# Patient Record
Sex: FEMALE | Race: WHITE | NOT HISPANIC OR LATINO | Employment: OTHER | URBAN - METROPOLITAN AREA
[De-identification: names, ages, dates, MRNs, and addresses within clinical notes are randomized per-mention and may not be internally consistent; named-entity substitution may affect disease eponyms.]

---

## 2023-08-31 PROBLEM — R73.03 PREDIABETES: Status: ACTIVE | Noted: 2023-08-31

## 2023-08-31 PROBLEM — M51.36 DEGENERATION OF LUMBAR INTERVERTEBRAL DISC: Status: ACTIVE | Noted: 2023-08-31

## 2023-08-31 PROBLEM — N94.819 VULVODYNIA: Status: ACTIVE | Noted: 2023-08-31

## 2023-08-31 PROBLEM — M35.3 POLYMYALGIA RHEUMATICA (MULTI): Status: ACTIVE | Noted: 2023-08-31

## 2023-08-31 PROBLEM — E03.9 HYPOTHYROID: Status: ACTIVE | Noted: 2023-08-31

## 2023-08-31 PROBLEM — R91.1 PULMONARY NODULE: Status: ACTIVE | Noted: 2023-08-31

## 2023-08-31 PROBLEM — R70.0 ELEVATED ERYTHROCYTE SEDIMENTATION RATE: Status: ACTIVE | Noted: 2023-08-31

## 2023-08-31 PROBLEM — I10 HYPERTENSION: Status: ACTIVE | Noted: 2023-08-31

## 2023-08-31 PROBLEM — M47.816 LUMBAR SPONDYLOSIS: Status: ACTIVE | Noted: 2023-08-31

## 2023-08-31 PROBLEM — E11.8 COMPLICATION OF DIABETES MELLITUS (MULTI): Status: ACTIVE | Noted: 2023-08-31

## 2023-08-31 PROBLEM — E55.9 VITAMIN D DEFICIENCY: Status: ACTIVE | Noted: 2023-08-31

## 2023-08-31 PROBLEM — E78.5 HYPERLIPIDEMIA: Status: ACTIVE | Noted: 2023-08-31

## 2023-08-31 PROBLEM — I25.10 CORONARY ARTERY DISEASE: Status: ACTIVE | Noted: 2023-08-31

## 2023-08-31 PROBLEM — M19.90 OSTEOARTHRITIS: Status: ACTIVE | Noted: 2023-08-31

## 2023-08-31 PROBLEM — Z90.710 HISTORY OF HYSTERECTOMY: Status: ACTIVE | Noted: 2023-08-31

## 2023-08-31 PROBLEM — E11.9 DIABETES MELLITUS WITHOUT COMPLICATION (MULTI): Status: ACTIVE | Noted: 2023-08-31

## 2023-08-31 PROBLEM — M47.817 SPONDYLOSIS WITHOUT MYELOPATHY OR RADICULOPATHY, LUMBOSACRAL REGION: Status: ACTIVE | Noted: 2023-08-31

## 2023-08-31 PROBLEM — M51.369 DEGENERATION OF LUMBAR INTERVERTEBRAL DISC: Status: ACTIVE | Noted: 2023-08-31

## 2023-08-31 PROBLEM — L90.0 LICHEN SCLEROSUS: Status: ACTIVE | Noted: 2023-08-31

## 2023-08-31 RX ORDER — DICYCLOMINE HYDROCHLORIDE 10 MG/1
1 CAPSULE ORAL 3 TIMES DAILY
COMMUNITY
End: 2024-05-30 | Stop reason: SDUPTHER

## 2023-08-31 RX ORDER — HYDROGEN PEROXIDE 3 %
1 SOLUTION, NON-ORAL MISCELLANEOUS DAILY
COMMUNITY
Start: 2014-08-21

## 2023-08-31 RX ORDER — LANOLIN ALCOHOL/MO/W.PET/CERES
1 CREAM (GRAM) TOPICAL DAILY
COMMUNITY

## 2023-08-31 RX ORDER — LEVOTHYROXINE SODIUM 88 UG/1
1 TABLET ORAL DAILY
COMMUNITY
End: 2024-02-05 | Stop reason: SDUPTHER

## 2023-08-31 RX ORDER — ATORVASTATIN CALCIUM 80 MG/1
1 TABLET, FILM COATED ORAL DAILY
COMMUNITY

## 2023-08-31 RX ORDER — LOSARTAN POTASSIUM 100 MG/1
1 TABLET ORAL DAILY
COMMUNITY
End: 2023-10-14 | Stop reason: HOSPADM

## 2023-08-31 RX ORDER — BLOOD SUGAR DIAGNOSTIC
STRIP MISCELLANEOUS
COMMUNITY
End: 2023-12-28

## 2023-08-31 RX ORDER — VIT C/E/ZN/COPPR/LUTEIN/ZEAXAN 250MG-90MG
1 CAPSULE ORAL DAILY
COMMUNITY

## 2023-08-31 RX ORDER — NAPROXEN SODIUM 220 MG/1
1 TABLET, FILM COATED ORAL DAILY
COMMUNITY

## 2023-09-04 ENCOUNTER — HOSPITAL ENCOUNTER (OUTPATIENT)
Dept: DATA CONVERSION | Facility: HOSPITAL | Age: 88
Discharge: HOME | End: 2023-09-04
Payer: MEDICARE

## 2023-09-04 DIAGNOSIS — Z88.0 ALLERGY STATUS TO PENICILLIN: ICD-10-CM

## 2023-09-04 DIAGNOSIS — Z79.899 OTHER LONG TERM (CURRENT) DRUG THERAPY: ICD-10-CM

## 2023-09-04 DIAGNOSIS — I10 ESSENTIAL (PRIMARY) HYPERTENSION: ICD-10-CM

## 2023-09-04 LAB
ANION GAP SERPL CALCULATED.3IONS-SCNC: 13 MMOL/L (ref 0–19)
BASOPHILS # BLD AUTO: 0.02 K/UL (ref 0–0.22)
BASOPHILS NFR BLD AUTO: 0.3 % (ref 0–1)
BUN SERPL-MCNC: 15 MG/DL (ref 8–25)
BUN/CREAT SERPL: 13.6 RATIO (ref 8–21)
CALCIUM SERPL-MCNC: 9.5 MG/DL (ref 8.5–10.4)
CHLORIDE SERPL-SCNC: 104 MMOL/L (ref 97–107)
CO2 SERPL-SCNC: 24 MMOL/L (ref 24–31)
CREAT SERPL-MCNC: 1.1 MG/DL (ref 0.4–1.6)
DEPRECATED RDW RBC AUTO: 46.1 FL (ref 37–54)
DIFFERENTIAL METHOD BLD: ABNORMAL
EOSINOPHIL # BLD AUTO: 0.14 K/UL (ref 0–0.45)
EOSINOPHIL NFR BLD: 1.9 % (ref 0–3)
ERYTHROCYTE [DISTWIDTH] IN BLOOD BY AUTOMATED COUNT: 13.6 % (ref 11.7–15)
GFR SERPL CREATININE-BSD FRML MDRD: 46 ML/MIN/1.73 M2
GLUCOSE SERPL-MCNC: 198 MG/DL (ref 65–99)
HCT VFR BLD AUTO: 39.4 % (ref 36–44)
HGB BLD-MCNC: 12.7 GM/DL (ref 12–15)
HS TROPONIN T DELTA: 2 (ref 0–4)
HS TROPONIN T DELTA: ABNORMAL (ref 0–4)
IMM GRANULOCYTES # BLD AUTO: 0.02 K/UL (ref 0–0.1)
LYMPHOCYTES # BLD AUTO: 2.69 K/UL (ref 1.2–3.2)
LYMPHOCYTES NFR BLD MANUAL: 37.2 % (ref 20–40)
MCH RBC QN AUTO: 30.1 PG (ref 26–34)
MCHC RBC AUTO-ENTMCNC: 32.2 % (ref 31–37)
MCV RBC AUTO: 93.4 FL (ref 80–100)
MONOCYTES # BLD AUTO: 0.7 K/UL (ref 0–0.8)
MONOCYTES NFR BLD MANUAL: 9.7 % (ref 0–8)
NEUTROPHILS # BLD AUTO: 3.67 K/UL
NEUTROPHILS # BLD AUTO: 3.67 K/UL (ref 1.8–7.7)
NEUTROPHILS.IMMATURE NFR BLD: 0.3 % (ref 0–1)
NEUTS SEG NFR BLD: 50.6 % (ref 50–70)
NRBC BLD-RTO: 0 /100 WBC
PLATELET # BLD AUTO: 169 K/UL (ref 150–450)
PMV BLD AUTO: 10.9 CU (ref 7–12.6)
POTASSIUM SERPL-SCNC: 4.4 MMOL/L (ref 3.4–5.1)
RBC # BLD AUTO: 4.22 M/UL (ref 4–4.9)
SODIUM SERPL-SCNC: 141 MMOL/L (ref 133–145)
TROPONIN T SERPL-MCNC: 26 NG/L
TROPONIN T SERPL-MCNC: 28 NG/L
WBC # BLD AUTO: 7.2 K/UL (ref 4.5–11)

## 2023-09-12 RX ORDER — ESTRADIOL 0.1 MG/G
CREAM VAGINAL DAILY
COMMUNITY
Start: 2023-04-06 | End: 2024-04-08 | Stop reason: WASHOUT

## 2023-09-20 ENCOUNTER — HOSPITAL ENCOUNTER (OUTPATIENT)
Dept: DATA CONVERSION | Facility: HOSPITAL | Age: 88
Discharge: HOME | End: 2023-09-20
Payer: MEDICARE

## 2023-09-28 ENCOUNTER — HOSPITAL ENCOUNTER (OUTPATIENT)
Dept: DATA CONVERSION | Facility: HOSPITAL | Age: 88
Discharge: HOME | End: 2023-09-28
Payer: MEDICARE

## 2023-09-28 LAB
ALBUMIN SERPL-MCNC: 4 GM/DL (ref 3.5–5)
ALBUMIN/GLOB SERPL: 1.3 RATIO (ref 1.5–3)
ALP BLD-CCNC: 120 U/L (ref 35–125)
ALT SERPL-CCNC: 15 U/L (ref 5–40)
AMOXICILLIN+CLAV SUSC ISLT: NORMAL
AMPICILLIN+SULBAC SUSC ISLT: NORMAL
ANION GAP SERPL CALCULATED.3IONS-SCNC: 12 MMOL/L (ref 0–19)
AST SERPL-CCNC: 14 U/L (ref 5–40)
BACTERIA ISLT: NORMAL
BACTERIA UR QL AUTO: NEGATIVE
BASOPHILS # BLD AUTO: 0.03 K/UL (ref 0–0.22)
BASOPHILS NFR BLD AUTO: 0.2 % (ref 0–1)
BILIRUB DIRECT SERPL-MCNC: 0.2 MG/DL (ref 0–0.2)
BILIRUB INDIRECT SERPL-MCNC: 0.2 MG/DL (ref 0–0.8)
BILIRUB SERPL-MCNC: 0.4 MG/DL (ref 0.1–1.2)
BILIRUB UR QL STRIP.AUTO: NEGATIVE
BUN SERPL-MCNC: 33 MG/DL (ref 8–25)
BUN/CREAT SERPL: 25.4 RATIO (ref 8–21)
CALCIUM SERPL-MCNC: 9.6 MG/DL (ref 8.5–10.4)
CEFAZOLIN SUSC ISLT: NORMAL
CEFOTETAN SUSC ISLT: NORMAL
CHLORIDE SERPL-SCNC: 103 MMOL/L (ref 97–107)
CIPROFLOXACIN SUSC ISLT: NORMAL
CLARITY UR: CLEAR
CO2 SERPL-SCNC: 25 MMOL/L (ref 24–31)
COLOR UR: COLORLESS
CREAT SERPL-MCNC: 1.3 MG/DL (ref 0.4–1.6)
DEPRECATED RDW RBC AUTO: 44.4 FL (ref 37–54)
DIFFERENTIAL METHOD BLD: ABNORMAL
EOSINOPHIL # BLD AUTO: 0.09 K/UL (ref 0–0.45)
EOSINOPHIL NFR BLD: 0.6 % (ref 0–3)
ERYTHROCYTE [DISTWIDTH] IN BLOOD BY AUTOMATED COUNT: 13 % (ref 11.7–15)
GENTAMICIN ISLT MLC: NORMAL
GFR SERPL CREATININE-BSD FRML MDRD: 38 ML/MIN/1.73 M2
GLOBULIN SER-MCNC: 3.2 G/DL (ref 1.9–3.7)
GLUCOSE SERPL-MCNC: 243 MG/DL (ref 65–99)
GLUCOSE UR STRIP.AUTO-MCNC: NEGATIVE MG/DL
HCT VFR BLD AUTO: 38.4 % (ref 36–44)
HGB BLD-MCNC: 12.6 GM/DL (ref 12–15)
HGB UR QL STRIP.AUTO: 1 /HPF (ref 0–3)
HGB UR QL: NEGATIVE
HYALINE CASTS UR QL AUTO: ABNORMAL /LPF
IMM GRANULOCYTES # BLD AUTO: 0.06 K/UL (ref 0–0.1)
KETONES UR QL STRIP.AUTO: NEGATIVE
LEUKOCYTE ESTERASE UR QL STRIP.AUTO: ABNORMAL
LEVOFLOXACIN SUSC ISLT: NORMAL
LYMPHOCYTES # BLD AUTO: 2.82 K/UL (ref 1.2–3.2)
LYMPHOCYTES NFR BLD MANUAL: 19.6 % (ref 20–40)
MCH RBC QN AUTO: 30.4 PG (ref 26–34)
MCHC RBC AUTO-ENTMCNC: 32.8 % (ref 31–37)
MCV RBC AUTO: 92.5 FL (ref 80–100)
MEROPENEM SUSC ISLT: NORMAL
MIC (SUSCEPTIBILITY): NORMAL
MICROSCOPIC (UA): ABNORMAL
MONOCYTES # BLD AUTO: 1.15 K/UL (ref 0–0.8)
MONOCYTES NFR BLD MANUAL: 8 % (ref 0–8)
NEUTROPHILS # BLD AUTO: 10.27 K/UL
NEUTROPHILS # BLD AUTO: 10.27 K/UL (ref 1.8–7.7)
NEUTROPHILS.IMMATURE NFR BLD: 0.4 % (ref 0–1)
NEUTS SEG NFR BLD: 71.2 % (ref 50–70)
NITRITE UR QL STRIP.AUTO: NEGATIVE
NITROFURANTOIN SUSC ISLT: NORMAL
NRBC BLD-RTO: 0 /100 WBC
PH UR STRIP.AUTO: 5 [PH] (ref 4.6–8)
PIP+TAZO SUSC ISLT: NORMAL
PLATELET # BLD AUTO: 190 K/UL (ref 150–450)
PMV BLD AUTO: 11 CU (ref 7–12.6)
POTASSIUM SERPL-SCNC: 4.4 MMOL/L (ref 3.4–5.1)
PROT SERPL-MCNC: 7.2 G/DL (ref 5.9–7.9)
PROT UR STRIP.AUTO-MCNC: NEGATIVE MG/DL
RBC # BLD AUTO: 4.15 M/UL (ref 4–4.9)
SODIUM SERPL-SCNC: 140 MMOL/L (ref 133–145)
SP GR UR STRIP.AUTO: 1.01 (ref 1–1.03)
SQUAMOUS UR QL AUTO: ABNORMAL /HPF
TETRACYCLINE SUSC ISLT: NORMAL
TMP SMX SUSC ISLT: NORMAL
URINE CULTURE: ABNORMAL
UROBILINOGEN UR QL STRIP.AUTO: NORMAL MG/DL (ref 0–1)
WBC # BLD AUTO: 14.4 K/UL (ref 4.5–11)
WBC #/AREA URNS AUTO: 6 /HPF (ref 0–3)

## 2023-10-01 ENCOUNTER — HOSPITAL ENCOUNTER (EMERGENCY)
Facility: HOSPITAL | Age: 88
Discharge: STILL ADMITTED OR ADMINISTRATIVE | End: 2023-10-01
Payer: MEDICARE

## 2023-10-01 PROCEDURE — 4500999001 HC ED NO CHARGE

## 2023-10-02 ENCOUNTER — TELEPHONE (OUTPATIENT)
Dept: PRIMARY CARE | Facility: CLINIC | Age: 88
End: 2023-10-02
Payer: MEDICARE

## 2023-10-02 DIAGNOSIS — N30.00 ACUTE CYSTITIS WITHOUT HEMATURIA: Primary | ICD-10-CM

## 2023-10-02 NOTE — TELEPHONE ENCOUNTER
Pt called stating her urine test done previously was positive for a UTI, and was prescribed bactrim at hospital - the patient states she does not have sx of UTI --- she is requesting another lab order for a UA to further dx her - she is requesting they be sent to [facility] - pt states she trusts Dr. Hamilton's judgement

## 2023-10-02 NOTE — TELEPHONE ENCOUNTER
Orders placed for a urine culture and urine micro - okay to send to [facility]

## 2023-10-06 ENCOUNTER — LAB (OUTPATIENT)
Dept: LAB | Facility: LAB | Age: 88
End: 2023-10-06
Payer: MEDICARE

## 2023-10-06 DIAGNOSIS — I25.10 ATHEROSCLEROTIC HEART DISEASE OF NATIVE CORONARY ARTERY WITHOUT ANGINA PECTORIS: Primary | ICD-10-CM

## 2023-10-06 LAB
ANION GAP SERPL CALC-SCNC: 15 MMOL/L (ref 10–20)
BUN SERPL-MCNC: 23 MG/DL (ref 6–23)
CALCIUM SERPL-MCNC: 8.9 MG/DL (ref 8.6–10.3)
CHLORIDE SERPL-SCNC: 95 MMOL/L (ref 98–107)
CO2 SERPL-SCNC: 23 MMOL/L (ref 21–32)
CREAT SERPL-MCNC: 1.65 MG/DL (ref 0.5–1.05)
GFR SERPL CREATININE-BSD FRML MDRD: 29 ML/MIN/1.73M*2
GLUCOSE SERPL-MCNC: 184 MG/DL (ref 74–99)
MAGNESIUM SERPL-MCNC: 1.54 MG/DL (ref 1.6–2.4)
POTASSIUM SERPL-SCNC: 4 MMOL/L (ref 3.5–5.3)
SODIUM SERPL-SCNC: 129 MMOL/L (ref 136–145)

## 2023-10-06 PROCEDURE — 36415 COLL VENOUS BLD VENIPUNCTURE: CPT

## 2023-10-09 ENCOUNTER — APPOINTMENT (OUTPATIENT)
Dept: RADIOLOGY | Facility: HOSPITAL | Age: 88
DRG: 392 | End: 2023-10-09
Payer: MEDICARE

## 2023-10-09 ENCOUNTER — HOSPITAL ENCOUNTER (INPATIENT)
Facility: HOSPITAL | Age: 88
LOS: 5 days | Discharge: HOME | DRG: 392 | End: 2023-10-14
Attending: STUDENT IN AN ORGANIZED HEALTH CARE EDUCATION/TRAINING PROGRAM | Admitting: FAMILY MEDICINE
Payer: MEDICARE

## 2023-10-09 DIAGNOSIS — I10 PRIMARY HYPERTENSION: ICD-10-CM

## 2023-10-09 DIAGNOSIS — M51.36 DEGENERATION OF LUMBAR INTERVERTEBRAL DISC: ICD-10-CM

## 2023-10-09 DIAGNOSIS — R53.1 ASTHENIA: ICD-10-CM

## 2023-10-09 DIAGNOSIS — R19.7 DIARRHEA, UNSPECIFIED TYPE: ICD-10-CM

## 2023-10-09 DIAGNOSIS — E87.1 HYPONATREMIA: Primary | ICD-10-CM

## 2023-10-09 LAB
ALBUMIN SERPL-MCNC: 3.8 G/DL (ref 3.5–5)
ALP BLD-CCNC: 98 U/L (ref 35–125)
ALT SERPL-CCNC: 15 U/L (ref 5–40)
ANION GAP SERPL CALC-SCNC: 12 MMOL/L
APPEARANCE UR: CLEAR
AST SERPL-CCNC: 15 U/L (ref 5–40)
BASOPHILS # BLD AUTO: 0.01 X10*3/UL (ref 0–0.1)
BASOPHILS NFR BLD AUTO: 0.1 %
BILIRUB SERPL-MCNC: <0.2 MG/DL (ref 0.1–1.2)
BILIRUB UR STRIP.AUTO-MCNC: NEGATIVE MG/DL
BUN SERPL-MCNC: 24 MG/DL (ref 8–25)
CALCIUM SERPL-MCNC: 9 MG/DL (ref 8.5–10.4)
CHLORIDE SERPL-SCNC: 90 MMOL/L (ref 97–107)
CO2 SERPL-SCNC: 18 MMOL/L (ref 24–31)
COLOR UR: ABNORMAL
CREAT SERPL-MCNC: 1.4 MG/DL (ref 0.4–1.6)
EOSINOPHIL # BLD AUTO: 0 X10*3/UL (ref 0–0.4)
EOSINOPHIL NFR BLD AUTO: 0 %
ERYTHROCYTE [DISTWIDTH] IN BLOOD BY AUTOMATED COUNT: 12.7 % (ref 11.5–14.5)
GFR SERPL CREATININE-BSD FRML MDRD: 35 ML/MIN/1.73M*2
GLUCOSE SERPL-MCNC: 223 MG/DL (ref 65–99)
GLUCOSE UR STRIP.AUTO-MCNC: NORMAL MG/DL
HCT VFR BLD AUTO: 32.1 % (ref 36–46)
HGB BLD-MCNC: 10.9 G/DL (ref 12–16)
HYALINE CASTS #/AREA URNS AUTO: ABNORMAL /LPF
IMM GRANULOCYTES # BLD AUTO: 0.06 X10*3/UL (ref 0–0.5)
IMM GRANULOCYTES NFR BLD AUTO: 0.6 % (ref 0–0.9)
KETONES UR STRIP.AUTO-MCNC: NEGATIVE MG/DL
LEUKOCYTE ESTERASE UR QL STRIP.AUTO: ABNORMAL
LYMPHOCYTES # BLD AUTO: 0.88 X10*3/UL (ref 0.8–3)
LYMPHOCYTES NFR BLD AUTO: 8.6 %
MCH RBC QN AUTO: 30.9 PG (ref 26–34)
MCHC RBC AUTO-ENTMCNC: 34 G/DL (ref 32–36)
MCV RBC AUTO: 91 FL (ref 80–100)
MONOCYTES # BLD AUTO: 0.83 X10*3/UL (ref 0.05–0.8)
MONOCYTES NFR BLD AUTO: 8.1 %
NEUTROPHILS # BLD AUTO: 8.44 X10*3/UL (ref 1.6–5.5)
NEUTROPHILS NFR BLD AUTO: 82.6 %
NITRITE UR QL STRIP.AUTO: NEGATIVE
NRBC BLD-RTO: 0 /100 WBCS (ref 0–0)
PH UR STRIP.AUTO: 5.5 [PH]
PLATELET # BLD AUTO: 182 X10*3/UL (ref 150–450)
PMV BLD AUTO: 10 FL (ref 7.5–11.5)
POTASSIUM SERPL-SCNC: 4.6 MMOL/L (ref 3.4–5.1)
PROT SERPL-MCNC: 6.3 G/DL (ref 5.9–7.9)
PROT UR STRIP.AUTO-MCNC: ABNORMAL MG/DL
RBC # BLD AUTO: 3.53 X10*6/UL (ref 4–5.2)
RBC # UR STRIP.AUTO: NEGATIVE /UL
RBC #/AREA URNS AUTO: ABNORMAL /HPF
SODIUM SERPL-SCNC: 120 MMOL/L (ref 133–145)
SP GR UR STRIP.AUTO: 1.02
SQUAMOUS #/AREA URNS AUTO: ABNORMAL /HPF
TROPONIN T SERPL-MCNC: 23 NG/L
TSH SERPL DL<=0.05 MIU/L-ACNC: 0.93 MIU/L (ref 0.27–4.2)
UROBILINOGEN UR STRIP.AUTO-MCNC: NORMAL MG/DL
WBC # BLD AUTO: 10.2 X10*3/UL (ref 4.4–11.3)
WBC #/AREA URNS AUTO: ABNORMAL /HPF

## 2023-10-09 PROCEDURE — 2500000004 HC RX 250 GENERAL PHARMACY W/ HCPCS (ALT 636 FOR OP/ED): Performed by: FAMILY MEDICINE

## 2023-10-09 PROCEDURE — 80053 COMPREHEN METABOLIC PANEL: CPT | Performed by: STUDENT IN AN ORGANIZED HEALTH CARE EDUCATION/TRAINING PROGRAM

## 2023-10-09 PROCEDURE — 71046 X-RAY EXAM CHEST 2 VIEWS: CPT

## 2023-10-09 PROCEDURE — 81001 URINALYSIS AUTO W/SCOPE: CPT | Performed by: STUDENT IN AN ORGANIZED HEALTH CARE EDUCATION/TRAINING PROGRAM

## 2023-10-09 PROCEDURE — 1100000001 HC PRIVATE ROOM DAILY

## 2023-10-09 PROCEDURE — G0378 HOSPITAL OBSERVATION PER HR: HCPCS

## 2023-10-09 PROCEDURE — 99285 EMERGENCY DEPT VISIT HI MDM: CPT | Mod: 25 | Performed by: STUDENT IN AN ORGANIZED HEALTH CARE EDUCATION/TRAINING PROGRAM

## 2023-10-09 PROCEDURE — 84443 ASSAY THYROID STIM HORMONE: CPT | Performed by: STUDENT IN AN ORGANIZED HEALTH CARE EDUCATION/TRAINING PROGRAM

## 2023-10-09 PROCEDURE — G1004 CDSM NDSC: HCPCS

## 2023-10-09 PROCEDURE — 36415 COLL VENOUS BLD VENIPUNCTURE: CPT | Performed by: STUDENT IN AN ORGANIZED HEALTH CARE EDUCATION/TRAINING PROGRAM

## 2023-10-09 PROCEDURE — 84484 ASSAY OF TROPONIN QUANT: CPT | Performed by: STUDENT IN AN ORGANIZED HEALTH CARE EDUCATION/TRAINING PROGRAM

## 2023-10-09 PROCEDURE — 2500000004 HC RX 250 GENERAL PHARMACY W/ HCPCS (ALT 636 FOR OP/ED): Performed by: STUDENT IN AN ORGANIZED HEALTH CARE EDUCATION/TRAINING PROGRAM

## 2023-10-09 PROCEDURE — 85025 COMPLETE CBC W/AUTO DIFF WBC: CPT | Performed by: STUDENT IN AN ORGANIZED HEALTH CARE EDUCATION/TRAINING PROGRAM

## 2023-10-09 RX ORDER — SODIUM CHLORIDE 9 MG/ML
100 INJECTION, SOLUTION INTRAVENOUS CONTINUOUS
Status: DISCONTINUED | OUTPATIENT
Start: 2023-10-09 | End: 2023-10-11

## 2023-10-09 RX ORDER — DEXTROSE 50 % IN WATER (D50W) INTRAVENOUS SYRINGE
25
Status: DISCONTINUED | OUTPATIENT
Start: 2023-10-09 | End: 2023-10-14 | Stop reason: HOSPADM

## 2023-10-09 RX ORDER — INSULIN LISPRO 100 [IU]/ML
0-10 INJECTION, SOLUTION INTRAVENOUS; SUBCUTANEOUS
Status: DISCONTINUED | OUTPATIENT
Start: 2023-10-10 | End: 2023-10-14 | Stop reason: HOSPADM

## 2023-10-09 RX ORDER — HYDRALAZINE HYDROCHLORIDE 20 MG/ML
10 INJECTION INTRAMUSCULAR; INTRAVENOUS EVERY 6 HOURS PRN
Status: DISCONTINUED | OUTPATIENT
Start: 2023-10-09 | End: 2023-10-12

## 2023-10-09 RX ORDER — DEXTROSE MONOHYDRATE 100 MG/ML
0.3 INJECTION, SOLUTION INTRAVENOUS ONCE AS NEEDED
Status: DISCONTINUED | OUTPATIENT
Start: 2023-10-09 | End: 2023-10-14 | Stop reason: HOSPADM

## 2023-10-09 RX ORDER — ONDANSETRON HYDROCHLORIDE 2 MG/ML
4 INJECTION, SOLUTION INTRAVENOUS ONCE
Status: COMPLETED | OUTPATIENT
Start: 2023-10-09 | End: 2023-10-09

## 2023-10-09 RX ADMIN — ONDANSETRON 4 MG: 2 INJECTION INTRAMUSCULAR; INTRAVENOUS at 23:37

## 2023-10-09 RX ADMIN — HYDRALAZINE HYDROCHLORIDE 10 MG: 20 INJECTION INTRAMUSCULAR; INTRAVENOUS at 23:36

## 2023-10-09 RX ADMIN — SODIUM CHLORIDE 100 ML/HR: 900 INJECTION, SOLUTION INTRAVENOUS at 23:37

## 2023-10-09 RX ADMIN — SODIUM CHLORIDE 500 ML: 900 INJECTION, SOLUTION INTRAVENOUS at 19:59

## 2023-10-09 ASSESSMENT — COLUMBIA-SUICIDE SEVERITY RATING SCALE - C-SSRS
6. HAVE YOU EVER DONE ANYTHING, STARTED TO DO ANYTHING, OR PREPARED TO DO ANYTHING TO END YOUR LIFE?: NO
2. HAVE YOU ACTUALLY HAD ANY THOUGHTS OF KILLING YOURSELF?: NO
1. IN THE PAST MONTH, HAVE YOU WISHED YOU WERE DEAD OR WISHED YOU COULD GO TO SLEEP AND NOT WAKE UP?: NO

## 2023-10-09 ASSESSMENT — PAIN - FUNCTIONAL ASSESSMENT: PAIN_FUNCTIONAL_ASSESSMENT: 0-10

## 2023-10-09 ASSESSMENT — PAIN SCALES - GENERAL
PAINLEVEL_OUTOF10: 0 - NO PAIN
PAINLEVEL_OUTOF10: 0 - NO PAIN

## 2023-10-09 NOTE — ED PROVIDER NOTES
HPI   Chief Complaint   Patient presents with    Fatigue     Patient brought in by EMS for complaints of weakness, diarrhea and blurred vision       Loss of appetite, nausea, vomiting, tired, lightheaded, cough, diarrhea. Denies abdominal pain however.  Patient states that she has had 6 episodes of diarrhea today.  She has been unable to eat due to lack of appetite and nausea if she does try to eat.  She had several bites of toast and little fluids today.  Patient reports that she has been feeling weak.  She does live at home alone.  She states that she has had diarrhea for approximately the last 4 weeks.  She has had multiple urinary tract infections recently treated with antibiotics.                          [place] Coma Scale Score: 15                  Patient History   No past medical history on file.  No past surgical history on file.  Family History   Problem Relation Name Age of Onset    Heart disease Mother      Heart disease Father      Heart disease Brother      No Known Problems Son      Heart disease Maternal Grandmother      Heart disease Maternal Grandfather      Heart disease Paternal Grandmother      Heart disease Paternal Grandfather       Social History     Tobacco Use    Smoking status: Not on file    Smokeless tobacco: Not on file   Substance Use Topics    Alcohol use: Not on file    Drug use: Not on file       Physical Exam   ED Triage Vitals [10/09/23 1817]   Temp Heart Rate Resp BP   37.1 °C (98.8 °F) 88 16 (!) 219/96      SpO2 Temp Source Heart Rate Source Patient Position   95 % Oral Monitor Lying      BP Location FiO2 (%)     Left arm --       Physical Exam  HENT:      Head: Normocephalic.   Cardiovascular:      Comments: Systolic murmur but normal rate  Pulmonary:      Effort: Pulmonary effort is normal.   Abdominal:      Comments: Abdomen soft and nontender to palpation   Skin:     General: Skin is warm.   Neurological:      General: No focal deficit present.      Mental Status: She is  alert.       EKG interpretation: Sinus rhythm with PVCs, rate 85.  Normal axis.  LVH.  No significant ST or T wave abnormalities.    ED Course & MDM   Diagnoses as of 10/09/23 6662   Hyponatremia   Asthenia   Diarrhea, unspecified type       Medical Decision Making  Laboratory studies are significant for hyponatremia.  Chest x-ray interpreted by radiology is unremarkable.  CAT scan reveals multiple lung nodules, patient's family states that patient had been told about this several years ago and has already had follow-up testing regarding these.  In setting of difficulty with oral intake and ongoing diarrhea, patient was accepted to hospitalist service for further management of hyponatremia.  Prior to obtaining laboratory results, I had given patient a cautious 500 mL fluid bolus of normal saline, but no further boluses were given.  Patient was noted to have elevated blood pressure, however I do not feel that she has hypertensive emergency and do not feel that treatment of this blood pressure is required acutely.  Parts of this chart were completed with dictation software, please excuse any errors in transcription.        Procedure  Procedures     Asad Rizo MD  10/09/23 2666

## 2023-10-10 ENCOUNTER — APPOINTMENT (OUTPATIENT)
Dept: RADIOLOGY | Facility: HOSPITAL | Age: 88
DRG: 392 | End: 2023-10-10
Payer: MEDICARE

## 2023-10-10 LAB
ANION GAP SERPL CALC-SCNC: 12 MMOL/L
ANION GAP SERPL CALC-SCNC: 9 MMOL/L
BASOPHILS # BLD AUTO: 0.02 X10*3/UL (ref 0–0.1)
BASOPHILS NFR BLD AUTO: 0.2 %
BUN SERPL-MCNC: 19 MG/DL (ref 8–25)
BUN SERPL-MCNC: 22 MG/DL (ref 8–25)
CALCIUM SERPL-MCNC: 8.9 MG/DL (ref 8.5–10.4)
CALCIUM SERPL-MCNC: 8.9 MG/DL (ref 8.5–10.4)
CHLORIDE SERPL-SCNC: 92 MMOL/L (ref 97–107)
CHLORIDE SERPL-SCNC: 93 MMOL/L (ref 97–107)
CO2 SERPL-SCNC: 17 MMOL/L (ref 24–31)
CO2 SERPL-SCNC: 19 MMOL/L (ref 24–31)
CREAT SERPL-MCNC: 1.2 MG/DL (ref 0.4–1.6)
CREAT SERPL-MCNC: 1.3 MG/DL (ref 0.4–1.6)
CREAT UR-MCNC: 90.4 MG/DL
EOSINOPHIL # BLD AUTO: 0.04 X10*3/UL (ref 0–0.4)
EOSINOPHIL NFR BLD AUTO: 0.5 %
ERYTHROCYTE [DISTWIDTH] IN BLOOD BY AUTOMATED COUNT: 12.7 % (ref 11.5–14.5)
GFR SERPL CREATININE-BSD FRML MDRD: 38 ML/MIN/1.73M*2
GFR SERPL CREATININE-BSD FRML MDRD: 42 ML/MIN/1.73M*2
GLUCOSE BLD MANUAL STRIP-MCNC: 109 MG/DL (ref 74–99)
GLUCOSE BLD MANUAL STRIP-MCNC: 123 MG/DL (ref 74–99)
GLUCOSE BLD MANUAL STRIP-MCNC: 126 MG/DL (ref 74–99)
GLUCOSE BLD MANUAL STRIP-MCNC: 157 MG/DL (ref 74–99)
GLUCOSE SERPL-MCNC: 120 MG/DL (ref 65–99)
GLUCOSE SERPL-MCNC: 140 MG/DL (ref 65–99)
HCT VFR BLD AUTO: 30.9 % (ref 36–46)
HGB BLD-MCNC: 10.4 G/DL (ref 12–16)
IMM GRANULOCYTES # BLD AUTO: 0.04 X10*3/UL (ref 0–0.5)
IMM GRANULOCYTES NFR BLD AUTO: 0.5 % (ref 0–0.9)
LYMPHOCYTES # BLD AUTO: 2.28 X10*3/UL (ref 0.8–3)
LYMPHOCYTES NFR BLD AUTO: 26.1 %
MCH RBC QN AUTO: 30.6 PG (ref 26–34)
MCHC RBC AUTO-ENTMCNC: 33.7 G/DL (ref 32–36)
MCV RBC AUTO: 91 FL (ref 80–100)
MONOCYTES # BLD AUTO: 1.06 X10*3/UL (ref 0.05–0.8)
MONOCYTES NFR BLD AUTO: 12.1 %
NEUTROPHILS # BLD AUTO: 5.31 X10*3/UL (ref 1.6–5.5)
NEUTROPHILS NFR BLD AUTO: 60.6 %
NRBC BLD-RTO: 0 /100 WBCS (ref 0–0)
PLATELET # BLD AUTO: 196 X10*3/UL (ref 150–450)
PMV BLD AUTO: 10.8 FL (ref 7.5–11.5)
POTASSIUM SERPL-SCNC: 4.6 MMOL/L (ref 3.4–5.1)
POTASSIUM SERPL-SCNC: 4.8 MMOL/L (ref 3.4–5.1)
RBC # BLD AUTO: 3.4 X10*6/UL (ref 4–5.2)
SARS-COV-2 RNA RESP QL NAA+PROBE: NOT DETECTED
SODIUM SERPL-SCNC: 121 MMOL/L (ref 133–145)
SODIUM SERPL-SCNC: 121 MMOL/L (ref 133–145)
SODIUM UR-SCNC: 123 MMOL/L
SODIUM/CREAT UR-RTO: 136 MMOL/G CREAT
TROPONIN T SERPL-MCNC: 28 NG/L
TROPONIN T SERPL-MCNC: 28 NG/L
TROPONIN T SERPL-MCNC: 37 NG/L
WBC # BLD AUTO: 8.8 X10*3/UL (ref 4.4–11.3)

## 2023-10-10 PROCEDURE — 1100000001 HC PRIVATE ROOM DAILY

## 2023-10-10 PROCEDURE — 2500000004 HC RX 250 GENERAL PHARMACY W/ HCPCS (ALT 636 FOR OP/ED): Performed by: NURSE PRACTITIONER

## 2023-10-10 PROCEDURE — 83930 ASSAY OF BLOOD OSMOLALITY: CPT | Mod: CMCLAB,TRILAB | Performed by: NURSE PRACTITIONER

## 2023-10-10 PROCEDURE — 97162 PT EVAL MOD COMPLEX 30 MIN: CPT | Mod: GP

## 2023-10-10 PROCEDURE — 2500000001 HC RX 250 WO HCPCS SELF ADMINISTERED DRUGS (ALT 637 FOR MEDICARE OP): Performed by: NURSE PRACTITIONER

## 2023-10-10 PROCEDURE — 87635 SARS-COV-2 COVID-19 AMP PRB: CPT | Performed by: STUDENT IN AN ORGANIZED HEALTH CARE EDUCATION/TRAINING PROGRAM

## 2023-10-10 PROCEDURE — 36415 COLL VENOUS BLD VENIPUNCTURE: CPT | Performed by: FAMILY MEDICINE

## 2023-10-10 PROCEDURE — 84484 ASSAY OF TROPONIN QUANT: CPT | Performed by: FAMILY MEDICINE

## 2023-10-10 PROCEDURE — 2500000002 HC RX 250 W HCPCS SELF ADMINISTERED DRUGS (ALT 637 FOR MEDICARE OP, ALT 636 FOR OP/ED): Performed by: FAMILY MEDICINE

## 2023-10-10 PROCEDURE — 97165 OT EVAL LOW COMPLEX 30 MIN: CPT | Mod: GO

## 2023-10-10 PROCEDURE — 71250 CT THORAX DX C-: CPT | Mod: ME

## 2023-10-10 PROCEDURE — 84484 ASSAY OF TROPONIN QUANT: CPT | Performed by: STUDENT IN AN ORGANIZED HEALTH CARE EDUCATION/TRAINING PROGRAM

## 2023-10-10 PROCEDURE — 87086 URINE CULTURE/COLONY COUNT: CPT | Mod: CMCLAB,TRILAB | Performed by: NURSE PRACTITIONER

## 2023-10-10 PROCEDURE — 83935 ASSAY OF URINE OSMOLALITY: CPT | Mod: CMCLAB,TRILAB | Performed by: NURSE PRACTITIONER

## 2023-10-10 PROCEDURE — 85025 COMPLETE CBC W/AUTO DIFF WBC: CPT | Performed by: FAMILY MEDICINE

## 2023-10-10 PROCEDURE — 80048 BASIC METABOLIC PNL TOTAL CA: CPT | Performed by: FAMILY MEDICINE

## 2023-10-10 PROCEDURE — 96372 THER/PROPH/DIAG INJ SC/IM: CPT | Performed by: FAMILY MEDICINE

## 2023-10-10 PROCEDURE — 2500000005 HC RX 250 GENERAL PHARMACY W/O HCPCS: Performed by: NURSE PRACTITIONER

## 2023-10-10 PROCEDURE — 84300 ASSAY OF URINE SODIUM: CPT | Performed by: NURSE PRACTITIONER

## 2023-10-10 PROCEDURE — 2500000004 HC RX 250 GENERAL PHARMACY W/ HCPCS (ALT 636 FOR OP/ED): Performed by: FAMILY MEDICINE

## 2023-10-10 PROCEDURE — 82947 ASSAY GLUCOSE BLOOD QUANT: CPT

## 2023-10-10 PROCEDURE — 87086 URINE CULTURE/COLONY COUNT: CPT | Mod: TRILAB | Performed by: NURSE PRACTITIONER

## 2023-10-10 PROCEDURE — 80048 BASIC METABOLIC PNL TOTAL CA: CPT | Performed by: INTERNAL MEDICINE

## 2023-10-10 PROCEDURE — 2500000002 HC RX 250 W HCPCS SELF ADMINISTERED DRUGS (ALT 637 FOR MEDICARE OP, ALT 636 FOR OP/ED): Performed by: NURSE PRACTITIONER

## 2023-10-10 PROCEDURE — S0119 ONDANSETRON 4 MG: HCPCS | Performed by: NURSE PRACTITIONER

## 2023-10-10 RX ORDER — CHOLECALCIFEROL (VITAMIN D3) 25 MCG
25 TABLET ORAL DAILY
Status: DISCONTINUED | OUTPATIENT
Start: 2023-10-10 | End: 2023-10-14 | Stop reason: HOSPADM

## 2023-10-10 RX ORDER — AMLODIPINE BESYLATE 5 MG/1
5 TABLET ORAL DAILY
Status: DISCONTINUED | OUTPATIENT
Start: 2023-10-10 | End: 2023-10-11

## 2023-10-10 RX ORDER — NAPROXEN SODIUM 220 MG/1
81 TABLET, FILM COATED ORAL DAILY
Status: DISCONTINUED | OUTPATIENT
Start: 2023-10-10 | End: 2023-10-14 | Stop reason: HOSPADM

## 2023-10-10 RX ORDER — ONDANSETRON HYDROCHLORIDE 2 MG/ML
4 INJECTION, SOLUTION INTRAVENOUS EVERY 8 HOURS PRN
Status: DISCONTINUED | OUTPATIENT
Start: 2023-10-10 | End: 2023-10-14 | Stop reason: HOSPADM

## 2023-10-10 RX ORDER — ONDANSETRON 4 MG/1
4 TABLET, ORALLY DISINTEGRATING ORAL EVERY 8 HOURS PRN
Status: DISCONTINUED | OUTPATIENT
Start: 2023-10-10 | End: 2023-10-14 | Stop reason: HOSPADM

## 2023-10-10 RX ORDER — LEVOTHYROXINE SODIUM 88 UG/1
88 TABLET ORAL
Status: DISCONTINUED | OUTPATIENT
Start: 2023-10-10 | End: 2023-10-14 | Stop reason: HOSPADM

## 2023-10-10 RX ORDER — LANOLIN ALCOHOL/MO/W.PET/CERES
1000 CREAM (GRAM) TOPICAL DAILY
Status: DISCONTINUED | OUTPATIENT
Start: 2023-10-10 | End: 2023-10-14 | Stop reason: HOSPADM

## 2023-10-10 RX ORDER — ATORVASTATIN CALCIUM 80 MG/1
80 TABLET, FILM COATED ORAL NIGHTLY
Status: DISCONTINUED | OUTPATIENT
Start: 2023-10-10 | End: 2023-10-14 | Stop reason: HOSPADM

## 2023-10-10 RX ORDER — PANTOPRAZOLE SODIUM 20 MG/1
20 TABLET, DELAYED RELEASE ORAL
Status: DISCONTINUED | OUTPATIENT
Start: 2023-10-10 | End: 2023-10-14 | Stop reason: HOSPADM

## 2023-10-10 RX ADMIN — ATORVASTATIN CALCIUM 80 MG: 80 TABLET, FILM COATED ORAL at 22:20

## 2023-10-10 RX ADMIN — LEVOTHYROXINE SODIUM 88 MCG: 0.09 TABLET ORAL at 09:56

## 2023-10-10 RX ADMIN — AMLODIPINE BESYLATE 5 MG: 5 TABLET ORAL at 09:56

## 2023-10-10 RX ADMIN — CHOLECALCIFEROL TAB 25 MCG (1000 UNIT) 25 MCG: 25 TAB at 09:55

## 2023-10-10 RX ADMIN — MEROPENEM 1 G: 1 INJECTION, POWDER, FOR SOLUTION INTRAVENOUS at 09:57

## 2023-10-10 RX ADMIN — INSULIN LISPRO 2 UNITS: 100 INJECTION, SOLUTION INTRAVENOUS; SUBCUTANEOUS at 11:58

## 2023-10-10 RX ADMIN — PANTOPRAZOLE SODIUM 20 MG: 20 TABLET, DELAYED RELEASE ORAL at 09:56

## 2023-10-10 RX ADMIN — CYANOCOBALAMIN TAB 1000 MCG 1000 MCG: 1000 TAB at 09:56

## 2023-10-10 RX ADMIN — ASPIRIN 81 MG CHEWABLE TABLET 81 MG: 81 TABLET CHEWABLE at 09:55

## 2023-10-10 RX ADMIN — HYDRALAZINE HYDROCHLORIDE 5 MG: 20 INJECTION INTRAMUSCULAR; INTRAVENOUS at 08:26

## 2023-10-10 RX ADMIN — MEROPENEM 1 G: 1 INJECTION, POWDER, FOR SOLUTION INTRAVENOUS at 22:20

## 2023-10-10 RX ADMIN — SITAGLIPTIN 100 MG: 100 TABLET, FILM COATED ORAL at 09:55

## 2023-10-10 RX ADMIN — ONDANSETRON 4 MG: 4 TABLET, ORALLY DISINTEGRATING ORAL at 09:56

## 2023-10-10 RX ADMIN — SODIUM CHLORIDE 100 ML/HR: 900 INJECTION, SOLUTION INTRAVENOUS at 09:57

## 2023-10-10 SDOH — SOCIAL STABILITY: SOCIAL INSECURITY: ABUSE: ADULT

## 2023-10-10 SDOH — SOCIAL STABILITY: SOCIAL INSECURITY: WERE YOU ABLE TO COMPLETE ALL THE BEHAVIORAL HEALTH SCREENINGS?: YES

## 2023-10-10 SDOH — SOCIAL STABILITY: SOCIAL INSECURITY: ARE YOU OR HAVE YOU BEEN THREATENED OR ABUSED PHYSICALLY, EMOTIONALLY, OR SEXUALLY BY ANYONE?: NO

## 2023-10-10 SDOH — SOCIAL STABILITY: SOCIAL INSECURITY: HAVE YOU HAD THOUGHTS OF HARMING ANYONE ELSE?: NO

## 2023-10-10 SDOH — SOCIAL STABILITY: SOCIAL INSECURITY: HAS ANYONE EVER THREATENED TO HURT YOUR FAMILY OR YOUR PETS?: NO

## 2023-10-10 SDOH — SOCIAL STABILITY: SOCIAL INSECURITY: DO YOU FEEL UNSAFE GOING BACK TO THE PLACE WHERE YOU ARE LIVING?: NO

## 2023-10-10 SDOH — SOCIAL STABILITY: SOCIAL INSECURITY: DOES ANYONE TRY TO KEEP YOU FROM HAVING/CONTACTING OTHER FRIENDS OR DOING THINGS OUTSIDE YOUR HOME?: NO

## 2023-10-10 SDOH — SOCIAL STABILITY: SOCIAL INSECURITY: DO YOU FEEL ANYONE HAS EXPLOITED OR TAKEN ADVANTAGE OF YOU FINANCIALLY OR OF YOUR PERSONAL PROPERTY?: NO

## 2023-10-10 SDOH — SOCIAL STABILITY: SOCIAL INSECURITY: ARE THERE ANY APPARENT SIGNS OF INJURIES/BEHAVIORS THAT COULD BE RELATED TO ABUSE/NEGLECT?: NO

## 2023-10-10 ASSESSMENT — ACTIVITIES OF DAILY LIVING (ADL)
WALKS IN HOME: INDEPENDENT
DRESSING YOURSELF: INDEPENDENT
BATHING_ASSISTANCE: NOT PERFORMED
ADEQUATE_TO_COMPLETE_ADL: YES
PATIENT'S MEMORY ADEQUATE TO SAFELY COMPLETE DAILY ACTIVITIES?: YES
LACK_OF_TRANSPORTATION: NO
ADL_ASSISTANCE: INDEPENDENT
FEEDING YOURSELF: INDEPENDENT
JUDGMENT_ADEQUATE_SAFELY_COMPLETE_DAILY_ACTIVITIES: YES
HEARING - RIGHT EAR: HEARING AID
HEARING - RIGHT EAR: HEARING AID
TOILETING: INDEPENDENT
WALKS IN HOME: INDEPENDENT
TOILETING: INDEPENDENT
JUDGMENT_ADEQUATE_SAFELY_COMPLETE_DAILY_ACTIVITIES: YES
PATIENT'S MEMORY ADEQUATE TO SAFELY COMPLETE DAILY ACTIVITIES?: YES
GROOMING: INDEPENDENT
FEEDING YOURSELF: INDEPENDENT
BATHING: INDEPENDENT
ADL_ASSISTANCE: INDEPENDENT
HEARING - LEFT EAR: HEARING AID
GROOMING: INDEPENDENT
ADEQUATE_TO_COMPLETE_ADL: YES
DRESSING YOURSELF: INDEPENDENT
BATHING: INDEPENDENT
HEARING - LEFT EAR: HEARING AID

## 2023-10-10 ASSESSMENT — ENCOUNTER SYMPTOMS
PALPITATIONS: 0
WEAKNESS: 0
SEIZURES: 0
POLYDIPSIA: 0
WHEEZING: 0
SINUS PRESSURE: 0
BLOOD IN STOOL: 0
NAUSEA: 1
COUGH: 1
TREMORS: 0
COLOR CHANGE: 0
BRUISES/BLEEDS EASILY: 0
LIGHT-HEADEDNESS: 0
CARDIOVASCULAR NEGATIVE: 1
ARTHRALGIAS: 0
PSYCHIATRIC NEGATIVE: 1
CONFUSION: 0
CONSTIPATION: 0
RECTAL PAIN: 0
HEADACHES: 0
FEVER: 0
APNEA: 0
ABDOMINAL PAIN: 0
ADENOPATHY: 0
BACK PAIN: 0
SHORTNESS OF BREATH: 0
SPEECH DIFFICULTY: 0
FATIGUE: 0
HALLUCINATIONS: 0
CHILLS: 0
HEMATOLOGIC/LYMPHATIC NEGATIVE: 1
SORE THROAT: 0
SLEEP DISTURBANCE: 0
COUGH: 0
VOMITING: 1
NECK PAIN: 0
ALLERGIC/IMMUNOLOGIC NEGATIVE: 1
HEMATURIA: 0
MUSCULOSKELETAL NEGATIVE: 1
DIZZINESS: 0
EYES NEGATIVE: 1
DYSURIA: 0
POLYPHAGIA: 0
DIARRHEA: 1
WOUND: 0
NEUROLOGICAL NEGATIVE: 1
FATIGUE: 1
MYALGIAS: 0
APPETITE CHANGE: 1
FREQUENCY: 0
NUMBNESS: 0
PHOTOPHOBIA: 0
JOINT SWELLING: 0

## 2023-10-10 ASSESSMENT — PAIN SCALES - GENERAL
PAINLEVEL_OUTOF10: 0 - NO PAIN

## 2023-10-10 ASSESSMENT — LIFESTYLE VARIABLES
HOW OFTEN DO YOU HAVE A DRINK CONTAINING ALCOHOL: NEVER
AUDIT-C TOTAL SCORE: 0
SKIP TO QUESTIONS 9-10: 1
SKIP TO QUESTIONS 9-10: 1
SUBSTANCE_ABUSE_PAST_12_MONTHS: NO
HOW OFTEN DO YOU HAVE 6 OR MORE DRINKS ON ONE OCCASION: NEVER
HOW OFTEN DO YOU HAVE A DRINK CONTAINING ALCOHOL: NEVER
PRESCIPTION_ABUSE_PAST_12_MONTHS: NO
SUBSTANCE_ABUSE_PAST_12_MONTHS: NO
HOW MANY STANDARD DRINKS CONTAINING ALCOHOL DO YOU HAVE ON A TYPICAL DAY: PATIENT DOES NOT DRINK
HOW OFTEN DO YOU HAVE 6 OR MORE DRINKS ON ONE OCCASION: NEVER
HOW MANY STANDARD DRINKS CONTAINING ALCOHOL DO YOU HAVE ON A TYPICAL DAY: PATIENT DOES NOT DRINK
PRESCIPTION_ABUSE_PAST_12_MONTHS: NO
AUDIT-C TOTAL SCORE: 0

## 2023-10-10 ASSESSMENT — PAIN - FUNCTIONAL ASSESSMENT
PAIN_FUNCTIONAL_ASSESSMENT: 0-10

## 2023-10-10 ASSESSMENT — COGNITIVE AND FUNCTIONAL STATUS - GENERAL
CLIMB 3 TO 5 STEPS WITH RAILING: A LITTLE
MOBILITY SCORE: 18
MOVING FROM LYING ON BACK TO SITTING ON SIDE OF FLAT BED WITH BEDRAILS: A LITTLE
STANDING UP FROM CHAIR USING ARMS: A LITTLE
MOVING TO AND FROM BED TO CHAIR: A LITTLE
MOVING FROM LYING ON BACK TO SITTING ON SIDE OF FLAT BED WITH BEDRAILS: A LITTLE
DAILY ACTIVITIY SCORE: 19
TOILETING: A LITTLE
MOVING TO AND FROM BED TO CHAIR: A LITTLE
PATIENT BASELINE BEDBOUND: NO
WALKING IN HOSPITAL ROOM: A LITTLE
STANDING UP FROM CHAIR USING ARMS: A LITTLE
DAILY ACTIVITIY SCORE: 20
DAILY ACTIVITIY SCORE: 21
MOVING FROM LYING ON BACK TO SITTING ON SIDE OF FLAT BED WITH BEDRAILS: A LITTLE
DRESSING REGULAR LOWER BODY CLOTHING: A LITTLE
TURNING FROM BACK TO SIDE WHILE IN FLAT BAD: A LITTLE
DRESSING REGULAR UPPER BODY CLOTHING: A LITTLE
MOBILITY SCORE: 17
CLIMB 3 TO 5 STEPS WITH RAILING: A LOT
WALKING IN HOSPITAL ROOM: A LITTLE
TURNING FROM BACK TO SIDE WHILE IN FLAT BAD: A LITTLE
STANDING UP FROM CHAIR USING ARMS: A LITTLE
DRESSING REGULAR LOWER BODY CLOTHING: A LITTLE
WALKING IN HOSPITAL ROOM: A LITTLE
TOILETING: A LITTLE
MOVING TO AND FROM BED TO CHAIR: A LITTLE
HELP NEEDED FOR BATHING: A LITTLE
DRESSING REGULAR LOWER BODY CLOTHING: A LITTLE
CLIMB 3 TO 5 STEPS WITH RAILING: A LITTLE
HELP NEEDED FOR BATHING: A LITTLE
TOILETING: A LITTLE
PERSONAL GROOMING: A LITTLE
MOBILITY SCORE: 20
HELP NEEDED FOR BATHING: A LITTLE
DRESSING REGULAR UPPER BODY CLOTHING: A LITTLE

## 2023-10-10 ASSESSMENT — PATIENT HEALTH QUESTIONNAIRE - PHQ9
2. FEELING DOWN, DEPRESSED OR HOPELESS: NOT AT ALL
1. LITTLE INTEREST OR PLEASURE IN DOING THINGS: NOT AT ALL
SUM OF ALL RESPONSES TO PHQ9 QUESTIONS 1 & 2: 0
1. LITTLE INTEREST OR PLEASURE IN DOING THINGS: NOT AT ALL
2. FEELING DOWN, DEPRESSED OR HOPELESS: NOT AT ALL
SUM OF ALL RESPONSES TO PHQ9 QUESTIONS 1 & 2: 0

## 2023-10-10 NOTE — PROGRESS NOTES
Physical Therapy    Physical Therapy Evaluation    Patient Name: Fartun Carey  MRN: [id number]  Today's Date: 10/10/2023   Time Calculation  Start Time: 1021  Stop Time: 1036  Time Calculation (min): 15 min    Assessment/Plan   PT Assessment  PT Assessment Results: Decreased strength, Decreased range of motion, Impaired balance, Decreased endurance, Decreased mobility, Decreased safety awareness  Rehab Prognosis: Excellent  Evaluation/Treatment Tolerance: Patient tolerated treatment well, Patient limited by fatigue  Medical Staff Made Aware: Yes  Strengths: Ability to acquire knowledge, Housing layout, Insight into problems, Premorbid level of function, Rehab experience  End of Session Communication: Bedside nurse  End of Session Patient Position: Bed, 3 rail up, Alarm on  IP OR SWING BED PT PLAN  Inpatient or Swing Bed: Inpatient  PT Plan  Treatment/Interventions: Bed mobility, Transfer training, Gait training, Stair training, Balance training, Strengthening, Endurance training, Range of motion, Therapeutic exercise, Therapeutic activity, Home exercise program  PT Plan: Skilled PT  PT Frequency: 3 times per week  PT Discharge Recommendations: Low intensity level of continued care, 24 hr supervision due to cognition  Equipment Recommended upon Discharge: Wheeled walker  PT Recommended Transfer Status: Assist x1, Assistive device      Subjective   General Visit Information:  General  Reason for Referral: Impaired Mobility  Referred By: Dr. Caroline Denise  Past Medical History Relevant to Rehab: HTN, DB  Family/Caregiver Present: No  Prior to Session Communication: Bedside nurse  Patient Position Received: Up in chair  Preferred Learning Style: verbal  Home Living:  Home Living  Type of Home: Condo  Lives With: Alone  Home Adaptive Equipment: Walker rolling or standard  Home Layout: One level  Home Access: Stairs to enter with rails  Entrance Stairs-Rails: None  Entrance Stairs-Number of Steps: 1  Bathroom Shower/Tub:  Walk-in shower  Bathroom Equipment: Grab bars in shower, Shower chair with back  Prior Level of Function:  Prior Function Per Pt/Caregiver Report  Level of [place]: Independent with ADLs and functional transfers, Needs assistance with homemaking, Independent with homemaking with wheelchair ([facility] assist meal prep, cleaning)  Receives Help From: Home health  ADL Assistance: Independent  Homemaking Assistance: Needs assistance  Meal Prep:  (assist from aid)  Laundry:  (assist from aid)  Vacuuming:  (assist from aid)  Cleaning:  (assist from aid)  Driving/Transportation: Independent  Ambulatory Assistance: Independent  Transfers: Independent  Gait: Other (Comment) (ind in home no AD, FWW in community and long Dx)  Stairs:  (ind)  Precautions:     Vital Signs:  Vital Signs  Heart Rate: 84  Heart Rate Source: Monitor  SpO2: 95 % (ra)  BP: 177/53 (rn made aware)  BP Location: Right arm  BP Method: Automatic  Patient Position: Sitting    Objective   Pain:  Pain Assessment  Pain Assessment: 0-10  Pain Score: 0 - No pain  Cognition:  Cognition  Overall Cognitive Status: Within Functional Limits    General Assessments:  Activity Tolerance  Endurance: Decreased tolerance for upright activites  Rate of Perceived Exertion (RPE): 7/10 (post gait)    Sensation  Light Touch: No apparent deficits    Static Sitting Balance  Static Sitting-Balance Support: Bilateral upper extremity supported, Feet supported  Static Sitting-Level of Assistance: Independent    Static Standing Balance  Static Standing-Balance Support: Bilateral upper extremity supported  Static Standing-Level of Assistance: Contact guard  Static Standing-Comment/Number of Minutes: 1  Functional Assessments:  Bed Mobility  Bed Mobility: Yes  Bed Mobility 1  Bed Mobility 1: Sitting to supine  Level of Assistance 1: Close supervision  Bed Mobility Comments 1: no rail use    Transfers  Transfer: Yes  Transfer 1  Transfer From 1: Sit to  Transfer to 1: Stand  Technique  1: Sit to stand  Transfer Device 1: Walker  Transfer Level of Assistance 1: Close supervision  Trials/Comments 1: x3 trials    Ambulation/Gait Training  Ambulation/Gait Training Performed: Yes  Ambulation/Gait Training 1  Surface 1: Level tile  Device 1: Rolling walker  Assistance 1: Contact guard  Quality of Gait 1:  (slow laborous with mild unsteadiness)  Comments/Distance (ft) 1: 15  Extremity/Trunk Assessments:  RLE   RLE : Exceptions to WFL  Strength RLE  RLE Overall Strength: Deficits  R Hip Flexion: 4/5  R Knee Flexion: 4/5  R Knee Extension: 4/5  LLE   LLE : Exceptions to WFL  Strength LLE  LLE Overall Strength: Deficits  L Hip Flexion: 4/5  L Knee Flexion: 4/5  L Knee Extension: 4/5  Outcome Measures:  [facility] Basic Mobility  Turning from your back to your side while in a flat bed without using bedrails: A little  Moving from lying on your back to sitting on the side of a flat bed without using bedrails: A little  Moving to and from bed to chair (including a wheelchair): A little  Standing up from a chair using your arms (e.g. wheelchair or bedside chair): A little  To walk in hospital room: A little  Climbing 3-5 steps with railing: A lot  Basic Mobility - Total Score: 17    Encounter Problems       Encounter Problems (Active)       Balance       Sitting Balance (Progressing)       Start:  10/10/23    Expected End:  10/24/23       Pt will demonstrate good sitting balance to promote safe engagement with out of bed activities           Standing Balance (Progressing)       Start:  10/10/23    Expected End:  10/24/23       Pt will demonstrate good static standing balance to promote safe participation with out of bed activity, transfers, and mobility              Mobility       Ambulation (Progressing)       Start:  10/10/23    Expected End:  10/24/23       Pt will ambulate 50' independent assist with walker to promote safe home mobility           Stair Negotiation (Progressing)       Start:  10/10/23    Expected  End:  10/24/23       Pt will ascend/descend 1 stairs, no rails, and least restrictive device with independent assist to safely enter/exit the home environment              Safety       LTG - Patient will demonstrate safety requirements appropriate to situation/environment (Progressing)       Start:  10/10/23    Expected End:  10/24/23            LTG - Patient will utilize safety techniques (Progressing)       Start:  10/10/23    Expected End:  10/24/23               Safety       Safe Mobility Techniques (Progressing)       Start:  10/10/23    Expected End:  10/24/23       Pt will correctly identify and demonstrate safe mobility techniques to reduce their risks for falls during their acute care stay               Transfers       Supine to sit (Progressing)       Start:  10/10/23    Expected End:  10/24/23       Pt will transfer supine to sitting at edge of bed with independent assist to promote acute care out of bed activity           Sit to stand (Progressing)       Start:  10/10/23    Expected End:  10/24/23       Pt will transfer sit to standing position with independent assist and least restrictive device to promote safe out of bed activity                  Education Documentation  Mobility Training, taught by Jorge Soriano, PT at 10/10/2023 11:31 AM.  Learner: Patient  Readiness: Eager  Method: Explanation  Response: Verbalizes Understanding  Comment: safe mobility techniques, transfer techniques, AD use, fall risk management    Education Comments  No comments found.

## 2023-10-10 NOTE — NURSING NOTE
Patient resting in bed comfortably. No signs of distress or pain. Call light and belongings within reach.

## 2023-10-10 NOTE — NURSING NOTE
Patient concerned with 10mg dose of hydralazine and how it made her feel in ED. Talked with Bárbara Kendrick while she was in pt room. Told to give 5mg of Hydralazine and see how she does.

## 2023-10-10 NOTE — NURSING NOTE
Patient in bed resting comfortably, NO change in previous assessment. Call light and belongings within reach,

## 2023-10-10 NOTE — CARE PLAN
Problem: Safety  Goal: LTG - Patient will demonstrate safety requirements appropriate to situation/environment  Outcome: Progressing  Goal: LTG - Patient will utilize safety techniques  Outcome: Progressing     Problem: Balance  Goal: Sitting Balance  Description: Pt will demonstrate good sitting balance to promote safe engagement with out of bed activities    Outcome: Progressing  Goal: Standing Balance  Description: Pt will demonstrate good static standing balance to promote safe participation with out of bed activity, transfers, and mobility    Outcome: Progressing     Problem: Mobility  Goal: Ambulation  Description: Pt will ambulate 50' independent assist with walker to promote safe home mobility    Outcome: Progressing  Goal: Stair Negotiation  Description: Pt will ascend/descend 1 stairs, no rails, and least restrictive device with independent assist to safely enter/exit the home environment    Outcome: Progressing     Problem: Safety  Goal: Safe Mobility Techniques  Description: Pt will correctly identify and demonstrate safe mobility techniques to reduce their risks for falls during their acute care stay     Outcome: Progressing     Problem: Transfers  Goal: Supine to sit  Description: Pt will transfer supine to sitting at edge of bed with independent assist to promote acute care out of bed activity    Outcome: Progressing  Goal: Sit to stand  Description: Pt will transfer sit to standing position with independent assist and least restrictive device to promote safe out of bed activity    Outcome: Progressing

## 2023-10-10 NOTE — CONSULTS
Reason For Consult  lung nodules    History Of Present Illness  Fartun Carey is a 89 y.o. female presenting with diarrhea.  She has been having diarrhea for the past 4 weeks that escalated and she decided to seek medical attention.  She endorses decreased appetite and generalized weakness.  Part of the work-up included an abdominal CT which showed diffuse lung nodules at the bases.  We are asked to see her in this regard.  Denies any fevers or chills.  She denies any night sweats.  She admits to an intermittent cough.  Denies any shortness of breath.     Past Medical History  She has a past medical history of Diabetes mellitus (CMS/[facility]), Disease of thyroid gland, and Hypertension.    Surgical History  She has a past surgical history that includes Hysterectomy; Back surgery; and Appendectomy.     Social History  She reports that she has never smoked. She has never used smokeless tobacco. She reports that she does not drink alcohol and does not use drugs.    Family History  Family History   Problem Relation Name Age of Onset    Heart disease Mother      Heart disease Father      Heart disease Brother      No Known Problems Son      Heart disease Maternal Grandmother      Heart disease Maternal Grandfather      Heart disease Paternal Grandmother      Heart disease Paternal Grandfather          Allergies  Amoxicillin-pot clavulanate, Glimepiride, Lisinopril, Metformin hcl, Tetracycline hcl, and Cephalexin    Review of Systems  Review of Systems   Constitutional:  Positive for appetite change and fatigue.   HENT: Negative.     Eyes: Negative.    Respiratory:  Positive for cough.    Cardiovascular: Negative.    Gastrointestinal:  Positive for diarrhea.   Genitourinary: Negative.    Musculoskeletal: Negative.    Skin: Negative.    Allergic/Immunologic: Negative.    Neurological: Negative.    Hematological: Negative.    Psychiatric/Behavioral: Negative.            Physical Exam  Constitutional: Nontoxic, no acute  "distress  Eyes: Anicteric sclera, extraocular movements intact  ENT: Oropharynx clear, moist mucous membranes, ambient air  Neck: Supple, trachea midline  Lymph: No cervical or supraclavicular adenopathy  Chest: nontender  Cardiovascular: Regular rate and rhythm, S1-S2 +, no murmurs  Lungs: clear to auscultation bilaterally without wheezes/rales/rhonci  Abdomen: NABS, soft, nontender,  Extremities: No cyanosis, clubbing or edema  MSK: no joint effusions or deformity  Neurological: Alert and oriented ×3, cranial nerves II-12 intact, no gross focal motor deficits  Skin: No jaundice, warm, no rashes     Last Recorded Vitals  Blood pressure (!) 167/48, pulse 79, temperature 36.8 °C (98.2 °F), temperature source Oral, resp. rate 16, height 1.651 m (5' 5\"), weight 72.6 kg (160 lb), SpO2 94 %.    Relevant Results  Reviewed basic metabolic panel on admission showing hyponatremia with sodium of 121.  Creatinine elevated 1.30  DBC reviewed, no significant leukocytosis  CT of the abdomen pelvis personally reviewed and agree on lung cuts there is diffuse solid lung nodules of which on the differential could be infection versus inflammatory versus metastatic implantation.     Assessment/Plan     Lung nodules: Unclear etiology at this time.  Diarrhea  Hypoosmolar hyponatremia due to volume contraction    Recommend for better assessment to get a noncontrasted chest CT.  Diarrhea work-up per primary team.  If negative, can consider getting GI involvement  Monitor sodium level, avoid rapid correction  Prophylaxis  Thank you for his consultation.  We will continue to follow with you.    Jamal Plascencia MD  Pulmonary/ Medicine  [facility]    I spent 22 minutes in the professional and overall care of this patient.        "

## 2023-10-10 NOTE — CONSULTS
.Reason For Consult  Hyponatremia and acute kidney injury    History Of Present Illness  Fartun Carey is a 89 y.o. female with a history of diabetes mellitus hypertension recurrent urinary tract infection fact recently she had been treated for UTI with Bactrim she finished the last dose on Sunday she took 10 pills total she presented to the emergency room with chief complaint of persistent nausea vomiting and diarrhea been going on for 2-week upon evaluation in the emergency room she was found to be hyponatremic and an VINCENT and that is why renal consultation is obtained the patient however denies any chest pain shortness breath fever chills or any abdominal pain..     Review of Systems  Review of Systems   Constitutional:  Negative for chills, fatigue and fever.   HENT:  Negative for sinus pressure, sore throat and tinnitus.    Eyes:  Negative for photophobia and visual disturbance.   Respiratory:  Negative for apnea, cough, shortness of breath and wheezing.    Cardiovascular:  Negative for chest pain, palpitations and leg swelling.   Gastrointestinal:  Positive for diarrhea, nausea and vomiting. Negative for abdominal pain, blood in stool, constipation and rectal pain.   Endocrine: Negative for cold intolerance, heat intolerance, polydipsia, polyphagia and polyuria.   Genitourinary:  Negative for decreased urine volume, dysuria, frequency, hematuria and urgency.   Musculoskeletal:  Negative for arthralgias, back pain, joint swelling, myalgias and neck pain.   Skin:  Negative for color change, pallor, rash and wound.   Neurological:  Negative for dizziness, tremors, seizures, syncope, speech difficulty, weakness, light-headedness, numbness and headaches.   Hematological:  Negative for adenopathy. Does not bruise/bleed easily.   Psychiatric/Behavioral:  Negative for confusion, hallucinations, sleep disturbance and suicidal ideas.         Past Medical History  She has a past medical history of Diabetes mellitus  (CMS/HCC), Disease of thyroid gland, and Hypertension.    Surgical History  She has a past surgical history that includes Hysterectomy; Back surgery; and Appendectomy.     Social History  She reports that she has never smoked. She has never used smokeless tobacco. She reports that she does not drink alcohol and does not use drugs.    Family History  Family History   Problem Relation Name Age of Onset    Heart disease Mother      Heart disease Father      Heart disease Brother      No Known Problems Son      Heart disease Maternal Grandmother      Heart disease Maternal Grandfather      Heart disease Paternal Grandmother      Heart disease Paternal Grandfather          Current Facility-Administered Medications:     amLODIPine (Norvasc) tablet 5 mg, 5 mg, oral, Daily, Osiris A Aroldo, APRN-CNP, 5 mg at 10/10/23 0956    aspirin chewable tablet 81 mg, 81 mg, oral, Daily, Osiris A Aroldo, APRN-CNP, 81 mg at 10/10/23 0955    atorvastatin (Lipitor) tablet 80 mg, 80 mg, oral, Nightly, Osiris A Aroldo, APRN-CNP    cholecalciferol (Vitamin D-3) tablet 25 mcg, 25 mcg, oral, Daily, Osiris A Aroldo, APRN-CNP, 25 mcg at 10/10/23 0955    cyanocobalamin (Vitamin B-12) tablet 1,000 mcg, 1,000 mcg, oral, Daily, Osiris A Aroldo, APRN-CNP, 1,000 mcg at 10/10/23 0956    dextrose 10 % in water (D10W) infusion, 0.3 g/kg/hr, intravenous, Once PRN, Caroline Denise MD    dextrose 50 % injection 25 g, 25 g, intravenous, q15 min PRN, Caroline Denise MD    glucagon (Glucagen) injection 1 mg, 1 mg, intramuscular, q15 min PRN, Caroline Denise MD    hydrALAZINE (Apresoline) injection 10 mg, 10 mg, intravenous, q6h PRN, Caroline Denise MD, 5 mg at 10/10/23 0826    insulin lispro (HumaLOG) injection 0-10 Units, 0-10 Units, subcutaneous, TID with meals, Caroline Denise MD, 2 Units at 10/10/23 1158    levothyroxine (Synthroid, Levoxyl) tablet 88 mcg, 88 mcg, oral, Daily before breakfast, Osiris Kendrick, APRN-CNP, 88 mcg at 10/10/23 0956    meropenem (Merrem)  1 g in sodium chloride 0.9 % 100 mL IV, 1 g, intravenous, q12h, Osiris A Aroldo, APRN-CNP, Stopped at 10/10/23 1027    ondansetron ODT (Zofran-ODT) disintegrating tablet 4 mg, 4 mg, oral, q8h PRN, 4 mg at 10/10/23 0956 **OR** ondansetron (Zofran) injection 4 mg, 4 mg, intravenous, q8h PRN, Osiris A Aroldo, APRN-CNP    pantoprazole (ProtoNix) EC tablet 20 mg, 20 mg, oral, Daily before breakfast, Osiris A Aroldo, APRN-CNP, 20 mg at 10/10/23 0956    SITagliptin phosphate (Januvia) tablet 100 mg, 100 mg, oral, Daily, Osiris A Aroldo, APRN-CNP, 100 mg at 10/10/23 0955    sodium chloride 0.9% infusion, 100 mL/hr, intravenous, Continuous, Caroline Denise MD, Last Rate: 100 mL/hr at 10/10/23 1041, 100 mL/hr at 10/10/23 1041   Allergies  Amoxicillin-pot clavulanate, Glimepiride, Lisinopril, Metformin hcl, Tetracycline hcl, and Cephalexin         Physical Exam  Physical Exam  Constitutional:       General: She is not in acute distress.     Appearance: She is not toxic-appearing.   HENT:      Head: Normocephalic and atraumatic.   Eyes:      Extraocular Movements: Extraocular movements intact.      Pupils: Pupils are equal, round, and reactive to light.   Neck:      Vascular: No carotid bruit.   Cardiovascular:      Rate and Rhythm: Normal rate and regular rhythm.   Pulmonary:      Effort: No respiratory distress.      Breath sounds: No stridor. No wheezing, rhonchi or rales.   Chest:      Chest wall: No tenderness.   Abdominal:      General: There is no distension.      Palpations: There is no mass.      Tenderness: There is no abdominal tenderness. There is no right CVA tenderness, left CVA tenderness or guarding.      Hernia: No hernia is present.   Musculoskeletal:         General: No swelling or tenderness.      Cervical back: No rigidity.      Right lower leg: No edema.      Left lower leg: No edema.   Lymphadenopathy:      Cervical: No cervical adenopathy.   Skin:     General: Skin is warm and dry.      Coloration: Skin  "is not jaundiced or pale.      Findings: No bruising or erythema.   Neurological:      General: No focal deficit present.      Mental Status: She is alert and oriented to person, place, and time.   Psychiatric:         Mood and Affect: Mood normal.         Behavior: Behavior normal.              I&O 24HR    Intake/Output Summary (Last 24 hours) at 10/10/2023 1226  Last data filed at 10/10/2023 1041  Gross per 24 hour   Intake 558.34 ml   Output --   Net 558.34 ml       Vitals 24HR  Heart Rate:  [78-94]   Temp:  [36.8 °C (98.2 °F)-37.1 °C (98.8 °F)]   Resp:  [15-18]   BP: (152-219)/(41-96)   Height:  [165.1 cm (5' 5\")]   Weight:  [72.6 kg (160 lb)]   SpO2:  [92 %-96 %]         Relevant Results        Results for orders placed or performed during the hospital encounter of 10/09/23 (from the past 96 hour(s))   CBC and Auto Differential   Result Value Ref Range    WBC 10.2 4.4 - 11.3 x10*3/uL    nRBC 0.0 0.0 - 0.0 /100 WBCs    RBC 3.53 (L) 4.00 - 5.20 x10*6/uL    Hemoglobin 10.9 (L) 12.0 - 16.0 g/dL    Hematocrit 32.1 (L) 36.0 - 46.0 %    MCV 91 80 - 100 fL    MCH 30.9 26.0 - 34.0 pg    MCHC 34.0 32.0 - 36.0 g/dL    RDW 12.7 11.5 - 14.5 %    Platelets 182 150 - 450 x10*3/uL    MPV 10.0 7.5 - 11.5 fL    Neutrophils % 82.6 40.0 - 80.0 %    Immature Granulocytes %, Automated 0.6 0.0 - 0.9 %    Lymphocytes % 8.6 13.0 - 44.0 %    Monocytes % 8.1 2.0 - 10.0 %    Eosinophils % 0.0 0.0 - 6.0 %    Basophils % 0.1 0.0 - 2.0 %    Neutrophils Absolute 8.44 (H) 1.60 - 5.50 x10*3/uL    Immature Granulocytes Absolute, Automated 0.06 0.00 - 0.50 x10*3/uL    Lymphocytes Absolute 0.88 0.80 - 3.00 x10*3/uL    Monocytes Absolute 0.83 (H) 0.05 - 0.80 x10*3/uL    Eosinophils Absolute 0.00 0.00 - 0.40 x10*3/uL    Basophils Absolute 0.01 0.00 - 0.10 x10*3/uL   Comprehensive Metabolic Panel   Result Value Ref Range    Glucose 223 (H) 65 - 99 mg/dL    Sodium 120 (L) 133 - 145 mmol/L    Potassium 4.6 3.4 - 5.1 mmol/L    Chloride 90 (L) 97 - 107 " mmol/L    Bicarbonate 18 (L) 24 - 31 mmol/L    Urea Nitrogen 24 8 - 25 mg/dL    Creatinine 1.40 0.40 - 1.60 mg/dL    eGFR 35 (L) >60 mL/min/1.73m*2    Calcium 9.0 8.5 - 10.4 mg/dL    Albumin 3.8 3.5 - 5.0 g/dL    Alkaline Phosphatase 98 35 - 125 U/L    Total Protein 6.3 5.9 - 7.9 g/dL    AST 15 5 - 40 U/L    Bilirubin, Total <0.2 0.1 - 1.2 mg/dL    ALT 15 5 - 40 U/L    Anion Gap 12 <=19 mmol/L   Serial Troponin, Initial ([facility])   Result Value Ref Range    Troponin T, High Sensitivity 23 (H) <=15 ng/L   TSH with reflex to Free T4 if abnormal   Result Value Ref Range    Thyroid Stimulating Hormone 0.93 0.27 - 4.20 mIU/L   Urinalysis with Reflex Microscopic   Result Value Ref Range    Color, Urine Light-Yellow Light-Yellow, Yellow, Dark-Yellow    Appearance, Urine Clear Clear    Specific Gravity, Urine 1.021 1.005 - 1.035    pH, Urine 5.5 5.0, 5.5, 6.0, 6.5, 7.0, 7.5, 8.0    Protein, Urine 10 (TRACE) NEGATIVE, 10 (TRACE), 20 (TRACE) mg/dL    Glucose, Urine Normal Normal mg/dL    Blood, Urine NEGATIVE NEGATIVE    Ketones, Urine NEGATIVE NEGATIVE mg/dL    Bilirubin, Urine NEGATIVE NEGATIVE    Urobilinogen, Urine Normal Normal mg/dL    Nitrite, Urine NEGATIVE NEGATIVE    Leukocyte Esterase, Urine 25 Sarahi/µL (A) NEGATIVE   Urinalysis Microscopic Only   Result Value Ref Range    WBC, Urine 1-5 1-5, NONE /HPF    RBC, Urine 6-10 (A) NONE, 1-2, 3-5 /HPF    Squamous Epithelial Cells, Urine 1-9 (SPARSE) Reference range not established. /HPF    Hyaline Casts, Urine 2+ (A) NONE /LPF   Serial Troponin, 2 Hour ([facility])   Result Value Ref Range    Troponin T, High Sensitivity 28 (H) <=15 ng/L   Sars-CoV-2 PCR, Symptomatic   Result Value Ref Range    Coronavirus 2019, PCR Not Detected Not Detected   Troponin T, High Sensitivity   Result Value Ref Range    Troponin T, High Sensitivity 28 (H) <=15 ng/L   Basic metabolic panel   Result Value Ref Range    Glucose 120 (H) 65 - 99 mg/dL    Sodium 121 (L) 133 - 145 mmol/L    Potassium 4.8 3.4 -  5.1 mmol/L    Chloride 93 (L) 97 - 107 mmol/L    Bicarbonate 19 (L) 24 - 31 mmol/L    Urea Nitrogen 22 8 - 25 mg/dL    Creatinine 1.30 0.40 - 1.60 mg/dL    eGFR 38 (L) >60 mL/min/1.73m*2    Calcium 8.9 8.5 - 10.4 mg/dL    Anion Gap 9 <=19 mmol/L   CBC and Auto Differential   Result Value Ref Range    WBC 8.8 4.4 - 11.3 x10*3/uL    nRBC 0.0 0.0 - 0.0 /100 WBCs    RBC 3.40 (L) 4.00 - 5.20 x10*6/uL    Hemoglobin 10.4 (L) 12.0 - 16.0 g/dL    Hematocrit 30.9 (L) 36.0 - 46.0 %    MCV 91 80 - 100 fL    MCH 30.6 26.0 - 34.0 pg    MCHC 33.7 32.0 - 36.0 g/dL    RDW 12.7 11.5 - 14.5 %    Platelets 196 150 - 450 x10*3/uL    MPV 10.8 7.5 - 11.5 fL    Neutrophils % 60.6 40.0 - 80.0 %    Immature Granulocytes %, Automated 0.5 0.0 - 0.9 %    Lymphocytes % 26.1 13.0 - 44.0 %    Monocytes % 12.1 2.0 - 10.0 %    Eosinophils % 0.5 0.0 - 6.0 %    Basophils % 0.2 0.0 - 2.0 %    Neutrophils Absolute 5.31 1.60 - 5.50 x10*3/uL    Immature Granulocytes Absolute, Automated 0.04 0.00 - 0.50 x10*3/uL    Lymphocytes Absolute 2.28 0.80 - 3.00 x10*3/uL    Monocytes Absolute 1.06 (H) 0.05 - 0.80 x10*3/uL    Eosinophils Absolute 0.04 0.00 - 0.40 x10*3/uL    Basophils Absolute 0.02 0.00 - 0.10 x10*3/uL   Serial Troponin, 6 Hour ([facility])   Result Value Ref Range    Troponin T, High Sensitivity 37 (H) <=15 ng/L   POCT GLUCOSE   Result Value Ref Range    POCT Glucose 123 (H) 74 - 99 mg/dL   POCT GLUCOSE   Result Value Ref Range    POCT Glucose 157 (H) 74 - 99 mg/dL          Assessment/Plan     CT abdomen pelvis wo IV contrast    Result Date: 10/9/2023  Interpreted By:  Carlos A Bobby, STUDY: CT ABDOMEN PELVIS WO IV CONTRAST;  10/9/2023 9:35 pm   INDICATION: Signs/Symptoms:diarrhea.  Nausea, vomiting, lightheadedness, diarrhea   COMPARISON: None..   ACCESSION NUMBER(S): [id number]   ORDERING CLINICIAN: JAMEY ROSE   TECHNIQUE: Oral contrast was administered. IV contrast was not administered. CT of the Abdomen without contrast was performed. Axial,  sagittal and coronal reformatted images were reviewed.   FINDINGS: Lower Chest: There are multiple noncalcified soft tissue nodules involving the bilateral lung bases. The largest in the right lower lobe measures approximately 9 mm. The largest nodule in the left lower lobe measures approximately 9 mm.   Abdomen: Liver: within normal limits. Bile Ducts: Normal caliber. Gallbladder: No calcified gallstones. Normal caliber wall. Pancreas: within normal limits. Spleen: Calcified granuloma are noted within the spleen Adrenals: within normal limits. Kidneys: Nonobstructive calculi are noted involving the right kidney.   Bowel: There is diverticulosis of the sigmoid and descending colon without evidence for diverticulitis. Mesenteric Lymph Nodes: No enlarged mesenteric lymph nodes. Peritoneum: No ascites or free air, no fluid collection. Vessels: Atherosclerotic changes within normal limits. Retroperitoneum: within normal limits. Abdominal Wall: within normal limits. Bones: within normal limits.       Numerous noncalcified soft tissue nodules throughout the bilateral lung bases. Findings are nonspecific but findings could represent nodules from metastatic disease.   MACRO: none   Signed by: Carlos A Bobby 10/9/2023 9:47 PM Dictation workstation:   [id number]    XR chest 2 views    Result Date: 10/9/2023  Interpreted By:  Carlos A Bobby, STUDY: XR CHEST 2 VIEWS; 10/9/2023 7:21 pm   INDICATION: Signs/Symptoms:fatigue/weakness;   COMPARISON: None available.   ACCESSION NUMBER(S): [id number]   ORDERING CLINICIAN: JAMEY ROSE   TECHNIQUE: Views: PA and Lateral   FINDINGS: RESULTS:  The cardiac silhouette is within normal limits. There are calcifications involving the thoracic aorta. The lungs demonstrate no infiltrate or atelectasis. There is no evidence for pleural effusion. There are degenerative changes of the thoracic spine.       No radiographic evidence for acute cardiopulmonary disease.     Signed by: Carlos A  Kanu 10/9/2023 8:06 PM Dictation workstation:   [id number]    Impression:    1.  Hyponatremia this could be very well secondary to volume depletion from GI losses however clinically the patient looks euvolemic at this time  2.  Acute kidney injury most likely secondary to GI loss however I am concerned about the use of Bactrim in this elderly woman this could very well lead to acute infection nephritis  3.  Diarrhea etiology is not clear rule out C. Difficile  4.  Metabolic acidosis.  5.  Diabetes mellitus type 2  6.  Recurrent urinary tract infections    Recommendations:  1.  Avoid Bactrim and avoid nephrotoxic medications  2.  Check serum osmolarity  3.  Check urine osmolarity  4.  Check urine sodium  5.  Check stool for C. Difficile  6.  IV hydration until I obtain the above results    Thank you for your consult              Johnathan Chilel

## 2023-10-10 NOTE — NURSING NOTE
Assumed care of patient. Patient in bed resting comfortably in bed. Call light and  belongings within reach.  Bed alarm on.

## 2023-10-10 NOTE — PROGRESS NOTES
Occupational Therapy    Evaluation    Patient Name: Fartun Carey  MRN: [id number]  Today's Date: 10/10/2023  Time Calculation  Start Time: 0937  Stop Time: 0951  Time Calculation (min): 14 min    Assessment  IP OT Assessment  OT Assessment:  (Pt is 89 y/o female admited for hyponatremia, diarrhea)  Plan:  OT Frequency: 2 times per week  OT Discharge Recommendations: Low intensity level of continued care  Equipment Recommended upon Discharge: Wheeled walker  OT Recommended Transfer Status: Stand by assist    Subjective   Current Problem:  1. Hyponatremia        2. Asthenia        3. Diarrhea, unspecified type          General:  General  Reason for Referral: decreased ADL's  Referred By: Dr. Caroline Denise  Past Medical History Relevant to Rehab: HTN, DMII  Family/Caregiver Present: No  Prior to Session Communication: Bedside nurse  Patient Position Received:  (on BSC)  Preferred Learning Style: verbal  Precautions:  Hearing/Visual Limitations:  (glasses, [place], has hearing aides)  Medical Precautions: Infection precautions (c-diff)  Vital Signs:  BP: (!) 159/41  Pain:  Pain Assessment  Pain Assessment: 0-10  Pain Score: 0 - No pain    Objective   Cognition:  Overall Cognitive Status: Within Functional Limits           Home Living:  Type of Home: Condo  Lives With: Alone  Home Adaptive Equipment: Walker rolling or standard (walker in community, none in home)  Home Layout: One level  Home Access: Stairs to enter without rails  Entrance Stairs-Rails: None  Entrance Stairs-Number of Steps: 1  Bathroom Shower/Tub: Walk-in shower  Bathroom Toilet: Handicapped height  Bathroom Equipment: Grab bars in shower, Shower chair with back   Prior Function:  Level of [place]: Independent with ADLs and functional transfers  Receives Help From: Home health  ADL Assistance: Independent  Homemaking Assistance: Needs assistance  Meal Prep:  (Has aide 2x/wk for meal assist)  Laundry:  (assist from aide)  Vacuuming:  (assist from  aide)  Cleaning:  (assist from aide for cleaning)  Driving/Transportation: Independent  Shopping:  (has groceries delivered)  Ambulatory Assistance: Independent  Transfers: Independent  Vocational: Retired  Hand Dominance: Right  IADL History:  Homemaking Responsibilities:  (Has aide for meals, cleaning)  Current License: Yes  Mode of Transportation: Car  Occupation: Retired  ADL:  Eating Assistance: Not performed  Grooming Assistance: Stand by  Grooming Deficit: Setup, Wash/dry face  Bathing Assistance: Not performed (pt declined bathing)  UE Dressing Assistance: Not performed (pt declined)  LE Dressing Assistance: Not performed  Toileting Assistance with Device: Stand by  Toileting Deficit: Steadying, Clothing management up  Functional Assistance: Stand by  Functional Deficit: Supervision/safety, Commode transfer  Activity Tolerance:  Endurance: Decreased tolerance for upright activites  Rate of Perceived Exertion (RPE): 3/10 (d/t nausea, lightheadedness)  Bed Mobility/Transfers: Transfers  Transfer: Yes  Transfer 1  Transfer From 1: Sit to, Commode-standard to  Transfer to 1: Stand, Chair with arms, Sit  Technique 1: Sit to stand, Stand to sit  Transfer Device 1:  (none)  Transfer Level of Assistance 1: Close supervision  Modalities:     IADL's:   Homemaking Responsibilities:  (Has aide for meals, cleaning)  Current License: Yes  Mode of Transportation: Car  Occupation: Retired  Vision: Vision - Basic Assessment  Current Vision: Wears glasses all the time  Sensation:  Light Touch: No apparent deficits  Strength:  Strength Comments: 4/5 (BUE strength)  Perception:  Inattention/Neglect: Appears intact  Coordination:  Movements are Fluid and Coordinated: Yes   Hand Function:  Hand Function  Gross Grasp: Functional  Coordination: Functional  Extremities: RUE   RUE : Within Functional Limits and LUE   LUE: Within Functional Limits      Outcome Measures: [facility] Daily Activity  Putting on and taking off regular lower  body clothing: A little  Bathing (including washing, rinsing, drying): A little  Putting on and taking off regular upper body clothing: None  Toileting, which includes using toilet, bedpan or urinal: A little  Taking care of personal grooming such as brushing teeth: None  Eating Meals: None  Daily Activity - Total Score: 21      Education Documentation  ADL Training, taught by Tea Garcia, OT at 10/10/2023 12:21 PM.  Learner: Patient  Readiness: Acceptance  Method: Explanation  Response: Needs Reinforcement    Education Comments  No comments found.      Goals:   Encounter Problems       Encounter Problems (Active)           OT Goals       Bathing (Progressing)       Start:  10/10/23    Expected End:  10/24/23       Pt will demon. UB/LB bathing independently         Dressing (Progressing)       Start:  10/10/23    Expected End:  10/24/23       Pt will demon. UB/LB dressing Independently         Functional transfers (Progressing)       Start:  10/10/23    Expected End:  10/24/23       Pt will demon. Bed, chair and toilet transfers independently w/ FWW.         Functional endurance. (Progressing)       Start:  10/10/23    Expected End:  10/24/23       Pt will demon. BUE exercises to improve functional endurance.

## 2023-10-10 NOTE — H&P
History Of Present Illness  Fartun Carey is a 89 y.o. female with history of hypertension diabetes mellitus presenting with diarrhea.  She lives at home and has been having diarrhea for the past 4 weeks, but worse for the past week associated with decreased appetite and generalized weakness.  Today she vomited twice and came to the emergency room for evaluation.  Initial work-up showed serum sodium of 120.  Her blood pressure is elevated.  She is admitted for further management     Past Medical History  Hypertension, diabetes mellitus    Surgical History  She has no past surgical history on file.     Social History  Denies alcohol abuse or drug abuse    Family History  Family History   Problem Relation Name Age of Onset    Heart disease Mother      Heart disease Father      Heart disease Brother      No Known Problems Son      Heart disease Maternal Grandmother      Heart disease Maternal Grandfather      Heart disease Paternal Grandmother      Heart disease Paternal Grandfather          Allergies  Amoxicillin-pot clavulanate, Glimepiride, Lisinopril, Metformin hcl, Tetracycline hcl, and Cephalexin    Review of Systems  As in history of present illness, otherwise negative     Physical Exam     Last Recorded Vitals  BP (!) 200/65 (BP Location: Left arm, Patient Position: Lying)   Pulse 83   Temp 37.1 °C (98.8 °F) (Oral)   Resp 15   Wt 72.6 kg (160 lb)   SpO2 92%     Relevant Results      Scheduled medications    Results for orders placed or performed during the hospital encounter of 10/09/23 (from the past 24 hour(s))   CBC and Auto Differential   Result Value Ref Range    WBC 10.2 4.4 - 11.3 x10*3/uL    nRBC 0.0 0.0 - 0.0 /100 WBCs    RBC 3.53 (L) 4.00 - 5.20 x10*6/uL    Hemoglobin 10.9 (L) 12.0 - 16.0 g/dL    Hematocrit 32.1 (L) 36.0 - 46.0 %    MCV 91 80 - 100 fL    MCH 30.9 26.0 - 34.0 pg    MCHC 34.0 32.0 - 36.0 g/dL    RDW 12.7 11.5 - 14.5 %    Platelets 182 150 - 450 x10*3/uL    MPV 10.0 7.5 - 11.5 fL     Neutrophils % 82.6 40.0 - 80.0 %    Immature Granulocytes %, Automated 0.6 0.0 - 0.9 %    Lymphocytes % 8.6 13.0 - 44.0 %    Monocytes % 8.1 2.0 - 10.0 %    Eosinophils % 0.0 0.0 - 6.0 %    Basophils % 0.1 0.0 - 2.0 %    Neutrophils Absolute 8.44 (H) 1.60 - 5.50 x10*3/uL    Immature Granulocytes Absolute, Automated 0.06 0.00 - 0.50 x10*3/uL    Lymphocytes Absolute 0.88 0.80 - 3.00 x10*3/uL    Monocytes Absolute 0.83 (H) 0.05 - 0.80 x10*3/uL    Eosinophils Absolute 0.00 0.00 - 0.40 x10*3/uL    Basophils Absolute 0.01 0.00 - 0.10 x10*3/uL   Comprehensive Metabolic Panel   Result Value Ref Range    Glucose 223 (H) 65 - 99 mg/dL    Sodium 120 (L) 133 - 145 mmol/L    Potassium 4.6 3.4 - 5.1 mmol/L    Chloride 90 (L) 97 - 107 mmol/L    Bicarbonate 18 (L) 24 - 31 mmol/L    Urea Nitrogen 24 8 - 25 mg/dL    Creatinine 1.40 0.40 - 1.60 mg/dL    eGFR 35 (L) >60 mL/min/1.73m*2    Calcium 9.0 8.5 - 10.4 mg/dL    Albumin 3.8 3.5 - 5.0 g/dL    Alkaline Phosphatase 98 35 - 125 U/L    Total Protein 6.3 5.9 - 7.9 g/dL    AST 15 5 - 40 U/L    Bilirubin, Total <0.2 0.1 - 1.2 mg/dL    ALT 15 5 - 40 U/L    Anion Gap 12 <=19 mmol/L   Serial Troponin, Initial ([facility])   Result Value Ref Range    Troponin T, High Sensitivity 23 (H) <=15 ng/L   TSH with reflex to Free T4 if abnormal   Result Value Ref Range    Thyroid Stimulating Hormone 0.93 0.27 - 4.20 mIU/L   Urinalysis with Reflex Microscopic   Result Value Ref Range    Color, Urine Light-Yellow Light-Yellow, Yellow, Dark-Yellow    Appearance, Urine Clear Clear    Specific Gravity, Urine 1.021 1.005 - 1.035    pH, Urine 5.5 5.0, 5.5, 6.0, 6.5, 7.0, 7.5, 8.0    Protein, Urine 10 (TRACE) NEGATIVE, 10 (TRACE), 20 (TRACE) mg/dL    Glucose, Urine Normal Normal mg/dL    Blood, Urine NEGATIVE NEGATIVE    Ketones, Urine NEGATIVE NEGATIVE mg/dL    Bilirubin, Urine NEGATIVE NEGATIVE    Urobilinogen, Urine Normal Normal mg/dL    Nitrite, Urine NEGATIVE NEGATIVE    Leukocyte Esterase, Urine 25 Sarahi/µL  (A) NEGATIVE   Urinalysis Microscopic Only   Result Value Ref Range    WBC, Urine 1-5 1-5, NONE /HPF    RBC, Urine 6-10 (A) NONE, 1-2, 3-5 /HPF    Squamous Epithelial Cells, Urine 1-9 (SPARSE) Reference range not established. /HPF    Hyaline Casts, Urine 2+ (A) NONE /LPF         Assessment/Plan   Acute diarrhea  Dehydration  Hypoosmolar hyponatremia due to volume contraction  Generalized weakness  Hypertension with elevated blood pressure  Diabetes mellitus  Lung nodules seen on CAT scan of the abdomen  Mild troponin elevation, likely demand ischemia    Plan:  IV hydration, clear liquid diet as tolerated   Check stool culture, stool C. difficile toxin  Check serial troponins  Monitor serum sodium level  PT OT evaluation and treatment  Hydralazine as needed for blood pressure control  Accu-Cheks, sliding scale    Caroline Denise MD

## 2023-10-10 NOTE — PROGRESS NOTES
Fartun Carey is a 89 y.o. female on day 1 of admission presenting with Hyponatremia and diarrhea.       Subjective   Patient sitting comfortably in bed denies any abdominal pain or cramping.  Patient claims to have 1 episode of diarrhea daily for the past month. Patient also c/o nausea.        Objective     Last Recorded Vitals  BP (!) 181/51 (BP Location: Right arm, Patient Position: Lying)   Pulse 78   Temp 36.8 °C (98.2 °F) (Oral)   Resp 18   Wt 72.6 kg (160 lb)   SpO2 96%   Intake/Output last 3 Shifts:    Intake/Output Summary (Last 24 hours) at 10/10/2023 0849  Last data filed at 10/10/2023 0300  Gross per 24 hour   Intake 338.34 ml   Output --   Net 338.34 ml       Admission Weight  Weight: 72.6 kg (160 lb) (10/09/23 1817)    Daily Weight  10/09/23 : 72.6 kg (160 lb)    Image Results  CT abdomen pelvis wo IV contrast  Narrative: Interpreted By:  Carlos A Bobby,   STUDY:  CT ABDOMEN PELVIS WO IV CONTRAST;  10/9/2023 9:35 pm      INDICATION:  Signs/Symptoms:diarrhea.  Nausea, vomiting, lightheadedness, diarrhea      COMPARISON:  None..      ACCESSION NUMBER(S):  [id number]      ORDERING CLINICIAN:  JAMEY ROSE      TECHNIQUE:  Oral contrast was administered. IV contrast was not administered. CT  of the Abdomen without contrast was performed. Axial, sagittal and  coronal reformatted images were reviewed.      FINDINGS:  Lower Chest: There are multiple noncalcified soft tissue nodules  involving the bilateral lung bases. The largest in the right lower  lobe measures approximately 9 mm. The largest nodule in the left  lower lobe measures approximately 9 mm.      Abdomen:  Liver: within normal limits.  Bile Ducts: Normal caliber.  Gallbladder: No calcified gallstones. Normal caliber wall.  Pancreas: within normal limits.  Spleen: Calcified granuloma are noted within the spleen  Adrenals: within normal limits.  Kidneys: Nonobstructive calculi are noted involving the right kidney.      Bowel: There is  diverticulosis of the sigmoid and descending colon  without evidence for diverticulitis. Mesenteric Lymph Nodes: No  enlarged mesenteric lymph nodes. Peritoneum: No ascites or free air,  no fluid collection. Vessels: Atherosclerotic changes within normal  limits. Retroperitoneum: within normal limits.  Abdominal Wall: within normal limits.  Bones: within normal limits.      Impression: Numerous noncalcified soft tissue nodules throughout the bilateral  lung bases. Findings are nonspecific but findings could represent  nodules from metastatic disease.      MACRO:  none      Signed by: Carlos A Bobby 10/9/2023 9:47 PM  Dictation workstation:   [id number]  XR chest 2 views  Narrative: Interpreted By:  Carlos A Bobby,   STUDY:  XR CHEST 2 VIEWS; 10/9/2023 7:21 pm      INDICATION:  Signs/Symptoms:fatigue/weakness;      COMPARISON:  None available.      ACCESSION NUMBER(S):  [id number]      ORDERING CLINICIAN:  JAMEY ROSE      TECHNIQUE:  Views: PA and Lateral      FINDINGS:  RESULTS:  The cardiac silhouette is within normal limits. There are  calcifications involving the thoracic aorta. The lungs demonstrate no  infiltrate or atelectasis. There is no evidence for pleural effusion.  There are degenerative changes of the thoracic spine.      Impression: No radiographic evidence for acute cardiopulmonary disease.          Signed by: Carlos A Bobby 10/9/2023 8:06 PM  Dictation workstation:   [id number]      Physical Exam  Constitutional:       Appearance: Normal appearance. She is obese.   Cardiovascular:      Rate and Rhythm: Normal rate and regular rhythm.      Pulses: Normal pulses.      Heart sounds: Normal heart sounds.   Pulmonary:      Effort: Pulmonary effort is normal.      Breath sounds: Normal breath sounds.   Abdominal:      General: Bowel sounds are normal.      Palpations: Abdomen is soft.   Musculoskeletal:         General: Normal range of motion.   Skin:     General: Skin is warm and dry.       Capillary Refill: Capillary refill takes less than 2 seconds.   Neurological:      General: No focal deficit present.      Mental Status: She is alert and oriented to person, place, and time.   Psychiatric:         Mood and Affect: Mood normal.         Behavior: Behavior normal.         Relevant Results             Results for orders placed or performed during the hospital encounter of 10/09/23 (from the past 24 hour(s))   CBC and Auto Differential   Result Value Ref Range    WBC 10.2 4.4 - 11.3 x10*3/uL    nRBC 0.0 0.0 - 0.0 /100 WBCs    RBC 3.53 (L) 4.00 - 5.20 x10*6/uL    Hemoglobin 10.9 (L) 12.0 - 16.0 g/dL    Hematocrit 32.1 (L) 36.0 - 46.0 %    MCV 91 80 - 100 fL    MCH 30.9 26.0 - 34.0 pg    MCHC 34.0 32.0 - 36.0 g/dL    RDW 12.7 11.5 - 14.5 %    Platelets 182 150 - 450 x10*3/uL    MPV 10.0 7.5 - 11.5 fL    Neutrophils % 82.6 40.0 - 80.0 %    Immature Granulocytes %, Automated 0.6 0.0 - 0.9 %    Lymphocytes % 8.6 13.0 - 44.0 %    Monocytes % 8.1 2.0 - 10.0 %    Eosinophils % 0.0 0.0 - 6.0 %    Basophils % 0.1 0.0 - 2.0 %    Neutrophils Absolute 8.44 (H) 1.60 - 5.50 x10*3/uL    Immature Granulocytes Absolute, Automated 0.06 0.00 - 0.50 x10*3/uL    Lymphocytes Absolute 0.88 0.80 - 3.00 x10*3/uL    Monocytes Absolute 0.83 (H) 0.05 - 0.80 x10*3/uL    Eosinophils Absolute 0.00 0.00 - 0.40 x10*3/uL    Basophils Absolute 0.01 0.00 - 0.10 x10*3/uL   Comprehensive Metabolic Panel   Result Value Ref Range    Glucose 223 (H) 65 - 99 mg/dL    Sodium 120 (L) 133 - 145 mmol/L    Potassium 4.6 3.4 - 5.1 mmol/L    Chloride 90 (L) 97 - 107 mmol/L    Bicarbonate 18 (L) 24 - 31 mmol/L    Urea Nitrogen 24 8 - 25 mg/dL    Creatinine 1.40 0.40 - 1.60 mg/dL    eGFR 35 (L) >60 mL/min/1.73m*2    Calcium 9.0 8.5 - 10.4 mg/dL    Albumin 3.8 3.5 - 5.0 g/dL    Alkaline Phosphatase 98 35 - 125 U/L    Total Protein 6.3 5.9 - 7.9 g/dL    AST 15 5 - 40 U/L    Bilirubin, Total <0.2 0.1 - 1.2 mg/dL    ALT 15 5 - 40 U/L    Anion Gap 12 <=19  mmol/L   Serial Troponin, Initial ([facility])   Result Value Ref Range    Troponin T, High Sensitivity 23 (H) <=15 ng/L   TSH with reflex to Free T4 if abnormal   Result Value Ref Range    Thyroid Stimulating Hormone 0.93 0.27 - 4.20 mIU/L   Urinalysis with Reflex Microscopic   Result Value Ref Range    Color, Urine Light-Yellow Light-Yellow, Yellow, Dark-Yellow    Appearance, Urine Clear Clear    Specific Gravity, Urine 1.021 1.005 - 1.035    pH, Urine 5.5 5.0, 5.5, 6.0, 6.5, 7.0, 7.5, 8.0    Protein, Urine 10 (TRACE) NEGATIVE, 10 (TRACE), 20 (TRACE) mg/dL    Glucose, Urine Normal Normal mg/dL    Blood, Urine NEGATIVE NEGATIVE    Ketones, Urine NEGATIVE NEGATIVE mg/dL    Bilirubin, Urine NEGATIVE NEGATIVE    Urobilinogen, Urine Normal Normal mg/dL    Nitrite, Urine NEGATIVE NEGATIVE    Leukocyte Esterase, Urine 25 Sarahi/µL (A) NEGATIVE   Urinalysis Microscopic Only   Result Value Ref Range    WBC, Urine 1-5 1-5, NONE /HPF    RBC, Urine 6-10 (A) NONE, 1-2, 3-5 /HPF    Squamous Epithelial Cells, Urine 1-9 (SPARSE) Reference range not established. /HPF    Hyaline Casts, Urine 2+ (A) NONE /LPF   Serial Troponin, 2 Hour ([facility])   Result Value Ref Range    Troponin T, High Sensitivity 28 (H) <=15 ng/L   Sars-CoV-2 PCR, Symptomatic   Result Value Ref Range    Coronavirus 2019, PCR Not Detected Not Detected   Troponin T, High Sensitivity   Result Value Ref Range    Troponin T, High Sensitivity 28 (H) <=15 ng/L   Basic metabolic panel   Result Value Ref Range    Glucose 120 (H) 65 - 99 mg/dL    Sodium 121 (L) 133 - 145 mmol/L    Potassium 4.8 3.4 - 5.1 mmol/L    Chloride 93 (L) 97 - 107 mmol/L    Bicarbonate 19 (L) 24 - 31 mmol/L    Urea Nitrogen 22 8 - 25 mg/dL    Creatinine 1.30 0.40 - 1.60 mg/dL    eGFR 38 (L) >60 mL/min/1.73m*2    Calcium 8.9 8.5 - 10.4 mg/dL    Anion Gap 9 <=19 mmol/L   CBC and Auto Differential   Result Value Ref Range    WBC 8.8 4.4 - 11.3 x10*3/uL    nRBC 0.0 0.0 - 0.0 /100 WBCs    RBC 3.40 (L) 4.00 - 5.20  x10*6/uL    Hemoglobin 10.4 (L) 12.0 - 16.0 g/dL    Hematocrit 30.9 (L) 36.0 - 46.0 %    MCV 91 80 - 100 fL    MCH 30.6 26.0 - 34.0 pg    MCHC 33.7 32.0 - 36.0 g/dL    RDW 12.7 11.5 - 14.5 %    Platelets 196 150 - 450 x10*3/uL    MPV 10.8 7.5 - 11.5 fL    Neutrophils % 60.6 40.0 - 80.0 %    Immature Granulocytes %, Automated 0.5 0.0 - 0.9 %    Lymphocytes % 26.1 13.0 - 44.0 %    Monocytes % 12.1 2.0 - 10.0 %    Eosinophils % 0.5 0.0 - 6.0 %    Basophils % 0.2 0.0 - 2.0 %    Neutrophils Absolute 5.31 1.60 - 5.50 x10*3/uL    Immature Granulocytes Absolute, Automated 0.04 0.00 - 0.50 x10*3/uL    Lymphocytes Absolute 2.28 0.80 - 3.00 x10*3/uL    Monocytes Absolute 1.06 (H) 0.05 - 0.80 x10*3/uL    Eosinophils Absolute 0.04 0.00 - 0.40 x10*3/uL    Basophils Absolute 0.02 0.00 - 0.10 x10*3/uL   Serial Troponin, 6 Hour ([facility])   Result Value Ref Range    Troponin T, High Sensitivity 37 (H) <=15 ng/L   POCT GLUCOSE   Result Value Ref Range    POCT Glucose 123 (H) 74 - 99 mg/dL         Assessment/Plan                  Principal Problem:    Hyponatremia    1)Diarrhea  Stool studies  2)Hyponatremia  Consult nephrology, Dr. Gaxiola  Serum osmolality, urine sodium and osmolality  Restrict fluid intake  3) UTI  Merrem, patient has allergies to Augmentin and cephalexin   Await urine cultures  4) Elevated Troponin  Consult Dr. Monroy, cardiology  Telemetry continuous  5)Hypertension  Hold  losartan due to hyponatremia  Start amlodipine 5 mg daily  PRN hydralazine  6) Abnormal CT; lung nodules bilaterally suggesting metastasis  Consult pulmonology     Plan  Home medication list reconciled.  Discharge plan is for patient to go back home alone. Therapy evaluation pending.    Plan of care discussed with patient and collaborating physician, Dr. Corbin.               Osiris Kendrick, APRN-CNP

## 2023-10-10 NOTE — CARE PLAN
The patient's goals for the shift include decrease nausea    The clinical goals for the shift include fluids    Over the shift, the patient did not make progress toward the following goals. Barriers to progression include . Recommendations to address these barriers include .

## 2023-10-10 NOTE — CARE PLAN
Problem: OT Goals  Goal: Bathing  Description: Pt will demon. UB/LB bathing independently  Outcome: Progressing  Goal: Dressing  Description: Pt will demon. UB/LB dressing Independently  Outcome: Progressing  Goal: Functional transfers  Description: Pt will demon. Bed, chair and toilet transfers independently w/ FWW.  Outcome: Progressing  Goal: Functional endurance.  Description: Pt will demon. BUE exercises to improve functional endurance.  Outcome: Progressing

## 2023-10-10 NOTE — CONSULTS
Reason For Consult  Hyponatremia    History Of Present Illness  Fartun Carey is a 89 y.o. female with past medical history diabetes mellitus, hypertension, hypothyroidism, and recurrent urinary tract infections which have been treated with antibiotics. She was brought to the emergency department by EMS and admitted after she called due to light headedness following diarrhea and vomiting. She has had at least four episodes of diarrhea in the past simon with vomiting on two of those occasions.    Review of Systems  Review of Systems   Constitutional:  Positive for fatigue. Negative for chills, diaphoresis and fever.   Respiratory:  Positive for cough. Negative for chest tightness, wheezing and stridor.         Baseline dyspnea on exertion   Cardiovascular:  Positive for leg swelling. Negative for chest pain and palpitations.   Gastrointestinal:  Positive for abdominal distention, diarrhea, nausea and vomiting. Negative for abdominal pain, anal bleeding, blood in stool and rectal pain.   Genitourinary:  Negative for dysuria, frequency, pelvic pain and urgency.   Musculoskeletal:  Negative for arthralgias and myalgias.   Skin:  Negative for color change and rash.   Neurological:  Positive for dizziness, tremors and light-headedness. Negative for speech difficulty.     Past Medical History  She has a past medical history of Diabetes mellitus (CMS/[facility]), Disease of thyroid gland, and Hypertension.    Surgical History  She has a past surgical history that includes Hysterectomy; Back surgery; and Appendectomy.     Social History  She reports that she has never smoked. She has never used smokeless tobacco. She reports that she does not drink alcohol and does not use drugs.    Family History  Family History   Problem Relation Name Age of Onset    Heart disease Mother      Heart disease Father      Heart disease Brother      No Known Problems Son      Heart disease Maternal Grandmother      Heart disease Maternal Grandfather    "   Heart disease Paternal Grandmother      Heart disease Paternal Grandfather          Allergies  Amoxicillin-pot clavulanate, Glimepiride, Lisinopril, Metformin hcl, Tetracycline hcl, and Cephalexin     Physical Exam  Physical Exam  Constitutional:       General: Not in acute distress.     Appearance: Normal appearance, not toxic-appearing.     HENT:      Head: Normocephalic and atraumatic.      Mouth/Throat:      Mouth: Mucous membranes are moist.      Pharynx: No oropharyngeal exudate or posterior oropharyngeal erythema.   Eyes:      Extraocular Movements: Extraocular movements intact.      Conjunctiva/sclera: Conjunctivae normal.      Pupils: Pupils are equal, round, and reactive to light.   Neck:      Comments: No JVD  Cardiovascular:      Pulses: Normal pulses.   Pulmonary:      Effort: Pulmonary effort is normal.   Abdominal:      General: There is distension.      Tenderness: There is no abdominal tenderness. There is no guarding or rebound.      Hernia: No hernia is present.   Musculoskeletal:         General: No swelling. Normal range of motion.      Cervical back: No rigidity.   Lymphadenopathy:      Cervical: No cervical adenopathy.   Skin:     General: Skin is warm and dry.   Neurological:      General: No focal deficit present.      Mental Status: She is alert and oriented to person, place, and time.   Psychiatric:         Mood and Affect: Mood normal.         Behavior: Behavior normal.               I&O 24HR    Intake/Output Summary (Last 24 hours) at 10/10/2023 1144  Last data filed at 10/10/2023 1041  Gross per 24 hour   Intake 558.34 ml   Output --   Net 558.34 ml       Vitals 24HR  Heart Rate:  [78-94]   Temp:  [36.8 °C (98.2 °F)-37.1 °C (98.8 °F)]   Resp:  [15-18]   BP: (152-219)/(41-96)   Height:  [165.1 cm (5' 5\")]   Weight:  [72.6 kg (160 lb)]   SpO2:  [92 %-96 %]         Relevant Results  Admission on 10/09/2023   Component Date Value Ref Range Status    WBC 10/09/2023 10.2  4.4 - 11.3 " x10*3/uL Final    nRBC 10/09/2023 0.0  0.0 - 0.0 /100 WBCs Final    RBC 10/09/2023 3.53 (L)  4.00 - 5.20 x10*6/uL Final    Hemoglobin 10/09/2023 10.9 (L)  12.0 - 16.0 g/dL Final    Hematocrit 10/09/2023 32.1 (L)  36.0 - 46.0 % Final    MCV 10/09/2023 91  80 - 100 fL Final    MCH 10/09/2023 30.9  26.0 - 34.0 pg Final    MCHC 10/09/2023 34.0  32.0 - 36.0 g/dL Final    RDW 10/09/2023 12.7  11.5 - 14.5 % Final    Platelets 10/09/2023 182  150 - 450 x10*3/uL Final    MPV 10/09/2023 10.0  7.5 - 11.5 fL Final    Neutrophils % 10/09/2023 82.6  40.0 - 80.0 % Final    Immature Granulocytes %, Automated 10/09/2023 0.6  0.0 - 0.9 % Final    Immature Granulocyte Count (IG) includes promyelocytes, myelocytes and metamyelocytes but does not include bands. Percent differential counts (%) should be interpreted in the context of the absolute cell counts (cells/UL).    Lymphocytes % 10/09/2023 8.6  13.0 - 44.0 % Final    Monocytes % 10/09/2023 8.1  2.0 - 10.0 % Final    Eosinophils % 10/09/2023 0.0  0.0 - 6.0 % Final    Basophils % 10/09/2023 0.1  0.0 - 2.0 % Final    Neutrophils Absolute 10/09/2023 8.44 (H)  1.60 - 5.50 x10*3/uL Final    Percent differential counts (%) should be interpreted in the context of the absolute cell counts (cells/uL).    Immature Granulocytes Absolute, Au* 10/09/2023 0.06  0.00 - 0.50 x10*3/uL Final    Lymphocytes Absolute 10/09/2023 0.88  0.80 - 3.00 x10*3/uL Final    Monocytes Absolute 10/09/2023 0.83 (H)  0.05 - 0.80 x10*3/uL Final    Eosinophils Absolute 10/09/2023 0.00  0.00 - 0.40 x10*3/uL Final    Basophils Absolute 10/09/2023 0.01  0.00 - 0.10 x10*3/uL Final    Glucose 10/09/2023 223 (H)  65 - 99 mg/dL Final    Sodium 10/09/2023 120 (L)  133 - 145 mmol/L Final    Result rechecked    Potassium 10/09/2023 4.6  3.4 - 5.1 mmol/L Final    Chloride 10/09/2023 90 (L)  97 - 107 mmol/L Final    Bicarbonate 10/09/2023 18 (L)  24 - 31 mmol/L Final    Urea Nitrogen 10/09/2023 24  8 - 25 mg/dL Final     Creatinine 10/09/2023 1.40  0.40 - 1.60 mg/dL Final    eGFR 10/09/2023 35 (L)  >60 mL/min/1.73m*2 Final    Calculations of estimated GFR are performed using the 2021 CKD-EPI Study Refit equation without the race variable for the IDMS-Traceable creatinine methods.  https://jasn.asnjournals.org/content/early/2021/09/22/ASN.5229133601    Calcium 10/09/2023 9.0  8.5 - 10.4 mg/dL Final    Albumin 10/09/2023 3.8  3.5 - 5.0 g/dL Final    Alkaline Phosphatase 10/09/2023 98  35 - 125 U/L Final    Total Protein 10/09/2023 6.3  5.9 - 7.9 g/dL Final    AST 10/09/2023 15  5 - 40 U/L Final    Bilirubin, Total 10/09/2023 <0.2  0.1 - 1.2 mg/dL Final    ALT 10/09/2023 15  5 - 40 U/L Final    Anion Gap 10/09/2023 12  <=19 mmol/L Final    Color, Urine 10/09/2023 Light-Yellow  Light-Yellow, Yellow, Dark-Yellow Final    Appearance, Urine 10/09/2023 Clear  Clear Final    Specific Gravity, Urine 10/09/2023 1.021  1.005 - 1.035 Final    pH, Urine 10/09/2023 5.5  5.0, 5.5, 6.0, 6.5, 7.0, 7.5, 8.0 Final    Protein, Urine 10/09/2023 10 (TRACE)  NEGATIVE, 10 (TRACE), 20 (TRACE) mg/dL Final    Glucose, Urine 10/09/2023 Normal  Normal mg/dL Final    Blood, Urine 10/09/2023 NEGATIVE  NEGATIVE Final    Ketones, Urine 10/09/2023 NEGATIVE  NEGATIVE mg/dL Final    Bilirubin, Urine 10/09/2023 NEGATIVE  NEGATIVE Final    Urobilinogen, Urine 10/09/2023 Normal  Normal mg/dL Final    Nitrite, Urine 10/09/2023 NEGATIVE  NEGATIVE Final    Leukocyte Esterase, Urine 10/09/2023 25 Sarahi/µL (A)  NEGATIVE Final    Troponin T, High Sensitivity 10/09/2023 23 (H)  <=15 ng/L Final    Troponin T, High Sensitivity 10/10/2023 28 (H)  <=15 ng/L Final    Consistent with previous results  Sex Specific Reference Range:   Male (0-22)  Female (0-14)  **Change(Delta) >=5 is significant**     Troponin Baseline and Serial elevation for significant change(delta)should be interpreted in conjunction with clinical presentation, history, signs and symptoms, ECG and biomarker  concentrations.     Troponin elevation can be seen in several other non-infarct conditions. Chronic troponin elevations can be detected in clinically stable patients with heart failure, cardiomyopathy, renal failure, diabetes, etc. Elevated troponin can also occur in myocarditis, heart contusion, PE, drug induced cardiotoxicity, etc.      Samples should NOT be taken from patients receiving high dose Biotin (> 5mg) doses until 8 hours following last Biotin administration    Thyroid Stimulating Hormone 10/09/2023 0.93  0.27 - 4.20 mIU/L Final    WBC, Urine 10/09/2023 1-5  1-5, NONE /HPF Final    RBC, Urine 10/09/2023 6-10 (A)  NONE, 1-2, 3-5 /HPF Final    Squamous Epithelial Cells, Urine 10/09/2023 1-9 (SPARSE)  Reference range not established. /HPF Final    Hyaline Casts, Urine 10/09/2023 2+ (A)  NONE /LPF Final    Coronavirus 2019, PCR 10/10/2023 Not Detected  Not Detected Final    Troponin T, High Sensitivity 10/10/2023 28 (H)  <=15 ng/L Final    Consistent with previous results  Sex Specific Reference Range:   Male (0-22)  Female (0-14)  **Change(Delta) >=5 is significant**     Troponin Baseline and Serial elevation for significant change(delta)should be interpreted in conjunction with clinical presentation, history, signs and symptoms, ECG and biomarker concentrations.     Troponin elevation can be seen in several other non-infarct conditions. Chronic troponin elevations can be detected in clinically stable patients with heart failure, cardiomyopathy, renal failure, diabetes, etc. Elevated troponin can also occur in myocarditis, heart contusion, PE, drug induced cardiotoxicity, etc.      Samples should NOT be taken from patients receiving high dose Biotin (> 5mg) doses until 8 hours following last Biotin administration      Glucose 10/10/2023 120 (H)  65 - 99 mg/dL Final    Sodium 10/10/2023 121 (L)  133 - 145 mmol/L Final    Result rechecked    Potassium 10/10/2023 4.8  3.4 - 5.1 mmol/L Final    Chloride  10/10/2023 93 (L)  97 - 107 mmol/L Final    Bicarbonate 10/10/2023 19 (L)  24 - 31 mmol/L Final    Urea Nitrogen 10/10/2023 22  8 - 25 mg/dL Final    Creatinine 10/10/2023 1.30  0.40 - 1.60 mg/dL Final    eGFR 10/10/2023 38 (L)  >60 mL/min/1.73m*2 Final    Calculations of estimated GFR are performed using the 2021 CKD-EPI Study Refit equation without the race variable for the IDMS-Traceable creatinine methods.  https://jasn.asnjournals.org/content/early/2021/09/22/ASN.0115631956    Calcium 10/10/2023 8.9  8.5 - 10.4 mg/dL Final    Anion Gap 10/10/2023 9  <=19 mmol/L Final    WBC 10/10/2023 8.8  4.4 - 11.3 x10*3/uL Final    nRBC 10/10/2023 0.0  0.0 - 0.0 /100 WBCs Final    RBC 10/10/2023 3.40 (L)  4.00 - 5.20 x10*6/uL Final    Hemoglobin 10/10/2023 10.4 (L)  12.0 - 16.0 g/dL Final    Hematocrit 10/10/2023 30.9 (L)  36.0 - 46.0 % Final    MCV 10/10/2023 91  80 - 100 fL Final    MCH 10/10/2023 30.6  26.0 - 34.0 pg Final    MCHC 10/10/2023 33.7  32.0 - 36.0 g/dL Final    RDW 10/10/2023 12.7  11.5 - 14.5 % Final    Platelets 10/10/2023 196  150 - 450 x10*3/uL Final    MPV 10/10/2023 10.8  7.5 - 11.5 fL Final    Neutrophils % 10/10/2023 60.6  40.0 - 80.0 % Final    Immature Granulocytes %, Automated 10/10/2023 0.5  0.0 - 0.9 % Final    Immature Granulocyte Count (IG) includes promyelocytes, myelocytes and metamyelocytes but does not include bands. Percent differential counts (%) should be interpreted in the context of the absolute cell counts (cells/UL).    Lymphocytes % 10/10/2023 26.1  13.0 - 44.0 % Final    Monocytes % 10/10/2023 12.1  2.0 - 10.0 % Final    Eosinophils % 10/10/2023 0.5  0.0 - 6.0 % Final    Basophils % 10/10/2023 0.2  0.0 - 2.0 % Final    Neutrophils Absolute 10/10/2023 5.31  1.60 - 5.50 x10*3/uL Final    Percent differential counts (%) should be interpreted in the context of the absolute cell counts (cells/uL).    Immature Granulocytes Absolute, Au* 10/10/2023 0.04  0.00 - 0.50 x10*3/uL Final     Lymphocytes Absolute 10/10/2023 2.28  0.80 - 3.00 x10*3/uL Final    Monocytes Absolute 10/10/2023 1.06 (H)  0.05 - 0.80 x10*3/uL Final    Eosinophils Absolute 10/10/2023 0.04  0.00 - 0.40 x10*3/uL Final    Basophils Absolute 10/10/2023 0.02  0.00 - 0.10 x10*3/uL Final    Troponin T, High Sensitivity 10/10/2023 37 (H)  <=15 ng/L Final    POCT Glucose 10/10/2023 123 (H)  74 - 99 mg/dL Final    POCT Glucose 10/10/2023 157 (H)  74 - 99 mg/dL Final     XR chest 2 views    Interpreted By:  Carlos A Bobby,   STUDY:  XR CHEST 2 VIEWS; 10/9/2023 7:21 pm  INDICATION:  Signs/Symptoms:fatigue/weakness;  COMPARISON:  None available.  ACCESSION NUMBER(S):  [id number]  ORDERING CLINICIAN:  JAMEY ROSE  TECHNIQUE:  Views: PA and Lateral  FINDINGS:  RESULTS:  The cardiac silhouette is within normal limits. There are  calcifications involving the thoracic aorta. The lungs demonstrate no  infiltrate or atelectasis. There is no evidence for pleural effusion.  There are degenerative changes of the thoracic spine.  IMPRESSION:  No radiographic evidence for acute cardiopulmonary disease.  Signed by: Carlos A Bobby 10/9/2023 8:06 PM  Dictation workstation:   [id number]    CT abdomen pelvis wo IV contrast    Interpreted By:  Carlos A Bobby,   STUDY:  CT ABDOMEN PELVIS WO IV CONTRAST;  10/9/2023 9:35 pm  INDICATION:  Signs/Symptoms:diarrhea.  Nausea, vomiting, lightheadedness, diarrhea  COMPARISON:  None..  ACCESSION NUMBER(S):  [id number]  ORDERING CLINICIAN:  JAMEY ROSE  TECHNIQUE:  Oral contrast was administered. IV contrast was not administered. CT  of the Abdomen without contrast was performed. Axial, sagittal and  coronal reformatted images were reviewed.  FINDINGS:  Lower Chest: There are multiple noncalcified soft tissue nodules  involving the bilateral lung bases. The largest in the right lower  lobe measures approximately 9 mm. The largest nodule in the left  lower lobe measures approximately 9 mm.  Abdomen:  Liver:  within normal limits.  Bile Ducts: Normal caliber.  Gallbladder: No calcified gallstones. Normal caliber wall.  Pancreas: within normal limits.  Spleen: Calcified granuloma are noted within the spleen  Adrenals: within normal limits.  Kidneys: Nonobstructive calculi are noted involving the right kidney.  Bowel: There is diverticulosis of the sigmoid and descending colon  without evidence for diverticulitis. Mesenteric Lymph Nodes: No  enlarged mesenteric lymph nodes. Peritoneum: No ascites or free air,  no fluid collection. Vessels: Atherosclerotic changes within normal  limits. Retroperitoneum: within normal limits.  Abdominal Wall: within normal limits.  Bones: within normal limits.  IMPRESSION:  Numerous noncalcified soft tissue nodules throughout the bilateral  lung bases. Findings are nonspecific but findings could represent  nodules from metastatic disease.  MACRO:  none  Signed by: Carlos A Bobby 10/9/2023 9:47 PM  Dictation workstation:   [id number]      Assessment/Plan     Impressions  Hyponatremia most likely due to GI loss from diarrhea and emesis. Awaiting other labs to rule out other possible causes such as volume overload or SIADH  Non-anion gap metabolic acidosis is consistent with diarrhea  Diarrhea likely due C. difficile considering history of antibiotic treatment. Awaiting stool sample for culture to confirm diagnosis and rule out other causes such as SIBO  Hypertension  Diabetes Mellitus   Disease of thyroid gland    Recommendations  -Continue IV normal saline to replete sodium  -Awaiting BMP  -Awaiting urine sodium  -Awaiting urine osmolality  -Awaiting serum osmolality  -Awaiting stool studies for C. difficile     Mariposa Luo OMS III  12:38 PM 10/10/2023

## 2023-10-11 LAB
ANION GAP SERPL CALC-SCNC: 10 MMOL/L
BUN SERPL-MCNC: 17 MG/DL (ref 8–25)
CALCIUM SERPL-MCNC: 8.9 MG/DL (ref 8.5–10.4)
CHLORIDE SERPL-SCNC: 97 MMOL/L (ref 97–107)
CO2 SERPL-SCNC: 18 MMOL/L (ref 24–31)
CREAT SERPL-MCNC: 1.2 MG/DL (ref 0.4–1.6)
ERYTHROCYTE [DISTWIDTH] IN BLOOD BY AUTOMATED COUNT: 12.8 % (ref 11.5–14.5)
GFR SERPL CREATININE-BSD FRML MDRD: 42 ML/MIN/1.73M*2
GLUCOSE BLD MANUAL STRIP-MCNC: 106 MG/DL (ref 74–99)
GLUCOSE BLD MANUAL STRIP-MCNC: 119 MG/DL (ref 74–99)
GLUCOSE BLD MANUAL STRIP-MCNC: 127 MG/DL (ref 74–99)
GLUCOSE BLD MANUAL STRIP-MCNC: 196 MG/DL (ref 74–99)
GLUCOSE SERPL-MCNC: 99 MG/DL (ref 65–99)
HCT VFR BLD AUTO: 30.6 % (ref 36–46)
HGB BLD-MCNC: 10.1 G/DL (ref 12–16)
MCH RBC QN AUTO: 30.6 PG (ref 26–34)
MCHC RBC AUTO-ENTMCNC: 33 G/DL (ref 32–36)
MCV RBC AUTO: 93 FL (ref 80–100)
NRBC BLD-RTO: 0 /100 WBCS (ref 0–0)
OSMOLALITY SERPL: 265 MOSM/KG (ref 280–300)
OSMOLALITY UR: 542 MOSM/KG (ref 200–1200)
PLATELET # BLD AUTO: 176 X10*3/UL (ref 150–450)
PMV BLD AUTO: 10 FL (ref 7.5–11.5)
POTASSIUM SERPL-SCNC: 4.8 MMOL/L (ref 3.4–5.1)
RBC # BLD AUTO: 3.3 X10*6/UL (ref 4–5.2)
SODIUM SERPL-SCNC: 125 MMOL/L (ref 133–145)
WBC # BLD AUTO: 7.8 X10*3/UL (ref 4.4–11.3)

## 2023-10-11 PROCEDURE — 80048 BASIC METABOLIC PNL TOTAL CA: CPT | Performed by: NURSE PRACTITIONER

## 2023-10-11 PROCEDURE — 2500000004 HC RX 250 GENERAL PHARMACY W/ HCPCS (ALT 636 FOR OP/ED): Performed by: FAMILY MEDICINE

## 2023-10-11 PROCEDURE — 36415 COLL VENOUS BLD VENIPUNCTURE: CPT | Performed by: NURSE PRACTITIONER

## 2023-10-11 PROCEDURE — 82947 ASSAY GLUCOSE BLOOD QUANT: CPT

## 2023-10-11 PROCEDURE — 85027 COMPLETE CBC AUTOMATED: CPT | Performed by: NURSE PRACTITIONER

## 2023-10-11 PROCEDURE — 1100000001 HC PRIVATE ROOM DAILY

## 2023-10-11 PROCEDURE — 2500000001 HC RX 250 WO HCPCS SELF ADMINISTERED DRUGS (ALT 637 FOR MEDICARE OP): Performed by: NURSE PRACTITIONER

## 2023-10-11 PROCEDURE — 97116 GAIT TRAINING THERAPY: CPT | Mod: GP

## 2023-10-11 PROCEDURE — 2500000002 HC RX 250 W HCPCS SELF ADMINISTERED DRUGS (ALT 637 FOR MEDICARE OP, ALT 636 FOR OP/ED): Performed by: NURSE PRACTITIONER

## 2023-10-11 PROCEDURE — 99221 1ST HOSP IP/OBS SF/LOW 40: CPT | Performed by: INTERNAL MEDICINE

## 2023-10-11 PROCEDURE — 2500000004 HC RX 250 GENERAL PHARMACY W/ HCPCS (ALT 636 FOR OP/ED): Performed by: NURSE PRACTITIONER

## 2023-10-11 PROCEDURE — 2500000002 HC RX 250 W HCPCS SELF ADMINISTERED DRUGS (ALT 637 FOR MEDICARE OP, ALT 636 FOR OP/ED): Performed by: FAMILY MEDICINE

## 2023-10-11 PROCEDURE — 96372 THER/PROPH/DIAG INJ SC/IM: CPT | Performed by: FAMILY MEDICINE

## 2023-10-11 PROCEDURE — 97110 THERAPEUTIC EXERCISES: CPT | Mod: GP

## 2023-10-11 RX ORDER — MEROPENEM 1 G/1
1 INJECTION, POWDER, FOR SOLUTION INTRAVENOUS EVERY 12 HOURS
Status: DISCONTINUED | OUTPATIENT
Start: 2023-10-11 | End: 2023-10-12

## 2023-10-11 RX ORDER — AMLODIPINE BESYLATE 10 MG/1
10 TABLET ORAL DAILY
Status: DISCONTINUED | OUTPATIENT
Start: 2023-10-11 | End: 2023-10-14 | Stop reason: HOSPADM

## 2023-10-11 RX ADMIN — INSULIN LISPRO 2 UNITS: 100 INJECTION, SOLUTION INTRAVENOUS; SUBCUTANEOUS at 16:24

## 2023-10-11 RX ADMIN — PANTOPRAZOLE SODIUM 20 MG: 20 TABLET, DELAYED RELEASE ORAL at 06:04

## 2023-10-11 RX ADMIN — MEROPENEM 1 G: 1 INJECTION, POWDER, FOR SOLUTION INTRAVENOUS at 21:01

## 2023-10-11 RX ADMIN — ATORVASTATIN CALCIUM 80 MG: 80 TABLET, FILM COATED ORAL at 21:01

## 2023-10-11 RX ADMIN — ASPIRIN 81 MG CHEWABLE TABLET 81 MG: 81 TABLET CHEWABLE at 08:08

## 2023-10-11 RX ADMIN — AMLODIPINE BESYLATE 10 MG: 10 TABLET ORAL at 08:08

## 2023-10-11 RX ADMIN — SODIUM CHLORIDE 100 ML/HR: 900 INJECTION, SOLUTION INTRAVENOUS at 08:11

## 2023-10-11 RX ADMIN — CYANOCOBALAMIN TAB 1000 MCG 1000 MCG: 1000 TAB at 08:08

## 2023-10-11 RX ADMIN — LEVOTHYROXINE SODIUM 88 MCG: 0.09 TABLET ORAL at 06:04

## 2023-10-11 RX ADMIN — MEROPENEM 1 G: 1 INJECTION, POWDER, FOR SOLUTION INTRAVENOUS at 10:00

## 2023-10-11 RX ADMIN — SITAGLIPTIN 100 MG: 100 TABLET, FILM COATED ORAL at 08:08

## 2023-10-11 RX ADMIN — CHOLECALCIFEROL TAB 25 MCG (1000 UNIT) 25 MCG: 25 TAB at 08:08

## 2023-10-11 ASSESSMENT — COGNITIVE AND FUNCTIONAL STATUS - GENERAL
CLIMB 3 TO 5 STEPS WITH RAILING: A LOT
MOVING FROM LYING ON BACK TO SITTING ON SIDE OF FLAT BED WITH BEDRAILS: A LITTLE
WALKING IN HOSPITAL ROOM: A LITTLE
DRESSING REGULAR UPPER BODY CLOTHING: A LITTLE
TOILETING: A LITTLE
STANDING UP FROM CHAIR USING ARMS: A LITTLE
TURNING FROM BACK TO SIDE WHILE IN FLAT BAD: A LITTLE
WALKING IN HOSPITAL ROOM: A LITTLE
HELP NEEDED FOR BATHING: A LITTLE
STANDING UP FROM CHAIR USING ARMS: A LITTLE
STANDING UP FROM CHAIR USING ARMS: A LITTLE
DAILY ACTIVITIY SCORE: 19
DRESSING REGULAR UPPER BODY CLOTHING: A LITTLE
MOVING FROM LYING ON BACK TO SITTING ON SIDE OF FLAT BED WITH BEDRAILS: A LITTLE
MOBILITY SCORE: 17
WALKING IN HOSPITAL ROOM: A LITTLE
DAILY ACTIVITIY SCORE: 19
DAILY ACTIVITIY SCORE: 19
MOVING TO AND FROM BED TO CHAIR: A LITTLE
TURNING FROM BACK TO SIDE WHILE IN FLAT BAD: A LITTLE
MOVING TO AND FROM BED TO CHAIR: A LITTLE
TOILETING: A LITTLE
CLIMB 3 TO 5 STEPS WITH RAILING: A LOT
MOBILITY SCORE: 17
PERSONAL GROOMING: A LITTLE
PERSONAL GROOMING: A LITTLE
MOBILITY SCORE: 17
MOVING TO AND FROM BED TO CHAIR: A LITTLE
DRESSING REGULAR LOWER BODY CLOTHING: A LITTLE
PERSONAL GROOMING: A LITTLE
DRESSING REGULAR LOWER BODY CLOTHING: A LITTLE
STANDING UP FROM CHAIR USING ARMS: A LITTLE
WALKING IN HOSPITAL ROOM: A LITTLE
MOVING FROM LYING ON BACK TO SITTING ON SIDE OF FLAT BED WITH BEDRAILS: A LITTLE
TURNING FROM BACK TO SIDE WHILE IN FLAT BAD: A LITTLE
TOILETING: A LITTLE
TURNING FROM BACK TO SIDE WHILE IN FLAT BAD: A LITTLE
MOVING FROM LYING ON BACK TO SITTING ON SIDE OF FLAT BED WITH BEDRAILS: A LITTLE
MOVING TO AND FROM BED TO CHAIR: A LITTLE
HELP NEEDED FOR BATHING: A LITTLE
CLIMB 3 TO 5 STEPS WITH RAILING: A LOT
HELP NEEDED FOR BATHING: A LITTLE
DRESSING REGULAR UPPER BODY CLOTHING: A LITTLE
DRESSING REGULAR LOWER BODY CLOTHING: A LITTLE
CLIMB 3 TO 5 STEPS WITH RAILING: A LOT
MOBILITY SCORE: 17

## 2023-10-11 ASSESSMENT — ENCOUNTER SYMPTOMS
WHEEZING: 0
ABDOMINAL PAIN: 0
DYSURIA: 0
COUGH: 0
SHORTNESS OF BREATH: 0
VOMITING: 0
HEMATURIA: 0
DIARRHEA: 0
PALPITATIONS: 0
NAUSEA: 0

## 2023-10-11 ASSESSMENT — PAIN - FUNCTIONAL ASSESSMENT
PAIN_FUNCTIONAL_ASSESSMENT: 0-10

## 2023-10-11 ASSESSMENT — PAIN SCALES - GENERAL
PAINLEVEL_OUTOF10: 0 - NO PAIN

## 2023-10-11 NOTE — NURSING NOTE
BSSR at this time. Pt is sitting up in the chair with no needs  at this time Alvarado in place draining clear yellow urine. No pain expressed. Call bell within reach

## 2023-10-11 NOTE — PROGRESS NOTES
Fartun Carey is a 89 y.o. female on day 2 of admission presenting with Hyponatremia and diarrhea.       Subjective   Patient sitting comfortably in bed denies any abdominal pain or cramping.  Patient claims no BM in last 24 hours. She denies nausea at this time will advance her diet.    Objective     Last Recorded Vitals  /62 (BP Location: Right arm, Patient Position: Lying)   Pulse 72   Temp 37 °C (98.6 °F) (Oral)   Resp 17   Wt 72.6 kg (160 lb)   SpO2 98%   Intake/Output last 3 Shifts:    Intake/Output Summary (Last 24 hours) at 10/11/2023 0903  Last data filed at 10/11/2023 0335  Gross per 24 hour   Intake 1686.67 ml   Output 1550 ml   Net 136.67 ml         Admission Weight  Weight: 72.6 kg (160 lb) (10/09/23 1817)    Daily Weight  10/09/23 : 72.6 kg (160 lb)    Image Results  CT chest wo IV contrast  Narrative: Interpreted By:  Carlos Sotelo,   STUDY:  CT CHEST WO IV CONTRAST; 10/10/2023 1:25 pm      INDICATION:  Signs/Symptoms:lung nodules.      COMPARISON:  None      ACCESSION NUMBER(S):  [id number]      ORDERING CLINICIAN:  AVE MARQUEZ      TECHNIQUE:  Contiguous axial images of the thorax were obtained from the level of  the thoracic inlet through the lung bases. 100 ml of Omnipaque 350  was utilized.          FINDINGS:  The thyroid gland is unremarkable.      The heart size is within normal limits.  No pericardial effusion is  identified. The aorta and pulmonary arteries are normal in caliber.      There are no pathologically enlarged mediastinal, hilar, or axillary  lymph nodes are seen. Calcified left infrahilar lymph node consistent  with granulomatous disease.      The trachea and mainstem bronchi are patent.  There are a few  calcified subcentimeter granulomas in the left lower lobe consistent  with granulomas. However there are numerous noncalcified pulmonary  nodules bilaterally concerning for the possibility of pulmonary  metastatic disease, the largest on the left is within the  left lower  lobe measuring approximately 8 mm. The largest pulmonary nodule in  the right upper lobe is lobe measuring 8 mm. There is a 9 mm  nonspecific noncalcified pulmonary nodule in the lateral basal  segment of the right lower lobe. Numerous additional bilateral  pulmonary nodules are seen.      Otherwise no significant consolidation or acute airspace opacity.      There is no evidence of pneumothorax or pleural effusion.      The visualized osseous structures are intact.      Limited images through the upper abdomen show multiple splenic  granulomas. Moderate sized hiatal hernia. Nonobstructive right renal  calculi.      Impression: Numerous noncalcified pulmonary nodules bilaterally concerning for  pulmonary metastatic disease. The largest in the right lung measures  approximately 9 mm, the largest in the left lung measures  approximately 8 mm. Clinical correlation and follow-up in 3 months is  recommended.          Signed by: Carlos Sotelo 10/10/2023 2:54 PM  Dictation workstation:   [id number]      Physical Exam  Constitutional:       Appearance: Normal appearance. She is obese.   Cardiovascular:      Rate and Rhythm: Normal rate and regular rhythm.      Pulses: Normal pulses.      Heart sounds: Normal heart sounds.   Pulmonary:      Effort: Pulmonary effort is normal.      Breath sounds: Normal breath sounds.   Abdominal:      General: Bowel sounds are normal.      Palpations: Abdomen is soft.   Musculoskeletal:         General: Normal range of motion.   Skin:     General: Skin is warm and dry.      Capillary Refill: Capillary refill takes less than 2 seconds.   Neurological:      General: No focal deficit present.      Mental Status: She is alert and oriented to person, place, and time.   Psychiatric:         Mood and Affect: Mood normal.         Behavior: Behavior normal.         Relevant Results               Results for orders placed or performed during the hospital encounter of 10/09/23 (from  the past 24 hour(s))   POCT GLUCOSE   Result Value Ref Range    POCT Glucose 157 (H) 74 - 99 mg/dL   Basic metabolic panel   Result Value Ref Range    Glucose 140 (H) 65 - 99 mg/dL    Sodium 121 (L) 133 - 145 mmol/L    Potassium 4.6 3.4 - 5.1 mmol/L    Chloride 92 (L) 97 - 107 mmol/L    Bicarbonate 17 (L) 24 - 31 mmol/L    Urea Nitrogen 19 8 - 25 mg/dL    Creatinine 1.20 0.40 - 1.60 mg/dL    eGFR 42 (L) >60 mL/min/1.73m*2    Calcium 8.9 8.5 - 10.4 mg/dL    Anion Gap 12 <=19 mmol/L   Sodium, Urine Random   Result Value Ref Range    Sodium, Urine Random 123 NOT ESTABLISHED mmol/L    Creatinine, Urine Random 90.4 NOT ESTABLISHED mg/dL    Sodium/Creatinine Ratio 136 Not established. mmol/g Creat   Osmolality, urine   Result Value Ref Range    Osmolality, Urine Random 542 200 - 1,200 mOsm/kg   POCT GLUCOSE   Result Value Ref Range    POCT Glucose 126 (H) 74 - 99 mg/dL   POCT GLUCOSE   Result Value Ref Range    POCT Glucose 109 (H) 74 - 99 mg/dL   CBC   Result Value Ref Range    WBC 7.8 4.4 - 11.3 x10*3/uL    nRBC 0.0 0.0 - 0.0 /100 WBCs    RBC 3.30 (L) 4.00 - 5.20 x10*6/uL    Hemoglobin 10.1 (L) 12.0 - 16.0 g/dL    Hematocrit 30.6 (L) 36.0 - 46.0 %    MCV 93 80 - 100 fL    MCH 30.6 26.0 - 34.0 pg    MCHC 33.0 32.0 - 36.0 g/dL    RDW 12.8 11.5 - 14.5 %    Platelets 176 150 - 450 x10*3/uL    MPV 10.0 7.5 - 11.5 fL   Basic metabolic panel   Result Value Ref Range    Glucose 99 65 - 99 mg/dL    Sodium 125 (L) 133 - 145 mmol/L    Potassium 4.8 3.4 - 5.1 mmol/L    Chloride 97 97 - 107 mmol/L    Bicarbonate 18 (L) 24 - 31 mmol/L    Urea Nitrogen 17 8 - 25 mg/dL    Creatinine 1.20 0.40 - 1.60 mg/dL    eGFR 42 (L) >60 mL/min/1.73m*2    Calcium 8.9 8.5 - 10.4 mg/dL    Anion Gap 10 <=19 mmol/L   POCT GLUCOSE   Result Value Ref Range    POCT Glucose 106 (H) 74 - 99 mg/dL         Assessment/Plan                  Principal Problem:    Hyponatremia    1)Diarrhea  Stool studies not able to be sent to lab, no BM in over 24 hours  Nausea  has subsided will advance diet    2)Hyponatremia  Consult nephrology, Dr. Gaxiola  This am sodium increased to 125, repeat in am  Continue to restrict fluid intake    3) UTI  Merrem, patient has allergies to Augmentin and cephalexin   Await urine cultures    4) Elevated Troponin  Consult Dr. Monroy, cardiology  Telemetry continuous    5)Hypertension  Hold  losartan due to hyponatremia  Increase amlodipine to 10 mg daily  PRN hydralazine    6) Abnormal CT; lung nodules bilaterally suggesting metastasis    Pulmonology following  CT ordered, result show multiple nodules bilateral. Will await a plan from Pulmonology for next step; Observe or diagnosis with bronchoscopy/biopsy.      Plan  Patient may need C at discharge will reassess tomorrow.    Plan of care discussed with patient and collaborating physician, Dr. Corbin.               Osiris Kendrick, KAYLENE-CNP

## 2023-10-11 NOTE — CARE PLAN
Problem: Balance  Goal: Sitting Balance  Description: Pt will demonstrate good sitting balance to promote safe engagement with out of bed activities    Outcome: Progressing  Goal: Standing Balance  Description: Pt will demonstrate good static standing balance to promote safe participation with out of bed activity, transfers, and mobility    Outcome: Progressing     Problem: Mobility  Goal: Ambulation  Description: Pt will ambulate 50' independent assist with walker to promote safe home mobility    Outcome: Progressing     Problem: Safety  Goal: Safe Mobility Techniques  Description: Pt will correctly identify and demonstrate safe mobility techniques to reduce their risks for falls during their acute care stay     Outcome: Progressing     Problem: Transfers  Goal: Sit to stand  Description: Pt will transfer sit to standing position with independent assist and least restrictive device to promote safe out of bed activity    Outcome: Progressing

## 2023-10-11 NOTE — NURSING NOTE
Patient up to the chair, comfortable. IV infiltrated in right wrist. Rn applied ice and restarted iv in left ac. Patient has call bell near her and family in room.

## 2023-10-11 NOTE — PROGRESS NOTES
Pulmonary Progress Note 10/11/23     Patient seen in follow-up for lung nodules.    Subjective   Interval History:   Has not had any further diarrhea.  Denies any shortness of breath or coughing.    Objective   Vital signs in last 24 hours:  Temp:  [36.4 °C (97.5 °F)-37 °C (98.6 °F)] 37 °C (98.6 °F)  Heart Rate:  [72-90] 72  Resp:  [16-17] 17  BP: (153-178)/(45-65) 174/62    Intake/Output last 3 shifts:  I/O last 3 completed shifts:  In: 2025 (27.9 mL/kg) [P.O.:480; I.V.:1445 (19.9 mL/kg); IV Piggyback:100]  Out: 1550 (21.4 mL/kg) [Urine:1550 (0.6 mL/kg/hr)]  Weight: 72.6 kg   Intake/Output this shift:  No intake/output data recorded.    Physical Exam  Gen: alert, nontoxic, NAD  ENT: Ambient air  Lungs: Diminished but clear, no wheezing  Ext: no c/c/e  Neuro: speech, comprehension intact, no gross focal deficits  Skin: warm and dry  Psych: normal mood and affect    Scheduled medications  amLODIPine, 10 mg, oral, Daily  aspirin, 81 mg, oral, Daily  atorvastatin, 80 mg, oral, Nightly  cholecalciferol, 25 mcg, oral, Daily  cyanocobalamin, 1,000 mcg, oral, Daily  insulin lispro, 0-10 Units, subcutaneous, TID with meals  levothyroxine, 88 mcg, oral, Daily before breakfast  meropenem, 1 g, intravenous, q12h  pantoprazole, 20 mg, oral, Daily before breakfast  SITagliptin phosphate, 100 mg, oral, Daily      Continuous medications     PRN medications  PRN medications: dextrose 10 % in water (D10W), dextrose, glucagon, hydrALAZINE, ondansetron ODT **OR** ondansetron     Labs:  Results from last 72 hours   Lab Units 10/11/23  0423 10/10/23  0428   WBC AUTO x10*3/uL 7.8 8.8   HEMOGLOBIN g/dL 10.1* 10.4*   HEMATOCRIT % 30.6* 30.9*   PLATELETS AUTO x10*3/uL 176 196   NEUTROS PCT AUTO %  --  60.6   LYMPHS PCT AUTO %  --  26.1   MONOS PCT AUTO %  --  12.1   EOS PCT AUTO %  --  0.5     Results from last 72 hours   Lab Units 10/11/23  0423   SODIUM mmol/L 125*   POTASSIUM mmol/L 4.8   CHLORIDE mmol/L 97   CO2 mmol/L 18*    BUN mg/dL 17   CREATININE mg/dL 1.20   GLUCOSE mg/dL 99   CALCIUM mg/dL 8.9   ANION GAP mmol/L 10   EGFR mL/min/1.73m*2 42*     Results from last 72 hours   Lab Units 10/09/23  1904   ALK PHOS U/L 98   BILIRUBIN TOTAL mg/dL <0.2   PROTEIN TOTAL g/dL 6.3   ALT U/L 15   AST U/L 15   ALBUMIN g/dL 3.8     Estimated Creatinine Clearance: 28 mL/min (by C-G formula based on SCr of 1.2 mg/dL).  CRP   Date/Time Value Ref Range Status   08/18/2022 11:57 AM 0.6 0 - 2.0 MG/DL Final     Comment:     Performed at [place] [id number]     No results found for the last 7 days.      Imaging:    I personally reviewed CT of the chest and agree with interpretation.    CT chest wo IV contrast    Result Date: 10/10/2023  Interpreted By:  Carlos Sotelo, STUDY: CT CHEST WO IV CONTRAST; 10/10/2023 1:25 pm   INDICATION: Signs/Symptoms:lung nodules.   COMPARISON: None   ACCESSION NUMBER(S): [id number]   ORDERING CLINICIAN: AVE MARQUEZ   TECHNIQUE: Contiguous axial images of the thorax were obtained from the level of the thoracic inlet through the lung bases. 100 ml of Omnipaque 350 was utilized.     FINDINGS: The thyroid gland is unremarkable.   The heart size is within normal limits.  No pericardial effusion is identified. The aorta and pulmonary arteries are normal in caliber.   There are no pathologically enlarged mediastinal, hilar, or axillary lymph nodes are seen. Calcified left infrahilar lymph node consistent with granulomatous disease.   The trachea and mainstem bronchi are patent.  There are a few calcified subcentimeter granulomas in the left lower lobe consistent with granulomas. However there are numerous noncalcified pulmonary nodules bilaterally concerning for the possibility of pulmonary metastatic disease, the largest on the left is within the left lower lobe measuring approximately 8 mm. The largest pulmonary nodule in the right upper lobe is lobe measuring 8 mm. There is a 9 mm nonspecific  noncalcified pulmonary nodule in the lateral basal segment of the right lower lobe. Numerous additional bilateral pulmonary nodules are seen.   Otherwise no significant consolidation or acute airspace opacity.   There is no evidence of pneumothorax or pleural effusion.   The visualized osseous structures are intact.   Limited images through the upper abdomen show multiple splenic granulomas. Moderate sized hiatal hernia. Nonobstructive right renal calculi.       Numerous noncalcified pulmonary nodules bilaterally concerning for pulmonary metastatic disease. The largest in the right lung measures approximately 9 mm, the largest in the left lung measures approximately 8 mm. Clinical correlation and follow-up in 3 months is recommended.     Signed by: Carlos Sotelo 10/10/2023 2:54 PM Dictation workstation:   [id number]    CT abdomen pelvis wo IV contrast    Result Date: 10/9/2023  Interpreted By:  Carlos A Bobby, STUDY: CT ABDOMEN PELVIS WO IV CONTRAST;  10/9/2023 9:35 pm   INDICATION: Signs/Symptoms:diarrhea.  Nausea, vomiting, lightheadedness, diarrhea   COMPARISON: None..   ACCESSION NUMBER(S): [id number]   ORDERING CLINICIAN: JAMEY ROSE   TECHNIQUE: Oral contrast was administered. IV contrast was not administered. CT of the Abdomen without contrast was performed. Axial, sagittal and coronal reformatted images were reviewed.   FINDINGS: Lower Chest: There are multiple noncalcified soft tissue nodules involving the bilateral lung bases. The largest in the right lower lobe measures approximately 9 mm. The largest nodule in the left lower lobe measures approximately 9 mm.   Abdomen: Liver: within normal limits. Bile Ducts: Normal caliber. Gallbladder: No calcified gallstones. Normal caliber wall. Pancreas: within normal limits. Spleen: Calcified granuloma are noted within the spleen Adrenals: within normal limits. Kidneys: Nonobstructive calculi are noted involving the right kidney.   Bowel: There is  diverticulosis of the sigmoid and descending colon without evidence for diverticulitis. Mesenteric Lymph Nodes: No enlarged mesenteric lymph nodes. Peritoneum: No ascites or free air, no fluid collection. Vessels: Atherosclerotic changes within normal limits. Retroperitoneum: within normal limits. Abdominal Wall: within normal limits. Bones: within normal limits.       Numerous noncalcified soft tissue nodules throughout the bilateral lung bases. Findings are nonspecific but findings could represent nodules from metastatic disease.   MACRO: none   Signed by: Carlos A Bobby 10/9/2023 9:47 PM Dictation workstation:   [id number]    XR chest 2 views    Result Date: 10/9/2023  Interpreted By:  Carlos A Bobby, STUDY: XR CHEST 2 VIEWS; 10/9/2023 7:21 pm   INDICATION: Signs/Symptoms:fatigue/weakness;   COMPARISON: None available.   ACCESSION NUMBER(S): [id number]   ORDERING CLINICIAN: JAMEY ROSE   TECHNIQUE: Views: PA and Lateral   FINDINGS: RESULTS:  The cardiac silhouette is within normal limits. There are calcifications involving the thoracic aorta. The lungs demonstrate no infiltrate or atelectasis. There is no evidence for pleural effusion. There are degenerative changes of the thoracic spine.       No radiographic evidence for acute cardiopulmonary disease.     Signed by: Carlos A Bobby 10/9/2023 8:06 PM Dictation workstation:   [id number]    CT abdomen wo IV contrast    Result Date: 9/28/2023  PROCEDURE:         ABDOMEN WO ORAL WO IV CONTRAST - WCT  3220 REASON FOR EXAM: Abdominal pain, acute, nonlocalized RESULT: MRN: [id number] Patient Name: FRANKIE WESTBROOK STUDY: ABDOMEN WO ORAL WO IV CONTRAST; 9/28/2023 5:57 pm INDICATION: Abdominal pain, acute, nonlocalized; /diarrhea/dizziness COMPARISON: CT thorax 04/19/2023 and 09/12/2020 CT abdomen/pelvis ACCESSION NUMBER(S): [id number] ORDERING CLINICIAN: VILLA PATE TECHNIQUE: Contiguous axial images from the lung bases through the abdomen pelvis were  performed. CTDI: DLP: FINDINGS: Evaluation of the solid viscera is suboptimal due to the lack of intravenous contrast. The liver and adrenal glands are unremarkable. There is redemonstration of chronic pancreatic ductal dilatation suboptimally evaluated on this noncontrast study. There is evidence of suggested IPMN of the pancreas. Gallbladder is surgically absent. Splenule is noted. Calcified granulomata of the spleen are identified. Multiple subcentimeter caliceal calcifications are noted within the right kidney. No hydronephrosis is identified. Bilateral renal atrophy is seen. Calcific atherosclerosis of the abdominal aorta visceral iliac arteries can be seen without aneurysm. There are some fluid-filled small bowel loops identified. Equivocal wall thickening of the left upper quadrant bowel loops can be seen. Colonic diverticulosis without diverticulitis is seen. No secondary signs appendicitis can be seen. No abdominal ascites is seen. The uterus is surgically absent. The bladder is mildly distended. The visualized osseous structures are intact. Degenerative disc disease spondylosis of the lumbar spine is noted. Bilateral hip osteoarthrosis is seen. Limited images of the lower thorax reveal multiple benign pulmonary nodules within the lung bases. Small hiatal hernia is noted. IMPRESSION: 1. Findings which may be seen with mild enteritis in the appropriate clinical setting. 2. Nonobstructing right nephrolithiasis. 3. Chronic changes as above. Dictation workstation:   [id number] This exam is available in DICOM format to non-affiliated healthcare facilities on a secure media free searchable basis with prior patient authorization.  The patient exposure is reported to a radiation dose index registry.  All CT examinations are performed with one or more of the following dose reduction techniques: Automated Exposure Control, Adjustment of mA and/or KV according to patient size, or use of iterative reconstruction  techniques. Original Interpreting Physician:   TIBURCIO ESQUIVEL MD Original Transcribed by/Date: MMODAL Sep 28 2023  5:19P Original Electronically Signed by/Date: TIBURCIO ESQUIVEL MD Sep 28 2023  6:42P Addendum Interpreting Physician: Addendum Transcribed by/Date: NO ADDENDUM Addendum Electronically Signed by/Date:     FL pain management TC    Result Date: 9/22/2023  PROCEDURE:         PAIN MANAGEMENT CASE - CXR  0999 REASON FOR EXAM: twin RESULT: MRN: [id number] Patient Name: FRNAKIE WESTBROOK STUDY: PAIN MANAGEMENT CASE; 9/20/2023 2:00 pm INDICATION: twin COMPARISON: None. ACCESSION NUMBER(S): [id number] ORDERING CLINICIAN: VONDA LIU FINDINGS: No radiologist interpretation is required. IMPRESSION: See above MACRO: None Dictation workstation:   [id number] Original Interpreting Physician:    Original Transcribed by/Date: MMODAL Sep 20 2023 12:00A Original Electronically Signed by/Date:   Sep 22 2023 1:40P Addendum Interpreting Physician: Addendum Transcribed by/Date: NO ADDENDUM Addendum Electronically Signed by/Date:               Assessment/Plan   Principal Problem:    Hyponatremia  Multiple lung nodules  Diarrhea    Etiology of lung nodules is unclear.  There is some calcified ones which would argue for chronic granulomatous process such as histoplasmosis especially with splenic granulomas.  Other possibilities include sarcoidosis although these nodules are random and do not necessarily follow a tricia-lymphangitic distribution.  Would also be quite unusual to develop sarcoidosis at her age.  Additional possibility includes metastatic process versus inflammatory.  -We will recommend follow-up at this time given the age.  We can discuss biopsy as an outpatient pending patient wishes.  -Can consider given relative anemia, poor appetite and diarrhea of unclear etiology, GI consultation inpatient versus outpatient.  Especially since we do not have a clear understanding of her subacute  to chronic diarrhea complaints.    Discussed with patient.  All questions answered    Jamal Plascencia MD  Pulmonary/CC Medicine  [facility]         LOS: 2 days

## 2023-10-11 NOTE — PROGRESS NOTES
"Fartun Carey is a 89 y.o. female on day 2 of admission presenting with Hyponatremia.    Subjective      The patient seen and examined she was seen yesterday for severe hyponatremia and acute kidney injury as well as metabolic acidosis her diarrhea is subsided no abdominal pain she feels a whole lot better she is on clear liquid diet at this time and tolerating that very well    Review of Systems   Respiratory:  Negative for cough, shortness of breath and wheezing.    Cardiovascular:  Negative for chest pain, palpitations and leg swelling.   Gastrointestinal:  Negative for abdominal pain, diarrhea, nausea and vomiting.   Genitourinary:  Negative for decreased urine volume, dysuria and hematuria.       Objective     Physical Exam  Constitutional:       General: She is not in acute distress.     Appearance: Normal appearance. She is not toxic-appearing.   Neck:      Vascular: No carotid bruit.   Cardiovascular:      Heart sounds: No murmur heard.     No friction rub. No gallop.   Pulmonary:      Breath sounds: No wheezing, rhonchi or rales.   Abdominal:      Tenderness: There is no abdominal tenderness. There is no right CVA tenderness, left CVA tenderness or rebound.   Musculoskeletal:         General: No tenderness.      Cervical back: Neck supple.      Right lower leg: No edema.      Left lower leg: No edema.   Lymphadenopathy:      Cervical: No cervical adenopathy.         Last Recorded Vitals  Blood pressure 174/62, pulse 72, temperature 37 °C (98.6 °F), temperature source Oral, resp. rate 17, height 1.651 m (5' 5\"), weight 72.6 kg (160 lb), SpO2 98 %.    Intake/Output last 3 Shifts:  I/O last 3 completed shifts:  In: 2025 (27.9 mL/kg) [P.O.:480; I.V.:1445 (19.9 mL/kg); IV Piggyback:100]  Out: 1550 (21.4 mL/kg) [Urine:1550 (0.6 mL/kg/hr)]  Weight: 72.6 kg     Current Facility-Administered Medications:     amLODIPine (Norvasc) tablet 10 mg, 10 mg, oral, Daily, ELAINE Xiong, 10 mg at 10/11/23 0808    " aspirin chewable tablet 81 mg, 81 mg, oral, Daily, Osiris A Aroldo, APRN-CNP, 81 mg at 10/11/23 0808    atorvastatin (Lipitor) tablet 80 mg, 80 mg, oral, Nightly, Osiris A Aroldo, APRN-CNP, 80 mg at 10/10/23 2220    cholecalciferol (Vitamin D-3) tablet 25 mcg, 25 mcg, oral, Daily, Osiris A Aroldo, APRN-CNP, 25 mcg at 10/11/23 0808    cyanocobalamin (Vitamin B-12) tablet 1,000 mcg, 1,000 mcg, oral, Daily, Osiris A Aroldo, APRN-CNP, 1,000 mcg at 10/11/23 0808    dextrose 10 % in water (D10W) infusion, 0.3 g/kg/hr, intravenous, Once PRN, Caroline Denise MD    dextrose 50 % injection 25 g, 25 g, intravenous, q15 min PRN, Caroline Denise MD    glucagon (Glucagen) injection 1 mg, 1 mg, intramuscular, q15 min PRN, Caroline Denise MD    hydrALAZINE (Apresoline) injection 10 mg, 10 mg, intravenous, q6h PRN, Caroline Denise MD, 5 mg at 10/10/23 0826    insulin lispro (HumaLOG) injection 0-10 Units, 0-10 Units, subcutaneous, TID with meals, Caroline Denise MD, 2 Units at 10/10/23 1158    levothyroxine (Synthroid, Levoxyl) tablet 88 mcg, 88 mcg, oral, Daily before breakfast, Osiris Tylerar, APRN-CNP, 88 mcg at 10/11/23 0604    meropenem (Merrem) 1 g in sodium chloride 0.9 % 100 mL IV, 1 g, intravenous, q12h, Osiris NEWELL Aroldo, APRN-CNP, Stopped at 10/10/23 2250    ondansetron ODT (Zofran-ODT) disintegrating tablet 4 mg, 4 mg, oral, q8h PRN, 4 mg at 10/10/23 0956 **OR** ondansetron (Zofran) injection 4 mg, 4 mg, intravenous, q8h PRN, Osiris Kendrick, APRN-CNP    pantoprazole (ProtoNix) EC tablet 20 mg, 20 mg, oral, Daily before breakfast, Osiris Tylerar, APRN-CNP, 20 mg at 10/11/23 0604    SITagliptin phosphate (Januvia) tablet 100 mg, 100 mg, oral, Daily, Osiris Kendrick, APRN-CNP, 100 mg at 10/11/23 0808    sodium chloride 0.9% infusion, 100 mL/hr, intravenous, Continuous, Caroline Denise MD, Last Rate: 100 mL/hr at 10/11/23 0811, 100 mL/hr at 10/11/23 0811   Relevant Results    Results for orders placed or performed during the  hospital encounter of 10/09/23 (from the past 96 hour(s))   CBC and Auto Differential   Result Value Ref Range    WBC 10.2 4.4 - 11.3 x10*3/uL    nRBC 0.0 0.0 - 0.0 /100 WBCs    RBC 3.53 (L) 4.00 - 5.20 x10*6/uL    Hemoglobin 10.9 (L) 12.0 - 16.0 g/dL    Hematocrit 32.1 (L) 36.0 - 46.0 %    MCV 91 80 - 100 fL    MCH 30.9 26.0 - 34.0 pg    MCHC 34.0 32.0 - 36.0 g/dL    RDW 12.7 11.5 - 14.5 %    Platelets 182 150 - 450 x10*3/uL    MPV 10.0 7.5 - 11.5 fL    Neutrophils % 82.6 40.0 - 80.0 %    Immature Granulocytes %, Automated 0.6 0.0 - 0.9 %    Lymphocytes % 8.6 13.0 - 44.0 %    Monocytes % 8.1 2.0 - 10.0 %    Eosinophils % 0.0 0.0 - 6.0 %    Basophils % 0.1 0.0 - 2.0 %    Neutrophils Absolute 8.44 (H) 1.60 - 5.50 x10*3/uL    Immature Granulocytes Absolute, Automated 0.06 0.00 - 0.50 x10*3/uL    Lymphocytes Absolute 0.88 0.80 - 3.00 x10*3/uL    Monocytes Absolute 0.83 (H) 0.05 - 0.80 x10*3/uL    Eosinophils Absolute 0.00 0.00 - 0.40 x10*3/uL    Basophils Absolute 0.01 0.00 - 0.10 x10*3/uL   Comprehensive Metabolic Panel   Result Value Ref Range    Glucose 223 (H) 65 - 99 mg/dL    Sodium 120 (L) 133 - 145 mmol/L    Potassium 4.6 3.4 - 5.1 mmol/L    Chloride 90 (L) 97 - 107 mmol/L    Bicarbonate 18 (L) 24 - 31 mmol/L    Urea Nitrogen 24 8 - 25 mg/dL    Creatinine 1.40 0.40 - 1.60 mg/dL    eGFR 35 (L) >60 mL/min/1.73m*2    Calcium 9.0 8.5 - 10.4 mg/dL    Albumin 3.8 3.5 - 5.0 g/dL    Alkaline Phosphatase 98 35 - 125 U/L    Total Protein 6.3 5.9 - 7.9 g/dL    AST 15 5 - 40 U/L    Bilirubin, Total <0.2 0.1 - 1.2 mg/dL    ALT 15 5 - 40 U/L    Anion Gap 12 <=19 mmol/L   Serial Troponin, Initial ([facility])   Result Value Ref Range    Troponin T, High Sensitivity 23 (H) <=15 ng/L   TSH with reflex to Free T4 if abnormal   Result Value Ref Range    Thyroid Stimulating Hormone 0.93 0.27 - 4.20 mIU/L   Urinalysis with Reflex Microscopic   Result Value Ref Range    Color, Urine Light-Yellow Light-Yellow, Yellow, Dark-Yellow     Appearance, Urine Clear Clear    Specific Gravity, Urine 1.021 1.005 - 1.035    pH, Urine 5.5 5.0, 5.5, 6.0, 6.5, 7.0, 7.5, 8.0    Protein, Urine 10 (TRACE) NEGATIVE, 10 (TRACE), 20 (TRACE) mg/dL    Glucose, Urine Normal Normal mg/dL    Blood, Urine NEGATIVE NEGATIVE    Ketones, Urine NEGATIVE NEGATIVE mg/dL    Bilirubin, Urine NEGATIVE NEGATIVE    Urobilinogen, Urine Normal Normal mg/dL    Nitrite, Urine NEGATIVE NEGATIVE    Leukocyte Esterase, Urine 25 Sarahi/µL (A) NEGATIVE   Urinalysis Microscopic Only   Result Value Ref Range    WBC, Urine 1-5 1-5, NONE /HPF    RBC, Urine 6-10 (A) NONE, 1-2, 3-5 /HPF    Squamous Epithelial Cells, Urine 1-9 (SPARSE) Reference range not established. /HPF    Hyaline Casts, Urine 2+ (A) NONE /LPF   Serial Troponin, 2 Hour ([facility])   Result Value Ref Range    Troponin T, High Sensitivity 28 (H) <=15 ng/L   Sars-CoV-2 PCR, Symptomatic   Result Value Ref Range    Coronavirus 2019, PCR Not Detected Not Detected   Troponin T, High Sensitivity   Result Value Ref Range    Troponin T, High Sensitivity 28 (H) <=15 ng/L   Basic metabolic panel   Result Value Ref Range    Glucose 120 (H) 65 - 99 mg/dL    Sodium 121 (L) 133 - 145 mmol/L    Potassium 4.8 3.4 - 5.1 mmol/L    Chloride 93 (L) 97 - 107 mmol/L    Bicarbonate 19 (L) 24 - 31 mmol/L    Urea Nitrogen 22 8 - 25 mg/dL    Creatinine 1.30 0.40 - 1.60 mg/dL    eGFR 38 (L) >60 mL/min/1.73m*2    Calcium 8.9 8.5 - 10.4 mg/dL    Anion Gap 9 <=19 mmol/L   CBC and Auto Differential   Result Value Ref Range    WBC 8.8 4.4 - 11.3 x10*3/uL    nRBC 0.0 0.0 - 0.0 /100 WBCs    RBC 3.40 (L) 4.00 - 5.20 x10*6/uL    Hemoglobin 10.4 (L) 12.0 - 16.0 g/dL    Hematocrit 30.9 (L) 36.0 - 46.0 %    MCV 91 80 - 100 fL    MCH 30.6 26.0 - 34.0 pg    MCHC 33.7 32.0 - 36.0 g/dL    RDW 12.7 11.5 - 14.5 %    Platelets 196 150 - 450 x10*3/uL    MPV 10.8 7.5 - 11.5 fL    Neutrophils % 60.6 40.0 - 80.0 %    Immature Granulocytes %, Automated 0.5 0.0 - 0.9 %    Lymphocytes %  26.1 13.0 - 44.0 %    Monocytes % 12.1 2.0 - 10.0 %    Eosinophils % 0.5 0.0 - 6.0 %    Basophils % 0.2 0.0 - 2.0 %    Neutrophils Absolute 5.31 1.60 - 5.50 x10*3/uL    Immature Granulocytes Absolute, Automated 0.04 0.00 - 0.50 x10*3/uL    Lymphocytes Absolute 2.28 0.80 - 3.00 x10*3/uL    Monocytes Absolute 1.06 (H) 0.05 - 0.80 x10*3/uL    Eosinophils Absolute 0.04 0.00 - 0.40 x10*3/uL    Basophils Absolute 0.02 0.00 - 0.10 x10*3/uL   Serial Troponin, 6 Hour ([facility])   Result Value Ref Range    Troponin T, High Sensitivity 37 (H) <=15 ng/L   Osmolality   Result Value Ref Range    Osmolality, Serum 265 (L) 280 - 300 mOsm/kg   POCT GLUCOSE   Result Value Ref Range    POCT Glucose 123 (H) 74 - 99 mg/dL   POCT GLUCOSE   Result Value Ref Range    POCT Glucose 157 (H) 74 - 99 mg/dL   Basic metabolic panel   Result Value Ref Range    Glucose 140 (H) 65 - 99 mg/dL    Sodium 121 (L) 133 - 145 mmol/L    Potassium 4.6 3.4 - 5.1 mmol/L    Chloride 92 (L) 97 - 107 mmol/L    Bicarbonate 17 (L) 24 - 31 mmol/L    Urea Nitrogen 19 8 - 25 mg/dL    Creatinine 1.20 0.40 - 1.60 mg/dL    eGFR 42 (L) >60 mL/min/1.73m*2    Calcium 8.9 8.5 - 10.4 mg/dL    Anion Gap 12 <=19 mmol/L   Sodium, Urine Random   Result Value Ref Range    Sodium, Urine Random 123 NOT ESTABLISHED mmol/L    Creatinine, Urine Random 90.4 NOT ESTABLISHED mg/dL    Sodium/Creatinine Ratio 136 Not established. mmol/g Creat   Osmolality, urine   Result Value Ref Range    Osmolality, Urine Random 542 200 - 1,200 mOsm/kg   POCT GLUCOSE   Result Value Ref Range    POCT Glucose 126 (H) 74 - 99 mg/dL   POCT GLUCOSE   Result Value Ref Range    POCT Glucose 109 (H) 74 - 99 mg/dL   CBC   Result Value Ref Range    WBC 7.8 4.4 - 11.3 x10*3/uL    nRBC 0.0 0.0 - 0.0 /100 WBCs    RBC 3.30 (L) 4.00 - 5.20 x10*6/uL    Hemoglobin 10.1 (L) 12.0 - 16.0 g/dL    Hematocrit 30.6 (L) 36.0 - 46.0 %    MCV 93 80 - 100 fL    MCH 30.6 26.0 - 34.0 pg    MCHC 33.0 32.0 - 36.0 g/dL    RDW 12.8 11.5 -  14.5 %    Platelets 176 150 - 450 x10*3/uL    MPV 10.0 7.5 - 11.5 fL   Basic metabolic panel   Result Value Ref Range    Glucose 99 65 - 99 mg/dL    Sodium 125 (L) 133 - 145 mmol/L    Potassium 4.8 3.4 - 5.1 mmol/L    Chloride 97 97 - 107 mmol/L    Bicarbonate 18 (L) 24 - 31 mmol/L    Urea Nitrogen 17 8 - 25 mg/dL    Creatinine 1.20 0.40 - 1.60 mg/dL    eGFR 42 (L) >60 mL/min/1.73m*2    Calcium 8.9 8.5 - 10.4 mg/dL    Anion Gap 10 <=19 mmol/L   POCT GLUCOSE   Result Value Ref Range    POCT Glucose 106 (H) 74 - 99 mg/dL       Assessment/Plan     1.  Hyponatremia it is improving it is improving  Plan:  I will discontinue IV fluids continue to monitor sodium very closely encourage oral intake    2.  Acute kidney injury proving we will discontinue IV fluids  3.  Diarrhea etiology is not clear rule out C. Difficile  4.  Metabolic acidosis it is improving  5.  Diabetes mellitus type 2  6.  Recurrent urinary tract infections               Edvin Gaxiola MD

## 2023-10-11 NOTE — CONSULTS
Consults  History Of Present Illness:    Fartun Carey is a 89 y.o. female presenting with history of diabetes, hypertension and recently had a UTI.  Patient last several days of nausea vomiting diarrhea.  Patient now admitted with a hyponatremia with VINCENT.  Patient had elevated troponin which is a borderline about 25 and 26..  Patient denies any chest pain tightness.  Fairly active.  No active angina or CHF type symptoms.  Consulted for mild elevation troponin without any symptoms.  Patient does take amlodipine for blood pressure management at home.  5 mg p.o. daily.  Last Recorded Vitals:  Vitals:    10/11/23 0335 10/11/23 0753 10/11/23 1053 10/11/23 1126   BP: 163/57 174/62 156/54 135/85   BP Location: Right arm Right arm Right arm Right arm   Patient Position: Lying Lying Sitting Sitting   Pulse: 75 72 84 84   Resp: 16 17  17   Temp: 36.5 °C (97.7 °F) 37 °C (98.6 °F)  36.7 °C (98.1 °F)   TempSrc: Oral Oral  Oral   SpO2: 96% 98%  95%   Weight:       Height:           Last Labs:  CBC - 10/11/2023:  4:23 AM  7.8 10.1 176    30.6      CMP - 10/11/2023:  4:23 AM  8.9 6.3 15 --- <0.2   _ 3.8 15 98      PTT - No results in last year.  _   _ _     Hemoglobin A1C   Date/Time Value Ref Range Status   07/06/2023 10:01 AM 6.8 (H) 4.0 - 6.0 % Final     Comment:     Hemoglobin A1C levels are related to mean blood glucose during the   preceding 2-3 months. The relationship table below may be used as a   general guide. Each 1% increase in HGB A1C is a reflection of an   increase in mean glucose of approximately 30 mg/dl.   Reference: Diabetes Care, volume 29, supplement 1 Jan. 2006                        HGB A1C ................. Approx. Mean Glucose   _______________________________________________   6%   ...............................  120 mg/dl   7%   ...............................  150 mg/dl   8%   ...............................  180 mg/dl   9%   ...............................  210 mg/dl   10%   "...............................  240 mg/dl  Performed at [address] [address]     03/31/2023 10:14 AM 7.2 (H) 4.0 - 6.0 % Final     Comment:     Hemoglobin A1C levels are related to mean blood glucose during the   preceding 2-3 months. The relationship table below may be used as a   general guide. Each 1% increase in HGB A1C is a reflection of an   increase in mean glucose of approximately 30 mg/dl.   Reference: Diabetes Care, volume 29, supplement 1 Jan. 2006                        HGB A1C ................. Approx. Mean Glucose   _______________________________________________   6%   ...............................  120 mg/dl   7%   ...............................  150 mg/dl   8%   ...............................  180 mg/dl   9%   ...............................  210 mg/dl   10%  ...............................  240 mg/dl  Performed at [address] [address]       LDL Calculated   Date/Time Value Ref Range Status   07/06/2023 10:01 AM 58 (L) 65 - 130 MG/DL Final   03/31/2023 10:14 AM 62 (L) 65 - 130 MG/DL Final   12/27/2022 10:13 AM 65 65 - 130 MG/DL Final      Last I/O:  I/O last 3 completed shifts:  In: 2025 (27.9 mL/kg) [P.O.:480; I.V.:1445 (19.9 mL/kg); IV Piggyback:100]  Out: 1550 (21.4 mL/kg) [Urine:1550 (0.6 mL/kg/hr)]  Weight: 72.6 kg     Past Cardiology Tests (Last 3 Years):  EKG:  No results found for this or any previous visit from the past 1095 days.    Echo:  No results found for this or any previous visit from the past 1095 days.    Ejection Fractions:  No results found for: \"EF\"  Cath:  No results found for this or any previous visit from the past 1095 days.    Stress Test:  No results found for this or any previous visit from the past 1095 days.    Cardiac Imaging:  No results found for this or any previous visit from the past 1095 days.      Past Medical History:  She has a past medical history of Diabetes mellitus (CMS/HCC), Disease of thyroid " gland, and Hypertension.    Past Surgical History:  She has a past surgical history that includes Hysterectomy; Back surgery; and Appendectomy.      Social History:  She reports that she has never smoked. She has never used smokeless tobacco. She reports that she does not drink alcohol and does not use drugs.    Family History:  Family History   Problem Relation Name Age of Onset    Heart disease Mother      Heart disease Father      Heart disease Brother      No Known Problems Son      Heart disease Maternal Grandmother      Heart disease Maternal Grandfather      Heart disease Paternal Grandmother      Heart disease Paternal Grandfather          Allergies:  Amoxicillin-pot clavulanate, Glimepiride, Lisinopril, Metformin hcl, Tetracycline hcl, and Cephalexin    Inpatient Medications:  Scheduled medications   Medication Dose Route Frequency    amLODIPine  10 mg oral Daily    aspirin  81 mg oral Daily    atorvastatin  80 mg oral Nightly    cholecalciferol  25 mcg oral Daily    cyanocobalamin  1,000 mcg oral Daily    insulin lispro  0-10 Units subcutaneous TID with meals    levothyroxine  88 mcg oral Daily before breakfast    meropenem  1 g intravenous q12h    pantoprazole  20 mg oral Daily before breakfast    SITagliptin phosphate  100 mg oral Daily     PRN medications   Medication    dextrose 10 % in water (D10W)    dextrose    glucagon    hydrALAZINE    ondansetron ODT    Or    ondansetron     Continuous Medications   Medication Dose Last Rate     Outpatient Medications:  Current Outpatient Medications   Medication Instructions    Accu-Chek Guide test strips strip CHECK ONCE DAILY subcutaneously as directed for 90 day(s)    aspirin 81 mg chewable tablet 1 tablet, oral, Daily    atorvastatin (Lipitor) 80 mg tablet 1 tablet, oral, Daily    cholecalciferol (Vitamin D-3) 25 MCG (1000 UT) capsule 1 capsule, oral, Daily    cyanocobalamin (Vitamin B-12) 1,000 mcg tablet 1 tablet, oral, Daily    dicyclomine (Bentyl) 10 mg  capsule 1 capsule, oral, 3 times daily    esomeprazole (NexIUM) 20 mg DR capsule 1 capsule, oral, Daily    estradiol (Estrace) 0.01 % (0.1 mg/gram) vaginal cream vaginal, Daily    estrogens (conjugated) (PREMARIN) 0.5 g, vaginal, Daily    levothyroxine (Synthroid, Levoxyl) 88 mcg tablet 1 tablet, oral, Daily    losartan (Cozaar) 100 mg tablet 1 tablet, oral, Daily    SITagliptin phosphate (Januvia) 100 mg tablet 1 tablet, oral, Daily       Physical Exam:  HEENT: Normocephalic/atraumatic pupils equal react light  Neck exam no JVD, no bruit  Lung exam clear to auscultation, few crackles at the bases  Cardiac exam is regular rhythm S1-S2, soft slight murmur heard.  No S3 heard.  Abdomen soft nontender, nondistended  Extremities no clubbing, cyanosis but trace edema  Neuro exam grossly intact.       Assessment/Plan   Patient has above underlying hypertension hyperlipidemia.  Patient with nausea vomiting diarrhea.  With a recent UTI.  No active angina CHF signs symptoms.  Elevated troponin more likely from underlying possible UTI with dehydration.  Increase amlodipine to 10 mg daily for optimizing blood pressure as well as a as needed hydralazine.  Stable from cardiac perspective.  No further work-up.  Will follow.  Basis.  DVT aspiration for precaution.    Urethral Catheter Straight-tip 16 Fr. (Active)   Reason for Continuing Urinary Catheterization acute urinary retention 10/11/23 0912   Number of days: 1       Code Status:  No Order    I spent 30 minutes in the professional and overall care of this patient.        David Eddy MD

## 2023-10-11 NOTE — PROGRESS NOTES
Physical Therapy    Physical Therapy Treatment    Patient Name: Fartun Carey  MRN: [id number]  Today's Date: 10/11/2023  Time Calculation  Start Time: 1053  Stop Time: 1118  Time Calculation (min): 25 min       Assessment/Plan   PT Assessment  PT Assessment Results: Decreased strength, Decreased range of motion, Impaired balance, Decreased endurance, Decreased mobility, Decreased safety awareness  Rehab Prognosis: Excellent  Evaluation/Treatment Tolerance: Patient tolerated treatment well, Patient limited by fatigue  Medical Staff Made Aware: Yes  Strengths: Ability to acquire knowledge, Housing layout, Insight into problems, Premorbid level of function, Rehab experience  End of Session Communication: Bedside nurse  End of Session Patient Position: Up in chair     PT Plan  Treatment/Interventions: Bed mobility, Transfer training, Gait training, Stair training, Balance training, Strengthening, Endurance training, Range of motion, Therapeutic exercise, Therapeutic activity, Home exercise program  PT Plan: Skilled PT  PT Frequency: 3 times per week  PT Discharge Recommendations: Low intensity level of continued care, 24 hr supervision due to cognition  Equipment Recommended upon Discharge: Wheeled walker  PT Recommended Transfer Status: Assist x1, Assistive device      General Visit Information:   PT  Visit  PT Received On: 10/11/23  Response to Previous Treatment: Patient with no complaints from previous session.  General  Reason for Referral: Impaired Mobility  Referred By: Dr. Caroline Denise  Past Medical History Relevant to Rehab: HTN, DB  Family/Caregiver Present: No  Prior to Session Communication: Bedside nurse  Patient Position Received: Bed, 3 rail up  Preferred Learning Style: verbal    Subjective   Precautions:     Vital Signs:  Vital Signs  Heart Rate: 84  Heart Rate Source: Monitor  BP: 156/54  BP Location: Right arm  BP Method: Automatic  Patient Position: Sitting    Objective   Pain:  Pain Assessment  Pain  Assessment: 0-10  Pain Score: 0 - No pain  Cognition:  Cognition  Overall Cognitive Status: Within Functional Limits    Activity Tolerance:  Activity Tolerance  Endurance: Decreased tolerance for upright activites  Rate of Perceived Exertion (RPE): 3/10 with gait  Treatments:  Therapeutic Exercise  Therapeutic Exercise Performed: Yes  Therapeutic Exercise Activity 1: Sitting Exercise (B ankle pumps x20, knee kicks x10, seated marches x10, seated marches x10, resisted hip abd x10, resisted hip add x10, glute sets x10)    Ambulation/Gait Training  Ambulation/Gait Training Performed: Yes  Ambulation/Gait Training 1  Surface 1: Level tile  Device 1: Rolling walker  Assistance 1: Close supervision  Quality of Gait 1:  (x1 LOB R while R turning self corrected)  Comments/Distance (ft) 1: 90x2    Outcome Measures:  [facility] Basic Mobility  Turning from your back to your side while in a flat bed without using bedrails: A little  Moving from lying on your back to sitting on the side of a flat bed without using bedrails: A little  Moving to and from bed to chair (including a wheelchair): A little  Standing up from a chair using your arms (e.g. wheelchair or bedside chair): A little  To walk in hospital room: A little  Climbing 3-5 steps with railing: A lot  Basic Mobility - Total Score: 17    Education Documentation  Home Exercise Program, taught by Jorge Soriano PT at 10/11/2023  1:06 PM.  Learner: Patient  Readiness: Eager  Method: Explanation  Response: Verbalizes Understanding  Comment: HEP info    Mobility Training, taught by Jorge Soriano PT at 10/11/2023  1:05 PM.  Learner: Patient  Readiness: Eager  Method: Explanation  Response: Verbalizes Understanding  Comment: safety, ad use, mobility techniques, fall risk education    Education Comments  No comments found.        OP EDUCATION:  Education  Individual(s) Educated: Patient  Education Provided: Body Mechanics, Fall Risk, Home Exercise Program, Home Safety,  POC, Posture  Risk and Benefits Discussed with Patient/Caregiver/Other: yes  Patient/Caregiver Demonstrated Understanding: yes  Plan of Care Discussed and Agreed Upon: yes  Patient Response to Education: Patient/Caregiver Verbalized Understanding of Information    Encounter Problems       Encounter Problems (Active)       Balance       Sitting Balance (Progressing)       Start:  10/10/23    Expected End:  10/24/23       Pt will demonstrate good sitting balance to promote safe engagement with out of bed activities           Standing Balance (Progressing)       Start:  10/10/23    Expected End:  10/24/23       Pt will demonstrate good static standing balance to promote safe participation with out of bed activity, transfers, and mobility              Mobility       Ambulation (Progressing)       Start:  10/10/23    Expected End:  10/24/23       Pt will ambulate 50' independent assist with walker to promote safe home mobility           Stair Negotiation (Progressing)       Start:  10/10/23    Expected End:  10/24/23       Pt will ascend/descend 1 stairs, no rails, and least restrictive device with independent assist to safely enter/exit the home environment              Safety       LTG - Patient will demonstrate safety requirements appropriate to situation/environment (Progressing)       Start:  10/10/23    Expected End:  10/24/23            LTG - Patient will utilize safety techniques (Progressing)       Start:  10/10/23    Expected End:  10/24/23               Safety       Safe Mobility Techniques (Progressing)       Start:  10/10/23    Expected End:  10/24/23       Pt will correctly identify and demonstrate safe mobility techniques to reduce their risks for falls during their acute care stay               Transfers       Supine to sit (Progressing)       Start:  10/10/23    Expected End:  10/24/23       Pt will transfer supine to sitting at edge of bed with independent assist to promote acute care out of bed  activity           Sit to stand (Progressing)       Start:  10/10/23    Expected End:  10/24/23       Pt will transfer sit to standing position with independent assist and least restrictive device to promote safe out of bed activity

## 2023-10-12 LAB
ANION GAP SERPL CALC-SCNC: 9 MMOL/L
BACTERIA UR CULT: NO GROWTH
BUN SERPL-MCNC: 16 MG/DL (ref 8–25)
CALCIUM SERPL-MCNC: 8.8 MG/DL (ref 8.5–10.4)
CHLORIDE SERPL-SCNC: 96 MMOL/L (ref 97–107)
CO2 SERPL-SCNC: 22 MMOL/L (ref 24–31)
CREAT SERPL-MCNC: 1 MG/DL (ref 0.4–1.6)
ERYTHROCYTE [DISTWIDTH] IN BLOOD BY AUTOMATED COUNT: 12.5 % (ref 11.5–14.5)
GFR SERPL CREATININE-BSD FRML MDRD: 52 ML/MIN/1.73M*2
GLUCOSE BLD MANUAL STRIP-MCNC: 110 MG/DL (ref 74–99)
GLUCOSE BLD MANUAL STRIP-MCNC: 119 MG/DL (ref 74–99)
GLUCOSE BLD MANUAL STRIP-MCNC: 148 MG/DL (ref 74–99)
GLUCOSE BLD MANUAL STRIP-MCNC: 166 MG/DL (ref 74–99)
GLUCOSE SERPL-MCNC: 106 MG/DL (ref 65–99)
HCT VFR BLD AUTO: 30 % (ref 36–46)
HGB BLD-MCNC: 10.2 G/DL (ref 12–16)
MCH RBC QN AUTO: 31 PG (ref 26–34)
MCHC RBC AUTO-ENTMCNC: 34 G/DL (ref 32–36)
MCV RBC AUTO: 91 FL (ref 80–100)
NRBC BLD-RTO: 0 /100 WBCS (ref 0–0)
PLATELET # BLD AUTO: 178 X10*3/UL (ref 150–450)
PMV BLD AUTO: 10.3 FL (ref 7.5–11.5)
POTASSIUM SERPL-SCNC: 4.5 MMOL/L (ref 3.4–5.1)
RBC # BLD AUTO: 3.29 X10*6/UL (ref 4–5.2)
SODIUM SERPL-SCNC: 127 MMOL/L (ref 133–145)
WBC # BLD AUTO: 7.9 X10*3/UL (ref 4.4–11.3)

## 2023-10-12 PROCEDURE — 97110 THERAPEUTIC EXERCISES: CPT | Mod: GP

## 2023-10-12 PROCEDURE — 82947 ASSAY GLUCOSE BLOOD QUANT: CPT

## 2023-10-12 PROCEDURE — 2500000002 HC RX 250 W HCPCS SELF ADMINISTERED DRUGS (ALT 637 FOR MEDICARE OP, ALT 636 FOR OP/ED): Performed by: FAMILY MEDICINE

## 2023-10-12 PROCEDURE — 2500000004 HC RX 250 GENERAL PHARMACY W/ HCPCS (ALT 636 FOR OP/ED): Performed by: NURSE PRACTITIONER

## 2023-10-12 PROCEDURE — 96372 THER/PROPH/DIAG INJ SC/IM: CPT | Performed by: NURSE PRACTITIONER

## 2023-10-12 PROCEDURE — 2500000001 HC RX 250 WO HCPCS SELF ADMINISTERED DRUGS (ALT 637 FOR MEDICARE OP): Performed by: NURSE PRACTITIONER

## 2023-10-12 PROCEDURE — 36415 COLL VENOUS BLD VENIPUNCTURE: CPT | Performed by: NURSE PRACTITIONER

## 2023-10-12 PROCEDURE — 2500000001 HC RX 250 WO HCPCS SELF ADMINISTERED DRUGS (ALT 637 FOR MEDICARE OP): Performed by: INTERNAL MEDICINE

## 2023-10-12 PROCEDURE — 1100000001 HC PRIVATE ROOM DAILY

## 2023-10-12 PROCEDURE — 80048 BASIC METABOLIC PNL TOTAL CA: CPT | Performed by: NURSE PRACTITIONER

## 2023-10-12 PROCEDURE — 2500000004 HC RX 250 GENERAL PHARMACY W/ HCPCS (ALT 636 FOR OP/ED): Performed by: FAMILY MEDICINE

## 2023-10-12 PROCEDURE — 2500000002 HC RX 250 W HCPCS SELF ADMINISTERED DRUGS (ALT 637 FOR MEDICARE OP, ALT 636 FOR OP/ED): Performed by: NURSE PRACTITIONER

## 2023-10-12 PROCEDURE — 96372 THER/PROPH/DIAG INJ SC/IM: CPT | Performed by: FAMILY MEDICINE

## 2023-10-12 PROCEDURE — 97116 GAIT TRAINING THERAPY: CPT | Mod: GP

## 2023-10-12 PROCEDURE — 85027 COMPLETE CBC AUTOMATED: CPT | Performed by: NURSE PRACTITIONER

## 2023-10-12 RX ORDER — CLONIDINE 0.1 MG/24H
1 PATCH, EXTENDED RELEASE TRANSDERMAL
Status: DISCONTINUED | OUTPATIENT
Start: 2023-10-12 | End: 2023-10-14 | Stop reason: HOSPADM

## 2023-10-12 RX ORDER — ENOXAPARIN SODIUM 100 MG/ML
40 INJECTION SUBCUTANEOUS ONCE
Status: COMPLETED | OUTPATIENT
Start: 2023-10-12 | End: 2023-10-12

## 2023-10-12 RX ORDER — HYDRALAZINE HYDROCHLORIDE 20 MG/ML
5 INJECTION INTRAMUSCULAR; INTRAVENOUS EVERY 6 HOURS PRN
Status: DISCONTINUED | OUTPATIENT
Start: 2023-10-12 | End: 2023-10-14 | Stop reason: HOSPADM

## 2023-10-12 RX ADMIN — ASPIRIN 81 MG CHEWABLE TABLET 81 MG: 81 TABLET CHEWABLE at 09:00

## 2023-10-12 RX ADMIN — CYANOCOBALAMIN TAB 1000 MCG 1000 MCG: 1000 TAB at 09:00

## 2023-10-12 RX ADMIN — LEVOTHYROXINE SODIUM 88 MCG: 0.09 TABLET ORAL at 05:15

## 2023-10-12 RX ADMIN — CLONIDINE 1 PATCH: 0.1 PATCH TRANSDERMAL at 11:33

## 2023-10-12 RX ADMIN — ENOXAPARIN SODIUM 40 MG: 40 INJECTION SUBCUTANEOUS at 11:34

## 2023-10-12 RX ADMIN — CHOLECALCIFEROL TAB 25 MCG (1000 UNIT) 25 MCG: 25 TAB at 09:00

## 2023-10-12 RX ADMIN — AMLODIPINE BESYLATE 10 MG: 10 TABLET ORAL at 09:00

## 2023-10-12 RX ADMIN — HYDRALAZINE HYDROCHLORIDE 5 MG: 20 INJECTION INTRAMUSCULAR; INTRAVENOUS at 01:04

## 2023-10-12 RX ADMIN — ATORVASTATIN CALCIUM 80 MG: 80 TABLET, FILM COATED ORAL at 21:54

## 2023-10-12 RX ADMIN — HYDRALAZINE HYDROCHLORIDE 5 MG: 20 INJECTION INTRAMUSCULAR; INTRAVENOUS at 06:57

## 2023-10-12 RX ADMIN — MEROPENEM 1 G: 1 INJECTION, POWDER, FOR SOLUTION INTRAVENOUS at 09:34

## 2023-10-12 RX ADMIN — PANTOPRAZOLE SODIUM 20 MG: 20 TABLET, DELAYED RELEASE ORAL at 05:15

## 2023-10-12 RX ADMIN — SITAGLIPTIN 100 MG: 100 TABLET, FILM COATED ORAL at 09:00

## 2023-10-12 RX ADMIN — INSULIN LISPRO 2 UNITS: 100 INJECTION, SOLUTION INTRAVENOUS; SUBCUTANEOUS at 17:16

## 2023-10-12 ASSESSMENT — COGNITIVE AND FUNCTIONAL STATUS - GENERAL
CLIMB 3 TO 5 STEPS WITH RAILING: A LITTLE
TURNING FROM BACK TO SIDE WHILE IN FLAT BAD: A LITTLE
MOVING TO AND FROM BED TO CHAIR: A LITTLE
WALKING IN HOSPITAL ROOM: A LITTLE
HELP NEEDED FOR BATHING: A LITTLE
STANDING UP FROM CHAIR USING ARMS: A LITTLE
MOBILITY SCORE: 17
MOBILITY SCORE: 18
CLIMB 3 TO 5 STEPS WITH RAILING: A LOT
MOVING FROM LYING ON BACK TO SITTING ON SIDE OF FLAT BED WITH BEDRAILS: A LITTLE
TOILETING: A LITTLE
DAILY ACTIVITIY SCORE: 19
STANDING UP FROM CHAIR USING ARMS: A LITTLE
TOILETING: A LITTLE
WALKING IN HOSPITAL ROOM: A LITTLE
MOVING FROM LYING ON BACK TO SITTING ON SIDE OF FLAT BED WITH BEDRAILS: A LITTLE
HELP NEEDED FOR BATHING: A LITTLE
STANDING UP FROM CHAIR USING ARMS: A LITTLE
DRESSING REGULAR LOWER BODY CLOTHING: A LITTLE
PERSONAL GROOMING: A LITTLE
TURNING FROM BACK TO SIDE WHILE IN FLAT BAD: A LITTLE
DRESSING REGULAR UPPER BODY CLOTHING: A LITTLE
MOVING TO AND FROM BED TO CHAIR: A LITTLE
PERSONAL GROOMING: A LITTLE
DRESSING REGULAR UPPER BODY CLOTHING: A LITTLE
MOVING TO AND FROM BED TO CHAIR: A LITTLE
TURNING FROM BACK TO SIDE WHILE IN FLAT BAD: A LITTLE
MOVING FROM LYING ON BACK TO SITTING ON SIDE OF FLAT BED WITH BEDRAILS: A LITTLE
CLIMB 3 TO 5 STEPS WITH RAILING: A LOT
DAILY ACTIVITIY SCORE: 19
DRESSING REGULAR LOWER BODY CLOTHING: A LITTLE
WALKING IN HOSPITAL ROOM: A LITTLE
MOBILITY SCORE: 17

## 2023-10-12 ASSESSMENT — PAIN - FUNCTIONAL ASSESSMENT
PAIN_FUNCTIONAL_ASSESSMENT: 0-10

## 2023-10-12 ASSESSMENT — PAIN SCALES - GENERAL
PAINLEVEL_OUTOF10: 0 - NO PAIN

## 2023-10-12 NOTE — CARE PLAN
Problem: Skin  Goal: Prevent/minimize sheer/friction injuries  Outcome: Progressing   The patient's goals for the shift include regain strength    The clinical goals for the shift include ambulation

## 2023-10-12 NOTE — PROGRESS NOTES
"Fartun Carey is a 89 y.o. female on day 3 of admission presenting with Hyponatremia.    Subjective    Patient denies having any further diarrhea.  Denies any shortness of breath.       Objective     Physical Exam  Alert, no acute distress  Ambient air  Lungs are clear, no wheezing  No CCE  Mood and affect    Last Recorded Vitals  Blood pressure 143/50, pulse 86, temperature 36.5 °C (97.7 °F), temperature source Oral, resp. rate 18, height 1.651 m (5' 5\"), weight 72.6 kg (160 lb), SpO2 96 %.  Intake/Output last 3 Shifts:  I/O last 3 completed shifts:  In: 580 (8 mL/kg) [P.O.:480; IV Piggyback:100]  Out: 2150 (29.6 mL/kg) [Urine:2150 (0.8 mL/kg/hr)]  Weight: 72.6 kg                    Assessment/Plan   Principal Problem:    Hyponatremia  Multiple pulmonary nodules  Diarrhea: No further episode    Given patient largely asymptomatic and especially with her age would recommend holding on any aggressive pursuit for biopsy at this time.  Would recommend following up with imaging as an outpatient.  All of patient's questions were answered.  No absolute contraindications for discharge from our perspective.  We will sign off.  Please call with any questions or reconsult as needed    Jamal Plascencia MD  Pulmonary/ Medicine  [facility]       I spent 11 minutes in the professional and overall care of this patient.          "

## 2023-10-12 NOTE — PROGRESS NOTES
Physical Therapy    Physical Therapy Treatment    Patient Name: Fartun Carey  MRN: [id number]  Today's Date: 10/12/2023  Time Calculation  Start Time: 1057  Stop Time: 1120  Time Calculation (min): 23 min       Assessment/Plan   PT Assessment  PT Assessment Results: Decreased strength, Decreased range of motion, Impaired balance, Decreased endurance, Decreased mobility, Decreased safety awareness  Rehab Prognosis: Excellent  Evaluation/Treatment Tolerance: Patient tolerated treatment well  Medical Staff Made Aware: Yes  Strengths: Ability to acquire knowledge, Housing layout, Insight into problems, Premorbid level of function, Rehab experience  End of Session Communication: Bedside nurse  End of Session Patient Position: Up in chair     PT Plan  Treatment/Interventions: Bed mobility, Transfer training, Gait training, Stair training, Balance training, Strengthening, Endurance training, Range of motion, Therapeutic exercise, Therapeutic activity, Home exercise program  PT Plan: Skilled PT  PT Frequency: 3 times per week  PT Discharge Recommendations: Low intensity level of continued care, 24 hr supervision due to cognition  Equipment Recommended upon Discharge: Wheeled walker  PT Recommended Transfer Status: Assist x1      General Visit Information:   PT  Visit  PT Received On: 10/12/23  Response to Previous Treatment: Patient with no complaints from previous session.  General  Reason for Referral: Impaired Mobility  Referred By: Dr. Caroline Denise  Past Medical History Relevant to Rehab: HTN, DB  Family/Caregiver Present: No  Prior to Session Communication: Bedside nurse  Patient Position Received: Bed, 3 rail up  Preferred Learning Style: verbal    Subjective   Precautions:     Vital Signs:  Vital Signs  Heart Rate: 84  Heart Rate Source: Monitor  SpO2: 95 % (ra)  BP: 156/54  BP Location: Right arm  BP Method: Automatic  Patient Position: Sitting    Objective   Pain:  Pain Assessment  Pain Assessment: 0-10  Pain Score:  0 - No pain  Cognition:  Cognition  Overall Cognitive Status: Within Functional Limits    Activity Tolerance:  Activity Tolerance  Endurance: Decreased tolerance for upright activites  Rate of Perceived Exertion (RPE): 5/10 with gait  Treatments:  Therapeutic Exercise  Therapeutic Exercise Performed: Yes  Therapeutic Exercise Activity 1: Sitting Exercise (B ankle pumps x20, knee kicks x15, seated marches x15, resisted hip abd x15, resisted hip add x15, glute sets x15)    Ambulation/Gait Training  Ambulation/Gait Training Performed: Yes  Ambulation/Gait Training 1  Surface 1: Level tile  Device 1: Rolling walker  Assistance 1: Close supervision  Quality of Gait 1:  (slow, laborous, kyphotic)  Comments/Distance (ft) 1: 120'x2    Outcome Measures:  [facility] Basic Mobility  Turning from your back to your side while in a flat bed without using bedrails: A little  Moving from lying on your back to sitting on the side of a flat bed without using bedrails: A little  Moving to and from bed to chair (including a wheelchair): A little  Standing up from a chair using your arms (e.g. wheelchair or bedside chair): A little  To walk in hospital room: A little  Climbing 3-5 steps with railing: A little  Basic Mobility - Total Score: 18    Education Documentation  Mobility Training, taught by Jorge Soriano PT at 10/12/2023 12:00 PM.  Learner: Patient  Readiness: Eager  Method: Explanation  Response: Verbalizes Understanding  Comment: transfer techniques, ad use, risks of bedrest, fall risk management    Home Exercise Program, taught by Jorge Soriano PT at 10/11/2023  1:06 PM.  Learner: Patient  Readiness: Eager  Method: Explanation  Response: Verbalizes Understanding  Comment: HEP info    Mobility Training, taught by Jorge Soriano PT at 10/11/2023  1:05 PM.  Learner: Patient  Readiness: Eager  Method: Explanation  Response: Verbalizes Understanding  Comment: safety, ad use, mobility techniques, fall risk  education    Education Comments  No comments found.        OP EDUCATION:  Education  Individual(s) Educated: Patient  Education Provided: Body Mechanics, Fall Risk, Home Exercise Program, Home Safety, POC, Posture  Risk and Benefits Discussed with Patient/Caregiver/Other: yes  Patient/Caregiver Demonstrated Understanding: yes  Plan of Care Discussed and Agreed Upon: yes  Patient Response to Education: Patient/Caregiver Verbalized Understanding of Information    Encounter Problems       Encounter Problems (Active)       Balance       Sitting Balance (Met)       Start:  10/10/23    Expected End:  10/24/23    Resolved:  10/12/23    Pt will demonstrate good sitting balance to promote safe engagement with out of bed activities           Standing Balance (Progressing)       Start:  10/10/23    Expected End:  10/24/23       Pt will demonstrate good static standing balance to promote safe participation with out of bed activity, transfers, and mobility              Mobility       Ambulation (Progressing)       Start:  10/10/23    Expected End:  10/24/23       Pt will ambulate 50' independent assist with walker to promote safe home mobility           Stair Negotiation (Progressing)       Start:  10/10/23    Expected End:  10/24/23       Pt will ascend/descend 1 stairs, no rails, and least restrictive device with independent assist to safely enter/exit the home environment              Safety       LTG - Patient will demonstrate safety requirements appropriate to situation/environment (Progressing)       Start:  10/10/23    Expected End:  10/24/23            LTG - Patient will utilize safety techniques (Progressing)       Start:  10/10/23    Expected End:  10/24/23               Safety       Safe Mobility Techniques (Progressing)       Start:  10/10/23    Expected End:  10/24/23       Pt will correctly identify and demonstrate safe mobility techniques to reduce their risks for falls during their acute care stay                Transfers       Supine to sit (Progressing)       Start:  10/10/23    Expected End:  10/24/23       Pt will transfer supine to sitting at edge of bed with independent assist to promote acute care out of bed activity           Sit to stand (Progressing)       Start:  10/10/23    Expected End:  10/24/23       Pt will transfer sit to standing position with independent assist and least restrictive device to promote safe out of bed activity

## 2023-10-12 NOTE — PROGRESS NOTES
"    Subjective   Seen for hyponatremia and acute kidney injury presented with diarrhea and volume depletion she feels a lot better no further diarrhea no nausea vomiting       Review of Systems    Objective     Physical Exam  Constitutional:       General: She is not in acute distress.     Appearance: Normal appearance. She is not toxic-appearing.   Neck:      Vascular: No carotid bruit.   Cardiovascular:      Heart sounds: No murmur heard.     No friction rub. No gallop.   Pulmonary:      Breath sounds: No wheezing, rhonchi or rales.   Abdominal:      Tenderness: There is no abdominal tenderness. There is no right CVA tenderness, left CVA tenderness or rebound.   Musculoskeletal:         General: No tenderness.      Cervical back: Neck supple.      Right lower leg: No edema.      Left lower leg: No edema.   Lymphadenopathy:      Cervical: No cervical adenopathy.         Last Recorded Vitals  Blood pressure 172/53, pulse 74, temperature 36.8 °C (98.2 °F), temperature source Oral, resp. rate 17, height 1.651 m (5' 5\"), weight 72.6 kg (160 lb), SpO2 96 %.    Intake/Output last 3 Shifts:  I/O last 3 completed shifts:  In: 580 (8 mL/kg) [P.O.:480; IV Piggyback:100]  Out: 2150 (29.6 mL/kg) [Urine:2150 (0.8 mL/kg/hr)]  Weight: 72.6 kg     Current Facility-Administered Medications:     amLODIPine (Norvasc) tablet 10 mg, 10 mg, oral, Daily, Osiris A Aroldo, APRN-CNP, 10 mg at 10/12/23 0900    aspirin chewable tablet 81 mg, 81 mg, oral, Daily, Osiris A Aroldo, APRN-CNP, 81 mg at 10/12/23 0900    atorvastatin (Lipitor) tablet 80 mg, 80 mg, oral, Nightly, Osiris A Aroldo, APRN-CNP, 80 mg at 10/11/23 2101    cholecalciferol (Vitamin D-3) tablet 25 mcg, 25 mcg, oral, Daily, Osiris A Aroldo, APRN-CNP, 25 mcg at 10/12/23 0900    cloNIDine (Catapres-TTS) 0.1 mg/24 hr patch 1 patch, 1 patch, transdermal, Weekly, David Eddy MD    cyanocobalamin (Vitamin B-12) tablet 1,000 mcg, 1,000 mcg, oral, Daily, Osiris Kendrick, " APRN-CNP, 1,000 mcg at 10/12/23 0900    dextrose 10 % in water (D10W) infusion, 0.3 g/kg/hr, intravenous, Once PRN, Caroline Denise MD    dextrose 50 % injection 25 g, 25 g, intravenous, q15 min PRN, Caroline Denise MD    enoxaparin (Lovenox) syringe 40 mg, 40 mg, subcutaneous, Once, Osiris Kendrick APRN-CNP    glucagon (Glucagen) injection 1 mg, 1 mg, intramuscular, q15 min PRN, Caroline Denise MD    hydrALAZINE (Apresoline) injection 5 mg, 5 mg, intravenous, q6h PRN, Caroline Denise MD, 5 mg at 10/12/23 0657    insulin lispro (HumaLOG) injection 0-10 Units, 0-10 Units, subcutaneous, TID with meals, Caroline Denise MD, 2 Units at 10/11/23 1624    levothyroxine (Synthroid, Levoxyl) tablet 88 mcg, 88 mcg, oral, Daily before breakfast, Osiris Kendrick APRN-CNP, 88 mcg at 10/12/23 0515    ondansetron ODT (Zofran-ODT) disintegrating tablet 4 mg, 4 mg, oral, q8h PRN, 4 mg at 10/10/23 0956 **OR** ondansetron (Zofran) injection 4 mg, 4 mg, intravenous, q8h PRN, Osiris Tylerar, APRN-CNP    pantoprazole (ProtoNix) EC tablet 20 mg, 20 mg, oral, Daily before breakfast, Osiris Tylerar, APRN-CNP, 20 mg at 10/12/23 0515    [START ON 10/13/2023] SITagliptin phosphate (Januvia) tablet 50 mg, 50 mg, oral, Daily, Osiris Kendrcik, APRN-CNP   Relevant Results    Results for orders placed or performed during the hospital encounter of 10/09/23 (from the past 96 hour(s))   CBC and Auto Differential   Result Value Ref Range    WBC 10.2 4.4 - 11.3 x10*3/uL    nRBC 0.0 0.0 - 0.0 /100 WBCs    RBC 3.53 (L) 4.00 - 5.20 x10*6/uL    Hemoglobin 10.9 (L) 12.0 - 16.0 g/dL    Hematocrit 32.1 (L) 36.0 - 46.0 %    MCV 91 80 - 100 fL    MCH 30.9 26.0 - 34.0 pg    MCHC 34.0 32.0 - 36.0 g/dL    RDW 12.7 11.5 - 14.5 %    Platelets 182 150 - 450 x10*3/uL    MPV 10.0 7.5 - 11.5 fL    Neutrophils % 82.6 40.0 - 80.0 %    Immature Granulocytes %, Automated 0.6 0.0 - 0.9 %    Lymphocytes % 8.6 13.0 - 44.0 %    Monocytes % 8.1 2.0 - 10.0 %    Eosinophils % 0.0 0.0 -  6.0 %    Basophils % 0.1 0.0 - 2.0 %    Neutrophils Absolute 8.44 (H) 1.60 - 5.50 x10*3/uL    Immature Granulocytes Absolute, Automated 0.06 0.00 - 0.50 x10*3/uL    Lymphocytes Absolute 0.88 0.80 - 3.00 x10*3/uL    Monocytes Absolute 0.83 (H) 0.05 - 0.80 x10*3/uL    Eosinophils Absolute 0.00 0.00 - 0.40 x10*3/uL    Basophils Absolute 0.01 0.00 - 0.10 x10*3/uL   Comprehensive Metabolic Panel   Result Value Ref Range    Glucose 223 (H) 65 - 99 mg/dL    Sodium 120 (L) 133 - 145 mmol/L    Potassium 4.6 3.4 - 5.1 mmol/L    Chloride 90 (L) 97 - 107 mmol/L    Bicarbonate 18 (L) 24 - 31 mmol/L    Urea Nitrogen 24 8 - 25 mg/dL    Creatinine 1.40 0.40 - 1.60 mg/dL    eGFR 35 (L) >60 mL/min/1.73m*2    Calcium 9.0 8.5 - 10.4 mg/dL    Albumin 3.8 3.5 - 5.0 g/dL    Alkaline Phosphatase 98 35 - 125 U/L    Total Protein 6.3 5.9 - 7.9 g/dL    AST 15 5 - 40 U/L    Bilirubin, Total <0.2 0.1 - 1.2 mg/dL    ALT 15 5 - 40 U/L    Anion Gap 12 <=19 mmol/L   Serial Troponin, Initial ([facility])   Result Value Ref Range    Troponin T, High Sensitivity 23 (H) <=15 ng/L   TSH with reflex to Free T4 if abnormal   Result Value Ref Range    Thyroid Stimulating Hormone 0.93 0.27 - 4.20 mIU/L   Urinalysis with Reflex Microscopic   Result Value Ref Range    Color, Urine Light-Yellow Light-Yellow, Yellow, Dark-Yellow    Appearance, Urine Clear Clear    Specific Gravity, Urine 1.021 1.005 - 1.035    pH, Urine 5.5 5.0, 5.5, 6.0, 6.5, 7.0, 7.5, 8.0    Protein, Urine 10 (TRACE) NEGATIVE, 10 (TRACE), 20 (TRACE) mg/dL    Glucose, Urine Normal Normal mg/dL    Blood, Urine NEGATIVE NEGATIVE    Ketones, Urine NEGATIVE NEGATIVE mg/dL    Bilirubin, Urine NEGATIVE NEGATIVE    Urobilinogen, Urine Normal Normal mg/dL    Nitrite, Urine NEGATIVE NEGATIVE    Leukocyte Esterase, Urine 25 Sarahi/µL (A) NEGATIVE   Urinalysis Microscopic Only   Result Value Ref Range    WBC, Urine 1-5 1-5, NONE /HPF    RBC, Urine 6-10 (A) NONE, 1-2, 3-5 /HPF    Squamous Epithelial Cells, Urine  1-9 (SPARSE) Reference range not established. /HPF    Hyaline Casts, Urine 2+ (A) NONE /LPF   Serial Troponin, 2 Hour ([facility])   Result Value Ref Range    Troponin T, High Sensitivity 28 (H) <=15 ng/L   Sars-CoV-2 PCR, Symptomatic   Result Value Ref Range    Coronavirus 2019, PCR Not Detected Not Detected   Troponin T, High Sensitivity   Result Value Ref Range    Troponin T, High Sensitivity 28 (H) <=15 ng/L   Basic metabolic panel   Result Value Ref Range    Glucose 120 (H) 65 - 99 mg/dL    Sodium 121 (L) 133 - 145 mmol/L    Potassium 4.8 3.4 - 5.1 mmol/L    Chloride 93 (L) 97 - 107 mmol/L    Bicarbonate 19 (L) 24 - 31 mmol/L    Urea Nitrogen 22 8 - 25 mg/dL    Creatinine 1.30 0.40 - 1.60 mg/dL    eGFR 38 (L) >60 mL/min/1.73m*2    Calcium 8.9 8.5 - 10.4 mg/dL    Anion Gap 9 <=19 mmol/L   CBC and Auto Differential   Result Value Ref Range    WBC 8.8 4.4 - 11.3 x10*3/uL    nRBC 0.0 0.0 - 0.0 /100 WBCs    RBC 3.40 (L) 4.00 - 5.20 x10*6/uL    Hemoglobin 10.4 (L) 12.0 - 16.0 g/dL    Hematocrit 30.9 (L) 36.0 - 46.0 %    MCV 91 80 - 100 fL    MCH 30.6 26.0 - 34.0 pg    MCHC 33.7 32.0 - 36.0 g/dL    RDW 12.7 11.5 - 14.5 %    Platelets 196 150 - 450 x10*3/uL    MPV 10.8 7.5 - 11.5 fL    Neutrophils % 60.6 40.0 - 80.0 %    Immature Granulocytes %, Automated 0.5 0.0 - 0.9 %    Lymphocytes % 26.1 13.0 - 44.0 %    Monocytes % 12.1 2.0 - 10.0 %    Eosinophils % 0.5 0.0 - 6.0 %    Basophils % 0.2 0.0 - 2.0 %    Neutrophils Absolute 5.31 1.60 - 5.50 x10*3/uL    Immature Granulocytes Absolute, Automated 0.04 0.00 - 0.50 x10*3/uL    Lymphocytes Absolute 2.28 0.80 - 3.00 x10*3/uL    Monocytes Absolute 1.06 (H) 0.05 - 0.80 x10*3/uL    Eosinophils Absolute 0.04 0.00 - 0.40 x10*3/uL    Basophils Absolute 0.02 0.00 - 0.10 x10*3/uL   Serial Troponin, 6 Hour ([facility])   Result Value Ref Range    Troponin T, High Sensitivity 37 (H) <=15 ng/L   Osmolality   Result Value Ref Range    Osmolality, Serum 265 (L) 280 - 300 mOsm/kg   POCT GLUCOSE    Result Value Ref Range    POCT Glucose 123 (H) 74 - 99 mg/dL   POCT GLUCOSE   Result Value Ref Range    POCT Glucose 157 (H) 74 - 99 mg/dL   Basic metabolic panel   Result Value Ref Range    Glucose 140 (H) 65 - 99 mg/dL    Sodium 121 (L) 133 - 145 mmol/L    Potassium 4.6 3.4 - 5.1 mmol/L    Chloride 92 (L) 97 - 107 mmol/L    Bicarbonate 17 (L) 24 - 31 mmol/L    Urea Nitrogen 19 8 - 25 mg/dL    Creatinine 1.20 0.40 - 1.60 mg/dL    eGFR 42 (L) >60 mL/min/1.73m*2    Calcium 8.9 8.5 - 10.4 mg/dL    Anion Gap 12 <=19 mmol/L   Sodium, Urine Random   Result Value Ref Range    Sodium, Urine Random 123 NOT ESTABLISHED mmol/L    Creatinine, Urine Random 90.4 NOT ESTABLISHED mg/dL    Sodium/Creatinine Ratio 136 Not established. mmol/g Creat   Osmolality, urine   Result Value Ref Range    Osmolality, Urine Random 542 200 - 1,200 mOsm/kg   Urine culture    Specimen: Clean Catch/Voided; Urine   Result Value Ref Range    Urine Culture No growth    POCT GLUCOSE   Result Value Ref Range    POCT Glucose 126 (H) 74 - 99 mg/dL   POCT GLUCOSE   Result Value Ref Range    POCT Glucose 109 (H) 74 - 99 mg/dL   CBC   Result Value Ref Range    WBC 7.8 4.4 - 11.3 x10*3/uL    nRBC 0.0 0.0 - 0.0 /100 WBCs    RBC 3.30 (L) 4.00 - 5.20 x10*6/uL    Hemoglobin 10.1 (L) 12.0 - 16.0 g/dL    Hematocrit 30.6 (L) 36.0 - 46.0 %    MCV 93 80 - 100 fL    MCH 30.6 26.0 - 34.0 pg    MCHC 33.0 32.0 - 36.0 g/dL    RDW 12.8 11.5 - 14.5 %    Platelets 176 150 - 450 x10*3/uL    MPV 10.0 7.5 - 11.5 fL   Basic metabolic panel   Result Value Ref Range    Glucose 99 65 - 99 mg/dL    Sodium 125 (L) 133 - 145 mmol/L    Potassium 4.8 3.4 - 5.1 mmol/L    Chloride 97 97 - 107 mmol/L    Bicarbonate 18 (L) 24 - 31 mmol/L    Urea Nitrogen 17 8 - 25 mg/dL    Creatinine 1.20 0.40 - 1.60 mg/dL    eGFR 42 (L) >60 mL/min/1.73m*2    Calcium 8.9 8.5 - 10.4 mg/dL    Anion Gap 10 <=19 mmol/L   POCT GLUCOSE   Result Value Ref Range    POCT Glucose 106 (H) 74 - 99 mg/dL   POCT GLUCOSE    Result Value Ref Range    POCT Glucose 119 (H) 74 - 99 mg/dL   POCT GLUCOSE   Result Value Ref Range    POCT Glucose 196 (H) 74 - 99 mg/dL   POCT GLUCOSE   Result Value Ref Range    POCT Glucose 127 (H) 74 - 99 mg/dL   CBC   Result Value Ref Range    WBC 7.9 4.4 - 11.3 x10*3/uL    nRBC 0.0 0.0 - 0.0 /100 WBCs    RBC 3.29 (L) 4.00 - 5.20 x10*6/uL    Hemoglobin 10.2 (L) 12.0 - 16.0 g/dL    Hematocrit 30.0 (L) 36.0 - 46.0 %    MCV 91 80 - 100 fL    MCH 31.0 26.0 - 34.0 pg    MCHC 34.0 32.0 - 36.0 g/dL    RDW 12.5 11.5 - 14.5 %    Platelets 178 150 - 450 x10*3/uL    MPV 10.3 7.5 - 11.5 fL   Basic metabolic panel   Result Value Ref Range    Glucose 106 (H) 65 - 99 mg/dL    Sodium 127 (L) 133 - 145 mmol/L    Potassium 4.5 3.4 - 5.1 mmol/L    Chloride 96 (L) 97 - 107 mmol/L    Bicarbonate 22 (L) 24 - 31 mmol/L    Urea Nitrogen 16 8 - 25 mg/dL    Creatinine 1.00 0.40 - 1.60 mg/dL    eGFR 52 (L) >60 mL/min/1.73m*2    Calcium 8.8 8.5 - 10.4 mg/dL    Anion Gap 9 <=19 mmol/L   POCT GLUCOSE   Result Value Ref Range    POCT Glucose 110 (H) 74 - 99 mg/dL       Assessment/Plan     1.  Hyponatremia sodium is stable  Plan:  Avoid IV fluids and continue to monitor sodium     2.  Acute kidney injury proving  3.  Diarrhea etiology is not clear rule out C. Difficile  4.  Metabolic acidosis it is improving  5.  Diabetes mellitus type 2  6.  Recurrent urinary tract infections                  Edvin Gaxiola MD

## 2023-10-12 NOTE — CARE PLAN
Problem: Balance  Goal: Standing Balance  Description: Pt will demonstrate good static standing balance to promote safe participation with out of bed activity, transfers, and mobility    Outcome: Progressing     Problem: Mobility  Goal: Ambulation  Description: Pt will ambulate 50' independent assist with walker to promote safe home mobility    Outcome: Progressing     Problem: Safety  Goal: Safe Mobility Techniques  Description: Pt will correctly identify and demonstrate safe mobility techniques to reduce their risks for falls during their acute care stay     Outcome: Progressing     Problem: Transfers  Goal: Supine to sit  Description: Pt will transfer supine to sitting at edge of bed with independent assist to promote acute care out of bed activity    Outcome: Progressing  Goal: Sit to stand  Description: Pt will transfer sit to standing position with independent assist and least restrictive device to promote safe out of bed activity    Outcome: Progressing

## 2023-10-12 NOTE — PROGRESS NOTES
Frankie Westbrook is a 89 y.o. female on day 3 of admission presenting with Hyponatremia and diarrhea.       Subjective   Patient sitting comfortably in chair at side of bed, denies any abdominal pain or cramping.  Patient claims no BM in last 24 hours. She denies nausea at this time will advance her diet.    Objective     Last Recorded Vitals  /53 (BP Location: Right arm, Patient Position: Lying)   Pulse 74   Temp 36.8 °C (98.2 °F) (Oral)   Resp 17   Wt 72.6 kg (160 lb)   SpO2 96%   Intake/Output last 3 Shifts:    Intake/Output Summary (Last 24 hours) at 10/12/2023 0931  Last data filed at 10/12/2023 0529  Gross per 24 hour   Intake 580 ml   Output 1250 ml   Net -670 ml         Admission Weight  Weight: 72.6 kg (160 lb) (10/09/23 1817)    Daily Weight  10/09/23 : 72.6 kg (160 lb)    Image Results  CT abdomen pelvis wo IV contrast  PROCEDURE:         ABD PELV WO ORAL WO IV CONTRAST - T  3221  REASON FOR EXAM: Abdominal pain, acute, nonlocalized    RESULT: MRN: [id number]  Patient Name: FRANKIE WESTBROOK    STUDY:  ABD PELV WO ORAL WO IV CONTRAST; 10/9/2023 8:55 am    INDICATION:  Abdominal pain, acute, nonlocalized; /diarrhea/dizziness    COMPARISON:  CT thorax 04/19/2023 and 09/12/2020 CT abdomen/pelvis    ACCESSION NUMBER(S):  [id number]    ORDERING CLINICIAN:  VILLA PATE    TECHNIQUE:  Contiguous axial images of the abdomen/pelvis were performed.    CTDI:  DLP:    FINDINGS:  This radiology study is being re-signed due to an administrative error but  not reinterpreted. The new signature merely reflects the date upon which the  administrative error was corrected for placement in patient chart. The  actual interpretation took place in a timely fashion, consistent with   policy, by the physician referenced as interpreting the study.    Evaluation of the solid viscera is suboptimal due to the lack of  intravenous contrast.    The liver and adrenal glands are unremarkable. There is redemonstration of  chronic  pancreatic ductal dilatation suboptimally evaluated on this  noncontrast study. There is evidence of suggested IPMN of the pancreas.  Gallbladder is surgically absent. Splenule is noted.    Calcified granulomata of the spleen are identified.    Multiple subcentimeter caliceal calcifications are noted within the right  kidney. No hydronephrosis is identified. Bilateral renal atrophy is seen.    Calcific atherosclerosis of the abdominal aorta visceral iliac arteries can  be seen without aneurysm.    There are some fluid-filled small bowel loops identified. Equivocal wall  thickening of the left upper quadrant bowel loops can be seen. Colonic  diverticulosis without diverticulitis is seen.    No secondary signs appendicitis can be seen.    No abdominal ascites is seen.    The uterus is surgically absent. The bladder is mildly distended.    The visualized osseous structures are intact. 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 Bilateral hip osteoarthrosis is  seen.    Limited images of the lower thorax reveal multiple benign pulmonary nodules  within the lung bases. Small hiatal hernia is noted.    IMPRESSION:  1. Findings which may be seen with mild enteritis in the appropriate  clinical setting.  2. Nonobstructing right nephrolithiasis.  3. Chronic changes as above.    Dictation workstation:   [id number]  This exam is available in DICOM format to non-affiliated healthcare  facilities on a secure media free searchable basis with prior patient  authorization.  The patient exposure is reported to a radiation dose index  registry.  All CT examinations are performed with one or more of the  following dose reduction techniques: Automated Exposure Control, Adjustment  of mA and/or KV according to patient size, or use of iterative  reconstruction techniques.    Original Interpreting Physician:   TIBURCIO ESQUIVEL MD  Original Transcribed by/Date: [email or web address] Sep 28 2023  5:19P  Original  Electronically Signed by/Date: TIBURCIO ESQUIVEL MD Oct 11 2023  8:37P    Addendum Interpreting Physician:  Addendum Transcribed by/Date: NO ADDENDUM  Addendum Electronically Signed by/Date:      Physical Exam  Constitutional:       Appearance: Normal appearance. She is obese.   Cardiovascular:      Rate and Rhythm: Normal rate and regular rhythm.      Pulses: Normal pulses.      Heart sounds: Normal heart sounds.   Pulmonary:      Effort: Pulmonary effort is normal.      Breath sounds: Normal breath sounds.   Abdominal:      General: Bowel sounds are normal.      Palpations: Abdomen is soft.   Musculoskeletal:         General: Normal range of motion.   Skin:     General: Skin is warm and dry.      Capillary Refill: Capillary refill takes less than 2 seconds.   Neurological:      General: No focal deficit present.      Mental Status: She is alert and oriented to person, place, and time.   Psychiatric:         Mood and Affect: Mood normal.         Behavior: Behavior normal.         Relevant Results               Results for orders placed or performed during the hospital encounter of 10/09/23 (from the past 24 hour(s))   POCT GLUCOSE   Result Value Ref Range    POCT Glucose 119 (H) 74 - 99 mg/dL   POCT GLUCOSE   Result Value Ref Range    POCT Glucose 196 (H) 74 - 99 mg/dL   POCT GLUCOSE   Result Value Ref Range    POCT Glucose 127 (H) 74 - 99 mg/dL   CBC   Result Value Ref Range    WBC 7.9 4.4 - 11.3 x10*3/uL    nRBC 0.0 0.0 - 0.0 /100 WBCs    RBC 3.29 (L) 4.00 - 5.20 x10*6/uL    Hemoglobin 10.2 (L) 12.0 - 16.0 g/dL    Hematocrit 30.0 (L) 36.0 - 46.0 %    MCV 91 80 - 100 fL    MCH 31.0 26.0 - 34.0 pg    MCHC 34.0 32.0 - 36.0 g/dL    RDW 12.5 11.5 - 14.5 %    Platelets 178 150 - 450 x10*3/uL    MPV 10.3 7.5 - 11.5 fL   Basic metabolic panel   Result Value Ref Range    Glucose 106 (H) 65 - 99 mg/dL    Sodium 127 (L) 133 - 145 mmol/L    Potassium 4.5 3.4 - 5.1 mmol/L    Chloride 96 (L) 97 - 107 mmol/L    Bicarbonate 22 (L)  24 - 31 mmol/L    Urea Nitrogen 16 8 - 25 mg/dL    Creatinine 1.00 0.40 - 1.60 mg/dL    eGFR 52 (L) >60 mL/min/1.73m*2    Calcium 8.8 8.5 - 10.4 mg/dL    Anion Gap 9 <=19 mmol/L   POCT GLUCOSE   Result Value Ref Range    POCT Glucose 110 (H) 74 - 99 mg/dL         Assessment/Plan                  Principal Problem:    Hyponatremia    1)Diarrhea  Stool studies not able to be sent to lab, no BM in over 24 hours  Patient tolerating diet, denies nausea    2)Hyponatremia  Consult nephrology, Dr. Gaxiola  This am sodium increased to 127, repeat in am  Continue to restrict fluid intake    3) UTI  Merrem, patient has allergies to Augmentin and cephalexin   Previous to admission urine culture on 9/28 showed susceptibility to merrem  Await urine cultures    4) Elevated Troponin  Consult Dr. Monroy, cardiology      5)Hypertension  Hold  losartan due to hyponatremia  Increased amlodipine to 10 mg daily  PRN hydralazine    6) Abnormal CT; lung nodules bilaterally suggesting metastasis    Pulmonology following. Will follow as outpatient.  CT ordered, result show multiple nodules bilateral.      Plan  Discharge home with out any needs in one to two days.     Plan of care discussed with patient and collaborating physician, Dr. Corbin.               Osiris Kendrick, APRN-CNP

## 2023-10-12 NOTE — NURSING NOTE
Bp 174/52 Pt requesting 5mg of hydralazine instead of the 10mg ordered. Reached out to Dr Chandler for 5mg. New orders placed

## 2023-10-13 LAB
ANION GAP SERPL CALC-SCNC: 9 MMOL/L
B-OH-BUTYR+ACETOACET BLD-SCNC: 0.2 MMOL/L (ref 0.1–0.3)
BUN SERPL-MCNC: 15 MG/DL (ref 8–25)
CALCIUM SERPL-MCNC: 9 MG/DL (ref 8.5–10.4)
CHLORIDE SERPL-SCNC: 95 MMOL/L (ref 97–107)
CO2 SERPL-SCNC: 24 MMOL/L (ref 24–31)
CREAT SERPL-MCNC: 0.9 MG/DL (ref 0.4–1.6)
ERYTHROCYTE [DISTWIDTH] IN BLOOD BY AUTOMATED COUNT: 12.6 % (ref 11.5–14.5)
GFR SERPL CREATININE-BSD FRML MDRD: 59 ML/MIN/1.73M*2
GLUCOSE BLD MANUAL STRIP-MCNC: 108 MG/DL (ref 74–99)
GLUCOSE BLD MANUAL STRIP-MCNC: 168 MG/DL (ref 74–99)
GLUCOSE BLD MANUAL STRIP-MCNC: 202 MG/DL (ref 74–99)
GLUCOSE SERPL-MCNC: 103 MG/DL (ref 65–99)
HCT VFR BLD AUTO: 30.3 % (ref 36–46)
HGB BLD-MCNC: 10.2 G/DL (ref 12–16)
MCH RBC QN AUTO: 30.4 PG (ref 26–34)
MCHC RBC AUTO-ENTMCNC: 33.7 G/DL (ref 32–36)
MCV RBC AUTO: 90 FL (ref 80–100)
NRBC BLD-RTO: 0 /100 WBCS (ref 0–0)
PLATELET # BLD AUTO: 192 X10*3/UL (ref 150–450)
PMV BLD AUTO: 10 FL (ref 7.5–11.5)
POTASSIUM SERPL-SCNC: 4.6 MMOL/L (ref 3.4–5.1)
RBC # BLD AUTO: 3.35 X10*6/UL (ref 4–5.2)
SODIUM SERPL-SCNC: 128 MMOL/L (ref 133–145)
WBC # BLD AUTO: 9.1 X10*3/UL (ref 4.4–11.3)

## 2023-10-13 PROCEDURE — 85027 COMPLETE CBC AUTOMATED: CPT | Performed by: NURSE PRACTITIONER

## 2023-10-13 PROCEDURE — 2500000002 HC RX 250 W HCPCS SELF ADMINISTERED DRUGS (ALT 637 FOR MEDICARE OP, ALT 636 FOR OP/ED): Performed by: FAMILY MEDICINE

## 2023-10-13 PROCEDURE — 82010 KETONE BODYS QUAN: CPT | Performed by: INTERNAL MEDICINE

## 2023-10-13 PROCEDURE — 2500000001 HC RX 250 WO HCPCS SELF ADMINISTERED DRUGS (ALT 637 FOR MEDICARE OP): Performed by: NURSE PRACTITIONER

## 2023-10-13 PROCEDURE — 1100000001 HC PRIVATE ROOM DAILY

## 2023-10-13 PROCEDURE — 82947 ASSAY GLUCOSE BLOOD QUANT: CPT

## 2023-10-13 PROCEDURE — 36415 COLL VENOUS BLD VENIPUNCTURE: CPT | Performed by: NURSE PRACTITIONER

## 2023-10-13 PROCEDURE — 96372 THER/PROPH/DIAG INJ SC/IM: CPT | Performed by: FAMILY MEDICINE

## 2023-10-13 PROCEDURE — 2500000002 HC RX 250 W HCPCS SELF ADMINISTERED DRUGS (ALT 637 FOR MEDICARE OP, ALT 636 FOR OP/ED): Performed by: NURSE PRACTITIONER

## 2023-10-13 PROCEDURE — 99232 SBSQ HOSP IP/OBS MODERATE 35: CPT | Performed by: INTERNAL MEDICINE

## 2023-10-13 PROCEDURE — 82374 ASSAY BLOOD CARBON DIOXIDE: CPT | Performed by: NURSE PRACTITIONER

## 2023-10-13 PROCEDURE — 2500000004 HC RX 250 GENERAL PHARMACY W/ HCPCS (ALT 636 FOR OP/ED): Performed by: NURSE PRACTITIONER

## 2023-10-13 RX ORDER — POLYETHYLENE GLYCOL 3350 17 G/17G
17 POWDER, FOR SOLUTION ORAL DAILY
Status: DISCONTINUED | OUTPATIENT
Start: 2023-10-13 | End: 2023-10-14 | Stop reason: HOSPADM

## 2023-10-13 RX ADMIN — POLYETHYLENE GLYCOL 3350 17 G: 17 POWDER, FOR SOLUTION ORAL at 13:55

## 2023-10-13 RX ADMIN — SITAGLIPTIN 50 MG: 50 TABLET, FILM COATED ORAL at 08:47

## 2023-10-13 RX ADMIN — INSULIN LISPRO 2 UNITS: 100 INJECTION, SOLUTION INTRAVENOUS; SUBCUTANEOUS at 16:53

## 2023-10-13 RX ADMIN — PANTOPRAZOLE SODIUM 20 MG: 20 TABLET, DELAYED RELEASE ORAL at 06:05

## 2023-10-13 RX ADMIN — AMLODIPINE BESYLATE 10 MG: 10 TABLET ORAL at 08:47

## 2023-10-13 RX ADMIN — ATORVASTATIN CALCIUM 80 MG: 80 TABLET, FILM COATED ORAL at 21:07

## 2023-10-13 RX ADMIN — LEVOTHYROXINE SODIUM 88 MCG: 0.09 TABLET ORAL at 06:05

## 2023-10-13 RX ADMIN — ASPIRIN 81 MG CHEWABLE TABLET 81 MG: 81 TABLET CHEWABLE at 08:47

## 2023-10-13 RX ADMIN — CHOLECALCIFEROL TAB 25 MCG (1000 UNIT) 25 MCG: 25 TAB at 08:47

## 2023-10-13 RX ADMIN — CYANOCOBALAMIN TAB 1000 MCG 1000 MCG: 1000 TAB at 08:47

## 2023-10-13 RX ADMIN — INSULIN LISPRO 4 UNITS: 100 INJECTION, SOLUTION INTRAVENOUS; SUBCUTANEOUS at 12:09

## 2023-10-13 ASSESSMENT — PAIN SCALES - GENERAL
PAINLEVEL_OUTOF10: 0 - NO PAIN

## 2023-10-13 ASSESSMENT — COGNITIVE AND FUNCTIONAL STATUS - GENERAL
PERSONAL GROOMING: A LITTLE
TOILETING: A LITTLE
CLIMB 3 TO 5 STEPS WITH RAILING: A LITTLE
WALKING IN HOSPITAL ROOM: A LITTLE
STANDING UP FROM CHAIR USING ARMS: A LITTLE
TURNING FROM BACK TO SIDE WHILE IN FLAT BAD: A LITTLE
STANDING UP FROM CHAIR USING ARMS: A LITTLE
DRESSING REGULAR UPPER BODY CLOTHING: A LITTLE
MOVING TO AND FROM BED TO CHAIR: A LITTLE
MOVING FROM LYING ON BACK TO SITTING ON SIDE OF FLAT BED WITH BEDRAILS: A LITTLE
DAILY ACTIVITIY SCORE: 20
MOBILITY SCORE: 19
WALKING IN HOSPITAL ROOM: A LITTLE
DAILY ACTIVITIY SCORE: 19
DRESSING REGULAR LOWER BODY CLOTHING: A LITTLE
MOVING TO AND FROM BED TO CHAIR: A LITTLE
CLIMB 3 TO 5 STEPS WITH RAILING: A LITTLE
HELP NEEDED FOR BATHING: A LITTLE
DRESSING REGULAR LOWER BODY CLOTHING: A LITTLE
TURNING FROM BACK TO SIDE WHILE IN FLAT BAD: A LITTLE
HELP NEEDED FOR BATHING: A LITTLE
MOBILITY SCORE: 18
DRESSING REGULAR UPPER BODY CLOTHING: A LITTLE
TOILETING: A LITTLE

## 2023-10-13 ASSESSMENT — PAIN - FUNCTIONAL ASSESSMENT
PAIN_FUNCTIONAL_ASSESSMENT: 0-10

## 2023-10-13 NOTE — PROGRESS NOTES
Subjective Data:  Patient stable cardiac wise no chest pain or tightness underlying hyponatremia with the labile hypertension currently on patch clonidine.  Stable cardiac wise.  Blood pressure back 156/62.  Pulse remained 80 bpm.  Continue current amlodipine and clonidine.    Overnight Events:    Unremarkable     Objective Data:  Last Recorded Vitals:  Vitals:    10/12/23 2016 10/12/23 2349 10/13/23 0356 10/13/23 0716   BP: 156/62 (!) 159/49 172/51 134/50   BP Location: Right arm Left arm Right arm Right arm   Patient Position: Lying Lying Lying Lying   Pulse: 86 79 78 73   Resp: 18 18 18 20   Temp: 36.8 °C (98.2 °F) 36.7 °C (98.1 °F) 36.6 °C (97.9 °F) 36.8 °C (98.2 °F)   TempSrc: Oral Oral Oral Oral   SpO2: 96% 95% 96% 96%   Weight:       Height:           Last Labs:  CBC - 10/13/2023:  4:37 AM  9.1 10.2 192    30.3      CMP - 10/13/2023:  4:37 AM  9.0 6.3 15 --- <0.2   _ 3.8 15 98      PTT - No results in last year.  _   _ _     HGBA1C   Date/Time Value Ref Range Status   07/06/2023 10:01 AM 6.8 4.0 - 6.0 % Final     Comment:     Hemoglobin A1C levels are related to mean blood glucose during the   preceding 2-3 months. The relationship table below may be used as a   general guide. Each 1% increase in HGB A1C is a reflection of an   increase in mean glucose of approximately 30 mg/dl.   Reference: Diabetes Care, volume 29, supplement 1 Jan. 2006                        HGB A1C ................. Approx. Mean Glucose   _______________________________________________   6%   ...............................  120 mg/dl   7%   ...............................  150 mg/dl   8%   ...............................  180 mg/dl   9%   ...............................  210 mg/dl   10%  ...............................  240 mg/dl  Performed at [address] [address]     03/31/2023 10:14 AM 7.2 4.0 - 6.0 % Final     Comment:     Hemoglobin A1C levels are related to mean blood glucose during the   preceding 2-3  "months. The relationship table below may be used as a   general guide. Each 1% increase in HGB A1C is a reflection of an   increase in mean glucose of approximately 30 mg/dl.   Reference: Diabetes Care, volume 29, supplement 1 Jan. 2006                        HGB A1C ................. Approx. Mean Glucose   _______________________________________________   6%   ...............................  120 mg/dl   7%   ...............................  150 mg/dl   8%   ...............................  180 mg/dl   9%   ...............................  210 mg/dl   10%  ...............................  240 mg/dl  Performed at [address] [address]       LDLCALC   Date/Time Value Ref Range Status   07/06/2023 10:01 AM 58 65 - 130 MG/DL Final   03/31/2023 10:14 AM 62 65 - 130 MG/DL Final   12/27/2022 10:13 AM 65 65 - 130 MG/DL Final      Last I/O:  I/O last 3 completed shifts:  In: 1320 (18.2 mL/kg) [P.O.:1220; IV Piggyback:100]  Out: 2625 (36.2 mL/kg) [Urine:2625 (1 mL/kg/hr)]  Weight: 72.6 kg     Past Cardiology Tests (Last 3 Years):  EKG:  No results found for this or any previous visit from the past 1095 days.    Echo:  No results found for this or any previous visit from the past 1095 days.    Ejection Fractions:  No results found for: \"EF\"  Cath:  No results found for this or any previous visit from the past 1095 days.    Stress Test:  No results found for this or any previous visit from the past 1095 days.    Cardiac Imaging:  No results found for this or any previous visit from the past 1095 days.      Inpatient Medications:  Scheduled medications   Medication Dose Route Frequency    amLODIPine  10 mg oral Daily    aspirin  81 mg oral Daily    atorvastatin  80 mg oral Nightly    cholecalciferol  25 mcg oral Daily    cloNIDine  1 patch transdermal Weekly    cyanocobalamin  1,000 mcg oral Daily    influenza  0.7 mL intramuscular During hospitalization    insulin lispro  0-10 Units subcutaneous TID " with meals    levothyroxine  88 mcg oral Daily before breakfast    pantoprazole  20 mg oral Daily before breakfast    SITagliptin phosphate  50 mg oral Daily     PRN medications   Medication    dextrose 10 % in water (D10W)    dextrose    glucagon    hydrALAZINE    ondansetron ODT    Or    ondansetron     Continuous Medications   Medication Dose Last Rate       Physical Exam:  HEENT: Normocephalic/atraumatic pupils equal react light  Neck exam no JVD, no bruit  Lung exam clear to auscultation, few crackles at the bases  Cardiac exam is regular rhythm S1-S2, soft slight murmur heard.  No S3 heard.  Abdomen soft nontender, nondistended  Extremities no clubbing, cyanosis but trace edema  Neuro exam grossly intact.       Assessment/Plan   Patient has a borderline history of hypertension labile at the moment.  Continue amlodipine and clonidine patch.  Modify risk factor.  No active angina CHF signs symptoms.Improving hyponatremia and diarrhea.  Stable cardiac wise okay to discharge home.  Peripheral IV 10/11/23 22 G Left Antecubital (Active)   Site Assessment Clean;Dry;Intact 10/13/23 0800   Dressing Type Transparent 10/13/23 0800   Line Status Saline locked 10/13/23 0800   Dressing Status Clean;Dry 10/13/23 0800   Number of days: 2       Urethral Catheter Straight-tip 16 Fr. (Active)   Site Assessment Clean;Skin intact 10/13/23 0855   Reason for Continuing Urinary Catheterization acute urinary retention 10/13/23 0855   Number of days: 3       Code Status:  No Order    I spent 30 minutes in the professional and overall care of this patient.        David Eddy MD

## 2023-10-13 NOTE — PROGRESS NOTES
PCN checked Careport and at this time [facility] team able to accept. Iqra Bagley aware. Patient will need [facility] sent when ready for discharge. BETTY Kendrick aware.     Hansel Waterman

## 2023-10-13 NOTE — PROGRESS NOTES
PCN informed per Care Coordinator Cydnee that patient is agreeable to [facility] at discharge. Per ml Cydnee, [facility]. ADOD 1-2 days. [employer] aware. Awaiting response, [facility] and definitive discharge date.       Hansel Waterman

## 2023-10-13 NOTE — NURSING NOTE
Assumed care of patient, bssr done, no needs at this time, call light within reach, will cont to monitor.

## 2023-10-13 NOTE — PROGRESS NOTES
Frankie Westbrook is a 89 y.o. female on day 4 of admission presenting with Hyponatremia.      Subjective   Patient is feeling better and concerned because she has not had a BM in 2 days.        Objective     Last Recorded Vitals  /59 (BP Location: Right arm, Patient Position: Sitting)   Pulse 84   Temp 36.9 °C (98.4 °F) (Oral)   Resp 20   Wt 72.6 kg (160 lb)   SpO2 97%   Intake/Output last 3 Shifts:    Intake/Output Summary (Last 24 hours) at 10/13/2023 1331  Last data filed at 10/13/2023 1108  Gross per 24 hour   Intake 740 ml   Output 2375 ml   Net -1635 ml       Admission Weight  Weight: 72.6 kg (160 lb) (10/09/23 1817)    Daily Weight  10/09/23 : 72.6 kg (160 lb)    Image Results  CT abdomen pelvis wo IV contrast  PROCEDURE:         ABD PELV WO ORAL WO IV CONTRAST - WCT  3221  REASON FOR EXAM: Abdominal pain, acute, nonlocalized    RESULT: MRN: [id number]  Patient Name: FRANKIE WESTBROOK    STUDY:  ABD PELV WO ORAL WO IV CONTRAST; 10/9/2023 8:55 am    INDICATION:  Abdominal pain, acute, nonlocalized; /diarrhea/dizziness    COMPARISON:  CT thorax 04/19/2023 and 09/12/2020 CT abdomen/pelvis    ACCESSION NUMBER(S):  [id number]    ORDERING CLINICIAN:  VILLA PATE    TECHNIQUE:  Contiguous axial images of the abdomen/pelvis were performed.    CTDI:  DLP:    FINDINGS:  This radiology study is being re-signed due to an administrative error but  not reinterpreted. The new signature merely reflects the date upon which the  administrative error was corrected for placement in patient chart. The  actual interpretation took place in a timely fashion, consistent with   policy, by the physician referenced as interpreting the study.    Evaluation of the solid viscera is suboptimal due to the lack of  intravenous contrast.    The liver and adrenal glands are unremarkable. There is redemonstration of  chronic pancreatic ductal dilatation suboptimally evaluated on this  noncontrast study. There is evidence of suggested  IPMN of the pancreas.  Gallbladder is surgically absent. Splenule is noted.    Calcified granulomata of the spleen are identified.    Multiple subcentimeter caliceal calcifications are noted within the right  kidney. No hydronephrosis is identified. Bilateral renal atrophy is seen.    Calcific atherosclerosis of the abdominal aorta visceral iliac arteries can  be seen without aneurysm.    There are some fluid-filled small bowel loops identified. Equivocal wall  thickening of the left upper quadrant bowel loops can be seen. Colonic  diverticulosis without diverticulitis is seen.    No secondary signs appendicitis can be seen.    No abdominal ascites is seen.    The uterus is surgically absent. The bladder is mildly distended.    The visualized osseous structures are intact. 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 Bilateral hip osteoarthrosis is  seen.    Limited images of the lower thorax reveal multiple benign pulmonary nodules  within the lung bases. Small hiatal hernia is noted.    IMPRESSION:  1. Findings which may be seen with mild enteritis in the appropriate  clinical setting.  2. Nonobstructing right nephrolithiasis.  3. Chronic changes as above.    Dictation workstation:   [id number]  This exam is available in DICOM format to non-affiliated healthcare  facilities on a secure media free searchable basis with prior patient  authorization.  The patient exposure is reported to a radiation dose index  registry.  All CT examinations are performed with one or more of the  following dose reduction techniques: Automated Exposure Control, Adjustment  of mA and/or KV according to patient size, or use of iterative  reconstruction techniques.    Original Interpreting Physician:   TIBURCIO ESQUIVEL MD  Original Transcribed by/Date: IMMANUEL Sep 28 2023  5:19P  Original Electronically Signed by/Date: TIBURCIO ESQUIVEL MD Oct 11 2023  8:37P    Addendum Interpreting Physician:  Addendum  Transcribed by/Date: NO ADDENDUM  Addendum Electronically Signed by/Date:      Physical Exam  Constitutional:       Appearance: Normal appearance. She is normal weight.   Cardiovascular:      Rate and Rhythm: Normal rate and regular rhythm.   Pulmonary:      Effort: Pulmonary effort is normal.      Breath sounds: Normal breath sounds.   Abdominal:      General: Abdomen is flat. Bowel sounds are normal.      Palpations: Abdomen is soft.   Musculoskeletal:         General: Normal range of motion.   Skin:     General: Skin is warm and dry.   Neurological:      General: No focal deficit present.      Mental Status: She is alert and oriented to person, place, and time.   Psychiatric:         Mood and Affect: Mood normal.         Behavior: Behavior normal.         Relevant Results             Results for orders placed or performed during the hospital encounter of 10/09/23 (from the past 24 hour(s))   POCT GLUCOSE   Result Value Ref Range    POCT Glucose 166 (H) 74 - 99 mg/dL   POCT GLUCOSE   Result Value Ref Range    POCT Glucose 119 (H) 74 - 99 mg/dL   CBC   Result Value Ref Range    WBC 9.1 4.4 - 11.3 x10*3/uL    nRBC 0.0 0.0 - 0.0 /100 WBCs    RBC 3.35 (L) 4.00 - 5.20 x10*6/uL    Hemoglobin 10.2 (L) 12.0 - 16.0 g/dL    Hematocrit 30.3 (L) 36.0 - 46.0 %    MCV 90 80 - 100 fL    MCH 30.4 26.0 - 34.0 pg    MCHC 33.7 32.0 - 36.0 g/dL    RDW 12.6 11.5 - 14.5 %    Platelets 192 150 - 450 x10*3/uL    MPV 10.0 7.5 - 11.5 fL   Basic metabolic panel   Result Value Ref Range    Glucose 103 (H) 65 - 99 mg/dL    Sodium 128 (L) 133 - 145 mmol/L    Potassium 4.6 3.4 - 5.1 mmol/L    Chloride 95 (L) 97 - 107 mmol/L    Bicarbonate 24 24 - 31 mmol/L    Urea Nitrogen 15 8 - 25 mg/dL    Creatinine 0.90 0.40 - 1.60 mg/dL    eGFR 59 (L) >60 mL/min/1.73m*2    Calcium 9.0 8.5 - 10.4 mg/dL    Anion Gap 9 <=19 mmol/L   Beta Hydroxybutyrate   Result Value Ref Range    Beta-Hydroxybutyrate 0.20 0.10 - 0.30 mmol/L   POCT GLUCOSE   Result Value Ref  Range    POCT Glucose 108 (H) 74 - 99 mg/dL   POCT GLUCOSE   Result Value Ref Range    POCT Glucose 202 (H) 74 - 99 mg/dL         Assessment/Plan          This patient has a urinary catheter   Reason for the urinary catheter remaining today? Urine catheter unnecessary, will be removed today          Principal Problem:    Hyponatremia    1) Diarrhea  Stool studies not able to be sent to lab, no BM in over 24 hours  Patient tolerating diet, denies nausea  Patient now is constipated, I will give one dose of Miralax and consult GI.      2)Hyponatremia  Consult nephrology, Dr. Gaxiola  This am sodium increased to 129, repeat in am  Continue to restrict fluid intake     3) UTI  UA positive and culture negative.   Merrem was discontinued yesterday.      4) Elevated Troponin  Dr. Eddy cardiology following        5)Hypertension  Hold  losartan due to hyponatremia  Increased amlodipine to 10 mg daily  PRN hydralazine     6) Abnormal CT; lung nodules bilaterally suggesting metastasis     Pulmonology following. Will follow as outpatient.  CT ordered, result show multiple nodules bilateral.     Discharge plan is for patient to have home health care, external referral for [facility] health care.  Of care discussed with patient, daughter at bedside and collaborating physician.             Osiris Kendrick, APRN-CNP

## 2023-10-13 NOTE — PROGRESS NOTES
"  Subjective   Patient seen for acute kidney injury and hyponatremia admitted with diarrhea however since Monday she has not had a bowel movement and she is very worried about that she is feeling okay       Review of Systems    Objective     Physical Exam  Constitutional:       General: She is not in acute distress.     Appearance: Normal appearance. She is not toxic-appearing.   Neck:      Vascular: No carotid bruit.   Cardiovascular:      Heart sounds: No murmur heard.     No friction rub. No gallop.   Pulmonary:      Breath sounds: No wheezing, rhonchi or rales.   Abdominal:      Tenderness: There is no abdominal tenderness. There is no right CVA tenderness, left CVA tenderness or rebound.   Musculoskeletal:         General: No tenderness.      Cervical back: Neck supple.      Right lower leg: No edema.      Left lower leg: No edema.   Lymphadenopathy:      Cervical: No cervical adenopathy.         Last Recorded Vitals  Blood pressure 134/50, pulse 73, temperature 36.8 °C (98.2 °F), temperature source Oral, resp. rate 20, height 1.651 m (5' 5\"), weight 72.6 kg (160 lb), SpO2 96 %.    Intake/Output last 3 Shifts:  I/O last 3 completed shifts:  In: 1320 (18.2 mL/kg) [P.O.:1220; IV Piggyback:100]  Out: 2625 (36.2 mL/kg) [Urine:2625 (1 mL/kg/hr)]  Weight: 72.6 kg     Current Facility-Administered Medications:     amLODIPine (Norvasc) tablet 10 mg, 10 mg, oral, Daily, Osiris A Aroldo, APRN-CNP, 10 mg at 10/13/23 0847    aspirin chewable tablet 81 mg, 81 mg, oral, Daily, Osiris A Aroldo, APRN-CNP, 81 mg at 10/13/23 0847    atorvastatin (Lipitor) tablet 80 mg, 80 mg, oral, Nightly, Osiris A Aroldo, APRN-CNP, 80 mg at 10/12/23 2154    cholecalciferol (Vitamin D-3) tablet 25 mcg, 25 mcg, oral, Daily, Osiris A Aroldo, APRN-CNP, 25 mcg at 10/13/23 0847    cloNIDine (Catapres-TTS) 0.1 mg/24 hr patch 1 patch, 1 patch, transdermal, Weekly, David Eddy MD, 1 patch at 10/12/23 1133    cyanocobalamin (Vitamin B-12) tablet " 1,000 mcg, 1,000 mcg, oral, Daily, Osiris Kendrick APRN-CNP, 1,000 mcg at 10/13/23 0847    dextrose 10 % in water (D10W) infusion, 0.3 g/kg/hr, intravenous, Once PRN, Caroline Denise MD    dextrose 50 % injection 25 g, 25 g, intravenous, q15 min PRN, Caroline Denise MD    flu vaccine, quadrivalent, high-dose, preservative free, age 65y+ (FLUZONE), 0.7 mL, intramuscular, During hospitalization, Osiris Kendrick APRN-CNP    glucagon (Glucagen) injection 1 mg, 1 mg, intramuscular, q15 min PRN, Caroline Denise MD    hydrALAZINE (Apresoline) injection 5 mg, 5 mg, intravenous, q6h PRN, Caroline Denise MD, 5 mg at 10/12/23 0657    insulin lispro (HumaLOG) injection 0-10 Units, 0-10 Units, subcutaneous, TID with meals, Caroline Denise MD, 2 Units at 10/12/23 1716    levothyroxine (Synthroid, Levoxyl) tablet 88 mcg, 88 mcg, oral, Daily before breakfast, Osiris Kendrick, APRN-CNP, 88 mcg at 10/13/23 0605    ondansetron ODT (Zofran-ODT) disintegrating tablet 4 mg, 4 mg, oral, q8h PRN, 4 mg at 10/10/23 0956 **OR** ondansetron (Zofran) injection 4 mg, 4 mg, intravenous, q8h PRN, Osiris Tylerar, APRN-CNP    pantoprazole (ProtoNix) EC tablet 20 mg, 20 mg, oral, Daily before breakfast, Osiris Tylerar, APRN-CNP, 20 mg at 10/13/23 0605    SITagliptin phosphate (Januvia) tablet 50 mg, 50 mg, oral, Daily, Osiris Tylerar, APRN-CNP, 50 mg at 10/13/23 0847   Relevant Results    Results for orders placed or performed during the hospital encounter of 10/09/23 (from the past 96 hour(s))   CBC and Auto Differential   Result Value Ref Range    WBC 10.2 4.4 - 11.3 x10*3/uL    nRBC 0.0 0.0 - 0.0 /100 WBCs    RBC 3.53 (L) 4.00 - 5.20 x10*6/uL    Hemoglobin 10.9 (L) 12.0 - 16.0 g/dL    Hematocrit 32.1 (L) 36.0 - 46.0 %    MCV 91 80 - 100 fL    MCH 30.9 26.0 - 34.0 pg    MCHC 34.0 32.0 - 36.0 g/dL    RDW 12.7 11.5 - 14.5 %    Platelets 182 150 - 450 x10*3/uL    MPV 10.0 7.5 - 11.5 fL    Neutrophils % 82.6 40.0 - 80.0 %    Immature Granulocytes %,  Automated 0.6 0.0 - 0.9 %    Lymphocytes % 8.6 13.0 - 44.0 %    Monocytes % 8.1 2.0 - 10.0 %    Eosinophils % 0.0 0.0 - 6.0 %    Basophils % 0.1 0.0 - 2.0 %    Neutrophils Absolute 8.44 (H) 1.60 - 5.50 x10*3/uL    Immature Granulocytes Absolute, Automated 0.06 0.00 - 0.50 x10*3/uL    Lymphocytes Absolute 0.88 0.80 - 3.00 x10*3/uL    Monocytes Absolute 0.83 (H) 0.05 - 0.80 x10*3/uL    Eosinophils Absolute 0.00 0.00 - 0.40 x10*3/uL    Basophils Absolute 0.01 0.00 - 0.10 x10*3/uL   Comprehensive Metabolic Panel   Result Value Ref Range    Glucose 223 (H) 65 - 99 mg/dL    Sodium 120 (L) 133 - 145 mmol/L    Potassium 4.6 3.4 - 5.1 mmol/L    Chloride 90 (L) 97 - 107 mmol/L    Bicarbonate 18 (L) 24 - 31 mmol/L    Urea Nitrogen 24 8 - 25 mg/dL    Creatinine 1.40 0.40 - 1.60 mg/dL    eGFR 35 (L) >60 mL/min/1.73m*2    Calcium 9.0 8.5 - 10.4 mg/dL    Albumin 3.8 3.5 - 5.0 g/dL    Alkaline Phosphatase 98 35 - 125 U/L    Total Protein 6.3 5.9 - 7.9 g/dL    AST 15 5 - 40 U/L    Bilirubin, Total <0.2 0.1 - 1.2 mg/dL    ALT 15 5 - 40 U/L    Anion Gap 12 <=19 mmol/L   Serial Troponin, Initial ([facility])   Result Value Ref Range    Troponin T, High Sensitivity 23 (H) <=15 ng/L   TSH with reflex to Free T4 if abnormal   Result Value Ref Range    Thyroid Stimulating Hormone 0.93 0.27 - 4.20 mIU/L   Urinalysis with Reflex Microscopic   Result Value Ref Range    Color, Urine Light-Yellow Light-Yellow, Yellow, Dark-Yellow    Appearance, Urine Clear Clear    Specific Gravity, Urine 1.021 1.005 - 1.035    pH, Urine 5.5 5.0, 5.5, 6.0, 6.5, 7.0, 7.5, 8.0    Protein, Urine 10 (TRACE) NEGATIVE, 10 (TRACE), 20 (TRACE) mg/dL    Glucose, Urine Normal Normal mg/dL    Blood, Urine NEGATIVE NEGATIVE    Ketones, Urine NEGATIVE NEGATIVE mg/dL    Bilirubin, Urine NEGATIVE NEGATIVE    Urobilinogen, Urine Normal Normal mg/dL    Nitrite, Urine NEGATIVE NEGATIVE    Leukocyte Esterase, Urine 25 Sarahi/µL (A) NEGATIVE   Urinalysis Microscopic Only   Result Value Ref  Range    WBC, Urine 1-5 1-5, NONE /HPF    RBC, Urine 6-10 (A) NONE, 1-2, 3-5 /HPF    Squamous Epithelial Cells, Urine 1-9 (SPARSE) Reference range not established. /HPF    Hyaline Casts, Urine 2+ (A) NONE /LPF   Serial Troponin, 2 Hour ([facility])   Result Value Ref Range    Troponin T, High Sensitivity 28 (H) <=15 ng/L   Sars-CoV-2 PCR, Symptomatic   Result Value Ref Range    Coronavirus 2019, PCR Not Detected Not Detected   Troponin T, High Sensitivity   Result Value Ref Range    Troponin T, High Sensitivity 28 (H) <=15 ng/L   Basic metabolic panel   Result Value Ref Range    Glucose 120 (H) 65 - 99 mg/dL    Sodium 121 (L) 133 - 145 mmol/L    Potassium 4.8 3.4 - 5.1 mmol/L    Chloride 93 (L) 97 - 107 mmol/L    Bicarbonate 19 (L) 24 - 31 mmol/L    Urea Nitrogen 22 8 - 25 mg/dL    Creatinine 1.30 0.40 - 1.60 mg/dL    eGFR 38 (L) >60 mL/min/1.73m*2    Calcium 8.9 8.5 - 10.4 mg/dL    Anion Gap 9 <=19 mmol/L   CBC and Auto Differential   Result Value Ref Range    WBC 8.8 4.4 - 11.3 x10*3/uL    nRBC 0.0 0.0 - 0.0 /100 WBCs    RBC 3.40 (L) 4.00 - 5.20 x10*6/uL    Hemoglobin 10.4 (L) 12.0 - 16.0 g/dL    Hematocrit 30.9 (L) 36.0 - 46.0 %    MCV 91 80 - 100 fL    MCH 30.6 26.0 - 34.0 pg    MCHC 33.7 32.0 - 36.0 g/dL    RDW 12.7 11.5 - 14.5 %    Platelets 196 150 - 450 x10*3/uL    MPV 10.8 7.5 - 11.5 fL    Neutrophils % 60.6 40.0 - 80.0 %    Immature Granulocytes %, Automated 0.5 0.0 - 0.9 %    Lymphocytes % 26.1 13.0 - 44.0 %    Monocytes % 12.1 2.0 - 10.0 %    Eosinophils % 0.5 0.0 - 6.0 %    Basophils % 0.2 0.0 - 2.0 %    Neutrophils Absolute 5.31 1.60 - 5.50 x10*3/uL    Immature Granulocytes Absolute, Automated 0.04 0.00 - 0.50 x10*3/uL    Lymphocytes Absolute 2.28 0.80 - 3.00 x10*3/uL    Monocytes Absolute 1.06 (H) 0.05 - 0.80 x10*3/uL    Eosinophils Absolute 0.04 0.00 - 0.40 x10*3/uL    Basophils Absolute 0.02 0.00 - 0.10 x10*3/uL   Serial Troponin, 6 Hour ([facility])   Result Value Ref Range    Troponin T, High Sensitivity 37  (H) <=15 ng/L   Osmolality   Result Value Ref Range    Osmolality, Serum 265 (L) 280 - 300 mOsm/kg   POCT GLUCOSE   Result Value Ref Range    POCT Glucose 123 (H) 74 - 99 mg/dL   POCT GLUCOSE   Result Value Ref Range    POCT Glucose 157 (H) 74 - 99 mg/dL   Basic metabolic panel   Result Value Ref Range    Glucose 140 (H) 65 - 99 mg/dL    Sodium 121 (L) 133 - 145 mmol/L    Potassium 4.6 3.4 - 5.1 mmol/L    Chloride 92 (L) 97 - 107 mmol/L    Bicarbonate 17 (L) 24 - 31 mmol/L    Urea Nitrogen 19 8 - 25 mg/dL    Creatinine 1.20 0.40 - 1.60 mg/dL    eGFR 42 (L) >60 mL/min/1.73m*2    Calcium 8.9 8.5 - 10.4 mg/dL    Anion Gap 12 <=19 mmol/L   Sodium, Urine Random   Result Value Ref Range    Sodium, Urine Random 123 NOT ESTABLISHED mmol/L    Creatinine, Urine Random 90.4 NOT ESTABLISHED mg/dL    Sodium/Creatinine Ratio 136 Not established. mmol/g Creat   Osmolality, urine   Result Value Ref Range    Osmolality, Urine Random 542 200 - 1,200 mOsm/kg   Urine culture    Specimen: Clean Catch/Voided; Urine   Result Value Ref Range    Urine Culture No growth    POCT GLUCOSE   Result Value Ref Range    POCT Glucose 126 (H) 74 - 99 mg/dL   POCT GLUCOSE   Result Value Ref Range    POCT Glucose 109 (H) 74 - 99 mg/dL   CBC   Result Value Ref Range    WBC 7.8 4.4 - 11.3 x10*3/uL    nRBC 0.0 0.0 - 0.0 /100 WBCs    RBC 3.30 (L) 4.00 - 5.20 x10*6/uL    Hemoglobin 10.1 (L) 12.0 - 16.0 g/dL    Hematocrit 30.6 (L) 36.0 - 46.0 %    MCV 93 80 - 100 fL    MCH 30.6 26.0 - 34.0 pg    MCHC 33.0 32.0 - 36.0 g/dL    RDW 12.8 11.5 - 14.5 %    Platelets 176 150 - 450 x10*3/uL    MPV 10.0 7.5 - 11.5 fL   Basic metabolic panel   Result Value Ref Range    Glucose 99 65 - 99 mg/dL    Sodium 125 (L) 133 - 145 mmol/L    Potassium 4.8 3.4 - 5.1 mmol/L    Chloride 97 97 - 107 mmol/L    Bicarbonate 18 (L) 24 - 31 mmol/L    Urea Nitrogen 17 8 - 25 mg/dL    Creatinine 1.20 0.40 - 1.60 mg/dL    eGFR 42 (L) >60 mL/min/1.73m*2    Calcium 8.9 8.5 - 10.4 mg/dL     Anion Gap 10 <=19 mmol/L   POCT GLUCOSE   Result Value Ref Range    POCT Glucose 106 (H) 74 - 99 mg/dL   POCT GLUCOSE   Result Value Ref Range    POCT Glucose 119 (H) 74 - 99 mg/dL   POCT GLUCOSE   Result Value Ref Range    POCT Glucose 196 (H) 74 - 99 mg/dL   POCT GLUCOSE   Result Value Ref Range    POCT Glucose 127 (H) 74 - 99 mg/dL   CBC   Result Value Ref Range    WBC 7.9 4.4 - 11.3 x10*3/uL    nRBC 0.0 0.0 - 0.0 /100 WBCs    RBC 3.29 (L) 4.00 - 5.20 x10*6/uL    Hemoglobin 10.2 (L) 12.0 - 16.0 g/dL    Hematocrit 30.0 (L) 36.0 - 46.0 %    MCV 91 80 - 100 fL    MCH 31.0 26.0 - 34.0 pg    MCHC 34.0 32.0 - 36.0 g/dL    RDW 12.5 11.5 - 14.5 %    Platelets 178 150 - 450 x10*3/uL    MPV 10.3 7.5 - 11.5 fL   Basic metabolic panel   Result Value Ref Range    Glucose 106 (H) 65 - 99 mg/dL    Sodium 127 (L) 133 - 145 mmol/L    Potassium 4.5 3.4 - 5.1 mmol/L    Chloride 96 (L) 97 - 107 mmol/L    Bicarbonate 22 (L) 24 - 31 mmol/L    Urea Nitrogen 16 8 - 25 mg/dL    Creatinine 1.00 0.40 - 1.60 mg/dL    eGFR 52 (L) >60 mL/min/1.73m*2    Calcium 8.8 8.5 - 10.4 mg/dL    Anion Gap 9 <=19 mmol/L   POCT GLUCOSE   Result Value Ref Range    POCT Glucose 110 (H) 74 - 99 mg/dL   POCT GLUCOSE   Result Value Ref Range    POCT Glucose 148 (H) 74 - 99 mg/dL   POCT GLUCOSE   Result Value Ref Range    POCT Glucose 166 (H) 74 - 99 mg/dL   POCT GLUCOSE   Result Value Ref Range    POCT Glucose 119 (H) 74 - 99 mg/dL   CBC   Result Value Ref Range    WBC 9.1 4.4 - 11.3 x10*3/uL    nRBC 0.0 0.0 - 0.0 /100 WBCs    RBC 3.35 (L) 4.00 - 5.20 x10*6/uL    Hemoglobin 10.2 (L) 12.0 - 16.0 g/dL    Hematocrit 30.3 (L) 36.0 - 46.0 %    MCV 90 80 - 100 fL    MCH 30.4 26.0 - 34.0 pg    MCHC 33.7 32.0 - 36.0 g/dL    RDW 12.6 11.5 - 14.5 %    Platelets 192 150 - 450 x10*3/uL    MPV 10.0 7.5 - 11.5 fL   Basic metabolic panel   Result Value Ref Range    Glucose 103 (H) 65 - 99 mg/dL    Sodium 128 (L) 133 - 145 mmol/L    Potassium 4.6 3.4 - 5.1 mmol/L    Chloride  95 (L) 97 - 107 mmol/L    Bicarbonate 24 24 - 31 mmol/L    Urea Nitrogen 15 8 - 25 mg/dL    Creatinine 0.90 0.40 - 1.60 mg/dL    eGFR 59 (L) >60 mL/min/1.73m*2    Calcium 9.0 8.5 - 10.4 mg/dL    Anion Gap 9 <=19 mmol/L   Beta Hydroxybutyrate   Result Value Ref Range    Beta-Hydroxybutyrate 0.20 0.10 - 0.30 mmol/L   POCT GLUCOSE   Result Value Ref Range    POCT Glucose 108 (H) 74 - 99 mg/dL       Assessment/Plan            1.  Hyponatremia sodium is resolved  Plan:  Okay to discharge we will remove the Alvarado catheter with the nurse practitioner     2.  Acute kidney injury proving  3.  Diarrhea etiology is not clear rule out C. Difficile  4.  Metabolic acidosis it is improving  5.  Diabetes mellitus type 2  6.  Recurrent urinary tract infections                 Edvin Gaxiola MD

## 2023-10-13 NOTE — CARE PLAN
The patient's goals for the shift include regain strength    The clinical goals for the shift include ambulation    Over the shift, the patient did not make progress toward the following goals. Barriers to progression include none. Recommendations to address these barriers include none.

## 2023-10-13 NOTE — PROGRESS NOTES
"Fartun Carey is a 89 y.o. female on day 4 of admission presenting with Hyponatremia.    Subjective   During nursing rounds, RN informed TCSW pt would like HHC post DC. TCSW discussed HHC choices with pt this morning. Pt has no preference in HHC. TCSW informed Hansel, C liaison, of pt's HHC needs.        Objective     Physical Exam    Last Recorded Vitals  Blood pressure 141/59, pulse 84, temperature 36.9 °C (98.4 °F), temperature source Oral, resp. rate 20, height 1.651 m (5' 5\"), weight 72.6 kg (160 lb), SpO2 97 %.  Intake/Output last 3 Shifts:  I/O last 3 completed shifts:  In: 1320 (18.2 mL/kg) [P.O.:1220; IV Piggyback:100]  Out: 2625 (36.2 mL/kg) [Urine:2625 (1 mL/kg/hr)]  Weight: 72.6 kg     Relevant Results                             Assessment/Plan   Principal Problem:    Hyponatremia               Kevyn Mccormack LCSW      "

## 2023-10-14 VITALS
HEIGHT: 65 IN | WEIGHT: 160 LBS | TEMPERATURE: 97.5 F | OXYGEN SATURATION: 95 % | DIASTOLIC BLOOD PRESSURE: 55 MMHG | BODY MASS INDEX: 26.66 KG/M2 | HEART RATE: 77 BPM | SYSTOLIC BLOOD PRESSURE: 124 MMHG | RESPIRATION RATE: 16 BRPM

## 2023-10-14 LAB
ANION GAP SERPL CALC-SCNC: 10 MMOL/L
BUN SERPL-MCNC: 19 MG/DL (ref 8–25)
CALCIUM SERPL-MCNC: 8.6 MG/DL (ref 8.5–10.4)
CHLORIDE SERPL-SCNC: 93 MMOL/L (ref 97–107)
CO2 SERPL-SCNC: 24 MMOL/L (ref 24–31)
CREAT SERPL-MCNC: 0.9 MG/DL (ref 0.4–1.6)
ERYTHROCYTE [DISTWIDTH] IN BLOOD BY AUTOMATED COUNT: 12.5 % (ref 11.5–14.5)
FERRITIN SERPL-MCNC: 194 NG/ML (ref 13–150)
GFR SERPL CREATININE-BSD FRML MDRD: 59 ML/MIN/1.73M*2
GLUCOSE BLD MANUAL STRIP-MCNC: 110 MG/DL (ref 74–99)
GLUCOSE BLD MANUAL STRIP-MCNC: 144 MG/DL (ref 74–99)
GLUCOSE SERPL-MCNC: 116 MG/DL (ref 65–99)
HCT VFR BLD AUTO: 30.4 % (ref 36–46)
HGB BLD-MCNC: 10.3 G/DL (ref 12–16)
MCH RBC QN AUTO: 30.4 PG (ref 26–34)
MCHC RBC AUTO-ENTMCNC: 33.9 G/DL (ref 32–36)
MCV RBC AUTO: 90 FL (ref 80–100)
NRBC BLD-RTO: 0 /100 WBCS (ref 0–0)
PLATELET # BLD AUTO: 187 X10*3/UL (ref 150–450)
PMV BLD AUTO: 10.1 FL (ref 7.5–11.5)
POTASSIUM SERPL-SCNC: 4.4 MMOL/L (ref 3.4–5.1)
RBC # BLD AUTO: 3.39 X10*6/UL (ref 4–5.2)
SODIUM SERPL-SCNC: 127 MMOL/L (ref 133–145)
WBC # BLD AUTO: 9.4 X10*3/UL (ref 4.4–11.3)

## 2023-10-14 PROCEDURE — 36415 COLL VENOUS BLD VENIPUNCTURE: CPT | Performed by: NURSE PRACTITIONER

## 2023-10-14 PROCEDURE — 80048 BASIC METABOLIC PNL TOTAL CA: CPT | Performed by: NURSE PRACTITIONER

## 2023-10-14 PROCEDURE — 97110 THERAPEUTIC EXERCISES: CPT | Mod: GO

## 2023-10-14 PROCEDURE — 2500000001 HC RX 250 WO HCPCS SELF ADMINISTERED DRUGS (ALT 637 FOR MEDICARE OP): Performed by: NURSE PRACTITIONER

## 2023-10-14 PROCEDURE — G0008 ADMIN INFLUENZA VIRUS VAC: HCPCS | Performed by: NURSE PRACTITIONER

## 2023-10-14 PROCEDURE — 99232 SBSQ HOSP IP/OBS MODERATE 35: CPT | Performed by: INTERNAL MEDICINE

## 2023-10-14 PROCEDURE — 2500000002 HC RX 250 W HCPCS SELF ADMINISTERED DRUGS (ALT 637 FOR MEDICARE OP, ALT 636 FOR OP/ED): Performed by: NURSE PRACTITIONER

## 2023-10-14 PROCEDURE — 90662 IIV NO PRSV INCREASED AG IM: CPT | Performed by: NURSE PRACTITIONER

## 2023-10-14 PROCEDURE — 2500000004 HC RX 250 GENERAL PHARMACY W/ HCPCS (ALT 636 FOR OP/ED): Performed by: NURSE PRACTITIONER

## 2023-10-14 PROCEDURE — 82947 ASSAY GLUCOSE BLOOD QUANT: CPT

## 2023-10-14 PROCEDURE — 82728 ASSAY OF FERRITIN: CPT | Performed by: NURSE PRACTITIONER

## 2023-10-14 PROCEDURE — 85027 COMPLETE CBC AUTOMATED: CPT | Performed by: NURSE PRACTITIONER

## 2023-10-14 RX ORDER — AMLODIPINE BESYLATE 10 MG/1
10 TABLET ORAL DAILY
Qty: 30 TABLET | Refills: 1 | Status: SHIPPED | OUTPATIENT
Start: 2023-10-15 | End: 2024-01-18 | Stop reason: WASHOUT

## 2023-10-14 RX ORDER — CLONIDINE 0.1 MG/24H
PATCH, EXTENDED RELEASE TRANSDERMAL
Qty: 4 PATCH | Refills: 1 | Status: SHIPPED | OUTPATIENT
Start: 2023-10-12 | End: 2024-04-08 | Stop reason: WASHOUT

## 2023-10-14 RX ADMIN — CHOLECALCIFEROL TAB 25 MCG (1000 UNIT) 25 MCG: 25 TAB at 09:15

## 2023-10-14 RX ADMIN — SITAGLIPTIN 50 MG: 50 TABLET, FILM COATED ORAL at 09:15

## 2023-10-14 RX ADMIN — LEVOTHYROXINE SODIUM 88 MCG: 0.09 TABLET ORAL at 06:52

## 2023-10-14 RX ADMIN — INFLUENZA A VIRUS A/VICTORIA/4897/2022 IVR-238 (H1N1) ANTIGEN (FORMALDEHYDE INACTIVATED), INFLUENZA A VIRUS A/DARWIN/9/2021 SAN-010 (H3N2) ANTIGEN (FORMALDEHYDE INACTIVATED), INFLUENZA B VIRUS B/PHUKET/3073/2013 ANTIGEN (FORMALDEHYDE INACTIVATED), AND INFLUENZA B VIRUS B/MICHIGAN/01/2021 ANTIGEN (FORMALDEHYDE INACTIVATED) 0.7 ML: 60; 60; 60; 60 INJECTION, SUSPENSION INTRAMUSCULAR at 15:58

## 2023-10-14 RX ADMIN — AMLODIPINE BESYLATE 10 MG: 10 TABLET ORAL at 09:15

## 2023-10-14 RX ADMIN — PANTOPRAZOLE SODIUM 20 MG: 20 TABLET, DELAYED RELEASE ORAL at 06:52

## 2023-10-14 RX ADMIN — CYANOCOBALAMIN TAB 1000 MCG 1000 MCG: 1000 TAB at 09:15

## 2023-10-14 RX ADMIN — ASPIRIN 81 MG CHEWABLE TABLET 81 MG: 81 TABLET CHEWABLE at 09:15

## 2023-10-14 ASSESSMENT — PAIN SCALES - GENERAL
PAINLEVEL_OUTOF10: 0 - NO PAIN

## 2023-10-14 ASSESSMENT — COGNITIVE AND FUNCTIONAL STATUS - GENERAL
HELP NEEDED FOR BATHING: A LITTLE
WALKING IN HOSPITAL ROOM: A LITTLE
DAILY ACTIVITIY SCORE: 20
DAILY ACTIVITIY SCORE: 20
TOILETING: A LITTLE
DRESSING REGULAR UPPER BODY CLOTHING: A LITTLE
CLIMB 3 TO 5 STEPS WITH RAILING: A LITTLE
HELP NEEDED FOR BATHING: A LITTLE
TOILETING: A LITTLE
DRESSING REGULAR UPPER BODY CLOTHING: A LITTLE
STANDING UP FROM CHAIR USING ARMS: A LITTLE
MOVING TO AND FROM BED TO CHAIR: A LITTLE
DRESSING REGULAR LOWER BODY CLOTHING: A LITTLE
TURNING FROM BACK TO SIDE WHILE IN FLAT BAD: A LITTLE
MOBILITY SCORE: 19
DRESSING REGULAR LOWER BODY CLOTHING: A LITTLE

## 2023-10-14 ASSESSMENT — ENCOUNTER SYMPTOMS
CONSTIPATION: 1
UNEXPECTED WEIGHT CHANGE: 0
RESPIRATORY NEGATIVE: 1
ACTIVITY CHANGE: 0
FATIGUE: 0

## 2023-10-14 ASSESSMENT — PAIN - FUNCTIONAL ASSESSMENT
PAIN_FUNCTIONAL_ASSESSMENT: 0-10

## 2023-10-14 ASSESSMENT — ACTIVITIES OF DAILY LIVING (ADL): HOME_MANAGEMENT_TIME_ENTRY: 5

## 2023-10-14 NOTE — DISCHARGE INSTR - OTHER ORDERS
The Home Care Agency you selected will contact you within 72 hours to schedule a Home Care Visit. If you have any questions prior to the call, please feel free to contact [facility] at [phone].

## 2023-10-14 NOTE — DISCHARGE SUMMARY
Discharge Diagnosis  Hyponatremia    Issues Requiring Follow-Up  Hyponatremia  Pulmonary to follow lung nodules  GI to follow for possible colonoscopy/EGD    Discharge Meds     Your medication list        START taking these medications        Instructions Last Dose Given Next Dose Due   amLODIPine 10 mg tablet  Commonly known as: Norvasc  Start taking on: October 15, 2023      Take 1 tablet (10 mg) by mouth once daily. Do not start before October 15, 2023.       cloNIDine 0.1 mg/24 hr patch  Commonly known as: Catapres-TTS      Apply one patch on the skin and replace every 7 days, as directed              CONTINUE taking these medications        Instructions Last Dose Given Next Dose Due   Accu-Chek Guide test strips strip  Generic drug: blood sugar diagnostic           aspirin 81 mg chewable tablet           atorvastatin 80 mg tablet  Commonly known as: Lipitor           cholecalciferol 25 MCG (1000 UT) capsule  Commonly known as: Vitamin D-3           dicyclomine 10 mg capsule  Commonly known as: Bentyl           estradiol 0.01 % (0.1 mg/gram) vaginal cream  Commonly known as: Estrace           Januvia 100 mg tablet  Generic drug: SITagliptin phosphate           levothyroxine 88 mcg tablet  Commonly known as: Synthroid, Levoxyl           NexIUM 20 mg DR capsule  Generic drug: esomeprazole           Premarin vaginal cream  Generic drug: estrogens (conjugated)           Vitamin B-12 1,000 mcg tablet  Generic drug: cyanocobalamin                  STOP taking these medications      losartan 100 mg tablet  Commonly known as: Cozaar                  Where to Get Your Medications        These medications were sent to [id number] - [address] [address]      Phone: [phone]   amLODIPine 10 mg tablet  cloNIDine 0.1 mg/24 hr patch         Test Results Pending At Discharge  Pending Labs       Order Current Status    Ferritin In process             Hospital Course   Patient is a 89-year-old female who presented to the emergency room for weakness and 4-week period of diarrhea.  Patient was having 1-2 episodes of diarrhea daily.  Patient was admitted and her sodium was found to be 120 she also had a slight acute kidney injury.  Patient was placed on a fluid restriction, losartan was stopped and she is improving.  Today her sodium is 127.  She is to follow-up with Dr. Khalida reyes and have blood work completed before then.   Patient's diarrhea resolved and stool studies could not be done.  Patient will be discharged with home health care for PT and OT.  She will be discharged home in stable condition.  Pertinent Physical Exam At Time of Discharge  Physical Exam  Constitutional:       Appearance: Normal appearance.   HENT:      Mouth/Throat:      Mouth: Mucous membranes are dry.   Cardiovascular:      Rate and Rhythm: Normal rate and regular rhythm.   Pulmonary:      Effort: Pulmonary effort is normal.      Breath sounds: Normal breath sounds.   Abdominal:      Palpations: Abdomen is soft.   Neurological:      Mental Status: She is alert.   Psychiatric:         Mood and Affect: Mood normal.         Behavior: Behavior normal.         Outpatient Follow-Up  Future Appointments   Date Time Provider Department Center   10/24/2023  1:15 PM Viki Rios APRN-CNP [id number]   11/21/2023 11:00 AM Jose Alejandro Hamilton MD [id number]   1/10/2024 10:30 AM Gabriela Zamora DPM [id number]         KAYLENE Xiong-CNP

## 2023-10-14 NOTE — CARE PLAN
Problem: OT Goals  Goal: Functional transfers  Description: Pt will demon. Bed, chair and toilet transfers independently w/ FWW.  Outcome: Progressing  Goal: Functional endurance.  Description: Pt will demon. BUE exercises to improve functional endurance.  Outcome: Progressing

## 2023-10-14 NOTE — PROGRESS NOTES
Discharge orders received, per previous care coordinator patient is planned discharge home with home health care.   Referral updated with final discharge orders/face to face and sent to [facility], SOC was set for Tuesday.  Bedside RN updated.      DC plan secure

## 2023-10-14 NOTE — PROGRESS NOTES
Occupational Therapy    Occupational Therapy Treatment    Name: Fartun Carey  MRN: [id number]  : 6/10/1928  Date: 10/14/23  Time Calculation  Start Time: 1118  Stop Time: 1131  Time Calculation (min): 13 min    Assessment:  OT Assessment:  (Pt is 89 y/o female admited for hyponatremia, diarrhea)  End of Session Communication: Bedside nurse  End of Session Patient Position: Up in chair  Plan:  OT Frequency: 2 times per week  OT Discharge Recommendations: Low intensity level of continued care  Equipment Recommended upon Discharge: Wheeled walker  OT Recommended Transfer Status: Stand by assist    Subjective   General:  OT Last Visit  OT Received On: 10/14/23  General  Reason for Referral: Impaired Mobility  Referred By: Dr. Caroline Denise  Past Medical History Relevant to Rehab: HTN, DB  Family/Caregiver Present: No  Prior to Session Communication: Bedside nurse  Patient Position Received: Up in chair  Preferred Learning Style: verbal  General Comment: 4Lnc intact  Vitals:  Vital Signs  BP: (!) 159/41  Pain Assessment:  Pain Assessment  Pain Assessment: 0-10  Pain Score: 0 - No pain     Objective   Activities of Daily Living:     Functional Standing Tolerance:     Bed Mobility/Transfers: Transfers  Transfer: Yes  Transfer 1  Transfer From 1: Sit to, Stand to  Transfer to 1: Stand, Chair with arms  Technique 1: Sit to stand, Stand to sit  Transfer Device 1: Walker  Transfer Level of Assistance 1: Independent  Trials/Comments 1:  (Pt ambulated from chair to bathroom to chair w/ distant5 supervison and FWW.)  IADL's:   Communication:     Splinting:     Therapy/Activity: Therapeutic Exercise  Therapeutic Exercise Performed: Yes  Therapeutic Exercise Activity 1: x15 (Shld flex/ ext)  Therapeutic Exercise Activity 2: x15 (Shld horiz abd/ add)  Therapeutic Exercise Activity 3: x15 (elbow flex/ ext)  Therapeutic Exercise Activity 4: x15 (forearm sup/ pron)  Therapeutic Exercise Activity 5: x15 (wrist flex/ ext)  Sensory:      Cognitive Skill Development:     Vision:     Strength:  Strength  Strength Comments: 4/5 (BUE strength)  Other Activity:     Home environment:           Outcome Measures:  [facility] Daily Activity  Putting on and taking off regular lower body clothing: A little  Bathing (including washing, rinsing, drying): A little  Putting on and taking off regular upper body clothing: A little  Toileting, which includes using toilet, bedpan or urinal: A little  Taking care of personal grooming such as brushing teeth: None  Eating Meals: None  Daily Activity - Total Score: 20        Education Documentation  ADL Training, taught by Tea Garcia OT at 10/14/2023 11:53 AM.  Learner: Patient  Readiness: Acceptance  Method: Explanation  Response: Demonstrated Understanding    Education Comments  No comments found.      Goals:  Encounter Problems       Encounter Problems (Active)       Balance       Sitting Balance (Met)       Start:  10/10/23    Expected End:  10/24/23    Resolved:  10/12/23    Pt will demonstrate good sitting balance to promote safe engagement with out of bed activities           Standing Balance (Progressing)       Start:  10/10/23    Expected End:  10/24/23       Pt will demonstrate good static standing balance to promote safe participation with out of bed activity, transfers, and mobility              Mobility       Ambulation (Progressing)       Start:  10/10/23    Expected End:  10/24/23       Pt will ambulate 50' independent assist with walker to promote safe home mobility           Stair Negotiation (Progressing)       Start:  10/10/23    Expected End:  10/24/23       Pt will ascend/descend 1 stairs, no rails, and least restrictive device with independent assist to safely enter/exit the home environment              OT Goals       Bathing (Progressing)       Start:  10/10/23    Expected End:  10/24/23       Pt will demon. UB/LB bathing independently         Dressing (Progressing)       Start:  10/10/23     Expected End:  10/24/23       Pt will demon. UB/LB dressing Independently         Functional transfers (Progressing)       Start:  10/10/23    Expected End:  10/24/23       Pt will demon. Bed, chair and toilet transfers independently w/ FWW.         Functional endurance. (Progressing)       Start:  10/10/23    Expected End:  10/24/23       Pt will demon. BUE exercises to improve functional endurance.            Safety       LTG - Patient will demonstrate safety requirements appropriate to situation/environment (Progressing)       Start:  10/10/23    Expected End:  10/24/23            LTG - Patient will utilize safety techniques (Progressing)       Start:  10/10/23    Expected End:  10/24/23               Safety       Safe Mobility Techniques (Progressing)       Start:  10/10/23    Expected End:  10/24/23       Pt will correctly identify and demonstrate safe mobility techniques to reduce their risks for falls during their acute care stay               Transfers       Supine to sit (Progressing)       Start:  10/10/23    Expected End:  10/24/23       Pt will transfer supine to sitting at edge of bed with independent assist to promote acute care out of bed activity           Sit to stand (Progressing)       Start:  10/10/23    Expected End:  10/24/23       Pt will transfer sit to standing position with independent assist and least restrictive device to promote safe out of bed activity

## 2023-10-14 NOTE — CARE PLAN
The patient's goals for the shift include normalize bowel regimen    The clinical goals for the shift include have bowel movements    Over the shift, the patient did not make progress toward the following goals. Barriers to progression include   Problem: Constipation   Recommendations to address these barriers include taking miralax daily  Goal: I will have a regular, well-formed bowel movement every 2-3 days  Outcome: Progressing

## 2023-10-14 NOTE — PROGRESS NOTES
Subjective Data:  Patient  History of hypertension.  So far stable cardiac wise.  Pending rehab placement.  Okay to discharge.  We will sign off.    Overnight Events:    Negative     Objective Data:  Last Recorded Vitals:  Vitals:    10/13/23 1856 10/14/23 0428 10/14/23 0700 10/14/23 1100   BP: 150/57 150/58 155/57 139/54   BP Location: Left arm Right arm Right arm Right arm   Patient Position: Sitting Lying Lying Sitting   Pulse: 57 69 71 79   Resp: 16 16 16 16   Temp: 36.4 °C (97.5 °F) 36.1 °C (97 °F) 36.7 °C (98.1 °F) 36.5 °C (97.7 °F)   TempSrc: Oral Oral Oral Oral   SpO2: 97% 98% 95% 96%   Weight:       Height:           Last Labs:  CBC - 10/14/2023:  4:32 AM  9.4 10.3 187    30.4      CMP - 10/14/2023:  4:32 AM  8.6 6.3 15 --- <0.2   _ 3.8 15 98      PTT - No results in last year.  _   _ _     HGBA1C   Date/Time Value Ref Range Status   07/06/2023 10:01 AM 6.8 4.0 - 6.0 % Final     Comment:     Hemoglobin A1C levels are related to mean blood glucose during the   preceding 2-3 months. The relationship table below may be used as a   general guide. Each 1% increase in HGB A1C is a reflection of an   increase in mean glucose of approximately 30 mg/dl.   Reference: Diabetes Care, volume 29, supplement 1 Jan. 2006                        HGB A1C ................. Approx. Mean Glucose   _______________________________________________   6%   ...............................  120 mg/dl   7%   ...............................  150 mg/dl   8%   ...............................  180 mg/dl   9%   ...............................  210 mg/dl   10%  ...............................  240 mg/dl  Performed at [address] [address]     03/31/2023 10:14 AM 7.2 4.0 - 6.0 % Final     Comment:     Hemoglobin A1C levels are related to mean blood glucose during the   preceding 2-3 months. The relationship table below may be used as a   general guide. Each 1% increase in HGB A1C is a reflection of an   increase in  "mean glucose of approximately 30 mg/dl.   Reference: Diabetes Care, volume 29, supplement 1 Jan. 2006                        HGB A1C ................. Approx. Mean Glucose   _______________________________________________   6%   ...............................  120 mg/dl   7%   ...............................  150 mg/dl   8%   ...............................  180 mg/dl   9%   ...............................  210 mg/dl   10%  ...............................  240 mg/dl  Performed at [address] [address]       LDLCALC   Date/Time Value Ref Range Status   07/06/2023 10:01 AM 58 65 - 130 MG/DL Final   03/31/2023 10:14 AM 62 65 - 130 MG/DL Final   12/27/2022 10:13 AM 65 65 - 130 MG/DL Final      Last I/O:  I/O last 3 completed shifts:  In: - (0 mL/kg)   Out: 3050 (42 mL/kg) [Urine:3050 (1.2 mL/kg/hr)]  Weight: 72.6 kg     Past Cardiology Tests (Last 3 Years):  EKG:  No results found for this or any previous visit from the past 1095 days.    Echo:  No results found for this or any previous visit from the past 1095 days.    Ejection Fractions:  No results found for: \"EF\"  Cath:  No results found for this or any previous visit from the past 1095 days.    Stress Test:  No results found for this or any previous visit from the past 1095 days.    Cardiac Imaging:  No results found for this or any previous visit from the past 1095 days.      Inpatient Medications:  Scheduled medications   Medication Dose Route Frequency    amLODIPine  10 mg oral Daily    aspirin  81 mg oral Daily    atorvastatin  80 mg oral Nightly    cholecalciferol  25 mcg oral Daily    cloNIDine  1 patch transdermal Weekly    cyanocobalamin  1,000 mcg oral Daily    influenza  0.7 mL intramuscular During hospitalization    insulin lispro  0-10 Units subcutaneous TID with meals    levothyroxine  88 mcg oral Daily before breakfast    pantoprazole  20 mg oral Daily before breakfast    polyethylene glycol  17 g oral Daily    SITagliptin " phosphate  50 mg oral Daily     PRN medications   Medication    dextrose 10 % in water (D10W)    dextrose    glucagon    hydrALAZINE    ondansetron ODT    Or    ondansetron     Continuous Medications   Medication Dose Last Rate       Physical Exam:  HEENT: Normocephalic/atraumatic pupils equal react light  Neck exam no JVD, no bruit  Lung exam clear to auscultation, few crackles at the bases  Cardiac exam is regular rhythm S1-S2, soft slight murmur heard.  No S3 heard.  Abdomen soft nontender, nondistended  Extremities no clubbing, cyanosis but trace edema  Neuro exam grossly intact.       Assessment/Plan   Patient has abdominal history of hypertension hyperlipidemia.  No chest pain or tightness.  Continue current clonidine patch for blood pressure management as well as amlodipine.  Modify risk factor.  Peripheral IV 10/11/23 22 G Left Antecubital (Active)   Site Assessment Clean;Dry;Intact 10/14/23 0800   Dressing Status Clean;Dry 10/14/23 0800   Number of days: 3       Code Status:  No Order    I spent 10 minutes in the professional and overall care of this patient.        David Eddy MD

## 2023-10-14 NOTE — CONSULTS
Inpatient consult to Gastroenterology  Consult performed by: ELAINE Haley  Consult ordered by: ELAINE Xiong  Reason for consult: Diarrhea          Reason For Consult  Diarrhea    History Of Present Illness  Fartun Carey is a 89 y.o. female presenting with diarrhea on 10/9. She was admitted for hyponatremia. Patient had negative stool studies. CT showed lung nodules but no acute GI findings. Found normocytic anemia with hgb 10.2. She denies dark, tarry, bloody stools. She mentions intermittent constipation vs diarrhea at baseline. Last colonoscopy was many years ago      Past Medical History  She has a past medical history of Diabetes mellitus (CMS/[facility]), Disease of thyroid gland, and Hypertension.    Surgical History  She has a past surgical history that includes Hysterectomy; Back surgery; and Appendectomy.     Social History  She reports that she has never smoked. She has never used smokeless tobacco. She reports that she does not drink alcohol and does not use drugs.    Family History  Family History   Problem Relation Name Age of Onset    Heart disease Mother      Heart disease Father      Heart disease Brother      No Known Problems Son      Heart disease Maternal Grandmother      Heart disease Maternal Grandfather      Heart disease Paternal Grandmother      Heart disease Paternal Grandfather          Allergies  Amoxicillin-pot clavulanate, Glimepiride, Lisinopril, Metformin hcl, Tetracycline hcl, and Cephalexin    Review of Systems   Constitutional:  Negative for activity change, fatigue and unexpected weight change.   Respiratory: Negative.     Gastrointestinal:  Positive for constipation.        Physical Exam  Constitutional:       Appearance: Normal appearance.   HENT:      Head: Normocephalic and atraumatic.      Mouth/Throat:      Mouth: Mucous membranes are moist.   Cardiovascular:      Rate and Rhythm: Normal rate.   Pulmonary:      Effort: Pulmonary effort is normal.  "  Musculoskeletal:         General: Normal range of motion.      Cervical back: Normal range of motion.   Skin:     General: Skin is warm.   Neurological:      General: No focal deficit present.      Mental Status: She is alert. Mental status is at baseline.   Psychiatric:         Mood and Affect: Mood normal.          Last Recorded Vitals  Blood pressure 139/54, pulse 79, temperature 36.5 °C (97.7 °F), temperature source Oral, resp. rate 16, height 1.651 m (5' 5\"), weight 72.6 kg (160 lb), SpO2 96 %.    Relevant Results  Results for orders placed or performed during the hospital encounter of 10/09/23 (from the past 24 hour(s))   POCT GLUCOSE   Result Value Ref Range    POCT Glucose 168 (H) 74 - 99 mg/dL   CBC   Result Value Ref Range    WBC 9.4 4.4 - 11.3 x10*3/uL    nRBC 0.0 0.0 - 0.0 /100 WBCs    RBC 3.39 (L) 4.00 - 5.20 x10*6/uL    Hemoglobin 10.3 (L) 12.0 - 16.0 g/dL    Hematocrit 30.4 (L) 36.0 - 46.0 %    MCV 90 80 - 100 fL    MCH 30.4 26.0 - 34.0 pg    MCHC 33.9 32.0 - 36.0 g/dL    RDW 12.5 11.5 - 14.5 %    Platelets 187 150 - 450 x10*3/uL    MPV 10.1 7.5 - 11.5 fL   Basic metabolic panel   Result Value Ref Range    Glucose 116 (H) 65 - 99 mg/dL    Sodium 127 (L) 133 - 145 mmol/L    Potassium 4.4 3.4 - 5.1 mmol/L    Chloride 93 (L) 97 - 107 mmol/L    Bicarbonate 24 24 - 31 mmol/L    Urea Nitrogen 19 8 - 25 mg/dL    Creatinine 0.90 0.40 - 1.60 mg/dL    eGFR 59 (L) >60 mL/min/1.73m*2    Calcium 8.6 8.5 - 10.4 mg/dL    Anion Gap 10 <=19 mmol/L   POCT GLUCOSE   Result Value Ref Range    POCT Glucose 110 (H) 74 - 99 mg/dL   POCT GLUCOSE   Result Value Ref Range    POCT Glucose 144 (H) 74 - 99 mg/dL     CT abdomen pelvis wo IV contrast    Result Date: 10/11/2023  PROCEDURE:         ABD PELV WO ORAL WO IV CONTRAST - WCT  3221 REASON FOR EXAM: Abdominal pain, acute, nonlocalized RESULT: MRN: [id number] Patient Name: FRANKIE WESTBROOK STUDY: ABD PELV WO ORAL WO IV CONTRAST; 10/9/2023 8:55 am INDICATION: Abdominal pain, acute, " nonlocalized; /diarrhea/dizziness COMPARISON: CT thorax 04/19/2023 and 09/12/2020 CT abdomen/pelvis ACCESSION NUMBER(S): [id number] ORDERING CLINICIAN: VILLA PATE TECHNIQUE: Contiguous axial images of the abdomen/pelvis were performed. CTDI: DLP: FINDINGS: This radiology study is being re-signed due to an administrative error but not reinterpreted. The new signature merely reflects the date upon which the administrative error was corrected for placement in patient chart. The actual interpretation took place in a timely fashion, consistent with  policy, by the physician referenced as interpreting the study. Evaluation of the solid viscera is suboptimal due to the lack of intravenous contrast. The liver and adrenal glands are unremarkable. There is redemonstration of chronic pancreatic ductal dilatation suboptimally evaluated on this noncontrast study. There is evidence of suggested IPMN of the pancreas. Gallbladder is surgically absent. Splenule is noted. Calcified granulomata of the spleen are identified. Multiple subcentimeter caliceal calcifications are noted within the right kidney. No hydronephrosis is identified. Bilateral renal atrophy is seen. Calcific atherosclerosis of the abdominal aorta visceral iliac arteries can be seen without aneurysm. There are some fluid-filled small bowel loops identified. Equivocal wall thickening of the left upper quadrant bowel loops can be seen. Colonic diverticulosis without diverticulitis is seen. No secondary signs appendicitis can be seen. No abdominal ascites is seen. The uterus is surgically absent. The bladder is mildly distended. The visualized osseous structures are intact. Degenerative disc disease and spondylosis of the lumbar spine is noted. Bilateral hip osteoarthrosis is seen. Limited images of the lower thorax reveal multiple benign pulmonary nodules within the lung bases. Small hiatal hernia is noted. IMPRESSION: 1. Findings which may be seen with mild  enteritis in the appropriate clinical setting. 2. Nonobstructing right nephrolithiasis. 3. Chronic changes as above. Dictation workstation:   [id number] This exam is available in DICOM format to non-affiliated healthcare facilities on a secure media free searchable basis with prior patient authorization.  The patient exposure is reported to a radiation dose index registry.  All CT examinations are performed with one or more of the following dose reduction techniques: Automated Exposure Control, Adjustment of mA and/or KV according to patient size, or use of iterative reconstruction techniques. Original Interpreting Physician:   TIBURCIO ESQUIVEL MD Original Transcribed by/Date: MMODAL Sep 28 2023  5:19P Original Electronically Signed by/Date: TIBURCIO ESQUIVEL MD Oct 11 2023  8:37P Addendum Interpreting Physician: Addendum Transcribed by/Date: NO ADDENDUM Addendum Electronically Signed by/Date:     CT chest wo IV contrast    Result Date: 10/10/2023  Interpreted By:  Carlos Sotelo, STUDY: CT CHEST WO IV CONTRAST; 10/10/2023 1:25 pm   INDICATION: Signs/Symptoms:lung nodules.   COMPARISON: None   ACCESSION NUMBER(S): [id number]   ORDERING CLINICIAN: AVE MARQUEZ   TECHNIQUE: Contiguous axial images of the thorax were obtained from the level of the thoracic inlet through the lung bases. 100 ml of Omnipaque 350 was utilized.     FINDINGS: The thyroid gland is unremarkable.   The heart size is within normal limits.  No pericardial effusion is identified. The aorta and pulmonary arteries are normal in caliber.   There are no pathologically enlarged mediastinal, hilar, or axillary lymph nodes are seen. Calcified left infrahilar lymph node consistent with granulomatous disease.   The trachea and mainstem bronchi are patent.  There are a few calcified subcentimeter granulomas in the left lower lobe consistent with granulomas. However there are numerous noncalcified pulmonary nodules bilaterally concerning for the  possibility of pulmonary metastatic disease, the largest on the left is within the left lower lobe measuring approximately 8 mm. The largest pulmonary nodule in the right upper lobe is lobe measuring 8 mm. There is a 9 mm nonspecific noncalcified pulmonary nodule in the lateral basal segment of the right lower lobe. Numerous additional bilateral pulmonary nodules are seen.   Otherwise no significant consolidation or acute airspace opacity.   There is no evidence of pneumothorax or pleural effusion.   The visualized osseous structures are intact.   Limited images through the upper abdomen show multiple splenic granulomas. Moderate sized hiatal hernia. Nonobstructive right renal calculi.       Numerous noncalcified pulmonary nodules bilaterally concerning for pulmonary metastatic disease. The largest in the right lung measures approximately 9 mm, the largest in the left lung measures approximately 8 mm. Clinical correlation and follow-up in 3 months is recommended.     Signed by: Carlos Sotelo 10/10/2023 2:54 PM Dictation workstation:   [id number]    CT abdomen pelvis wo IV contrast    Result Date: 10/9/2023  Interpreted By:  Carlos A Bobby, STUDY: CT ABDOMEN PELVIS WO IV CONTRAST;  10/9/2023 9:35 pm   INDICATION: Signs/Symptoms:diarrhea.  Nausea, vomiting, lightheadedness, diarrhea   COMPARISON: None..   ACCESSION NUMBER(S): [id number]   ORDERING CLINICIAN: JAMEY ROSE   TECHNIQUE: Oral contrast was administered. IV contrast was not administered. CT of the Abdomen without contrast was performed. Axial, sagittal and coronal reformatted images were reviewed.   FINDINGS: Lower Chest: There are multiple noncalcified soft tissue nodules involving the bilateral lung bases. The largest in the right lower lobe measures approximately 9 mm. The largest nodule in the left lower lobe measures approximately 9 mm.   Abdomen: Liver: within normal limits. Bile Ducts: Normal caliber. Gallbladder: No calcified gallstones.  Normal caliber wall. Pancreas: within normal limits. Spleen: Calcified granuloma are noted within the spleen Adrenals: within normal limits. Kidneys: Nonobstructive calculi are noted involving the right kidney.   Bowel: There is diverticulosis of the sigmoid and descending colon without evidence for diverticulitis. Mesenteric Lymph Nodes: No enlarged mesenteric lymph nodes. Peritoneum: No ascites or free air, no fluid collection. Vessels: Atherosclerotic changes within normal limits. Retroperitoneum: within normal limits. Abdominal Wall: within normal limits. Bones: within normal limits.       Numerous noncalcified soft tissue nodules throughout the bilateral lung bases. Findings are nonspecific but findings could represent nodules from metastatic disease.   MACRO: none   Signed by: Carlos A Bobby 10/9/2023 9:47 PM Dictation workstation:   [id number]    XR chest 2 views    Result Date: 10/9/2023  Interpreted By:  Carlos A Bobby, STUDY: XR CHEST 2 VIEWS; 10/9/2023 7:21 pm   INDICATION: Signs/Symptoms:fatigue/weakness;   COMPARISON: None available.   ACCESSION NUMBER(S): [id number]   ORDERING CLINICIAN: JAMEY ROSE   TECHNIQUE: Views: PA and Lateral   FINDINGS: RESULTS:  The cardiac silhouette is within normal limits. There are calcifications involving the thoracic aorta. The lungs demonstrate no infiltrate or atelectasis. There is no evidence for pleural effusion. There are degenerative changes of the thoracic spine.       No radiographic evidence for acute cardiopulmonary disease.     Signed by: Carlos A Bobby 10/9/2023 8:06 PM Dictation workstation:   [id number]    CT abdomen wo IV contrast    Result Date: 9/28/2023  PROCEDURE:         ABDOMEN WO ORAL WO IV CONTRAST - WCT  3220 REASON FOR EXAM: Abdominal pain, acute, nonlocalized RESULT: MRN: [id number] Patient Name: FRANKIE WESTBROOK STUDY: ABDOMEN WO ORAL WO IV CONTRAST; 9/28/2023 5:57 pm INDICATION: Abdominal pain, acute, nonlocalized; /diarrhea/dizziness  COMPARISON: CT thorax 04/19/2023 and 09/12/2020 CT abdomen/pelvis ACCESSION NUMBER(S): [id number] ORDERING CLINICIAN: VILLA PATE TECHNIQUE: Contiguous axial images from the lung bases through the abdomen pelvis were performed. CTDI: DLP: FINDINGS: Evaluation of the solid viscera is suboptimal due to the lack of intravenous contrast. The liver and adrenal glands are unremarkable. There is redemonstration of chronic pancreatic ductal dilatation suboptimally evaluated on this noncontrast study. There is evidence of suggested IPMN of the pancreas. Gallbladder is surgically absent. Splenule is noted. Calcified granulomata of the spleen are identified. Multiple subcentimeter caliceal calcifications are noted within the right kidney. No hydronephrosis is identified. Bilateral renal atrophy is seen. Calcific atherosclerosis of the abdominal aorta visceral iliac arteries can be seen without aneurysm. There are some fluid-filled small bowel loops identified. Equivocal wall thickening of the left upper quadrant bowel loops can be seen. Colonic diverticulosis without diverticulitis is seen. No secondary signs appendicitis can be seen. No abdominal ascites is seen. The uterus is surgically absent. The bladder is mildly distended. The visualized osseous structures are intact. Degenerative disc disease spondylosis of the lumbar spine is noted. Bilateral hip osteoarthrosis is seen. Limited images of the lower thorax reveal multiple benign pulmonary nodules within the lung bases. Small hiatal hernia is noted. IMPRESSION: 1. Findings which may be seen with mild enteritis in the appropriate clinical setting. 2. Nonobstructing right nephrolithiasis. 3. Chronic changes as above. Dictation workstation:   [id number] This exam is available in DICOM format to non-affiliated healthcare facilities on a secure media free searchable basis with prior patient authorization.  The patient exposure is reported to a radiation dose index  registry.  All CT examinations are performed with one or more of the following dose reduction techniques: Automated Exposure Control, Adjustment of mA and/or KV according to patient size, or use of iterative reconstruction techniques. Original Interpreting Physician:   TIBURCIO ESQUIVEL MD Original Transcribed by/Date: MMODAL Sep 28 2023  5:19P Original Electronically Signed by/Date: TIBURCIO ESQUIVEL MD Sep 28 2023  6:42P Addendum Interpreting Physician: Addendum Transcribed by/Date: NO ADDENDUM Addendum Electronically Signed by/Date:     [place] pain management TC    Result Date: 9/22/2023  PROCEDURE:         PAIN MANAGEMENT CASE - CXR  0999 REASON FOR EXAM: twin RESULT: MRN: [id number] Patient Name: FRANKIE WESTBROOK STUDY: PAIN MANAGEMENT CASE; 9/20/2023 2:00 pm INDICATION: twin COMPARISON: None. ACCESSION NUMBER(S): [id number] ORDERING CLINICIAN: VONDA LIU FINDINGS: No radiologist interpretation is required. IMPRESSION: See above MACRO: None Dictation workstation:   [id number] Original Interpreting Physician:    Original Transcribed by/Date: MMODAL Sep 20 2023 12:00A Original Electronically Signed by/Date:   Sep 22 2023 1:40P Addendum Interpreting Physician: Addendum Transcribed by/Date: NO ADDENDUM Addendum Electronically Signed by/Date:         Assessment/Plan     Diarrhea (resolved, possible gastroenteritis vs med related)   -There was some concern with her lung nodules and change in bowel habits plus anemia for underlying malignancy. Will add iron studies   -Patient is 89 years old but quite healthy. No indication for urgent colonoscopy. Patient agreeable to outpatient FU. No barriers to DC from GI standpoint. We can further discuss colonoscopy at that time. Please call us with questions or concerns     I spent 30 minutes in the professional and overall care of this patient.

## 2023-10-14 NOTE — PROGRESS NOTES
Fartun Carey is a 89 y.o. female on day 5 of admission presenting with Hyponatremia.      Subjective   Patient with no complaints, sodium low but remains stable at 127.       Objective          Vitals 24HR  Heart Rate:  [57-79]   Temp:  [36.1 °C (97 °F)-36.7 °C (98.1 °F)]   Resp:  [16-17]   BP: (138-155)/(54-59)   SpO2:  [95 %-98 %]       Intake/Output last 3 Shifts:    Intake/Output Summary (Last 24 hours) at 10/14/2023 1418  Last data filed at 10/14/2023 1300  Gross per 24 hour   Intake 440 ml   Output 1425 ml   Net -985 ml       Physical Exam  Constitutional:       General: She is awake. She is not in acute distress.  Cardiovascular:      Rate and Rhythm: Regular rhythm.      Heart sounds:      No friction rub.   Pulmonary:      Effort: Pulmonary effort is normal.      Breath sounds: Normal breath sounds.   Abdominal:      General: Bowel sounds are normal.      Palpations: Abdomen is soft.      Tenderness: There is no guarding or rebound.   Musculoskeletal:      Comments: Trace edema   Neurological:      Mental Status: She is alert.         Relevant Results  Results for orders placed or performed during the hospital encounter of 10/09/23 (from the past 24 hour(s))   POCT GLUCOSE   Result Value Ref Range    POCT Glucose 168 (H) 74 - 99 mg/dL   CBC   Result Value Ref Range    WBC 9.4 4.4 - 11.3 x10*3/uL    nRBC 0.0 0.0 - 0.0 /100 WBCs    RBC 3.39 (L) 4.00 - 5.20 x10*6/uL    Hemoglobin 10.3 (L) 12.0 - 16.0 g/dL    Hematocrit 30.4 (L) 36.0 - 46.0 %    MCV 90 80 - 100 fL    MCH 30.4 26.0 - 34.0 pg    MCHC 33.9 32.0 - 36.0 g/dL    RDW 12.5 11.5 - 14.5 %    Platelets 187 150 - 450 x10*3/uL    MPV 10.1 7.5 - 11.5 fL   Basic metabolic panel   Result Value Ref Range    Glucose 116 (H) 65 - 99 mg/dL    Sodium 127 (L) 133 - 145 mmol/L    Potassium 4.4 3.4 - 5.1 mmol/L    Chloride 93 (L) 97 - 107 mmol/L    Bicarbonate 24 24 - 31 mmol/L    Urea Nitrogen 19 8 - 25 mg/dL    Creatinine 0.90 0.40 - 1.60 mg/dL    eGFR 59 (L) >60  mL/min/1.73m*2    Calcium 8.6 8.5 - 10.4 mg/dL    Anion Gap 10 <=19 mmol/L   POCT GLUCOSE   Result Value Ref Range    POCT Glucose 110 (H) 74 - 99 mg/dL   POCT GLUCOSE   Result Value Ref Range    POCT Glucose 144 (H) 74 - 99 mg/dL         Assessment/Plan   1.  Hyponatremia    -Although the sodium is low, sodium remains stable and patient is okay for discharge from renal standpoint, can be followed as an outpatient, will need a renal function panel in a week and follow-up with us in 2 weeks  2.  Acute kidney injury   - Resolved   3.  Diarrhea   4.  Metabolic acidosis it is improving  5.  Diabetes mellitus type 2  6.  Recurrent urinary tract infections      Celso Gaxiola MD

## 2023-10-23 DIAGNOSIS — N17.9 ACUTE KIDNEY FAILURE, UNSPECIFIED (CMS-HCC): Primary | ICD-10-CM

## 2023-10-24 ENCOUNTER — OFFICE VISIT (OUTPATIENT)
Dept: PAIN MEDICINE | Facility: CLINIC | Age: 88
End: 2023-10-24
Payer: MEDICARE

## 2023-10-24 ENCOUNTER — LAB (OUTPATIENT)
Dept: LAB | Facility: LAB | Age: 88
End: 2023-10-24
Payer: MEDICARE

## 2023-10-24 VITALS
DIASTOLIC BLOOD PRESSURE: 65 MMHG | SYSTOLIC BLOOD PRESSURE: 143 MMHG | BODY MASS INDEX: 30.21 KG/M2 | HEIGHT: 61 IN | HEART RATE: 73 BPM | WEIGHT: 160 LBS

## 2023-10-24 DIAGNOSIS — M47.817 LUMBOSACRAL SPONDYLOSIS WITHOUT MYELOPATHY: Primary | ICD-10-CM

## 2023-10-24 DIAGNOSIS — N17.9 ACUTE KIDNEY FAILURE, UNSPECIFIED (CMS-HCC): ICD-10-CM

## 2023-10-24 LAB
ALBUMIN SERPL BCP-MCNC: 3.8 G/DL (ref 3.4–5)
ANION GAP SERPL CALC-SCNC: 13 MMOL/L (ref 10–20)
BUN SERPL-MCNC: 20 MG/DL (ref 6–23)
CALCIUM SERPL-MCNC: 9.1 MG/DL (ref 8.6–10.3)
CHLORIDE SERPL-SCNC: 98 MMOL/L (ref 98–107)
CO2 SERPL-SCNC: 27 MMOL/L (ref 21–32)
CREAT SERPL-MCNC: 1.06 MG/DL (ref 0.5–1.05)
ERYTHROCYTE [DISTWIDTH] IN BLOOD BY AUTOMATED COUNT: 13.2 % (ref 11.5–14.5)
GFR SERPL CREATININE-BSD FRML MDRD: 48 ML/MIN/1.73M*2
GLUCOSE SERPL-MCNC: 130 MG/DL (ref 74–99)
HCT VFR BLD AUTO: 32.6 % (ref 36–46)
HGB BLD-MCNC: 10.6 G/DL (ref 12–16)
MCH RBC QN AUTO: 31.1 PG (ref 26–34)
MCHC RBC AUTO-ENTMCNC: 32.5 G/DL (ref 32–36)
MCV RBC AUTO: 96 FL (ref 80–100)
NRBC BLD-RTO: 0 /100 WBCS (ref 0–0)
PHOSPHATE SERPL-MCNC: 3.3 MG/DL (ref 2.5–4.9)
PLATELET # BLD AUTO: 258 X10*3/UL (ref 150–450)
PMV BLD AUTO: 10.3 FL (ref 7.5–11.5)
POTASSIUM SERPL-SCNC: 3.8 MMOL/L (ref 3.5–5.3)
RBC # BLD AUTO: 3.41 X10*6/UL (ref 4–5.2)
SODIUM SERPL-SCNC: 134 MMOL/L (ref 136–145)
WBC # BLD AUTO: 6.7 X10*3/UL (ref 4.4–11.3)

## 2023-10-24 PROCEDURE — 1126F AMNT PAIN NOTED NONE PRSNT: CPT | Performed by: NURSE PRACTITIONER

## 2023-10-24 PROCEDURE — 1111F DSCHRG MED/CURRENT MED MERGE: CPT | Performed by: NURSE PRACTITIONER

## 2023-10-24 PROCEDURE — 3077F SYST BP >= 140 MM HG: CPT | Performed by: NURSE PRACTITIONER

## 2023-10-24 PROCEDURE — 99213 OFFICE O/P EST LOW 20 MIN: CPT | Performed by: NURSE PRACTITIONER

## 2023-10-24 PROCEDURE — 1159F MED LIST DOCD IN RCRD: CPT | Performed by: NURSE PRACTITIONER

## 2023-10-24 PROCEDURE — 83970 ASSAY OF PARATHORMONE: CPT

## 2023-10-24 PROCEDURE — 3078F DIAST BP <80 MM HG: CPT | Performed by: NURSE PRACTITIONER

## 2023-10-24 PROCEDURE — 36415 COLL VENOUS BLD VENIPUNCTURE: CPT

## 2023-10-24 PROCEDURE — 1036F TOBACCO NON-USER: CPT | Performed by: NURSE PRACTITIONER

## 2023-10-24 ASSESSMENT — PATIENT HEALTH QUESTIONNAIRE - PHQ9
SUM OF ALL RESPONSES TO PHQ9 QUESTIONS 1 AND 2: 0
2. FEELING DOWN, DEPRESSED OR HOPELESS: NOT AT ALL
1. LITTLE INTEREST OR PLEASURE IN DOING THINGS: NOT AT ALL

## 2023-10-24 ASSESSMENT — PAIN - FUNCTIONAL ASSESSMENT: PAIN_FUNCTIONAL_ASSESSMENT: 0-10

## 2023-10-24 ASSESSMENT — PAIN SCALES - GENERAL: PAINLEVEL_OUTOF10: 0 - NO PAIN

## 2023-10-24 NOTE — PROGRESS NOTES
Subjective   Patient ID: Fartun Carey is a 89 y.o. female who presents for Back Pain (POST RFA).    HPI: Very pleasant 89 YO Female with a PMHx significant for Diabetes, Hypothyroidism, Vitamin D Deficiency, HLD, Degenerative Lumbar Disc Disease, PMR, Myofascial Pain, Lumbosacral Spondylosis, OAB, HTN, CAD and OA. She presents s/p Bilateral L4,5 L5,S1 RFA on 09/20/2023 with Dr. Sanchez. She reports at least 50-60% improvement in pain since her procedure. She is aware it can take a full 4-6 weeks for final results. She is very pleased with her results. She reports that she is now able to stand and wash dishes or cook a meal without having to stop to sit down.     Review of Systems Unless noted in the HPI all other systems have been reviewed and are negative for complaint    Objective   Physical Exam   General- No acute distress, well appearing and well nourished.  Eyes Conjunctiva and lids: No erythema, swelling or discharge  Neck - Supple, no cervical lymphadenopathy.   Pulmonary - Respiratory effort: Normal respiration.   Cardiovascular - Normal rate and rhythm.  Examination of extremities for edema and/or varicosities: No peripheral edema  Abdomen: Soft, Non-tender, non-distended, no abdominal masses.   Skin - Skin and subcutaneous tissue: Normal without rashes or lesions.  Psychiatric - Orientation to person, place, and time: Normal. Mood and affect: Normal.  Musculoskeletal - Range of motion: Decreased range of motion to the lumbar spine. Limited in extension.  Neurologic - Patient able to stand from seated position unassisted. Patient ambulates with lumbar flexed gait. Strength is 5/5 in BLE. A&Ox3 with no signs of cognition issues. Patient appropriate for age.    Assessment/Plan   Problem List Items Addressed This Visit          Neuro    Lumbosacral spondylosis without myelopathy - Primary       TREATMENT PLAN:  I had a nice discussion with the patient today and our plan will be as follows:  Radiology: No new  imaging to review at this time.   Physically: Encouraged patient to continue with increased physical activity as able.   Psychologically: No acute psychological needs at this time.    Medication: Nothing at this time.   Duration: Chronic/ongoing.   Intervention: Very sweet lady s/p Bilateral L4,5 L5,S1 RFA on 09/20/2023 with Dr. Sanchez. She reports at least 50-60% improvement in pain and functional ability. She is very pleased with her results. She will follow up with us on an as needed basis.

## 2023-10-25 LAB — PTH-INTACT SERPL-MCNC: 64.3 PG/ML (ref 18.5–88)

## 2023-10-27 ENCOUNTER — LAB (OUTPATIENT)
Dept: LAB | Facility: LAB | Age: 88
End: 2023-10-27
Payer: MEDICARE

## 2023-10-27 PROCEDURE — 87506 IADNA-DNA/RNA PROBE TQ 6-11: CPT | Performed by: STUDENT IN AN ORGANIZED HEALTH CARE EDUCATION/TRAINING PROGRAM

## 2023-10-30 LAB

## 2023-11-01 ENCOUNTER — HOSPITAL ENCOUNTER (OUTPATIENT)
Dept: CARDIOLOGY | Facility: HOSPITAL | Age: 88
Discharge: HOME | End: 2023-11-01
Payer: MEDICARE

## 2023-11-01 PROCEDURE — 93005 ELECTROCARDIOGRAM TRACING: CPT

## 2023-11-13 ENCOUNTER — TELEPHONE (OUTPATIENT)
Dept: PRIMARY CARE | Facility: CLINIC | Age: 88
End: 2023-11-13
Payer: MEDICARE

## 2023-11-13 DIAGNOSIS — R79.0 LOW MAGNESIUM LEVEL: Primary | ICD-10-CM

## 2023-11-13 DIAGNOSIS — E53.8 VITAMIN B12 DEFICIENCY: ICD-10-CM

## 2023-11-13 DIAGNOSIS — N39.0 RECURRENT URINARY TRACT INFECTION: ICD-10-CM

## 2023-11-13 DIAGNOSIS — R53.83 FATIGUE, UNSPECIFIED TYPE: ICD-10-CM

## 2023-11-13 DIAGNOSIS — E03.9 ACQUIRED HYPOTHYROIDISM: ICD-10-CM

## 2023-11-13 DIAGNOSIS — E11.9 DIABETES MELLITUS WITHOUT COMPLICATION (MULTI): ICD-10-CM

## 2023-11-13 DIAGNOSIS — E55.9 VITAMIN D DEFICIENCY: ICD-10-CM

## 2023-11-13 DIAGNOSIS — E78.2 MIXED HYPERLIPIDEMIA: ICD-10-CM

## 2023-11-15 ENCOUNTER — LAB (OUTPATIENT)
Dept: LAB | Facility: LAB | Age: 88
End: 2023-11-15
Payer: MEDICARE

## 2023-11-15 DIAGNOSIS — E03.9 ACQUIRED HYPOTHYROIDISM: ICD-10-CM

## 2023-11-15 DIAGNOSIS — E55.9 VITAMIN D DEFICIENCY: ICD-10-CM

## 2023-11-15 DIAGNOSIS — R79.0 LOW MAGNESIUM LEVEL: ICD-10-CM

## 2023-11-15 DIAGNOSIS — R53.83 FATIGUE, UNSPECIFIED TYPE: ICD-10-CM

## 2023-11-15 DIAGNOSIS — E53.8 VITAMIN B12 DEFICIENCY: ICD-10-CM

## 2023-11-15 DIAGNOSIS — N30.00 ACUTE CYSTITIS WITHOUT HEMATURIA: ICD-10-CM

## 2023-11-15 DIAGNOSIS — N39.0 RECURRENT URINARY TRACT INFECTION: ICD-10-CM

## 2023-11-15 DIAGNOSIS — E11.9 DIABETES MELLITUS WITHOUT COMPLICATION (MULTI): ICD-10-CM

## 2023-11-15 DIAGNOSIS — E78.2 MIXED HYPERLIPIDEMIA: ICD-10-CM

## 2023-11-15 LAB
ALBUMIN SERPL BCP-MCNC: 3.8 G/DL (ref 3.4–5)
ALP SERPL-CCNC: 104 U/L (ref 33–136)
ALT SERPL W P-5'-P-CCNC: 11 U/L (ref 7–45)
ANION GAP SERPL CALC-SCNC: 12 MMOL/L (ref 10–20)
APPEARANCE UR: CLEAR
AST SERPL W P-5'-P-CCNC: 14 U/L (ref 9–39)
BACTERIA #/AREA URNS AUTO: ABNORMAL /HPF
BASOPHILS # BLD AUTO: 0.03 X10*3/UL (ref 0–0.1)
BASOPHILS NFR BLD AUTO: 0.5 %
BILIRUB SERPL-MCNC: 0.4 MG/DL (ref 0–1.2)
BILIRUB UR STRIP.AUTO-MCNC: NEGATIVE MG/DL
BUN SERPL-MCNC: 19 MG/DL (ref 6–23)
CALCIUM SERPL-MCNC: 8.9 MG/DL (ref 8.6–10.3)
CHLORIDE SERPL-SCNC: 106 MMOL/L (ref 98–107)
CHOLEST SERPL-MCNC: 127 MG/DL (ref 0–199)
CHOLESTEROL/HDL RATIO: 2.8
CO2 SERPL-SCNC: 25 MMOL/L (ref 21–32)
COLOR UR: NORMAL
CREAT SERPL-MCNC: 0.97 MG/DL (ref 0.5–1.05)
EOSINOPHIL # BLD AUTO: 0.14 X10*3/UL (ref 0–0.4)
EOSINOPHIL NFR BLD AUTO: 2.2 %
ERYTHROCYTE [DISTWIDTH] IN BLOOD BY AUTOMATED COUNT: 13.5 % (ref 11.5–14.5)
EST. AVERAGE GLUCOSE BLD GHB EST-MCNC: 148 MG/DL
GFR SERPL CREATININE-BSD FRML MDRD: 54 ML/MIN/1.73M*2
GLUCOSE SERPL-MCNC: 112 MG/DL (ref 74–99)
GLUCOSE UR STRIP.AUTO-MCNC: NEGATIVE MG/DL
HBA1C MFR BLD: 6.8 %
HCT VFR BLD AUTO: 34.3 % (ref 36–46)
HDLC SERPL-MCNC: 44.6 MG/DL
HGB BLD-MCNC: 10.7 G/DL (ref 12–16)
HOLD SPECIMEN: NORMAL
IMM GRANULOCYTES # BLD AUTO: 0.01 X10*3/UL (ref 0–0.5)
IMM GRANULOCYTES NFR BLD AUTO: 0.2 % (ref 0–0.9)
KETONES UR STRIP.AUTO-MCNC: NEGATIVE MG/DL
LDLC SERPL CALC-MCNC: 51 MG/DL
LEUKOCYTE ESTERASE UR QL STRIP.AUTO: NEGATIVE
LYMPHOCYTES # BLD AUTO: 2.59 X10*3/UL (ref 0.8–3)
LYMPHOCYTES NFR BLD AUTO: 40.5 %
MAGNESIUM SERPL-MCNC: 1.69 MG/DL (ref 1.6–2.4)
MCH RBC QN AUTO: 30.7 PG (ref 26–34)
MCHC RBC AUTO-ENTMCNC: 31.2 G/DL (ref 32–36)
MCV RBC AUTO: 98 FL (ref 80–100)
MONOCYTES # BLD AUTO: 0.68 X10*3/UL (ref 0.05–0.8)
MONOCYTES NFR BLD AUTO: 10.6 %
NEUTROPHILS # BLD AUTO: 2.95 X10*3/UL (ref 1.6–5.5)
NEUTROPHILS NFR BLD AUTO: 46 %
NITRITE UR QL STRIP.AUTO: NEGATIVE
NON HDL CHOLESTEROL: 82 MG/DL (ref 0–149)
NRBC BLD-RTO: 0 /100 WBCS (ref 0–0)
PH UR STRIP.AUTO: 5 [PH]
PLATELET # BLD AUTO: 219 X10*3/UL (ref 150–450)
POTASSIUM SERPL-SCNC: 3.7 MMOL/L (ref 3.5–5.3)
PROT SERPL-MCNC: 6.3 G/DL (ref 6.4–8.2)
PROT UR STRIP.AUTO-MCNC: NEGATIVE MG/DL
RBC # BLD AUTO: 3.49 X10*6/UL (ref 4–5.2)
RBC # UR STRIP.AUTO: NEGATIVE /UL
RBC #/AREA URNS AUTO: ABNORMAL /HPF
SODIUM SERPL-SCNC: 139 MMOL/L (ref 136–145)
SP GR UR STRIP.AUTO: 1.01
SQUAMOUS #/AREA URNS AUTO: ABNORMAL /HPF
T4 FREE SERPL-MCNC: 1.11 NG/DL (ref 0.61–1.12)
TRIGL SERPL-MCNC: 158 MG/DL (ref 0–149)
TSH SERPL-ACNC: 1.44 MIU/L (ref 0.44–3.98)
UROBILINOGEN UR STRIP.AUTO-MCNC: <2 MG/DL
VLDL: 32 MG/DL (ref 0–40)
WBC # BLD AUTO: 6.4 X10*3/UL (ref 4.4–11.3)
WBC #/AREA URNS AUTO: ABNORMAL /HPF

## 2023-11-15 PROCEDURE — 82607 VITAMIN B-12: CPT

## 2023-11-15 PROCEDURE — 82306 VITAMIN D 25 HYDROXY: CPT

## 2023-11-15 PROCEDURE — 36415 COLL VENOUS BLD VENIPUNCTURE: CPT

## 2023-11-15 PROCEDURE — 83036 HEMOGLOBIN GLYCOSYLATED A1C: CPT

## 2023-11-16 LAB
25(OH)D3 SERPL-MCNC: 34 NG/ML (ref 30–100)
VIT B12 SERPL-MCNC: 859 PG/ML (ref 211–911)

## 2023-11-21 ENCOUNTER — OFFICE VISIT (OUTPATIENT)
Dept: PRIMARY CARE | Facility: CLINIC | Age: 88
End: 2023-11-21
Payer: MEDICARE

## 2023-11-21 VITALS
DIASTOLIC BLOOD PRESSURE: 72 MMHG | SYSTOLIC BLOOD PRESSURE: 138 MMHG | WEIGHT: 161 LBS | OXYGEN SATURATION: 97 % | BODY MASS INDEX: 30.42 KG/M2 | HEART RATE: 73 BPM

## 2023-11-21 DIAGNOSIS — R05.9 COUGH, UNSPECIFIED TYPE: ICD-10-CM

## 2023-11-21 DIAGNOSIS — N30.00 ACUTE CYSTITIS WITHOUT HEMATURIA: ICD-10-CM

## 2023-11-21 DIAGNOSIS — I10 PRIMARY HYPERTENSION: ICD-10-CM

## 2023-11-21 DIAGNOSIS — E11.9 DIABETES MELLITUS WITHOUT COMPLICATION (MULTI): Primary | ICD-10-CM

## 2023-11-21 DIAGNOSIS — E03.9 HYPOTHYROIDISM, UNSPECIFIED TYPE: ICD-10-CM

## 2023-11-21 DIAGNOSIS — N39.0 RECURRENT URINARY TRACT INFECTION: ICD-10-CM

## 2023-11-21 DIAGNOSIS — R53.83 FATIGUE, UNSPECIFIED TYPE: ICD-10-CM

## 2023-11-21 DIAGNOSIS — E87.1 HYPONATREMIA: ICD-10-CM

## 2023-11-21 DIAGNOSIS — E78.2 MIXED HYPERLIPIDEMIA: ICD-10-CM

## 2023-11-21 PROBLEM — R73.03 PREDIABETES: Status: RESOLVED | Noted: 2023-08-31 | Resolved: 2023-11-21

## 2023-11-21 PROCEDURE — 99214 OFFICE O/P EST MOD 30 MIN: CPT | Performed by: FAMILY MEDICINE

## 2023-11-21 PROCEDURE — 1126F AMNT PAIN NOTED NONE PRSNT: CPT | Performed by: FAMILY MEDICINE

## 2023-11-21 PROCEDURE — 1159F MED LIST DOCD IN RCRD: CPT | Performed by: FAMILY MEDICINE

## 2023-11-21 PROCEDURE — 1160F RVW MEDS BY RX/DR IN RCRD: CPT | Performed by: FAMILY MEDICINE

## 2023-11-21 PROCEDURE — 3075F SYST BP GE 130 - 139MM HG: CPT | Performed by: FAMILY MEDICINE

## 2023-11-21 PROCEDURE — 1036F TOBACCO NON-USER: CPT | Performed by: FAMILY MEDICINE

## 2023-11-21 PROCEDURE — 3078F DIAST BP <80 MM HG: CPT | Performed by: FAMILY MEDICINE

## 2023-11-21 RX ORDER — NITROFURANTOIN 25; 75 MG/1; MG/1
100 CAPSULE ORAL 2 TIMES DAILY
Qty: 14 CAPSULE | Refills: 0 | Status: SHIPPED | OUTPATIENT
Start: 2023-11-21 | End: 2023-11-28

## 2023-11-21 RX ORDER — BENZONATATE 200 MG/1
CAPSULE ORAL
Qty: 30 CAPSULE | Refills: 0 | Status: SHIPPED | OUTPATIENT
Start: 2023-11-21 | End: 2024-01-18 | Stop reason: WASHOUT

## 2023-11-21 ASSESSMENT — ENCOUNTER SYMPTOMS
NERVOUS/ANXIOUS: 0
DIZZINESS: 0
SHORTNESS OF BREATH: 0
COUGH: 1
FEVER: 0
AGITATION: 0
ABDOMINAL DISTENTION: 0
PALPITATIONS: 0
CHILLS: 0
WEAKNESS: 0
CONFUSION: 0
FATIGUE: 1

## 2023-11-21 ASSESSMENT — PATIENT HEALTH QUESTIONNAIRE - PHQ9
1. LITTLE INTEREST OR PLEASURE IN DOING THINGS: NOT AT ALL
2. FEELING DOWN, DEPRESSED OR HOPELESS: NOT AT ALL
SUM OF ALL RESPONSES TO PHQ9 QUESTIONS 1 AND 2: 0

## 2023-11-21 ASSESSMENT — PAIN SCALES - GENERAL: PAINLEVEL: 0-NO PAIN

## 2023-11-21 NOTE — PROGRESS NOTES
Subjective   Patient ID: Fartun Carey is a 89 y.o. female who presents for Follow-up.    Urinary Tract Infection:          Dysuria burning, yes.          Frequency yes.          Hematuria none.          urgency yes.          back pain none.          fever none.          she is getting recurrent UTI - she did well on daily use of macrobid- agrees to start back on that.     Hypertension:          Patient here for F/U. at goal.          Med compliance yes.          Med side effects none.      Hyperlipidemia:          Patient here for F/U. tolerating medicine well, stable.          Labs at target goal.      Diabetes Mellitus:          Follow Up tolerating meds - improved control.          The frequency of exercise that the patient achieves is 4-5, times per week .          Dietary measures include portion control, carb control .          Hypoglycemic episodes are none recently.          The results of the last HbgA1c were above goal and worsening.      Hypothyroidism:          Follow Up taking hormone supplement routinely due for a tsh.          Hypothyroidism tolerating meds with no side effects.   Was admitted 10/9/23 - she had hyponatremia due to recurrent diarrhea - this has resolved - she followed up with GI - no areas of concerns.         Review of Systems   Constitutional:  Positive for fatigue. Negative for chills and fever.   HENT:  Negative for congestion and postnasal drip.    Respiratory:  Positive for cough. Negative for shortness of breath.    Cardiovascular:  Negative for chest pain and palpitations.   Gastrointestinal:  Negative for abdominal distention.   Skin:  Negative for rash.   Neurological:  Negative for dizziness and weakness.   Psychiatric/Behavioral:  Negative for agitation and confusion. The patient is not nervous/anxious.        Objective   /72   Pulse 73   Wt 73 kg (161 lb)   SpO2 97%   BMI 30.42 kg/m²     Physical Exam  Vitals reviewed.   Constitutional:       Appearance: Normal  appearance.   Cardiovascular:      Rate and Rhythm: Normal rate and regular rhythm.      Heart sounds: Normal heart sounds. No murmur heard.  Pulmonary:      Breath sounds: Normal breath sounds.   Abdominal:      General: Abdomen is flat. Bowel sounds are normal.      Palpations: Abdomen is soft.   Musculoskeletal:         General: No swelling.   Skin:     Findings: No rash.   Neurological:      General: No focal deficit present.      Mental Status: She is alert and oriented to person, place, and time.   Psychiatric:         Mood and Affect: Mood normal.         Behavior: Behavior normal.         Assessment/Plan   Problem List Items Addressed This Visit             ICD-10-CM       Cardiac and Vasculature    Hyperlipidemia E78.5    Relevant Orders    Comprehensive Metabolic Panel    Lipid Panel    Hypertension I10       Endocrine/Metabolic    Diabetes mellitus without complication (CMS/HCC) - Primary E11.9    Relevant Orders    Hemoglobin A1C    Hypothyroid E03.9    Hyponatremia E87.1     Other Visit Diagnoses         Codes    Acute cystitis without hematuria     N30.00    Relevant Medications    nitrofurantoin, macrocrystal-monohydrate, (Macrobid) 100 mg capsule    Cough, unspecified type     R05.9    Relevant Medications    benzonatate (Tessalon) 200 mg capsule    Other Relevant Orders    XR chest 2 views    Fatigue, unspecified type     R53.83    Relevant Orders    CBC and Auto Differential    Recurrent urinary tract infection     N39.0    Relevant Orders    Urinalysis with Reflex Microscopic and Culture

## 2023-11-22 ENCOUNTER — ANCILLARY PROCEDURE (OUTPATIENT)
Dept: RADIOLOGY | Facility: CLINIC | Age: 88
End: 2023-11-22
Payer: MEDICARE

## 2023-11-22 DIAGNOSIS — R05.9 COUGH, UNSPECIFIED TYPE: ICD-10-CM

## 2023-11-22 PROCEDURE — 71046 X-RAY EXAM CHEST 2 VIEWS: CPT

## 2023-11-24 ENCOUNTER — HOSPITAL ENCOUNTER (OUTPATIENT)
Dept: CARDIOLOGY | Facility: HOSPITAL | Age: 88
Discharge: HOME | End: 2023-11-24
Payer: MEDICARE

## 2023-11-24 DIAGNOSIS — R01.1 CARDIAC MURMUR, UNSPECIFIED: ICD-10-CM

## 2023-11-24 PROCEDURE — 93306 TTE W/DOPPLER COMPLETE: CPT

## 2023-11-25 LAB
AORTIC VALVE MEAN GRADIENT: 11
AORTIC VALVE PEAK VELOCITY: 2.28
AV PEAK GRADIENT: 20.8
AVA (PEAK VEL): 1.68
AVA (VTI): 1.6
EJECTION FRACTION APICAL 4 CHAMBER: 63.3
LEFT VENTRICLE INTERNAL DIMENSION DIASTOLE: 3.73 (ref 3.5–6)
LEFT VENTRICULAR OUTFLOW TRACT DIAMETER: 1.9
MITRAL VALVE E/A RATIO: 0.92
MITRAL VALVE E/E' RATIO: 13.6
RIGHT VENTRICLE FREE WALL PEAK S': 14.4
RIGHT VENTRICLE PEAK SYSTOLIC PRESSURE: 44.7
TRICUSPID ANNULAR PLANE SYSTOLIC EXCURSION: 2

## 2023-12-06 ENCOUNTER — TELEPHONE (OUTPATIENT)
Dept: PRIMARY CARE | Facility: CLINIC | Age: 88
End: 2023-12-06
Payer: MEDICARE

## 2023-12-06 NOTE — TELEPHONE ENCOUNTER
Patient was up every hour last night with urinary frequency. She said you had given her a prescription for Nitrofurantoin. She is wondering if she should take this?

## 2023-12-28 DIAGNOSIS — E11.9 TYPE 2 DIABETES MELLITUS WITHOUT COMPLICATION, WITHOUT LONG-TERM CURRENT USE OF INSULIN (MULTI): Primary | ICD-10-CM

## 2023-12-28 RX ORDER — BLOOD SUGAR DIAGNOSTIC
STRIP MISCELLANEOUS
Qty: 100 STRIP | Refills: 3 | Status: SHIPPED | OUTPATIENT
Start: 2023-12-28

## 2024-01-10 ENCOUNTER — PROCEDURE VISIT (OUTPATIENT)
Dept: PODIATRY | Facility: CLINIC | Age: 89
End: 2024-01-10
Payer: MEDICARE

## 2024-01-10 DIAGNOSIS — E11.9 COMPREHENSIVE DIABETIC FOOT EXAMINATION, TYPE 2 DM, ENCOUNTER FOR (MULTI): ICD-10-CM

## 2024-01-10 DIAGNOSIS — E11.9 DIABETES MELLITUS WITHOUT COMPLICATION (MULTI): Primary | ICD-10-CM

## 2024-01-10 DIAGNOSIS — M79.672 PAIN IN BOTH FEET: ICD-10-CM

## 2024-01-10 DIAGNOSIS — M79.671 PAIN IN BOTH FEET: ICD-10-CM

## 2024-01-10 DIAGNOSIS — E11.8 COMPLICATION OF DIABETES MELLITUS (MULTI): ICD-10-CM

## 2024-01-10 PROCEDURE — 99212 OFFICE O/P EST SF 10 MIN: CPT | Performed by: PODIATRIST

## 2024-01-10 NOTE — PROGRESS NOTES
History of Present Illness:   Patient states they are here for Dm exam  Denies NTB to feet  Most recent A1C is 6.8  Unable to safely trim nails on own      Past Medical History  Past Medical History:   Diagnosis Date    Diabetes mellitus (CMS/[facility])     Disease of thyroid gland     Hypertension        Medications and Allergies have been reviewed.    Review Of Systems:  GENERAL: No weight loss, malaise or fevers.  HEENT: Negative for frequent or significant headaches,   RESPIRATORY: Negative for cough, wheezing or shortness of breath.  CARDIOVASCULAR: Negative for chest pain, leg swelling or palpitations.    Physical Exam:  Vascular exam: Dorsalis pedis and Posterior Tibial pulses palpable 2/4 bilateral. Capillary refill time less than 3 seconds b/l. No Hair growth noted to digits. No edema noted. Skin temp warm to warm from proximal to distal b/l. +varicosities noted.     Neurologic exam: Vibratory, protective and proprioception intact b/l. No neurologic deficits noted.      Musculoskeletal exam: Muscle strength 5/5 for all major muscle groups tested in the lower extremity b/l. No gross deformities noted b/l.      Dermatologic exam: Nails 1-5 b/l are thickened, elongated and discolored. Webspaces 1-4 b/l are clean, dry and intact. No primary or secondary skin lesions noted. No open lesions or wounds noted at this time .      1. Diabetes mellitus without complication (CMS/[facility])        2. Complication of diabetes mellitus (CMS/[facility])        3. Pain in both feet        4. Comprehensive diabetic foot examination, type 2 DM, encounter for (CMS/[facility])            Patient examined and evaluated.   Patient educated on proper diabetic foot care and education dispensed.  Nails 1-5 b/l were debrided in thickness and length with nail cutting forceps.  A1C revd, low pedal risk noted at this time  Having higher than normal BP - is following with PCP and cardio regarding this.   Patient to follow up in 6 mos or sooner if any problems  arise.   Patient was in agreement to this plan. All questions answered.       Gabriela Zamora DPM  [phone]  [facility]  Fax: [phone]

## 2024-01-11 ENCOUNTER — TELEPHONE (OUTPATIENT)
Dept: PRIMARY CARE | Facility: CLINIC | Age: 89
End: 2024-01-11
Payer: MEDICARE

## 2024-01-11 DIAGNOSIS — R35.0 URINARY FREQUENCY: ICD-10-CM

## 2024-01-11 NOTE — TELEPHONE ENCOUNTER
Patient said she is have urinary frequency and it is keeping her up a lot at night. She would like to check her urine for a UTI. Order placed please sign.    She said that Dr. Torres has her on hydrochlorothiazide daily. She is wondering if she can take this medication every other day so she is not urinating as much? Dr. Torres is [place] out [place] and she is unable to ask him about this.

## 2024-01-12 ENCOUNTER — LAB (OUTPATIENT)
Dept: LAB | Facility: LAB | Age: 89
End: 2024-01-12
Payer: MEDICARE

## 2024-01-12 DIAGNOSIS — R35.0 URINARY FREQUENCY: ICD-10-CM

## 2024-01-12 DIAGNOSIS — N30.00 ACUTE CYSTITIS WITHOUT HEMATURIA: Primary | ICD-10-CM

## 2024-01-12 LAB
APPEARANCE UR: ABNORMAL
BILIRUB UR STRIP.AUTO-MCNC: NEGATIVE MG/DL
COLOR UR: YELLOW
GLUCOSE UR STRIP.AUTO-MCNC: NEGATIVE MG/DL
KETONES UR STRIP.AUTO-MCNC: NEGATIVE MG/DL
LEUKOCYTE ESTERASE UR QL STRIP.AUTO: ABNORMAL
NITRITE UR QL STRIP.AUTO: NEGATIVE
PH UR STRIP.AUTO: 5 [PH]
PROT UR STRIP.AUTO-MCNC: ABNORMAL MG/DL
RBC # UR STRIP.AUTO: ABNORMAL /UL
RBC #/AREA URNS AUTO: >20 /HPF
SP GR UR STRIP.AUTO: 1.01
UROBILINOGEN UR STRIP.AUTO-MCNC: <2 MG/DL
WBC #/AREA URNS AUTO: >50 /HPF

## 2024-01-12 PROCEDURE — 87186 SC STD MICRODIL/AGAR DIL: CPT

## 2024-01-12 PROCEDURE — 87086 URINE CULTURE/COLONY COUNT: CPT

## 2024-01-12 RX ORDER — NITROFURANTOIN 25; 75 MG/1; MG/1
100 CAPSULE ORAL 2 TIMES DAILY
Qty: 20 CAPSULE | Refills: 0 | Status: SHIPPED | OUTPATIENT
Start: 2024-01-12 | End: 2024-01-18 | Stop reason: WASHOUT

## 2024-01-13 LAB — HOLD SPECIMEN: NORMAL

## 2024-01-15 ENCOUNTER — TELEPHONE (OUTPATIENT)
Dept: PRIMARY CARE | Facility: CLINIC | Age: 89
End: 2024-01-15
Payer: MEDICARE

## 2024-01-15 DIAGNOSIS — N30.00 ACUTE CYSTITIS WITHOUT HEMATURIA: Primary | ICD-10-CM

## 2024-01-15 LAB — BACTERIA UR CULT: ABNORMAL

## 2024-01-15 RX ORDER — SULFAMETHOXAZOLE AND TRIMETHOPRIM 400; 80 MG/1; MG/1
1 TABLET ORAL 2 TIMES DAILY
Qty: 14 TABLET | Refills: 0 | Status: SHIPPED | OUTPATIENT
Start: 2024-01-15 | End: 2024-01-22

## 2024-01-15 RX ORDER — HYDROCHLOROTHIAZIDE 25 MG/1
25 TABLET ORAL DAILY
COMMUNITY
Start: 2023-12-27 | End: 2024-02-20 | Stop reason: WASHOUT

## 2024-01-15 NOTE — TELEPHONE ENCOUNTER
Patient called and stated the hydrochlorothiazide is making her balance worse. She is also feeling dizzy and having trouble with her vision. She stated Dr Torres gave it to her but he is out of the country right now. She would like to know if she should stop the medication or is there another one she can take instead.

## 2024-01-15 NOTE — TELEPHONE ENCOUNTER
Recommend she stop the hydrochlorothiazide now. How high has her BP been? Recommend she stop the new medication and follow up Thursday in person to see if we need to add a new medication

## 2024-01-15 NOTE — TELEPHONE ENCOUNTER
Patient notified. In person appointment scheduled for Thursday. BP today was 106/53 pulse 72. BP on 1/11 was 143/67 pulse 59.

## 2024-01-16 ENCOUNTER — TELEPHONE (OUTPATIENT)
Dept: PRIMARY CARE | Facility: CLINIC | Age: 89
End: 2024-01-16
Payer: MEDICARE

## 2024-01-16 NOTE — TELEPHONE ENCOUNTER
----- Message from Jose Alejandro Hamilton MD sent at 1/15/2024  2:57 PM EST -----    ----- Message -----  From: Lab, Background User  Sent: 1/12/2024   3:05 PM EST  To: Jose Alejandro Hamilton MD

## 2024-01-18 ENCOUNTER — OFFICE VISIT (OUTPATIENT)
Dept: PRIMARY CARE | Facility: CLINIC | Age: 89
End: 2024-01-18
Payer: MEDICARE

## 2024-01-18 VITALS
DIASTOLIC BLOOD PRESSURE: 60 MMHG | SYSTOLIC BLOOD PRESSURE: 150 MMHG | OXYGEN SATURATION: 97 % | HEART RATE: 57 BPM | HEIGHT: 61 IN | WEIGHT: 161 LBS | BODY MASS INDEX: 30.4 KG/M2

## 2024-01-18 DIAGNOSIS — E03.9 ACQUIRED HYPOTHYROIDISM: ICD-10-CM

## 2024-01-18 DIAGNOSIS — I10 PRIMARY HYPERTENSION: Primary | ICD-10-CM

## 2024-01-18 DIAGNOSIS — E11.9 TYPE 2 DIABETES MELLITUS WITHOUT COMPLICATION, WITHOUT LONG-TERM CURRENT USE OF INSULIN (MULTI): ICD-10-CM

## 2024-01-18 DIAGNOSIS — R53.83 FATIGUE, UNSPECIFIED TYPE: ICD-10-CM

## 2024-01-18 DIAGNOSIS — R79.0 LOW MAGNESIUM LEVEL: ICD-10-CM

## 2024-01-18 DIAGNOSIS — N30.00 ACUTE CYSTITIS WITHOUT HEMATURIA: ICD-10-CM

## 2024-01-18 DIAGNOSIS — E78.2 MIXED HYPERLIPIDEMIA: ICD-10-CM

## 2024-01-18 PROCEDURE — 99214 OFFICE O/P EST MOD 30 MIN: CPT | Performed by: FAMILY MEDICINE

## 2024-01-18 PROCEDURE — 3077F SYST BP >= 140 MM HG: CPT | Performed by: FAMILY MEDICINE

## 2024-01-18 PROCEDURE — 1126F AMNT PAIN NOTED NONE PRSNT: CPT | Performed by: FAMILY MEDICINE

## 2024-01-18 PROCEDURE — 1160F RVW MEDS BY RX/DR IN RCRD: CPT | Performed by: FAMILY MEDICINE

## 2024-01-18 PROCEDURE — 1036F TOBACCO NON-USER: CPT | Performed by: FAMILY MEDICINE

## 2024-01-18 PROCEDURE — 3078F DIAST BP <80 MM HG: CPT | Performed by: FAMILY MEDICINE

## 2024-01-18 PROCEDURE — 1159F MED LIST DOCD IN RCRD: CPT | Performed by: FAMILY MEDICINE

## 2024-01-18 RX ORDER — LOSARTAN POTASSIUM 100 MG/1
100 TABLET ORAL DAILY
COMMUNITY

## 2024-01-18 ASSESSMENT — PAIN SCALES - GENERAL: PAINLEVEL: 0-NO PAIN

## 2024-01-18 NOTE — PROGRESS NOTES
Subjective   Patient ID: Fartun Carey is a 89 y.o. female who presents for No chief complaint on file..     Urinary Tract Infection:          Dysuria burning, yes.          Frequency yes.          Hematuria none.          urgency yes.          back pain none.          fever none.          she is getting recurrent UTI - recent culture showed resistant bacteria and some of the antibiotics that she is allergic to, so she ended up starting on low dose bactrim, she seems to be tolerating this so far and agrees to get labs tomorrow.     Hypertension:          Patient here for F/U. She has been having increased BP recently and Dr Torres added hydrochlorothiazide - she is having side effects to that - very tired         Med compliance yes.          Med side effects none.      Hyperlipidemia:          Patient here for F/U. tolerating medicine well, stable.          Labs at target goal.      Diabetes Mellitus:          Follow Up tolerating meds - improved control.          The frequency of exercise that the patient achieves is 4-5, times per week .          Dietary measures include portion control, carb control .          Hypoglycemic episodes are none recently.          The results of the last HbgA1c were above goal and worsening.      Hypothyroidism:          Follow Up taking hormone supplement routinely due for a tsh.          Hypothyroidism tolerating meds with no side effects.          Review of Systems   Constitutional:  Positive for fatigue. Negative for chills and fever.   HENT:  Negative for ear pain and tinnitus.    Respiratory:  Negative for cough, shortness of breath and wheezing.    Cardiovascular:  Negative for chest pain, palpitations and leg swelling.   Gastrointestinal:  Negative for constipation, nausea and vomiting.   Endocrine: Negative for cold intolerance.   Genitourinary:  Positive for frequency. Negative for hematuria.   Musculoskeletal:  Positive for arthralgias. Negative for myalgias.  "  Neurological:  Negative for dizziness, tremors and weakness.       Objective   /60   Pulse 57   Ht 1.549 m (5' 1\")   Wt 73 kg (161 lb)   SpO2 97%   BMI 30.42 kg/m²     Physical Exam  Vitals reviewed.   Constitutional:       Appearance: Normal appearance.   Cardiovascular:      Rate and Rhythm: Normal rate and regular rhythm.      Heart sounds: Normal heart sounds. No murmur heard.  Pulmonary:      Breath sounds: Normal breath sounds.   Abdominal:      General: Abdomen is flat. Bowel sounds are normal.      Palpations: Abdomen is soft.   Musculoskeletal:         General: No swelling.   Skin:     Findings: No rash.   Neurological:      General: No focal deficit present.      Mental Status: She is alert and oriented to person, place, and time.   Psychiatric:         Mood and Affect: Mood normal.         Behavior: Behavior normal.         Assessment/Plan   Problem List Items Addressed This Visit             ICD-10-CM    Hyperlipidemia E78.5    Relevant Orders    Comprehensive Metabolic Panel    Hypothyroid E03.9    Hypertension - Primary I10     Other Visit Diagnoses         Codes    Acute cystitis without hematuria     N30.00    on low dose bactrim with caution - encourage fluids     Relevant Orders    Urinalysis with Reflex Culture and Microscopic    Type 2 diabetes mellitus without complication, without long-term current use of insulin (CMS/[facility])     E11.9    Fatigue, unspecified type     R53.83    Relevant Orders    CBC and Auto Differential    Low magnesium level     R79.0    Relevant Orders    Magnesium               "

## 2024-01-19 ASSESSMENT — ENCOUNTER SYMPTOMS
PALPITATIONS: 0
ARTHRALGIAS: 1
TREMORS: 0
COUGH: 0
FREQUENCY: 1
WHEEZING: 0
SHORTNESS OF BREATH: 0
MYALGIAS: 0
NAUSEA: 0
CONSTIPATION: 0
DIZZINESS: 0
HEMATURIA: 0
WEAKNESS: 0
VOMITING: 0
FATIGUE: 1
FEVER: 0
CHILLS: 0

## 2024-01-22 ENCOUNTER — LAB (OUTPATIENT)
Dept: LAB | Facility: LAB | Age: 89
End: 2024-01-22
Payer: MEDICARE

## 2024-01-22 DIAGNOSIS — N18.32 STAGE 3B CHRONIC KIDNEY DISEASE (MULTI): Primary | ICD-10-CM

## 2024-01-22 DIAGNOSIS — R53.83 FATIGUE, UNSPECIFIED TYPE: ICD-10-CM

## 2024-01-22 DIAGNOSIS — N17.9 ACUTE KIDNEY FAILURE, UNSPECIFIED (CMS-HCC): Primary | ICD-10-CM

## 2024-01-22 DIAGNOSIS — N30.00 ACUTE CYSTITIS WITHOUT HEMATURIA: ICD-10-CM

## 2024-01-22 DIAGNOSIS — E78.2 MIXED HYPERLIPIDEMIA: ICD-10-CM

## 2024-01-22 DIAGNOSIS — R79.0 LOW MAGNESIUM LEVEL: ICD-10-CM

## 2024-01-22 LAB
ALBUMIN SERPL BCP-MCNC: 3.8 G/DL (ref 3.4–5)
ALP SERPL-CCNC: 124 U/L (ref 33–136)
ALT SERPL W P-5'-P-CCNC: 18 U/L (ref 7–45)
ANION GAP SERPL CALC-SCNC: 13 MMOL/L (ref 10–20)
APPEARANCE UR: ABNORMAL
AST SERPL W P-5'-P-CCNC: 18 U/L (ref 9–39)
BASOPHILS # BLD AUTO: 0.03 X10*3/UL (ref 0–0.1)
BASOPHILS NFR BLD AUTO: 0.4 %
BILIRUB SERPL-MCNC: 0.3 MG/DL (ref 0–1.2)
BILIRUB UR STRIP.AUTO-MCNC: NEGATIVE MG/DL
BUN SERPL-MCNC: 25 MG/DL (ref 6–23)
CALCIUM SERPL-MCNC: 9.3 MG/DL (ref 8.6–10.3)
CHLORIDE SERPL-SCNC: 106 MMOL/L (ref 98–107)
CO2 SERPL-SCNC: 25 MMOL/L (ref 21–32)
COLOR UR: ABNORMAL
CREAT SERPL-MCNC: 1.44 MG/DL (ref 0.5–1.05)
EGFRCR SERPLBLD CKD-EPI 2021: 34 ML/MIN/1.73M*2
EOSINOPHIL # BLD AUTO: 0.21 X10*3/UL (ref 0–0.4)
EOSINOPHIL NFR BLD AUTO: 3 %
ERYTHROCYTE [DISTWIDTH] IN BLOOD BY AUTOMATED COUNT: 13.5 % (ref 11.5–14.5)
GLUCOSE SERPL-MCNC: 132 MG/DL (ref 74–99)
GLUCOSE UR STRIP.AUTO-MCNC: NEGATIVE MG/DL
HCT VFR BLD AUTO: 37.1 % (ref 36–46)
HGB BLD-MCNC: 11.3 G/DL (ref 12–16)
HOLD SPECIMEN: NORMAL
IMM GRANULOCYTES # BLD AUTO: 0.02 X10*3/UL (ref 0–0.5)
IMM GRANULOCYTES NFR BLD AUTO: 0.3 % (ref 0–0.9)
KETONES UR STRIP.AUTO-MCNC: NEGATIVE MG/DL
LEUKOCYTE ESTERASE UR QL STRIP.AUTO: ABNORMAL
LYMPHOCYTES # BLD AUTO: 2.75 X10*3/UL (ref 0.8–3)
LYMPHOCYTES NFR BLD AUTO: 39 %
MAGNESIUM SERPL-MCNC: 1.64 MG/DL (ref 1.6–2.4)
MCH RBC QN AUTO: 29.3 PG (ref 26–34)
MCHC RBC AUTO-ENTMCNC: 30.5 G/DL (ref 32–36)
MCV RBC AUTO: 96 FL (ref 80–100)
MONOCYTES # BLD AUTO: 0.81 X10*3/UL (ref 0.05–0.8)
MONOCYTES NFR BLD AUTO: 11.5 %
NEUTROPHILS # BLD AUTO: 3.24 X10*3/UL (ref 1.6–5.5)
NEUTROPHILS NFR BLD AUTO: 45.8 %
NITRITE UR QL STRIP.AUTO: NEGATIVE
NRBC BLD-RTO: 0 /100 WBCS (ref 0–0)
PH UR STRIP.AUTO: 5 [PH]
PHOSPHATE SERPL-MCNC: 4.3 MG/DL (ref 2.5–4.9)
PLATELET # BLD AUTO: 189 X10*3/UL (ref 150–450)
POTASSIUM SERPL-SCNC: 4.4 MMOL/L (ref 3.5–5.3)
PROT SERPL-MCNC: 6.4 G/DL (ref 6.4–8.2)
PROT UR STRIP.AUTO-MCNC: NEGATIVE MG/DL
PTH-INTACT SERPL-MCNC: 38.7 PG/ML (ref 18.5–88)
RBC # BLD AUTO: 3.86 X10*6/UL (ref 4–5.2)
RBC # UR STRIP.AUTO: NEGATIVE /UL
RBC #/AREA URNS AUTO: ABNORMAL /HPF
SODIUM SERPL-SCNC: 140 MMOL/L (ref 136–145)
SP GR UR STRIP.AUTO: 1.01
UROBILINOGEN UR STRIP.AUTO-MCNC: <2 MG/DL
WBC # BLD AUTO: 7.1 X10*3/UL (ref 4.4–11.3)
WBC #/AREA URNS AUTO: >50 /HPF
WBC CLUMPS #/AREA URNS AUTO: ABNORMAL /HPF

## 2024-01-22 PROCEDURE — 36415 COLL VENOUS BLD VENIPUNCTURE: CPT

## 2024-01-22 PROCEDURE — 87086 URINE CULTURE/COLONY COUNT: CPT

## 2024-01-22 PROCEDURE — 83970 ASSAY OF PARATHORMONE: CPT

## 2024-01-23 ENCOUNTER — TELEPHONE (OUTPATIENT)
Dept: PRIMARY CARE | Facility: CLINIC | Age: 89
End: 2024-01-23
Payer: MEDICARE

## 2024-01-23 LAB — BACTERIA UR CULT: NORMAL

## 2024-01-23 NOTE — TELEPHONE ENCOUNTER
----- Message from Jose Alejandro Hamilton MD sent at 1/22/2024  4:39 PM EST -----    ----- Message -----  From: Lab, Background User  Sent: 1/22/2024  11:49 AM EST  To: Jose Alejandro Hamilton MD

## 2024-01-31 ENCOUNTER — LAB (OUTPATIENT)
Dept: LAB | Facility: LAB | Age: 89
End: 2024-01-31
Payer: MEDICARE

## 2024-01-31 DIAGNOSIS — N18.32 STAGE 3B CHRONIC KIDNEY DISEASE (MULTI): ICD-10-CM

## 2024-01-31 DIAGNOSIS — N17.9 ACUTE KIDNEY FAILURE, UNSPECIFIED (CMS-HCC): Primary | ICD-10-CM

## 2024-01-31 LAB
ALBUMIN SERPL BCP-MCNC: 3.5 G/DL (ref 3.4–5)
ANION GAP SERPL CALC-SCNC: 10 MMOL/L (ref 10–20)
APPEARANCE UR: ABNORMAL
BACTERIA #/AREA URNS AUTO: ABNORMAL /HPF
BASOPHILS # BLD AUTO: 0.03 X10*3/UL (ref 0–0.1)
BASOPHILS NFR BLD AUTO: 0.4 %
BILIRUB UR STRIP.AUTO-MCNC: NEGATIVE MG/DL
BUN SERPL-MCNC: 29 MG/DL (ref 6–23)
CALCIUM SERPL-MCNC: 9 MG/DL (ref 8.6–10.3)
CHLORIDE SERPL-SCNC: 105 MMOL/L (ref 98–107)
CO2 SERPL-SCNC: 28 MMOL/L (ref 21–32)
COLOR UR: YELLOW
CREAT SERPL-MCNC: 1.21 MG/DL (ref 0.5–1.05)
EGFRCR SERPLBLD CKD-EPI 2021: 41 ML/MIN/1.73M*2
EOSINOPHIL # BLD AUTO: 0.12 X10*3/UL (ref 0–0.4)
EOSINOPHIL NFR BLD AUTO: 1.5 %
ERYTHROCYTE [DISTWIDTH] IN BLOOD BY AUTOMATED COUNT: 13.2 % (ref 11.5–14.5)
GLUCOSE SERPL-MCNC: 109 MG/DL (ref 74–99)
GLUCOSE UR STRIP.AUTO-MCNC: NEGATIVE MG/DL
HCT VFR BLD AUTO: 31.4 % (ref 36–46)
HGB BLD-MCNC: 9.9 G/DL (ref 12–16)
HOLD SPECIMEN: NORMAL
IMM GRANULOCYTES # BLD AUTO: 0.02 X10*3/UL (ref 0–0.5)
IMM GRANULOCYTES NFR BLD AUTO: 0.2 % (ref 0–0.9)
KETONES UR STRIP.AUTO-MCNC: NEGATIVE MG/DL
LEUKOCYTE ESTERASE UR QL STRIP.AUTO: ABNORMAL
LYMPHOCYTES # BLD AUTO: 2.51 X10*3/UL (ref 0.8–3)
LYMPHOCYTES NFR BLD AUTO: 30.8 %
MCH RBC QN AUTO: 29.9 PG (ref 26–34)
MCHC RBC AUTO-ENTMCNC: 31.5 G/DL (ref 32–36)
MCV RBC AUTO: 95 FL (ref 80–100)
MONOCYTES # BLD AUTO: 0.73 X10*3/UL (ref 0.05–0.8)
MONOCYTES NFR BLD AUTO: 9 %
NEUTROPHILS # BLD AUTO: 4.73 X10*3/UL (ref 1.6–5.5)
NEUTROPHILS NFR BLD AUTO: 58.1 %
NITRITE UR QL STRIP.AUTO: NEGATIVE
NRBC BLD-RTO: 0 /100 WBCS (ref 0–0)
PH UR STRIP.AUTO: 5 [PH]
PHOSPHATE SERPL-MCNC: 3.5 MG/DL (ref 2.5–4.9)
PLATELET # BLD AUTO: 145 X10*3/UL (ref 150–450)
POTASSIUM SERPL-SCNC: 4.1 MMOL/L (ref 3.5–5.3)
PROT UR STRIP.AUTO-MCNC: ABNORMAL MG/DL
RBC # BLD AUTO: 3.31 X10*6/UL (ref 4–5.2)
RBC # UR STRIP.AUTO: NEGATIVE /UL
RBC #/AREA URNS AUTO: ABNORMAL /HPF
SODIUM SERPL-SCNC: 139 MMOL/L (ref 136–145)
SP GR UR STRIP.AUTO: 1.01
SQUAMOUS #/AREA URNS AUTO: ABNORMAL /HPF
UROBILINOGEN UR STRIP.AUTO-MCNC: <2 MG/DL
WBC # BLD AUTO: 8.1 X10*3/UL (ref 4.4–11.3)
WBC #/AREA URNS AUTO: >50 /HPF
WBC CLUMPS #/AREA URNS AUTO: ABNORMAL /HPF

## 2024-01-31 PROCEDURE — 36415 COLL VENOUS BLD VENIPUNCTURE: CPT

## 2024-02-01 ENCOUNTER — TELEPHONE (OUTPATIENT)
Dept: PRIMARY CARE | Facility: CLINIC | Age: 89
End: 2024-02-01
Payer: MEDICARE

## 2024-02-01 NOTE — TELEPHONE ENCOUNTER
----- Message from Jose Alejandro Hamilton MD sent at 1/31/2024  6:22 PM EST -----    ----- Message -----  From: Lab, Background User  Sent: 1/31/2024  11:20 AM EST  To: Jose Alejandro Hamilton MD

## 2024-02-03 LAB — BACTERIA UR CULT: ABNORMAL

## 2024-02-05 DIAGNOSIS — E03.9 HYPOTHYROIDISM, UNSPECIFIED TYPE: ICD-10-CM

## 2024-02-05 DIAGNOSIS — E11.9 DIABETES MELLITUS WITHOUT COMPLICATION (MULTI): ICD-10-CM

## 2024-02-05 RX ORDER — LEVOTHYROXINE SODIUM 88 UG/1
88 TABLET ORAL DAILY
Qty: 90 TABLET | Refills: 1 | Status: SHIPPED | OUTPATIENT
Start: 2024-02-05 | End: 2024-08-03

## 2024-02-09 ENCOUNTER — TELEPHONE (OUTPATIENT)
Dept: PRIMARY CARE | Facility: CLINIC | Age: 89
End: 2024-02-09
Payer: MEDICARE

## 2024-02-09 DIAGNOSIS — E11.9 TYPE 2 DIABETES MELLITUS WITHOUT COMPLICATION, WITHOUT LONG-TERM CURRENT USE OF INSULIN (MULTI): Primary | ICD-10-CM

## 2024-02-09 RX ORDER — DAPAGLIFLOZIN 5 MG/1
5 TABLET, FILM COATED ORAL DAILY
Qty: 30 TABLET | Refills: 5 | Status: SHIPPED | OUTPATIENT
Start: 2024-02-09 | End: 2024-04-08 | Stop reason: WASHOUT

## 2024-02-09 NOTE — TELEPHONE ENCOUNTER
She is allergic to the 2 diabetic medications that are not expensive - the glimiperide and metformin. I can try sending a script for Farxiga to see if this is covered better by her insurance - this medication also helps protect her kidneys

## 2024-02-09 NOTE — TELEPHONE ENCOUNTER
Patient went to  Januvia and the price was $356 she was paying $75. She has 4 pills remaining she will like to know is there is something else she can be prescribed.

## 2024-02-12 ENCOUNTER — HOSPITAL ENCOUNTER (EMERGENCY)
Facility: HOSPITAL | Age: 89
Discharge: HOME | End: 2024-02-12
Payer: MEDICARE

## 2024-02-12 VITALS
OXYGEN SATURATION: 95 % | SYSTOLIC BLOOD PRESSURE: 141 MMHG | BODY MASS INDEX: 27.49 KG/M2 | RESPIRATION RATE: 16 BRPM | DIASTOLIC BLOOD PRESSURE: 59 MMHG | HEIGHT: 64 IN | TEMPERATURE: 97.7 F | HEART RATE: 95 BPM | WEIGHT: 161 LBS

## 2024-02-12 DIAGNOSIS — N30.00 ACUTE CYSTITIS WITHOUT HEMATURIA: ICD-10-CM

## 2024-02-12 DIAGNOSIS — R04.0 EPISTAXIS: Primary | ICD-10-CM

## 2024-02-12 LAB
APPEARANCE UR: ABNORMAL
BACTERIA #/AREA URNS AUTO: ABNORMAL /HPF
BILIRUB UR STRIP.AUTO-MCNC: NEGATIVE MG/DL
COLOR UR: ABNORMAL
GLUCOSE UR STRIP.AUTO-MCNC: NORMAL MG/DL
KETONES UR STRIP.AUTO-MCNC: NEGATIVE MG/DL
LEUKOCYTE ESTERASE UR QL STRIP.AUTO: ABNORMAL
MUCOUS THREADS #/AREA URNS AUTO: ABNORMAL /LPF
NITRITE UR QL STRIP.AUTO: NEGATIVE
PH UR STRIP.AUTO: 6 [PH]
PROT UR STRIP.AUTO-MCNC: ABNORMAL MG/DL
RBC # UR STRIP.AUTO: ABNORMAL /UL
RBC #/AREA URNS AUTO: ABNORMAL /HPF
SP GR UR STRIP.AUTO: 1.01
SQUAMOUS #/AREA URNS AUTO: ABNORMAL /HPF
UROBILINOGEN UR STRIP.AUTO-MCNC: NORMAL MG/DL
WBC #/AREA URNS AUTO: >50 /HPF
WBC CLUMPS #/AREA URNS AUTO: ABNORMAL /HPF

## 2024-02-12 PROCEDURE — 99284 EMERGENCY DEPT VISIT MOD MDM: CPT

## 2024-02-12 PROCEDURE — 81003 URINALYSIS AUTO W/O SCOPE: CPT

## 2024-02-12 PROCEDURE — 99283 EMERGENCY DEPT VISIT LOW MDM: CPT

## 2024-02-12 PROCEDURE — 2500000001 HC RX 250 WO HCPCS SELF ADMINISTERED DRUGS (ALT 637 FOR MEDICARE OP)

## 2024-02-12 PROCEDURE — 87086 URINE CULTURE/COLONY COUNT: CPT | Mod: TRILAB,WESLAB

## 2024-02-12 RX ORDER — LEVOFLOXACIN 250 MG/1
250 TABLET ORAL DAILY
Qty: 5 TABLET | Refills: 0 | Status: SHIPPED | OUTPATIENT
Start: 2024-02-12 | End: 2024-02-17

## 2024-02-12 RX ORDER — OXYMETAZOLINE HCL 0.05 %
2 SPRAY, NON-AEROSOL (ML) NASAL ONCE
Status: COMPLETED | OUTPATIENT
Start: 2024-02-12 | End: 2024-02-12

## 2024-02-12 RX ADMIN — OXYMETAZOLINE HYDROCHLORIDE 2 SPRAY: 0.05 SPRAY NASAL at 15:17

## 2024-02-12 ASSESSMENT — COLUMBIA-SUICIDE SEVERITY RATING SCALE - C-SSRS
1. IN THE PAST MONTH, HAVE YOU WISHED YOU WERE DEAD OR WISHED YOU COULD GO TO SLEEP AND NOT WAKE UP?: NO
6. HAVE YOU EVER DONE ANYTHING, STARTED TO DO ANYTHING, OR PREPARED TO DO ANYTHING TO END YOUR LIFE?: NO
2. HAVE YOU ACTUALLY HAD ANY THOUGHTS OF KILLING YOURSELF?: NO

## 2024-02-12 ASSESSMENT — PAIN - FUNCTIONAL ASSESSMENT: PAIN_FUNCTIONAL_ASSESSMENT: 0-10

## 2024-02-12 ASSESSMENT — PAIN SCALES - GENERAL: PAINLEVEL_OUTOF10: 0 - NO PAIN

## 2024-02-12 NOTE — DISCHARGE INSTRUCTIONS
Take Levaquin 250 for 5 days for treatment of your urinary tract infection.  Present back to ER if you develop any worsening of your epistaxis or urinary tract infection.

## 2024-02-12 NOTE — ED PROVIDER NOTES
HPI   Chief Complaint   Patient presents with    Epistaxis (Nose Bleed)     About a 1/2 hr ago my nose started to bleed I'm not on any blood thinners        patient is a 89-year-old female presenting for evaluation of epistaxis that started prior to presenting to ER.  Patient is also endorsing dysuria and urinary frequency states she believes she has UTI.  She sees Dr. Gaxiola for nephrology who typically prescribes her antibiotics for her urinary tract infections.  States she has an appointment with him scheduled for Thursday.  She denies chest pain, shortness of breath, headache, dizziness, visual symptoms, nausea, vomiting, abdominal pain, flank pain.  Denies fever or chills.                        [place] Coma Scale Score: 15                     Patient History   Past Medical History:   Diagnosis Date    Diabetes mellitus (CMS/HCC)     Disease of thyroid gland     Hypertension      Past Surgical History:   Procedure Laterality Date    APPENDECTOMY      BACK SURGERY      HYSTERECTOMY       Family History   Problem Relation Name Age of Onset    Heart disease Mother      Heart disease Father      Heart disease Brother      No Known Problems Son      Heart disease Maternal Grandmother      Heart disease Maternal Grandfather      Heart disease Paternal Grandmother      Heart disease Paternal Grandfather       Social History     Tobacco Use    Smoking status: Never    Smokeless tobacco: Never   Vaping Use    Vaping Use: Never used   Substance Use Topics    Alcohol use: Never    Drug use: Never       Physical Exam   ED Triage Vitals [02/12/24 1430]   Temperature Heart Rate Respirations BP   36.5 °C (97.7 °F) (!) 107 18 (!) 172/93      Pulse Ox Temp Source Heart Rate Source Patient Position   96 % Oral Monitor Sitting      BP Location FiO2 (%)     Left arm --       Physical Exam  Vitals and nursing note reviewed.   Constitutional:       General: She is not in acute distress.     Appearance: Normal appearance.   HENT:       Head: Normocephalic and atraumatic.      Nose:      Comments:   Evidence of bleed in the right anterior nostril     Mouth/Throat:      Mouth: Mucous membranes are moist.      Pharynx: Oropharynx is clear.   Cardiovascular:      Rate and Rhythm: Normal rate and regular rhythm.      Heart sounds: Normal heart sounds.   Pulmonary:      Effort: Pulmonary effort is normal.      Breath sounds: Normal breath sounds.   Abdominal:      Tenderness: There is no right CVA tenderness, left CVA tenderness, guarding or rebound.      Comments:  mild suprapubic tenderness   Skin:     General: Skin is warm and dry.   Neurological:      Mental Status: She is alert and oriented to person, place, and time.   Psychiatric:         Mood and Affect: Mood normal.         Behavior: Behavior normal.         ED Course & MDM   ED Course as of 02/12/24 1631   Mon Feb 12, 2024   1612  spoke with patient's nephrologist Dr. Gaxiola. he would like her to be on Levaquin 250 for 5 days and she has an appointment scheduled to follow-up with him in the office. [JJ]      ED Course User Index  [JJ] Gale Herzog PA-C         Diagnoses as of 02/12/24 1631   Epistaxis   Acute cystitis without hematuria       Medical Decision Making    89-year-old female presenting for evaluation of epistaxis and dysuria.  During the ER visit the patient is resting comfortably in hospital bed in no acute distress.  She has a nasal clamp applied.  Nose was bleeding shortly after arrival thus Afrin and nasal clamp was reapplied.  Bleeding did stop with no reoccurrence after use of the Afrin.  She is also complaining of dysuria and urinary frequency, has had frequent UTIs in the past and does see Dr. Gaxiola on Thursday for this.  Urinalysis is significant for urinary tract infection.  Patient is not having any flank pain, fever, chills at this time thus CT was not pursued.  Thus I believe the patient is appropriate for discharge at this time.  She was given a prescription for  Levaquin  p.o. for 5 days.  She was provided Afrin for home for it for nose begins to bleed again.  She was also provided a nasal clamp.  She was instructed to follow-up with her primary care provider and her nephrologist regarding her ER visit.  She was instructed to present back to ER with any worsening of symptoms.  She states her understanding of the treatment plan at this time and is agreeable.    Procedure  Procedures     Gale Herzog PA-C  02/12/24 1561

## 2024-02-15 LAB — BACTERIA UR CULT: ABNORMAL

## 2024-02-16 ENCOUNTER — APPOINTMENT (OUTPATIENT)
Dept: RADIOLOGY | Facility: HOSPITAL | Age: 89
End: 2024-02-16
Payer: MEDICARE

## 2024-02-17 ENCOUNTER — TELEPHONE (OUTPATIENT)
Dept: PHARMACY | Facility: HOSPITAL | Age: 89
End: 2024-02-17
Payer: MEDICARE

## 2024-02-17 NOTE — PROGRESS NOTES
EDPD Note: Antibiotics Reviewed and Warranted    Contacted Ms. Fartun Carey regarding a positive urine culture/result that was taken during their recent emergency room visit. I completed education with patient . The patient is being treated appropriately with Levofloxacin 250mg 1 tablet daily x 5 days.     Susceptibility data from last 90 days.  Collected Specimen Info Organism Amoxicillin/Clavulanate Ampicillin Ampicillin/Sulbactam Cefazolin Cefazolin (uncomplicated UTIs only) Ciprofloxacin Gentamicin Nitrofurantoin Piperacillin/Tazobactam Trimethoprim/Sulfamethoxazole   02/12/24 Urine from Clean Catch/Voided Klebsiella pneumoniae/variicola S R S S S S S I S S   01/31/24 Urine from Clean Catch/Voided Klebsiella pneumoniae/variicola S R S S S S S S S S   01/12/24 Urine from Clean Catch/Voided Klebsiella pneumoniae/variicola S R S S S S S R S S        No further follow up needed from EDPD Team.     Kena Siddiqui, PharmD

## 2024-02-19 ENCOUNTER — LAB (OUTPATIENT)
Dept: LAB | Facility: LAB | Age: 89
End: 2024-02-19
Payer: MEDICARE

## 2024-02-19 ENCOUNTER — TELEPHONE (OUTPATIENT)
Dept: PRIMARY CARE | Facility: CLINIC | Age: 89
End: 2024-02-19
Payer: MEDICARE

## 2024-02-19 DIAGNOSIS — R53.83 FATIGUE, UNSPECIFIED TYPE: Primary | ICD-10-CM

## 2024-02-19 DIAGNOSIS — N39.0 RECURRENT URINARY TRACT INFECTION: ICD-10-CM

## 2024-02-19 DIAGNOSIS — D64.9 ANEMIA, UNSPECIFIED TYPE: ICD-10-CM

## 2024-02-19 DIAGNOSIS — N18.32 STAGE 3B CHRONIC KIDNEY DISEASE (MULTI): ICD-10-CM

## 2024-02-19 LAB
ALBUMIN SERPL BCP-MCNC: 3.5 G/DL (ref 3.4–5)
ALP SERPL-CCNC: 124 U/L (ref 33–136)
ALT SERPL W P-5'-P-CCNC: 15 U/L (ref 7–45)
ANION GAP SERPL CALC-SCNC: 9 MMOL/L (ref 10–20)
APPEARANCE UR: ABNORMAL
AST SERPL W P-5'-P-CCNC: 16 U/L (ref 9–39)
BACTERIA #/AREA URNS AUTO: ABNORMAL /HPF
BASOPHILS # BLD AUTO: 0.03 X10*3/UL (ref 0–0.1)
BASOPHILS NFR BLD AUTO: 0.5 %
BILIRUB SERPL-MCNC: 0.3 MG/DL (ref 0–1.2)
BILIRUB UR STRIP.AUTO-MCNC: NEGATIVE MG/DL
BUN SERPL-MCNC: 25 MG/DL (ref 6–23)
CALCIUM SERPL-MCNC: 9.2 MG/DL (ref 8.6–10.3)
CHLORIDE SERPL-SCNC: 103 MMOL/L (ref 98–107)
CO2 SERPL-SCNC: 30 MMOL/L (ref 21–32)
COLOR UR: ABNORMAL
CREAT SERPL-MCNC: 1.16 MG/DL (ref 0.5–1.05)
EGFRCR SERPLBLD CKD-EPI 2021: 44 ML/MIN/1.73M*2
EOSINOPHIL # BLD AUTO: 0.14 X10*3/UL (ref 0–0.4)
EOSINOPHIL NFR BLD AUTO: 2.2 %
ERYTHROCYTE [DISTWIDTH] IN BLOOD BY AUTOMATED COUNT: 13.6 % (ref 11.5–14.5)
GLUCOSE SERPL-MCNC: 100 MG/DL (ref 74–99)
GLUCOSE UR STRIP.AUTO-MCNC: ABNORMAL MG/DL
HCT VFR BLD AUTO: 30.8 % (ref 36–46)
HGB BLD-MCNC: 9.7 G/DL (ref 12–16)
HOLD SPECIMEN: NORMAL
IMM GRANULOCYTES # BLD AUTO: 0.03 X10*3/UL (ref 0–0.5)
IMM GRANULOCYTES NFR BLD AUTO: 0.5 % (ref 0–0.9)
IRON SATN MFR SERPL: 20 % (ref 25–45)
IRON SERPL-MCNC: 71 UG/DL (ref 35–150)
KETONES UR STRIP.AUTO-MCNC: NEGATIVE MG/DL
LEUKOCYTE ESTERASE UR QL STRIP.AUTO: ABNORMAL
LYMPHOCYTES # BLD AUTO: 2.38 X10*3/UL (ref 0.8–3)
LYMPHOCYTES NFR BLD AUTO: 38 %
MCH RBC QN AUTO: 30.3 PG (ref 26–34)
MCHC RBC AUTO-ENTMCNC: 31.5 G/DL (ref 32–36)
MCV RBC AUTO: 96 FL (ref 80–100)
MONOCYTES # BLD AUTO: 0.66 X10*3/UL (ref 0.05–0.8)
MONOCYTES NFR BLD AUTO: 10.5 %
NEUTROPHILS # BLD AUTO: 3.03 X10*3/UL (ref 1.6–5.5)
NEUTROPHILS NFR BLD AUTO: 48.3 %
NITRITE UR QL STRIP.AUTO: NEGATIVE
NRBC BLD-RTO: 0 /100 WBCS (ref 0–0)
PH UR STRIP.AUTO: 6 [PH]
PLATELET # BLD AUTO: 165 X10*3/UL (ref 150–450)
POTASSIUM SERPL-SCNC: 4.5 MMOL/L (ref 3.5–5.3)
PROT SERPL-MCNC: 6.2 G/DL (ref 6.4–8.2)
PROT UR STRIP.AUTO-MCNC: NEGATIVE MG/DL
RBC # BLD AUTO: 3.2 X10*6/UL (ref 4–5.2)
RBC # UR STRIP.AUTO: NEGATIVE /UL
RBC #/AREA URNS AUTO: ABNORMAL /HPF
SODIUM SERPL-SCNC: 137 MMOL/L (ref 136–145)
SP GR UR STRIP.AUTO: 1.01
TIBC SERPL-MCNC: 354 UG/DL (ref 240–445)
UIBC SERPL-MCNC: 283 UG/DL (ref 110–370)
UROBILINOGEN UR STRIP.AUTO-MCNC: <2 MG/DL
WBC # BLD AUTO: 6.3 X10*3/UL (ref 4.4–11.3)
WBC #/AREA URNS AUTO: >50 /HPF

## 2024-02-19 PROCEDURE — 87086 URINE CULTURE/COLONY COUNT: CPT

## 2024-02-19 PROCEDURE — 82607 VITAMIN B-12: CPT

## 2024-02-19 PROCEDURE — 36415 COLL VENOUS BLD VENIPUNCTURE: CPT

## 2024-02-19 NOTE — TELEPHONE ENCOUNTER
Patient notified. She will have these done today. Patient is aware of the appointment change from Dr. BANUELOS to Carlos A's schedule.

## 2024-02-19 NOTE — TELEPHONE ENCOUNTER
Patient is having fatigue and dizziness. She thinks it might have something to do with her nose bleed that she had last week. She went to the ER for it. She is wondering if you can order lab work? She also had a UTI when she went to the ER and has since finished her antibiotic. Can you order a urine test as well?    She was requesting an appointment with you. I scheduled her with you for tomorrow (forgetting about the Medicare scheduling issue). Can I schedule her with someone else?

## 2024-02-20 ENCOUNTER — OFFICE VISIT (OUTPATIENT)
Dept: PRIMARY CARE | Facility: CLINIC | Age: 89
End: 2024-02-20
Payer: MEDICARE

## 2024-02-20 VITALS
WEIGHT: 164.2 LBS | OXYGEN SATURATION: 97 % | HEART RATE: 88 BPM | BODY MASS INDEX: 31 KG/M2 | DIASTOLIC BLOOD PRESSURE: 48 MMHG | HEIGHT: 61 IN | SYSTOLIC BLOOD PRESSURE: 122 MMHG

## 2024-02-20 DIAGNOSIS — I95.1 ORTHOSTASIS: Primary | ICD-10-CM

## 2024-02-20 DIAGNOSIS — E11.9 DIABETES MELLITUS WITHOUT COMPLICATION (MULTI): ICD-10-CM

## 2024-02-20 LAB
BACTERIA UR CULT: NORMAL
VIT B12 SERPL-MCNC: 1767 PG/ML (ref 211–911)

## 2024-02-20 PROCEDURE — 1036F TOBACCO NON-USER: CPT | Performed by: PHYSICIAN ASSISTANT

## 2024-02-20 PROCEDURE — 3074F SYST BP LT 130 MM HG: CPT | Performed by: PHYSICIAN ASSISTANT

## 2024-02-20 PROCEDURE — 1160F RVW MEDS BY RX/DR IN RCRD: CPT | Performed by: PHYSICIAN ASSISTANT

## 2024-02-20 PROCEDURE — 1159F MED LIST DOCD IN RCRD: CPT | Performed by: PHYSICIAN ASSISTANT

## 2024-02-20 PROCEDURE — 99213 OFFICE O/P EST LOW 20 MIN: CPT | Performed by: PHYSICIAN ASSISTANT

## 2024-02-20 PROCEDURE — 3078F DIAST BP <80 MM HG: CPT | Performed by: PHYSICIAN ASSISTANT

## 2024-02-20 PROCEDURE — 1126F AMNT PAIN NOTED NONE PRSNT: CPT | Performed by: PHYSICIAN ASSISTANT

## 2024-02-20 RX ORDER — CIPROFLOXACIN 250 MG/1
TABLET, FILM COATED ORAL EVERY 24 HOURS
COMMUNITY
Start: 2024-02-15 | End: 2024-04-08 | Stop reason: WASHOUT

## 2024-02-20 RX ORDER — MULTIVIT,TX WITH IRON,MINERALS
120 TABLET, EXTENDED RELEASE ORAL
COMMUNITY
End: 2024-05-07 | Stop reason: WASHOUT

## 2024-02-20 ASSESSMENT — PATIENT HEALTH QUESTIONNAIRE - PHQ9
1. LITTLE INTEREST OR PLEASURE IN DOING THINGS: NOT AT ALL
SUM OF ALL RESPONSES TO PHQ9 QUESTIONS 1 AND 2: 0
2. FEELING DOWN, DEPRESSED OR HOPELESS: NOT AT ALL

## 2024-02-20 ASSESSMENT — ENCOUNTER SYMPTOMS: DIZZINESS: 1

## 2024-02-20 ASSESSMENT — PAIN SCALES - GENERAL: PAINLEVEL: 0-NO PAIN

## 2024-02-20 NOTE — PATIENT INSTRUCTIONS
Iron tab daily.   Continue hydration.   Hold dapagliflozin at this time and follow up as scheduled in 4 weeks, sooner if not improving.   Call in 2 days if symptoms not improving for CT head.

## 2024-02-20 NOTE — PROGRESS NOTES
"Subjective   Patient ID: Fartun Carey is a 89 y.o. female who presents for Dizziness (Patient states the dizziness started last Monday.  Worse when moving around or reading. Patient went to ED on 2/12).    89 y/o female with complaint of dizziness and fatigue the last week or so. States she had a severe nosebleed 8 days ago which brought her to the ED. BP was high at that time and has had some fluctuations recently.   Also of note has been on Farxiga for 2 weeks due to poor coverage for Januvia.   Denies chest pain, palpitations, edema.   Labs from yesterday show stable anemia.     Dizziness         Review of Systems   Neurological:  Positive for dizziness.   All other systems reviewed and are negative.    BP Readings from Last 3 Encounters:   02/20/24 (!) 122/48   02/12/24 141/59   01/18/24 150/60      Wt Readings from Last 3 Encounters:   02/20/24 74.5 kg (164 lb 3.2 oz)   02/12/24 73 kg (161 lb)   01/18/24 73 kg (161 lb)        Objective   BP (!) 122/48 (Patient Position: Standing)   Pulse 88   Ht 1.549 m (5' 1\")   Wt 74.5 kg (164 lb 3.2 oz)   SpO2 97%   BMI 31.03 kg/m²     Physical Exam  Constitutional:       Appearance: Normal appearance.   HENT:      Mouth/Throat:      Mouth: Mucous membranes are moist.      Pharynx: Oropharynx is clear.   Eyes:      Pupils: Pupils are equal, round, and reactive to light.   Cardiovascular:      Rate and Rhythm: Normal rate and regular rhythm.   Pulmonary:      Breath sounds: Normal breath sounds.   Musculoskeletal:      Right lower leg: No edema.      Left lower leg: No edema.   Neurological:      Mental Status: She is alert.      Comments: Dizziness with EOM; unsteady on feet     Below is the patient's most recent value for Albumin, ALT, AST, BUN, Calcium, Chloride, Cholesterol, CO2, Creatinine, GFR, Glucose, HDL, Hematocrit, Hemoglobin, Hemoglobin A1C, LDL, Magnesium, Phosphorus, Platelets, Potassium, PSA, Sodium, Triglycerides, and WBC.   Lab Results   Component Value " Date    ALBUMIN 3.5 02/19/2024    ALT 15 02/19/2024    AST 16 02/19/2024    BUN 25 (H) 02/19/2024    CALCIUM 9.2 02/19/2024     02/19/2024    CHOL 127 11/15/2023    CO2 30 02/19/2024    CREATININE 1.16 (H) 02/19/2024    HDL 44.6 11/15/2023    HCT 30.8 (L) 02/19/2024    HGB 9.7 (L) 02/19/2024    HGBA1C 6.8 (H) 11/15/2023    MG 1.64 01/22/2024    PHOS 3.5 01/31/2024     02/19/2024    K 4.5 02/19/2024     02/19/2024    TRIG 158 (H) 11/15/2023    WBC 6.3 02/19/2024     Note: for a comprehensive list of the patient's lab results, access the Results Review activity.    Assessment/Plan   Diagnoses and all orders for this visit:  Orthostasis  Diabetes mellitus without complication (CMS/HCC)

## 2024-02-21 ENCOUNTER — HOSPITAL ENCOUNTER (EMERGENCY)
Facility: HOSPITAL | Age: 89
Discharge: HOME | End: 2024-02-21
Attending: STUDENT IN AN ORGANIZED HEALTH CARE EDUCATION/TRAINING PROGRAM
Payer: MEDICARE

## 2024-02-21 ENCOUNTER — APPOINTMENT (OUTPATIENT)
Dept: RADIOLOGY | Facility: HOSPITAL | Age: 89
End: 2024-02-21
Payer: MEDICARE

## 2024-02-21 ENCOUNTER — TELEPHONE (OUTPATIENT)
Dept: PRIMARY CARE | Facility: CLINIC | Age: 89
End: 2024-02-21
Payer: MEDICARE

## 2024-02-21 ENCOUNTER — APPOINTMENT (OUTPATIENT)
Dept: CARDIOLOGY | Facility: HOSPITAL | Age: 89
End: 2024-02-21
Payer: MEDICARE

## 2024-02-21 VITALS
RESPIRATION RATE: 20 BRPM | HEIGHT: 61 IN | HEART RATE: 79 BPM | WEIGHT: 160 LBS | OXYGEN SATURATION: 98 % | TEMPERATURE: 96.8 F | DIASTOLIC BLOOD PRESSURE: 85 MMHG | SYSTOLIC BLOOD PRESSURE: 197 MMHG | BODY MASS INDEX: 30.21 KG/M2

## 2024-02-21 DIAGNOSIS — R04.0 EPISTAXIS: Primary | ICD-10-CM

## 2024-02-21 DIAGNOSIS — R42 EPISODIC LIGHTHEADEDNESS: ICD-10-CM

## 2024-02-21 LAB
ACANTHOCYTES BLD QL SMEAR: NORMAL
ALBUMIN SERPL-MCNC: 3.8 G/DL (ref 3.5–5)
ALP BLD-CCNC: 154 U/L (ref 35–125)
ALT SERPL-CCNC: 16 U/L (ref 5–40)
ANION GAP SERPL CALC-SCNC: 10 MMOL/L
APPEARANCE UR: CLEAR
AST SERPL-CCNC: 18 U/L (ref 5–40)
BASOPHILS # BLD AUTO: 0.02 X10*3/UL (ref 0–0.1)
BASOPHILS NFR BLD AUTO: 0.3 %
BILIRUB SERPL-MCNC: 0.2 MG/DL (ref 0.1–1.2)
BILIRUB UR STRIP.AUTO-MCNC: NEGATIVE MG/DL
BUN SERPL-MCNC: 32 MG/DL (ref 8–25)
CALCIUM SERPL-MCNC: 9.5 MG/DL (ref 8.5–10.4)
CHLORIDE SERPL-SCNC: 102 MMOL/L (ref 97–107)
CO2 SERPL-SCNC: 25 MMOL/L (ref 24–31)
COLOR UR: COLORLESS
CREAT SERPL-MCNC: 1.3 MG/DL (ref 0.4–1.6)
EGFRCR SERPLBLD CKD-EPI 2021: 38 ML/MIN/1.73M*2
EOSINOPHIL # BLD AUTO: 0.1 X10*3/UL (ref 0–0.4)
EOSINOPHIL NFR BLD AUTO: 1.4 %
ERYTHROCYTE [DISTWIDTH] IN BLOOD BY AUTOMATED COUNT: 13.7 % (ref 11.5–14.5)
GLUCOSE SERPL-MCNC: 142 MG/DL (ref 65–99)
GLUCOSE UR STRIP.AUTO-MCNC: ABNORMAL MG/DL
HCT VFR BLD AUTO: 32.8 % (ref 36–46)
HGB BLD-MCNC: 10.5 G/DL (ref 12–16)
IMM GRANULOCYTES # BLD AUTO: 0.04 X10*3/UL (ref 0–0.5)
IMM GRANULOCYTES NFR BLD AUTO: 0.6 % (ref 0–0.9)
INR PPP: 1 (ref 0.9–1.2)
KETONES UR STRIP.AUTO-MCNC: NEGATIVE MG/DL
LEUKOCYTE ESTERASE UR QL STRIP.AUTO: ABNORMAL
LYMPHOCYTES # BLD AUTO: 2.16 X10*3/UL (ref 0.8–3)
LYMPHOCYTES NFR BLD AUTO: 30.1 %
MCH RBC QN AUTO: 30.1 PG (ref 26–34)
MCHC RBC AUTO-ENTMCNC: 32 G/DL (ref 32–36)
MCV RBC AUTO: 94 FL (ref 80–100)
MONOCYTES # BLD AUTO: 0.6 X10*3/UL (ref 0.05–0.8)
MONOCYTES NFR BLD AUTO: 8.4 %
NEUTROPHILS # BLD AUTO: 4.26 X10*3/UL (ref 1.6–5.5)
NEUTROPHILS NFR BLD AUTO: 59.2 %
NITRITE UR QL STRIP.AUTO: NEGATIVE
NRBC BLD-RTO: 0 /100 WBCS (ref 0–0)
OVALOCYTES BLD QL SMEAR: NORMAL
PH UR STRIP.AUTO: 6 [PH]
PLATELET # BLD AUTO: 142 X10*3/UL (ref 150–450)
POTASSIUM SERPL-SCNC: 5 MMOL/L (ref 3.4–5.1)
PROT SERPL-MCNC: 6.7 G/DL (ref 5.9–7.9)
PROT UR STRIP.AUTO-MCNC: ABNORMAL MG/DL
PROTHROMBIN TIME: 10.7 SECONDS (ref 9.3–12.7)
RBC # BLD AUTO: 3.49 X10*6/UL (ref 4–5.2)
RBC # UR STRIP.AUTO: ABNORMAL /UL
RBC #/AREA URNS AUTO: NORMAL /HPF
RBC MORPH BLD: NORMAL
SCHISTOCYTES BLD QL SMEAR: NORMAL
SODIUM SERPL-SCNC: 137 MMOL/L (ref 133–145)
SP GR UR STRIP.AUTO: 1
TROPONIN T SERPL-MCNC: 27 NG/L
UROBILINOGEN UR STRIP.AUTO-MCNC: NORMAL MG/DL
WBC # BLD AUTO: 7.2 X10*3/UL (ref 4.4–11.3)
WBC #/AREA URNS AUTO: NORMAL /HPF

## 2024-02-21 PROCEDURE — 93005 ELECTROCARDIOGRAM TRACING: CPT

## 2024-02-21 PROCEDURE — 99285 EMERGENCY DEPT VISIT HI MDM: CPT | Mod: 25 | Performed by: STUDENT IN AN ORGANIZED HEALTH CARE EDUCATION/TRAINING PROGRAM

## 2024-02-21 PROCEDURE — 81001 URINALYSIS AUTO W/SCOPE: CPT | Performed by: PHYSICIAN ASSISTANT

## 2024-02-21 PROCEDURE — 36415 COLL VENOUS BLD VENIPUNCTURE: CPT | Performed by: PHYSICIAN ASSISTANT

## 2024-02-21 PROCEDURE — 85025 COMPLETE CBC W/AUTO DIFF WBC: CPT | Performed by: PHYSICIAN ASSISTANT

## 2024-02-21 PROCEDURE — 2500000001 HC RX 250 WO HCPCS SELF ADMINISTERED DRUGS (ALT 637 FOR MEDICARE OP): Performed by: PHYSICIAN ASSISTANT

## 2024-02-21 PROCEDURE — 71045 X-RAY EXAM CHEST 1 VIEW: CPT

## 2024-02-21 PROCEDURE — 87086 URINE CULTURE/COLONY COUNT: CPT | Mod: TRILAB | Performed by: PHYSICIAN ASSISTANT

## 2024-02-21 PROCEDURE — 84484 ASSAY OF TROPONIN QUANT: CPT | Performed by: PHYSICIAN ASSISTANT

## 2024-02-21 PROCEDURE — 70450 CT HEAD/BRAIN W/O DYE: CPT

## 2024-02-21 PROCEDURE — 80053 COMPREHEN METABOLIC PANEL: CPT | Performed by: PHYSICIAN ASSISTANT

## 2024-02-21 PROCEDURE — 71045 X-RAY EXAM CHEST 1 VIEW: CPT | Mod: FOREIGN READ | Performed by: RADIOLOGY

## 2024-02-21 PROCEDURE — 85610 PROTHROMBIN TIME: CPT | Performed by: PHYSICIAN ASSISTANT

## 2024-02-21 RX ORDER — CLONIDINE HYDROCHLORIDE 0.2 MG/1
0.2 TABLET ORAL ONCE
Status: COMPLETED | OUTPATIENT
Start: 2024-02-21 | End: 2024-02-21

## 2024-02-21 RX ADMIN — CLONIDINE HYDROCHLORIDE 0.2 MG: 0.2 TABLET ORAL at 19:25

## 2024-02-21 ASSESSMENT — PAIN SCALES - GENERAL: PAINLEVEL_OUTOF10: 0 - NO PAIN

## 2024-02-21 ASSESSMENT — COLUMBIA-SUICIDE SEVERITY RATING SCALE - C-SSRS
2. HAVE YOU ACTUALLY HAD ANY THOUGHTS OF KILLING YOURSELF?: NO
6. HAVE YOU EVER DONE ANYTHING, STARTED TO DO ANYTHING, OR PREPARED TO DO ANYTHING TO END YOUR LIFE?: NO
1. IN THE PAST MONTH, HAVE YOU WISHED YOU WERE DEAD OR WISHED YOU COULD GO TO SLEEP AND NOT WAKE UP?: NO

## 2024-02-21 ASSESSMENT — PAIN - FUNCTIONAL ASSESSMENT: PAIN_FUNCTIONAL_ASSESSMENT: 0-10

## 2024-02-21 NOTE — TELEPHONE ENCOUNTER
----- Message from Jose Alejandro Hamilton MD sent at 2/21/2024  8:09 AM EST -----    ----- Message -----  From: Lab, Background User  Sent: 2/19/2024   2:51 PM EST  To: Jose Alejandro Hamilton MD

## 2024-02-22 ENCOUNTER — TELEPHONE (OUTPATIENT)
Dept: PRIMARY CARE | Facility: CLINIC | Age: 89
End: 2024-02-22
Payer: MEDICARE

## 2024-02-22 LAB
ATRIAL RATE: 77 BPM
P AXIS: 83 DEGREES
P OFFSET: 149 MS
P ONSET: 119 MS
PR INTERVAL: 184 MS
Q ONSET: 211 MS
QRS COUNT: 13 BEATS
QRS DURATION: 80 MS
QT INTERVAL: 390 MS
QTC CALCULATION(BAZETT): 441 MS
QTC FREDERICIA: 423 MS
R AXIS: -30 DEGREES
T AXIS: 36 DEGREES
T OFFSET: 406 MS
VENTRICULAR RATE: 77 BPM

## 2024-02-22 NOTE — ED PROVIDER NOTES
Supervisory note:  Patient seen in conjunction with GRACE Angeles.  Patient presents after nosebleed.  She reports that for the last week she has also been very lightheaded when she stands up.  Her blood pressure has been somewhat elevated recently as well.  She recently was switched from clonidine patch to oral clonidine.  On examination, patient is awake, alert, answers questions appropriately.  No focal neurological deficits.    EKG interpreted by me: Sinus rhythm with premature ventricular contractions, rate 77.  Borderline leftward axis.  LVH.  No ST or T wave abnormalities.    Laboratory studies are unremarkable.  Head CT and chest x-ray without acute findings.  Patient was able to ambulate in the emergency department.  Blood pressure did improve to 150s systolic.  Patient was advised to follow-up with primary care physician.  My suspicion for posterior stroke as etiology of symptoms is very low.  Return precautions given for any worsening symptoms.    I personally saw the patient and made/approved the management plan and take responsiblity for the patient management.  Parts of this chart were completed with dictation software, please excuse any errors in transcription.     Asad Rizo MD  02/21/24 8559

## 2024-02-22 NOTE — TELEPHONE ENCOUNTER
Patient calling to let you know that she is feeling wobbly today but is feeling better. She will call us Monday with another update.    She is going to call ENT today to make an appointment for her nose bleeds.

## 2024-02-22 NOTE — ED PROVIDER NOTES
HPI   Chief Complaint   Patient presents with    Epistaxis (Nose Bleed)     4:30-4:45pm nose began to bleed. BP elevated. 232/112       This is a 89-year-old female who presents to the emergency department due to nosebleed and lightheadedness.  The patient states that she was at home bending over to feed her cat when she started to have nosebleed.  She called EMS who arrived to her house and they placed TXA soaked 2 x 2 gauze pads in nares bilaterally.  Upon arrival to the emergency department the nosebleed stopped.  The patient states that she has been having to use a cane over the last couple of days at home due to feeling lightheaded with ambulation.  She states that she feels like she may fall over when she is walking.  She does not feel a fall sense of motion like the room is spinning.  She states that she recently was switched from clonidine patch to clonidine tablet for high blood pressure.  She did not take her evening dose of clonidine.  She denies any headaches, chest pain, shortness of breath, abdominal pain, nausea, vomiting.  She states that she has had increased blurry vision.  She takes aspirin 81 mg daily.      Please see HPI for pertinent positive and negative ROS.                   No data recorded                   Patient History   Past Medical History:   Diagnosis Date    Diabetes mellitus (CMS/HCC)     Disease of thyroid gland     Hypertension      Past Surgical History:   Procedure Laterality Date    APPENDECTOMY      BACK SURGERY      HYSTERECTOMY       Family History   Problem Relation Name Age of Onset    Heart disease Mother      Heart disease Father      Heart disease Brother      No Known Problems Son      Heart disease Maternal Grandmother      Heart disease Maternal Grandfather      Heart disease Paternal Grandmother      Heart disease Paternal Grandfather       Social History     Tobacco Use    Smoking status: Never    Smokeless tobacco: Never   Vaping Use    Vaping Use: Never used    Substance Use Topics    Alcohol use: Never    Drug use: Never       Physical Exam   ED Triage Vitals [02/21/24 1800]   Temperature Heart Rate Respirations BP   36 °C (96.8 °F) (!) 111 20 (!) 227/104      Pulse Ox Temp Source Heart Rate Source Patient Position   97 % Tympanic Monitor Lying      BP Location FiO2 (%)     -- --       Physical Exam  GENERAL APPEARANCE: Awake and alert. No acute distress.   VITAL SIGNS: As per the nurses' triage record.  HEENT: Normocephalic, atraumatic. Extraocular muscles are intact.  No vertical or horizontal nystagmus noted bilaterally.  Conjunctiva are pink. Negative scleral icterus. Mucous membranes are moist. Tongue in the midline. Oropharynx clear, uvula midline.  Nares without active bleeding bilaterally.  There is a clot in the right nare.  There is friable mucosa of the right anterior septum but no active bleeding.  No active bleeding in the left nare.   There is small amount of blood in posterior oropharynx.  NECK: Soft, nontender and supple, full gross ROM, no meningeal signs.   CHEST: Nontender to palpation. Clear to auscultation bilaterally. No rales, rhonchi, or wheezing. Symmetric rise and fall of chest wall.   HEART: Clear S1 and S2.  Tachycardic rate and regular rhythm. No murmurs appreciated on auscultation.  Strong and equal pulses in the extremities.  ABDOMEN: Soft, nontender, nondistended, positive bowel sounds, no palpable masses.  MUSCULOSKELETAL: The calves are nontender to palpation. Full gross active range of motion.   NEUROLOGICAL: Awake, alert and oriented x 3. Motor power intact in the upper and lower extremities. Sensation is intact to light touch in the upper and lower extremities. Patient answering questions appropriately.  NIH stroke scale 0  IMMUNOLOGICAL: No lymphatic streaking noted  DERMATOLOGIC: Warm and dry without petechiae, rashes, or ecchymosis noted on visible skin.   PYSCH: Cooperative with appropriate mood and affect.  ED Course & MDM    Diagnoses as of 02/21/24 2305   Epistaxis   Episodic lightheadedness       Medical Decision Making  Parts of this chart have been completed using voice recognition software. Please excuse any errors of transcription.  My thought process and reason for plan has been formulated from the time that I saw the patient until the time of disposition and is not specific to one specific moment during their visit and furthermore my MDM encompasses this entire chart and not only this text box.      HPI: Detailed above.    Exam: A medically appropriate exam performed, outlined above, given the known history and presentation.    History obtained from: Patient    EKG: See my supervising physician's EKG interpretation    Medications given during visit:  Medications   cloNIDine (Catapres) tablet 0.2 mg (0.2 mg oral Given 2/21/24 1925)        Diagnostic/tests  Labs Reviewed   URINALYSIS WITH REFLEX CULTURE AND MICROSCOPIC - Abnormal       Result Value    Color, Urine Colorless (*)     Appearance, Urine Clear      Specific Gravity, Urine 1.005      pH, Urine 6.0      Protein, Urine 10 (TRACE)      Glucose, Urine 300 (3+) (*)     Blood, Urine OVER (3+) (*)     Ketones, Urine NEGATIVE      Bilirubin, Urine NEGATIVE      Urobilinogen, Urine Normal      Nitrite, Urine NEGATIVE      Leukocyte Esterase, Urine 25 Sarahi/µL (*)    CBC WITH AUTO DIFFERENTIAL - Abnormal    WBC 7.2      nRBC 0.0      RBC 3.49 (*)     Hemoglobin 10.5 (*)     Hematocrit 32.8 (*)     MCV 94      MCH 30.1      MCHC 32.0      RDW 13.7      Platelets 142 (*)     Neutrophils % 59.2      Immature Granulocytes %, Automated 0.6      Lymphocytes % 30.1      Monocytes % 8.4      Eosinophils % 1.4      Basophils % 0.3      Neutrophils Absolute 4.26      Immature Granulocytes Absolute, Automated 0.04      Lymphocytes Absolute 2.16      Monocytes Absolute 0.60      Eosinophils Absolute 0.10      Basophils Absolute 0.02     COMPREHENSIVE METABOLIC PANEL - Abnormal    Glucose 142  (*)     Sodium 137      Potassium 5.0      Chloride 102      Bicarbonate 25      Urea Nitrogen 32 (*)     Creatinine 1.30      eGFR 38 (*)     Calcium 9.5      Albumin 3.8      Alkaline Phosphatase 154 (*)     Total Protein 6.7      AST 18      Bilirubin, Total 0.2      ALT 16      Anion Gap 10     SERIAL TROPONIN, INITIAL ([facility]) - Abnormal    Troponin T, High Sensitivity 27 (*)    PROTIME-INR - Normal    Protime 10.7      INR 1.0      Narrative:     INR Therapeutic Range: 2.0-3.5   MICROSCOPIC ONLY, URINE - Normal    WBC, Urine 1-5      RBC, Urine 3-5     URINE CULTURE   URINALYSIS WITH REFLEX CULTURE AND MICROSCOPIC    Narrative:     The following orders were created for panel order Urinalysis with Reflex Culture and Microscopic.  Procedure                               Abnormality         Status                     ---------                               -----------         ------                     Urinalysis with Reflex C...[[id number]]  Abnormal            Final result               Extra Urine Gray Tube[[id number]]                                                         Please view results for these tests on the individual orders.   EXTRA URINE GRAY TUBE   TROPONIN T SERIES, HIGH SENSITIVITY (0, 2 HR, 6 HR)    Narrative:     The following orders were created for panel order Troponin T Series, High Sensitivity (0, 2HR, 6HR).  Procedure                               Abnormality         Status                     ---------                               -----------         ------                     Serial Troponin, Initial...[[id number]]  Abnormal            Final result               Serial Troponin, 2 Hour ...[[id number]]                                                   Please view results for these tests on the individual orders.   SERIAL TROPONIN,  2 HOUR ([facility])   MORPHOLOGY    RBC Morphology See Below      RBC Fragments Few      Ovalocytes Few      Acanthocytes Few        XR chest 1 view   Final Result   No  detectable active cardiopulmonary disease.   Signed by Evens Lopez MD      CT head wo IV contrast   Final Result   No acute intracranial abnormality.        MACRO:   None.        Signed by: Emily Chua 2/21/2024 7:40 PM   Dictation workstation:   [id number]           Considerations/further MDM:  Patient was seen in conjucntion with my supervising physician,  Dr. Rizo. Please refer to his note.    Considered and evaluated for systemic infection, urinary tract infection, electrolyte disturbance, anemia, acute kidney injury, intracranial mass, stroke, acute coronary syndrome as cause of patient's lightheadedness.    Patient's nosebleed did not restart in the emergency department.  Patient was greeted with TXA soaked by 2 gauze by EMS prior to arrival.  Gauze was removed and nosebleed did not restart.     Patient's blood pressure improved with her evening dose of clonidine 0.2 mg    CT scan of head without IV contrast shows no acute intracranial abnormalities.  Patient's NIH stroke scale is 0.  She has no vertical or horizontal nystagmus.  She states that she only feels lightheaded when she is up and walking.  Does not have a fall sense of motion and she does not feel lightheaded when she is sitting or lying down.  She has no nausea or vomiting.  Therefore I do have very low suspicion of stroke at this time.    CMP shows no significant electrolyte disturbance or acute kidney injury.  Urinalysis shows 25 leukocytes with 1-5 WBCs.  Urinalysis shows great improvement from previous urinalysis.  She is currently taking ciprofloxacin and sees Dr. Gaxiola nephrology for her frequent UTIs.  Initial troponin T HS 27, repeat troponin T not indicated as patient symptoms have been ongoing x 1 week.      Patient was able to ambulate in the emergency department with a walker.  She felt well.  She has a walker at home    The patient was evaluated in the emergency department and an etiology requiring emergent treatment or  admission to hospital was not identified.  All labs, imaging, and diagnostic studies were reviewed by me and the patient was counseled on clinical impression, expectations, and plan.  Patient was educated to follow-up with PCP in the following 1 to 2 days and ENT.  All questions from patient were answered.  They elicited understanding and were agreeable to course of treatment.  Patient was discharged in stable condition and given strict return precautions including new or worsening symptoms.    Procedure  Procedures            Gabrielle Godfrey PA-C  02/21/24 1067       Gabrielle Godfrey PA-C  02/27/24 0109

## 2024-02-23 ENCOUNTER — APPOINTMENT (OUTPATIENT)
Dept: RADIOLOGY | Facility: HOSPITAL | Age: 89
End: 2024-02-23
Payer: MEDICARE

## 2024-02-23 LAB — BACTERIA UR CULT: NO GROWTH

## 2024-02-26 ENCOUNTER — HOSPITAL ENCOUNTER (EMERGENCY)
Facility: HOSPITAL | Age: 89
Discharge: HOME | End: 2024-02-26
Attending: EMERGENCY MEDICINE
Payer: MEDICARE

## 2024-02-26 ENCOUNTER — TELEPHONE (OUTPATIENT)
Dept: PRIMARY CARE | Facility: CLINIC | Age: 89
End: 2024-02-26
Payer: MEDICARE

## 2024-02-26 VITALS
BODY MASS INDEX: 30.21 KG/M2 | WEIGHT: 160 LBS | HEIGHT: 61 IN | HEART RATE: 108 BPM | TEMPERATURE: 97.7 F | SYSTOLIC BLOOD PRESSURE: 198 MMHG | RESPIRATION RATE: 16 BRPM | DIASTOLIC BLOOD PRESSURE: 120 MMHG | OXYGEN SATURATION: 98 %

## 2024-02-26 DIAGNOSIS — E11.9 TYPE 2 DIABETES MELLITUS WITHOUT COMPLICATION, WITHOUT LONG-TERM CURRENT USE OF INSULIN (MULTI): Primary | ICD-10-CM

## 2024-02-26 DIAGNOSIS — R04.0 EPISTAXIS: Primary | ICD-10-CM

## 2024-02-26 PROCEDURE — 99283 EMERGENCY DEPT VISIT LOW MDM: CPT

## 2024-02-26 PROCEDURE — 2500000001 HC RX 250 WO HCPCS SELF ADMINISTERED DRUGS (ALT 637 FOR MEDICARE OP): Performed by: PHYSICIAN ASSISTANT

## 2024-02-26 PROCEDURE — 2500000005 HC RX 250 GENERAL PHARMACY W/O HCPCS: Performed by: PHYSICIAN ASSISTANT

## 2024-02-26 RX ORDER — TRANEXAMIC ACID 100 MG/ML
500 INJECTION, SOLUTION INTRAVENOUS ONCE
Status: COMPLETED | OUTPATIENT
Start: 2024-02-26 | End: 2024-02-26

## 2024-02-26 RX ORDER — OXYMETAZOLINE HCL 0.05 %
2 SPRAY, NON-AEROSOL (ML) NASAL ONCE
Status: COMPLETED | OUTPATIENT
Start: 2024-02-26 | End: 2024-02-26

## 2024-02-26 RX ADMIN — OXYMETAZOLINE HYDROCHLORIDE 2 SPRAY: 0.05 SPRAY NASAL at 19:05

## 2024-02-26 RX ADMIN — TRANEXAMIC ACID 500 MG: 100 INJECTION, SOLUTION INTRAVENOUS at 19:10

## 2024-02-26 ASSESSMENT — PAIN - FUNCTIONAL ASSESSMENT: PAIN_FUNCTIONAL_ASSESSMENT: 0-10

## 2024-02-26 ASSESSMENT — PAIN SCALES - GENERAL: PAINLEVEL_OUTOF10: 0 - NO PAIN

## 2024-02-26 NOTE — TELEPHONE ENCOUNTER
Pt will like to know if she can be placed on Januvia do to the cost of her other DM meds, also she would like to discuss Actos if that meds will be ok as will. Advise please.

## 2024-02-26 NOTE — TELEPHONE ENCOUNTER
I would prefer we have her go back on januvia if she is willing to pay for that - I am concerned with her getting side effects to any other meds. Actos can cause ankle swelling and weight gain from water weight.

## 2024-02-27 ENCOUNTER — OFFICE VISIT (OUTPATIENT)
Dept: OTOLARYNGOLOGY | Facility: CLINIC | Age: 89
End: 2024-02-27
Payer: MEDICARE

## 2024-02-27 DIAGNOSIS — R04.0 EPISTAXIS: Primary | ICD-10-CM

## 2024-02-27 DIAGNOSIS — J34.2 DEVIATED NASAL SEPTUM: ICD-10-CM

## 2024-02-27 PROCEDURE — 1160F RVW MEDS BY RX/DR IN RCRD: CPT | Performed by: OTOLARYNGOLOGY

## 2024-02-27 PROCEDURE — 1159F MED LIST DOCD IN RCRD: CPT | Performed by: OTOLARYNGOLOGY

## 2024-02-27 PROCEDURE — 1126F AMNT PAIN NOTED NONE PRSNT: CPT | Performed by: OTOLARYNGOLOGY

## 2024-02-27 PROCEDURE — 30901 CONTROL OF NOSEBLEED: CPT | Performed by: OTOLARYNGOLOGY

## 2024-02-27 PROCEDURE — 99203 OFFICE O/P NEW LOW 30 MIN: CPT | Performed by: OTOLARYNGOLOGY

## 2024-02-27 PROCEDURE — 1036F TOBACCO NON-USER: CPT | Performed by: OTOLARYNGOLOGY

## 2024-02-27 NOTE — ED PROVIDER NOTES
HPI   Chief Complaint   Patient presents with    Epistaxis (Nose Bleed)     I was eating dinner and my nose started bleeding       Is a 89-year-old female who presents the emergency department for right-sided nosebleed.  The patient has had history of nosebleeds in the last couple weeks.  She was most recently evaluated in the emergency department last Wednesday for nosebleed.  Nosebleed was controlled with TXA and did not restart.  Patient states since being discharged from the emergency department this past Wednesday, she has not had a nosebleed since this evening.  She has been using humidifiers at home and saline nasal spray daily.  She states that she was eating dinner this evening when she started to bleed out of her right nare.  She was at a restaurant in the restaurant called EMS.  The patient has appoint with ENT tomorrow.        Please see HPI for pertinent positive and negative ROS.                   [place] Coma Scale Score: 15                     Patient History   Past Medical History:   Diagnosis Date    Diabetes mellitus (CMS/HCC)     Disease of thyroid gland     Hypertension      Past Surgical History:   Procedure Laterality Date    APPENDECTOMY      BACK SURGERY      HYSTERECTOMY       Family History   Problem Relation Name Age of Onset    Heart disease Mother      Heart disease Father      Heart disease Brother      No Known Problems Son      Heart disease Maternal Grandmother      Heart disease Maternal Grandfather      Heart disease Paternal Grandmother      Heart disease Paternal Grandfather       Social History     Tobacco Use    Smoking status: Never    Smokeless tobacco: Never   Vaping Use    Vaping Use: Never used   Substance Use Topics    Alcohol use: Never    Drug use: Never       Physical Exam   ED Triage Vitals [02/26/24 1840]   Temperature Heart Rate Respirations BP   36.5 °C (97.7 °F) (!) 103 18 (!) 205/92      Pulse Ox Temp Source Heart Rate Source Patient Position   95 % Axillary --  Sitting      BP Location FiO2 (%)     Right arm --       Physical Exam  GENERAL APPEARANCE: Awake and alert. No acute distress.   VITAL SIGNS: As per the nurses' triage record.  HEENT: Normocephalic, atraumatic. Extraocular muscles are intact. Conjunctiva are pink. Negative scleral icterus. Mucous membranes are moist. Tongue in the midline. Oropharynx clear, uvula midline.  Right nare with dried blood.  There is active bleeding at the anterior septum on the right side.  No bleeding of the left nare.  There is bright red blood in the posterior oropharynx from nosebleed.  NECK:  full gross ROM during exam  MUSCULOSKELETAL:  Full gross active range of motion. Ambulating on own with no acute difficulties  NEUROLOGICAL: Awake, alert and oriented x 3.  IMMUNOLOGICAL: No palpable lymphadenopathy or lymphatic streaking noted on visible skin.  DERM: No petechiae, rashes, or ecchymoses on visible skin  PSYCH: mood and affect appear normal.    ED Course & MDM   Diagnoses as of 02/27/24 0113   Epistaxis       Medical Decision Making  Parts of this chart have been completed using voice recognition software. Please excuse any errors of transcription.  My thought process and reason for plan has been formulated from the time that I saw the patient until the time of disposition and is not specific to one specific moment during their visit and furthermore my MDM encompasses this entire chart and not only this text box.      HPI: Detailed above.    Exam: A medically appropriate exam performed, outlined above, given the known history and presentation.    History obtained from: Patient    Medications given during visit:  Medications   oxymetazoline (Afrin) 0.05 % nasal spray 2 spray (2 sprays Each Nostril Given 2/26/24 1905)   tranexamic acid (Cyklokapron) injection 500 mg (500 mg Topical Given 2/26/24 1910)        Diagnostic/tests  Labs Reviewed - No data to display   No orders to display        Considerations/further MDM:   Patient  was seen in conjucntion with my supervising physician,  Dr. Kyle. Please refer to his note.    Presents emergency department with taxis of right nare.  Afrin was used 2 sprays in right nostril.  TXA was sprayed into right nostril.  Then a gauze soaked pad and TXA and Afrin was applied to right nare.  Direct pressure was held for approximately 30 minutes.  Anterior septum still with active bleeding.  Therefore a Rhino Rocket was placed in right nare.  Prior to placement of right Rhino Rocket, it was soaked in TXA solution.  Patient was observed for 20 minutes after Rhino Rocket placement and there was no bleeding.  The patient has history of recurrent nosebleeds frequently.  She has a ENT appointment tomorrow at 0945.  She was instructed to keep Rhino Rocket in place until she sees ENT tomorrow morning.  She was instructed to return to the emergency department immediately if she has bleeding.  Patient and her family members were at bedside verbalized understanding.  The patient was released in stable condition.      Procedure  Procedures     Gabrielle Godfrey PA-C  02/27/24 0113

## 2024-02-27 NOTE — DISCHARGE INSTRUCTIONS
Please follow-up with the ENT tomorrow at 9:45 AM  Please keep Rhino Rocket in place tonight.  Do not remove Rhino Rocket until you see ENT in the morning.  If bleeding recurs please return to the emergency department immediately

## 2024-02-27 NOTE — PROGRESS NOTES
HPI  Fartun Carey is a 89 y.o. female with 3 episodes of epistaxis in the last few weeks.  She is on aspirin.  She was seen in the emergency room overnight and a Rhino Rocket pack was placed on the right.  She does not have other bleeding disorders.  She has never been cauterized before.      Past Medical History:   Diagnosis Date    Diabetes (CMS/HCC)     Diabetes mellitus (CMS/HCC)     Disease of thyroid gland     Hypertension     Renal disease             Medications:     Current Outpatient Medications:     aspirin 81 mg chewable tablet, Chew 1 tablet (81 mg) once daily., Disp: , Rfl:     atorvastatin (Lipitor) 80 mg tablet, Take 1 tablet (80 mg) by mouth once daily., Disp: , Rfl:     blood sugar diagnostic (Accu-Chek Guide test strips) strip, CHECK ONCE DAILY subcutaneously as directed 90 day(s), Disp: 100 strip, Rfl: 3    cholecalciferol (Vitamin D-3) 25 MCG (1000 UT) capsule, Take 1 capsule (25 mcg) by mouth once daily., Disp: , Rfl:     cloNIDine (Catapres-TTS) 0.1 mg/24 hr patch, Apply one patch on the skin and replace every 7 days, as directed, Disp: 4 patch, Rfl: 1    cyanocobalamin (Vitamin B-12) 1,000 mcg tablet, Take 1 tablet (1,000 mcg) by mouth once daily., Disp: , Rfl:     dicyclomine (Bentyl) 10 mg capsule, Take 1 capsule (10 mg) by mouth 3 times a day., Disp: , Rfl:     esomeprazole (NexIUM) 20 mg DR capsule, Take 1 capsule (20 mg) by mouth once daily., Disp: , Rfl:     estradiol (Estrace) 0.01 % (0.1 mg/gram) vaginal cream, Insert into the vagina once daily., Disp: , Rfl:     levothyroxine (Synthroid, Levoxyl) 88 mcg tablet, Take 1 tablet (88 mcg) by mouth once daily., Disp: 90 tablet, Rfl: 1    losartan (Cozaar) 100 mg tablet, Take 1 tablet (100 mg) by mouth once daily., Disp: , Rfl:     magnesium gluconate 12.5 mg magne- sium (250 mg) tablet, 120 mg., Disp: , Rfl:     SITagliptin phosphate (Januvia) 50 mg tablet, Take 1 tablet (50 mg) by mouth once daily., Disp: 90 tablet, Rfl: 3    vit  C/E/Zn/coppr/lutein/zeaxan (PRESERVISION AREDS-2 ORAL), every 12 hours., Disp: , Rfl:     ciprofloxacin (Cipro) 250 mg tablet, once every 24 hours., Disp: , Rfl:     dapagliflozin propanediol (Farxiga) 5 mg, Take 1 tablet (5 mg) by mouth once daily. (Patient not taking: Reported on 2/27/2024), Disp: 30 tablet, Rfl: 5    estrogens, conjugated, (Premarin) vaginal cream, Insert 0.5 g into the vagina once daily., Disp: , Rfl:   No current facility-administered medications for this visit.     Allergies:  Allergies   Allergen Reactions    Amoxicillin Unknown    Amoxicillin-Pot Clavulanate Diarrhea    Glimepiride Diarrhea    Lisinopril Cough    Metformin Hcl Diarrhea    Tetracycline Hcl Diarrhea    Cephalexin Rash        Physical Exam:  Last Recorded Vitals  There were no vitals taken for this visit.  General:     General appearance: Well-developed, well-nourished in no acute distress.       Voice:  normal       Head/face: Normal appearance; nontender to palpation     Facial nerve function: Normal and symmetric bilaterally.    Oral/oropharynx:     Oral vestibule: Normal labial and gingival mucosa     Tongue/floor of mouth: Normal without lesion     Oropharynx: Clear.  No lesions present of the hard/soft palate, posterior pharynx    Neck:     Neck: Normal appearance, trachea midline     Salivary glands: Normal to palpation bilaterally     Lymph nodes: No cervical lymphadenopathy to palpation     Thyroid: No thyromegaly.  No palpable nodules     Range of motion: Normal    Neurological:     Cortical functions: Alert and oriented x3, appropriate affect       Larynx/hypopharynx:     Laryngeal findings: Mirror exam inadequate or limited secondary to enlarged base of tongue and/or excessive gagging    Ear:     Ear canal: Normal bilaterally     Tympanic membrane: Intact and mobile bilaterally     Pinna: Normal bilaterally     Hearing:  Gross hearing assessment normal by voice    Nose:     Visualized using: Anterior rhinoscopy      Nasopharynx: Inadequate mirror exam secondary to gag, anatomy.       Nasal dorsum: Nontraumatic midline appearance     Septum: Deviation.  Pack removed from the right.  There was an obvious discrete pyogenic granuloma that was suctioned/unroofed from the right anterior septum.  Brisk venous bleeding.  Cauterized with silver nitrate and controlled.     Inferior turbinates: Normally sized     Mucosa: Bilateral, pink, normal appearing       Assessment/Plan   Right anterior septal source, controlled with silver nitrate.  Bacitracin placed over.  She was educated I will see her back with any recurrence         Jorge Murray MD

## 2024-02-27 NOTE — TELEPHONE ENCOUNTER
Please let her know I sent the januvia as 50 mg vs her previous 100 mg due to her current kidney function - but I sent for 90 of them with refills.

## 2024-02-27 NOTE — TELEPHONE ENCOUNTER
Patient called back. She would like to stay on Januvia. She would like a 90 day supply to [facility].    She was in the ER again for a nose bleed. She will see ENT today. She will call with an update after her appointment. She said she is no longer dizzy but still feels unstable on his feet.

## 2024-03-01 ENCOUNTER — LAB (OUTPATIENT)
Dept: LAB | Facility: LAB | Age: 89
End: 2024-03-01
Payer: MEDICARE

## 2024-03-01 ENCOUNTER — HOSPITAL ENCOUNTER (OUTPATIENT)
Dept: RADIOLOGY | Facility: HOSPITAL | Age: 89
Discharge: HOME | End: 2024-03-01
Payer: MEDICARE

## 2024-03-01 DIAGNOSIS — N18.32 CHRONIC KIDNEY DISEASE, STAGE 3B (MULTI): ICD-10-CM

## 2024-03-01 DIAGNOSIS — N39.0 URINARY TRACT INFECTION, SITE NOT SPECIFIED: ICD-10-CM

## 2024-03-01 DIAGNOSIS — I10 ESSENTIAL (PRIMARY) HYPERTENSION: Primary | ICD-10-CM

## 2024-03-01 LAB
ANION GAP SERPL CALC-SCNC: 11 MMOL/L (ref 10–20)
BUN SERPL-MCNC: 28 MG/DL (ref 6–23)
CALCIUM SERPL-MCNC: 9.2 MG/DL (ref 8.6–10.3)
CHLORIDE SERPL-SCNC: 105 MMOL/L (ref 98–107)
CO2 SERPL-SCNC: 27 MMOL/L (ref 21–32)
CREAT SERPL-MCNC: 1.35 MG/DL (ref 0.5–1.05)
EGFRCR SERPLBLD CKD-EPI 2021: 36 ML/MIN/1.73M*2
GLUCOSE SERPL-MCNC: 126 MG/DL (ref 74–99)
MAGNESIUM SERPL-MCNC: 2.01 MG/DL (ref 1.6–2.4)
POTASSIUM SERPL-SCNC: 4.4 MMOL/L (ref 3.5–5.3)
SODIUM SERPL-SCNC: 139 MMOL/L (ref 136–145)

## 2024-03-01 PROCEDURE — 36415 COLL VENOUS BLD VENIPUNCTURE: CPT

## 2024-03-01 PROCEDURE — 76770 US EXAM ABDO BACK WALL COMP: CPT

## 2024-03-12 ENCOUNTER — APPOINTMENT (OUTPATIENT)
Dept: PRIMARY CARE | Facility: CLINIC | Age: 89
End: 2024-03-12
Payer: MEDICARE

## 2024-03-19 ENCOUNTER — APPOINTMENT (OUTPATIENT)
Dept: PRIMARY CARE | Facility: CLINIC | Age: 89
End: 2024-03-19
Payer: MEDICARE

## 2024-04-04 ENCOUNTER — LAB (OUTPATIENT)
Dept: LAB | Facility: LAB | Age: 89
End: 2024-04-04
Payer: MEDICARE

## 2024-04-04 DIAGNOSIS — E11.9 DIABETES MELLITUS WITHOUT COMPLICATION (MULTI): ICD-10-CM

## 2024-04-04 DIAGNOSIS — R53.83 FATIGUE, UNSPECIFIED TYPE: ICD-10-CM

## 2024-04-04 DIAGNOSIS — E78.2 MIXED HYPERLIPIDEMIA: ICD-10-CM

## 2024-04-04 DIAGNOSIS — N39.0 RECURRENT URINARY TRACT INFECTION: ICD-10-CM

## 2024-04-04 LAB
ALBUMIN SERPL BCP-MCNC: 3.6 G/DL (ref 3.4–5)
ALP SERPL-CCNC: 126 U/L (ref 33–136)
ALT SERPL W P-5'-P-CCNC: 10 U/L (ref 7–45)
ANION GAP SERPL CALC-SCNC: 13 MMOL/L (ref 10–20)
APPEARANCE UR: CLEAR
AST SERPL W P-5'-P-CCNC: 11 U/L (ref 9–39)
BASOPHILS # BLD AUTO: 0.03 X10*3/UL (ref 0–0.1)
BASOPHILS NFR BLD AUTO: 0.4 %
BILIRUB SERPL-MCNC: 0.3 MG/DL (ref 0–1.2)
BILIRUB UR STRIP.AUTO-MCNC: NEGATIVE MG/DL
BUN SERPL-MCNC: 21 MG/DL (ref 6–23)
CALCIUM SERPL-MCNC: 8.9 MG/DL (ref 8.6–10.3)
CHLORIDE SERPL-SCNC: 107 MMOL/L (ref 98–107)
CHOLEST SERPL-MCNC: 125 MG/DL (ref 0–199)
CHOLESTEROL/HDL RATIO: 2.9
CO2 SERPL-SCNC: 27 MMOL/L (ref 21–32)
COLOR UR: YELLOW
CREAT SERPL-MCNC: 1.12 MG/DL (ref 0.5–1.05)
EGFRCR SERPLBLD CKD-EPI 2021: 45 ML/MIN/1.73M*2
EOSINOPHIL # BLD AUTO: 0.16 X10*3/UL (ref 0–0.4)
EOSINOPHIL NFR BLD AUTO: 2 %
ERYTHROCYTE [DISTWIDTH] IN BLOOD BY AUTOMATED COUNT: 13.7 % (ref 11.5–14.5)
EST. AVERAGE GLUCOSE BLD GHB EST-MCNC: 171 MG/DL
GLUCOSE SERPL-MCNC: 99 MG/DL (ref 74–99)
GLUCOSE UR STRIP.AUTO-MCNC: NEGATIVE MG/DL
HBA1C MFR BLD: 7.6 %
HCT VFR BLD AUTO: 31.4 % (ref 36–46)
HDLC SERPL-MCNC: 43.7 MG/DL
HGB BLD-MCNC: 9.6 G/DL (ref 12–16)
HOLD SPECIMEN: NORMAL
HYALINE CASTS #/AREA URNS AUTO: ABNORMAL /LPF
IMM GRANULOCYTES # BLD AUTO: 0.03 X10*3/UL (ref 0–0.5)
IMM GRANULOCYTES NFR BLD AUTO: 0.4 % (ref 0–0.9)
KETONES UR STRIP.AUTO-MCNC: NEGATIVE MG/DL
LDLC SERPL CALC-MCNC: 44 MG/DL
LEUKOCYTE ESTERASE UR QL STRIP.AUTO: ABNORMAL
LYMPHOCYTES # BLD AUTO: 2.9 X10*3/UL (ref 0.8–3)
LYMPHOCYTES NFR BLD AUTO: 36.9 %
MCH RBC QN AUTO: 28.7 PG (ref 26–34)
MCHC RBC AUTO-ENTMCNC: 30.6 G/DL (ref 32–36)
MCV RBC AUTO: 94 FL (ref 80–100)
MONOCYTES # BLD AUTO: 0.77 X10*3/UL (ref 0.05–0.8)
MONOCYTES NFR BLD AUTO: 9.8 %
MUCOUS THREADS #/AREA URNS AUTO: ABNORMAL /LPF
NEUTROPHILS # BLD AUTO: 3.96 X10*3/UL (ref 1.6–5.5)
NEUTROPHILS NFR BLD AUTO: 50.5 %
NITRITE UR QL STRIP.AUTO: NEGATIVE
NON HDL CHOLESTEROL: 81 MG/DL (ref 0–149)
NRBC BLD-RTO: 0 /100 WBCS (ref 0–0)
PH UR STRIP.AUTO: 5 [PH]
PLATELET # BLD AUTO: 234 X10*3/UL (ref 150–450)
POTASSIUM SERPL-SCNC: 4.1 MMOL/L (ref 3.5–5.3)
PROT SERPL-MCNC: 6.1 G/DL (ref 6.4–8.2)
PROT UR STRIP.AUTO-MCNC: NEGATIVE MG/DL
RBC # BLD AUTO: 3.35 X10*6/UL (ref 4–5.2)
RBC # UR STRIP.AUTO: NEGATIVE /UL
RBC #/AREA URNS AUTO: ABNORMAL /HPF
SODIUM SERPL-SCNC: 143 MMOL/L (ref 136–145)
SP GR UR STRIP.AUTO: 1.01
SQUAMOUS #/AREA URNS AUTO: ABNORMAL /HPF
TRIGL SERPL-MCNC: 189 MG/DL (ref 0–149)
UROBILINOGEN UR STRIP.AUTO-MCNC: <2 MG/DL
VLDL: 38 MG/DL (ref 0–40)
WBC # BLD AUTO: 7.9 X10*3/UL (ref 4.4–11.3)
WBC #/AREA URNS AUTO: ABNORMAL /HPF

## 2024-04-04 PROCEDURE — 87086 URINE CULTURE/COLONY COUNT: CPT

## 2024-04-04 PROCEDURE — 36415 COLL VENOUS BLD VENIPUNCTURE: CPT

## 2024-04-04 PROCEDURE — 83036 HEMOGLOBIN GLYCOSYLATED A1C: CPT

## 2024-04-05 LAB — BACTERIA UR CULT: NORMAL

## 2024-04-08 ENCOUNTER — OFFICE VISIT (OUTPATIENT)
Dept: PRIMARY CARE | Facility: CLINIC | Age: 89
End: 2024-04-08
Payer: MEDICARE

## 2024-04-08 VITALS
HEIGHT: 61 IN | OXYGEN SATURATION: 98 % | BODY MASS INDEX: 31.34 KG/M2 | HEART RATE: 64 BPM | SYSTOLIC BLOOD PRESSURE: 130 MMHG | DIASTOLIC BLOOD PRESSURE: 60 MMHG | WEIGHT: 166 LBS

## 2024-04-08 DIAGNOSIS — E78.2 MIXED HYPERLIPIDEMIA: ICD-10-CM

## 2024-04-08 DIAGNOSIS — N18.32 STAGE 3B CHRONIC KIDNEY DISEASE (MULTI): ICD-10-CM

## 2024-04-08 DIAGNOSIS — I10 PRIMARY HYPERTENSION: ICD-10-CM

## 2024-04-08 DIAGNOSIS — E11.9 TYPE 2 DIABETES MELLITUS WITHOUT COMPLICATION, WITHOUT LONG-TERM CURRENT USE OF INSULIN (MULTI): ICD-10-CM

## 2024-04-08 DIAGNOSIS — D64.9 ANEMIA, UNSPECIFIED TYPE: Primary | ICD-10-CM

## 2024-04-08 PROCEDURE — 3075F SYST BP GE 130 - 139MM HG: CPT | Performed by: FAMILY MEDICINE

## 2024-04-08 PROCEDURE — 1123F ACP DISCUSS/DSCN MKR DOCD: CPT | Performed by: FAMILY MEDICINE

## 2024-04-08 PROCEDURE — 1126F AMNT PAIN NOTED NONE PRSNT: CPT | Performed by: FAMILY MEDICINE

## 2024-04-08 PROCEDURE — 99214 OFFICE O/P EST MOD 30 MIN: CPT | Performed by: FAMILY MEDICINE

## 2024-04-08 PROCEDURE — 1160F RVW MEDS BY RX/DR IN RCRD: CPT | Performed by: FAMILY MEDICINE

## 2024-04-08 PROCEDURE — 1158F ADVNC CARE PLAN TLK DOCD: CPT | Performed by: FAMILY MEDICINE

## 2024-04-08 PROCEDURE — 1159F MED LIST DOCD IN RCRD: CPT | Performed by: FAMILY MEDICINE

## 2024-04-08 PROCEDURE — 1036F TOBACCO NON-USER: CPT | Performed by: FAMILY MEDICINE

## 2024-04-08 PROCEDURE — 3078F DIAST BP <80 MM HG: CPT | Performed by: FAMILY MEDICINE

## 2024-04-08 RX ORDER — CLONIDINE HYDROCHLORIDE 0.2 MG/1
0.2 TABLET ORAL 2 TIMES DAILY
COMMUNITY

## 2024-04-08 RX ORDER — HYDRALAZINE HYDROCHLORIDE 50 MG/1
50 TABLET, FILM COATED ORAL 2 TIMES DAILY
COMMUNITY

## 2024-04-08 ASSESSMENT — ENCOUNTER SYMPTOMS
WHEEZING: 0
PALPITATIONS: 0
CHILLS: 0
OCCASIONAL FEELINGS OF UNSTEADINESS: 0
DEPRESSION: 0
CONSTIPATION: 0
ARTHRALGIAS: 1
VOMITING: 0
MYALGIAS: 0
TREMORS: 0
FEVER: 0
SHORTNESS OF BREATH: 0
LOSS OF SENSATION IN FEET: 0
NAUSEA: 0
COUGH: 0
WEAKNESS: 0
FATIGUE: 1

## 2024-04-08 ASSESSMENT — PAIN SCALES - GENERAL: PAINLEVEL: 0-NO PAIN

## 2024-04-08 ASSESSMENT — PATIENT HEALTH QUESTIONNAIRE - PHQ9
SUM OF ALL RESPONSES TO PHQ9 QUESTIONS 1 AND 2: 0
1. LITTLE INTEREST OR PLEASURE IN DOING THINGS: NOT AT ALL
2. FEELING DOWN, DEPRESSED OR HOPELESS: NOT AT ALL

## 2024-04-08 NOTE — PROGRESS NOTES
"Subjective   Patient ID: Fartun Carey is a 89 y.o. female who presents for Follow-up.     Hypertension:          Patient here for F/U. She has been having increased BP recently and Dr Torres added hydralazine and clonidine - she is having side effects to that - but getting better         Med compliance yes.          Med side effects none.      Hyperlipidemia:          Patient here for F/U. tolerating medicine well, stable.          Labs at target goal.      Diabetes Mellitus:          Follow Up tolerating meds - improved control.          The frequency of exercise that the patient achieves is 4-5, times per week .          Dietary measures include portion control, carb control .          Hypoglycemic episodes are none recently.          The results of the last HbgA1c were above goal and worsening.      Hypothyroidism:          Follow Up taking hormone supplement routinely due for a tsh.          Hypothyroidism tolerating meds with no side effects.    Recently anemic - chronic, stable- denies any signs of blood loss - does not want further testing at this time - agrees to recheck labs to see if it worsens         Review of Systems   Constitutional:  Positive for fatigue. Negative for chills and fever.   HENT:  Negative for ear pain and postnasal drip.    Respiratory:  Negative for cough, shortness of breath and wheezing.    Cardiovascular:  Negative for chest pain, palpitations and leg swelling.   Gastrointestinal:  Negative for constipation, nausea and vomiting.   Musculoskeletal:  Positive for arthralgias. Negative for myalgias.   Neurological:  Negative for tremors and weakness.       Objective   /60   Pulse 64   Ht 1.549 m (5' 1\")   Wt 75.3 kg (166 lb)   SpO2 98%   BMI 31.37 kg/m²     Physical Exam  Vitals reviewed.   Constitutional:       General: She is not in acute distress.     Appearance: Normal appearance. She is not ill-appearing.   HENT:      Right Ear: Tympanic membrane normal.      Left " Ear: Tympanic membrane normal.   Cardiovascular:      Rate and Rhythm: Normal rate and regular rhythm.      Heart sounds: Normal heart sounds. No murmur heard.  Pulmonary:      Breath sounds: Normal breath sounds.   Musculoskeletal:         General: No swelling.      Cervical back: Neck supple.   Skin:     Findings: No rash.   Neurological:      General: No focal deficit present.      Mental Status: She is alert and oriented to person, place, and time.   Psychiatric:         Mood and Affect: Mood normal.         Behavior: Behavior normal.         Assessment/Plan   Problem List Items Addressed This Visit             ICD-10-CM    Hyperlipidemia E78.5    Hypertension I10     Other Visit Diagnoses         Codes    Anemia, unspecified type    -  Primary D64.9    Relevant Orders    CBC and Auto Differential    Iron and TIBC    Stage 3b chronic kidney disease (CMS/[facility])     N18.32    slightly improved     Relevant Orders    Basic Metabolic Panel    Type 2 diabetes mellitus without complication, without long-term current use of insulin (CMS/[facility])     E11.9

## 2024-04-12 DIAGNOSIS — N18.32 CHRONIC KIDNEY DISEASE, STAGE 3B (MULTI): Primary | ICD-10-CM

## 2024-04-18 ENCOUNTER — LAB (OUTPATIENT)
Dept: LAB | Facility: LAB | Age: 89
End: 2024-04-18
Payer: MEDICARE

## 2024-04-18 DIAGNOSIS — N18.32 CHRONIC KIDNEY DISEASE, STAGE 3B (MULTI): Primary | ICD-10-CM

## 2024-04-18 LAB
ALBUMIN SERPL BCP-MCNC: 3.8 G/DL (ref 3.4–5)
ANION GAP SERPL CALC-SCNC: 13 MMOL/L (ref 10–20)
APPEARANCE UR: CLEAR
BILIRUB UR STRIP.AUTO-MCNC: NEGATIVE MG/DL
BUN SERPL-MCNC: 28 MG/DL (ref 6–23)
CALCIUM SERPL-MCNC: 9.2 MG/DL (ref 8.6–10.3)
CHLORIDE SERPL-SCNC: 102 MMOL/L (ref 98–107)
CO2 SERPL-SCNC: 29 MMOL/L (ref 21–32)
COLOR UR: COLORLESS
CREAT SERPL-MCNC: 1.37 MG/DL (ref 0.5–1.05)
EGFRCR SERPLBLD CKD-EPI 2021: 36 ML/MIN/1.73M*2
ERYTHROCYTE [DISTWIDTH] IN BLOOD BY AUTOMATED COUNT: 14.3 % (ref 11.5–14.5)
GLUCOSE SERPL-MCNC: 115 MG/DL (ref 74–99)
GLUCOSE UR STRIP.AUTO-MCNC: NORMAL MG/DL
HCT VFR BLD AUTO: 33.3 % (ref 36–46)
HGB BLD-MCNC: 10.4 G/DL (ref 12–16)
HOLD SPECIMEN: NORMAL
HYALINE CASTS #/AREA URNS AUTO: ABNORMAL /LPF
KETONES UR STRIP.AUTO-MCNC: NEGATIVE MG/DL
LEUKOCYTE ESTERASE UR QL STRIP.AUTO: ABNORMAL
MCH RBC QN AUTO: 28.7 PG (ref 26–34)
MCHC RBC AUTO-ENTMCNC: 31.2 G/DL (ref 32–36)
MCV RBC AUTO: 92 FL (ref 80–100)
MUCOUS THREADS #/AREA URNS AUTO: ABNORMAL /LPF
NITRITE UR QL STRIP.AUTO: NEGATIVE
NRBC BLD-RTO: 0 /100 WBCS (ref 0–0)
PH UR STRIP.AUTO: 5 [PH]
PHOSPHATE SERPL-MCNC: 3.9 MG/DL (ref 2.5–4.9)
PLATELET # BLD AUTO: 217 X10*3/UL (ref 150–450)
POTASSIUM SERPL-SCNC: 3.6 MMOL/L (ref 3.5–5.3)
PROT UR STRIP.AUTO-MCNC: NEGATIVE MG/DL
RBC # BLD AUTO: 3.62 X10*6/UL (ref 4–5.2)
RBC # UR STRIP.AUTO: NEGATIVE /UL
RBC #/AREA URNS AUTO: ABNORMAL /HPF
SODIUM SERPL-SCNC: 140 MMOL/L (ref 136–145)
SP GR UR STRIP.AUTO: 1.01
SQUAMOUS #/AREA URNS AUTO: ABNORMAL /HPF
UROBILINOGEN UR STRIP.AUTO-MCNC: NORMAL MG/DL
WBC # BLD AUTO: 10.7 X10*3/UL (ref 4.4–11.3)
WBC #/AREA URNS AUTO: ABNORMAL /HPF

## 2024-04-18 PROCEDURE — 83970 ASSAY OF PARATHORMONE: CPT

## 2024-04-18 PROCEDURE — 87086 URINE CULTURE/COLONY COUNT: CPT

## 2024-04-18 PROCEDURE — 36415 COLL VENOUS BLD VENIPUNCTURE: CPT

## 2024-04-19 LAB — PTH-INTACT SERPL-MCNC: 79.4 PG/ML (ref 18.5–88)

## 2024-04-20 LAB — BACTERIA UR CULT: NORMAL

## 2024-04-24 ENCOUNTER — PROCEDURE VISIT (OUTPATIENT)
Dept: PODIATRY | Facility: CLINIC | Age: 89
End: 2024-04-24
Payer: MEDICARE

## 2024-04-24 DIAGNOSIS — E11.9 DIABETES MELLITUS WITHOUT COMPLICATION (MULTI): Primary | ICD-10-CM

## 2024-04-24 DIAGNOSIS — M79.672 PAIN IN BOTH FEET: ICD-10-CM

## 2024-04-24 DIAGNOSIS — M79.671 PAIN IN BOTH FEET: ICD-10-CM

## 2024-04-24 PROCEDURE — 99212 OFFICE O/P EST SF 10 MIN: CPT | Performed by: PODIATRIST

## 2024-04-27 NOTE — PROGRESS NOTES
History of Present Illness:   Patient states they are here for Dm exam  Denies NTB to feet  Most recent A1C is 7.6  Increased from 6.8  Unable to safely trim nails on own      Past Medical History  Past Medical History:   Diagnosis Date    Diabetes mellitus (CMS/[facility])     Disease of thyroid gland     Hypertension        Medications and Allergies have been reviewed.    Review Of Systems:  GENERAL: No weight loss, malaise or fevers.  HEENT: Negative for frequent or significant headaches,   RESPIRATORY: Negative for cough, wheezing or shortness of breath.  CARDIOVASCULAR: Negative for chest pain, leg swelling or palpitations.    Physical Exam:  Vascular exam: Dorsalis pedis and Posterior Tibial pulses palpable 2/4 bilateral. Capillary refill time less than 3 seconds b/l. No Hair growth noted to digits. No edema noted. Skin temp warm to warm from proximal to distal b/l. +varicosities noted.     Neurologic exam: Vibratory, protective and proprioception intact b/l. No neurologic deficits noted.      Musculoskeletal exam: Muscle strength 5/5 for all major muscle groups tested in the lower extremity b/l. No gross deformities noted b/l.      Dermatologic exam: Nails 1-5 b/l are thickened, elongated and discolored. Webspaces 1-4 b/l are clean, dry and intact. No primary or secondary skin lesions noted. No open lesions or wounds noted at this time .      1. Diabetes mellitus without complication (CMS/[facility])        2. Complication of diabetes mellitus (CMS/[facility])        3. Pain in both feet        4. Comprehensive diabetic foot examination, type 2 DM, encounter for (CMS/[facility])            Patient examined and evaluated.   Patient educated on proper diabetic foot care and education dispensed.  Nails 1-5 b/l were debrided in thickness and length with nail cutting forceps.  A1C revd, 7.6  Keep sugars under 9  low pedal risk noted at this time\  Patient to follow up in 6 mos or sooner if any problems arise.   Patient was in agreement to  this plan. All questions answered.       Gabriela Zamora, JOCELYN  [phone]  [facility]  Fax: [phone]

## 2024-05-01 ENCOUNTER — TELEPHONE (OUTPATIENT)
Dept: PRIMARY CARE | Facility: CLINIC | Age: 89
End: 2024-05-01
Payer: MEDICARE

## 2024-05-01 DIAGNOSIS — R21 RASH: Primary | ICD-10-CM

## 2024-05-01 RX ORDER — MUPIROCIN 20 MG/G
OINTMENT TOPICAL 3 TIMES DAILY
Qty: 30 G | Refills: 0 | Status: SHIPPED | OUTPATIENT
Start: 2024-05-01 | End: 2024-05-07 | Stop reason: SDUPTHER

## 2024-05-01 NOTE — TELEPHONE ENCOUNTER
Patient has a rash on her buttocks. She said it is not itchy or painful. She said it is red and bumpy. She has been using hydrocortisone cream and it is not helping. She is wondering if you can send something in? [facility] in [place].

## 2024-05-07 ENCOUNTER — OFFICE VISIT (OUTPATIENT)
Dept: PRIMARY CARE | Facility: CLINIC | Age: 89
End: 2024-05-07
Payer: MEDICARE

## 2024-05-07 VITALS — BODY MASS INDEX: 29.29 KG/M2 | WEIGHT: 155 LBS | DIASTOLIC BLOOD PRESSURE: 50 MMHG | SYSTOLIC BLOOD PRESSURE: 140 MMHG

## 2024-05-07 DIAGNOSIS — G62.9 NEUROPATHY: ICD-10-CM

## 2024-05-07 DIAGNOSIS — R30.0 DYSURIA: ICD-10-CM

## 2024-05-07 DIAGNOSIS — R21 RASH: ICD-10-CM

## 2024-05-07 DIAGNOSIS — B02.8 HERPES ZOSTER WITH COMPLICATION: Primary | ICD-10-CM

## 2024-05-07 LAB
POC APPEARANCE, URINE: CLEAR
POC BILIRUBIN, URINE: NEGATIVE
POC BLOOD, URINE: NEGATIVE
POC COLOR, URINE: YELLOW
POC GLUCOSE, URINE: NEGATIVE MG/DL
POC KETONES, URINE: NEGATIVE MG/DL
POC LEUKOCYTES, URINE: ABNORMAL
POC NITRITE,URINE: NEGATIVE
POC PH, URINE: 5.5 PH
POC PROTEIN, URINE: ABNORMAL MG/DL
POC SPECIFIC GRAVITY, URINE: 1.01
POC UROBILINOGEN, URINE: 0.2 EU/DL

## 2024-05-07 PROCEDURE — 1160F RVW MEDS BY RX/DR IN RCRD: CPT | Performed by: FAMILY MEDICINE

## 2024-05-07 PROCEDURE — 3078F DIAST BP <80 MM HG: CPT | Performed by: FAMILY MEDICINE

## 2024-05-07 PROCEDURE — 81003 URINALYSIS AUTO W/O SCOPE: CPT | Mod: 91 | Performed by: FAMILY MEDICINE

## 2024-05-07 PROCEDURE — 3077F SYST BP >= 140 MM HG: CPT | Performed by: FAMILY MEDICINE

## 2024-05-07 PROCEDURE — 99214 OFFICE O/P EST MOD 30 MIN: CPT | Performed by: FAMILY MEDICINE

## 2024-05-07 PROCEDURE — G2211 COMPLEX E/M VISIT ADD ON: HCPCS | Performed by: FAMILY MEDICINE

## 2024-05-07 PROCEDURE — 87086 URINE CULTURE/COLONY COUNT: CPT | Mod: WESLAB | Performed by: FAMILY MEDICINE

## 2024-05-07 PROCEDURE — 1159F MED LIST DOCD IN RCRD: CPT | Performed by: FAMILY MEDICINE

## 2024-05-07 PROCEDURE — 1036F TOBACCO NON-USER: CPT | Performed by: FAMILY MEDICINE

## 2024-05-07 PROCEDURE — 1126F AMNT PAIN NOTED NONE PRSNT: CPT | Performed by: FAMILY MEDICINE

## 2024-05-07 RX ORDER — GABAPENTIN 100 MG/1
100 CAPSULE ORAL 3 TIMES DAILY PRN
Qty: 50 CAPSULE | Refills: 0 | Status: SHIPPED | OUTPATIENT
Start: 2024-05-07 | End: 2024-06-06

## 2024-05-07 RX ORDER — MUPIROCIN 20 MG/G
OINTMENT TOPICAL 3 TIMES DAILY
Qty: 30 G | Refills: 0 | Status: SHIPPED | OUTPATIENT
Start: 2024-05-07 | End: 2024-05-17

## 2024-05-07 ASSESSMENT — ENCOUNTER SYMPTOMS
CONFUSION: 0
NERVOUS/ANXIOUS: 0
OCCASIONAL FEELINGS OF UNSTEADINESS: 0
ARTHRALGIAS: 1
SHORTNESS OF BREATH: 0
DIARRHEA: 0
TREMORS: 0
CHILLS: 0
MYALGIAS: 1
FEVER: 0
DIZZINESS: 0
FREQUENCY: 0
WEAKNESS: 0
ANOREXIA: 1
LOSS OF SENSATION IN FEET: 0
PALPITATIONS: 0
CONSTIPATION: 0
HEMATURIA: 0
VOMITING: 0
FATIGUE: 1
NAUSEA: 0
COUGH: 0
WHEEZING: 0
DEPRESSION: 0

## 2024-05-07 ASSESSMENT — PAIN SCALES - GENERAL: PAINLEVEL: 0-NO PAIN

## 2024-05-07 NOTE — PROGRESS NOTES
Subjective   Patient ID: Fartun Carey is a 89 y.o. female who presents for UTI.    She had a new onset rash on the left side of her buttock, happened over a week ago, no itching, minimal pain - having incontinence. Concerns with a UTI - but minimal dysuria, no fevers or chills    Rash  This is a new problem. The current episode started in the past 7 days. The problem has been gradually improving since onset. The affected locations include the left hip. The rash is characterized by blistering. She was exposed to nothing. Associated symptoms include anorexia, fatigue and joint pain. Pertinent negatives include no congestion, cough, diarrhea, fever, shortness of breath or vomiting. Past treatments include analgesics. The treatment provided mild relief.        Review of Systems   Constitutional:  Positive for fatigue. Negative for chills and fever.   HENT:  Negative for congestion, ear pain and postnasal drip.    Respiratory:  Negative for cough, shortness of breath and wheezing.    Cardiovascular:  Negative for chest pain, palpitations and leg swelling.   Gastrointestinal:  Positive for anorexia. Negative for constipation, diarrhea, nausea and vomiting.   Endocrine: Negative for cold intolerance.   Genitourinary:  Negative for frequency and hematuria.   Musculoskeletal:  Positive for arthralgias, joint pain and myalgias.   Skin:  Positive for rash.   Neurological:  Negative for dizziness, tremors and weakness.   Psychiatric/Behavioral:  Negative for confusion. The patient is not nervous/anxious.        Objective   /50   Wt 70.3 kg (155 lb)   BMI 29.29 kg/m²     Physical Exam  Vitals reviewed.   Constitutional:       General: She is not in acute distress.     Appearance: Normal appearance. She is not ill-appearing.   Cardiovascular:      Rate and Rhythm: Normal rate and regular rhythm.      Heart sounds: Normal heart sounds. No murmur heard.  Pulmonary:      Breath sounds: Normal breath sounds.   Abdominal:       General: Abdomen is flat. Bowel sounds are normal.      Palpations: Abdomen is soft.   Skin:     Findings: No rash.      Comments: Shingles rash - slight scabbing on her left buttock area   Neurological:      General: No focal deficit present.      Mental Status: She is alert and oriented to person, place, and time.   Psychiatric:         Mood and Affect: Mood normal.         Behavior: Behavior normal.         Assessment/Plan   Problem List Items Addressed This Visit    None  Visit Diagnoses         Codes    Herpes zoster with complication    -  Primary B02.8    symptoms started 1 week ago     Dysuria     R30.0    Relevant Orders    POCT UA Automated manually resulted (Completed)    Urine Culture    Rash     R21    Relevant Medications    mupirocin (Bactroban) 2 % ointment    Neuropathy     G62.9    Relevant Medications    gabapentin (Neurontin) 100 mg capsule

## 2024-05-09 ENCOUNTER — TELEPHONE (OUTPATIENT)
Dept: PRIMARY CARE | Facility: CLINIC | Age: 89
End: 2024-05-09
Payer: MEDICARE

## 2024-05-09 LAB — BACTERIA UR CULT: NO GROWTH

## 2024-05-09 NOTE — TELEPHONE ENCOUNTER
Patient notified of recent urine culture results. She said she is feeling good. She said she took 1 Gabapentin on Tuesday and Wednesday. She is feeling good today so she will take another one today.

## 2024-05-09 NOTE — TELEPHONE ENCOUNTER
----- Message from Jose Alejandro Hamilton MD sent at 5/9/2024  1:09 PM EDT -----    ----- Message -----  From: Bryanna Langley MA  Sent: 5/7/2024   1:59 PM EDT  To: Jose Alejandro Hamilton MD

## 2024-05-30 DIAGNOSIS — R19.7 DIARRHEA, UNSPECIFIED TYPE: ICD-10-CM

## 2024-05-30 RX ORDER — DICYCLOMINE HYDROCHLORIDE 10 MG/1
10 CAPSULE ORAL 3 TIMES DAILY
Qty: 270 CAPSULE | Refills: 1 | Status: SHIPPED | OUTPATIENT
Start: 2024-05-30 | End: 2024-11-26

## 2024-07-09 ENCOUNTER — TELEPHONE (OUTPATIENT)
Dept: PRIMARY CARE | Facility: CLINIC | Age: 89
End: 2024-07-09
Payer: MEDICARE

## 2024-07-09 DIAGNOSIS — R73.03 PREDIABETES: Primary | ICD-10-CM

## 2024-07-09 DIAGNOSIS — E03.9 ACQUIRED HYPOTHYROIDISM: ICD-10-CM

## 2024-07-09 DIAGNOSIS — R53.83 FATIGUE, UNSPECIFIED TYPE: ICD-10-CM

## 2024-07-09 DIAGNOSIS — N39.0 RECURRENT URINARY TRACT INFECTION: ICD-10-CM

## 2024-07-09 DIAGNOSIS — E55.9 VITAMIN D DEFICIENCY: ICD-10-CM

## 2024-07-09 DIAGNOSIS — D64.9 ANEMIA, UNSPECIFIED TYPE: ICD-10-CM

## 2024-07-09 DIAGNOSIS — E78.2 MIXED HYPERLIPIDEMIA: ICD-10-CM

## 2024-07-10 NOTE — PROGRESS NOTES
Orders placed for her to go to the [place] lab fasting and get labs and a urine sample -okay to go anytime

## 2024-07-16 ENCOUNTER — APPOINTMENT (OUTPATIENT)
Dept: PRIMARY CARE | Facility: CLINIC | Age: 89
End: 2024-07-16
Payer: MEDICARE

## 2024-07-23 ENCOUNTER — LAB (OUTPATIENT)
Dept: LAB | Facility: LAB | Age: 89
End: 2024-07-23
Payer: MEDICARE

## 2024-07-23 DIAGNOSIS — E03.9 ACQUIRED HYPOTHYROIDISM: ICD-10-CM

## 2024-07-23 DIAGNOSIS — E55.9 VITAMIN D DEFICIENCY: ICD-10-CM

## 2024-07-23 DIAGNOSIS — R73.03 PREDIABETES: ICD-10-CM

## 2024-07-23 DIAGNOSIS — D64.9 ANEMIA, UNSPECIFIED TYPE: ICD-10-CM

## 2024-07-23 DIAGNOSIS — N39.0 RECURRENT URINARY TRACT INFECTION: ICD-10-CM

## 2024-07-23 DIAGNOSIS — E78.2 MIXED HYPERLIPIDEMIA: ICD-10-CM

## 2024-07-23 LAB
ALBUMIN SERPL BCP-MCNC: 3.9 G/DL (ref 3.4–5)
ALP SERPL-CCNC: 93 U/L (ref 33–136)
ALT SERPL W P-5'-P-CCNC: 11 U/L (ref 7–45)
ANION GAP SERPL CALC-SCNC: 12 MMOL/L (ref 10–20)
APPEARANCE UR: CLEAR
AST SERPL W P-5'-P-CCNC: 15 U/L (ref 9–39)
BILIRUB SERPL-MCNC: 0.5 MG/DL (ref 0–1.2)
BILIRUB UR STRIP.AUTO-MCNC: NEGATIVE MG/DL
BUN SERPL-MCNC: 33 MG/DL (ref 6–23)
CALCIUM SERPL-MCNC: 9.3 MG/DL (ref 8.6–10.3)
CHLORIDE SERPL-SCNC: 105 MMOL/L (ref 98–107)
CHOLEST SERPL-MCNC: 136 MG/DL (ref 0–199)
CHOLESTEROL/HDL RATIO: 3
CO2 SERPL-SCNC: 27 MMOL/L (ref 21–32)
COLOR UR: COLORLESS
CREAT SERPL-MCNC: 1.27 MG/DL (ref 0.5–1.05)
EGFRCR SERPLBLD CKD-EPI 2021: 39 ML/MIN/1.73M*2
GLUCOSE SERPL-MCNC: 103 MG/DL (ref 74–99)
GLUCOSE UR STRIP.AUTO-MCNC: NORMAL MG/DL
HDLC SERPL-MCNC: 44.7 MG/DL
HOLD SPECIMEN: NORMAL
HYALINE CASTS #/AREA URNS AUTO: ABNORMAL /LPF
IRON SATN MFR SERPL: 38 % (ref 25–45)
IRON SERPL-MCNC: 130 UG/DL (ref 35–150)
KETONES UR STRIP.AUTO-MCNC: NEGATIVE MG/DL
LDLC SERPL CALC-MCNC: 47 MG/DL
LEUKOCYTE ESTERASE UR QL STRIP.AUTO: ABNORMAL
MUCOUS THREADS #/AREA URNS AUTO: ABNORMAL /LPF
NITRITE UR QL STRIP.AUTO: NEGATIVE
NON HDL CHOLESTEROL: 91 MG/DL (ref 0–149)
PH UR STRIP.AUTO: 5 [PH]
POTASSIUM SERPL-SCNC: 4.1 MMOL/L (ref 3.5–5.3)
PROT SERPL-MCNC: 6.6 G/DL (ref 6.4–8.2)
PROT UR STRIP.AUTO-MCNC: NEGATIVE MG/DL
RBC # UR STRIP.AUTO: NEGATIVE /UL
RBC #/AREA URNS AUTO: ABNORMAL /HPF
SODIUM SERPL-SCNC: 140 MMOL/L (ref 136–145)
SP GR UR STRIP.AUTO: 1.01
SQUAMOUS #/AREA URNS AUTO: ABNORMAL /HPF
T4 FREE SERPL-MCNC: 1.01 NG/DL (ref 0.61–1.12)
TIBC SERPL-MCNC: 345 UG/DL (ref 240–445)
TRANS CELLS #/AREA UR COMP ASSIST: ABNORMAL /HPF
TRIGL SERPL-MCNC: 220 MG/DL (ref 0–149)
TSH SERPL-ACNC: 1.4 MIU/L (ref 0.44–3.98)
UIBC SERPL-MCNC: 215 UG/DL (ref 110–370)
UROBILINOGEN UR STRIP.AUTO-MCNC: NORMAL MG/DL
VLDL: 44 MG/DL (ref 0–40)
WBC #/AREA URNS AUTO: ABNORMAL /HPF

## 2024-07-23 PROCEDURE — 36415 COLL VENOUS BLD VENIPUNCTURE: CPT

## 2024-07-23 PROCEDURE — 85025 COMPLETE CBC W/AUTO DIFF WBC: CPT

## 2024-07-23 PROCEDURE — 87086 URINE CULTURE/COLONY COUNT: CPT

## 2024-07-23 PROCEDURE — 82607 VITAMIN B-12: CPT

## 2024-07-23 PROCEDURE — 83036 HEMOGLOBIN GLYCOSYLATED A1C: CPT

## 2024-07-23 PROCEDURE — 82306 VITAMIN D 25 HYDROXY: CPT

## 2024-07-24 ENCOUNTER — TELEPHONE (OUTPATIENT)
Dept: PRIMARY CARE | Facility: CLINIC | Age: 89
End: 2024-07-24
Payer: MEDICARE

## 2024-07-24 LAB
25(OH)D3 SERPL-MCNC: 36 NG/ML (ref 30–100)
BASOPHILS # BLD AUTO: 0.03 X10*3/UL (ref 0–0.1)
BASOPHILS NFR BLD AUTO: 0.4 %
EOSINOPHIL # BLD AUTO: 0.18 X10*3/UL (ref 0–0.4)
EOSINOPHIL NFR BLD AUTO: 2.4 %
ERYTHROCYTE [DISTWIDTH] IN BLOOD BY AUTOMATED COUNT: 16.4 % (ref 11.5–14.5)
EST. AVERAGE GLUCOSE BLD GHB EST-MCNC: 154 MG/DL
HBA1C MFR BLD: 7 %
HCT VFR BLD AUTO: 34.5 % (ref 36–46)
HGB BLD-MCNC: 10.7 G/DL (ref 12–16)
IMM GRANULOCYTES # BLD AUTO: 0.01 X10*3/UL (ref 0–0.5)
IMM GRANULOCYTES NFR BLD AUTO: 0.1 % (ref 0–0.9)
LYMPHOCYTES # BLD AUTO: 3.05 X10*3/UL (ref 0.8–3)
LYMPHOCYTES NFR BLD AUTO: 39.9 %
MCH RBC QN AUTO: 29.4 PG (ref 26–34)
MCHC RBC AUTO-ENTMCNC: 31 G/DL (ref 32–36)
MCV RBC AUTO: 95 FL (ref 80–100)
MONOCYTES # BLD AUTO: 0.88 X10*3/UL (ref 0.05–0.8)
MONOCYTES NFR BLD AUTO: 11.5 %
NEUTROPHILS # BLD AUTO: 3.49 X10*3/UL (ref 1.6–5.5)
NEUTROPHILS NFR BLD AUTO: 45.7 %
NRBC BLD-RTO: 0 /100 WBCS (ref 0–0)
PLATELET # BLD AUTO: 190 X10*3/UL (ref 150–450)
RBC # BLD AUTO: 3.64 X10*6/UL (ref 4–5.2)
VIT B12 SERPL-MCNC: 1491 PG/ML (ref 211–911)
WBC # BLD AUTO: 7.6 X10*3/UL (ref 4.4–11.3)

## 2024-07-25 ENCOUNTER — TELEPHONE (OUTPATIENT)
Dept: PRIMARY CARE | Facility: CLINIC | Age: 89
End: 2024-07-25
Payer: MEDICARE

## 2024-07-25 DIAGNOSIS — R35.0 FREQUENT URINATION: ICD-10-CM

## 2024-07-25 DIAGNOSIS — R30.0 DYSURIA: Primary | ICD-10-CM

## 2024-07-25 DIAGNOSIS — N95.2 ATROPHIC VAGINITIS: ICD-10-CM

## 2024-07-25 LAB — BACTERIA UR CULT: NORMAL

## 2024-07-25 NOTE — TELEPHONE ENCOUNTER
----- Message from Jose Alejandro Hamilton sent at 7/25/2024  2:45 PM EDT -----    ----- Message -----  From: Lab, Background User  Sent: 7/23/2024   3:26 PM EDT  To: Jose Alejandro Hamilton MD

## 2024-08-01 ENCOUNTER — OFFICE VISIT (OUTPATIENT)
Dept: PRIMARY CARE | Facility: CLINIC | Age: 89
End: 2024-08-01
Payer: MEDICARE

## 2024-08-01 DIAGNOSIS — Z23 ENCOUNTER FOR IMMUNIZATION: ICD-10-CM

## 2024-08-01 DIAGNOSIS — I47.29 NONSUSTAINED VENTRICULAR TACHYCARDIA (MULTI): ICD-10-CM

## 2024-08-01 DIAGNOSIS — I25.118 ATHEROSCLEROTIC HEART DISEASE OF NATIVE CORONARY ARTERY WITH OTHER FORMS OF ANGINA PECTORIS (CMS-HCC): ICD-10-CM

## 2024-08-01 DIAGNOSIS — M19.90 OSTEOARTHRITIS, UNSPECIFIED OSTEOARTHRITIS TYPE, UNSPECIFIED SITE: ICD-10-CM

## 2024-08-01 DIAGNOSIS — Z00.00 MEDICARE ANNUAL WELLNESS VISIT, SUBSEQUENT: Primary | ICD-10-CM

## 2024-08-01 DIAGNOSIS — I73.9 PERIPHERAL VASCULAR DISEASE (CMS-HCC): ICD-10-CM

## 2024-08-01 DIAGNOSIS — E55.9 VITAMIN D DEFICIENCY: ICD-10-CM

## 2024-08-01 DIAGNOSIS — E78.2 MIXED HYPERLIPIDEMIA: ICD-10-CM

## 2024-08-01 DIAGNOSIS — E11.9 TYPE 2 DIABETES MELLITUS WITHOUT COMPLICATION, WITHOUT LONG-TERM CURRENT USE OF INSULIN (MULTI): ICD-10-CM

## 2024-08-01 DIAGNOSIS — Z90.710 HISTORY OF HYSTERECTOMY: ICD-10-CM

## 2024-08-01 DIAGNOSIS — E03.9 ACQUIRED HYPOTHYROIDISM: ICD-10-CM

## 2024-08-01 DIAGNOSIS — I10 PRIMARY HYPERTENSION: ICD-10-CM

## 2024-08-01 DIAGNOSIS — I47.10 SUPRAVENTRICULAR TACHYCARDIA (CMS-HCC): ICD-10-CM

## 2024-08-01 PROBLEM — M35.3 POLYMYALGIA RHEUMATICA (MULTI): Status: RESOLVED | Noted: 2023-08-31 | Resolved: 2024-08-01

## 2024-08-01 PROCEDURE — 1158F ADVNC CARE PLAN TLK DOCD: CPT | Performed by: FAMILY MEDICINE

## 2024-08-01 PROCEDURE — G0439 PPPS, SUBSEQ VISIT: HCPCS | Performed by: FAMILY MEDICINE

## 2024-08-01 PROCEDURE — 1160F RVW MEDS BY RX/DR IN RCRD: CPT | Performed by: FAMILY MEDICINE

## 2024-08-01 PROCEDURE — 3079F DIAST BP 80-89 MM HG: CPT | Performed by: FAMILY MEDICINE

## 2024-08-01 PROCEDURE — 1126F AMNT PAIN NOTED NONE PRSNT: CPT | Performed by: FAMILY MEDICINE

## 2024-08-01 PROCEDURE — 3075F SYST BP GE 130 - 139MM HG: CPT | Performed by: FAMILY MEDICINE

## 2024-08-01 PROCEDURE — 99215 OFFICE O/P EST HI 40 MIN: CPT | Performed by: FAMILY MEDICINE

## 2024-08-01 PROCEDURE — 99214 OFFICE O/P EST MOD 30 MIN: CPT | Performed by: FAMILY MEDICINE

## 2024-08-01 PROCEDURE — 1170F FXNL STATUS ASSESSED: CPT | Performed by: FAMILY MEDICINE

## 2024-08-01 PROCEDURE — 1159F MED LIST DOCD IN RCRD: CPT | Performed by: FAMILY MEDICINE

## 2024-08-01 PROCEDURE — 99397 PER PM REEVAL EST PAT 65+ YR: CPT | Performed by: FAMILY MEDICINE

## 2024-08-01 PROCEDURE — 1123F ACP DISCUSS/DSCN MKR DOCD: CPT | Performed by: FAMILY MEDICINE

## 2024-08-01 PROCEDURE — 90677 PCV20 VACCINE IM: CPT | Performed by: FAMILY MEDICINE

## 2024-08-01 ASSESSMENT — ACTIVITIES OF DAILY LIVING (ADL)
PREPARING MEALS: INDEPENDENT
NEEDS ASSISTANCE WITH FOOD: INDEPENDENT
ADEQUATE_TO_COMPLETE_ADL: YES
USING TRANSPORTATION: INDEPENDENT
STIL DRIVING: YES
DOING HOUSEWORK: INDEPENDENT
JUDGMENT_ADEQUATE_SAFELY_COMPLETE_DAILY_ACTIVITIES: YES
BATHING: INDEPENDENT
USING TELEPHONE: INDEPENDENT
GROCERY SHOPPING: INDEPENDENT
EATING: INDEPENDENT
TOILETING: INDEPENDENT
PILL BOX USED: YES
MANAGING FINANCES: INDEPENDENT
FEEDING: INDEPENDENT
TAKING MEDICATION: INDEPENDENT
DRESSING: INDEPENDENT

## 2024-08-01 ASSESSMENT — ENCOUNTER SYMPTOMS
COUGH: 0
WEAKNESS: 0
SHORTNESS OF BREATH: 0
CHILLS: 0
NERVOUS/ANXIOUS: 0
VOMITING: 0
PALPITATIONS: 0
CONFUSION: 0
DEPRESSION: 0
LOSS OF SENSATION IN FEET: 0
TREMORS: 0
ARTHRALGIAS: 1
WHEEZING: 0
FEVER: 0
CONSTIPATION: 0
DIZZINESS: 0
MYALGIAS: 0
NAUSEA: 0
FREQUENCY: 0
HEMATURIA: 0
OCCASIONAL FEELINGS OF UNSTEADINESS: 0

## 2024-08-01 ASSESSMENT — PAIN SCALES - GENERAL: PAINLEVEL: 0-NO PAIN

## 2024-08-01 ASSESSMENT — GERIATRIC MINI NUTRITIONAL ASSESSMENT (MNA)
E NEUROPSYCHOLOGICAL PROBLEMS: NO PSYCHOLOGICAL PROBLEMS
C GENERAL MOBILITY: GOES OUT
A HAS FOOD INTAKE DECLINED OVER THE PAST 3 MONTHS DUE TO LOSS OF APPETITE, DIGESTIVE PROBLEMS, CHEWING OR SWALLOWING DIFFICULTIES?: NO DECREASE IN FOOD INTAKE
B WEIGHT LOSS DURING THE LAST 3 MONTHS: DOES NOT KNOW

## 2024-08-01 ASSESSMENT — COGNITIVE AND FUNCTIONAL STATUS - GENERAL: VERBAL FLUENCY - ANIMAL NAMES (0 TO 25): 3

## 2024-08-01 NOTE — PROGRESS NOTES
Subjective   Patient ID: Fartun Carey is a 89 y.o. female who presents for Annual Exam.    Mini Cognitive Screening:          Three Word Recall             Words:  Book, apple, window.            Patient able to repeat?  Yes .         Three Word Recall after 5mins             Word recall Score (0-3):  3 .         Clock Drawing             Given preprinted Hopi and instructions?  Yes .            Clock Draw Score (0 or 2):  2 .         Clock Draw plus Word Recall Score             Mini Cog Result <3 Pos:  5 .         Follow-up:             .  Not needed, negative result .     General Comments:          Patient here for Medicare Wellness Exam.         Active medical problems:         PMH/PSH/FMH/SHX: reviewed, noted below.         Other providers:         Medications: reviewed, noted below.         Depression screening: denies feeling down, depressed, hopeless. Denies having felt little interest or pleasure in doing things.         Functional ability and safety: Get up and go test is <30s. Pt denies any issues with ADLs, including phone, transportation, shopping, preparing meals, housework,laundry, medications or managing money. No home safety concerns, including having rugs in the hallway, lack grab bars in the bathroom, lack handrails, on the stairs or have poor lighting. Pt denies falls.         Hearing changes: Pt denies changes or concerns.         Advanced directives: discussed and recommended.          Preventative services: sheet reviewed, scanned, copy provided to patient.   No opioid use  Has a good feeling of their overall wellbeing     Hypertension:          Patient here for F/U. She has been having increased BP recently and Dr Torres added hydrochlorothiazide - she is having side effects to that - very tired         Med compliance yes.          Med side effects none.      Hyperlipidemia:          Patient here for F/U. tolerating medicine well, stable.          Labs at target goal.      Diabetes  "Mellitus:          Follow Up tolerating meds - improved control.          The frequency of exercise that the patient achieves is 4-5, times per week .          Dietary measures include portion control, carb control .          Hypoglycemic episodes are none recently.          The results of the last HbgA1c were above goal and worsening.      Hypothyroidism:          Follow Up taking hormone supplement routinely due for a tsh.          Hypothyroidism tolerating meds with no side effects.   Declines mammogram ,declines colonoscopy, UTD on vaccines         Review of Systems   Constitutional:  Positive for fatigue. Negative for chills and fever.   HENT:  Negative for ear pain and postnasal drip.    Respiratory:  Negative for cough, shortness of breath and wheezing.    Cardiovascular:  Negative for chest pain, palpitations and leg swelling.   Gastrointestinal:  Negative for constipation, nausea and vomiting.   Genitourinary:  Negative for frequency and hematuria.   Musculoskeletal:  Positive for arthralgias. Negative for myalgias.   Neurological:  Negative for dizziness, tremors and weakness.   Psychiatric/Behavioral:  Negative for confusion. The patient is not nervous/anxious.        Objective   /80   Pulse 59   Ht 1.549 m (5' 1\")   Wt 72.1 kg (159 lb)   SpO2 99%   BMI 30.04 kg/m²     Physical Exam  Constitutional:       General: She is not in acute distress.     Appearance: Normal appearance. She is not ill-appearing.   HENT:      Head: Normocephalic.      Right Ear: Tympanic membrane and external ear normal.      Left Ear: Tympanic membrane and external ear normal.      Nose: Nose normal. No congestion.      Mouth/Throat:      Mouth: Mucous membranes are moist.      Pharynx: No posterior oropharyngeal erythema.   Eyes:      Extraocular Movements: Extraocular movements intact.      Conjunctiva/sclera: Conjunctivae normal.      Pupils: Pupils are equal, round, and reactive to light.   Cardiovascular:      Rate " and Rhythm: Normal rate and regular rhythm.      Pulses: Normal pulses.      Heart sounds: Normal heart sounds. No murmur heard.  Pulmonary:      Effort: Pulmonary effort is normal. No respiratory distress.      Breath sounds: Normal breath sounds. No wheezing.   Chest:   Breasts:     Right: Normal. No mass, nipple discharge, skin change or tenderness.      Left: Normal. No mass, nipple discharge, skin change or tenderness.   Abdominal:      General: Bowel sounds are normal.      Palpations: Abdomen is soft. There is no mass.      Tenderness: There is no abdominal tenderness.      Hernia: No hernia is present.   Genitourinary:     Comments: S/p hysterectomy  Musculoskeletal:         General: No tenderness. Normal range of motion.      Cervical back: Neck supple. No tenderness.      Right lower leg: No edema.      Left lower leg: No edema.   Lymphadenopathy:      Cervical: No cervical adenopathy.   Skin:     General: Skin is warm.      Findings: No erythema or rash.   Neurological:      General: No focal deficit present.      Mental Status: She is alert and oriented to person, place, and time.      Motor: No weakness.      Gait: Gait normal.   Psychiatric:         Mood and Affect: Mood normal.         Behavior: Behavior normal.         Assessment/Plan   Problem List Items Addressed This Visit             ICD-10-CM    Atherosclerotic heart disease of native coronary artery with other forms of angina pectoris (CMS-[facility]) I25.118    Relevant Orders    Referral to Cardiology    History of hysterectomy Z90.710    Hyperlipidemia E78.5    Relevant Orders    Referral to Cardiology    Comprehensive Metabolic Panel    Lipid Panel    Hypothyroid E03.9    Osteoarthritis M19.90    Hypertension I10    Vitamin D deficiency E55.9    Nonsustained ventricular tachycardia (Multi) I47.29    Relevant Orders    Referral to Cardiology    Peripheral vascular disease (CMS-[facility]) I73.9    Supraventricular tachycardia (CMS-[facility]) I47.10     Other  Visit Diagnoses         Codes    Medicare annual wellness visit, subsequent    -  Primary Z00.00    Type 2 diabetes mellitus without complication, without long-term current use of insulin (Multi)     E11.9    Relevant Orders    Albumin-Creatinine Ratio, Urine Random    Hemoglobin A1C    Encounter for immunization     Z23

## 2024-08-04 VITALS
DIASTOLIC BLOOD PRESSURE: 80 MMHG | WEIGHT: 159 LBS | HEART RATE: 59 BPM | HEIGHT: 61 IN | BODY MASS INDEX: 30.02 KG/M2 | SYSTOLIC BLOOD PRESSURE: 138 MMHG | OXYGEN SATURATION: 99 %

## 2024-08-04 ASSESSMENT — ENCOUNTER SYMPTOMS: FATIGUE: 1

## 2024-08-05 ENCOUNTER — TELEPHONE (OUTPATIENT)
Dept: PRIMARY CARE | Facility: CLINIC | Age: 89
End: 2024-08-05
Payer: MEDICARE

## 2024-08-05 DIAGNOSIS — N30.00 ACUTE CYSTITIS WITHOUT HEMATURIA: Primary | ICD-10-CM

## 2024-08-05 RX ORDER — NITROFURANTOIN 25; 75 MG/1; MG/1
100 CAPSULE ORAL 2 TIMES DAILY
Qty: 20 CAPSULE | Refills: 0 | Status: SHIPPED | OUTPATIENT
Start: 2024-08-05 | End: 2024-08-15

## 2024-08-05 NOTE — TELEPHONE ENCOUNTER
Patient called and stated she was supposed to get a antibiotic sent in but the pharmacy. The pharmacy told her they did not get yet.

## 2024-08-11 DIAGNOSIS — N30.00 ACUTE CYSTITIS WITHOUT HEMATURIA: ICD-10-CM

## 2024-08-12 RX ORDER — SULFAMETHOXAZOLE AND TRIMETHOPRIM 400; 80 MG/1; MG/1
1 TABLET ORAL 2 TIMES DAILY
Qty: 14 TABLET | Refills: 0 | Status: SHIPPED | OUTPATIENT
Start: 2024-08-12 | End: 2024-08-19

## 2024-08-16 ENCOUNTER — LAB (OUTPATIENT)
Dept: LAB | Facility: LAB | Age: 89
End: 2024-08-16
Payer: MEDICARE

## 2024-08-16 DIAGNOSIS — N18.32 CHRONIC KIDNEY DISEASE, STAGE 3B (MULTI): Primary | ICD-10-CM

## 2024-08-16 LAB
ALBUMIN SERPL BCP-MCNC: 4 G/DL (ref 3.4–5)
ANION GAP SERPL CALC-SCNC: 13 MMOL/L (ref 10–20)
BUN SERPL-MCNC: 30 MG/DL (ref 6–23)
CALCIUM SERPL-MCNC: 9.5 MG/DL (ref 8.6–10.3)
CHLORIDE SERPL-SCNC: 102 MMOL/L (ref 98–107)
CO2 SERPL-SCNC: 28 MMOL/L (ref 21–32)
CREAT SERPL-MCNC: 1.41 MG/DL (ref 0.5–1.05)
EGFRCR SERPLBLD CKD-EPI 2021: 34 ML/MIN/1.73M*2
ERYTHROCYTE [DISTWIDTH] IN BLOOD BY AUTOMATED COUNT: 14.8 % (ref 11.5–14.5)
GLUCOSE SERPL-MCNC: 107 MG/DL (ref 74–99)
HCT VFR BLD AUTO: 34.3 % (ref 36–46)
HGB BLD-MCNC: 10.8 G/DL (ref 12–16)
IRON SATN MFR SERPL: 33 % (ref 25–45)
IRON SERPL-MCNC: 114 UG/DL (ref 35–150)
MCH RBC QN AUTO: 29.6 PG (ref 26–34)
MCHC RBC AUTO-ENTMCNC: 31.5 G/DL (ref 32–36)
MCV RBC AUTO: 94 FL (ref 80–100)
NRBC BLD-RTO: 0 /100 WBCS (ref 0–0)
PHOSPHATE SERPL-MCNC: 3.7 MG/DL (ref 2.5–4.9)
PLATELET # BLD AUTO: 210 X10*3/UL (ref 150–450)
POTASSIUM SERPL-SCNC: 3.9 MMOL/L (ref 3.5–5.3)
PTH-INTACT SERPL-MCNC: 72.5 PG/ML (ref 18.5–88)
RBC # BLD AUTO: 3.65 X10*6/UL (ref 4–5.2)
SODIUM SERPL-SCNC: 139 MMOL/L (ref 136–145)
TIBC SERPL-MCNC: 345 UG/DL (ref 240–445)
UIBC SERPL-MCNC: 231 UG/DL (ref 110–370)
WBC # BLD AUTO: 6.9 X10*3/UL (ref 4.4–11.3)

## 2024-08-16 PROCEDURE — 36415 COLL VENOUS BLD VENIPUNCTURE: CPT

## 2024-08-16 PROCEDURE — 83970 ASSAY OF PARATHORMONE: CPT

## 2024-08-28 ENCOUNTER — APPOINTMENT (OUTPATIENT)
Dept: PODIATRY | Facility: CLINIC | Age: 89
End: 2024-08-28
Payer: MEDICARE

## 2024-08-28 DIAGNOSIS — E11.9 DIABETES MELLITUS WITHOUT COMPLICATION (MULTI): Primary | ICD-10-CM

## 2024-08-28 DIAGNOSIS — M79.671 PAIN IN BOTH FEET: ICD-10-CM

## 2024-08-28 DIAGNOSIS — M79.672 PAIN IN BOTH FEET: ICD-10-CM

## 2024-08-28 DIAGNOSIS — E11.9 COMPREHENSIVE DIABETIC FOOT EXAMINATION, TYPE 2 DM, ENCOUNTER FOR (MULTI): ICD-10-CM

## 2024-08-28 PROCEDURE — 99212 OFFICE O/P EST SF 10 MIN: CPT | Performed by: PODIATRIST

## 2024-08-28 PROCEDURE — 1123F ACP DISCUSS/DSCN MKR DOCD: CPT | Performed by: PODIATRIST

## 2024-08-28 PROCEDURE — 1160F RVW MEDS BY RX/DR IN RCRD: CPT | Performed by: PODIATRIST

## 2024-08-28 PROCEDURE — 1159F MED LIST DOCD IN RCRD: CPT | Performed by: PODIATRIST

## 2024-08-28 PROCEDURE — 1036F TOBACCO NON-USER: CPT | Performed by: PODIATRIST

## 2024-08-28 NOTE — PROGRESS NOTES
History of Present Illness:   Patient states they are here for Dm exam  Denies NTB to feet  Most recent A1C is 7.0  Unable to safely trim nails on own      Past Medical History  Past Medical History:   Diagnosis Date    Diabetes mellitus (CMS/[facility])     Disease of thyroid gland     Hypertension        Medications and Allergies have been reviewed.    Review Of Systems:  GENERAL: No weight loss, malaise or fevers.  HEENT: Negative for frequent or significant headaches,   RESPIRATORY: Negative for cough, wheezing or shortness of breath.  CARDIOVASCULAR: Negative for chest pain, leg swelling or palpitations.    Physical Exam:  Vascular exam: Dorsalis pedis and Posterior Tibial pulses palpable 2/4 bilateral. Capillary refill time less than 3 seconds b/l. No Hair growth noted to digits. No edema noted. Skin temp warm to warm from proximal to distal b/l. +varicosities noted.     Neurologic exam: Vibratory, protective and proprioception intact b/l. No neurologic deficits noted.      Musculoskeletal exam: Muscle strength 5/5 for all major muscle groups tested in the lower extremity b/l. No gross deformities noted b/l.      Dermatologic exam: Nails 1-5 b/l are thickened, elongated and discolored. Webspaces 1-4 b/l are clean, dry and intact. No primary or secondary skin lesions noted. No open lesions or wounds noted at this time .      1. Diabetes mellitus without complication (CMS/[facility])        2. Complication of diabetes mellitus (CMS/[facility])        3. Pain in both feet        4. Comprehensive diabetic foot examination, type 2 DM, encounter for (CMS/[facility])          Patient examined and evaluated.   Patient educated on proper diabetic foot care and education dispensed.  Nails 1-5 b/l were debrided in thickness and length with nail cutting forceps.  A1C revd, 7.0  Keep sugars under   Low pedal risk noted at this time  Discussed questions patient had regarding shoe gear, why nails grow faster some times vs others  Patient to follow up  in 6 mos or sooner if any problems arise.   Patient was in agreement to this plan. All questions answered.       Gabriela Zamora DPM  [phone]  [facility]  Fax: [phone]

## 2024-09-05 ENCOUNTER — TELEPHONE (OUTPATIENT)
Dept: PRIMARY CARE | Facility: CLINIC | Age: 89
End: 2024-09-05
Payer: MEDICARE

## 2024-09-05 DIAGNOSIS — I10 PRIMARY HYPERTENSION: Primary | ICD-10-CM

## 2024-09-05 DIAGNOSIS — E03.9 HYPOTHYROIDISM, UNSPECIFIED TYPE: ICD-10-CM

## 2024-09-05 RX ORDER — HYDRALAZINE HYDROCHLORIDE 25 MG/1
25 TABLET, FILM COATED ORAL 3 TIMES DAILY
Qty: 270 TABLET | Refills: 1 | Status: SHIPPED | OUTPATIENT
Start: 2024-09-05

## 2024-09-05 RX ORDER — LEVOTHYROXINE SODIUM 88 UG/1
88 TABLET ORAL DAILY
Qty: 90 TABLET | Refills: 3 | Status: SHIPPED | OUTPATIENT
Start: 2024-09-05 | End: 2025-08-31

## 2024-09-05 NOTE — TELEPHONE ENCOUNTER
Patient's cardiologist has retired. She is going to see Dr. Simmons in 2 weeks. She does not have enough until then. She said she is taking Hydralazine 25 mg three times a day. Her medication list is incorrect. [facility] in [place].

## 2024-09-30 ENCOUNTER — TELEPHONE (OUTPATIENT)
Dept: PRIMARY CARE | Facility: CLINIC | Age: 89
End: 2024-09-30
Payer: MEDICARE

## 2024-09-30 DIAGNOSIS — R30.0 DYSURIA: Primary | ICD-10-CM

## 2024-09-30 NOTE — TELEPHONE ENCOUNTER
"Pt called stating her urine has smelled like ammonia for x3 days -- no other sx --- pt declined a SDA because \"she's busy\"     >She is requesting a lab order to have her urine tested   "

## 2024-10-01 ENCOUNTER — LAB (OUTPATIENT)
Dept: LAB | Facility: LAB | Age: 89
End: 2024-10-01
Payer: MEDICARE

## 2024-10-01 DIAGNOSIS — R30.0 DYSURIA: ICD-10-CM

## 2024-10-01 LAB
APPEARANCE UR: ABNORMAL
BILIRUB UR STRIP.AUTO-MCNC: NEGATIVE MG/DL
COLOR UR: ABNORMAL
GLUCOSE UR STRIP.AUTO-MCNC: NORMAL MG/DL
HOLD SPECIMEN: NORMAL
KETONES UR STRIP.AUTO-MCNC: NEGATIVE MG/DL
LEUKOCYTE ESTERASE UR QL STRIP.AUTO: ABNORMAL
NITRITE UR QL STRIP.AUTO: NEGATIVE
PH UR STRIP.AUTO: 5 [PH]
PROT UR STRIP.AUTO-MCNC: NEGATIVE MG/DL
RBC # UR STRIP.AUTO: NEGATIVE /UL
RBC #/AREA URNS AUTO: NORMAL /HPF
SP GR UR STRIP.AUTO: 1.01
UROBILINOGEN UR STRIP.AUTO-MCNC: NORMAL MG/DL
WBC #/AREA URNS AUTO: NORMAL /HPF

## 2024-10-01 PROCEDURE — 81001 URINALYSIS AUTO W/SCOPE: CPT

## 2024-10-01 PROCEDURE — 87086 URINE CULTURE/COLONY COUNT: CPT

## 2024-10-02 ENCOUNTER — TELEPHONE (OUTPATIENT)
Dept: PRIMARY CARE | Facility: CLINIC | Age: 89
End: 2024-10-02
Payer: MEDICARE

## 2024-10-02 LAB — BACTERIA UR CULT: NORMAL

## 2024-10-02 NOTE — TELEPHONE ENCOUNTER
----- Message from Jose Alejandro Hamilton sent at 10/2/2024  8:34 AM EDT -----    ----- Message -----  From: Lab, Background User  Sent: 10/1/2024   2:43 PM EDT  To: Jose Alejandro Hamilton MD

## 2024-11-04 DIAGNOSIS — E03.9 HYPOTHYROIDISM, UNSPECIFIED TYPE: ICD-10-CM

## 2024-11-04 DIAGNOSIS — E11.9 TYPE 2 DIABETES MELLITUS WITHOUT COMPLICATION, WITHOUT LONG-TERM CURRENT USE OF INSULIN (MULTI): ICD-10-CM

## 2024-11-04 RX ORDER — LEVOTHYROXINE SODIUM 88 UG/1
88 TABLET ORAL DAILY
Qty: 90 TABLET | Refills: 2 | Status: SHIPPED | OUTPATIENT
Start: 2024-11-04 | End: 2025-08-01

## 2024-11-11 DIAGNOSIS — E11.9 TYPE 2 DIABETES MELLITUS WITHOUT COMPLICATION, WITHOUT LONG-TERM CURRENT USE OF INSULIN (MULTI): ICD-10-CM

## 2024-11-11 RX ORDER — BLOOD SUGAR DIAGNOSTIC
1 STRIP MISCELLANEOUS DAILY
Qty: 100 STRIP | Refills: 3 | Status: SHIPPED | OUTPATIENT
Start: 2024-11-11

## 2024-12-02 ENCOUNTER — LAB (OUTPATIENT)
Dept: LAB | Facility: LAB | Age: 89
End: 2024-12-02
Payer: MEDICARE

## 2024-12-02 DIAGNOSIS — E11.9 TYPE 2 DIABETES MELLITUS WITHOUT COMPLICATION, WITHOUT LONG-TERM CURRENT USE OF INSULIN (MULTI): ICD-10-CM

## 2024-12-02 DIAGNOSIS — E78.2 MIXED HYPERLIPIDEMIA: ICD-10-CM

## 2024-12-02 LAB
ALBUMIN SERPL BCP-MCNC: 3.8 G/DL (ref 3.4–5)
ALP SERPL-CCNC: 108 U/L (ref 33–136)
ALT SERPL W P-5'-P-CCNC: 10 U/L (ref 7–45)
ANION GAP SERPL CALC-SCNC: 14 MMOL/L (ref 10–20)
AST SERPL W P-5'-P-CCNC: 14 U/L (ref 9–39)
BILIRUB SERPL-MCNC: 0.4 MG/DL (ref 0–1.2)
BUN SERPL-MCNC: 38 MG/DL (ref 6–23)
CALCIUM SERPL-MCNC: 8.7 MG/DL (ref 8.6–10.3)
CHLORIDE SERPL-SCNC: 107 MMOL/L (ref 98–107)
CHOLEST SERPL-MCNC: 136 MG/DL (ref 0–199)
CHOLESTEROL/HDL RATIO: 3.1
CO2 SERPL-SCNC: 25 MMOL/L (ref 21–32)
CREAT SERPL-MCNC: 1.52 MG/DL (ref 0.5–1.05)
EGFRCR SERPLBLD CKD-EPI 2021: 31 ML/MIN/1.73M*2
EST. AVERAGE GLUCOSE BLD GHB EST-MCNC: 148 MG/DL
GLUCOSE SERPL-MCNC: 178 MG/DL (ref 74–99)
HBA1C MFR BLD: 6.8 %
HDLC SERPL-MCNC: 44.5 MG/DL
LDLC SERPL CALC-MCNC: 53 MG/DL
NON HDL CHOLESTEROL: 92 MG/DL (ref 0–149)
POTASSIUM SERPL-SCNC: 3.5 MMOL/L (ref 3.5–5.3)
PROT SERPL-MCNC: 6.3 G/DL (ref 6.4–8.2)
SODIUM SERPL-SCNC: 142 MMOL/L (ref 136–145)
TRIGL SERPL-MCNC: 192 MG/DL (ref 0–149)
VLDL: 38 MG/DL (ref 0–40)

## 2024-12-02 PROCEDURE — 36415 COLL VENOUS BLD VENIPUNCTURE: CPT

## 2024-12-02 PROCEDURE — 83036 HEMOGLOBIN GLYCOSYLATED A1C: CPT

## 2024-12-03 ENCOUNTER — OFFICE VISIT (OUTPATIENT)
Dept: PRIMARY CARE | Facility: CLINIC | Age: 89
End: 2024-12-03
Payer: MEDICARE

## 2024-12-03 VITALS
OXYGEN SATURATION: 97 % | DIASTOLIC BLOOD PRESSURE: 80 MMHG | WEIGHT: 161.6 LBS | SYSTOLIC BLOOD PRESSURE: 130 MMHG | HEIGHT: 61 IN | BODY MASS INDEX: 30.51 KG/M2 | HEART RATE: 67 BPM

## 2024-12-03 DIAGNOSIS — I12.9 HYPERTENSIVE CHRONIC KIDNEY DISEASE W STG 1-4/UNSP CHR KDNY: ICD-10-CM

## 2024-12-03 DIAGNOSIS — E03.9 ACQUIRED HYPOTHYROIDISM: ICD-10-CM

## 2024-12-03 DIAGNOSIS — N39.0 RECURRENT UTI: ICD-10-CM

## 2024-12-03 DIAGNOSIS — E11.9 TYPE 2 DIABETES MELLITUS WITHOUT COMPLICATION, WITHOUT LONG-TERM CURRENT USE OF INSULIN (MULTI): Primary | ICD-10-CM

## 2024-12-03 DIAGNOSIS — N18.32 STAGE 3B CHRONIC KIDNEY DISEASE (MULTI): ICD-10-CM

## 2024-12-03 DIAGNOSIS — E78.2 MIXED HYPERLIPIDEMIA: ICD-10-CM

## 2024-12-03 PROCEDURE — 1123F ACP DISCUSS/DSCN MKR DOCD: CPT | Performed by: FAMILY MEDICINE

## 2024-12-03 PROCEDURE — G2211 COMPLEX E/M VISIT ADD ON: HCPCS | Performed by: FAMILY MEDICINE

## 2024-12-03 PROCEDURE — 99214 OFFICE O/P EST MOD 30 MIN: CPT | Performed by: FAMILY MEDICINE

## 2024-12-03 PROCEDURE — 1036F TOBACCO NON-USER: CPT | Performed by: FAMILY MEDICINE

## 2024-12-03 PROCEDURE — 3075F SYST BP GE 130 - 139MM HG: CPT | Performed by: FAMILY MEDICINE

## 2024-12-03 PROCEDURE — 3079F DIAST BP 80-89 MM HG: CPT | Performed by: FAMILY MEDICINE

## 2024-12-03 PROCEDURE — 1159F MED LIST DOCD IN RCRD: CPT | Performed by: FAMILY MEDICINE

## 2024-12-03 PROCEDURE — 1160F RVW MEDS BY RX/DR IN RCRD: CPT | Performed by: FAMILY MEDICINE

## 2024-12-03 ASSESSMENT — ENCOUNTER SYMPTOMS
VOMITING: 0
WHEEZING: 0
PALPITATIONS: 0
MYALGIAS: 0
HEMATURIA: 0
WEAKNESS: 0
CONSTIPATION: 0
FEVER: 0
SHORTNESS OF BREATH: 0
NAUSEA: 0
CHILLS: 0
NERVOUS/ANXIOUS: 0
DIZZINESS: 0
CONFUSION: 0
COUGH: 0
FREQUENCY: 0
ARTHRALGIAS: 1
FATIGUE: 1

## 2024-12-03 NOTE — PROGRESS NOTES
"Subjective   Patient ID: Fartun Carey is a 89 y.o. female who presents for Follow-up (4 month follow up ).      Hypertension:          Patient here for F/U. Her BP has been stable, much improved control, her kidney function is slightly decreased on her current meds - she will try to increase her hydration. Seeing Dr Simmons as her cardiologist now.         Med compliance yes.          Med side effects none.      Hyperlipidemia:          Patient here for F/U. tolerating medicine well, stable.          Labs at target goal.      Diabetes Mellitus:          Follow Up tolerating meds - improved control.          The frequency of exercise that the patient achieves is 4-5, times per week .          Dietary measures include portion control, carb control .          Hypoglycemic episodes are none recently.          The results of the last HbgA1c with stable results     Hypothyroidism:          Follow Up taking hormone supplement routinely due for a tsh.          Hypothyroidism tolerating meds with no side effects.   Recurrent UTIs - no symptoms currently, she has a order on hand if she gets symptoms         Review of Systems   Constitutional:  Positive for fatigue. Negative for chills and fever.   HENT:  Negative for ear pain and postnasal drip.    Respiratory:  Negative for cough, shortness of breath and wheezing.    Cardiovascular:  Negative for chest pain, palpitations and leg swelling.   Gastrointestinal:  Negative for constipation, nausea and vomiting.   Genitourinary:  Negative for frequency and hematuria.   Musculoskeletal:  Positive for arthralgias. Negative for myalgias.   Neurological:  Negative for dizziness and weakness.   Psychiatric/Behavioral:  Negative for confusion. The patient is not nervous/anxious.        Objective   /80 (BP Location: Left arm, Patient Position: Sitting)   Pulse 67   Ht 1.549 m (5' 1\")   Wt 73.3 kg (161 lb 9.6 oz)   SpO2 97%   BMI 30.53 kg/m²     Physical Exam  Vitals reviewed. "   Constitutional:       General: She is not in acute distress.     Appearance: Normal appearance. She is not ill-appearing.   Cardiovascular:      Rate and Rhythm: Normal rate and regular rhythm.      Heart sounds: Normal heart sounds. No murmur heard.  Pulmonary:      Breath sounds: Normal breath sounds.   Abdominal:      General: Abdomen is flat. Bowel sounds are normal.      Palpations: Abdomen is soft.   Musculoskeletal:         General: No swelling.   Neurological:      Mental Status: She is alert.         Assessment/Plan   Problem List Items Addressed This Visit             ICD-10-CM    Hyperlipidemia E78.5    Hypothyroid E03.9     Other Visit Diagnoses         Codes    Type 2 diabetes mellitus without complication, without long-term current use of insulin (Multi)    -  Primary E11.9    continue Januvia - low sugar diet and stay active     Recurrent UTI     N39.0    no symptoms currently - an order is on hand     Relevant Orders    Urinalysis with Reflex Culture and Microscopic    Hypertensive chronic kidney disease w stg 1-4/unsp chr kdny     I12.9    stable on meds     Stage 3b chronic kidney disease (Multi)     N18.32    encourage fluids

## 2024-12-17 ENCOUNTER — APPOINTMENT (OUTPATIENT)
Dept: PODIATRY | Facility: CLINIC | Age: 89
End: 2024-12-17
Payer: MEDICARE

## 2024-12-17 DIAGNOSIS — B35.1 ONYCHOMYCOSIS: ICD-10-CM

## 2024-12-17 DIAGNOSIS — M79.671 PAIN IN BOTH FEET: ICD-10-CM

## 2024-12-17 DIAGNOSIS — E11.8 COMPLICATION OF DIABETES MELLITUS (MULTI): ICD-10-CM

## 2024-12-17 DIAGNOSIS — E11.9 DIABETES MELLITUS WITHOUT COMPLICATION (MULTI): Primary | ICD-10-CM

## 2024-12-17 DIAGNOSIS — M79.672 PAIN IN BOTH FEET: ICD-10-CM

## 2024-12-17 DIAGNOSIS — E11.9 COMPREHENSIVE DIABETIC FOOT EXAMINATION, TYPE 2 DM, ENCOUNTER FOR (MULTI): ICD-10-CM

## 2024-12-17 PROCEDURE — 1159F MED LIST DOCD IN RCRD: CPT | Performed by: PODIATRIST

## 2024-12-17 PROCEDURE — 1160F RVW MEDS BY RX/DR IN RCRD: CPT | Performed by: PODIATRIST

## 2024-12-17 PROCEDURE — 1036F TOBACCO NON-USER: CPT | Performed by: PODIATRIST

## 2024-12-17 PROCEDURE — 1123F ACP DISCUSS/DSCN MKR DOCD: CPT | Performed by: PODIATRIST

## 2024-12-17 PROCEDURE — 11721 DEBRIDE NAIL 6 OR MORE: CPT | Performed by: PODIATRIST

## 2024-12-17 NOTE — PROGRESS NOTES
History of Present Illness:   Patient states they are here for Dm exam  Denies NTB to feet  Most recent A1C is 6.87 Dec 2024  Unable to safely trim nails on own      Past Medical History  Past Medical History:   Diagnosis Date    Diabetes mellitus (CMS/[facility])     Disease of thyroid gland     Hypertension        Medications and Allergies have been reviewed.    Review Of Systems:  GENERAL: No weight loss, malaise or fevers.  HEENT: Negative for frequent or significant headaches,   RESPIRATORY: Negative for cough, wheezing or shortness of breath.  CARDIOVASCULAR: Negative for chest pain, leg swelling or palpitations.    Physical Exam:  Vascular exam: Dorsalis pedis and Posterior Tibial pulses palpable 2/4 bilateral. Capillary refill time less than 3 seconds b/l. No Hair growth noted to digits. No edema noted. Skin temp warm to warm from proximal to distal b/l. +varicosities noted.     Neurologic exam: Vibratory, protective and proprioception intact b/l. No neurologic deficits noted.      Musculoskeletal exam: Muscle strength 5/5 for all major muscle groups tested in the lower extremity b/l. No gross deformities noted b/l.      Dermatologic exam: Nails 1-5 b/l are thickened, elongated and discolored. Webspaces 1-4 b/l are clean, dry and intact. No primary or secondary skin lesions noted. No open lesions or wounds noted at this time .      1. Diabetes mellitus without complication (CMS/[facility])        2. Complication of diabetes mellitus (CMS/[facility])        3. Pain in both feet        4. Comprehensive diabetic foot examination, type 2 DM, encounter for (CMS/[facility])          Patient examined and evaluated.   Patient educated on proper diabetic foot care and education dispensed.  Nails 1-5 b/l were debrided in thickness and length with nail cutting forceps.  A1C revd, 6.8  Keep sugars under   Low pedal risk noted at this time  Discussed questions patient had regarding shoe gear, why nails grow faster some times vs others  Patient to  follow up in 6 mos or sooner if any problems arise.   Patient was in agreement to this plan. All questions answered.       Gabriela Zamora DPM  [phone]  [facility]  Fax: [phone]   resilient/elastic

## 2025-02-06 ENCOUNTER — TELEMEDICINE (OUTPATIENT)
Dept: PRIMARY CARE | Facility: CLINIC | Age: OVER 89
End: 2025-02-06
Payer: MEDICARE

## 2025-02-06 DIAGNOSIS — N18.32 STAGE 3B CHRONIC KIDNEY DISEASE (MULTI): ICD-10-CM

## 2025-02-06 DIAGNOSIS — I25.118 ATHEROSCLEROTIC HEART DISEASE OF NATIVE CORONARY ARTERY WITH OTHER FORMS OF ANGINA PECTORIS: ICD-10-CM

## 2025-02-06 DIAGNOSIS — I10 PRIMARY HYPERTENSION: Primary | ICD-10-CM

## 2025-02-06 DIAGNOSIS — E11.9 DIABETES MELLITUS WITHOUT COMPLICATION (MULTI): ICD-10-CM

## 2025-02-06 PROBLEM — I47.29 NONSUSTAINED VENTRICULAR TACHYCARDIA (MULTI): Status: RESOLVED | Noted: 2024-08-01 | Resolved: 2025-02-06

## 2025-02-06 RX ORDER — OLMESARTAN MEDOXOMIL 40 MG/1
20 TABLET ORAL 2 TIMES DAILY
COMMUNITY

## 2025-02-06 ASSESSMENT — ENCOUNTER SYMPTOMS
CHILLS: 0
CONSTIPATION: 0
FEVER: 0
DIZZINESS: 1
CONFUSION: 0
VOMITING: 0
WEAKNESS: 0
WHEEZING: 0
COUGH: 0
FATIGUE: 1
SHORTNESS OF BREATH: 0
PALPITATIONS: 0
NERVOUS/ANXIOUS: 0
NAUSEA: 0

## 2025-02-06 ASSESSMENT — LIFESTYLE VARIABLES
HOW OFTEN DO YOU HAVE SIX OR MORE DRINKS ON ONE OCCASION: NEVER
SKIP TO QUESTIONS 9-10: 1
HOW MANY STANDARD DRINKS CONTAINING ALCOHOL DO YOU HAVE ON A TYPICAL DAY: PATIENT DOES NOT DRINK
AUDIT-C TOTAL SCORE: 0
HOW OFTEN DO YOU HAVE A DRINK CONTAINING ALCOHOL: NEVER

## 2025-02-06 ASSESSMENT — PAIN SCALES - GENERAL: PAINLEVEL_OUTOF10: 0-NO PAIN

## 2025-02-06 NOTE — PROGRESS NOTES
Subjective   Patient ID: Fartun Carey is a 89 y.o. female who presents for Discuss cardiologist/[phone].  This visit was completed via telephone/virtual visit. All issues as documented below were discussed and addressed but no physical exam was performed. If it was felt that the patient should be evaluated via  face-to-face office appointment(s) they were directed there. The patient verbally consented to this visit.   Calls today - upset with her cardiologist, her BP has been increased and he is not responding to her calls, wants a different referral. He stopped her losartan and switched to benicar 20 mg BID. Also recommend she increase she take the clonidine 0.2 mg 2 x a day. She noted and agreed.         Review of Systems   Constitutional:  Positive for fatigue. Negative for chills and fever.   HENT:  Negative for ear pain and postnasal drip.    Respiratory:  Negative for cough, shortness of breath and wheezing.    Cardiovascular:  Negative for chest pain, palpitations and leg swelling.   Gastrointestinal:  Negative for constipation, nausea and vomiting.   Skin:  Negative for rash.   Neurological:  Positive for dizziness. Negative for weakness.   Psychiatric/Behavioral:  Negative for confusion. The patient is not nervous/anxious.        Objective   There were no vitals taken for this visit.    Physical Exam  13 min discussion  Assessment/Plan   Problem List Items Addressed This Visit             ICD-10-CM    Atherosclerotic heart disease of native coronary artery with other forms of angina pectoris I25.118    Relevant Orders    Referral to Cardiology    Diabetes mellitus without complication (Multi) E11.9    Hypertension - Primary I10    Relevant Orders    Referral to Cardiology    Stage 3b chronic kidney disease (Multi) N18.32

## 2025-02-18 ENCOUNTER — APPOINTMENT (OUTPATIENT)
Dept: PODIATRY | Facility: CLINIC | Age: OVER 89
End: 2025-02-18
Payer: MEDICARE

## 2025-02-18 ENCOUNTER — OFFICE VISIT (OUTPATIENT)
Dept: CARDIOLOGY | Facility: CLINIC | Age: OVER 89
End: 2025-02-18
Payer: MEDICARE

## 2025-02-18 VITALS
WEIGHT: 163.6 LBS | HEIGHT: 61 IN | SYSTOLIC BLOOD PRESSURE: 157 MMHG | OXYGEN SATURATION: 98 % | DIASTOLIC BLOOD PRESSURE: 66 MMHG | BODY MASS INDEX: 30.89 KG/M2 | HEART RATE: 82 BPM

## 2025-02-18 DIAGNOSIS — E11.9 COMPREHENSIVE DIABETIC FOOT EXAMINATION, TYPE 2 DM, ENCOUNTER FOR (MULTI): ICD-10-CM

## 2025-02-18 DIAGNOSIS — Z95.5 STENTED CORONARY ARTERY: ICD-10-CM

## 2025-02-18 DIAGNOSIS — E11.9 DIABETES MELLITUS WITHOUT COMPLICATION (MULTI): Primary | ICD-10-CM

## 2025-02-18 DIAGNOSIS — I35.0 AORTIC STENOSIS: Primary | ICD-10-CM

## 2025-02-18 DIAGNOSIS — B35.1 ONYCHOMYCOSIS: ICD-10-CM

## 2025-02-18 DIAGNOSIS — M79.671 PAIN IN BOTH FEET: ICD-10-CM

## 2025-02-18 DIAGNOSIS — I10 PRIMARY HYPERTENSION: ICD-10-CM

## 2025-02-18 DIAGNOSIS — M79.672 PAIN IN BOTH FEET: ICD-10-CM

## 2025-02-18 PROCEDURE — 1123F ACP DISCUSS/DSCN MKR DOCD: CPT | Performed by: HOSPITALIST

## 2025-02-18 PROCEDURE — 3077F SYST BP >= 140 MM HG: CPT | Performed by: HOSPITALIST

## 2025-02-18 PROCEDURE — 99213 OFFICE O/P EST LOW 20 MIN: CPT | Performed by: HOSPITALIST

## 2025-02-18 PROCEDURE — 3078F DIAST BP <80 MM HG: CPT | Performed by: HOSPITALIST

## 2025-02-18 PROCEDURE — 11721 DEBRIDE NAIL 6 OR MORE: CPT | Performed by: PODIATRIST

## 2025-02-18 PROCEDURE — 1159F MED LIST DOCD IN RCRD: CPT | Performed by: HOSPITALIST

## 2025-02-18 PROCEDURE — 1126F AMNT PAIN NOTED NONE PRSNT: CPT | Performed by: HOSPITALIST

## 2025-02-18 PROCEDURE — 1036F TOBACCO NON-USER: CPT | Performed by: HOSPITALIST

## 2025-02-18 PROCEDURE — 1160F RVW MEDS BY RX/DR IN RCRD: CPT | Performed by: HOSPITALIST

## 2025-02-18 RX ORDER — HYDRALAZINE HYDROCHLORIDE 25 MG/1
25 TABLET, FILM COATED ORAL 3 TIMES DAILY
Qty: 270 TABLET | Refills: 3 | Status: SHIPPED | OUTPATIENT
Start: 2025-02-18

## 2025-02-18 RX ORDER — LOSARTAN POTASSIUM 100 MG/1
100 TABLET ORAL DAILY
COMMUNITY

## 2025-02-18 ASSESSMENT — PAIN SCALES - GENERAL: PAINLEVEL_OUTOF10: 0-NO PAIN

## 2025-02-18 NOTE — PATIENT INSTRUCTIONS
Thank you so much for visiting us today.    Will see you once a year, we will repeat an echocardiogram of your aortic valve every 3 to 5 years, next due in 2028.    Please let us know if you develop any trouble breathing or chest pressure/pain.    Please continue take all your medication including aspirin 81 mg once daily.      Please call us at [phone] if any question or need help.

## 2025-02-18 NOTE — PROGRESS NOTES
History of Present Illness:   Patient states they are here for Dm exam  Denies NTB to feet  Most recent A1C is 6.8 Dec 2024  Unable to safely trim nails on own    Past Medical History  Past Medical History:   Diagnosis Date    Diabetes mellitus (CMS/[facility])     Disease of thyroid gland     Hypertension        Medications and Allergies have been reviewed.    Review Of Systems:  GENERAL: No weight loss, malaise or fevers.  HEENT: Negative for frequent or significant headaches,   RESPIRATORY: Negative for cough, wheezing or shortness of breath.  CARDIOVASCULAR: Negative for chest pain, leg swelling or palpitations.    Physical Exam:  Vascular exam: Dorsalis pedis and Posterior Tibial pulses palpable 2/4 bilateral. Capillary refill time less than 3 seconds b/l. No Hair growth noted to digits. No edema noted. Skin temp warm to warm from proximal to distal b/l. +varicosities noted.     Neurologic exam: Vibratory, protective and proprioception intact b/l. No neurologic deficits noted.      Musculoskeletal exam: Muscle strength 5/5 for all major muscle groups tested in the lower extremity b/l. No gross deformities noted b/l.      Dermatologic exam: Nails 1-5 b/l are thickened, elongated and discolored. Webspaces 1-4 b/l are clean, dry and intact. No primary or secondary skin lesions noted. No open lesions or wounds noted at this time .      1. Diabetes mellitus without complication (CMS/[facility])        2. Complication of diabetes mellitus (CMS/[facility])        3. Pain in both feet        4. Comprehensive diabetic foot examination, type 2 DM, encounter for (CMS/[facility])          Patient examined and evaluated.   Patient educated on proper diabetic foot care and education dispensed.  Nails 1-5 b/l were debrided in thickness and length with nail cutting forceps.  A1C revd, 6.8  Keep sugars under   Low pedal risk noted at this time  Discussed questions patient had regarding shoe gear, why nails grow faster some times vs others  Patient to  follow up in 6 mos or sooner if any problems arise.   Patient was in agreement to this plan. All questions answered.    PCP Jose Alejandro Hamilton 2/6/2025       Gabriela Zamora DPM  [phone]  [facility]  Fax: [phone]

## 2025-02-18 NOTE — PROGRESS NOTES
Subjective   Fartun Carey is a 89 y.o. female with PMH of diabetes, HTN, CAD status post remote stenting, CKD, mild aortic stenosis, moderate TR, PAD, SVT, pulmonary nodule, osteoarthritis, and other comorbidities, is here to establish cardiac care.  Patient used to follow-up with Dr. Torres before he retired.  Patient is doing well for her age, she is able to take care of for all her ADLs, she drove to the office today herself.  She denies any cardiovascular symptoms.  She reports occasional dizziness but blood pressure and blood sugar are okay when checked.  She brought a home log of her BP measurement, she is averaging 130-140/50. Patient on aspirin 81 mg, atorvastatin 80 mg, clonidine 0.2 mg once daily, losartan 100 mg, hydralazine 25 mg 3 times daily.  Today's blood pressure is 157/66, heart rate 82.    Most recent blood work from 12/2/2024 with creatinine 1.52, corrected 192, HDL 45, LDL of 53, A1c of 6.8, and hemoglobin of 10.8 with an MCV of 94.    EKG from 2/21/2024  Sinus rhythm with sinus arrhythmia with Premature ventricular complexes or Fusion complexes, left axis deviation, pulmonary disease pattern, minimal voltage criteria for LVH, may be normal variant    TTE on 11/24/2023  1. Left ventricular systolic function is normal with a 60% estimated ejection fraction.   2. Spectral Doppler shows an impaired relaxation pattern of left ventricular diastolic filling.   3. RV normal size and functon.   4. Mild mitral regurgotation.   5. Moderate tricuspid regurgitation.   6. Mpoderate Tricuspid regurgitation RVSP 50 mmHg.   7. Aortic valve appears abnormal.   8. Mild aortic stenosis HOLLY 1.6 cm2      Vmax 2.26 m/sec      p/m gradient 20/10 mmHg.    Review of Systems  ROS is negative other than in HPI.      Objective   Physical Exam  General: NAD  HEENT: IEOM, PERRL   Neck: No JVD or carotid bruit  Lungs: CTAB  Heart: RRR, normal S1 and S2, no loud murmurs  Abdomen: Soft, nontender, positive bowel  sounds  Extremities: No edema  Neurologic: No FND  Psychiatric: Normal mood and affect    Assessment/Plan   1-CAD:  -Status post remote stenting.  -Most recent echocardiogram from 2023 with normal LVEF.  -Patient has good level of functional capacity with no cardiovascular symptoms.  -Continue aspirin and statin.    2-HTN:  -Overall controlled, continue current meds.    3-aortic stenosis:  -Mild per TTE from 2023.  We will repeat an echocardiogram every 3 to 5 years or earlier if symptoms.    RTC in 1 year.    Aury Correa MD

## 2025-02-25 ENCOUNTER — TELEPHONE (OUTPATIENT)
Dept: CARDIOLOGY | Facility: CLINIC | Age: OVER 89
End: 2025-02-25
Payer: MEDICARE

## 2025-02-25 ENCOUNTER — TELEPHONE (OUTPATIENT)
Dept: PRIMARY CARE | Facility: CLINIC | Age: OVER 89
End: 2025-02-25
Payer: MEDICARE

## 2025-02-25 NOTE — TELEPHONE ENCOUNTER
Pt stated that she was very dizzy, told pt to contact family member and go to ER. Dr. BANUELOS and staff gone for the day. Advise pt to follow up with us with the outcome

## 2025-02-25 NOTE — TELEPHONE ENCOUNTER
Meds reviewed. TCT patient who denies any medication changes. States most recent  syst and blood glucose 120's. States dizziness when getting from sitting to standing. Encouraged to stay well hydrated and to wear compression stockings. Also encouraged to try to see PCP this week. May need updated labs. States understanding and agreement. Dr. Correa aware and in agreement with above

## 2025-02-27 ENCOUNTER — OFFICE VISIT (OUTPATIENT)
Dept: PRIMARY CARE | Facility: CLINIC | Age: OVER 89
End: 2025-02-27
Payer: MEDICARE

## 2025-02-27 DIAGNOSIS — H93.13 BILATERAL TINNITUS: ICD-10-CM

## 2025-02-27 DIAGNOSIS — R53.1 WEAKNESS: ICD-10-CM

## 2025-02-27 DIAGNOSIS — N18.32 STAGE 3B CHRONIC KIDNEY DISEASE (MULTI): ICD-10-CM

## 2025-02-27 DIAGNOSIS — R53.83 FATIGUE, UNSPECIFIED TYPE: ICD-10-CM

## 2025-02-27 DIAGNOSIS — R42 DYSEQUILIBRIUM: Primary | ICD-10-CM

## 2025-02-27 DIAGNOSIS — R26.89 BALANCE DISORDER: ICD-10-CM

## 2025-02-27 DIAGNOSIS — E78.2 MIXED HYPERLIPIDEMIA: ICD-10-CM

## 2025-02-27 DIAGNOSIS — E55.9 VITAMIN D DEFICIENCY: ICD-10-CM

## 2025-02-27 DIAGNOSIS — H91.93 BILATERAL HEARING LOSS, UNSPECIFIED HEARING LOSS TYPE: ICD-10-CM

## 2025-02-27 DIAGNOSIS — E11.9 TYPE 2 DIABETES MELLITUS WITHOUT COMPLICATION, WITHOUT LONG-TERM CURRENT USE OF INSULIN (MULTI): ICD-10-CM

## 2025-02-27 DIAGNOSIS — R35.1 NOCTURIA: ICD-10-CM

## 2025-02-27 DIAGNOSIS — M35.3 PMR (POLYMYALGIA RHEUMATICA) (MULTI): ICD-10-CM

## 2025-02-27 DIAGNOSIS — E53.8 VITAMIN B12 DEFICIENCY: ICD-10-CM

## 2025-02-27 DIAGNOSIS — R09.89 BRUIT: ICD-10-CM

## 2025-02-27 DIAGNOSIS — I12.9 HYPERTENSIVE CHRONIC KIDNEY DISEASE W STG 1-4/UNSP CHR KDNY: ICD-10-CM

## 2025-02-27 PROCEDURE — 99214 OFFICE O/P EST MOD 30 MIN: CPT | Performed by: FAMILY MEDICINE

## 2025-02-27 PROCEDURE — 1036F TOBACCO NON-USER: CPT | Performed by: FAMILY MEDICINE

## 2025-02-27 PROCEDURE — 1125F AMNT PAIN NOTED PAIN PRSNT: CPT | Performed by: FAMILY MEDICINE

## 2025-02-27 PROCEDURE — 3077F SYST BP >= 140 MM HG: CPT | Performed by: FAMILY MEDICINE

## 2025-02-27 PROCEDURE — G2211 COMPLEX E/M VISIT ADD ON: HCPCS | Performed by: FAMILY MEDICINE

## 2025-02-27 PROCEDURE — 3078F DIAST BP <80 MM HG: CPT | Performed by: FAMILY MEDICINE

## 2025-02-27 PROCEDURE — 1160F RVW MEDS BY RX/DR IN RCRD: CPT | Performed by: FAMILY MEDICINE

## 2025-02-27 PROCEDURE — 1123F ACP DISCUSS/DSCN MKR DOCD: CPT | Performed by: FAMILY MEDICINE

## 2025-02-27 PROCEDURE — 1159F MED LIST DOCD IN RCRD: CPT | Performed by: FAMILY MEDICINE

## 2025-02-27 ASSESSMENT — LIFESTYLE VARIABLES
AUDIT-C TOTAL SCORE: 0
HOW OFTEN DO YOU HAVE A DRINK CONTAINING ALCOHOL: NEVER
SKIP TO QUESTIONS 9-10: 1
HOW MANY STANDARD DRINKS CONTAINING ALCOHOL DO YOU HAVE ON A TYPICAL DAY: PATIENT DOES NOT DRINK
HOW OFTEN DO YOU HAVE SIX OR MORE DRINKS ON ONE OCCASION: NEVER

## 2025-02-27 ASSESSMENT — PAIN SCALES - GENERAL: PAINLEVEL_OUTOF10: 5

## 2025-02-27 NOTE — PROGRESS NOTES
Subjective   Patient ID: Fartun Carey is a 89 y.o. female who presents for Dizziness.     Hypertension:          Patient here for F/U. Her BP has been borderline control, her kidney function is decreased on her current meds - she will try to increase her hydration. Seeing Dr Simmons as her cardiologist now.         Med compliance yes.          Med side effects none.      Hyperlipidemia:          Patient here for F/U. tolerating medicine well, stable.          Labs at target goal.      Diabetes Mellitus:          Follow Up tolerating meds - improved control.          The frequency of exercise that the patient achieves is 4-5, times per week .          Dietary measures include portion control, carb control .          Hypoglycemic episodes are none recently.          The results of the last HbgA1c with stable results     Hypothyroidism:          Follow Up taking hormone supplement routinely due for a tsh.          Hypothyroidism tolerating meds with no side effects.   Recurrent UTIs - no symptoms currently, she has a order on hand if she gets symptoms  She has known chronic kidney disease sees Dr. Tobias for this.    She continues to have recurrent dizziness off balance, ringing in her ears, hearing loss, now has to walk holding on to walls or using a walker.  The dizziness is getting worse.  Feels slightly confused at times.  Generalized weakness.  No focal weakness.         Review of Systems   Constitutional:  Positive for fatigue. Negative for chills and fever.   HENT:  Positive for tinnitus. Negative for ear pain and postnasal drip.    Respiratory:  Negative for cough, shortness of breath and wheezing.    Cardiovascular:  Negative for chest pain, palpitations and leg swelling.   Gastrointestinal:  Negative for constipation, nausea and vomiting.   Genitourinary:  Negative for frequency and hematuria.   Musculoskeletal:  Positive for arthralgias and myalgias.   Skin:  Negative for rash.   Neurological:  Positive for  dizziness, weakness and light-headedness. Negative for tremors and syncope.   Psychiatric/Behavioral:  Negative for confusion. The patient is not nervous/anxious.        Objective   /68   Pulse 72     Physical Exam  Constitutional:       General: She is not in acute distress.     Appearance: Normal appearance. She is not ill-appearing.   HENT:      Head: Normocephalic.      Right Ear: Tympanic membrane normal.      Left Ear: Tympanic membrane normal.      Mouth/Throat:      Pharynx: No posterior oropharyngeal erythema.   Eyes:      Extraocular Movements: Extraocular movements intact.      Conjunctiva/sclera: Conjunctivae normal.      Pupils: Pupils are equal, round, and reactive to light.   Cardiovascular:      Rate and Rhythm: Normal rate and regular rhythm.      Pulses: Normal pulses.      Heart sounds: Normal heart sounds. No murmur heard.  Pulmonary:      Effort: Pulmonary effort is normal.      Breath sounds: Normal breath sounds. No wheezing.   Abdominal:      General: Bowel sounds are normal.      Palpations: Abdomen is soft. There is no mass.      Tenderness: There is no abdominal tenderness.   Musculoskeletal:         General: Normal range of motion.      Cervical back: Neck supple. No tenderness.      Right lower leg: No edema.      Left lower leg: No edema.   Lymphadenopathy:      Cervical: No cervical adenopathy.   Skin:     General: Skin is warm.      Findings: No rash.   Neurological:      General: No focal deficit present.      Mental Status: She is alert and oriented to person, place, and time.      Sensory: No sensory deficit.      Motor: Weakness present.      Coordination: Coordination abnormal.      Gait: Gait abnormal.   Psychiatric:         Mood and Affect: Mood normal.         Behavior: Behavior normal.         Assessment/Plan   Problem List Items Addressed This Visit             ICD-10-CM    Hyperlipidemia E78.5    Relevant Orders    Comprehensive Metabolic Panel    Lipid Panel     Vitamin D deficiency E55.9    Relevant Orders    Vitamin D 25-Hydroxy,Total (for eval of Vitamin D levels)    Stage 3b chronic kidney disease (Multi) N18.32     Other Visit Diagnoses         Codes    Dysequilibrium    -  Primary R42    referral to neurology and get MRI     Relevant Orders    MR brain wo IV contrast    MR angio neck wo IV contrast    MR brain wo IV contrast    Referral to Neurology    Bilateral hearing loss, unspecified hearing loss type     H91.93    had a hearing test - using hearing aides     Relevant Orders    MR brain wo IV contrast    MR angio neck wo IV contrast    MR brain wo IV contrast    Referral to Neurology    Balance disorder     R26.89    using a walker or furniture walking - check MRI and to see neurology     Relevant Orders    MR brain wo IV contrast    MR angio neck wo IV contrast    MR brain wo IV contrast    Referral to Neurology    Bruit     R09.89    Relevant Orders    MR brain wo IV contrast    MR angio neck wo IV contrast    MR brain wo IV contrast    Weakness     R53.1    PT     Relevant Orders    MR brain wo IV contrast    MR angio neck wo IV contrast    MR brain wo IV contrast    Referral to Neurology    Bilateral tinnitus     H93.13    Relevant Orders    MR brain wo IV contrast    MR angio neck wo IV contrast    MR brain wo IV contrast    Referral to Neurology    Type 2 diabetes mellitus without complication, without long-term current use of insulin (Multi)     E11.9    stable on meds     Relevant Orders    Albumin-Creatinine Ratio, Urine Random    Hemoglobin A1C    Fatigue, unspecified type     R53.83    Relevant Orders    CBC and Auto Differential    TSH with reflex to Free T4 if abnormal    Nocturia     R35.1    Relevant Orders    Urinalysis with Reflex Culture and Microscopic    PMR (polymyalgia rheumatica) (Multi)     M35.3    Vitamin B12 deficiency     E53.8    vit B12 supplement     Relevant Orders    Vitamin B12    Hypertensive chronic kidney disease w stg 1-4/unsp  chr kdny     I12.9    follow up with Dr Gaxiola

## 2025-03-01 VITALS — SYSTOLIC BLOOD PRESSURE: 144 MMHG | HEART RATE: 72 BPM | DIASTOLIC BLOOD PRESSURE: 68 MMHG

## 2025-03-01 ASSESSMENT — ENCOUNTER SYMPTOMS
DIZZINESS: 1
PALPITATIONS: 0
SHORTNESS OF BREATH: 0
ARTHRALGIAS: 1
LIGHT-HEADEDNESS: 1
WHEEZING: 0
CHILLS: 0
MYALGIAS: 1
COUGH: 0
FATIGUE: 1
CONFUSION: 0
NERVOUS/ANXIOUS: 0
HEMATURIA: 0
TREMORS: 0
CONSTIPATION: 0
FEVER: 0
WEAKNESS: 1
NAUSEA: 0
VOMITING: 0
FREQUENCY: 0

## 2025-03-05 ENCOUNTER — TELEPHONE (OUTPATIENT)
Dept: PRIMARY CARE | Facility: CLINIC | Age: OVER 89
End: 2025-03-05
Payer: MEDICARE

## 2025-03-05 LAB
25(OH)D3+25(OH)D2 SERPL-MCNC: 40 NG/ML (ref 30–100)
ALBUMIN SERPL-MCNC: 4.2 G/DL (ref 3.6–5.1)
ALP SERPL-CCNC: 124 U/L (ref 37–153)
ALT SERPL-CCNC: 23 U/L (ref 6–29)
ANION GAP SERPL CALCULATED.4IONS-SCNC: 12 MMOL/L (CALC) (ref 7–17)
AST SERPL-CCNC: 18 U/L (ref 10–35)
BASOPHILS # BLD AUTO: 26 CELLS/UL (ref 0–200)
BASOPHILS NFR BLD AUTO: 0.3 %
BILIRUB SERPL-MCNC: 0.4 MG/DL (ref 0.2–1.2)
BUN SERPL-MCNC: 59 MG/DL (ref 7–25)
CALCIUM SERPL-MCNC: 9.2 MG/DL (ref 8.6–10.4)
CHLORIDE SERPL-SCNC: 109 MMOL/L (ref 98–110)
CHOLEST SERPL-MCNC: 134 MG/DL
CHOLEST/HDLC SERPL: 2.3 (CALC)
CO2 SERPL-SCNC: 21 MMOL/L (ref 20–32)
CREAT SERPL-MCNC: 1.54 MG/DL (ref 0.6–0.95)
EGFRCR SERPLBLD CKD-EPI 2021: 31 ML/MIN/1.73M2
EOSINOPHIL # BLD AUTO: 122 CELLS/UL (ref 15–500)
EOSINOPHIL NFR BLD AUTO: 1.4 %
ERYTHROCYTE [DISTWIDTH] IN BLOOD BY AUTOMATED COUNT: 12.6 % (ref 11–15)
EST. AVERAGE GLUCOSE BLD GHB EST-MCNC: 166 MG/DL
EST. AVERAGE GLUCOSE BLD GHB EST-SCNC: 9.2 MMOL/L
GLUCOSE SERPL-MCNC: 96 MG/DL (ref 65–99)
HBA1C MFR BLD: 7.4 % OF TOTAL HGB
HCT VFR BLD AUTO: 33.1 % (ref 35–45)
HDLC SERPL-MCNC: 59 MG/DL
HGB BLD-MCNC: 10.5 G/DL (ref 11.7–15.5)
LDLC SERPL CALC-MCNC: 47 MG/DL (CALC)
LYMPHOCYTES # BLD AUTO: 2819 CELLS/UL (ref 850–3900)
LYMPHOCYTES NFR BLD AUTO: 32.4 %
MCH RBC QN AUTO: 29.9 PG (ref 27–33)
MCHC RBC AUTO-ENTMCNC: 31.7 G/DL (ref 32–36)
MCV RBC AUTO: 94.3 FL (ref 80–100)
MONOCYTES # BLD AUTO: 1018 CELLS/UL (ref 200–950)
MONOCYTES NFR BLD AUTO: 11.7 %
NEUTROPHILS # BLD AUTO: 4715 CELLS/UL (ref 1500–7800)
NEUTROPHILS NFR BLD AUTO: 54.2 %
NONHDLC SERPL-MCNC: 75 MG/DL (CALC)
PLATELET # BLD AUTO: 190 THOUSAND/UL (ref 140–400)
PMV BLD REES-ECKER: 12.1 FL (ref 7.5–12.5)
POTASSIUM SERPL-SCNC: 3.9 MMOL/L (ref 3.5–5.3)
PROT SERPL-MCNC: 7 G/DL (ref 6.1–8.1)
RBC # BLD AUTO: 3.51 MILLION/UL (ref 3.8–5.1)
SODIUM SERPL-SCNC: 142 MMOL/L (ref 135–146)
TRIGL SERPL-MCNC: 213 MG/DL
TSH SERPL-ACNC: 0.6 MIU/L (ref 0.4–4.5)
VIT B12 SERPL-MCNC: >2000 PG/ML (ref 200–1100)
WBC # BLD AUTO: 8.7 THOUSAND/UL (ref 3.8–10.8)

## 2025-03-05 NOTE — TELEPHONE ENCOUNTER
Yes - that is great if she can get in with that neurologist on Monday - does she need me to place a referral? And - when are her MRIs scheduled for?

## 2025-03-05 NOTE — TELEPHONE ENCOUNTER
Patient calling to let you know that she can not get an appointment with Dr. Ruiz until December. She said she was able to get in with Dr. Milena Obrien on Monday. She wanted to know if this is OK with you?    She said she is not feeling much better.

## 2025-03-06 ENCOUNTER — TELEPHONE (OUTPATIENT)
Dept: PRIMARY CARE | Facility: CLINIC | Age: OVER 89
End: 2025-03-06
Payer: MEDICARE

## 2025-03-06 ENCOUNTER — OFFICE VISIT (OUTPATIENT)
Dept: PAIN MEDICINE | Facility: CLINIC | Age: OVER 89
End: 2025-03-06
Payer: MEDICARE

## 2025-03-06 VITALS
OXYGEN SATURATION: 100 % | SYSTOLIC BLOOD PRESSURE: 130 MMHG | HEIGHT: 61 IN | DIASTOLIC BLOOD PRESSURE: 78 MMHG | WEIGHT: 163 LBS | RESPIRATION RATE: 16 BRPM | HEART RATE: 63 BPM | BODY MASS INDEX: 30.78 KG/M2

## 2025-03-06 DIAGNOSIS — M54.16 LUMBAR RADICULOPATHY: Primary | ICD-10-CM

## 2025-03-06 DIAGNOSIS — M48.062 SPINAL STENOSIS, LUMBAR REGION, WITH NEUROGENIC CLAUDICATION: ICD-10-CM

## 2025-03-06 PROCEDURE — 3078F DIAST BP <80 MM HG: CPT | Performed by: ANESTHESIOLOGY

## 2025-03-06 PROCEDURE — 99214 OFFICE O/P EST MOD 30 MIN: CPT | Performed by: ANESTHESIOLOGY

## 2025-03-06 PROCEDURE — 1160F RVW MEDS BY RX/DR IN RCRD: CPT | Performed by: ANESTHESIOLOGY

## 2025-03-06 PROCEDURE — G2211 COMPLEX E/M VISIT ADD ON: HCPCS | Performed by: ANESTHESIOLOGY

## 2025-03-06 PROCEDURE — 1159F MED LIST DOCD IN RCRD: CPT | Performed by: ANESTHESIOLOGY

## 2025-03-06 PROCEDURE — 3075F SYST BP GE 130 - 139MM HG: CPT | Performed by: ANESTHESIOLOGY

## 2025-03-06 PROCEDURE — 1036F TOBACCO NON-USER: CPT | Performed by: ANESTHESIOLOGY

## 2025-03-06 PROCEDURE — 1123F ACP DISCUSS/DSCN MKR DOCD: CPT | Performed by: ANESTHESIOLOGY

## 2025-03-06 PROCEDURE — 1125F AMNT PAIN NOTED PAIN PRSNT: CPT | Performed by: ANESTHESIOLOGY

## 2025-03-06 ASSESSMENT — ENCOUNTER SYMPTOMS
PSYCHIATRIC NEGATIVE: 1
ENDOCRINE NEGATIVE: 1
CARDIOVASCULAR NEGATIVE: 1
RESPIRATORY NEGATIVE: 1
ALLERGIC/IMMUNOLOGIC NEGATIVE: 1
BACK PAIN: 1
GASTROINTESTINAL NEGATIVE: 1
EYES NEGATIVE: 1
HEMATOLOGIC/LYMPHATIC NEGATIVE: 1
CONSTITUTIONAL NEGATIVE: 1

## 2025-03-06 ASSESSMENT — PAIN - FUNCTIONAL ASSESSMENT: PAIN_FUNCTIONAL_ASSESSMENT: 0-10

## 2025-03-06 ASSESSMENT — PAIN DESCRIPTION - DESCRIPTORS: DESCRIPTORS: ACHING;NUMBNESS

## 2025-03-06 ASSESSMENT — LIFESTYLE VARIABLES: TOTAL SCORE: 0

## 2025-03-06 ASSESSMENT — PAIN SCALES - GENERAL
PAINLEVEL_OUTOF10: 2
PAINLEVEL_OUTOF10: 2

## 2025-03-06 NOTE — TELEPHONE ENCOUNTER
Patient's daughter calling regarding her neurologist appointment with Dr. Obrien on Monday. Her daughter said that the appointment is in [place] and is a far drive for them. She is wondering if you know of a neurologist that she can see sooner that is local? Her daughter also said that Fartun seemed a bit confused yesterday when she talked to her.

## 2025-03-06 NOTE — TELEPHONE ENCOUNTER
----- Message from Jose Alejandro Hamilton sent at 3/5/2025  9:49 PM EST -----    ----- Message -----  From: Laura Tuizzi Results In  Sent: 3/4/2025  10:49 PM EST  To: Jose Alejandro Hamilton MD

## 2025-03-06 NOTE — PROGRESS NOTES
Subjective   Patient ID: Fartun Carey is a 89 y.o. female who presents for Back Pain.  Back Pain      Patient here today for follow-up.  She was last seen approximately 2 years ago for lumbar radiofrequency ablation.  She reports that she did excellent and she really has had no pain.  However she has had back pain now and down into her leg which is new.  She reports that when she standing and walking she will get pain in her back that radiates down the posterior lateral aspect of her left leg into her calf.  She finds that she uses a wheeled walker and leans forward on it she can walk a longer period of time then without it.  If she sits down she has 0 pain.  Laying flat on her pack can also be challenging for her she will get some pain and charley horse into the left leg.  Last MRI was completed in 2022.  Her standing tolerance is about 5 to 10 minutes which makes chores around the home, washing dishes, making food herself a challenge.  She would like to know what type of interventional alert options there are for her.  Review of Systems   Constitutional: Negative.    HENT: Negative.     Eyes: Negative.    Respiratory: Negative.     Cardiovascular: Negative.    Gastrointestinal: Negative.    Endocrine: Negative.    Genitourinary: Negative.    Musculoskeletal:  Positive for back pain and gait problem.   Skin: Negative.    Allergic/Immunologic: Negative.    Hematological: Negative.    Psychiatric/Behavioral: Negative.         Objective   Physical Exam  Vitals and nursing note reviewed.   Constitutional:       Appearance: Normal appearance.   HENT:      Head: Normocephalic and atraumatic.      Right Ear: Ear canal and external ear normal.      Left Ear: Ear canal and external ear normal.      Nose: Nose normal.      Mouth/Throat:      Mouth: Mucous membranes are moist.      Pharynx: Oropharynx is clear.   Eyes:      Conjunctiva/sclera: Conjunctivae normal.      Pupils: Pupils are equal, round, and reactive to light.    Cardiovascular:      Rate and Rhythm: Normal rate.   Pulmonary:      Effort: Pulmonary effort is normal. No respiratory distress.   Musculoskeletal:      Cervical back: Normal, normal range of motion and neck supple.      Thoracic back: Normal.      Lumbar back: No spasms. Decreased range of motion. Positive right straight leg raise test and positive left straight leg raise test. No scoliosis.        Back:    Skin:     General: Skin is warm and dry.   Neurological:      Mental Status: She is alert and oriented to person, place, and time.      Sensory: Sensation is intact.      Motor: Motor function is intact.      Coordination: Coordination is intact.      Gait: Gait abnormal.      Comments: Patient does need help to stand from seated position.  Patient ambulates with a lumbar flexed gait with wheeled walker.   Psychiatric:         Mood and Affect: Mood normal.         Thought Content: Thought content normal.         Assessment/Plan   Problem List Items Addressed This Visit    None  Visit Diagnoses         Codes    Lumbar radiculopathy    -  Primary M54.16    Relevant Orders    Transforaminal    FL pain management    Spinal stenosis, lumbar region, with neurogenic claudication     M48.062    Relevant Orders    Transforaminal    FL pain management          I nice discussion with the patient today our plan will be as follows.    Radiology: Findings: For the purpose of this examination, five lumbar type vertebral  bodies are identified. The vertebral body heights are unremarkable.  Multilevel severe narrowing of disc spaces extending from T11-T12 to L2-L3.  There is also severe narrowing of the L5-S1 disc space and moderate to  severe narrowing of the L4-L5 disc space. Multilevel subchondral cyst  formation identified throughout the included lower thoracic and the lumbar  spine. There is 4 mm retrolisthesis of L1 on L2 and of L5 on S1. 3 mm  retrolisthesis of L2 on L3. There is levoscoliosis of the upper lumbar  spine  with the apex at L1-L2. Multilevel endplate bone marrow edema, likely  degenerative/stress-related. This is more prominent at T12-L1. The conus  medullaris is unremarkable and ends at L1-L2.     At T12-L1, large circumferential disc bulging with mass effect upon the  ventral thecal sac. Moderate facet hypertrophic changes. Resultant mild to  moderate canal stenosis. Mild narrowing of bilateral neural foramina.     At L1-L2, prominent posterior disc osteophyte complex with mass effect upon  the ventral thecal sac. Moderate facet and ligamentum flavum hypertrophy.  Resultant mild to moderate canal stenosis. Mild narrowing of bilateral  neural foramina.     At L2-L3, prominent posterior disc osteophyte complex with mass effect upon  the ventral thecal sac. Facet and ligamentum flavum hypertrophy. Moderate  canal stenosis. Moderate narrowing of the left neural foramen. The right  neural foramen appears fairly patent.     At L3-L4, circumferential disc bulging, asymmetric towards the left, with no  significant mass effect upon the ventral thecal sac. Moderate facet  hypertrophic changes. Resultant mild to moderate canal stenosis. There is  mild narrowing of the left neural foramen. The right neural foramen is  patent.     At L4-L5, circumferential disc bulging with minor mass effect upon the  ventral thecal sac. Moderate facet and ligamentum flavum hypertrophy. There  is moderate canal stenosis predominantly in mediolateral dimension. Moderate  to severe narrowing of the right neural foramen. Mild narrowing of the left  neural foramen.     At L5-S1, no canal stenosis identified. Note is made of postoperative  changes from prior left hemilaminectomy at this level. Facet hypertrophic  changes. Mild right and moderate left foraminal narrowing.     No discrete paraspinal soft tissue abnormality is identified. There is  dilation of the pancreatic duct.     IMPRESSION:  1. Multilevel degenerative disc disease and  spondylosis of the lumbar spine,  worsened from prior, resulting in multilevel canal stenosis as detailed  above.  2. Narrowing of neural foramina as described.  3. Multilevel endplate bone marrow edema, likely degenerative/stress-related.  4. Dilation of the visualized pancreatic duct, as seen on prior CT    Physically: Patient should continue with home exercise program.  Patient should continue use of wheeled walker for stability.    Psychologically: No issues at this time.    Medication: No changes at this time.    Duration: 3 months.    Intervention: Patient is suffering with spinal stenosis with neurogenic claudication her presentation is different than when I saw her 2 years ago.  She is suffering with radicular leg pain with standing and walking which does correlate with the moderate central canal stenosis seen at the L4-5 level on her last lumbar MRI.  We will move forward with a left L4, L5 transforaminal dural steroid injection under fluoroscopic guidance and local anesthetic.  Risks, benefit, and alternatives of the procedure were discussed with the patient.  Oswestry score has been compelted and recorded.        Please note that this report has been produced using speech recognition software. It may contain errors related to grammar, punctuation or spelling. Electronically signed, but not reviewed.          Khari Sanchez MD 03/06/25 3:02 PM

## 2025-03-06 NOTE — TELEPHONE ENCOUNTER
If she is having any acute changes in her mental status - then I would recommend she go to the ED for some stat tests. Let them know I did place a message to Dr Ruiz to see if he could see her in the next month... I'll up date as soon as I hear back from him.

## 2025-03-06 NOTE — TELEPHONE ENCOUNTER
Patient notified of recent lab results. She was seen by Dr. Gaxiola last month and will follow up with him in 6 months.

## 2025-03-06 NOTE — TELEPHONE ENCOUNTER
Patient's daughter notified. She is going to take Fartun to an appointment today and will see how her mental status is today.    I told Olive that I would call her back once you hear from Dr. Ruiz.

## 2025-03-10 ENCOUNTER — HOSPITAL ENCOUNTER (OUTPATIENT)
Dept: RADIOLOGY | Facility: CLINIC | Age: OVER 89
Discharge: HOME | End: 2025-03-10
Payer: MEDICARE

## 2025-03-10 ENCOUNTER — APPOINTMENT (OUTPATIENT)
Facility: CLINIC | Age: OVER 89
End: 2025-03-10
Payer: MEDICARE

## 2025-03-10 DIAGNOSIS — R42 DYSEQUILIBRIUM: ICD-10-CM

## 2025-03-10 DIAGNOSIS — R26.89 BALANCE DISORDER: ICD-10-CM

## 2025-03-10 DIAGNOSIS — H93.13 BILATERAL TINNITUS: ICD-10-CM

## 2025-03-10 DIAGNOSIS — H91.93 BILATERAL HEARING LOSS, UNSPECIFIED HEARING LOSS TYPE: ICD-10-CM

## 2025-03-10 DIAGNOSIS — R09.89 BRUIT: ICD-10-CM

## 2025-03-10 DIAGNOSIS — R53.1 WEAKNESS: ICD-10-CM

## 2025-03-10 PROCEDURE — 70551 MRI BRAIN STEM W/O DYE: CPT

## 2025-03-10 PROCEDURE — 70547 MR ANGIOGRAPHY NECK W/O DYE: CPT

## 2025-03-12 ENCOUNTER — TELEPHONE (OUTPATIENT)
Dept: PRIMARY CARE | Facility: CLINIC | Age: OVER 89
End: 2025-03-12
Payer: MEDICARE

## 2025-03-12 DIAGNOSIS — R42 DYSEQUILIBRIUM: ICD-10-CM

## 2025-03-12 DIAGNOSIS — R53.1 WEAKNESS: ICD-10-CM

## 2025-03-12 DIAGNOSIS — R26.89 BALANCE DISORDER: ICD-10-CM

## 2025-03-12 NOTE — TELEPHONE ENCOUNTER
----- Message from Jose Alejandro Hamilton sent at 3/11/2025  9:24 PM EDT -----    ----- Message -----  From: Interface, Radiology Results In  Sent: 3/11/2025  11:35 AM EDT  To: Jose Alejandro Hamilton MD

## 2025-03-12 NOTE — TELEPHONE ENCOUNTER
Patient notified of recent MRI results. She said she is not feeling much better. She agrees to do therapy. Order placed. [facility] ?

## 2025-03-13 NOTE — TELEPHONE ENCOUNTER
PT order signed - If she can't get in with them in a reasonable time, okay to go to [facility]. Please encourage her to follow up with Dr Ruiz this month as scheduled.

## 2025-03-19 NOTE — PROGRESS NOTES
"Date of Service: 3/25/2025  Patient: Fartun Carey  MRN: [id number]  Referring Provider: Jose Alejandro Hamilton MD  PCP: Jose Alejandro Hamilton MD    History of Present Illness:   Ms. Carey is a 89 y.o. female who presents to neurology clinic for evaluation of dizziness. Fartun Carey's past medical history is pertinent for HTN, diabetes, hypothyroidism.    She reports dizziness when she stands up or is walking and it is described as rocking. Symptoms started 6 months ago and have worsened since onset. The dizziness is provoked by position changes and walking and made better by sitting and resting. Attacks of dizziness last for as long as she is moving on her feet and she is completely free of dizziness between attacks. Sometimes when she's reading, lines will blur together but this does not happen all the time. Prior to symptom onset she was using a cane but now requires a rollator at home. No new medications. She cannot identify a trigger at the onset     Associated Symptoms:  Lightheadedness: No  Episodes of loss of consciousness: No  Tendency to fall: No  Objects spinning: No  Sensation of spinning: Yes, head feels like it's spinning  Loss of balance when walking: Yes  Headache: No  Pressure in head: No  Nausea or vomiting: No  Hearing change: No but does wear hearing aids  Tinnitus: Yes, only when hearing aids are out but this is chronic  Fullness in ears: No    Hx of head injury: No  Hx of neck injury: No  Hx of migraines: No    She reports bilateral hand tingling that began around 6 months ago. Tingling is worse at night and she will feel a bilateral \"throbbing\" that sometimes wakes her up. Tingling is worse when sitting with her arms on an arm chair, or when her arms are up while driving.    She denies any weakness, paresthesias, dysarthria, dysphagia, change in bowel or bladder function.    Review of Systems:  The systems were reviewed with pertinent positives and negatives documented in the HPI.      Past Medical " & Surgical History  Past Medical History:   Diagnosis Date    Diabetes (Multi)     Diabetes mellitus (Multi)     Disease of thyroid gland     Hypertension     Renal disease      Past Surgical History:   Procedure Laterality Date    APPENDECTOMY      BACK SURGERY      HYSTERECTOMY         Social History:   Social History     Tobacco Use    Smoking status: Never    Smokeless tobacco: Never   Substance Use Topics    Alcohol use: Never     Family History:   No pertinent family history to chief complaint.     Medications:     Current Outpatient Medications:     aspirin 81 mg chewable tablet, Chew 1 tablet (81 mg) once daily., Disp: , Rfl:     atorvastatin (Lipitor) 80 mg tablet, Take 1 tablet (80 mg) by mouth once daily., Disp: , Rfl:     blood sugar diagnostic (Accu-Chek Guide test strips) strip, Inject 1 strip under the skin early in the morning.., Disp: 100 strip, Rfl: 3    cholecalciferol (Vitamin D-3) 25 MCG (1000 UT) capsule, Take 1 capsule (25 mcg) by mouth once daily., Disp: , Rfl:     cloNIDine (Catapres) 0.2 mg tablet, Take 1 tablet (0.2 mg) by mouth 2 times a day., Disp: , Rfl:     cyanocobalamin (Vitamin B-12) 1,000 mcg tablet, Take 1 tablet (1,000 mcg) by mouth once daily., Disp: , Rfl:     esomeprazole (NexIUM) 20 mg DR capsule, Take 1 capsule (20 mg) by mouth once daily., Disp: , Rfl:     furosemide (Lasix) 20 mg tablet, Take 1 tablet (20 mg) by mouth once daily., Disp: , Rfl:     hydrALAZINE (Apresoline) 25 mg tablet, Take 1 tablet (25 mg) by mouth 3 times a day., Disp: 270 tablet, Rfl: 3    levothyroxine (Synthroid, Levoxyl) 88 mcg tablet, Take 1 tablet (88 mcg) by mouth once daily., Disp: 90 tablet, Rfl: 2    losartan (Cozaar) 100 mg tablet, Take 1 tablet (100 mg) by mouth once daily., Disp: , Rfl:     olmesartan (BENIcar) 40 mg tablet, Take 0.5 tablets (20 mg) by mouth 2 times a day., Disp: , Rfl:     SITagliptin phosphate (Januvia) 50 mg tablet, Take 1 tablet (50 mg) by mouth once daily., Disp: 90  "tablet, Rfl: 2    vit C/E/Zn/coppr/lutein/zeaxan (PRESERVISION AREDS-2 ORAL), every 12 hours., Disp: , Rfl:      General Physical Exam:  /62 (BP Location: Left arm, Patient Position: Sitting, BP Cuff Size: Adult)   Pulse 74   Temp 35.3 °C (95.5 °F) (Temporal)   Ht 1.549 m (5' 1\")   Wt 70.3 kg (155 lb)   BMI 29.29 kg/m²      Lying 165/69 HR 76  Sitting 144/63 HR 88  Standing 101/62 HR 86    She looks well and is not in any acute distress. Breathing comfortably on room air.     Neurological Exam:   Mental status reveals her to be alert and oriented to person, place, and date. Speech is intact to conversation. Fund of knowledge is normal.     Cranial nerves:  CN 2   Visual fields full to confrontation.   CN 3, 4, 6   Pupils round, 4 mm in diameter, equally reactive to light. Lids symmetric; no ptosis. EOMs normal alignment, full range.   Normal horizontal and vertical saccadic movements. A few beats of end gaze nystagmus bilaterally.  CN 5   Facial sensation intact bilaterally.   CN 7   Normal and symmetric facial strength. Nasolabial folds symmetric.   CN 8   Hearing intact to conversation.   CN 9   Palate elevates symmetrically.   CN 11   Normal strength of shoulder shrug and neck turning.   CN 12   Tongue midline, with normal bulk     Motor:  Muscle tone: Normal in both upper and lower extremities.  Movements: No tremors or other abnormal movement.    R L   5 5 Shoulder abduction  5 5 Elbow flexion  5 5 Elbow extension  5 5 Finger abduction  5 5 Hip flexion  5 5 Knee flexion  5 5 Knee extension  5 5 Ankle dorsiflexion  5 5 Ankle plantarflexion     Reflexes                         R     L  Triceps          2      2  Biceps           2      2  Brachiorad    2      2  Patellar         1      1   Achilles         0      0     Sensory:   Light Touch: Normal in all extremities  Pin: Decreased of left palmar and dorsal aspect of hand  Vibration: Impaired up to ankles bilaterally in the lower extremities   "   Coordination:  In both upper extremities, finger-nose-finger was intact without dysmetria or overshoot.   In both lower extremities, heel-to-shin was intact.    Gait:  Ambulates with walker. Subjective dizziness upon standing and walking. Improves with sitting.    Dixhallpike:  -On right ear down no nystagmus or subjective dizziness  -On left ear down severe dizziness with down beating nystagmus    Results:    Lab Results   Component Value Date    HGBA1C 7.4 (H) 03/04/2025     Lab Results   Component Value Date    BWZYTYEC92 >2000 (H) 03/04/2025     IRON STUDIES:   Lab Results   Component Value Date    IRON 114 08/16/2024    TIBC 345 08/16/2024    FERRITIN 194 (H) 10/14/2023     Lab Results   Component Value Date    TSH 0.60 03/04/2025      Lab Results   Component Value Date    KRIS  08/18/2022     NEGATIVE  Reference range: NEGATIVE    Anti-nuclear antibody test is used as an aid in diagnosis of systemic  autoimmune diseases. Where positive and clinically warranted,  follow-up using disease-specific testing is recommended. Low positive  titers are not uncommon with advanced age, certain chronic  infections, and malignancies among others.     Test methodology: Indirect fluorescence immunoassay (IFA) using HEp-2  cells.  Performed By:  [facility]  [address] [place] [address]  : Bang Frye III, MD  CLIA#: [id number]  Phone#: [phone]      ANATITER  07/16/2018     Negative  Reference range: NEGATIVE    Normal range : negative at <1:80 serum dilution.       Lab Results   Component Value Date    CKTOTAL 163 09/14/2018     CBC:   Lab Results   Component Value Date    WBC 8.7 03/04/2025    HGB 10.5 (L) 03/04/2025    HCT 33.1 (L) 03/04/2025     03/04/2025     BMP:   Lab Results   Component Value Date     03/04/2025    K 3.9 03/04/2025     03/04/2025    CO2 21 03/04/2025    BUN 59 (H) 03/04/2025    CREATININE 1.54 (H) 03/04/2025    CALCIUM 9.2 03/04/2025     MG 2.01 03/01/2024    PHOS 3.7 08/16/2024     LFT:   Lab Results   Component Value Date    ALKPHOS 124 03/04/2025    BILITOT 0.4 03/04/2025    BILIDIR 0.2 09/28/2023    PROT 7.0 03/04/2025    ALBUMIN 4.2 03/04/2025    ALT 23 03/04/2025    AST 18 03/04/2025    GGT 20 05/31/2022     Imaging:  MRI brain/MRA neck    IMPRESSION:  No evidence of acute infarct, intracranial mass effect or midline shift.      FLAIR subcortical and periventricular hyperintensities that likely represent chronic small-vessel ischemic changes.      No evidence of significant stenosis on MRA of the neck.    Impression:  Fartun Carey is a 89 y.o. who presents with positional dizziness for the past 6 months. Dizziness described as internal spinning sensations with standing and walking. On neurological exam there are a few beats of end gaze nystagmus on left and right gaze.  Horizontal and vertical saccades are normal.  Smooth pursuit is normal.  No dysmetria or ataxia.  On Jose-Hallpike testing there was evidence of downbeat nystagmus with left ear down.  MRI brain without contrast was personally reviewed and did not show any evidence of infarct, hemorrhage or intracranial mass.  MRA neck was personally reviewed and no evidence of significant stenosis. In light of the negative MRI the down beating nystagmus on jose hallpike is most indicative of an anterior canal BPPV.  This was discussed with the patient and she has vestibular therapy scheduled in about 2 weeks for further evaluation and treatment.  If symptoms persist despite therapy electronystagmography testing can be considered.    The patient is also orthostatic on orthostatic vital signs.  While I do not think this is the primary cause of her dizziness, I did recommend that she increase her water intake to about 40 ounces of water a day and should consider wearing the compression stockings that she already has at home.    Plan:  -Vestibular therapy  -Increase water intake to 40 ounces a  day  -Compression stockings    She will follow-up with Meeta Fabian PA-C in 1 month.     Reviewed and approved by MICHELLE GALO on 3/25/25 at 5:20 PM.    I personally spent 75 minutes on the day of the visit completing the review of the medical record and outside records, obtaining history and performing an appropriate physical exam, patient care, counseling and education, placing orders, independently reviewing results, communicating with the patient/family and other providers, coordinating care and performing appropriate clinical documentation.

## 2025-03-25 ENCOUNTER — APPOINTMENT (OUTPATIENT)
Dept: NEUROLOGY | Facility: CLINIC | Age: OVER 89
End: 2025-03-25
Payer: MEDICARE

## 2025-03-25 VITALS
WEIGHT: 155 LBS | TEMPERATURE: 95.5 F | SYSTOLIC BLOOD PRESSURE: 148 MMHG | DIASTOLIC BLOOD PRESSURE: 62 MMHG | BODY MASS INDEX: 29.27 KG/M2 | HEART RATE: 74 BPM | HEIGHT: 61 IN

## 2025-03-25 DIAGNOSIS — R42 DIZZINESS: ICD-10-CM

## 2025-03-25 DIAGNOSIS — R42 DYSEQUILIBRIUM: ICD-10-CM

## 2025-03-25 DIAGNOSIS — H93.13 BILATERAL TINNITUS: ICD-10-CM

## 2025-03-25 DIAGNOSIS — H81.12 BENIGN PAROXYSMAL POSITIONAL VERTIGO OF LEFT EAR: Primary | ICD-10-CM

## 2025-03-25 DIAGNOSIS — R26.89 BALANCE DISORDER: ICD-10-CM

## 2025-03-25 DIAGNOSIS — R53.1 WEAKNESS: ICD-10-CM

## 2025-03-25 DIAGNOSIS — H91.93 BILATERAL HEARING LOSS, UNSPECIFIED HEARING LOSS TYPE: ICD-10-CM

## 2025-03-25 PROCEDURE — G2212 PROLONG OUTPT/OFFICE VIS: HCPCS | Performed by: STUDENT IN AN ORGANIZED HEALTH CARE EDUCATION/TRAINING PROGRAM

## 2025-03-25 PROCEDURE — 1123F ACP DISCUSS/DSCN MKR DOCD: CPT | Performed by: STUDENT IN AN ORGANIZED HEALTH CARE EDUCATION/TRAINING PROGRAM

## 2025-03-25 PROCEDURE — 3077F SYST BP >= 140 MM HG: CPT | Performed by: STUDENT IN AN ORGANIZED HEALTH CARE EDUCATION/TRAINING PROGRAM

## 2025-03-25 PROCEDURE — 99205 OFFICE O/P NEW HI 60 MIN: CPT | Performed by: STUDENT IN AN ORGANIZED HEALTH CARE EDUCATION/TRAINING PROGRAM

## 2025-03-25 PROCEDURE — 1126F AMNT PAIN NOTED NONE PRSNT: CPT | Performed by: STUDENT IN AN ORGANIZED HEALTH CARE EDUCATION/TRAINING PROGRAM

## 2025-03-25 PROCEDURE — 1036F TOBACCO NON-USER: CPT | Performed by: STUDENT IN AN ORGANIZED HEALTH CARE EDUCATION/TRAINING PROGRAM

## 2025-03-25 PROCEDURE — 1159F MED LIST DOCD IN RCRD: CPT | Performed by: STUDENT IN AN ORGANIZED HEALTH CARE EDUCATION/TRAINING PROGRAM

## 2025-03-25 PROCEDURE — 3078F DIAST BP <80 MM HG: CPT | Performed by: STUDENT IN AN ORGANIZED HEALTH CARE EDUCATION/TRAINING PROGRAM

## 2025-03-25 RX ORDER — FUROSEMIDE 20 MG/1
20 TABLET ORAL DAILY
COMMUNITY

## 2025-03-25 ASSESSMENT — PAIN SCALES - GENERAL: PAINLEVEL_OUTOF10: 0-NO PAIN

## 2025-03-25 NOTE — PATIENT INSTRUCTIONS
"It was a pleasure seeing you today.    As we discussed your examination shows evidence of a \"crystal issue\" in the left year called benign paroxysmal position vertigo. You have vestibular therapy scheduled for 4/7 which is important. They will do an evaluation and come up with a treatment plan.    You will follow up with Meeta Fabian PA-C in 1 month. We will call you to arrange this.    If you have any questions or concerns please call my office at [phone].   "

## 2025-04-02 ENCOUNTER — ANCILLARY PROCEDURE (OUTPATIENT)
Dept: RADIOLOGY | Facility: EXTERNAL LOCATION | Age: OVER 89
End: 2025-04-02
Payer: MEDICARE

## 2025-04-02 DIAGNOSIS — M54.16 RADICULOPATHY, LUMBAR REGION: ICD-10-CM

## 2025-04-02 PROCEDURE — 64484 NJX AA&/STRD TFRM EPI L/S EA: CPT | Performed by: ANESTHESIOLOGY

## 2025-04-02 PROCEDURE — 64483 NJX AA&/STRD TFRM EPI L/S 1: CPT | Performed by: ANESTHESIOLOGY

## 2025-04-02 NOTE — PROGRESS NOTES
Preprocedure diagnosis: Lumbar radiculopathy  Postprocedure diagnosis lumbar radiculopathy    Procedure performed: Left L4, L5 transforaminal epidural steroid injection under fluoroscopic guidance    Physician: Khari Sanchez MD    Anesthesia: Local    Complications: none    Blood loss:  none    Clinical note: This is a very pleasant 89-year-old female who suffers with low back and leg pain here meeting all medical criteria for above-mentioned procedure.    Procedure:      The patient was identified in the preoperative area.  The procedure was discussed in detail including its risks, benefits, and alternatives.  Signed consent was obtained and the patient agreed to proceed.       The patient was brought to the [facility] procedure room and positioned  in the prone position onto the procedure room table.  A pillow was placed below the abdomen to decrease lumbar lordosis and a safety strap was placed across the legs.  The lumbosacral region was then exposed, prepped, and draped in the usual sterile fashion using 2% Chloroprep scrub. Under fluoroscopic guidance in the AP, oblique, and lateral views, the left L4, L5 foramina were identified. The skin and subcutaneous tissue along the intended needle trajectories were anesthetized with 5 ml of 2% preservative free lidocaine.  Once adequate skin anesthesia was obtained, two 22-gauge Sprotte needles were advanced into the foramina. Once the desired needle tip location was achieved, the needles were aspirated and were negative for heme and CSF. Then 3mL of Omnipaque constrast dye was injected in divided doses under live fluoroscopy and showed appropriate epidural spread without intravascular or intrathecal uptake. After another negative aspiration, 10 mg of dexamethasone along with 3 ml of 0.5% preservative free lidocaine was injected in divided doses equally between the two needles. The needles were removed and bandages were applied.  The patient tolerated the  procedure well and was transferred back to the discharge area.   The patient was monitored for 15 minutes and vital signs were stable. The patient was discharged home in stable condition with a .

## 2025-04-07 ENCOUNTER — CLINICAL SUPPORT (OUTPATIENT)
Dept: PHYSICAL THERAPY | Facility: CLINIC | Age: OVER 89
End: 2025-04-07
Payer: MEDICARE

## 2025-04-07 DIAGNOSIS — R53.1 WEAKNESS: ICD-10-CM

## 2025-04-07 DIAGNOSIS — H81.12 BPPV (BENIGN PAROXYSMAL POSITIONAL VERTIGO), LEFT: Primary | ICD-10-CM

## 2025-04-07 DIAGNOSIS — R26.89 BALANCE DISORDER: ICD-10-CM

## 2025-04-07 DIAGNOSIS — R42 DYSEQUILIBRIUM: ICD-10-CM

## 2025-04-07 PROCEDURE — 97162 PT EVAL MOD COMPLEX 30 MIN: CPT | Mod: GP | Performed by: PHYSICAL THERAPIST

## 2025-04-07 NOTE — PROGRESS NOTES
"Physical Therapy Evaluation    Patient Name: Fartun Carey  MRN: [id number]  Evaluation Date: 2025  Time Calculation  Start Time: 1051  Stop Time: 1121  Time Calculation (min): 30 min    Current Problem  Problem List Items Addressed This Visit             ICD-10-CM    Dysequilibrium R42    Weakness R53.1    BPPV (benign paroxysmal positional vertigo), left - Primary H81.12       Subjective   General:       Patient reported hx of injury: started having dizziness last fall, no JARETT.  Last week had 2 spinal lumbar injections last week that have not helped as yet.  Patient gets a little tearful with Jose-Hallpike testing due to vertigo, however felt better after reassurance from PT.  Per neurology note... suspect anterior canal BPPV.  Surgery:   Remote back sx  Red Flags:  denies, but for does have occ blurry vision and occ B hand tingle-neuro aware, pt does have trouble emptying bladder once in a while, but no other s/s of cauda equina    Precautions:   Fall risk, dizziness, orthostatic  Hx:  HTN, DM  Pain:   None  today, but gets with prolonged stand and walking LB to L LE.  Up to a 8 at times.  Home Living:     Pt gets around home safely-rollator AAT    Work:  retired    Prior Function Per Pt/Caregiver Report:   Able to amb with cane instead of rollator  Pt goals: less dizzy  Imaging:  No evidence of significant stenosis on MRA of the neck.   IMPRESSION:  No evidence of acute infarct, intracranial mass effect or midline  shift.  OBJECTIVE:  If left blank, assume NT:    TU\"    Vestibular/Balance Assessment:    Occulomotor, Vertigo Tests, etc. +/-   Smooth pursuits    Saccades    VOR    Sharp-Lissa Test    [place]-Hallpike L +, Up beating nystagmus   [place]-Hallpike R    Roll Test R    Roll Test L    JPSE of C-spine    [place] Convergence    HIT    Head shake test        Balance Test Scores    MCTSIB 0/4 (5,0,0,0) shoes on              Range of Motion:     C-spine:  Ext mod decr no pain  Flexion wfl no pain  Rot " "min/mod decr no pain, but dizzy    Lower extremities:  Grossly WFL for gait and transfers      Strength:     LE MMT R L   Hip flexion 3+/5 4-/5   Hip extension     Hip abduction seated 4-/5 4/5   Hip adduction     Knee flexion     Knee extension 4/5 4/5   Ankle DF 4/5 4-/5   Ankle PF     Ankle eversion     Ankle inversion         Gait:   Decr jeison    Stairs:   Condo-small step into home  Bed Mobility:   Min a from PT Today  Transfers:   I, but with unsafe technique-leaving rollator and walking to chair    Outcome Measures:  DHI = 88    OP EDUCATION:   Pt ed on PT POC      Assessment       pt is s/p onset of dizziness and needs PT to decr dizziness, incr  strength and improve balance to restore function.      Based on the history including personal factors and/or comorbidities, examination of body systems including body structures and function, activity limitations, and/or participation restrictions, as well as clinical presentation, patient meets criteria for a moderate  complexity evaluation.    Plan     Next session work a little on lower extremity strengthening and balance, and finished treatment with canalith repositioning maneuver  Skilled PT consisting of:  therapeutic exercise, therapeutic activity, NMR, manual, thermal, electric stimulation, US, light therapy, gait training, transfer training, canalith repositioning  Rehab Potential: good  Frequency:  2x/wk  Duration: 20 weeks  4/7/2025 to 7/4/2025 Medicare cert date:    Anticipate 10 PT visits    Goals:    STG:   Pt to be I in initial HEP.    LTG\"s:  Incr MMT of lower extremities by 1 grade for improved gait stability.  Improve score on outcome form by 10 points to show incr in function.  Patient to report 50% less vertigo for IADL  Negative [place]-Hallpike to decrease vertigo with position changes  MCTSIB 1-2/4 for incr sensory integration.    Some of This note was created using voice recognition software and was not corrected for typographical or " grammatical errors.

## 2025-04-14 ENCOUNTER — TREATMENT (OUTPATIENT)
Dept: PHYSICAL THERAPY | Facility: CLINIC | Age: OVER 89
End: 2025-04-14
Payer: MEDICARE

## 2025-04-14 DIAGNOSIS — R26.89 BALANCE DISORDER: ICD-10-CM

## 2025-04-14 DIAGNOSIS — R42 DYSEQUILIBRIUM: ICD-10-CM

## 2025-04-14 DIAGNOSIS — H81.12 BPPV (BENIGN PAROXYSMAL POSITIONAL VERTIGO), LEFT: ICD-10-CM

## 2025-04-14 DIAGNOSIS — R53.1 WEAKNESS: ICD-10-CM

## 2025-04-14 PROCEDURE — 97110 THERAPEUTIC EXERCISES: CPT | Mod: GP | Performed by: PHYSICAL THERAPIST

## 2025-04-14 PROCEDURE — 95992 CANALITH REPOSITIONING PROC: CPT | Mod: GP | Performed by: PHYSICAL THERAPIST

## 2025-04-14 NOTE — PROGRESS NOTES
Physical Therapy Treatment    Patient Name: Fartun Carey  MRN: [id number]  Encounter date:  4/14/2025  Time Calculation  Start Time: 1050  Stop Time: 1115  Time Calculation (min): 25 min  PT Modalities Time Entry  Canalith Repositioning Time Entry: 10  PT Therapeutic Procedures Time Entry  Neuromuscular Re-Education Time Entry: 2  Therapeutic Exercise Time Entry: 13    Visit Number:  2 (including evaluation)  Planned total visits: 10-20  Visit Authorized:  2025:  [employer] MEDICARE - NO AUTH / 96% coins / $1500 OOP not met / [place]ISITS / AVAILITY [id number] / ds 4/7/25 //     Current Problem  Problem List Items Addressed This Visit             ICD-10-CM    Dysequilibrium R42    Weakness R53.1    BPPV (benign paroxysmal positional vertigo), left H81.12     Other Visit Diagnoses         Codes    Balance disorder     R26.89            Surgery:   Remote back sx  Red Flags:  denies, but for does have occ blurry vision and occ B hand tingle-neuro aware, pt does have trouble emptying bladder once in a while, but no other s/s of cauda equina     Precautions:   Fall risk, dizziness, orthostatic  Hx:  HTN, DM    Pain   0  Location  description  Subjective  General        No progress as yet    Objective  Positive left Jose-Hallpike    Treatment:  TE's/nmr to improve coordination, strength and stability:  Seated:  Ankle circles 2 x 10 clockwise/counterclockwise  Long arc quads 2 x 10 each with pause at the top of motion  Ankle inversion eversion 2 x 10 each  Seated in Romberg position with eyes closed 1 minute    Standing:  Sidestep along plinth 4 laps-positive loss of balance 3 times with self correction  Hip abduction x 10 each    Canalith repositioning treatment performed: Epley maneuver      OP EDUCATION:   Patient drove herself to clinic today, I advise she have a , as Canalith repositioning can increase dizziness temporarily    Assessment:     Pt's response to treatment:  improved ecc control with LE te's after  "cues  Areas of improvements: Less anxiety with [place]-Hallpike today  Limitations/deficits:  dizzy, weak, balance    Pain end of session: 0    Continued PT required to reach all goals and restore function    Plan  Issue HEP, do ocular motor testing.  work a little on lower extremity strengthening and balance, and finish treatment with canalith repositioning maneuver    Skilled PT consisting of:  therapeutic exercise, therapeutic activity, NMR, manual, thermal, electric stimulation, US, light therapy, gait training, transfer training, canalith repositioning  Rehab Potential: good  Frequency:  2x/wk  Duration: 20 weeks  4/7/2025 to 7/4/2025 Medicare cert date:    Anticipate 10 PT visits     Goals:     STG:   Pt to be I in initial HEP.     LTG\"s:  Incr MMT of lower extremities by 1 grade for improved gait stability.  Improve score on outcome form by 10 points to show incr in function.  Patient to report 50% less vertigo for IADL  Negative Jose-Hallpike to decrease vertigo with position changes  MCTSIB 1-2/4 for incr sensory integration.       Some of This note was created using voice recognition software and was not corrected for typographical or grammatical errors.          "

## 2025-04-15 ENCOUNTER — APPOINTMENT (OUTPATIENT)
Dept: PRIMARY CARE | Facility: CLINIC | Age: OVER 89
End: 2025-04-15
Payer: MEDICARE

## 2025-04-16 ENCOUNTER — OFFICE VISIT (OUTPATIENT)
Dept: PAIN MEDICINE | Facility: CLINIC | Age: OVER 89
End: 2025-04-16
Payer: MEDICARE

## 2025-04-16 ENCOUNTER — TREATMENT (OUTPATIENT)
Dept: PHYSICAL THERAPY | Facility: CLINIC | Age: OVER 89
End: 2025-04-16
Payer: MEDICARE

## 2025-04-16 VITALS
OXYGEN SATURATION: 97 % | HEART RATE: 61 BPM | SYSTOLIC BLOOD PRESSURE: 108 MMHG | BODY MASS INDEX: 29.27 KG/M2 | WEIGHT: 155 LBS | HEIGHT: 61 IN | DIASTOLIC BLOOD PRESSURE: 52 MMHG

## 2025-04-16 DIAGNOSIS — R26.89 BALANCE DISORDER: ICD-10-CM

## 2025-04-16 DIAGNOSIS — R42 DYSEQUILIBRIUM: ICD-10-CM

## 2025-04-16 DIAGNOSIS — M51.362 DEGENERATION OF INTERVERTEBRAL DISC OF LUMBAR REGION WITH DISCOGENIC BACK PAIN AND LOWER EXTREMITY PAIN: Primary | ICD-10-CM

## 2025-04-16 DIAGNOSIS — R53.1 WEAKNESS: ICD-10-CM

## 2025-04-16 DIAGNOSIS — M48.062 NEUROGENIC CLAUDICATION DUE TO LUMBAR SPINAL STENOSIS: ICD-10-CM

## 2025-04-16 DIAGNOSIS — H81.12 BPPV (BENIGN PAROXYSMAL POSITIONAL VERTIGO), LEFT: ICD-10-CM

## 2025-04-16 PROCEDURE — 3074F SYST BP LT 130 MM HG: CPT | Performed by: PHYSICIAN ASSISTANT

## 2025-04-16 PROCEDURE — 97110 THERAPEUTIC EXERCISES: CPT | Mod: GP | Performed by: PHYSICAL THERAPIST

## 2025-04-16 PROCEDURE — 1160F RVW MEDS BY RX/DR IN RCRD: CPT | Performed by: PHYSICIAN ASSISTANT

## 2025-04-16 PROCEDURE — 1159F MED LIST DOCD IN RCRD: CPT | Performed by: PHYSICIAN ASSISTANT

## 2025-04-16 PROCEDURE — 3078F DIAST BP <80 MM HG: CPT | Performed by: PHYSICIAN ASSISTANT

## 2025-04-16 PROCEDURE — 1125F AMNT PAIN NOTED PAIN PRSNT: CPT | Performed by: PHYSICIAN ASSISTANT

## 2025-04-16 PROCEDURE — 1123F ACP DISCUSS/DSCN MKR DOCD: CPT | Performed by: PHYSICIAN ASSISTANT

## 2025-04-16 PROCEDURE — 99214 OFFICE O/P EST MOD 30 MIN: CPT | Performed by: PHYSICIAN ASSISTANT

## 2025-04-16 PROCEDURE — 97112 NEUROMUSCULAR REEDUCATION: CPT | Mod: GP | Performed by: PHYSICAL THERAPIST

## 2025-04-16 PROCEDURE — G2211 COMPLEX E/M VISIT ADD ON: HCPCS | Performed by: PHYSICIAN ASSISTANT

## 2025-04-16 RX ORDER — AMOXICILLIN 500 MG/1
TABLET, FILM COATED ORAL
COMMUNITY
Start: 2025-04-12

## 2025-04-16 ASSESSMENT — PAIN - FUNCTIONAL ASSESSMENT: PAIN_FUNCTIONAL_ASSESSMENT: 0-10

## 2025-04-16 ASSESSMENT — PAIN SCALES - GENERAL
PAINLEVEL_OUTOF10: 3
PAINLEVEL_OUTOF10: 3

## 2025-04-16 ASSESSMENT — PAIN DESCRIPTION - DESCRIPTORS: DESCRIPTORS: ACHING

## 2025-04-16 NOTE — PROGRESS NOTES
"Physical Therapy Treatment    Patient Name: Fartun Carey  MRN: [id number]  Encounter date:  4/16/2025  Time Calculation  Start Time: 1049  Stop Time: 1127  Time Calculation (min): 38 min     PT Therapeutic Procedures Time Entry  Neuromuscular Re-Education Time Entry: 20  Therapeutic Exercise Time Entry: 18    Visit Number:  3 (including evaluation)  Planned total visits: 10-20  Visit Authorized:  2025:  [employer] MEDICARE - NO AUTH / 96% coins / $1500 OOP not met / [place] VISITS / AVAILITY [id number] / ds 4/7/25 //     Current Problem  Problem List Items Addressed This Visit           ICD-10-CM    Balance disorder R26.89    Weakness R53.1    BPPV (benign paroxysmal positional vertigo), left H81.12       Surgery:   Remote back sx  Red Flags:  denies, but for does have occ blurry vision and occ B hand tingle-neuro aware, pt does have trouble emptying bladder once in a while, but no other s/s of cauda equina     Precautions:   Fall risk, dizziness, orthostatic  Hx:  HTN, DM    Pain   3-4  Location LB   Description ache  Dizziness 0 now  Subjective  General        A little less dizziness, but still gets a lot of dizziness and lightheadedness when she was up standing and walking.  Want HEP.    Objective:  negative left [place]-Hallpike  - B Roll tests  R DHP deferred, as it was - at neuro visit last month  Smooth pursuits +  Saccades int +  NPC-pt does not tolerate test-\"dizzy\"  VOR +    Prior:  Positive left [place]-Hallpike    Per neuro note in 3/2020:  Lying 165/69 HR 76  Sitting 144/63 HR 88  Standing 101/62 HR 86    Treatment:  TE's/nmr to improve coordination, strength and stability:    Seated:  Ankle circles 2 x 20 clockwise/counterclockwise  Long arc quads 2 x 10 each with pause at the top of motion  Ankle inversion eversion 2 x 10 each  Seated in Romberg position with eyes closed 1 minute    Standing: DNP  Sidestep along plinth 4 laps-positive loss of balance 3 times with self correction  Hip abduction x 10 each    Canalith " "repositioning treatment performed: Epley maneuver DNP      OP EDUCATION:  HEP:  Saccades, smooth pursuits VOR, pencil push-ups, VOR cancellation    Prior:   Patient drove herself to clinic today, I advise she have a , as Canalith repositioning can increase dizziness temporarily    Assessment:     Pt's response to treatment: No nystagmus with any BPPV testing today  Areas of improvements: Patient is now negative for BPPV  Limitations/deficits:  dizzy, weak, balance    Pain end of session: No change  Continued PT required to reach all goals and restore function    Plan  Patient gets abdominal binder and new compression hose, assess if this improves symptoms.  If not refer back to MD for consideration of medication management and/or increased sodium recommendation, etc.  Continue to work on gaze stabilization, work a little on lower extremity strengthening and balance.    Skilled PT consisting of:  therapeutic exercise, therapeutic activity, NMR, manual, thermal, electric stimulation, US, light therapy, gait training, transfer training, canalith repositioning  Rehab Potential: good  Frequency:  2x/wk  Duration: 20 weeks  4/7/2025 to 7/4/2025 Medicare cert date:    Anticipate 10 PT visits     Goals:     STG:   Pt to be I in initial HEP.     LTG\"s:  Incr MMT of lower extremities by 1 grade for improved gait stability.  Improve score on outcome form by 10 points to show incr in function.  Patient to report 50% less vertigo for IADL  Negative [place]-Hallpike to decrease vertigo with position changes  MCTSIB 1-2/4 for incr sensory integration.       Some of This note was created using voice recognition software and was not corrected for typographical or grammatical errors.          "

## 2025-04-16 NOTE — PROGRESS NOTES
Chronic Pain Clinic Follow-Up Evaluation    Date: April 16, 2025 - 2:51 PM    Chief Complaint:   Chief Complaint   Patient presents with    Follow-up     Post LTR, 50% relief           SUBJECTIVE:    Fartun Carey is a 89 y.o. female who presents to [facility] pain management center for a follow up appointment for evaluation of pain after recent procedure.    Patient is an established patient of Dr Sanchez    Patient has hx of: degenerative disc dis and lumbar spinal stenosis     PMH also significant for: DM, HTN, CKD      The plan from the last visit on 3/6/2025 includes:  Intervention: Patient is suffering with spinal stenosis with neurogenic claudication her presentation is different than when I saw her 2 years ago.  She is suffering with radicular leg pain with standing and walking which does correlate with the moderate central canal stenosis seen at the L4-5 level on her last lumbar MRI.  We will move forward with a left L4, L5 transforaminal dural steroid injection under fluoroscopic guidance and local anesthetic.  Risks, benefit, and alternatives of the procedure were discussed with the patient.  Oswestry score has been compelted and recorded.     Since the last visit, she had left L4, L5 TFESI with Dr Sanchez 4/2     Left leg pain 50% improved after TFESI   No pain on right buttocks with sitting but can start about 30 min after standing 8/10 and sitting down relieves the pain    She describes aching in right buttocks       Current Outpatient Medications:  Current Medications[1]    Current Anticoagulant Therapy: No    OARRS: N/A    Pain medications reviewed:  yes    Past Medical History:  Medical History[2]      Past Surgical History:  Surgical History[3]     Family History:  Family History[4]    Social History:  Social History     Substance and Sexual Activity   Alcohol Use Never     Tobacco Use History[5]  Social History     Substance and Sexual Activity   Drug Use Never         Review of  Systems:  CARDIOVASCULAR: HTN  GENITOURINARY: CKD  MUSCULOSKELETAL: chronic low back pain  ENDOCRINE: DM, hypothyroid    The remainder of the ROS was negative.    Physical Examination:  Vitals:    04/16/25 1454   BP: 108/52   Pulse: 61   SpO2: 97%       General:in no acute distress  Skin: skin color, texture, turgor normal, no rashes or lesions  HEENT: normocephalic, atraumatic, sclera non-icteric   Resp: unlabored on room air   Musculoskeletal:  Back: pain elicited with ext of lumbar spine   Extremities: Extremities normal. No deformities, edema, or skin discoloration  Neurological:  Mental Status: alert and oriented      New Imaging and Diagnostic Studies:  No    ASSESSMENT:    Fartun Carey is a 89 y.o. female with chronic low back and leg pain secondary to degenerative disc disease and lumbar spinal stenosis. She had 50% pain relief after L4 L5 TFESI which has allowed her to maintain her ADL's and activities.     Problem List Items Addressed This Visit           ICD-10-CM       Neuro    Degeneration of lumbar intervertebral disc - Primary M51.369     Other Visit Diagnoses         Codes      Neurogenic claudication due to lumbar spinal stenosis     M48.062            PLAN:    Diagnostics: Not indicated at this time      Physical Therapy and Rehabilitation: Continue HEP      Psychologically: No issues to be addressed at this time     Medication: N/A    Duration: years, chronic and ongoing    Intervention: None needed at this time. Can repeat TFESI as needed     7.   Follow up: as needed     The above plan and management options were discussed at length with patient. Patient is in agreement with the above and verbalized understanding. It will be communicated with the referring physician via electronic record, fax, or mail.    Bonita Harris PA-C  April 16, 2025         [1]   Current Outpatient Medications   Medication Sig Dispense Refill    aspirin 81 mg chewable tablet Chew 1 tablet (81 mg) once daily.       atorvastatin (Lipitor) 80 mg tablet Take 1 tablet (80 mg) by mouth once daily.      blood sugar diagnostic (Accu-Chek Guide test strips) strip Inject 1 strip under the skin early in the morning.. 100 strip 3    cholecalciferol (Vitamin D-3) 25 MCG (1000 UT) capsule Take 1 capsule (25 mcg) by mouth once daily.      cloNIDine (Catapres) 0.2 mg tablet Take 1 tablet (0.2 mg) by mouth 2 times a day.      cyanocobalamin (Vitamin B-12) 1,000 mcg tablet Take 1 tablet (1,000 mcg) by mouth once daily.      esomeprazole (NexIUM) 20 mg DR capsule Take 1 capsule (20 mg) by mouth once daily.      furosemide (Lasix) 20 mg tablet Take 1 tablet (20 mg) by mouth once daily.      hydrALAZINE (Apresoline) 25 mg tablet Take 1 tablet (25 mg) by mouth 3 times a day. 270 tablet 3    levothyroxine (Synthroid, Levoxyl) 88 mcg tablet Take 1 tablet (88 mcg) by mouth once daily. 90 tablet 2    losartan (Cozaar) 100 mg tablet Take 1 tablet (100 mg) by mouth once daily.      olmesartan (BENIcar) 40 mg tablet Take 0.5 tablets (20 mg) by mouth 2 times a day.      SITagliptin phosphate (Januvia) 50 mg tablet Take 1 tablet (50 mg) by mouth once daily. 90 tablet 2    vit C/E/Zn/coppr/lutein/zeaxan (PRESERVISION AREDS-2 ORAL) every 12 hours.       No current facility-administered medications for this visit.   [2]   Past Medical History:  Diagnosis Date    Diabetes (Multi)     Diabetes mellitus (Multi)     Disease of thyroid gland     Hypertension     Renal disease    [3]   Past Surgical History:  Procedure Laterality Date    APPENDECTOMY      BACK SURGERY      HYSTERECTOMY     [4]   Family History  Problem Relation Name Age of Onset    Heart disease Mother      Heart disease Father      Heart disease Brother      No Known Problems Son      Heart disease Maternal Grandmother      Heart disease Maternal Grandfather      Heart disease Paternal Grandmother      Heart disease Paternal Grandfather     [5]   Social History  Tobacco Use   Smoking Status Never    Smokeless Tobacco Never

## 2025-04-16 NOTE — PATIENT INSTRUCTIONS
Salonpas patches - methylsalicylate or menthol camphor   Can leave this on for up to 12 hours     Contact office early next week if pain not improved

## 2025-04-23 ENCOUNTER — TREATMENT (OUTPATIENT)
Dept: PHYSICAL THERAPY | Facility: CLINIC | Age: OVER 89
End: 2025-04-23
Payer: MEDICARE

## 2025-04-23 DIAGNOSIS — R53.1 WEAKNESS: ICD-10-CM

## 2025-04-23 DIAGNOSIS — R42 DYSEQUILIBRIUM: ICD-10-CM

## 2025-04-23 DIAGNOSIS — R26.89 BALANCE DISORDER: ICD-10-CM

## 2025-04-23 DIAGNOSIS — H81.12 BPPV (BENIGN PAROXYSMAL POSITIONAL VERTIGO), LEFT: ICD-10-CM

## 2025-04-23 PROCEDURE — 97112 NEUROMUSCULAR REEDUCATION: CPT | Mod: GP | Performed by: PHYSICAL THERAPIST

## 2025-04-23 PROCEDURE — 97110 THERAPEUTIC EXERCISES: CPT | Mod: GP | Performed by: PHYSICAL THERAPIST

## 2025-04-23 NOTE — PROGRESS NOTES
"Physical Therapy Treatment    Patient Name: Fartun Carey  MRN: [id number]  Encounter date:  4/23/2025  Time Calculation  Start Time: 1128  Stop Time: 1208  Time Calculation (min): 40 min     PT Therapeutic Procedures Time Entry  Neuromuscular Re-Education Time Entry: 20  Therapeutic Exercise Time Entry: 20    Visit Number:  4 (including evaluation)  Planned total visits: 10-20  Visit Authorized:  2025:  [employer] MEDICARE - NO AUTH / 96% coins / $1500 OOP not met / [place] VISITS / AVAILITY [id number] / ds 4/7/25 //     Current Problem  Problem List Items Addressed This Visit           ICD-10-CM    Balance disorder R26.89    Weakness R53.1    BPPV (benign paroxysmal positional vertigo), left H81.12     Other Visit Diagnoses         Codes      Dysequilibrium     R42            Surgery:   Remote back sx  Red Flags:  denies, but for does have occ blurry vision and occ B hand tingle-neuro aware, pt does have trouble emptying bladder once in a while, but no other s/s of cauda equina     Precautions:   Fall risk, dizziness, orthostatic  Hx:  HTN, DM    Pain   3-4  Location LB   Description ache  Dizziness 0 now  Subjective  General         less dizziness most days since getting compression hose and abdominal binder, but still has some days where it is a little worse.  I have told everyone but no one has an answer for me why my hands have numbness.    Objective:  With wearing abdominal binder and compression hose:  Sitting BP: 122/56  Standing BP: 145/44  Standing BP after standing for minutes: 138/50    Prior:  negative left [place]-Hallpike  - B Roll tests  R DHP deferred, as it was - at neuro visit last month  Smooth pursuits +  Saccades int +  NPC-pt does not tolerate test-\"dizzy\"  VOR +    Per neuro note in 3/2020:  Lying 165/69 HR 76  Sitting 144/63 HR 88  Standing 101/62 HR 86    Treatment:  TE's/nmr to improve coordination, strength and stability:    Seated:  Ankle circles 2 x 20 clockwise/counterclockwise  Long arc quads 2 x " 10 each with pause at the top of motion  Ankle inversion eversion 2 x 10 each  Seated in Romberg position with eyes closed 1 minute DNP  STS 3 x 5    Standing:   Sidestep parallel bars 2 laps-positive loss of balance 1 times with self correction  Hip abduction x 10 each DNP  Forward backward walking in bars 2 laps with occasional need for upper extremity support  Hip flexion x 10 each  Heel raise/toe raise x 10 each    Standing on foam: Wide base of support  Eyes open 1 minute x 3  Bilateral shoulder flexion and elbow flexions x 5 each stopped due to both with significant constant posterior loss of balance    Canalith repositioning treatment performed: Epley maneuver DNP      OP EDUCATION:  Will send message to  About current management of orthostatic BP including compression hose and abdominal binder, to see if she wants to see you in office or not.  Hand numbness sounds like it could be a neuropathy situation, however PT is not the one to diagnosis  Add sit to stands and sidestepping at counter to HEP    HEP:  Saccades, smooth pursuits VOR, pencil push-ups, VOR cancellation    Prior:   Patient drove herself to clinic today, I advise she have a , as Canalith repositioning can increase dizziness temporarily    Assessment:     Pt's response to treatment: No nystagmus with any BPPV testing today  Areas of improvements: Patient is now negative for BPPV  Limitations/deficits:  dizzy, weak, balance    Pain end of session: No change  Continued PT required to reach all goals and restore function    Plan  Continue to work on gaze stabilization, work a little on lower extremity strengthening and balance and issue more handouts for HEP.    Skilled PT consisting of:  therapeutic exercise, therapeutic activity, NMR, manual, thermal, electric stimulation, US, light therapy, gait training, transfer training, canalith repositioning  Rehab Potential: good  Frequency:  2x/wk  Duration: 20 weeks  4/7/2025 to 7/4/2025  "Medicare cert date:    Anticipate 10 PT visits     Goals:     STG:   Pt to be I in initial HEP.     LTG\"s:  Incr MMT of lower extremities by 1 grade for improved gait stability.  Improve score on outcome form by 10 points to show incr in function.  Patient to report 50% less vertigo for IADL  Negative Jose-Hallpike to decrease vertigo with position changes  MCTSIB 1-2/4 for incr sensory integration.       Some of This note was created using voice recognition software and was not corrected for typographical or grammatical errors.          "

## 2025-04-28 ENCOUNTER — TREATMENT (OUTPATIENT)
Dept: PHYSICAL THERAPY | Facility: CLINIC | Age: OVER 89
End: 2025-04-28
Payer: MEDICARE

## 2025-04-28 DIAGNOSIS — H81.12 BPPV (BENIGN PAROXYSMAL POSITIONAL VERTIGO), LEFT: ICD-10-CM

## 2025-04-28 DIAGNOSIS — R42 DYSEQUILIBRIUM: Primary | ICD-10-CM

## 2025-04-28 DIAGNOSIS — R53.1 WEAKNESS: ICD-10-CM

## 2025-04-28 DIAGNOSIS — R26.89 BALANCE DISORDER: ICD-10-CM

## 2025-04-28 PROCEDURE — 97110 THERAPEUTIC EXERCISES: CPT | Mod: GP | Performed by: PHYSICAL THERAPIST

## 2025-04-28 PROCEDURE — 97112 NEUROMUSCULAR REEDUCATION: CPT | Mod: GP | Performed by: PHYSICAL THERAPIST

## 2025-04-28 NOTE — PROGRESS NOTES
"Physical Therapy Treatment    Patient Name: Fartun Carey  MRN: [id number]  Encounter date:  4/28/2025  Time Calculation  Start Time: 1122  Stop Time: 1200  Time Calculation (min): 38 min     PT Therapeutic Procedures Time Entry  Neuromuscular Re-Education Time Entry: 20  Therapeutic Exercise Time Entry: 18    Visit Number:  5 (including evaluation)  Planned total visits: 10-20  Visit Authorized:  2025:  [employer] MED[employer]ARE - NO AUTH / 96% coins / $1500 OOP not met / [place] VISITS / AVAILITY [id number] / ds 4/7/25 //     Current Problem  Problem List Items Addressed This Visit           ICD-10-CM    Balance disorder R26.89    Weakness R53.1    BPPV (benign paroxysmal positional vertigo), left H81.12     Other Visit Diagnoses         Codes      Dysequilibrium    -  Primary R42              Surgery:   Remote back sx  Red Flags:  denies, but for does have occ blurry vision and occ B hand tingle-neuro aware, pt does have trouble emptying bladder once in a while, but no other s/s of cauda equina     Precautions:   Fall risk, dizziness, orthostatic  Hx:  HTN, DM    Pain   0  Location LB   Description   Dizziness 0 now  Subjective  General      Pt s/s are overall much improved, not perfect.  Pt notified of MD's recommendations to take a liquid IV, daily, and will follow instructions on the box, and call MD with any need for clarification    Prior:   less dizziness most days since getting compression hose and abdominal binder, but still has some days where it is a little worse.  I have told everyone but no one has an answer for me why my hands have numbness.    Objective:  Stand foam no UE support, WBOS 0-8\"    Prior:  With wearing abdominal binder and compression hose:  Sitting BP: 122/56  Standing BP: 145/44  Standing BP after standing for minutes: 138/50    Prior:  negative left Jose-Hallpike  - B Roll tests  R DHP deferred, as it was - at neuro visit last month  Smooth pursuits +  Saccades int +  NPC-pt does not tolerate " "test-\"dizzy\"  VOR +    Per neuro note in 3/2020:  Lying 165/69 HR 76  Sitting 144/63 HR 88  Standing 101/62 HR 86    Treatment:  TE's/nmr to improve coordination, strength and stability:    Seated:  Ankle circles 2 x 20 clockwise/counterclockwise  Long arc quads 2# 2 x 10 each with pause at the top of motion  Ankle inversion eversion 2 x 10 each  STS 3 x 6  Ankle iso inversion with ball 2 x 10    Seated on fit disc:  WBOS:  EO 2' x 2  EC 30\"  VOR V/H 2 x 20\" jerrod  SP x 10 all planes  Saccades DNP  Pencil push ups x 10  VOR cx DNP    Standing:   Sidestep parallel bars 2 laps-positive loss of balance 1 times with self correction  Hip abduction x 10 each DNP  Forward backward walking in bars 2 laps with occasional need for upper extremity support  Hip flexion x 10 each  Heel raise/toe raise x 10 each    Standing on foam: Wide base of support  Eyes open 1 minute x 3  Bilateral shoulder flexion and elbow flexions DNP     Canalith repositioning treatment performed: Epley maneuver DNP      OP EDUCATION:  Continue HEP, add liquid IV per MD instructions    Prior:  Will send message to  About current management of orthostatic BP including compression hose and abdominal binder, to see if she wants to see you in office or not.  Hand numbness sounds like it could be a neuropathy situation, however PT is not the one to diagnosis  Add sit to stands and sidestepping at counter to HEP    HEP:  Saccades, smooth pursuits VOR, pencil push-ups, VOR cancellation    Prior:   Patient drove herself to clinic today, I advise she have a , as Canalith repositioning can increase dizziness temporarily    Assessment:     Pt's response to treatment: tan c/o dizzy today, severely retropulsive on foam  Areas of improvements: - for BPPV, mild decr retropulsion after cues to engage abs  Limitations/deficits:  dizzy, weak, balance    Pain end of session: No change  Continued PT required to reach all goals and restore function    Plan:consider " "bike if s/s continue to improve after taking liquid IV  Continue to work on gaze stabilization, work a little on lower extremity strengthening and balance and issue more handouts for HEP.    Skilled PT consisting of:  therapeutic exercise, therapeutic activity, NMR, manual, thermal, electric stimulation, US, light therapy, gait training, transfer training, canalith repositioning  Rehab Potential: good  Frequency:  2x/wk  Duration: 20 weeks  4/7/2025 to 7/4/2025 Medicare cert date:    Anticipate 10 PT visits     Goals:     STG:   Pt to be I in initial HEP.     LTG\"s:  Incr MMT of lower extremities by 1 grade for improved gait stability.  Improve score on outcome form by 10 points to show incr in function.  Patient to report 50% less vertigo for IADL  Negative Jose-Hallpike to decrease vertigo with position changes  MCTSIB 1-2/4 for incr sensory integration.       Some of This note was created using voice recognition software and was not corrected for typographical or grammatical errors.          "

## 2025-04-30 ENCOUNTER — TREATMENT (OUTPATIENT)
Dept: PHYSICAL THERAPY | Facility: CLINIC | Age: OVER 89
End: 2025-04-30
Payer: MEDICARE

## 2025-04-30 DIAGNOSIS — H81.12 BPPV (BENIGN PAROXYSMAL POSITIONAL VERTIGO), LEFT: ICD-10-CM

## 2025-04-30 DIAGNOSIS — R42 DYSEQUILIBRIUM: ICD-10-CM

## 2025-04-30 DIAGNOSIS — R53.1 WEAKNESS: ICD-10-CM

## 2025-04-30 DIAGNOSIS — R26.89 BALANCE DISORDER: ICD-10-CM

## 2025-04-30 PROCEDURE — 97110 THERAPEUTIC EXERCISES: CPT | Mod: GP | Performed by: PHYSICAL THERAPIST

## 2025-04-30 PROCEDURE — 97112 NEUROMUSCULAR REEDUCATION: CPT | Mod: GP | Performed by: PHYSICAL THERAPIST

## 2025-04-30 NOTE — PROGRESS NOTES
"Physical Therapy Treatment    Patient Name: Fartun Carey  MRN: [id number]  Encounter date:  4/30/2025  Time Calculation  Start Time: 1343  Stop Time: 1421  Time Calculation (min): 38 min     PT Therapeutic Procedures Time Entry  Neuromuscular Re-Education Time Entry: 20  Therapeutic Exercise Time Entry: 18    Visit Number:  6 (including evaluation)  Planned total visits: 10-20  Visit Authorized:  2025:  [employer] MED[employer]ARE - NO AUTH / 96% coins / $1500 OOP not met / [place] VISITS / AVAILITY [id number] / ds 4/7/25 //     Current Problem  Problem List Items Addressed This Visit           ICD-10-CM    Dysequilibrium R42    Weakness R53.1    BPPV (benign paroxysmal positional vertigo), left H81.12     Other Visit Diagnoses         Codes      Balance disorder     R26.89              Surgery:   Remote back sx  Red Flags:  denies, but for does have occ blurry vision and occ B hand tingle-neuro aware, pt does have trouble emptying bladder once in a while, but no other s/s of cauda equina     Precautions:   Fall risk, dizziness, orthostatic  Hx:  HTN, DM    Pain   0  Location LB   Description   Dizziness 0   Subjective  General   Difficulty with gaze stabilization, trouble reading at times    Prior:   Pt s/s are overall much improved, not perfect.  Pt notified of MD's recommendations to take a liquid IV, daily, and will follow instructions on the box, and call MD with any need for clarification    Prior:   less dizziness most days since getting compression hose and abdominal binder, but still has some days where it is a little worse.  I have told everyone but no one has an answer for me why my hands have numbness.    Objective:  Stand foam no UE support, WBOS 0-8\"    Prior:  With wearing abdominal binder and compression hose:  Sitting BP: 122/56  Standing BP: 145/44  Standing BP after standing for minutes: 138/50    Prior:  negative left Jose-Hallpike  - B Roll tests  R DHP deferred, as it was - at neuro visit last month  Smooth " "pursuits +  Saccades int +  NPC-pt does not tolerate test-\"dizzy\"  VOR +    Per neuro note in 3/2020:  Lying 165/69 HR 76  Sitting 144/63 HR 88  Standing 101/62 HR 86    Treatment:  TE's/nmr to improve coordination, strength and stability:    REC bike, lv 1-2, 90\"    Seated:  Ankle circles 2 x 20 clockwise/counterclockwise  Long arc quads 2# 2 x 10 each with pause at the top of motion  Ankle inversion eversion 2 x 10 each  STS 2 x 10  Ankle iso inversion with ball 2 x 10 DNP    Seated on fit disc:  WBOS:  EO 2' x 2  EC 30\"  VOR V/H 2 x 20\" jerrod  SP x 10 all planes  Saccades 2 x 10  Pencil push ups 2 x 10  VOR cx 2 x 20\"    Standing: DNP  Sidestep parallel bars 2 laps-positive loss of balance 1 times with self correction  Hip abduction x 10 each DNP  Forward backward walking in bars 2 laps with occasional need for upper extremity support  Hip flexion x 10 each  Heel raise/toe raise x 10 each    Standing on foam: Wide base of support  Eyes open 1 minute x 3 dnp  Bilateral shoulder flexion and elbow flexions DNP     Canalith repositioning treatment performed: Epley maneuver DNP      OP EDUCATION:  Add 2 walks in home/day as tolerated    Prior:  Continue HEP, add liquid IV, compression hose and abdominal binder, per MD instructions  Hand numbness sounds like it could be a neuropathy situation, however PT is not the one to diagnosis  Add sit to stands and sidestepping, marching, hip abd, knee flexion at counter to HEP  Saccades, smooth pursuits VOR, pencil push-ups, VOR cancellation    Assessment:     Pt's response to treatment: less c/o dizzy today, but incr fatigue, needing more rest breaks  Areas of improvements: - for BPPV, dizziness  Limitations/deficits:  dizzy, weak, balance, retropulsion    Pain/dizziness end of session: No change  Continued PT required to reach all goals and restore function    Plan: ask how DR martinez -pcp and neuro-went  Continue to work on gaze stabilization, work a little on lower extremity " "strengthening and balance     Skilled PT consisting of:  therapeutic exercise, therapeutic activity, NMR, manual, thermal, electric stimulation, US, light therapy, gait training, transfer training, canalith repositioning  Rehab Potential: good  Frequency:  2x/wk  Duration: 20 weeks  4/7/2025 to 7/4/2025 Medicare cert date:    Anticipate 10 PT visits     Goals:     STG:   Pt to be I in initial HEP.     LTG\"s:  Incr MMT of lower extremities by 1 grade for improved gait stability.  Improve score on outcome form by 10 points to show incr in function.  Patient to report 50% less vertigo for IADL  Negative Jose-Hallpike to decrease vertigo with position changes  MCTSIB 1-2/4 for incr sensory integration.       Some of This note was created using voice recognition software and was not corrected for typographical or grammatical errors.          "

## 2025-05-01 ENCOUNTER — OFFICE VISIT (OUTPATIENT)
Dept: PRIMARY CARE | Facility: CLINIC | Age: OVER 89
End: 2025-05-01
Payer: MEDICARE

## 2025-05-01 ENCOUNTER — HOSPITAL ENCOUNTER (OUTPATIENT)
Dept: RADIOLOGY | Facility: CLINIC | Age: OVER 89
Discharge: HOME | End: 2025-05-01
Payer: MEDICARE

## 2025-05-01 VITALS
DIASTOLIC BLOOD PRESSURE: 70 MMHG | WEIGHT: 160 LBS | OXYGEN SATURATION: 95 % | HEIGHT: 61 IN | SYSTOLIC BLOOD PRESSURE: 124 MMHG | BODY MASS INDEX: 30.21 KG/M2 | HEART RATE: 70 BPM

## 2025-05-01 DIAGNOSIS — R29.898 WEAKNESS OF BOTH HANDS: ICD-10-CM

## 2025-05-01 DIAGNOSIS — R42 VERTIGO: ICD-10-CM

## 2025-05-01 DIAGNOSIS — E53.8 VITAMIN B12 DEFICIENCY: ICD-10-CM

## 2025-05-01 DIAGNOSIS — M54.2 BILATERAL NECK PAIN: Primary | ICD-10-CM

## 2025-05-01 DIAGNOSIS — R35.1 NOCTURIA: ICD-10-CM

## 2025-05-01 DIAGNOSIS — R53.83 FATIGUE, UNSPECIFIED TYPE: ICD-10-CM

## 2025-05-01 DIAGNOSIS — N18.32 TYPE 2 DIABETES MELLITUS WITH STAGE 3B CHRONIC KIDNEY DISEASE, WITHOUT LONG-TERM CURRENT USE OF INSULIN (MULTI): ICD-10-CM

## 2025-05-01 DIAGNOSIS — M54.2 BILATERAL NECK PAIN: ICD-10-CM

## 2025-05-01 DIAGNOSIS — E11.22 TYPE 2 DIABETES MELLITUS WITH STAGE 3B CHRONIC KIDNEY DISEASE, WITHOUT LONG-TERM CURRENT USE OF INSULIN (MULTI): ICD-10-CM

## 2025-05-01 DIAGNOSIS — I10 PRIMARY HYPERTENSION: ICD-10-CM

## 2025-05-01 PROCEDURE — 1160F RVW MEDS BY RX/DR IN RCRD: CPT | Performed by: FAMILY MEDICINE

## 2025-05-01 PROCEDURE — 99214 OFFICE O/P EST MOD 30 MIN: CPT | Performed by: FAMILY MEDICINE

## 2025-05-01 PROCEDURE — G2211 COMPLEX E/M VISIT ADD ON: HCPCS | Performed by: FAMILY MEDICINE

## 2025-05-01 PROCEDURE — 3074F SYST BP LT 130 MM HG: CPT | Performed by: FAMILY MEDICINE

## 2025-05-01 PROCEDURE — 72040 X-RAY EXAM NECK SPINE 2-3 VW: CPT

## 2025-05-01 PROCEDURE — 1123F ACP DISCUSS/DSCN MKR DOCD: CPT | Performed by: FAMILY MEDICINE

## 2025-05-01 PROCEDURE — 1159F MED LIST DOCD IN RCRD: CPT | Performed by: FAMILY MEDICINE

## 2025-05-01 PROCEDURE — 1126F AMNT PAIN NOTED NONE PRSNT: CPT | Performed by: FAMILY MEDICINE

## 2025-05-01 PROCEDURE — 3078F DIAST BP <80 MM HG: CPT | Performed by: FAMILY MEDICINE

## 2025-05-01 ASSESSMENT — PATIENT HEALTH QUESTIONNAIRE - PHQ9
2. FEELING DOWN, DEPRESSED OR HOPELESS: NOT AT ALL
1. LITTLE INTEREST OR PLEASURE IN DOING THINGS: NOT AT ALL
SUM OF ALL RESPONSES TO PHQ9 QUESTIONS 1 AND 2: 0

## 2025-05-01 ASSESSMENT — PAIN SCALES - GENERAL: PAINLEVEL_OUTOF10: 0-NO PAIN

## 2025-05-01 NOTE — PROGRESS NOTES
dSubjective   Patient ID: Fartun Carey is a 89 y.o. female who presents for No chief complaint on file..     Hypertension:          Patient here for F/U. Her BP has been borderline control, her kidney function is decreased on her current meds - she will try to increase her hydration. She has switch her cardiologist recently.         Med compliance yes.          Med side effects none.       Diabetes Mellitus:          Follow Up tolerating meds - improved control.          The frequency of exercise that the patient achieves is 4-5, times per week .          Dietary measures include portion control, carb control .          Hypoglycemic episodes are none recently.          The results of the last HbgA1c with stable results     Hypothyroidism:          Follow Up taking hormone supplement routinely due for a tsh.          Hypothyroidism tolerating meds with no side effects.   Recurrent UTIs - no symptoms currently, she has a order on hand if she gets symptoms  She has known chronic kidney disease sees Dr. Gaxiola for this.     She has significant improvement of her dizziness and feeling of off balance, denies ringing in her ears, has age related hearing loss, walks using a walker.  The dizziness is much better after PT.  Denies feeling confused.  Generalized weakness.  No focal weakness.  She does have neck pain, stiffness and bilateral hand numbness - she has an appointment with neurology next week           Review of Systems   Constitutional:  Positive for fatigue. Negative for chills and fever.   HENT:  Negative for ear pain and postnasal drip.    Respiratory:  Negative for cough, shortness of breath and wheezing.    Cardiovascular:  Negative for chest pain, palpitations and leg swelling.   Gastrointestinal:  Negative for constipation, nausea and vomiting.   Genitourinary:  Negative for frequency.   Musculoskeletal:  Positive for arthralgias, myalgias, neck pain and neck stiffness.   Skin:  Negative for rash.  "  Neurological:  Positive for dizziness and numbness. Negative for weakness and headaches.   Psychiatric/Behavioral:  Negative for confusion. The patient is not nervous/anxious.    Vit D def, nocturia    Objective   /70   Pulse 70   Ht 1.549 m (5' 1\")   Wt 72.6 kg (160 lb)   SpO2 95%   BMI 30.23 kg/m²     Physical Exam  Vitals reviewed.   Constitutional:       General: She is not in acute distress.     Appearance: Normal appearance. She is not ill-appearing.   Neck:      Vascular: No carotid bruit.      Comments: Diffuse, mild tenderness to palpation and decreased flexion - subjective numbness of both hands (all fingers) - normal ROM of hand and wrists. Strength intake, normal pulses.  Vertigo improved with ROM of neck and EOMI - no nystagmus.  Cardiovascular:      Rate and Rhythm: Normal rate and regular rhythm.      Heart sounds: Normal heart sounds. No murmur heard.  Pulmonary:      Breath sounds: Normal breath sounds.   Abdominal:      General: Abdomen is flat. Bowel sounds are normal.      Palpations: Abdomen is soft.   Musculoskeletal:         General: No swelling.      Cervical back: Neck supple. Tenderness present.   Lymphadenopathy:      Cervical: No cervical adenopathy.   Neurological:      Mental Status: She is alert.         Assessment/Plan   Problem List Items Addressed This Visit           ICD-10-CM    Type 2 diabetes mellitus with stage 3b chronic kidney disease, without long-term current use of insulin (Multi) E11.22, N18.32    Hypertension I10    Relevant Orders    Comprehensive Metabolic Panel     Other Visit Diagnoses         Codes      Bilateral neck pain    -  Primary M54.2    recommend xray, physical therapy and to see neurology     Relevant Orders    XR cervical spine 2-3 views (Completed)      Weakness of both hands     R29.898    check neck Xray and to see neurology     Relevant Orders    XR cervical spine 2-3 views (Completed)      Fatigue, unspecified type     R53.83    Relevant " Orders    CBC and Auto Differential    TSH with reflex to Free T4 if abnormal      Vitamin B12 deficiency     E53.8    check labs     Relevant Orders    Vitamin B12      Nocturia     R35.1    Relevant Orders    Urinalysis with Reflex Culture and Microscopic      Vertigo     R42    much improved  - continue with her PT, continue to encourage good hydration.

## 2025-05-03 ENCOUNTER — APPOINTMENT (OUTPATIENT)
Dept: RADIOLOGY | Facility: HOSPITAL | Age: OVER 89
DRG: 189 | End: 2025-05-03
Payer: MEDICARE

## 2025-05-03 ENCOUNTER — APPOINTMENT (OUTPATIENT)
Dept: CARDIOLOGY | Facility: HOSPITAL | Age: OVER 89
DRG: 189 | End: 2025-05-03
Payer: MEDICARE

## 2025-05-03 ENCOUNTER — HOSPITAL ENCOUNTER (INPATIENT)
Facility: HOSPITAL | Age: OVER 89
Discharge: HOME | End: 2025-05-03
Attending: INTERNAL MEDICINE | Admitting: INTERNAL MEDICINE
Payer: MEDICARE

## 2025-05-03 DIAGNOSIS — R53.1 WEAKNESS: ICD-10-CM

## 2025-05-03 DIAGNOSIS — R05.1 ACUTE COUGH: ICD-10-CM

## 2025-05-03 DIAGNOSIS — N17.9 AKI (ACUTE KIDNEY INJURY): ICD-10-CM

## 2025-05-03 DIAGNOSIS — R06.00 DYSPNEA, UNSPECIFIED: ICD-10-CM

## 2025-05-03 DIAGNOSIS — I25.118 ATHEROSCLEROTIC HEART DISEASE OF NATIVE CORONARY ARTERY WITH OTHER FORMS OF ANGINA PECTORIS: ICD-10-CM

## 2025-05-03 DIAGNOSIS — I10 PRIMARY HYPERTENSION: ICD-10-CM

## 2025-05-03 DIAGNOSIS — J40 BRONCHITIS: Primary | ICD-10-CM

## 2025-05-03 DIAGNOSIS — J96.01 ACUTE HYPOXEMIC RESPIRATORY FAILURE: ICD-10-CM

## 2025-05-03 DIAGNOSIS — R06.02 SHORTNESS OF BREATH: ICD-10-CM

## 2025-05-03 DIAGNOSIS — J96.01 ACUTE RESPIRATORY FAILURE WITH HYPOXIA: Primary | ICD-10-CM

## 2025-05-03 LAB
ALBUMIN SERPL BCP-MCNC: 3.9 G/DL (ref 3.4–5)
ALP SERPL-CCNC: 130 U/L (ref 33–136)
ALT SERPL W P-5'-P-CCNC: 16 U/L (ref 7–45)
ANION GAP SERPL CALCULATED.3IONS-SCNC: 15 MMOL/L (ref 10–20)
APPEARANCE UR: CLEAR
AST SERPL W P-5'-P-CCNC: 22 U/L (ref 9–39)
BASOPHILS # BLD AUTO: 0.01 X10*3/UL (ref 0–0.1)
BASOPHILS NFR BLD AUTO: 0.2 %
BILIRUB SERPL-MCNC: 0.4 MG/DL (ref 0–1.2)
BILIRUB UR STRIP.AUTO-MCNC: NEGATIVE MG/DL
BNP SERPL-MCNC: 145 PG/ML (ref 0–99)
BUN SERPL-MCNC: 25 MG/DL (ref 6–23)
CALCIUM SERPL-MCNC: 9 MG/DL (ref 8.6–10.3)
CARDIAC TROPONIN I PNL SERPL HS: 22 NG/L (ref 0–13)
CARDIAC TROPONIN I PNL SERPL HS: 22 NG/L (ref 0–13)
CHLORIDE SERPL-SCNC: 102 MMOL/L (ref 98–107)
CO2 SERPL-SCNC: 26 MMOL/L (ref 21–32)
COLOR UR: YELLOW
CREAT SERPL-MCNC: 1.24 MG/DL (ref 0.5–1.05)
EGFRCR SERPLBLD CKD-EPI 2021: 40 ML/MIN/1.73M*2
EOSINOPHIL # BLD AUTO: 0.02 X10*3/UL (ref 0–0.4)
EOSINOPHIL NFR BLD AUTO: 0.4 %
ERYTHROCYTE [DISTWIDTH] IN BLOOD BY AUTOMATED COUNT: 14.6 % (ref 11.5–14.5)
FLUAV RNA RESP QL NAA+PROBE: NOT DETECTED
FLUBV RNA RESP QL NAA+PROBE: NOT DETECTED
GLUCOSE SERPL-MCNC: 144 MG/DL (ref 74–99)
GLUCOSE UR STRIP.AUTO-MCNC: NEGATIVE MG/DL
HCT VFR BLD AUTO: 33.5 % (ref 36–46)
HGB BLD-MCNC: 10.7 G/DL (ref 12–16)
HYALINE CASTS #/AREA URNS AUTO: ABNORMAL /LPF
IMM GRANULOCYTES # BLD AUTO: 0.02 X10*3/UL (ref 0–0.5)
IMM GRANULOCYTES NFR BLD AUTO: 0.4 % (ref 0–0.9)
KETONES UR STRIP.AUTO-MCNC: NEGATIVE MG/DL
LACTATE SERPL-SCNC: 1.7 MMOL/L (ref 0.4–2)
LACTATE SERPL-SCNC: 2.5 MMOL/L (ref 0.4–2)
LEUKOCYTE ESTERASE UR QL STRIP.AUTO: NEGATIVE
LYMPHOCYTES # BLD AUTO: 1.17 X10*3/UL (ref 0.8–3)
LYMPHOCYTES NFR BLD AUTO: 22.3 %
MCH RBC QN AUTO: 30.1 PG (ref 26–34)
MCHC RBC AUTO-ENTMCNC: 31.9 G/DL (ref 32–36)
MCV RBC AUTO: 94 FL (ref 80–100)
MONOCYTES # BLD AUTO: 0.99 X10*3/UL (ref 0.05–0.8)
MONOCYTES NFR BLD AUTO: 18.9 %
MUCOUS THREADS #/AREA URNS AUTO: ABNORMAL /LPF
NEUTROPHILS # BLD AUTO: 3.04 X10*3/UL (ref 1.6–5.5)
NEUTROPHILS NFR BLD AUTO: 57.8 %
NITRITE UR QL STRIP.AUTO: NEGATIVE
NRBC BLD-RTO: 0 /100 WBCS (ref 0–0)
PH UR STRIP.AUTO: 5.5 [PH]
PLATELET # BLD AUTO: 144 X10*3/UL (ref 150–450)
POTASSIUM SERPL-SCNC: 3.7 MMOL/L (ref 3.5–5.3)
PROT SERPL-MCNC: 6.6 G/DL (ref 6.4–8.2)
PROT UR STRIP.AUTO-MCNC: ABNORMAL MG/DL
RBC # BLD AUTO: 3.55 X10*6/UL (ref 4–5.2)
RBC # UR STRIP.AUTO: NEGATIVE MG/DL
RBC #/AREA URNS AUTO: ABNORMAL /HPF
SARS-COV-2 RNA RESP QL NAA+PROBE: NOT DETECTED
SODIUM SERPL-SCNC: 139 MMOL/L (ref 136–145)
SP GR UR STRIP.AUTO: 1.01
SQUAMOUS #/AREA URNS AUTO: ABNORMAL /HPF
UROBILINOGEN UR STRIP.AUTO-MCNC: 0.2 MG/DL
WBC # BLD AUTO: 5.3 X10*3/UL (ref 4.4–11.3)
WBC #/AREA URNS AUTO: ABNORMAL /HPF

## 2025-05-03 PROCEDURE — 87636 SARSCOV2 & INF A&B AMP PRB: CPT

## 2025-05-03 PROCEDURE — 93005 ELECTROCARDIOGRAM TRACING: CPT

## 2025-05-03 PROCEDURE — 96361 HYDRATE IV INFUSION ADD-ON: CPT

## 2025-05-03 PROCEDURE — 71046 X-RAY EXAM CHEST 2 VIEWS: CPT

## 2025-05-03 PROCEDURE — 84484 ASSAY OF TROPONIN QUANT: CPT

## 2025-05-03 PROCEDURE — 99291 CRITICAL CARE FIRST HOUR: CPT

## 2025-05-03 PROCEDURE — 83605 ASSAY OF LACTIC ACID: CPT

## 2025-05-03 PROCEDURE — 36415 COLL VENOUS BLD VENIPUNCTURE: CPT

## 2025-05-03 PROCEDURE — 96365 THER/PROPH/DIAG IV INF INIT: CPT

## 2025-05-03 PROCEDURE — 1200000002 HC GENERAL ROOM WITH TELEMETRY DAILY

## 2025-05-03 PROCEDURE — 2500000001 HC RX 250 WO HCPCS SELF ADMINISTERED DRUGS (ALT 637 FOR MEDICARE OP)

## 2025-05-03 PROCEDURE — 2550000001 HC RX 255 CONTRASTS

## 2025-05-03 PROCEDURE — 83880 ASSAY OF NATRIURETIC PEPTIDE: CPT

## 2025-05-03 PROCEDURE — 2500000002 HC RX 250 W HCPCS SELF ADMINISTERED DRUGS (ALT 637 FOR MEDICARE OP, ALT 636 FOR OP/ED)

## 2025-05-03 PROCEDURE — 81003 URINALYSIS AUTO W/O SCOPE: CPT

## 2025-05-03 PROCEDURE — 2500000004 HC RX 250 GENERAL PHARMACY W/ HCPCS (ALT 636 FOR OP/ED): Mod: JZ

## 2025-05-03 PROCEDURE — 71046 X-RAY EXAM CHEST 2 VIEWS: CPT | Performed by: RADIOLOGY

## 2025-05-03 PROCEDURE — 87040 BLOOD CULTURE FOR BACTERIA: CPT | Mod: TRILAB

## 2025-05-03 PROCEDURE — 85025 COMPLETE CBC W/AUTO DIFF WBC: CPT

## 2025-05-03 PROCEDURE — 71275 CT ANGIOGRAPHY CHEST: CPT

## 2025-05-03 PROCEDURE — 80053 COMPREHEN METABOLIC PANEL: CPT

## 2025-05-03 PROCEDURE — 2500000004 HC RX 250 GENERAL PHARMACY W/ HCPCS (ALT 636 FOR OP/ED): Mod: JZ | Performed by: NURSE PRACTITIONER

## 2025-05-03 PROCEDURE — 71275 CT ANGIOGRAPHY CHEST: CPT | Performed by: RADIOLOGY

## 2025-05-03 PROCEDURE — 93010 ELECTROCARDIOGRAM REPORT: CPT | Performed by: INTERNAL MEDICINE

## 2025-05-03 PROCEDURE — 94640 AIRWAY INHALATION TREATMENT: CPT

## 2025-05-03 RX ORDER — ACETAMINOPHEN 650 MG/1
650 SUPPOSITORY RECTAL EVERY 4 HOURS PRN
Status: ACTIVE | OUTPATIENT
Start: 2025-05-03

## 2025-05-03 RX ORDER — IPRATROPIUM BROMIDE AND ALBUTEROL SULFATE 2.5; .5 MG/3ML; MG/3ML
3 SOLUTION RESPIRATORY (INHALATION)
Status: DISPENSED | OUTPATIENT
Start: 2025-05-04

## 2025-05-03 RX ORDER — IPRATROPIUM BROMIDE AND ALBUTEROL SULFATE 2.5; .5 MG/3ML; MG/3ML
3 SOLUTION RESPIRATORY (INHALATION) ONCE
Status: COMPLETED | OUTPATIENT
Start: 2025-05-03 | End: 2025-05-03

## 2025-05-03 RX ORDER — BISACODYL 5 MG
10 TABLET, DELAYED RELEASE (ENTERIC COATED) ORAL DAILY PRN
Status: ACTIVE | OUTPATIENT
Start: 2025-05-03

## 2025-05-03 RX ORDER — ONDANSETRON 4 MG/1
4 TABLET, FILM COATED ORAL EVERY 8 HOURS PRN
Status: DISCONTINUED | OUTPATIENT
Start: 2025-05-03 | End: 2025-05-03

## 2025-05-03 RX ORDER — FUROSEMIDE 20 MG/1
20 TABLET ORAL ONCE
Status: COMPLETED | OUTPATIENT
Start: 2025-05-03 | End: 2025-05-03

## 2025-05-03 RX ORDER — HYDRALAZINE HYDROCHLORIDE 25 MG/1
25 TABLET, FILM COATED ORAL ONCE
Status: COMPLETED | OUTPATIENT
Start: 2025-05-03 | End: 2025-05-03

## 2025-05-03 RX ORDER — IPRATROPIUM BROMIDE AND ALBUTEROL SULFATE 2.5; .5 MG/3ML; MG/3ML
3 SOLUTION RESPIRATORY (INHALATION) EVERY 2 HOUR PRN
Status: ACTIVE | OUTPATIENT
Start: 2025-05-03

## 2025-05-03 RX ORDER — ONDANSETRON 4 MG/1
4 TABLET, ORALLY DISINTEGRATING ORAL EVERY 8 HOURS PRN
Status: ACTIVE | OUTPATIENT
Start: 2025-05-03

## 2025-05-03 RX ORDER — ONDANSETRON HYDROCHLORIDE 2 MG/ML
4 INJECTION, SOLUTION INTRAVENOUS EVERY 8 HOURS PRN
Status: ACTIVE | OUTPATIENT
Start: 2025-05-03

## 2025-05-03 RX ORDER — LEVOFLOXACIN 5 MG/ML
500 INJECTION, SOLUTION INTRAVENOUS EVERY 24 HOURS
Status: DISPENSED | OUTPATIENT
Start: 2025-05-04

## 2025-05-03 RX ORDER — LEVOFLOXACIN 5 MG/ML
750 INJECTION, SOLUTION INTRAVENOUS ONCE
Status: COMPLETED | OUTPATIENT
Start: 2025-05-03 | End: 2025-05-03

## 2025-05-03 RX ORDER — POLYETHYLENE GLYCOL 3350 17 G/17G
17 POWDER, FOR SOLUTION ORAL DAILY PRN
Status: ACTIVE | OUTPATIENT
Start: 2025-05-03

## 2025-05-03 RX ORDER — CLONIDINE HYDROCHLORIDE 0.2 MG/1
0.2 TABLET ORAL ONCE
Status: COMPLETED | OUTPATIENT
Start: 2025-05-03 | End: 2025-05-03

## 2025-05-03 RX ORDER — ACETAMINOPHEN 325 MG/1
650 TABLET ORAL EVERY 4 HOURS PRN
Status: ACTIVE | OUTPATIENT
Start: 2025-05-03

## 2025-05-03 RX ORDER — LOSARTAN POTASSIUM 100 MG/1
100 TABLET ORAL ONCE
Status: COMPLETED | OUTPATIENT
Start: 2025-05-03 | End: 2025-05-03

## 2025-05-03 RX ORDER — ACETAMINOPHEN 160 MG/5ML
650 SOLUTION ORAL EVERY 4 HOURS PRN
Status: ACTIVE | OUTPATIENT
Start: 2025-05-03

## 2025-05-03 RX ORDER — HEPARIN SODIUM 5000 [USP'U]/ML
5000 INJECTION, SOLUTION INTRAVENOUS; SUBCUTANEOUS EVERY 8 HOURS
Status: DISPENSED | OUTPATIENT
Start: 2025-05-03

## 2025-05-03 RX ORDER — ONDANSETRON HYDROCHLORIDE 2 MG/ML
4 INJECTION, SOLUTION INTRAVENOUS EVERY 8 HOURS PRN
Status: DISCONTINUED | OUTPATIENT
Start: 2025-05-03 | End: 2025-05-03

## 2025-05-03 RX ORDER — HYDROCODONE BITARTRATE AND HOMATROPINE METHYLBROMIDE ORAL SOLUTION 5; 1.5 MG/5ML; MG/5ML
5 LIQUID ORAL EVERY 6 HOURS PRN
Refills: 0 | Status: DISPENSED | OUTPATIENT
Start: 2025-05-03

## 2025-05-03 RX ORDER — GUAIFENESIN/DEXTROMETHORPHAN 100-10MG/5
5 SYRUP ORAL EVERY 4 HOURS PRN
Status: ACTIVE | OUTPATIENT
Start: 2025-05-03

## 2025-05-03 RX ORDER — IPRATROPIUM BROMIDE AND ALBUTEROL SULFATE 2.5; .5 MG/3ML; MG/3ML
3 SOLUTION RESPIRATORY (INHALATION)
Status: DISCONTINUED | OUTPATIENT
Start: 2025-05-03 | End: 2025-05-03

## 2025-05-03 RX ADMIN — LOSARTAN POTASSIUM 100 MG: 100 TABLET, FILM COATED ORAL at 18:30

## 2025-05-03 RX ADMIN — CLONIDINE HYDROCHLORIDE 0.2 MG: 0.2 TABLET ORAL at 18:49

## 2025-05-03 RX ADMIN — SODIUM CHLORIDE 250 ML: 900 INJECTION, SOLUTION INTRAVENOUS at 17:04

## 2025-05-03 RX ADMIN — LEVOFLOXACIN 750 MG: 750 INJECTION, SOLUTION INTRAVENOUS at 18:30

## 2025-05-03 RX ADMIN — HYDRALAZINE HYDROCHLORIDE 25 MG: 25 TABLET ORAL at 18:30

## 2025-05-03 RX ADMIN — FUROSEMIDE 20 MG: 20 TABLET ORAL at 18:30

## 2025-05-03 RX ADMIN — IOHEXOL 75 ML: 350 INJECTION, SOLUTION INTRAVENOUS at 17:42

## 2025-05-03 RX ADMIN — HEPARIN SODIUM 5000 UNITS: 5000 INJECTION, SOLUTION INTRAVENOUS; SUBCUTANEOUS at 21:03

## 2025-05-03 RX ADMIN — IPRATROPIUM BROMIDE AND ALBUTEROL SULFATE 3 ML: 2.5; .5 SOLUTION RESPIRATORY (INHALATION) at 15:35

## 2025-05-03 RX ADMIN — METHYLPREDNISOLONE SODIUM SUCCINATE 40 MG: 40 INJECTION, POWDER, FOR SOLUTION INTRAMUSCULAR; INTRAVENOUS at 21:03

## 2025-05-03 SDOH — SOCIAL STABILITY: SOCIAL INSECURITY: ARE THERE ANY APPARENT SIGNS OF INJURIES/BEHAVIORS THAT COULD BE RELATED TO ABUSE/NEGLECT?: NO

## 2025-05-03 SDOH — SOCIAL STABILITY: SOCIAL INSECURITY: DO YOU FEEL ANYONE HAS EXPLOITED OR TAKEN ADVANTAGE OF YOU FINANCIALLY OR OF YOUR PERSONAL PROPERTY?: NO

## 2025-05-03 SDOH — ECONOMIC STABILITY: HOUSING INSECURITY: AT ANY TIME IN THE PAST 12 MONTHS, WERE YOU HOMELESS OR LIVING IN A SHELTER (INCLUDING NOW)?: NO

## 2025-05-03 SDOH — ECONOMIC STABILITY: FOOD INSECURITY: HOW HARD IS IT FOR YOU TO PAY FOR THE VERY BASICS LIKE FOOD, HOUSING, MEDICAL CARE, AND HEATING?: NOT HARD AT ALL

## 2025-05-03 SDOH — ECONOMIC STABILITY: INCOME INSECURITY: IN THE PAST 12 MONTHS HAS THE ELECTRIC, GAS, OIL, OR WATER COMPANY THREATENED TO SHUT OFF SERVICES IN YOUR HOME?: NO

## 2025-05-03 SDOH — SOCIAL STABILITY: SOCIAL INSECURITY: WITHIN THE LAST YEAR, HAVE YOU BEEN HUMILIATED OR EMOTIONALLY ABUSED IN OTHER WAYS BY YOUR PARTNER OR EX-PARTNER?: NO

## 2025-05-03 SDOH — SOCIAL STABILITY: SOCIAL INSECURITY: WITHIN THE LAST YEAR, HAVE YOU BEEN AFRAID OF YOUR PARTNER OR EX-PARTNER?: NO

## 2025-05-03 SDOH — SOCIAL STABILITY: SOCIAL INSECURITY
WITHIN THE LAST YEAR, HAVE YOU BEEN RAPED OR FORCED TO HAVE ANY KIND OF SEXUAL ACTIVITY BY YOUR PARTNER OR EX-PARTNER?: NO

## 2025-05-03 SDOH — SOCIAL STABILITY: SOCIAL INSECURITY
WITHIN THE LAST YEAR, HAVE YOU BEEN KICKED, HIT, SLAPPED, OR OTHERWISE PHYSICALLY HURT BY YOUR PARTNER OR EX-PARTNER?: NO

## 2025-05-03 SDOH — ECONOMIC STABILITY: FOOD INSECURITY: WITHIN THE PAST 12 MONTHS, YOU WORRIED THAT YOUR FOOD WOULD RUN OUT BEFORE YOU GOT THE MONEY TO BUY MORE.: NEVER TRUE

## 2025-05-03 SDOH — ECONOMIC STABILITY: HOUSING INSECURITY: IN THE PAST 12 MONTHS, HOW MANY TIMES HAVE YOU MOVED WHERE YOU WERE LIVING?: 0

## 2025-05-03 SDOH — SOCIAL STABILITY: SOCIAL INSECURITY: DO YOU FEEL UNSAFE GOING BACK TO THE PLACE WHERE YOU ARE LIVING?: NO

## 2025-05-03 SDOH — SOCIAL STABILITY: SOCIAL INSECURITY: HAS ANYONE EVER THREATENED TO HURT YOUR FAMILY OR YOUR PETS?: NO

## 2025-05-03 SDOH — ECONOMIC STABILITY: HOUSING INSECURITY: IN THE LAST 12 MONTHS, WAS THERE A TIME WHEN YOU WERE NOT ABLE TO PAY THE MORTGAGE OR RENT ON TIME?: NO

## 2025-05-03 SDOH — ECONOMIC STABILITY: FOOD INSECURITY: WITHIN THE PAST 12 MONTHS, THE FOOD YOU BOUGHT JUST DIDN'T LAST AND YOU DIDN'T HAVE MONEY TO GET MORE.: NEVER TRUE

## 2025-05-03 SDOH — ECONOMIC STABILITY: TRANSPORTATION INSECURITY: IN THE PAST 12 MONTHS, HAS LACK OF TRANSPORTATION KEPT YOU FROM MEDICAL APPOINTMENTS OR FROM GETTING MEDICATIONS?: NO

## 2025-05-03 SDOH — SOCIAL STABILITY: SOCIAL INSECURITY: ABUSE: ADULT

## 2025-05-03 SDOH — SOCIAL STABILITY: SOCIAL INSECURITY: WERE YOU ABLE TO COMPLETE ALL THE BEHAVIORAL HEALTH SCREENINGS?: YES

## 2025-05-03 SDOH — SOCIAL STABILITY: SOCIAL INSECURITY: DOES ANYONE TRY TO KEEP YOU FROM HAVING/CONTACTING OTHER FRIENDS OR DOING THINGS OUTSIDE YOUR HOME?: NO

## 2025-05-03 SDOH — SOCIAL STABILITY: SOCIAL INSECURITY: ARE YOU OR HAVE YOU BEEN THREATENED OR ABUSED PHYSICALLY, EMOTIONALLY, OR SEXUALLY BY ANYONE?: NO

## 2025-05-03 SDOH — SOCIAL STABILITY: SOCIAL INSECURITY: HAVE YOU HAD THOUGHTS OF HARMING ANYONE ELSE?: NO

## 2025-05-03 ASSESSMENT — ENCOUNTER SYMPTOMS
DYSURIA: 0
HEADACHES: 0
SPEECH DIFFICULTY: 0
COUGH: 1
WEAKNESS: 0
BLOOD IN STOOL: 0
UNEXPECTED WEIGHT CHANGE: 0
NUMBNESS: 0
HEMATOLOGIC/LYMPHATIC NEGATIVE: 1
ACTIVITY CHANGE: 1
TROUBLE SWALLOWING: 0
DIZZINESS: 0
ENDOCRINE NEGATIVE: 1
RHINORRHEA: 0
CONSTIPATION: 0
APPETITE CHANGE: 1
ABDOMINAL PAIN: 0
TREMORS: 0
FEVER: 0
VOMITING: 0
VOICE CHANGE: 0
PSYCHIATRIC NEGATIVE: 1
EYES NEGATIVE: 1
NAUSEA: 0
FATIGUE: 1
ABDOMINAL DISTENTION: 0
PALPITATIONS: 0
SORE THROAT: 0
DIARRHEA: 0
SEIZURES: 0
SHORTNESS OF BREATH: 1
WHEEZING: 1
DIFFICULTY URINATING: 1
LIGHT-HEADEDNESS: 0

## 2025-05-03 ASSESSMENT — LIFESTYLE VARIABLES
AUDIT-C TOTAL SCORE: 0
AUDIT-C TOTAL SCORE: 0
HOW MANY STANDARD DRINKS CONTAINING ALCOHOL DO YOU HAVE ON A TYPICAL DAY: PATIENT DOES NOT DRINK
HOW OFTEN DO YOU HAVE 6 OR MORE DRINKS ON ONE OCCASION: NEVER
SKIP TO QUESTIONS 9-10: 1
HOW OFTEN DO YOU HAVE A DRINK CONTAINING ALCOHOL: NEVER

## 2025-05-03 ASSESSMENT — ACTIVITIES OF DAILY LIVING (ADL)
WALKS IN HOME: INDEPENDENT
FEEDING YOURSELF: INDEPENDENT
BATHING: INDEPENDENT
TOILETING: INDEPENDENT
JUDGMENT_ADEQUATE_SAFELY_COMPLETE_DAILY_ACTIVITIES: YES
LACK_OF_TRANSPORTATION: NO
HEARING - RIGHT EAR: HEARING AID
ASSISTIVE_DEVICE: WALKER;HEARING AID - LEFT;HEARING AID - RIGHT
DRESSING YOURSELF: INDEPENDENT
GROOMING: INDEPENDENT
ADEQUATE_TO_COMPLETE_ADL: YES
PATIENT'S MEMORY ADEQUATE TO SAFELY COMPLETE DAILY ACTIVITIES?: YES
HEARING - LEFT EAR: HEARING AID

## 2025-05-03 ASSESSMENT — PAIN SCALES - GENERAL
PAINLEVEL_OUTOF10: 0 - NO PAIN
PAINLEVEL_OUTOF10: 0 - NO PAIN

## 2025-05-03 ASSESSMENT — PAIN - FUNCTIONAL ASSESSMENT: PAIN_FUNCTIONAL_ASSESSMENT: 0-10

## 2025-05-03 ASSESSMENT — PATIENT HEALTH QUESTIONNAIRE - PHQ9
SUM OF ALL RESPONSES TO PHQ9 QUESTIONS 1 & 2: 1
2. FEELING DOWN, DEPRESSED OR HOPELESS: SEVERAL DAYS
1. LITTLE INTEREST OR PLEASURE IN DOING THINGS: NOT AT ALL

## 2025-05-03 ASSESSMENT — COGNITIVE AND FUNCTIONAL STATUS - GENERAL: PATIENT BASELINE BEDBOUND: UNABLE TO ASSESS AT THIS TIME

## 2025-05-03 NOTE — ED PROVIDER NOTES
I evaluated patient and agree with PA's plan and treatment.    Patient with cough and shortness of breath since yesterday.  She has had some wheezing.  She denies any sick contacts.  She has never had pneumonia before.  She does have a history of some heart failure and is on 20 mg of furosemide daily.    Physical examination: Patient tachypneic with a respiratory rate of 28.  Her pulse oximetry was 89% on room air.  She has diffuse rhonchi throughout her lung fields.  She has no significant peripheral edema.  She has 2+ radial pulses.  She has 2+ dorsalis pedis pulses.  She is slightly tachycardic at a rate of 106.  She has an active cough.      I discussed with patient.  She states she feels somewhat better after DuoNeb.  She is still somewhat hypoxemic.  I placed her on 3 L nasal cannula and her respiratory rate went down to 22 with a pulse oximetry 100% on 4 L nasal cannula.    Workup was commencing.  Reviewed all diagnostic testing to this point.           Bradley Estrada MD  05/03/25 0646

## 2025-05-03 NOTE — ED PROVIDER NOTES
HPI   Chief Complaint   Patient presents with    Cough     Bib ems from home with 2 days worth of a cough, wheezing. Denies chest pain or sob. Cough.        Patient is a 89-year-old female with past medical history of HTN, HLD, CHF presenting with concerns for flulike symptoms.  She has been having a cough for the last 3 days.  Nonproductive.  Getting short of breath and excessively fatigued.  Lives at home alone.  Presents via EMS.  Denies any sick contacts.  Endorses chest discomfort.  Patient denies fevers, chills, sore throat, runny nose, chest pain, abdominal pain, nausea, vomiting, diarrhea or urinary complaints.              Patient History   Medical History[1]  Surgical History[2]  Family History[3]  Social History[4]    Physical Exam   ED Triage Vitals [05/03/25 1520]   Temperature Heart Rate Respirations BP   37.1 °C (98.8 °F) 97 20 (!) 170/91      Pulse Ox Temp Source Heart Rate Source Patient Position   97 % Temporal -- --      BP Location FiO2 (%)     -- --       Physical Exam  Vitals and nursing note reviewed.   Constitutional:       Appearance: She is well-developed.      Comments: Awake, laying in examination bed   HENT:      Head: Normocephalic and atraumatic.      Nose: Nose normal.      Mouth/Throat:      Mouth: Mucous membranes are moist.      Pharynx: Oropharynx is clear.   Eyes:      Extraocular Movements: Extraocular movements intact.      Conjunctiva/sclera: Conjunctivae normal.      Pupils: Pupils are equal, round, and reactive to light.   Cardiovascular:      Rate and Rhythm: Normal rate and regular rhythm.      Pulses: Normal pulses.      Heart sounds: Normal heart sounds. No murmur heard.  Pulmonary:      Effort: Pulmonary effort is normal. No respiratory distress.      Breath sounds: Rhonchi present.      Comments: Rhonchi heard in the bilateral lung fields  Abdominal:      General: Abdomen is flat.      Palpations: Abdomen is soft.      Tenderness: There is no abdominal tenderness.    Musculoskeletal:         General: No swelling. Normal range of motion.      Cervical back: Normal range of motion and neck supple.   Skin:     General: Skin is warm and dry.      Capillary Refill: Capillary refill takes less than 2 seconds.   Neurological:      General: No focal deficit present.      Mental Status: She is alert and oriented to person, place, and time.   Psychiatric:         Mood and Affect: Mood normal.         Behavior: Behavior normal.           ED Course & MDM   ED Course as of 05/03/25 1848   Sat May 03, 2025   1539 EKG on my independent interpretation:  Sinus tachycardia with PVCs and fusion complexes  LAD  Pulmonary disease pattern [RL]   1617 Patient now placed on 4 L nasal cannula due to hypoxia [AR]      ED Course User Index  [AR] Armando Pinto PA-C  [RL] Bradley Estrada MD         Diagnoses as of 05/03/25 1848   Acute cough   Shortness of breath   Acute hypoxemic respiratory failure                 No data recorded     Pinky Coma Scale Score: 15 (05/03/25 1520 : Camryn Stockton RN)                           Medical Decision Making  Patient is a 89-year-old female with past medical history of HTN, HLD, CHF presented with concerns for flulike symptoms.  Lab work, vital signs, imaging ordered.  Conditions considered include but are not limited: Pneumonia, viral illness.    I saw this patient in conjunction with Dr. Estrada.  CBC is without leukocytosis but does show signs of anemia with hemoglobin of 10.7.  CMP without significant electrolyte abnormality but does show CKD.  BNP of 145.  No BNP from prior.  Troponin of 22.  Viral swabs are negative here chest x-ray without acute cardiopulmonary process.  Due to patient's tachycardia and hypoxia, will order CT PE.  CT PE without pneumonia or PE.  Does show left upper lobe nodule.  Report printed and discussed at bedside with patient.  Patient has been hypertensive related to her having not taken her home medication.  Her home blood  pressure medications have been ordered.    I spoke with admitting provider, Dr. Loera, who was agreeable to admit this patient under his services.  As I deemed necessary from the patient's history, physical, laboratory and imaging findings as well as ED course, I considered the above listed diagnoses.    Portions of this note made with Dragon software, please be mindful of potential grammatical errors.      Medications   cloNIDine (Catapres) tablet 0.2 mg (has no administration in time range)   levoFLOXacin (Levaquin) 750 mg in dextrose 5%  mL (750 mg intravenous New Bag 5/3/25 1830)   ipratropium-albuteroL (Duo-Neb) 0.5-2.5 mg/3 mL nebulizer solution 3 mL (3 mL nebulization Given 5/3/25 1535)   ipratropium-albuteroL (Duo-Neb) 0.5-2.5 mg/3 mL nebulizer solution 3 mL (3 mL nebulization Given 5/3/25 1535)   sodium chloride 0.9 % bolus 250 mL (0 mL intravenous Stopped 5/3/25 1819)   iohexol (OMNIPaque) 350 mg iodine/mL solution 75 mL (75 mL intravenous Given 5/3/25 1742)   furosemide (Lasix) tablet 20 mg (20 mg oral Given 5/3/25 1830)   losartan (Cozaar) tablet 100 mg (100 mg oral Given 5/3/25 1830)   hydrALAZINE (Apresoline) tablet 25 mg (25 mg oral Given 5/3/25 1830)     Labs Reviewed   CBC WITH AUTO DIFFERENTIAL - Abnormal       Result Value    WBC 5.3      nRBC 0.0      RBC 3.55 (*)     Hemoglobin 10.7 (*)     Hematocrit 33.5 (*)     MCV 94      MCH 30.1      MCHC 31.9 (*)     RDW 14.6 (*)     Platelets 144 (*)     Neutrophils % 57.8      Immature Granulocytes %, Automated 0.4      Lymphocytes % 22.3      Monocytes % 18.9      Eosinophils % 0.4      Basophils % 0.2      Neutrophils Absolute 3.04      Immature Granulocytes Absolute, Automated 0.02      Lymphocytes Absolute 1.17      Monocytes Absolute 0.99 (*)     Eosinophils Absolute 0.02      Basophils Absolute 0.01     COMPREHENSIVE METABOLIC PANEL - Abnormal    Glucose 144 (*)     Sodium 139      Potassium 3.7      Chloride 102      Bicarbonate 26       Anion Gap 15      Urea Nitrogen 25 (*)     Creatinine 1.24 (*)     eGFR 40 (*)     Calcium 9.0      Albumin 3.9      Alkaline Phosphatase 130      Total Protein 6.6      AST 22      Bilirubin, Total 0.4      ALT 16     B-TYPE NATRIURETIC PEPTIDE - Abnormal     (*)     Narrative:        <100 pg/mL - Heart failure unlikely  100-299 pg/mL - Intermediate probability of acute heart                  failure exacerbation. Correlate with clinical                  context and patient history.    >=300 pg/mL - Heart Failure likely. Correlate with clinical                  context and patient history.    BNP testing is performed using different testing methodology at [facility] than at other [facility]. Direct result comparisons should only be made within the same method.      SERIAL TROPONIN-INITIAL - Abnormal    Troponin I, High Sensitivity 22 (*)     Narrative:     Less than 99th percentile of normal range cutoff-  Female and children under 18 years old <14 ng/L; Male <21 ng/L: Negative  Repeat testing should be performed if clinically indicated.     Female and children under 18 years old 14-50 ng/L; Male 21-50 ng/L:  Consistent with possible cardiac damage and possible increased clinical   risk. Serial measurements may help to assess extent of myocardial damage.     >50 ng/L: Consistent with cardiac damage, increased clinical risk and  myocardial infarction. Serial measurements may help assess extent of   myocardial damage.      NOTE: Children less than 1 year old may have higher baseline troponin   levels and results should be interpreted in conjunction with the overall   clinical context.     NOTE: Troponin I testing is performed using a different   testing methodology at [facility] than at other   [facility]. Direct result comparisons should only   be made within the same method.   SERIAL TROPONIN, 1 HOUR - Abnormal    Troponin I, High Sensitivity 22 (*)     Narrative:      Less than 99th percentile of normal range cutoff-  Female and children under 18 years old <14 ng/L; Male <21 ng/L: Negative  Repeat testing should be performed if clinically indicated.     Female and children under 18 years old 14-50 ng/L; Male 21-50 ng/L:  Consistent with possible cardiac damage and possible increased clinical   risk. Serial measurements may help to assess extent of myocardial damage.     >50 ng/L: Consistent with cardiac damage, increased clinical risk and  myocardial infarction. Serial measurements may help assess extent of   myocardial damage.      NOTE: Children less than 1 year old may have higher baseline troponin   levels and results should be interpreted in conjunction with the overall   clinical context.     NOTE: Troponin I testing is performed using a different   testing methodology at [facility] than at [facility]. Direct result comparisons should only   be made within the same method.   LACTATE - Abnormal    Lactate 2.5 (*)     Narrative:     Venipuncture immediately after or during the administration of Metamizole may lead to falsely low results. Testing should be performed immediately prior to Metamizole dosing.   SARS-COV-2 AND INFLUENZA A/B PCR - Normal    Flu A Result Not Detected      Flu B Result Not Detected      Coronavirus 2019, PCR Not Detected      Narrative:     This assay is an FDA-cleared, in vitro diagnostic nucleic acid amplification test for the qualitative detection and differentiation of SARS CoV-2/ Influenza A/B from nasopharyngeal specimens collected from individuals with signs and symptoms of respiratory tract infections, and has been validated for use at [facility]. Negative results do not preclude COVID-19/ Influenza A/B infections and should not be used as the sole basis for diagnosis, treatment, or other management decisions. Testing for SARS CoV-2 is recommended only for patients who meet current clinical  and/or epidemiological criteria defined by federal, state, or local public health directives.   LACTATE - Normal    Lactate 1.7      Narrative:     Venipuncture immediately after or during the administration of Metamizole may lead to falsely low results. Testing should be performed immediately prior to Metamizole dosing.   BLOOD CULTURE   BLOOD CULTURE   TROPONIN SERIES- (INITIAL, 1 HR)    Narrative:     The following orders were created for panel order Troponin I Series, High Sensitivity (0, 1 HR).  Procedure                               Abnormality         Status                     ---------                               -----------         ------                     Troponin I, High Sensiti...[[id number]]  Abnormal            Final result               Troponin, High Sensitivi...[[id number]]  Abnormal            Final result                 Please view results for these tests on the individual orders.   URINALYSIS WITH REFLEX CULTURE AND MICROSCOPIC    Narrative:     The following orders were created for panel order Urinalysis with Reflex Culture and Microscopic.  Procedure                               Abnormality         Status                     ---------                               -----------         ------                     Urinalysis with Reflex C...[[id number]]                                                 Extra Urine Gray Tube[[id number]]                                                         Please view results for these tests on the individual orders.   URINALYSIS WITH REFLEX CULTURE AND MICROSCOPIC   EXTRA URINE GRAY TUBE     CT angio chest for pulmonary embolism   Final Result   1. No evidence of acute pulmonary embolism   2. Senescent changes. Heart size is enlarged. Changes of old healed   granulomas disease   3. Multiple stable pulmonary nodules. Enlarging left upper lobe   ground-glass nodule now measuring under 2 cm concerning for slow   growing neoplasm.   4. Bronchial thickening noted.  Mild mosaic attenuation seen in the   lungs. Query bronchial inflammation. Senescent changes.        MACRO:   None.        Signed by: Pancho Barth 5/3/2025 6:19 PM   Dictation workstation:   [id number]      XR chest 2 views   Final Result   1.  No radiographic evidence of acute cardiopulmonary disease.                  MACRO:   None        Signed by: Amilcar Galdamez 5/3/2025 4:37 PM   Dictation workstation:   [id number]            Procedure  Critical Care    Performed by: Armando Pinto PA-C  Authorized by: Armando Pinto PA-C    Critical care provider statement:     Critical care time (minutes):  32    Critical care time was exclusive of:  Separately billable procedures and treating other patients    Critical care was necessary to treat or prevent imminent or life-threatening deterioration of the following conditions:  Respiratory failure    Critical care was time spent personally by me on the following activities:  Blood draw for specimens, development of treatment plan with patient or surrogate, evaluation of patient's response to treatment, interpretation of cardiac output measurements, obtaining history from patient or surrogate, examination of patient, ordering and performing treatments and interventions, ordering and review of laboratory studies, ordering and review of radiographic studies, pulse oximetry, re-evaluation of patient's condition and review of old charts    Care discussed with: admitting provider           [1]   Past Medical History:  Diagnosis Date    Diabetes (Multi)     Diabetes mellitus (Multi)     Disease of thyroid gland     Hypertension     Renal disease    [2]   Past Surgical History:  Procedure Laterality Date    APPENDECTOMY      BACK SURGERY      HYSTERECTOMY     [3]   Family History  Problem Relation Name Age of Onset    Heart disease Mother      Heart disease Father      Heart disease Brother      No Known Problems Son      Heart disease Maternal Grandmother      Heart  disease Maternal Grandfather      Heart disease Paternal Grandmother      Heart disease Paternal Grandfather     [4]   Social History  Tobacco Use    Smoking status: Never    Smokeless tobacco: Never   Vaping Use    Vaping status: Never Used   Substance Use Topics    Alcohol use: Never    Drug use: Never        Armando Pinto PA-C  05/03/25 1847

## 2025-05-04 VITALS
BODY MASS INDEX: 30.21 KG/M2 | HEIGHT: 61 IN | SYSTOLIC BLOOD PRESSURE: 118 MMHG | RESPIRATION RATE: 18 BRPM | DIASTOLIC BLOOD PRESSURE: 47 MMHG | OXYGEN SATURATION: 95 % | TEMPERATURE: 97.5 F | WEIGHT: 160 LBS | HEART RATE: 75 BPM

## 2025-05-04 PROBLEM — N18.32 TYPE 2 DIABETES MELLITUS WITH STAGE 3B CHRONIC KIDNEY DISEASE, WITHOUT LONG-TERM CURRENT USE OF INSULIN (MULTI): Status: ACTIVE | Noted: 2023-08-31

## 2025-05-04 PROBLEM — E11.22 TYPE 2 DIABETES MELLITUS WITH STAGE 3B CHRONIC KIDNEY DISEASE, WITHOUT LONG-TERM CURRENT USE OF INSULIN (MULTI): Status: ACTIVE | Noted: 2023-08-31

## 2025-05-04 LAB
ANION GAP SERPL CALCULATED.3IONS-SCNC: 14 MMOL/L (ref 10–20)
BACTERIA BLD CULT: NORMAL
BACTERIA BLD CULT: NORMAL
BUN SERPL-MCNC: 25 MG/DL (ref 6–23)
CALCIUM SERPL-MCNC: 8.4 MG/DL (ref 8.6–10.3)
CHLORIDE SERPL-SCNC: 102 MMOL/L (ref 98–107)
CO2 SERPL-SCNC: 26 MMOL/L (ref 21–32)
CREAT SERPL-MCNC: 1.28 MG/DL (ref 0.5–1.05)
EGFRCR SERPLBLD CKD-EPI 2021: 38 ML/MIN/1.73M*2
ERYTHROCYTE [DISTWIDTH] IN BLOOD BY AUTOMATED COUNT: 14.9 % (ref 11.5–14.5)
GLUCOSE SERPL-MCNC: 176 MG/DL (ref 74–99)
HCT VFR BLD AUTO: 33.3 % (ref 36–46)
HGB BLD-MCNC: 9.6 G/DL (ref 12–16)
MCH RBC QN AUTO: 30 PG (ref 26–34)
MCHC RBC AUTO-ENTMCNC: 28.8 G/DL (ref 32–36)
MCV RBC AUTO: 104 FL (ref 80–100)
NRBC BLD-RTO: 0 /100 WBCS (ref 0–0)
PLATELET # BLD AUTO: 137 X10*3/UL (ref 150–450)
POTASSIUM SERPL-SCNC: 3.9 MMOL/L (ref 3.5–5.3)
RBC # BLD AUTO: 3.2 X10*6/UL (ref 4–5.2)
SODIUM SERPL-SCNC: 138 MMOL/L (ref 136–145)
WBC # BLD AUTO: 7.1 X10*3/UL (ref 4.4–11.3)

## 2025-05-04 PROCEDURE — 2500000002 HC RX 250 W HCPCS SELF ADMINISTERED DRUGS (ALT 637 FOR MEDICARE OP, ALT 636 FOR OP/ED): Performed by: INTERNAL MEDICINE

## 2025-05-04 PROCEDURE — 97162 PT EVAL MOD COMPLEX 30 MIN: CPT | Mod: GP

## 2025-05-04 PROCEDURE — 51701 INSERT BLADDER CATHETER: CPT

## 2025-05-04 PROCEDURE — 83550 IRON BINDING TEST: CPT | Performed by: NURSE PRACTITIONER

## 2025-05-04 PROCEDURE — 36415 COLL VENOUS BLD VENIPUNCTURE: CPT | Performed by: NURSE PRACTITIONER

## 2025-05-04 PROCEDURE — 1200000002 HC GENERAL ROOM WITH TELEMETRY DAILY

## 2025-05-04 PROCEDURE — 99232 SBSQ HOSP IP/OBS MODERATE 35: CPT | Performed by: NURSE PRACTITIONER

## 2025-05-04 PROCEDURE — 2500000004 HC RX 250 GENERAL PHARMACY W/ HCPCS (ALT 636 FOR OP/ED): Mod: JZ | Performed by: NURSE PRACTITIONER

## 2025-05-04 PROCEDURE — 85027 COMPLETE CBC AUTOMATED: CPT | Performed by: NURSE PRACTITIONER

## 2025-05-04 PROCEDURE — 82728 ASSAY OF FERRITIN: CPT | Performed by: NURSE PRACTITIONER

## 2025-05-04 PROCEDURE — 2500000002 HC RX 250 W HCPCS SELF ADMINISTERED DRUGS (ALT 637 FOR MEDICARE OP, ALT 636 FOR OP/ED): Performed by: NURSE PRACTITIONER

## 2025-05-04 PROCEDURE — 2500000001 HC RX 250 WO HCPCS SELF ADMINISTERED DRUGS (ALT 637 FOR MEDICARE OP): Performed by: NURSE PRACTITIONER

## 2025-05-04 PROCEDURE — 80048 BASIC METABOLIC PNL TOTAL CA: CPT | Performed by: NURSE PRACTITIONER

## 2025-05-04 PROCEDURE — 94640 AIRWAY INHALATION TREATMENT: CPT

## 2025-05-04 PROCEDURE — 99222 1ST HOSP IP/OBS MODERATE 55: CPT | Performed by: NURSE PRACTITIONER

## 2025-05-04 PROCEDURE — 2500000001 HC RX 250 WO HCPCS SELF ADMINISTERED DRUGS (ALT 637 FOR MEDICARE OP): Performed by: REGISTERED NURSE

## 2025-05-04 PROCEDURE — 9420000001 HC RT PATIENT EDUCATION 5 MIN

## 2025-05-04 RX ORDER — HYDRALAZINE HYDROCHLORIDE 25 MG/1
25 TABLET, FILM COATED ORAL 3 TIMES DAILY
Status: DISPENSED | OUTPATIENT
Start: 2025-05-04

## 2025-05-04 RX ORDER — CLONIDINE HYDROCHLORIDE 0.2 MG/1
0.2 TABLET ORAL 2 TIMES DAILY
Status: DISPENSED | OUTPATIENT
Start: 2025-05-04

## 2025-05-04 RX ORDER — LEVOTHYROXINE SODIUM 88 UG/1
88 TABLET ORAL DAILY
Status: DISPENSED | OUTPATIENT
Start: 2025-05-04

## 2025-05-04 RX ORDER — PANTOPRAZOLE SODIUM 20 MG/1
20 TABLET, DELAYED RELEASE ORAL
Status: DISPENSED | OUTPATIENT
Start: 2025-05-04

## 2025-05-04 RX ORDER — NAPROXEN SODIUM 220 MG/1
81 TABLET, FILM COATED ORAL DAILY
Status: DISPENSED | OUTPATIENT
Start: 2025-05-04

## 2025-05-04 RX ORDER — LOSARTAN POTASSIUM 100 MG/1
100 TABLET ORAL DAILY
Status: DISPENSED | OUTPATIENT
Start: 2025-05-04

## 2025-05-04 RX ORDER — FUROSEMIDE 20 MG/1
20 TABLET ORAL DAILY
Status: ACTIVE | OUTPATIENT
Start: 2025-05-04

## 2025-05-04 RX ORDER — ATORVASTATIN CALCIUM 80 MG/1
80 TABLET, FILM COATED ORAL NIGHTLY
Status: DISPENSED | OUTPATIENT
Start: 2025-05-04

## 2025-05-04 RX ADMIN — ASPIRIN 81 MG: 81 TABLET, CHEWABLE ORAL at 08:37

## 2025-05-04 RX ADMIN — PANTOPRAZOLE SODIUM 20 MG: 20 TABLET, DELAYED RELEASE ORAL at 05:47

## 2025-05-04 RX ADMIN — SITAGLIPTIN 50 MG: 100 TABLET, FILM COATED ORAL at 08:38

## 2025-05-04 RX ADMIN — IPRATROPIUM BROMIDE AND ALBUTEROL SULFATE 3 ML: 2.5; .5 SOLUTION RESPIRATORY (INHALATION) at 19:24

## 2025-05-04 RX ADMIN — ATORVASTATIN CALCIUM 80 MG: 80 TABLET, FILM COATED ORAL at 20:38

## 2025-05-04 RX ADMIN — HEPARIN SODIUM 5000 UNITS: 5000 INJECTION, SOLUTION INTRAVENOUS; SUBCUTANEOUS at 20:38

## 2025-05-04 RX ADMIN — CLONIDINE HYDROCHLORIDE 0.2 MG: 0.2 TABLET ORAL at 08:37

## 2025-05-04 RX ADMIN — IPRATROPIUM BROMIDE AND ALBUTEROL SULFATE 3 ML: 2.5; .5 SOLUTION RESPIRATORY (INHALATION) at 07:34

## 2025-05-04 RX ADMIN — HEPARIN SODIUM 5000 UNITS: 5000 INJECTION, SOLUTION INTRAVENOUS; SUBCUTANEOUS at 12:06

## 2025-05-04 RX ADMIN — HYDRALAZINE HYDROCHLORIDE 25 MG: 25 TABLET ORAL at 17:01

## 2025-05-04 RX ADMIN — BENZOCAINE AND MENTHOL 1 LOZENGE: 15; 3.6 LOZENGE ORAL at 08:47

## 2025-05-04 RX ADMIN — HYDRALAZINE HYDROCHLORIDE 25 MG: 25 TABLET ORAL at 08:37

## 2025-05-04 RX ADMIN — LEVOFLOXACIN 500 MG: 500 INJECTION, SOLUTION INTRAVENOUS at 17:10

## 2025-05-04 RX ADMIN — PHENOL 1 SPRAY: 1.5 LIQUID ORAL at 21:55

## 2025-05-04 RX ADMIN — LOSARTAN POTASSIUM 100 MG: 100 TABLET, FILM COATED ORAL at 08:37

## 2025-05-04 RX ADMIN — IPRATROPIUM BROMIDE AND ALBUTEROL SULFATE 3 ML: 2.5; .5 SOLUTION RESPIRATORY (INHALATION) at 12:29

## 2025-05-04 RX ADMIN — CLONIDINE HYDROCHLORIDE 0.2 MG: 0.2 TABLET ORAL at 20:38

## 2025-05-04 RX ADMIN — HYDRALAZINE HYDROCHLORIDE 25 MG: 25 TABLET ORAL at 20:38

## 2025-05-04 RX ADMIN — HYDROCODONE BITARTRATE AND HOMATROPINE METHYLBROMIDE 5 ML: 1.5; 5 SOLUTION ORAL at 08:46

## 2025-05-04 RX ADMIN — METHYLPREDNISOLONE SODIUM SUCCINATE 40 MG: 40 INJECTION, POWDER, FOR SOLUTION INTRAMUSCULAR; INTRAVENOUS at 08:38

## 2025-05-04 RX ADMIN — LEVOTHYROXINE SODIUM 88 MCG: 88 TABLET ORAL at 05:47

## 2025-05-04 RX ADMIN — METHYLPREDNISOLONE SODIUM SUCCINATE 40 MG: 40 INJECTION, POWDER, FOR SOLUTION INTRAMUSCULAR; INTRAVENOUS at 20:38

## 2025-05-04 ASSESSMENT — COGNITIVE AND FUNCTIONAL STATUS - GENERAL
TOILETING: A LOT
CLIMB 3 TO 5 STEPS WITH RAILING: TOTAL
CLIMB 3 TO 5 STEPS WITH RAILING: TOTAL
STANDING UP FROM CHAIR USING ARMS: A LITTLE
MOBILITY SCORE: 16
TURNING FROM BACK TO SIDE WHILE IN FLAT BAD: A LITTLE
STANDING UP FROM CHAIR USING ARMS: A LITTLE
HELP NEEDED FOR BATHING: A LITTLE
HELP NEEDED FOR BATHING: A LITTLE
TURNING FROM BACK TO SIDE WHILE IN FLAT BAD: A LITTLE
TOILETING: A LOT
WALKING IN HOSPITAL ROOM: A LOT
MOBILITY SCORE: 13
CLIMB 3 TO 5 STEPS WITH RAILING: TOTAL
MOVING FROM LYING ON BACK TO SITTING ON SIDE OF FLAT BED WITH BEDRAILS: A LITTLE
MOVING TO AND FROM BED TO CHAIR: A LITTLE
DRESSING REGULAR UPPER BODY CLOTHING: A LITTLE
WALKING IN HOSPITAL ROOM: A LOT
PERSONAL GROOMING: A LITTLE
EATING MEALS: A LITTLE
WALKING IN HOSPITAL ROOM: TOTAL
MOVING TO AND FROM BED TO CHAIR: A LITTLE
STANDING UP FROM CHAIR USING ARMS: A LITTLE
PERSONAL GROOMING: A LITTLE
DRESSING REGULAR LOWER BODY CLOTHING: A LITTLE
DRESSING REGULAR LOWER BODY CLOTHING: A LITTLE
DRESSING REGULAR UPPER BODY CLOTHING: A LITTLE
TURNING FROM BACK TO SIDE WHILE IN FLAT BAD: A LITTLE
EATING MEALS: A LITTLE
DAILY ACTIVITIY SCORE: 17
MOVING TO AND FROM BED TO CHAIR: A LOT

## 2025-05-04 ASSESSMENT — ENCOUNTER SYMPTOMS
ALLERGIC/IMMUNOLOGIC NEGATIVE: 1
ACTIVITY CHANGE: 1
CARDIOVASCULAR NEGATIVE: 1
NUMBNESS: 1
DIZZINESS: 1
NECK PAIN: 1
CONFUSION: 0
SHORTNESS OF BREATH: 0
FEVER: 0
HEADACHES: 0
NEUROLOGICAL NEGATIVE: 1
SHORTNESS OF BREATH: 1
FREQUENCY: 0
COUGH: 0
FATIGUE: 1
NERVOUS/ANXIOUS: 0
MYALGIAS: 1
COUGH: 1
FATIGUE: 1
CHILLS: 0
NECK STIFFNESS: 1
ENDOCRINE NEGATIVE: 1
HEMATOLOGIC/LYMPHATIC NEGATIVE: 1
EYES NEGATIVE: 1
NAUSEA: 0
WHEEZING: 0
WEAKNESS: 0
VOMITING: 0
APPETITE CHANGE: 1
ARTHRALGIAS: 1
CONSTIPATION: 0
GASTROINTESTINAL NEGATIVE: 1
PALPITATIONS: 0
PSYCHIATRIC NEGATIVE: 1
MUSCULOSKELETAL NEGATIVE: 1
WHEEZING: 1

## 2025-05-04 ASSESSMENT — ACTIVITIES OF DAILY LIVING (ADL): ADL_ASSISTANCE: INDEPENDENT

## 2025-05-04 ASSESSMENT — PAIN SCALES - GENERAL
PAINLEVEL_OUTOF10: 0 - NO PAIN

## 2025-05-04 NOTE — PROGRESS NOTES
Physical Therapy    Physical Therapy Evaluation    Patient Name: Fartun Carey  MRN: [id number]  Department: [address]  Room: [address] [place], [place] [address]  Today's Date: 5/4/2025   Time Calculation  Start Time: 0830  Stop Time: 0847  Time Calculation (min): 17 min    Assessment/Plan   PT Assessment  PT Assessment Results: Decreased strength, Decreased endurance, Impaired balance, Decreased mobility, Decreased safety awareness  Rehab Prognosis: Good  Barriers to Discharge Home: Physical needs  Physical Needs: Ambulating household distances limited by function/safety  Evaluation/Treatment Tolerance: Patient tolerated treatment well, Patient limited by fatigue  Medical Staff Made Aware: Yes  Strengths: Premorbid level of function  Barriers to Participation: Comorbidities  End of Session Communication: Bedside nurse  Assessment Comment: patient is 89 y/o female presenting with generalized weakness and balance deficits impacting safety with transfers and ambulation secondary to SOB with STS and limited standing tolerance with need to return supine. patient would benefit from continued skilled PT needs during hospitalization and upon discharge destination.  End of Session Patient Position: Bed, 3 rail up, Alarm on  IP OR SWING BED PT PLAN  Inpatient or Swing Bed: Inpatient  PT Plan  PT Plan: Ongoing PT  PT Frequency: 3 times per week  PT Discharge Recommendations: Moderate intensity level of continued care  Equipment Recommended upon Discharge: Wheeled walker  PT Recommended Transfer Status: Assist x1, Assistive device  PT - OK to Discharge: Yes    Subjective   General Visit Information:  General  Reason for Referral: patient referred to PT due decline in transfers and ambulation secondary to generalized weakness with SOB and bronchitis.  Referred By: Dr. Edward  Past Medical History Relevant to Rehab:  Diabetes (Multi)     Diabetes mellitus (Multi)     Disease of thyroid gland     Hypertension     Renal disease  Family/Caregiver Present:  No  Prior to Session Communication: Bedside nurse  Patient Position Received: Bed, 3 rail up, Alarm on  Preferred Learning Style: verbal, visual  General Comment: patient cleared by RN. patient agreed to PT eval.  Home Living:  Home Living  Type of Home: [place]  Lives With: Alone  Home Adaptive Equipment: Walker rolling or standard  Home Layout: One level  Home Access: Stairs to enter without rails  Entrance Stairs-Rails: None  Entrance Stairs-Number of Steps: x1 SOHAIL  Bathroom Shower/Tub: Walk-in shower  Bathroom Toilet: Handicapped height  Bathroom Equipment: Built-in shower seat, Grab bars in shower  Prior Level of Function:  Prior Function Per Pt/Caregiver Report  Level of [place]: Independent with ADLs and functional transfers, Independent with homemaking with ambulation  Receives Help From: Family, Neighbor  ADL Assistance: Independent  Homemaking Assistance: Needs assistance  Ambulatory Assistance: Independent  Vocational: Retired  Prior Function Comments: able to manage medications. was driving.  Precautions:  Precautions  Precautions Comment: Full code             Objective   Pain:  Pain Assessment  0-10 (Numeric) Pain Score: 0 - No pain  Cognition:       General Assessments:  General Observation  General Observation: increase fatigue and SOB with STS and standing x15-20 seconds with need to sit and return supine.     Activity Tolerance  Endurance: Tolerates less than 10 min exercise with changes in vital signs  Activity Tolerance Comments: increasd SOB with STS from EOB to rolling walker.         Strength  Strength Comments: generalized weakness.  Strength  Strength Comments: generalized weakness.    Functional Assessments:  Bed Mobility  Bed Mobility: Yes  Bed Mobility 1  Bed Mobility 1: Sitting to supine  Level of Assistance 1: Minimum assistance  Bed Mobility Comments 1: use of bed rails.    Transfers  Transfer: Yes  Transfer 1  Transfer From 1: Sit to  Transfer to 1: Stand  Technique 1: Sit to  stand  Transfer Device 1: Walker  Transfer Level of Assistance 1: Minimum assistance  Trials/Comments 1: VC for hand placement and transfer sequencing.  Transfers 2  Transfer From 2: Stand to  Transfer to 2: Sit  Technique 2: Stand to sit  Transfer Device 2: Walker  Transfer Level of Assistance 2: Minimum assistance  Extremity/Trunk Assessments:  RLE   RLE : Exceptions to WFL  Strength RLE  RLE Overall Strength: Greater than or equal to 3/5 as evidenced by functional mobility  LLE   LLE : Exceptions to WFL  Strength LLE  LLE Overall Strength: Greater than or equal to 3/5 as evidenced by functional mobility  Outcome Measures:  [facility] Basic Mobility  Turning from your back to your side while in a flat bed without using bedrails: A little  Moving from lying on your back to sitting on the side of a flat bed without using bedrails: A little  Moving to and from bed to chair (including a wheelchair): A lot  Standing up from a chair using your arms (e.g. wheelchair or bedside chair): A little  To walk in hospital room: Total  Climbing 3-5 steps with railing: Total  Basic Mobility - Total Score: 13    Encounter Problems       Encounter Problems (Active)       Balance       LTG - Patient will maintain balance to allow for safe mobility (Progressing)       Start:  05/04/25    Expected End:  05/18/25       Patient will maintain balance to allow for safe mobility with decreased risk for falls.         STG - Maintains dynamic standing balance with upper extremity support (Progressing)       Start:  05/04/25    Expected End:  05/11/25       INTERVENTIONS:1. Practice standing with minimal support.2. Educate patient about standing tolerance.3. Educate patient about independence with gait, transfers, and ADL's.4. Educate patient about use of assistive device.5. Educate patient about self-directed care.            Mobility       LTG - Patient will ambulate household distance (Progressing)       Start:  05/04/25    Expected End:   05/18/25       Patient will ambulate household distance with rolling walker in order to return to PLOF with decreased risk for falls.         STG - Patient will ambulate up and down a curb/step (Progressing)       Start:  05/04/25    Expected End:  05/11/25       Patient will ambulate up and down a curb/step in order to enter/exit home.            PT Transfers       LTG - Patient will transfer from one surface to another (Progressing)       Start:  05/04/25    Expected End:  05/18/25       Patient will transfer from one surface to another with Ekaterina in order to return to PLOF.         STG - Patient will transfer sit to and from stand (Progressing)       Start:  05/04/25    Expected End:  05/11/25       Patient will transfer sit to and from stand with CGA in order to increase level of independence.            Pain - Adult              Education Documentation  Mobility Training, taught by Maria Del Carmen Dawson PT at 5/4/2025 10:34 AM.  Learner: Patient  Readiness: Acceptance  Method: Explanation  Response: Verbalizes Understanding, Needs Reinforcement    Education Comments  No comments found.

## 2025-05-04 NOTE — CONSULTS
Inpatient consult to Pulmonology  Consult performed by: ELAINE Winslow  Consult ordered by: ELAINE Duran  Reason for consult: Abnormal CT scan, acute respiratory failure with hypoxia          Reason For Consult  Abnormal CT scan, acute respiratory failure with hypoxia     History Of Present Illness  Fartun Carey is a 89 y.o. female with a past medical history that includes but is not limited to who presented to lung nodules, hyperlipidemia, hypertension and chronic kidney disease stage IIIb who presented to [facility] [place] [facility] yesterday via squad for evaluation of worsening shortness of breath, nonproductive cough, fatigue and decreased appetite  for the past 3 days. She was tachypneic and hypoxic upon arrival; 89% on room air and placed on 3 L nasal cannula oxygen to maintain O2 sats greater than or equal to 92%.  A CT angio was obtained and was without pulmonary embolism but did show multiple stable pulmonary nodules, enlarging left upper lobe ground-glass nodule now measuring under 2 cm concerning for slow growing neoplasm, bronchial thickening, and mild mosaic attenuation, query bronchial inflammation and senescent changes. She denies fevers, chills, nausea, vomiting, diarrhea, chest or abdominal pain.     Past Medical History  Diagnosis Date    Diabetes (Multi)     Diabetes mellitus (Multi)     Disease of thyroid gland     Hypertension     Renal disease        Surgical History  Procedure Laterality Date    APPENDECTOMY      BACK SURGERY      HYSTERECTOMY          Social History  Tobacco Use    Smoking status: Never    Smokeless tobacco: Never   Vaping Use    Vaping status: Never Used   Substance Use Topics    Alcohol use: Never    Drug use: Never       Family History  Problem Relation Name Age of Onset    Heart disease Mother      Heart disease Father      Heart disease Brother      No Known Problems Son      Heart disease Maternal Grandmother      Heart disease  Maternal Grandfather      Heart disease Paternal Grandmother      Heart disease Paternal Grandfather          Allergies  Amoxicillin, Amoxicillin-pot clavulanate, Glimepiride, Lisinopril, Metformin hcl, Tetracycline hcl, and Cephalexin    Review of Systems   Constitutional:  Positive for activity change, appetite change and fatigue.   HENT: Negative.     Eyes: Negative.    Respiratory:  Positive for cough, shortness of breath and wheezing.    Cardiovascular: Negative.    Gastrointestinal: Negative.    Endocrine: Negative.    Genitourinary: Negative.    Musculoskeletal: Negative.    Allergic/Immunologic: Negative.    Neurological: Negative.    Hematological: Negative.    Psychiatric/Behavioral: Negative.          Physical Exam  Vitals and nursing note reviewed.   Constitutional:       Appearance: She is obese.   HENT:      Head: Normocephalic and atraumatic.      Nose: Nose normal.      Mouth/Throat:      Mouth: Mucous membranes are moist.   Eyes:      Extraocular Movements: Extraocular movements intact.      Conjunctiva/sclera: Conjunctivae normal.      Pupils: Pupils are equal, round, and reactive to light.   Cardiovascular:      Rate and Rhythm: Normal rate and regular rhythm.      Pulses: Normal pulses.      Heart sounds: Normal heart sounds.   Pulmonary:      Effort: Pulmonary effort is normal.      Breath sounds: Rhonchi present.      Comments: Coarse breath sounds bilaterally.   Abdominal:      General: Bowel sounds are normal.      Palpations: Abdomen is soft.   Musculoskeletal:         General: Normal range of motion.   Skin:     General: Skin is warm and dry.      Capillary Refill: Capillary refill takes less than 2 seconds.   Neurological:      General: No focal deficit present.      Mental Status: She is alert and oriented to person, place, and time.   Psychiatric:         Mood and Affect: Mood normal.         Behavior: Behavior normal.          Vital Signs  Blood pressure 130/58, pulse 69, temperature  "37.3 °C (99.1 °F), temperature source Temporal, resp. rate 18, height 1.549 m (5' 1\"), weight 72.6 kg (160 lb), SpO2 96%.  Oxygen Therapy  SpO2: 96 %  Medical Gas Therapy: Supplemental oxygen  Medical Gas Delivery Method: Nasal cannula       Intake/Output Summary (Last 24 hours) at 5/4/2025 0839  Last data filed at 5/3/2025 2000  Gross per 24 hour   Intake 150 ml   Output 600 ml   Net -450 ml      Scheduled medications:  aspirin, 81 mg, oral, Daily  atorvastatin, 80 mg, oral, Nightly  cloNIDine, 0.2 mg, oral, BID  [Held by provider] furosemide, 20 mg, oral, Daily  heparin (porcine), 5,000 Units, subcutaneous, q8h  hydrALAZINE, 25 mg, oral, TID  ipratropium-albuteroL, 3 mL, nebulization, TID  levoFLOXacin, 500 mg, intravenous, q24h  levothyroxine, 88 mcg, oral, Daily  losartan, 100 mg, oral, Daily  methylPREDNISolone sodium succinate (PF), 40 mg, intravenous, q12h  pantoprazole, 20 mg, oral, Daily before breakfast  SITagliptin phosphate, 50 mg, oral, Daily  Continuous medications    PRN medications: acetaminophen **OR** acetaminophen **OR** acetaminophen, benzocaine-menthol, bisacodyl, dextromethorphan-guaifenesin, hydrocodone-homatropine, ipratropium-albuteroL, ondansetron ODT **OR** ondansetron, polyethylene glycol    Relevant Results  Results for orders placed or performed during the hospital encounter of 05/03/25 (from the past 24 hours)   CBC and Auto Differential   Result Value Ref Range    WBC 5.3 4.4 - 11.3 x10*3/uL    nRBC 0.0 0.0 - 0.0 /100 WBCs    RBC 3.55 (L) 4.00 - 5.20 x10*6/uL    Hemoglobin 10.7 (L) 12.0 - 16.0 g/dL    Hematocrit 33.5 (L) 36.0 - 46.0 %    MCV 94 80 - 100 fL    MCH 30.1 26.0 - 34.0 pg    MCHC 31.9 (L) 32.0 - 36.0 g/dL    RDW 14.6 (H) 11.5 - 14.5 %    Platelets 144 (L) 150 - 450 x10*3/uL    Neutrophils % 57.8 40.0 - 80.0 %    Immature Granulocytes %, Automated 0.4 0.0 - 0.9 %    Lymphocytes % 22.3 13.0 - 44.0 %    Monocytes % 18.9 2.0 - 10.0 %    Eosinophils % 0.4 0.0 - 6.0 %    " Basophils % 0.2 0.0 - 2.0 %    Neutrophils Absolute 3.04 1.60 - 5.50 x10*3/uL    Immature Granulocytes Absolute, Automated 0.02 0.00 - 0.50 x10*3/uL    Lymphocytes Absolute 1.17 0.80 - 3.00 x10*3/uL    Monocytes Absolute 0.99 (H) 0.05 - 0.80 x10*3/uL    Eosinophils Absolute 0.02 0.00 - 0.40 x10*3/uL    Basophils Absolute 0.01 0.00 - 0.10 x10*3/uL   Comprehensive metabolic panel   Result Value Ref Range    Glucose 144 (H) 74 - 99 mg/dL    Sodium 139 136 - 145 mmol/L    Potassium 3.7 3.5 - 5.3 mmol/L    Chloride 102 98 - 107 mmol/L    Bicarbonate 26 21 - 32 mmol/L    Anion Gap 15 10 - 20 mmol/L    Urea Nitrogen 25 (H) 6 - 23 mg/dL    Creatinine 1.24 (H) 0.50 - 1.05 mg/dL    eGFR 40 (L) >60 mL/min/1.73m*2    Calcium 9.0 8.6 - 10.3 mg/dL    Albumin 3.9 3.4 - 5.0 g/dL    Alkaline Phosphatase 130 33 - 136 U/L    Total Protein 6.6 6.4 - 8.2 g/dL    AST 22 9 - 39 U/L    Bilirubin, Total 0.4 0.0 - 1.2 mg/dL    ALT 16 7 - 45 U/L   Sars-CoV-2 and Influenza A/B PCR   Result Value Ref Range    Flu A Result Not Detected Not Detected    Flu B Result Not Detected Not Detected    Coronavirus 2019, PCR Not Detected Not Detected   B-type natriuretic peptide   Result Value Ref Range     (H) 0 - 99 pg/mL   Troponin I, High Sensitivity, Initial   Result Value Ref Range    Troponin I, High Sensitivity 22 (H) 0 - 13 ng/L   Troponin, High Sensitivity, 1 Hour   Result Value Ref Range    Troponin I, High Sensitivity 22 (H) 0 - 13 ng/L   Lactate   Result Value Ref Range    Lactate 2.5 (H) 0.4 - 2.0 mmol/L   Lactate   Result Value Ref Range    Lactate 1.7 0.4 - 2.0 mmol/L   Urinalysis with Reflex Culture and Microscopic   Result Value Ref Range    Color, Urine Yellow Straw, Yellow    Appearance, Urine Clear Clear    Specific Gravity, Urine 1.010 1.005 - 1.035    pH, Urine 5.5 5.0, 5.5, 6.0, 6.5, 7.0, 7.5, 8.0    Protein, Urine 30 (1+) (A) NEGATIVE, TRACE mg/dL    Glucose, Urine NEGATIVE NEGATIVE mg/dL    Blood, Urine NEGATIVE NEGATIVE  mg/dL    Ketones, Urine NEGATIVE NEGATIVE mg/dL    Bilirubin, Urine NEGATIVE NEGATIVE mg/dL    Urobilinogen, Urine 0.2 0.2, 1.0 mg/dL    Nitrite, Urine NEGATIVE NEGATIVE    Leukocyte Esterase, Urine NEGATIVE NEGATIVE   Urinalysis Microscopic   Result Value Ref Range    WBC, Urine 1-5 1-5, NONE /HPF    RBC, Urine 3-5 NONE, 1-2, 3-5 /HPF    Squamous Epithelial Cells, Urine 1-9 (SPARSE) Reference range not established. /HPF    Mucus, Urine FEW Reference range not established. /LPF    Hyaline Casts, Urine OCCASIONAL (A) NONE /LPF   CBC   Result Value Ref Range    WBC 7.1 4.4 - 11.3 x10*3/uL    nRBC 0.0 0.0 - 0.0 /100 WBCs    RBC 3.20 (L) 4.00 - 5.20 x10*6/uL    Hemoglobin 9.6 (L) 12.0 - 16.0 g/dL    Hematocrit 33.3 (L) 36.0 - 46.0 %     (H) 80 - 100 fL    MCH 30.0 26.0 - 34.0 pg    MCHC 28.8 (L) 32.0 - 36.0 g/dL    RDW 14.9 (H) 11.5 - 14.5 %    Platelets 137 (L) 150 - 450 x10*3/uL   Basic metabolic panel   Result Value Ref Range    Glucose 176 (H) 74 - 99 mg/dL    Sodium 138 136 - 145 mmol/L    Potassium 3.9 3.5 - 5.3 mmol/L    Chloride 102 98 - 107 mmol/L    Bicarbonate 26 21 - 32 mmol/L    Anion Gap 14 10 - 20 mmol/L    Urea Nitrogen 25 (H) 6 - 23 mg/dL    Creatinine 1.28 (H) 0.50 - 1.05 mg/dL    eGFR 38 (L) >60 mL/min/1.73m*2    Calcium 8.4 (L) 8.6 - 10.3 mg/dL     *Note: Due to a large number of results and/or encounters for the requested time period, some results have not been displayed. A complete set of results can be found in Results Review.      CT angio chest for pulmonary embolism  Result Date: 5/3/2025  FINDINGS: Ground-glass increasing left upper lobe nodule measures 1.7 cm on image 94 concerning for slow growing neoplasm. Other numerous bilateral pulmonary nodules appear similar in size. Reference right lower lobe nodule measuring a cm on image 163. Reference 8 mm right lower lobe nodule on image 184. Other numerous nodules detected remain stable. Mosaic attenuation in the lungs suggesting small  airway or small vessel inflammation. No findings of acute pulmonary embolism. Coronary calcification no evidence of acute thoracic pathology. Changes of old healed granulomas disease. Robust vascular calcification. Nephroliths detected. Largest visualized stone measures 9 mm.   Demineralized bones. 1. No evidence of acute pulmonary embolism 2. Senescent changes. Heart size is enlarged. Changes of old healed granulomas disease 3. Multiple stable pulmonary nodules. Enlarging left upper lobe ground-glass nodule now measuring under 2 cm concerning for slow growing neoplasm. 4. Bronchial thickening noted. Mild mosaic attenuation seen in the lungs. Query bronchial inflammation. Senescent changes.    XR chest 2 views  Result Date: 5/3/2025  FINDINGS: Stable heart and mediastinal contours. No pneumothorax or pleural fluid. No focal consolidation or alveolar edema. Mild atherosclerotic calcification of the aortic arch. Mild elevation of the right hemidiaphragm.  1.  No radiographic evidence of acute cardiopulmonary disease.          XR cervical spine 2-3 views  Result Date: 5/2/2025  FINDINGS:   Reversal normal cervical lordosis is seen.   Osteopenia is present. The prevertebral soft tissues are unremarkable.   Endplate sclerosis and spur formation is seen throughout the cervical spine. Narrowing of C3-4, C4-5 C5-6 and C6-7 disc space is seen. Uncovertebral joint hypertrophy seen.   No acute fracture or dislocation is seen. Reversal normal cervical lordosis and degenerative changes.         CT chest wo IV contrast  Result Date: 10/10/2023  FINDINGS: The thyroid gland is unremarkable. The heart size is within normal limits.  No pericardial effusion is identified. The aorta and pulmonary arteries are normal in caliber. There are no pathologically enlarged mediastinal, hilar, or axillary lymph nodes are seen. Calcified left infrahilar lymph node consistent with granulomatous disease. The trachea and mainstem bronchi are patent.   There are a few calcified subcentimeter granulomas in the left lower lobe consistent with granulomas. However there are numerous noncalcified pulmonary nodules bilaterally concerning for the possibility of pulmonary metastatic disease, the largest on the left is within the left lower lobe measuring approximately 8 mm. The largest pulmonary nodule in the right upper lobe is lobe measuring 8 mm. There is a 9 mm  nonspecific noncalcified pulmonary nodule in the lateral basal segment of the right lower lobe. Numerous additional bilateral pulmonary nodules are seen. Otherwise no significant consolidation or acute airspace opacity. There is no evidence of pneumothorax or pleural effusion. The visualized osseous structures are intact. Limited images through the upper abdomen show multiple splenic granulomas. Moderate sized hiatal hernia. Nonobstructive right renal  calculi. IMPRESSION: Numerous noncalcified pulmonary nodules bilaterally concerning for pulmonary metastatic disease. The largest in the right lung measures approximately 9 mm, the largest in the left lung measures approximately 8 mm. Clinical correlation and follow-up in 3 months is recommended.        Assessment/Plan   Acute hypoxic respiratory failure  Wean oxygen as sats allow  Continue nebulized bronchodilator  Continuous pulse oximetry  Incentive spirometry/pulmonary hygiene    Lung nodules  CT angio with multiple stable pulmonary nodules, enlarging left upper lobe ground-glass nodule now measuring under 2 cm concerning for slow growing neoplasm  CT scan chest wo IV contrast October '23 with numerous noncalcified pulmonary nodules bilaterally concerning for pulmonary metastatic disease, largest in the right lung measures approximately 9 mm, the largest in the left lung measures approximately 8 [id number]-lc in 3 months is recommended  Patient seen by Dr. Plascencia for lung nodules when hospitalized here October '23 because patient was asymptomatic and given  her age recommended deferring aggressive pursuit of biopsy at that time and outpatient follow-up imaging-patient does not recall getting any scans after this    Acute bronchitis  Continue emperic IV levofloxacin  IV Solu-Medrol  Sputum if able  Follow-up cultures  FEV 1 when optimized   2 D echo pending    Prophylaxis  Subcutaneous heparin  Protonix        Whit Miguel APRN-CNP  Pulmonary & Critical Care Medicine      The patient was seen and examined.  CT scan of the chest was reviewed and compared to the one in 2021.  The patient currently presents presentation is consistent with chest infection like bronchitis and she does have cough and wheezing on physical examination.  Will continue current therapy will add bronchodilators nebulized DuoNeb every 6 hours.  Regarding lung nodules I explained to the patient as she has enlarging lung nodules that this could be suggestive of neoplastic process but we need to do a biopsy I also gave her the option of doing PET scan first.  In my opinion PET scan would be a good next option for this patient is out patient and follow-up with her pulmonologist meanwhile we will treat her for her acute issue right now here in the hospital.

## 2025-05-04 NOTE — ASSESSMENT & PLAN NOTE
No history of COPD, asthma or tobacco use..  Imaging does not show no acute finding  Started yesterday getting worst today nonproductive cough  Flu A and B- coronavirus negative  Start on Solu-Medrol and levofloxacin  DuoNeb  Order: Hycodan Syrup

## 2025-05-04 NOTE — CARE PLAN
The patient's goals for the shift include      The clinical goals for the shift include safety      Problem: Pain - Adult  Goal: Verbalizes/displays adequate comfort level or baseline comfort level  Outcome: Progressing     Problem: Safety - Adult  Goal: Free from fall injury  Outcome: Progressing     Problem: Discharge Planning  Goal: Discharge to home or other facility with appropriate resources  Outcome: Progressing     Problem: Chronic Conditions and Co-morbidities  Goal: Patient's chronic conditions and co-morbidity symptoms are monitored and maintained or improved  Outcome: Progressing     Problem: Nutrition  Goal: Nutrient intake appropriate for maintaining nutritional needs  Outcome: Progressing     Problem: Fall/Injury  Goal: Not fall by end of shift  Outcome: Progressing  Goal: Be free from injury by end of the shift  Outcome: Progressing  Goal: Verbalize understanding of personal risk factors for fall in the hospital  Outcome: Progressing  Goal: Verbalize understanding of risk factor reduction measures to prevent injury from fall in the home  Outcome: Progressing  Goal: Use assistive devices by end of the shift  Outcome: Progressing  Goal: Pace activities to prevent fatigue by end of the shift  Outcome: Progressing

## 2025-05-04 NOTE — ASSESSMENT & PLAN NOTE
Uncontrolled BP patient did not took her home meds this morning  Patient took multiple meds at the ED start to drop from -179  Resume her home meds tomorrow

## 2025-05-04 NOTE — ASSESSMENT & PLAN NOTE
Patient pulse ox dropped to 88% in room air requires 4 L nasal cannula up to 95-96%  May be related to her bronchitis  Wean patient from O2 as tolerated  Last echo was 60% I ordered Limited echo

## 2025-05-04 NOTE — H&P
History Of Present Illness  Fartun Carey is a 89 y.o. female scented from home due to cough and shortness of breath since yesterday.  She denies any sore throat runny nose or headache.  No fever or chills.  Patient denies any head history with vertigo she is older neurology and feels better with the therapy.  She uses a walker no fall.  Denies any recent illness.  Complains of discomfort on her chest from cough.  She denies any nausea, vomiting, diarrhea, constipation, or abdominal pain.  Has a small bowel movement at the ED.  Complaint having difficulty urinating but no no dysuria.     Past Medical History  Medical History[1]    Surgical History  Surgical History[2]     Social History  She reports that she has never smoked. She has never used smokeless tobacco. She reports that she does not drink alcohol and does not use drugs.    Family History  Family History[3]     Allergies  Amoxicillin, Amoxicillin-pot clavulanate, Glimepiride, Lisinopril, Metformin hcl, Tetracycline hcl, and Cephalexin    Review of Systems   Constitutional:  Positive for activity change, appetite change and fatigue. Negative for fever and unexpected weight change.   HENT:  Negative for congestion, rhinorrhea, sore throat, trouble swallowing and voice change.    Eyes: Negative.    Respiratory:  Positive for cough, shortness of breath and wheezing.    Cardiovascular:  Negative for chest pain, palpitations and leg swelling.   Gastrointestinal:  Negative for abdominal distention, abdominal pain, blood in stool, constipation, diarrhea, nausea and vomiting.   Endocrine: Negative.    Genitourinary:  Positive for difficulty urinating. Negative for decreased urine volume and dysuria.   Neurological:  Negative for dizziness, tremors, seizures, syncope, speech difficulty, weakness, light-headedness, numbness and headaches.   Hematological: Negative.    Psychiatric/Behavioral: Negative.          Physical Exam  Vitals and nursing note reviewed.  "  Constitutional:       Appearance: Normal appearance. She is ill-appearing.   HENT:      Head: Normocephalic and atraumatic.   Eyes:      Extraocular Movements: Extraocular movements intact.      Pupils: Pupils are equal, round, and reactive to light.   Cardiovascular:      Rate and Rhythm: Tachycardia present.   Pulmonary:      Effort: Pulmonary effort is normal.      Breath sounds: Wheezing and rhonchi present.   Abdominal:      General: Bowel sounds are normal. There is no distension.      Palpations: Abdomen is soft.   Genitourinary:     General: Normal vulva.   Musculoskeletal:         General: No swelling. Normal range of motion.      Cervical back: Normal range of motion and neck supple.      Right lower leg: No edema.      Left lower leg: No edema.   Skin:     General: Skin is warm.   Neurological:      General: No focal deficit present.      Mental Status: She is alert and oriented to person, place, and time.   Psychiatric:         Mood and Affect: Mood normal.        Last Recorded Vitals  Blood pressure 152/84, pulse (!) 102, temperature 36.5 °C (97.7 °F), temperature source Temporal, resp. rate (!) 23, height 1.549 m (5' 1\"), weight 72.6 kg (160 lb), SpO2 100%.    Relevant Results  Medications Ordered Prior to Encounter[4]  Results for orders placed or performed during the hospital encounter of 05/03/25 (from the past 24 hours)   CBC and Auto Differential   Result Value Ref Range    WBC 5.3 4.4 - 11.3 x10*3/uL    nRBC 0.0 0.0 - 0.0 /100 WBCs    RBC 3.55 (L) 4.00 - 5.20 x10*6/uL    Hemoglobin 10.7 (L) 12.0 - 16.0 g/dL    Hematocrit 33.5 (L) 36.0 - 46.0 %    MCV 94 80 - 100 fL    MCH 30.1 26.0 - 34.0 pg    MCHC 31.9 (L) 32.0 - 36.0 g/dL    RDW 14.6 (H) 11.5 - 14.5 %    Platelets 144 (L) 150 - 450 x10*3/uL    Neutrophils % 57.8 40.0 - 80.0 %    Immature Granulocytes %, Automated 0.4 0.0 - 0.9 %    Lymphocytes % 22.3 13.0 - 44.0 %    Monocytes % 18.9 2.0 - 10.0 %    Eosinophils % 0.4 0.0 - 6.0 %    Basophils " % 0.2 0.0 - 2.0 %    Neutrophils Absolute 3.04 1.60 - 5.50 x10*3/uL    Immature Granulocytes Absolute, Automated 0.02 0.00 - 0.50 x10*3/uL    Lymphocytes Absolute 1.17 0.80 - 3.00 x10*3/uL    Monocytes Absolute 0.99 (H) 0.05 - 0.80 x10*3/uL    Eosinophils Absolute 0.02 0.00 - 0.40 x10*3/uL    Basophils Absolute 0.01 0.00 - 0.10 x10*3/uL   Comprehensive metabolic panel   Result Value Ref Range    Glucose 144 (H) 74 - 99 mg/dL    Sodium 139 136 - 145 mmol/L    Potassium 3.7 3.5 - 5.3 mmol/L    Chloride 102 98 - 107 mmol/L    Bicarbonate 26 21 - 32 mmol/L    Anion Gap 15 10 - 20 mmol/L    Urea Nitrogen 25 (H) 6 - 23 mg/dL    Creatinine 1.24 (H) 0.50 - 1.05 mg/dL    eGFR 40 (L) >60 mL/min/1.73m*2    Calcium 9.0 8.6 - 10.3 mg/dL    Albumin 3.9 3.4 - 5.0 g/dL    Alkaline Phosphatase 130 33 - 136 U/L    Total Protein 6.6 6.4 - 8.2 g/dL    AST 22 9 - 39 U/L    Bilirubin, Total 0.4 0.0 - 1.2 mg/dL    ALT 16 7 - 45 U/L   Sars-CoV-2 and Influenza A/B PCR   Result Value Ref Range    Flu A Result Not Detected Not Detected    Flu B Result Not Detected Not Detected    Coronavirus 2019, PCR Not Detected Not Detected   B-type natriuretic peptide   Result Value Ref Range     (H) 0 - 99 pg/mL   Troponin I, High Sensitivity, Initial   Result Value Ref Range    Troponin I, High Sensitivity 22 (H) 0 - 13 ng/L   Troponin, High Sensitivity, 1 Hour   Result Value Ref Range    Troponin I, High Sensitivity 22 (H) 0 - 13 ng/L   Lactate   Result Value Ref Range    Lactate 2.5 (H) 0.4 - 2.0 mmol/L   Lactate   Result Value Ref Range    Lactate 1.7 0.4 - 2.0 mmol/L   Urinalysis with Reflex Culture and Microscopic   Result Value Ref Range    Color, Urine Yellow Straw, Yellow    Appearance, Urine Clear Clear    Specific Gravity, Urine 1.010 1.005 - 1.035    pH, Urine 5.5 5.0, 5.5, 6.0, 6.5, 7.0, 7.5, 8.0    Protein, Urine 30 (1+) (A) NEGATIVE, TRACE mg/dL    Glucose, Urine NEGATIVE NEGATIVE mg/dL    Blood, Urine NEGATIVE NEGATIVE mg/dL     Ketones, Urine NEGATIVE NEGATIVE mg/dL    Bilirubin, Urine NEGATIVE NEGATIVE mg/dL    Urobilinogen, Urine 0.2 0.2, 1.0 mg/dL    Nitrite, Urine NEGATIVE NEGATIVE    Leukocyte Esterase, Urine NEGATIVE NEGATIVE   Urinalysis Microscopic   Result Value Ref Range    WBC, Urine 1-5 1-5, NONE /HPF    RBC, Urine 3-5 NONE, 1-2, 3-5 /HPF    Squamous Epithelial Cells, Urine 1-9 (SPARSE) Reference range not established. /HPF    Mucus, Urine FEW Reference range not established. /LPF    Hyaline Casts, Urine OCCASIONAL (A) NONE /LPF     *Note: Due to a large number of results and/or encounters for the requested time period, some results have not been displayed. A complete set of results can be found in Results Review.   CT angio chest for pulmonary embolism  Result Date: 5/3/2025  Interpreted By:  Pancho Barth, STUDY: CT ANGIO CHEST FOR PULMONARY EMBOLISM;  5/3/2025 5:41 pm   INDICATION: Signs/Symptoms:tachycardic, hypoxic, concern for PE vs pneumonia.   COMPARISON: 2023   ACCESSION NUMBER(S): [id number]   ORDERING CLINICIAN: VIVIANE RILEY   TECHNIQUE: Helical data acquisition of the chest was obtained after intravenous administration of 75 ML Omnipaque 350 as per PE protocol. Images were reformatted in coronal and sagittal planes. Axial and coronal maximum intensity projection (MIP) images were created and reviewed.   FINDINGS: Ground-glass increasing left upper lobe nodule measures 1.7 cm on image 94 concerning for slow growing neoplasm   Other numerous bilateral pulmonary nodules appear similar in size. Reference right lower lobe nodule measuring a cm on image 163. Reference 8 mm right lower lobe nodule on image 184. Other numerous nodules detected remain stable   Mosaic attenuation in the lungs suggesting small airway or small vessel inflammation. No findings of acute pulmonary embolism. Coronary calcification no evidence of acute thoracic pathology.   Changes of old healed granulomas disease. Robust vascular  calcification.   Nephroliths detected. Largest visualized stone measures 9 mm.   Demineralized bones       1. No evidence of acute pulmonary embolism 2. Senescent changes. Heart size is enlarged. Changes of old healed granulomas disease 3. Multiple stable pulmonary nodules. Enlarging left upper lobe ground-glass nodule now measuring under 2 cm concerning for slow growing neoplasm. 4. Bronchial thickening noted. Mild mosaic attenuation seen in the lungs. Query bronchial inflammation. Senescent changes.   MACRO: None.   Signed by: Pancho Barth 5/3/2025 6:19 PM Dictation workstation:   [id number]    XR chest 2 views  Result Date: 5/3/2025  Interpreted By:  Amilcar Galdamez, STUDY: XR CHEST 2 VIEWS;  5/3/2025 3:34 pm   INDICATION: Signs/Symptoms:shortness of breath.     COMPARISON: 02/21/2024   ACCESSION NUMBER(S): [id number]   ORDERING CLINICIAN: VIVIANE RILEY   FINDINGS: Stable heart and mediastinal contours. No pneumothorax or pleural fluid. No focal consolidation or alveolar edema. Mild atherosclerotic calcification of the aortic arch. Mild elevation of the right hemidiaphragm.       1.  No radiographic evidence of acute cardiopulmonary disease.       MACRO: None   Signed by: Amilcar Galdamez 5/3/2025 4:37 PM Dictation workstation:   [id number]      .imi        Admitted with acute bronchitis, acute respiratory failure with hypoxia, uncontrolled hypertension.  Started on IV antibiotic, Solu-Medrol DuoNeb.  Echo pending       Assessment & Plan  Bronchitis  No history of COPD, asthma or tobacco use..  Imaging does not show no acute finding  Started yesterday getting worst today nonproductive cough  Flu A and B- coronavirus negative  Start on Solu-Medrol and levofloxacin  DuoNeb  Order: Hycodan Syrup  Acute respiratory failure with hypoxia  Patient pulse ox dropped to 88% in room air requires 4 L nasal cannula up to 95-96%  May be related to her bronchitis  Wean patient from O2 as tolerated  Last echo was 60% I  ordered Limited echo  Hypertension  Uncontrolled BP patient did not took her home meds this morning  Patient took multiple meds at the ED start to drop from -179  Resume her home meds tomorrow  Hyperlipidemia  Continue with home meds  Stage 3b chronic kidney disease (Multi)  Creatinine and GFR at the baseline  Monitor kidney function     DVT prophylaxis  On heparin subcutaneous      Marian Soaresdenise APRN-CNP           [1]  Past Medical History:  Diagnosis Date   • Diabetes (Multi)    • Diabetes mellitus (Multi)    • Disease of thyroid gland    • Hypertension    • Renal disease    [2]  Past Surgical History:  Procedure Laterality Date   • APPENDECTOMY     • BACK SURGERY     • HYSTERECTOMY     [3]  Family History  Problem Relation Name Age of Onset   • Heart disease Mother     • Heart disease Father     • Heart disease Brother     • No Known Problems Son     • Heart disease Maternal Grandmother     • Heart disease Maternal Grandfather     • Heart disease Paternal Grandmother     • Heart disease Paternal Grandfather     [4]  No current facility-administered medications on file prior to encounter.     Current Outpatient Medications on File Prior to Encounter   Medication Sig Dispense Refill   • aspirin 81 mg chewable tablet Chew 1 tablet (81 mg) once daily.     • atorvastatin (Lipitor) 80 mg tablet Take 1 tablet (80 mg) by mouth once daily.     • blood sugar diagnostic (Accu-Chek Guide test strips) strip Inject 1 strip under the skin early in the morning.. 100 strip 3   • cholecalciferol (Vitamin D-3) 25 MCG (1000 UT) capsule Take 1 capsule (25 mcg) by mouth once daily.     • cloNIDine (Catapres) 0.2 mg tablet Take 1 tablet (0.2 mg) by mouth 2 times a day.     • cyanocobalamin (Vitamin B-12) 1,000 mcg tablet Take 1 tablet (1,000 mcg) by mouth once daily.     • esomeprazole (NexIUM) 20 mg DR capsule Take 1 capsule (20 mg) by mouth once daily.     • furosemide (Lasix) 20 mg tablet Take 1 tablet (20 mg) by mouth once  daily.     • hydrALAZINE (Apresoline) 25 mg tablet Take 1 tablet (25 mg) by mouth 3 times a day. 270 tablet 3   • levothyroxine (Synthroid, Levoxyl) 88 mcg tablet Take 1 tablet (88 mcg) by mouth once daily. 90 tablet 2   • losartan (Cozaar) 100 mg tablet Take 1 tablet (100 mg) by mouth once daily.     • olmesartan (BENIcar) 40 mg tablet Take 0.5 tablets (20 mg) by mouth 2 times a day.     • SITagliptin phosphate (Januvia) 50 mg tablet Take 1 tablet (50 mg) by mouth once daily. 90 tablet 2   • vit C/E/Zn/coppr/lutein/zeaxan (PRESERVISION AREDS-2 ORAL) every 12 hours.     • [DISCONTINUED] amoxicillin (Amoxil) 500 mg tablet TAKE 4 TABLETS BY MOUTH 1 HOUR PRIOR TO APPT

## 2025-05-04 NOTE — PROGRESS NOTES
"Fartun Carey is a 89 y.o. female on day 1 of admission presenting with Bronchitis.    Subjective   Pt seen and examined.  Pt says her dyspnea is improved.  She has a dry non-productive cough.  Afebrile overnight.  Denies chills.       Objective     Physical Exam  Constitutional:       General: She is not in acute distress.     Appearance: She is ill-appearing. She is not toxic-appearing.   Cardiovascular:      Rate and Rhythm: Normal rate and regular rhythm.      Pulses: Normal pulses.      Heart sounds: Normal heart sounds.   Pulmonary:      Effort: Pulmonary effort is normal. No respiratory distress.      Breath sounds: Rhonchi present. No wheezing.   Abdominal:      General: Bowel sounds are normal.      Palpations: Abdomen is soft.   Musculoskeletal:      Right lower leg: No edema.      Left lower leg: No edema.   Skin:     General: Skin is warm and dry.   Neurological:      General: No focal deficit present.      Mental Status: She is alert and oriented to person, place, and time.         Last Recorded Vitals  Blood pressure 130/58, pulse 69, temperature 37.3 °C (99.1 °F), temperature source Temporal, resp. rate 18, height 1.549 m (5' 1\"), weight 72.6 kg (160 lb), SpO2 96%.  Intake/Output last 3 Shifts:  I/O last 3 completed shifts:  In: 150 (2.1 mL/kg) [IV Piggyback:150]  Out: 600 (8.3 mL/kg) [Urine:600 (0.2 mL/kg/hr)]  Weight: 72.6 kg     Relevant Results    Scheduled medications  Scheduled Medications[1]  Continuous medications  Continuous Medications[2]  PRN medications  PRN Medications[3]     following data reviewed    WBC- 7.1  Hgb-9.6  Hct-33.3  Platelets-137      Na-138  K+-3.9  Cl-102  Bicarb-26  BUN-25  creatinine-1.3        Influenza A negative  Influenza B negative  COVID-19 negative        CTA chest    impression:     1. No evidence of acute pulmonary embolism  2. Senescent changes. Heart size is enlarged. Changes of old healed  granulomas disease  3. Multiple stable pulmonary nodules. Enlarging " left upper lobe  ground-glass nodule now measuring under 2 cm concerning for slow  growing neoplasm.  4. Bronchial thickening noted. Mild mosaic attenuation seen in the  lungs. Query bronchial inflammation. Senescent changes.     Assessment and Plan     Acute Respiratory Failure with hypoxia  -Oxygen PRN, wean as tolerated  -currently on 2L    Bronchitis/Dyspnea  -CTA with bronchial thickening with mild mosaic attenuation  -CXR  no acute findings  -ECHO  -empiric ATB for now  - Blood cultures pending    Lung Nodule  -pulmonology consult    DM II  -monitor blood glucose  -continue home jardiance  -HgbA1C 6.8 (12/2024)    CKD   -baseline creatinine 1.2 to 1.5  -stable   -renally dose medications  -avoid nephrotoxic agents    Hyperlipidemia  -continue statin    HTN  -monitor blood pressure  -continue home meds    Disposition  Above plan discussed with pt and he is in agreement         KAYLENE Duran-CNP         [1] aspirin, 81 mg, oral, Daily  atorvastatin, 80 mg, oral, Nightly  cloNIDine, 0.2 mg, oral, BID  [Held by provider] furosemide, 20 mg, oral, Daily  heparin (porcine), 5,000 Units, subcutaneous, q8h  hydrALAZINE, 25 mg, oral, TID  ipratropium-albuteroL, 3 mL, nebulization, TID  levoFLOXacin, 500 mg, intravenous, q24h  levothyroxine, 88 mcg, oral, Daily  losartan, 100 mg, oral, Daily  methylPREDNISolone sodium succinate (PF), 40 mg, intravenous, q12h  pantoprazole, 20 mg, oral, Daily before breakfast  SITagliptin phosphate, 50 mg, oral, Daily  [2]    [3] PRN medications: acetaminophen **OR** acetaminophen **OR** acetaminophen, benzocaine-menthol, bisacodyl, dextromethorphan-guaifenesin, hydrocodone-homatropine, ipratropium-albuteroL, ondansetron ODT **OR** ondansetron, polyethylene glycol

## 2025-05-05 ENCOUNTER — TELEPHONE (OUTPATIENT)
Dept: PRIMARY CARE | Facility: CLINIC | Age: OVER 89
End: 2025-05-05
Payer: MEDICARE

## 2025-05-05 ENCOUNTER — APPOINTMENT (OUTPATIENT)
Dept: CARDIOLOGY | Facility: HOSPITAL | Age: OVER 89
DRG: 189 | End: 2025-05-05
Payer: MEDICARE

## 2025-05-05 PROBLEM — R78.81 BLOOD BACTERIAL CULTURE POSITIVE: Status: ACTIVE | Noted: 2025-05-05

## 2025-05-05 LAB
ANION GAP SERPL CALCULATED.3IONS-SCNC: 14 MMOL/L (ref 10–20)
AORTIC VALVE MEAN GRADIENT: 10 MMHG
AORTIC VALVE PEAK VELOCITY: 2.14 M/S
APPEARANCE UR: ABNORMAL
ATRIAL RATE: 110 BPM
AV PEAK GRADIENT: 18 MMHG
AVA (PEAK VEL): 1.06 CM2
AVA (VTI): 0.98 CM2
BACTERIA #/AREA URNS AUTO: ABNORMAL /HPF
BILIRUB UR STRIP.AUTO-MCNC: NEGATIVE MG/DL
BUN SERPL-MCNC: 54 MG/DL (ref 6–23)
CALCIUM SERPL-MCNC: 7.7 MG/DL (ref 8.6–10.3)
CHLORIDE SERPL-SCNC: 97 MMOL/L (ref 98–107)
CO2 SERPL-SCNC: 24 MMOL/L (ref 21–32)
COLOR UR: YELLOW
CREAT SERPL-MCNC: 2.18 MG/DL (ref 0.5–1.05)
EGFRCR SERPLBLD CKD-EPI 2021: 20 ML/MIN/1.73M*2
EJECTION FRACTION APICAL 4 CHAMBER: 62.2
EJECTION FRACTION: 63 %
ERYTHROCYTE [DISTWIDTH] IN BLOOD BY AUTOMATED COUNT: 14.6 % (ref 11.5–14.5)
FERRITIN SERPL-MCNC: 51 NG/ML (ref 8–150)
GLUCOSE SERPL-MCNC: 116 MG/DL (ref 74–99)
GLUCOSE UR STRIP.AUTO-MCNC: NORMAL MG/DL
HCT VFR BLD AUTO: 27.6 % (ref 36–46)
HGB BLD-MCNC: 8.7 G/DL (ref 12–16)
HOLD SPECIMEN: 293
HYALINE CASTS #/AREA URNS AUTO: ABNORMAL /LPF
IRON SATN MFR SERPL: 12 % (ref 25–45)
IRON SERPL-MCNC: 37 UG/DL (ref 35–150)
KETONES UR STRIP.AUTO-MCNC: NEGATIVE MG/DL
LEFT VENTRICLE INTERNAL DIMENSION DIASTOLE: 4.37 CM (ref 3.5–6)
LEFT VENTRICULAR OUTFLOW TRACT DIAMETER: 1.8 CM
LEUKOCYTE ESTERASE UR QL STRIP.AUTO: ABNORMAL
MAGNESIUM SERPL-MCNC: 1.57 MG/DL (ref 1.6–2.4)
MCH RBC QN AUTO: 30.2 PG (ref 26–34)
MCHC RBC AUTO-ENTMCNC: 31.5 G/DL (ref 32–36)
MCV RBC AUTO: 96 FL (ref 80–100)
MITRAL VALVE E/A RATIO: 0.9
MUCOUS THREADS #/AREA URNS AUTO: ABNORMAL /LPF
NITRITE UR QL STRIP.AUTO: NEGATIVE
NRBC BLD-RTO: 0 /100 WBCS (ref 0–0)
P AXIS: 93 DEGREES
P OFFSET: 182 MS
P ONSET: 149 MS
PH UR STRIP.AUTO: 5 [PH]
PLATELET # BLD AUTO: 123 X10*3/UL (ref 150–450)
POTASSIUM SERPL-SCNC: 4.1 MMOL/L (ref 3.5–5.3)
PR INTERVAL: 156 MS
PROT UR STRIP.AUTO-MCNC: NEGATIVE MG/DL
Q ONSET: 227 MS
QRS COUNT: 18 BEATS
QRS DURATION: 72 MS
QT INTERVAL: 348 MS
QTC CALCULATION(BAZETT): 470 MS
QTC FREDERICIA: 426 MS
R AXIS: -41 DEGREES
RBC # BLD AUTO: 2.88 X10*6/UL (ref 4–5.2)
RBC # UR STRIP.AUTO: NEGATIVE MG/DL
RBC #/AREA URNS AUTO: ABNORMAL /HPF
RIGHT VENTRICLE PEAK SYSTOLIC PRESSURE: 32.6 MMHG
SODIUM SERPL-SCNC: 131 MMOL/L (ref 136–145)
SP GR UR STRIP.AUTO: 1.02
SQUAMOUS #/AREA URNS AUTO: ABNORMAL /HPF
T AXIS: 67 DEGREES
T OFFSET: 401 MS
TIBC SERPL-MCNC: 300 UG/DL (ref 240–445)
UIBC SERPL-MCNC: 263 UG/DL (ref 110–370)
UROBILINOGEN UR STRIP.AUTO-MCNC: NORMAL MG/DL
VENTRICULAR RATE: 110 BPM
WBC # BLD AUTO: 9.4 X10*3/UL (ref 4.4–11.3)
WBC #/AREA URNS AUTO: >50 /HPF

## 2025-05-05 PROCEDURE — 93308 TTE F-UP OR LMTD: CPT | Performed by: INTERNAL MEDICINE

## 2025-05-05 PROCEDURE — 2500000004 HC RX 250 GENERAL PHARMACY W/ HCPCS (ALT 636 FOR OP/ED)

## 2025-05-05 PROCEDURE — 87449 NOS EACH ORGANISM AG IA: CPT | Mod: TRILAB | Performed by: NURSE PRACTITIONER

## 2025-05-05 PROCEDURE — 87040 BLOOD CULTURE FOR BACTERIA: CPT | Mod: TRILAB | Performed by: NURSE PRACTITIONER

## 2025-05-05 PROCEDURE — 2500000001 HC RX 250 WO HCPCS SELF ADMINISTERED DRUGS (ALT 637 FOR MEDICARE OP): Performed by: NURSE PRACTITIONER

## 2025-05-05 PROCEDURE — 36415 COLL VENOUS BLD VENIPUNCTURE: CPT | Performed by: NURSE PRACTITIONER

## 2025-05-05 PROCEDURE — 97110 THERAPEUTIC EXERCISES: CPT | Mod: GP,CQ

## 2025-05-05 PROCEDURE — 2500000004 HC RX 250 GENERAL PHARMACY W/ HCPCS (ALT 636 FOR OP/ED): Performed by: REGISTERED NURSE

## 2025-05-05 PROCEDURE — 85027 COMPLETE CBC AUTOMATED: CPT | Performed by: NURSE PRACTITIONER

## 2025-05-05 PROCEDURE — 2500000004 HC RX 250 GENERAL PHARMACY W/ HCPCS (ALT 636 FOR OP/ED): Mod: JZ | Performed by: NURSE PRACTITIONER

## 2025-05-05 PROCEDURE — 1200000002 HC GENERAL ROOM WITH TELEMETRY DAILY

## 2025-05-05 PROCEDURE — 99232 SBSQ HOSP IP/OBS MODERATE 35: CPT | Performed by: NURSE PRACTITIONER

## 2025-05-05 PROCEDURE — 97530 THERAPEUTIC ACTIVITIES: CPT | Mod: GP,CQ

## 2025-05-05 PROCEDURE — 2500000002 HC RX 250 W HCPCS SELF ADMINISTERED DRUGS (ALT 637 FOR MEDICARE OP, ALT 636 FOR OP/ED): Performed by: NURSE PRACTITIONER

## 2025-05-05 PROCEDURE — 81001 URINALYSIS AUTO W/SCOPE: CPT | Performed by: NURSE PRACTITIONER

## 2025-05-05 PROCEDURE — 97165 OT EVAL LOW COMPLEX 30 MIN: CPT | Mod: GO

## 2025-05-05 PROCEDURE — 93321 DOPPLER ECHO F-UP/LMTD STD: CPT | Performed by: INTERNAL MEDICINE

## 2025-05-05 PROCEDURE — 86738 MYCOPLASMA ANTIBODY: CPT | Performed by: NURSE PRACTITIONER

## 2025-05-05 PROCEDURE — 83735 ASSAY OF MAGNESIUM: CPT | Performed by: NURSE PRACTITIONER

## 2025-05-05 PROCEDURE — 93325 DOPPLER ECHO COLOR FLOW MAPG: CPT | Performed by: INTERNAL MEDICINE

## 2025-05-05 PROCEDURE — 87086 URINE CULTURE/COLONY COUNT: CPT | Mod: TRILAB | Performed by: NURSE PRACTITIONER

## 2025-05-05 PROCEDURE — 93325 DOPPLER ECHO COLOR FLOW MAPG: CPT

## 2025-05-05 PROCEDURE — 80048 BASIC METABOLIC PNL TOTAL CA: CPT | Performed by: NURSE PRACTITIONER

## 2025-05-05 RX ORDER — HYDROXYZINE HYDROCHLORIDE 10 MG/1
10 TABLET, FILM COATED ORAL EVERY 8 HOURS PRN
Status: DISCONTINUED | OUTPATIENT
Start: 2025-05-05 | End: 2025-05-13 | Stop reason: HOSPADM

## 2025-05-05 RX ORDER — VANCOMYCIN 750 MG/150ML
750 INJECTION, SOLUTION INTRAVENOUS EVERY 24 HOURS
Status: DISCONTINUED | OUTPATIENT
Start: 2025-05-05 | End: 2025-05-06

## 2025-05-05 RX ORDER — GUAIFENESIN 600 MG/1
600 TABLET, EXTENDED RELEASE ORAL 2 TIMES DAILY PRN
Status: DISCONTINUED | OUTPATIENT
Start: 2025-05-05 | End: 2025-05-05

## 2025-05-05 RX ORDER — GUAIFENESIN 600 MG/1
600 TABLET, EXTENDED RELEASE ORAL 2 TIMES DAILY
Status: DISCONTINUED | OUTPATIENT
Start: 2025-05-05 | End: 2025-05-13 | Stop reason: HOSPADM

## 2025-05-05 RX ORDER — DEXTROSE MONOHYDRATE AND SODIUM CHLORIDE 5; .45 G/100ML; G/100ML
50 INJECTION, SOLUTION INTRAVENOUS CONTINUOUS
Status: DISCONTINUED | OUTPATIENT
Start: 2025-05-05 | End: 2025-05-06

## 2025-05-05 RX ORDER — VANCOMYCIN HYDROCHLORIDE 1 G/20ML
INJECTION, POWDER, LYOPHILIZED, FOR SOLUTION INTRAVENOUS DAILY PRN
Status: DISCONTINUED | OUTPATIENT
Start: 2025-05-05 | End: 2025-05-06

## 2025-05-05 RX ADMIN — LOSARTAN POTASSIUM 100 MG: 100 TABLET, FILM COATED ORAL at 09:47

## 2025-05-05 RX ADMIN — PANTOPRAZOLE SODIUM 20 MG: 20 TABLET, DELAYED RELEASE ORAL at 05:13

## 2025-05-05 RX ADMIN — SODIUM CHLORIDE 500 ML: 900 INJECTION, SOLUTION INTRAVENOUS at 16:45

## 2025-05-05 RX ADMIN — ATORVASTATIN CALCIUM 80 MG: 80 TABLET, FILM COATED ORAL at 20:12

## 2025-05-05 RX ADMIN — DEXTROSE MONOHYDRATE AND SODIUM CHLORIDE 50 ML/HR: 5; .45 INJECTION, SOLUTION INTRAVENOUS at 18:05

## 2025-05-05 RX ADMIN — ASPIRIN 81 MG: 81 TABLET, CHEWABLE ORAL at 09:28

## 2025-05-05 RX ADMIN — GUAIFENESIN 600 MG: 600 TABLET ORAL at 03:47

## 2025-05-05 RX ADMIN — FUROSEMIDE 20 MG: 20 TABLET ORAL at 09:29

## 2025-05-05 RX ADMIN — HYDRALAZINE HYDROCHLORIDE 25 MG: 25 TABLET ORAL at 09:46

## 2025-05-05 RX ADMIN — CLONIDINE HYDROCHLORIDE 0.2 MG: 0.2 TABLET ORAL at 09:47

## 2025-05-05 RX ADMIN — METHYLPREDNISOLONE SODIUM SUCCINATE 40 MG: 40 INJECTION, POWDER, FOR SOLUTION INTRAMUSCULAR; INTRAVENOUS at 09:28

## 2025-05-05 RX ADMIN — GUAIFENESIN 600 MG: 600 TABLET ORAL at 11:29

## 2025-05-05 RX ADMIN — VANCOMYCIN 750 MG: 750 INJECTION, SOLUTION INTRAVENOUS at 16:26

## 2025-05-05 RX ADMIN — SITAGLIPTIN 50 MG: 100 TABLET, FILM COATED ORAL at 09:48

## 2025-05-05 RX ADMIN — LEVOFLOXACIN 500 MG: 5 INJECTION, SOLUTION INTRAVENOUS at 18:20

## 2025-05-05 RX ADMIN — GUAIFENESIN 600 MG: 600 TABLET ORAL at 20:12

## 2025-05-05 RX ADMIN — HYDRALAZINE HYDROCHLORIDE 25 MG: 25 TABLET ORAL at 20:12

## 2025-05-05 RX ADMIN — GUAIFENESIN AND DEXTROMETHORPHAN 5 ML: 100; 10 SYRUP ORAL at 03:28

## 2025-05-05 RX ADMIN — LEVOTHYROXINE SODIUM 88 MCG: 0.09 TABLET ORAL at 05:13

## 2025-05-05 RX ADMIN — HYDRALAZINE HYDROCHLORIDE 25 MG: 25 TABLET ORAL at 15:55

## 2025-05-05 RX ADMIN — METHYLPREDNISOLONE SODIUM SUCCINATE 40 MG: 40 INJECTION, POWDER, FOR SOLUTION INTRAMUSCULAR; INTRAVENOUS at 20:12

## 2025-05-05 SDOH — ECONOMIC STABILITY: FOOD INSECURITY: HOW HARD IS IT FOR YOU TO PAY FOR THE VERY BASICS LIKE FOOD, HOUSING, MEDICAL CARE, AND HEATING?: NOT HARD AT ALL

## 2025-05-05 SDOH — SOCIAL STABILITY: SOCIAL NETWORK: HOW OFTEN DO YOU ATTEND MEETINGS OF THE CLUBS OR ORGANIZATIONS YOU BELONG TO?: NEVER

## 2025-05-05 SDOH — HEALTH STABILITY: PHYSICAL HEALTH: ON AVERAGE, HOW MANY MINUTES DO YOU ENGAGE IN EXERCISE AT THIS LEVEL?: 0 MIN

## 2025-05-05 SDOH — SOCIAL STABILITY: SOCIAL NETWORK: HOW OFTEN DO YOU ATTEND CHURCH OR RELIGIOUS SERVICES?: NEVER

## 2025-05-05 SDOH — HEALTH STABILITY: MENTAL HEALTH: HOW OFTEN DO YOU HAVE SIX OR MORE DRINKS ON ONE OCCASION?: NEVER

## 2025-05-05 SDOH — SOCIAL STABILITY: SOCIAL NETWORK: HOW OFTEN DO YOU GET TOGETHER WITH FRIENDS OR RELATIVES?: MORE THAN THREE TIMES A WEEK

## 2025-05-05 SDOH — SOCIAL STABILITY: SOCIAL INSECURITY: ARE YOU MARRIED, WIDOWED, DIVORCED, SEPARATED, NEVER MARRIED, OR LIVING WITH A PARTNER?: WIDOWED

## 2025-05-05 SDOH — SOCIAL STABILITY: SOCIAL INSECURITY: WITHIN THE LAST YEAR, HAVE YOU BEEN AFRAID OF YOUR PARTNER OR EX-PARTNER?: NO

## 2025-05-05 SDOH — HEALTH STABILITY: MENTAL HEALTH
DO YOU FEEL STRESS - TENSE, RESTLESS, NERVOUS, OR ANXIOUS, OR UNABLE TO SLEEP AT NIGHT BECAUSE YOUR MIND IS TROUBLED ALL THE TIME - THESE DAYS?: NOT AT ALL

## 2025-05-05 SDOH — SOCIAL STABILITY: SOCIAL NETWORK
DO YOU BELONG TO ANY CLUBS OR ORGANIZATIONS SUCH AS CHURCH GROUPS, UNIONS, FRATERNAL OR ATHLETIC GROUPS, OR SCHOOL GROUPS?: NO

## 2025-05-05 SDOH — ECONOMIC STABILITY: HOUSING INSECURITY: IN THE LAST 12 MONTHS, WAS THERE A TIME WHEN YOU WERE NOT ABLE TO PAY THE MORTGAGE OR RENT ON TIME?: NO

## 2025-05-05 SDOH — HEALTH STABILITY: MENTAL HEALTH: HOW MANY DRINKS CONTAINING ALCOHOL DO YOU HAVE ON A TYPICAL DAY WHEN YOU ARE DRINKING?: PATIENT DOES NOT DRINK

## 2025-05-05 SDOH — ECONOMIC STABILITY: HOUSING INSECURITY: AT ANY TIME IN THE PAST 12 MONTHS, WERE YOU HOMELESS OR LIVING IN A SHELTER (INCLUDING NOW)?: NO

## 2025-05-05 SDOH — HEALTH STABILITY: MENTAL HEALTH: HOW OFTEN DO YOU HAVE A DRINK CONTAINING ALCOHOL?: NEVER

## 2025-05-05 SDOH — HEALTH STABILITY: PHYSICAL HEALTH: ON AVERAGE, HOW MANY DAYS PER WEEK DO YOU ENGAGE IN MODERATE TO STRENUOUS EXERCISE (LIKE A BRISK WALK)?: 0 DAYS

## 2025-05-05 SDOH — HEALTH STABILITY: PHYSICAL HEALTH
HOW OFTEN DO YOU NEED TO HAVE SOMEONE HELP YOU WHEN YOU READ INSTRUCTIONS, PAMPHLETS, OR OTHER WRITTEN MATERIAL FROM YOUR DOCTOR OR PHARMACY?: NEVER

## 2025-05-05 SDOH — ECONOMIC STABILITY: FOOD INSECURITY: WITHIN THE PAST 12 MONTHS, THE FOOD YOU BOUGHT JUST DIDN'T LAST AND YOU DIDN'T HAVE MONEY TO GET MORE.: NEVER TRUE

## 2025-05-05 SDOH — ECONOMIC STABILITY: FOOD INSECURITY: WITHIN THE PAST 12 MONTHS, YOU WORRIED THAT YOUR FOOD WOULD RUN OUT BEFORE YOU GOT THE MONEY TO BUY MORE.: NEVER TRUE

## 2025-05-05 SDOH — ECONOMIC STABILITY: INCOME INSECURITY: IN THE PAST 12 MONTHS HAS THE ELECTRIC, GAS, OIL, OR WATER COMPANY THREATENED TO SHUT OFF SERVICES IN YOUR HOME?: NO

## 2025-05-05 SDOH — SOCIAL STABILITY: SOCIAL NETWORK
IN A TYPICAL WEEK, HOW MANY TIMES DO YOU TALK ON THE PHONE WITH FAMILY, FRIENDS, OR NEIGHBORS?: MORE THAN THREE TIMES A WEEK

## 2025-05-05 SDOH — ECONOMIC STABILITY: HOUSING INSECURITY: IN THE PAST 12 MONTHS, HOW MANY TIMES HAVE YOU MOVED WHERE YOU WERE LIVING?: 0

## 2025-05-05 SDOH — ECONOMIC STABILITY: TRANSPORTATION INSECURITY: IN THE PAST 12 MONTHS, HAS LACK OF TRANSPORTATION KEPT YOU FROM MEDICAL APPOINTMENTS OR FROM GETTING MEDICATIONS?: NO

## 2025-05-05 ASSESSMENT — ENCOUNTER SYMPTOMS
SHORTNESS OF BREATH: 1
COUGH: 1
ENDOCRINE NEGATIVE: 1
PSYCHIATRIC NEGATIVE: 1
NEUROLOGICAL NEGATIVE: 1
CARDIOVASCULAR NEGATIVE: 1
MUSCULOSKELETAL NEGATIVE: 1
GASTROINTESTINAL NEGATIVE: 1
EYES NEGATIVE: 1
FATIGUE: 1

## 2025-05-05 ASSESSMENT — PAIN SCALES - GENERAL
PAINLEVEL_OUTOF10: 0 - NO PAIN

## 2025-05-05 ASSESSMENT — COGNITIVE AND FUNCTIONAL STATUS - GENERAL
PERSONAL GROOMING: A LITTLE
MOVING TO AND FROM BED TO CHAIR: A LOT
STANDING UP FROM CHAIR USING ARMS: A LITTLE
DRESSING REGULAR LOWER BODY CLOTHING: A LITTLE
DRESSING REGULAR UPPER BODY CLOTHING: A LITTLE
MOVING TO AND FROM BED TO CHAIR: A LITTLE
EATING MEALS: A LITTLE
WALKING IN HOSPITAL ROOM: A LOT
TURNING FROM BACK TO SIDE WHILE IN FLAT BAD: A LITTLE
TURNING FROM BACK TO SIDE WHILE IN FLAT BAD: A LITTLE
MOVING TO AND FROM BED TO CHAIR: A LITTLE
EATING MEALS: A LITTLE
DRESSING REGULAR UPPER BODY CLOTHING: A LOT
DRESSING REGULAR UPPER BODY CLOTHING: A LITTLE
DAILY ACTIVITIY SCORE: 14
CLIMB 3 TO 5 STEPS WITH RAILING: TOTAL
STANDING UP FROM CHAIR USING ARMS: A LITTLE
DRESSING REGULAR LOWER BODY CLOTHING: A LOT
PERSONAL GROOMING: A LITTLE
HELP NEEDED FOR BATHING: A LOT
MOVING FROM LYING ON BACK TO SITTING ON SIDE OF FLAT BED WITH BEDRAILS: A LITTLE
DRESSING REGULAR LOWER BODY CLOTHING: A LITTLE
MOBILITY SCORE: 13
WALKING IN HOSPITAL ROOM: A LOT
TOILETING: A LOT
HELP NEEDED FOR BATHING: A LITTLE
EATING MEALS: A LITTLE
CLIMB 3 TO 5 STEPS WITH RAILING: TOTAL
MOBILITY SCORE: 16
WALKING IN HOSPITAL ROOM: TOTAL
DAILY ACTIVITIY SCORE: 17
PERSONAL GROOMING: A LITTLE
DAILY ACTIVITIY SCORE: 17
TURNING FROM BACK TO SIDE WHILE IN FLAT BAD: A LITTLE
TOILETING: A LOT
TOILETING: A LOT
HELP NEEDED FOR BATHING: A LITTLE
CLIMB 3 TO 5 STEPS WITH RAILING: TOTAL
MOBILITY SCORE: 16
STANDING UP FROM CHAIR USING ARMS: A LITTLE

## 2025-05-05 ASSESSMENT — LIFESTYLE VARIABLES
AUDIT-C TOTAL SCORE: 0
SKIP TO QUESTIONS 9-10: 1

## 2025-05-05 ASSESSMENT — ACTIVITIES OF DAILY LIVING (ADL)
LACK_OF_TRANSPORTATION: NO
BATHING_ASSISTANCE: MODERATE
LACK_OF_TRANSPORTATION: NO
ADL_ASSISTANCE: INDEPENDENT

## 2025-05-05 ASSESSMENT — PAIN - FUNCTIONAL ASSESSMENT
PAIN_FUNCTIONAL_ASSESSMENT: 0-10
PAIN_FUNCTIONAL_ASSESSMENT: 0-10

## 2025-05-05 NOTE — TELEPHONE ENCOUNTER
Left message for patient regarding recent x-ray results.      You do have several levels of disc space narrowing in your neck - I suspect there is pressure on your spinal cord - I recommend to discuss with neurology - we could also consider getting an MRI of  your spine

## 2025-05-05 NOTE — PROGRESS NOTES
Physical Therapy    Physical Therapy Treatment    Patient Name: Fartun Carey  MRN: [id number]  Department: [address]  Room: [address] [place], [place] [address]  Today's Date: 5/5/2025  Time Calculation  Start Time: 1432  Stop Time: 1504  Time Calculation (min): 32 min         Assessment/Plan   PT Assessment  PT Assessment Results: Decreased strength, Decreased endurance, Impaired balance, Decreased mobility, Decreased safety awareness  Rehab Prognosis: Good  Barriers to Discharge Home: Physical needs  Physical Needs: Ambulating household distances limited by function/safety  Evaluation/Treatment Tolerance: Patient limited by fatigue  Medical Staff Made Aware: Yes  Strengths: Premorbid level of function  Barriers to Participation: Comorbidities  End of Session Communication: Bedside nurse  Assessment Comment: Pt fatigues easily, gives effort. Pt reported dizziness while being up, did not worsen but did not get better, RN aware. Pt declined staying up in chair due to fatigue. Pt would benefit from continued PT services to progress safety and mobility.  End of Session Patient Position: Alarm on, Bed, 3 rail up     PT Plan  Treatment/Interventions: Bed mobility, Transfer training, Balance training, Strengthening, Endurance training, Gait training, Therapeutic exercise, Therapeutic activity  PT Plan: Ongoing PT  PT Frequency: 3 times per week  PT Discharge Recommendations: Moderate intensity level of continued care  Equipment Recommended upon Discharge: Wheeled walker  PT Recommended Transfer Status: Assist x1, Assistive device  PT - OK to Discharge: Yes      General Visit Information:   PT  Visit  PT Received On: 05/05/25  Response to Previous Treatment: Patient reporting fatigue but able to participate. (Pt c/o being tired, was up to the chair this morning, recently back from having ECHO.)  General  Reason for Referral: Impaired mobility secondary to generalized weakness, SOB and bronchitis.  Referred By: Dr. Edward  Past Medical History  Relevant to Rehab: DM type 2, OA, pulmonary nodule, hyponatremia, PVD  Prior to Session Communication: Bedside nurse  Patient Position Received: Bed, 3 rail up, Alarm on  Preferred Learning Style: verbal, visual  General Comment: Cleared per nurse to see pt for PT. Pt agreeable.    Subjective   Precautions:  Precautions  Hearing/Visual Limitations: + glasses, bilateral hearing aides  Medical Precautions: Fall precautions, Oxygen therapy device and L/min (2L oxygen via nc)        Objective   Pain:  Pain Assessment  Pain Assessment: 0-10  0-10 (Numeric) Pain Score: 0 - No pain    Cognition:  Cognition  Overall Cognitive Status: Within Functional Limits  Cognition Comments: Pt pleasant and cooperative.    Activity Tolerance:  Activity Tolerance  Endurance: Decreased tolerance for upright activites  Activity Tolerance Comments: Pt fatigues quickly.    Treatments:  Therapeutic Exercise  Therapeutic Exercise Performed: Yes  Therapeutic Exercise Activity 1: Pt performed seated bilat LE ankle pumps, heel raises, glute sets with cues, LAQ, hip flexion, dennis hip adduction and resisted hip abduction 5 x3 reps each. Rest breaks needed often due to fatigue.    Bed Mobility 1  Bed Mobility 1: Supine to sitting  Level of Assistance 1: Minimum assistance  Bed Mobility Comments 1: Head of bed elevated. Pt able to bring bilat LE's off bed, min assist with trunk up.  Bed Mobility 2  Bed Mobility  2: Sitting to supine  Level of Assistance 2: Close supervision    Transfer 1  Transfer From 1: Bed to  Transfer to 1: Stand  Technique 1: Sit to stand  Transfer Device 1: Walker  Transfer Level of Assistance 1: Minimum assistance, Minimal verbal cues  Trials/Comments 1: Verbal cues to push from bed sit to stand. Pt used RW to take 6-7 small, shuffled steps to chair, min/mod assist. Pt unsteady, required assist to manage walker.  Transfers 2  Transfer From 2: Stand to  Transfer to 2: Sit, Chair with arms  Technique 2: Stand to sit  Transfer  Device 2: Walker  Transfer Level of Assistance 2: Minimum assistance, Minimal verbal cues  Trials/Comments 2: Verbal cues to reach back for chair arms stand to sit.  Transfers 3  Transfer From 3: Chair with arms to  Transfer to 3: Stand  Technique 3: Sit to stand  Transfer Device 3: Walker  Transfer Level of Assistance 3: Minimum assistance, Minimal verbal cues  Trials/Comments 3: Verbal cues to push from chair arms sit to stand. Pt used RW to take ~5-6 steps back to bedside, min/mod assist to steady and manage walker.  Transfers 4  Transfer From 4: Stand to  Transfer to 4: Sit, Bed  Technique 4: Stand to sit  Transfer Device 4: Walker  Transfer Level of Assistance 4: Minimum assistance, Minimal verbal cues  Trials/Comments 4: Verbal cues to reach back for bed/rail stand to sit.    Outcome Measures:  [facility] Basic Mobility  Turning from your back to your side while in a flat bed without using bedrails: A little  Moving from lying on your back to sitting on the side of a flat bed without using bedrails: A little  Moving to and from bed to chair (including a wheelchair): A lot  Standing up from a chair using your arms (e.g. wheelchair or bedside chair): A little  To walk in hospital room: Total  Climbing 3-5 steps with railing: Total  Basic Mobility - Total Score: 13    Education Documentation  Precautions, taught by Alison Solomon PTA at 5/5/2025  3:18 PM.  Learner: Patient  Readiness: Acceptance  Method: Explanation  Response: Needs Reinforcement  Comment: Safety with mobility.    Body Mechanics, taught by Alison Solomon PTA at 5/5/2025  3:18 PM.  Learner: Patient  Readiness: Acceptance  Method: Explanation  Response: Needs Reinforcement  Comment: Safety with mobility.    Mobility Training, taught by Alison Solomon PTA at 5/5/2025  3:18 PM.  Learner: Patient  Readiness: Acceptance  Method: Explanation  Response: Needs Reinforcement  Comment: Safety with mobility.    Education Comments  No comments found.        OP  EDUCATION:       Encounter Problems       Encounter Problems (Active)       Balance       LTG - Patient will maintain balance to allow for safe mobility (Progressing)       Start:  05/04/25    Expected End:  05/18/25       Patient will maintain balance to allow for safe mobility with decreased risk for falls.         STG - Maintains dynamic standing balance with upper extremity support (Progressing)       Start:  05/04/25    Expected End:  05/11/25       INTERVENTIONS:1. Practice standing with minimal support.2. Educate patient about standing tolerance.3. Educate patient about independence with gait, transfers, and ADL's.4. Educate patient about use of assistive device.5. Educate patient about self-directed care.            Mobility       LTG - Patient will ambulate household distance (Progressing)       Start:  05/04/25    Expected End:  05/18/25       Patient will ambulate household distance with rolling walker in order to return to PLOF with decreased risk for falls.         STG - Patient will ambulate up and down a curb/step (Progressing)       Start:  05/04/25    Expected End:  05/11/25       Patient will ambulate up and down a curb/step in order to enter/exit home.            PT Transfers       LTG - Patient will transfer from one surface to another (Progressing)       Start:  05/04/25    Expected End:  05/18/25       Patient will transfer from one surface to another with Ekaterina in order to return to PLOF.         STG - Patient will transfer sit to and from stand (Progressing)       Start:  05/04/25    Expected End:  05/11/25       Patient will transfer sit to and from stand with CGA in order to increase level of independence.            Pain - Adult

## 2025-05-05 NOTE — PROGRESS NOTES
"Fartun Carey is a 89 y.o. female on day 2 of admission seen in follow-up for acute hypoxic respiratory failure, lung nodules, acute bronchitis    Subjective   Sitting up in chair. On 2 L nasal cannula oxygen; oxygen sats 99%. Endorses dyspnea and nonproductive cough. Denies pain. Afebrile.     Objective     Physical Exam  Vitals and nursing note reviewed.   Constitutional:       Appearance: She is obese.   HENT:      Head: Normocephalic and atraumatic.      Nose: Nose normal.      Mouth/Throat:      Mouth: Mucous membranes are moist.   Eyes:      Extraocular Movements: Extraocular movements intact.      Conjunctiva/sclera: Conjunctivae normal.      Pupils: Pupils are equal, round, and reactive to light.   Cardiovascular:      Rate and Rhythm: Normal rate and regular rhythm.      Pulses: Normal pulses.      Heart sounds: Normal heart sounds.   Pulmonary:      Breath sounds: Wheezing and rhonchi present.      Comments: Coarse breath sounds with expiratory wheezes bilaterally. Tachypneic.   Abdominal:      General: Bowel sounds are normal.      Palpations: Abdomen is soft.   Musculoskeletal:         General: Normal range of motion.   Skin:     General: Skin is warm and dry.      Capillary Refill: Capillary refill takes less than 2 seconds.   Neurological:      General: No focal deficit present.      Mental Status: She is alert and oriented to person, place, and time.   Psychiatric:         Mood and Affect: Mood normal.         Behavior: Behavior normal.         Last Recorded Vitals  Blood pressure (!) 126/47, pulse 61, temperature 36.3 °C (97.3 °F), temperature source Oral, resp. rate 18, height 1.549 m (5' 1\"), weight 72.6 kg (160 lb), SpO2 98%.      Intake/Output Summary (Last 24 hours) at 5/5/2025 0900  Last data filed at 5/4/2025 2056  Gross per 24 hour   Intake 100 ml   Output 550 ml   Net -450 ml      Scheduled medications:  aspirin, 81 mg, oral, Daily  atorvastatin, 80 mg, oral, Nightly  cloNIDine, 0.2 mg, " oral, BID  furosemide, 20 mg, oral, Daily  hydrALAZINE, 25 mg, oral, TID  ipratropium-albuteroL, 3 mL, nebulization, TID  levoFLOXacin, 500 mg, intravenous, q24h  levothyroxine, 88 mcg, oral, Daily  losartan, 100 mg, oral, Daily  methylPREDNISolone sodium succinate (PF), 40 mg, intravenous, q12h  pantoprazole, 20 mg, oral, Daily before breakfast  SITagliptin phosphate, 50 mg, oral, Daily    PRN medications: acetaminophen **OR** acetaminophen **OR** acetaminophen, benzocaine-menthol, bisacodyl, dextromethorphan-guaifenesin, guaiFENesin, hydrocodone-homatropine, hydrOXYzine HCL, ipratropium-albuteroL, ondansetron ODT **OR** ondansetron, phenoL, polyethylene glycol    Relevant Results  Results for orders placed or performed during the hospital encounter of 05/03/25 (from the past 24 hours)   CBC   Result Value Ref Range    WBC 9.4 4.4 - 11.3 x10*3/uL    nRBC 0.0 0.0 - 0.0 /100 WBCs    RBC 2.88 (L) 4.00 - 5.20 x10*6/uL    Hemoglobin 8.7 (L) 12.0 - 16.0 g/dL    Hematocrit 27.6 (L) 36.0 - 46.0 %    MCV 96 80 - 100 fL    MCH 30.2 26.0 - 34.0 pg    MCHC 31.5 (L) 32.0 - 36.0 g/dL    RDW 14.6 (H) 11.5 - 14.5 %    Platelets 123 (L) 150 - 450 x10*3/uL     *Note: Due to a large number of results and/or encounters for the requested time period, some results have not been displayed. A complete set of results can be found in Results Review.      CT angio chest for pulmonary embolism  Result Date: 5/3/2025  Interpreted By:  Pancho Barth, STUDY: CT ANGIO CHEST FOR PULMONARY EMBOLISM;  5/3/2025 5:41 pm   INDICATION: Signs/Symptoms:tachycardic, hypoxic, concern for PE vs pneumonia.   COMPARISON: 2023   ACCESSION NUMBER(S): [id number]   ORDERING CLINICIAN: VIVIANE RILEY   TECHNIQUE: Helical data acquisition of the chest was obtained after intravenous administration of 75 ML Omnipaque 350 as per PE protocol. Images were reformatted in coronal and sagittal planes. Axial and coronal maximum intensity projection (MIP) images were  created and reviewed.   FINDINGS: Ground-glass increasing left upper lobe nodule measures 1.7 cm on image 94 concerning for slow growing neoplasm   Other numerous bilateral pulmonary nodules appear similar in size. Reference right lower lobe nodule measuring a cm on image 163. Reference 8 mm right lower lobe nodule on image 184. Other numerous nodules detected remain stable   Mosaic attenuation in the lungs suggesting small airway or small vessel inflammation. No findings of acute pulmonary embolism. Coronary calcification no evidence of acute thoracic pathology.   Changes of old healed granulomas disease. Robust vascular calcification.   Nephroliths detected. Largest visualized stone measures 9 mm.   Demineralized bones  1. No evidence of acute pulmonary embolism 2. Senescent changes. Heart size is enlarged. Changes of old healed granulomas disease 3. Multiple stable pulmonary nodules. Enlarging left upper lobe ground-glass nodule now measuring under 2 cm concerning for slow growing neoplasm. 4. Bronchial thickening noted. Mild mosaic attenuation seen in the lungs. Query bronchial inflammation. Senescent changes.       XR chest 2 views  Result Date: 5/3/2025  FINDINGS: Stable heart and mediastinal contours. No pneumothorax or pleural fluid. No focal consolidation or alveolar edema. Mild atherosclerotic calcification of the aortic arch. Mild elevation of the right hemidiaphragm.  1.  No radiographic evidence of acute cardiopulmonary disease.           XR cervical spine 2-3 views  Result Date: 5/2/2025  FINDINGS:   Reversal normal cervical lordosis is seen.   Osteopenia is present. The prevertebral soft tissues are unremarkable.   Endplate sclerosis and spur formation is seen throughout the cervical spine. Narrowing of C3-4, C4-5 C5-6 and C6-7 disc space is seen. Uncovertebral joint hypertrophy seen.   No acute fracture or dislocation is seen.  Reversal normal cervical lordosis and degenerative changes.           Assessment & Plan    Acute hypoxic respiratory failure  Wean oxygen as sats allow  Continue nebulized bronchodilator  Continuous pulse oximetry  Incentive spirometry/pulmonary hygiene     Lung nodules  CT angio with multiple stable pulmonary nodules, enlarging left upper lobe ground-glass nodule now measuring under 2 cm concerning for slow growing neoplasm  CT scan chest wo IV contrast October '23 with numerous noncalcified pulmonary nodules bilaterally concerning for pulmonary metastatic disease, largest in the right lung measures approximately 9 mm, the largest in the left lung measures approximately 8 [id number] in 3 months is recommended  Patient seen by Dr. Plascencia for lung nodules when hospitalized here October '23 because patient was asymptomatic and given her age recommended deferring aggressive pursuit of biopsy at that time and outpatient follow-up imaging-patient does not recall getting any scans after this  PET-CT scan as outpatient    Acute bronchitis  Continue emperic IV levofloxacin  IV Solu-Medrol-will maintain current dose  Sputum if able  Follow-up cultures  FEV 1 when optimized   2 D echo pending     Prophylaxis  Subcutaneous heparin  Protonix    Whit Miguel APRN-CNP  Pulmonary & Critical Care Medicine

## 2025-05-05 NOTE — ASSESSMENT & PLAN NOTE
1 of 2 with gram + Cocci noted today   - Continue to monitor - may be contaminant vs bacteremia   Continue Levaquin at this time   Consult ID

## 2025-05-05 NOTE — CONSULTS
Vancomycin Dosing by Pharmacy- INITIAL    Fartun Carey is a 89 y.o. year old female who Pharmacy has been consulted for vancomycin dosing for other (positive blood cultures). Based on the patient's indication and renal status this patient will be dosed based on a goal AUC of 500-600.     Renal function is currently stable.    Visit Vitals  BP (!) 105/44 (BP Location: Left arm, Patient Position: Lying)   Pulse 53   Temp 36 °C (96.8 °F) (Temporal)   Resp 16        Lab Results   Component Value Date    CREATININE 1.28 (H) 2025    CREATININE 1.24 (H) 2025    CREATININE 1.54 (H) 2025    CREATININE 1.52 (H) 2024    CREATININE 1.41 (H) 2024        Patient weight is as follows:   Vitals:    25 1520   Weight: 72.6 kg (160 lb)       Cultures:  No results found for the encounter in last 14 days.        I/O last 3 completed shifts:  In: 250 (3.4 mL/kg) [IV Piggyback:250]  Out: 1150 (15.8 mL/kg) [Urine:1150 (0.4 mL/kg/hr)]  Weight: 72.6 kg   I/O during current shift:  No intake/output data recorded.    Temp (24hrs), Av.4 °C (97.6 °F), Min:36 °C (96.8 °F), Max:37 °C (98.6 °F)         Assessment/Plan     Patient will not be given a loading dose.  Will initiate vancomycin maintenance, 750 mg every 24 hours.    This dosing regimen is predicted by [employer] to result in the following pharmacokinetic parameters:  Loading dose: N/A  Regimen: 750 mg IV every 24 hours.  Start time: 16:00 on 2025  Exposure target: AUC24 (range)500-600 mg/L.hr   [id number]: 264 mg/L.hr  AUC24,ss: 513 mg/L.hr  Probability of AUC24 > 400: 75 %  Ctrough,ss: 17.4 mg/L  Probability of Ctrough,ss > 20: 37 %      Follow-up level will be ordered on  at 0500 unless clinically indicated sooner.  Will continue to monitor renal function daily while on vancomycin and order serum creatinine at least every 48 hours if not already ordered.  Follow for continued vancomycin needs, clinical response, and signs/symptoms of  toxicity.       HUGO MONTERROSO

## 2025-05-05 NOTE — PROGRESS NOTES
05/05/25 1342   [facility] Disability Status   Are you deaf or do you have serious difficulty hearing? N   Are you blind or do you have serious difficulty seeing, even when wearing glasses? N   Because of a physical, mental, or emotional condition, do you have serious difficulty concentrating, remembering, or making decisions? (5 years old or older) N   Do you have serious difficulty walking or climbing stairs? Y   Do you have serious difficulty dressing or bathing? N   Because of a physical, mental, or emotional condition, do you have serious difficulty doing errands alone such as visiting the doctor? N

## 2025-05-05 NOTE — PROGRESS NOTES
Physical Therapy                 Therapy Communication Note    Patient Name: Fartun Carey  MRN: [id number]  Department: [facility]  Room: [address] [place], [place] [address]  Today's Date: 5/5/2025     Discipline: Physical Therapy          Missed Visit Reason: Missed Visit Reason: Patient in a medical procedure (off floor for Echo per nurse)    Missed Time: Attempt    Comment:

## 2025-05-05 NOTE — CARE PLAN
The patient's goals for the shift include  monitor output     The clinical goals for the shift include monitor 02      Problem: Discharge Planning  Goal: Discharge to home or other facility with appropriate resources  Outcome: Progressing     Problem: Safety - Adult  Goal: Free from fall injury  Outcome: Progressing     Problem: Pain - Adult  Goal: Verbalizes/displays adequate comfort level or baseline comfort level  Outcome: Progressing

## 2025-05-05 NOTE — PROGRESS NOTES
05/05/25 1249   Discharge Planning   Living Arrangements Alone   Support Systems Children;Friends/neighbors   Assistance Needed generalized weakness and balance deficits impacting safety with transfers and ambulation secondary to SOB with STS and limited standing tolerance with need to return supine.   Type of Residence Private residence  ([facility] Level)   Number of Stairs to Enter Residence 0   Number of Stairs Within Residence 0   Do you have animals or pets at home? Yes   Type of Animals or Pets dog   Who is requesting discharge planning? Provider   Home or Post Acute Services In home services   Type of Home Care Services Home OT;Home PT;Home nursing visits   Expected Discharge Disposition Home H  (TBD)   Financial Resource Strain   How hard is it for you to pay for the very basics like food, housing, medical care, and heating? Not hard   Housing Stability   In the last 12 months, was there a time when you were not able to pay the mortgage or rent on time? N   In the past 12 months, how many times have you moved where you were living? 0   At any time in the past 12 months, were you homeless or living in a shelter (including now)? N   Transportation Needs   In the past 12 months, has lack of transportation kept you from medical appointments or from getting medications? no   In the past 12 months, has lack of transportation kept you from meetings, work, or from getting things needed for daily living? No   Patient Choice   Provider Choice list and CMS website (https://medicare.gov/care-compare#search) for post-acute Quality and Resource Measure Data were provided and reviewed with: Patient   Patient / Family choosing to utilize agency / facility established prior to hospitalization Yes   Intensity of Service   Intensity of Service 0-30 min     Fartun Carey is a 89 y.o. female seen in follow-up for acute hypoxic respiratory failure, lung nodules, acute bronchitis.  Patient presenting with generalized weakness  and balance deficits impacting safety with transfers and ambulation secondary to SOB with STS and limited standing tolerance with need to return supine.     Patient lives alone in a one level Condo. Has a supportive daughter. Patient uses a walker rolling.  Has a walk-in shower with built-in shower seat, Grab bars in shower  Independent with ADLs and functional transfers, Independent with homemaking with ambulation  Receives Help From: Family, Neighbor  ADL Assistance: Independent  Homemaking Assistance: Needs assistance  Ambulatory Assistance: Independent  Vocational: Retired  Prior Function Comments: able to manage medications. was driving.  Patient is a Full code  Per Pulmonary Consult: Continue emperic IV levofloxacin, IV Solu-Medrol. Follow-up cultures. 2 D echo pending.    Discharge Recommendations: Moderate intensity level of continued care   Patient refuses SNF, believes once she is feeling better, will regain her strength. Was agreeable/open to home health care. Was receiving outpatient therapy in [place].     PLAN: Home with [facility] (internal order)

## 2025-05-05 NOTE — PROGRESS NOTES
05/05/25 1343   Physical Activity   On average, how many days per week do you engage in moderate to strenuous exercise (like a brisk walk)? 0 days   On average, how many minutes do you engage in exercise at this level? 0 min   Financial Resource Strain   How hard is it for you to pay for the very basics like food, housing, medical care, and heating? Not hard   Housing Stability   In the last 12 months, was there a time when you were not able to pay the mortgage or rent on time? N   In the past 12 months, how many times have you moved where you were living? 0   At any time in the past 12 months, were you homeless or living in a shelter (including now)? N   Transportation Needs   In the past 12 months, has lack of transportation kept you from medical appointments or from getting medications? no   In the past 12 months, has lack of transportation kept you from meetings, work, or from getting things needed for daily living? No   Food Insecurity   Within the past 12 months, you worried that your food would run out before you got the money to buy more. Never true   Within the past 12 months, the food you bought just didn't last and you didn't have money to get more. Never true   Stress   Do you feel stress - tense, restless, nervous, or anxious, or unable to sleep at night because your mind is troubled all the time - these days? Not at all   Social Connections   In a typical week, how many times do you talk on the phone with family, friends, or neighbors? More than 3   How often do you get together with friends or relatives? More than 3   How often do you attend Latter-day or Yazidi services? Never   Do you belong to any clubs or organizations such as Latter-day groups, unions, fraternal or athletic groups, or school groups? No   How often do you attend meetings of the clubs or organizations you belong to? Never   Are you , , , , never , or living with a partner?    Intimate  Partner Violence   Within the last year, have you been afraid of your partner or ex-partner? No   Within the last year, have you been kicked, hit, slapped, or otherwise physically hurt by your partner or ex-partner? No   Within the last year, have you been raped or forced to have any kind of sexual activity by your partner or ex-partner? No   Alcohol Use   Q1: How often do you have a drink containing alcohol? Never   Q2: How many drinks containing alcohol do you have on a typical day when you are drinking? None   Q3: How often do you have six or more drinks on one occasion? Never   Utilities   In the past 12 months has the electric, gas, oil, or water company threatened to shut off services in your home? No   Health Literacy   How often do you need to have someone help you when you read instructions, pamphlets, or other written material from your doctor or pharmacy? Never   Follow-Ups   We make community resources available to all of our patients to assist with everyday needs. We may be able to connect you with those resources. Would you be interested? N

## 2025-05-05 NOTE — CONSULTS
Inpatient consult to Infectious Diseases  Consult performed by: KAYLENE Faith-CNP  Consult ordered by: KAYLENE Loco-CNP  Reason for consult: positive blood culture          Primary MD: Jose Alejandro Hamilton MD        History Of Present Illness  Fartun Carey is a 89 y.o. female with past medical history of diabetes mellitus, hypertension, lung nodules, chronic kidney disease.  She presented to the emergency room due to shortness of breath and cough.  She reports interval worsening of shortness of breath over the past few days.  She was found to be hypoxic and tachypneic on arrival saturating 89, placed on supplemental oxygen.  Patient was seen and examined.  She is resting comfortably in bed.  She reports she is feeling better than when she arrived.  She reports nonproductive cough.  She is afebrile, denies chills or sweats.  She denies nausea vomiting or diarrhea.  Labs with with no leukocytosis.  Thrombocytopenia.  Chronic kidney disease. Covid/influenza PCR negative.   Positive blood culture-1 out of 2 gram-positive cocci in clusters.  CT angio of chest shows no acute pulmonary embolism.  Multiple pulmonary nodules.  Bronchial thickening.  She was evaluated by pulmonary-note reviewed.  She has allergy reported to amoxicillin-unknown,keflex-rash,  intolerance to Augmentin-chart reviewed tolerated amoxicillin on 4/2025  She is on Levaquin.     Past Medical History  She has a past medical history of Diabetes (Multi), Diabetes mellitus (Multi), Disease of thyroid gland, Hypertension, and Renal disease.    Surgical History  She has a past surgical history that includes Hysterectomy; Back surgery; and Appendectomy.     Social History     Occupational History    Not on file   Tobacco Use    Smoking status: Never    Smokeless tobacco: Never   Vaping Use    Vaping status: Never Used   Substance and Sexual Activity    Alcohol use: Never    Drug use: Never    Sexual activity: Defer     Travel History    Travel since 04/05/25    No documented travel since 04/05/25                Family History  Family History[1]  Allergies  Amoxicillin, Amoxicillin-pot clavulanate, Glimepiride, Lisinopril, Metformin hcl, Tetracycline hcl, and Cephalexin     Immunization History   Administered Date(s) Administered    Flu vaccine, quadrivalent, high-dose, preservative free, age 65y+ (FLUZONE) 10/14/2023    Flu vaccine, trivalent, preservative free, HIGH-DOSE, age 65y+ (Fluzone) 12/10/2015, 10/31/2016, 11/01/2017, 10/23/2018, 09/10/2019, 10/15/2020    Influenza, injectable, quadrivalent 10/07/2020, 10/18/2021, 10/06/2022    Influenza, seasonal, injectable 10/01/2009, 10/13/2010, 09/21/2011, 09/27/2012, 10/10/2013, 10/07/2014    MMR vaccine, subcutaneous (MMR II) 09/10/2019    Moderna COVID-19 vaccine, 12 years and older (50mcg/0.5mL)(Spikevax) 11/03/2023    Moderna SARS-CoV-2 Vaccination 01/20/2021, 02/17/2021, 10/25/2021, 04/27/2022    Novel influenza-H1N1-09, preservative-free 12/13/2009    Pfizer COVID-19 vaccine, 12 years and older, (30mcg/0.3mL) (Comirnaty) 09/27/2024    Pfizer COVID-19 vaccine, bivalent, age 12 years and older (30 mcg/0.3 mL) 10/06/2022    Pneumococcal conjugate vaccine, 13-valent (PREVNAR 13) 02/06/2014    Pneumococcal conjugate vaccine, 20-valent (PREVNAR 20) 08/01/2024    Pneumococcal polysaccharide vaccine, 23-valent, age 2 years and older (PNEUMOVAX 23) 10/31/2016    Tdap vaccine, age 7 year and older (BOOSTRIX, ADACEL) 08/14/2017    Zoster, live 12/09/2009     Medications  Home medications:  Prescriptions Prior to Admission[2]  Current medications:  Scheduled medications  Scheduled Medications[3]  Continuous medications  Continuous Medications[4]  PRN medications  PRN Medications[5]    Review of Systems   Constitutional:  Positive for fatigue.   HENT: Negative.     Eyes: Negative.    Respiratory:  Positive for cough and shortness of breath.    Cardiovascular: Negative.    Gastrointestinal: Negative.     Endocrine: Negative.    Genitourinary: Negative.    Musculoskeletal: Negative.    Skin: Negative.    Neurological: Negative.    Psychiatric/Behavioral: Negative.          Objective  Range of Vitals (last 24 hours)  Heart Rate:  []   Temp:  [36.3 °C (97.3 °F)-37 °C (98.6 °F)]   Resp:  [16-18]   BP: (118-140)/(47-79)   SpO2:  [95 %-98 %]   Daily Weight  05/03/25 : 72.6 kg (160 lb)    Body mass index is 30.23 kg/m².     Physical Exam  Constitutional:       Appearance: Normal appearance.   HENT:      Head: Normocephalic and atraumatic.      Nose: Nose normal.      Mouth/Throat:      Mouth: Mucous membranes are moist.      Pharynx: Oropharynx is clear.   Eyes:      General: No scleral icterus.  Cardiovascular:      Rate and Rhythm: Normal rate and regular rhythm.   Pulmonary:      Effort: Pulmonary effort is normal.      Breath sounds: Wheezing and rhonchi present.   Abdominal:      General: Bowel sounds are normal.      Palpations: Abdomen is soft.   Musculoskeletal:         General: Normal range of motion.      Cervical back: Normal range of motion and neck supple.   Skin:     General: Skin is warm and dry.   Neurological:      Mental Status: She is alert.      Comments: Awake, alert    Psychiatric:         Mood and Affect: Mood normal.         Behavior: Behavior normal.          Relevant Results  Outside Hospital Results  No  Labs  Results from last 72 hours   Lab Units 05/05/25  0403 05/04/25  0520 05/03/25  1527   WBC AUTO x10*3/uL 9.4 7.1 5.3   HEMOGLOBIN g/dL 8.7* 9.6* 10.7*   HEMATOCRIT % 27.6* 33.3* 33.5*   PLATELETS AUTO x10*3/uL 123* 137* 144*   NEUTROS PCT AUTO %  --   --  57.8   LYMPHS PCT AUTO %  --   --  22.3   MONOS PCT AUTO %  --   --  18.9   EOS PCT AUTO %  --   --  0.4     Results from last 72 hours   Lab Units 05/04/25  0520 05/03/25  1527   SODIUM mmol/L 138 139   POTASSIUM mmol/L 3.9 3.7   CHLORIDE mmol/L 102 102   CO2 mmol/L 26 26   BUN mg/dL 25* 25*   CREATININE mg/dL 1.28* 1.24*   GLUCOSE  "mg/dL 176* 144*   CALCIUM mg/dL 8.4* 9.0   ANION GAP mmol/L 14 15   EGFR mL/min/1.73m*2 38* 40*     Results from last 72 hours   Lab Units 05/03/25  1527   ALK PHOS U/L 130   BILIRUBIN TOTAL mg/dL 0.4   PROTEIN TOTAL g/dL 6.6   ALT U/L 16   AST U/L 22   ALBUMIN g/dL 3.9     Estimated Creatinine Clearance: 23.4 mL/min (A) (by C-G formula based on SCr of 1.28 mg/dL (H)).  CRP   Date Value Ref Range Status   08/18/2022 0.6 0 - 2.0 MG/DL Final     Comment:     Performed at [address] [address]     Sedimentation Rate   Date Value Ref Range Status   08/18/2022 40 (H) 0 - 30 MM/HR Final     Comment:     Performed at [address] [address]   06/16/2022 19 0 - 30 MM/HR Final     Comment:     Performed at [address] [address]   12/07/2021 36 (H) 0 - 20 MM/HR Final     Comment:     Performed at [facility] [address] [facility] [address]     No results found for: \"HIV1X2\", \"HIVCONF\", \"IELFJR3IM\"  No results found for: \"HEPCABINIT\", \"HEPCAB\", \"HCVPCRQUANT\"  Microbiology    Positive blood culture-1 out of 2 gram-positive cocci in clusters.      Imaging    CT angio chest for pulmonary embolism  Result Date: 5/3/2025  Interpreted By:  Pancho Barth, STUDY: CT ANGIO CHEST FOR PULMONARY EMBOLISM;  5/3/2025 5:41 pm   INDICATION: Signs/Symptoms:tachycardic, hypoxic, concern for PE vs pneumonia.   COMPARISON: 2023   ACCESSION NUMBER(S): [id number]   ORDERING CLINICIAN: VIVIANE RILEY   TECHNIQUE: Helical data acquisition of the chest was obtained after intravenous administration of 75 ML Omnipaque 350 as per PE protocol. Images were reformatted in coronal and sagittal planes. Axial and coronal maximum intensity projection (MIP) images were created and reviewed.   FINDINGS: Ground-glass increasing left upper lobe nodule measures 1.7 cm on image 94 concerning for slow growing neoplasm   Other numerous bilateral pulmonary nodules appear similar in " size. Reference right lower lobe nodule measuring a cm on image 163. Reference 8 mm right lower lobe nodule on image 184. Other numerous nodules detected remain stable   Mosaic attenuation in the lungs suggesting small airway or small vessel inflammation. No findings of acute pulmonary embolism. Coronary calcification no evidence of acute thoracic pathology.   Changes of old healed granulomas disease. Robust vascular calcification.   Nephroliths detected. Largest visualized stone measures 9 mm.   Demineralized bones       1. No evidence of acute pulmonary embolism 2. Senescent changes. Heart size is enlarged. Changes of old healed granulomas disease 3. Multiple stable pulmonary nodules. Enlarging left upper lobe ground-glass nodule now measuring under 2 cm concerning for slow growing neoplasm. 4. Bronchial thickening noted. Mild mosaic attenuation seen in the lungs. Query bronchial inflammation. Senescent changes.   MACRO: None.   Signed by: Pancho Barth 5/3/2025 6:19 PM Dictation workstation:   [id number]    XR chest 2 views  Result Date: 5/3/2025  Interpreted By:  Amilcar Galdamez, STUDY: XR CHEST 2 VIEWS;  5/3/2025 3:34 pm   INDICATION: Signs/Symptoms:shortness of breath.     COMPARISON: 02/21/2024   ACCESSION NUMBER(S): [id number]   ORDERING CLINICIAN: VIVIANE RILEY   FINDINGS: Stable heart and mediastinal contours. No pneumothorax or pleural fluid. No focal consolidation or alveolar edema. Mild atherosclerotic calcification of the aortic arch. Mild elevation of the right hemidiaphragm.       1.  No radiographic evidence of acute cardiopulmonary disease.       MACRO: None   Signed by: Amilcar Galdamez 5/3/2025 4:37 PM Dictation workstation:   [id number]    XR cervical spine 2-3 views  Result Date: 5/2/2025  Interpreted By:  Anais Roa, STUDY: XR CERVICAL SPINE 2-3 VIEWS; ;  5/1/2025 12:10 pm   INDICATION: Signs/Symptoms:acute on chronic  neck pain with bilateral hand numbness.   ,M54.2 Cervicalgia,R29.898  Other symptoms and signs involving the musculoskeletal system   COMPARISON: None.   ACCESSION NUMBER(S): [id number]   ORDERING CLINICIAN: ADALI LEMUS   FINDINGS:   Reversal normal cervical lordosis is seen.   Osteopenia is present. The prevertebral soft tissues are unremarkable.   Endplate sclerosis and spur formation is seen throughout the cervical spine. Narrowing of C3-4, C4-5 C5-6 and C6-7 disc space is seen. Uncovertebral joint hypertrophy seen.   No acute fracture or dislocation is seen.       Reversal normal cervical lordosis and degenerative changes.     MACRO: None   Signed by: Anais Roa 5/2/2025 4:20 PM Dictation workstation:   [id number]       Assessment/Plan   Acute hypoxic respiratory failure, on low flow oxygen   Pulmonary nodules  CT showed bronchial thickening-bronchitis  Positive blood culture gram-positive cocci in clusters 1 out of 2-contaminant verses blood infection  Has reported intolerances to amoxicillin, Augmentin-chart reviewed tolerated follow-up amoxicillin on 4/20/2025- allergy to Keflex  Chronic kidney disease    Levaquin  IV vancomycin  Methylprednisolone per pulmonary   Monitor renal function  Follow up blood culture  Repeat blood culture   Legionella urine antigen  Mycoplasma IgM  Further recommendation based on work up  Supplemental oxygen , wean as tolerated   Supportive care   Pulmonary follow up    Total time spent caring for the patient today was 35 minutes. This includes time spent before the visit reviewing the chart, time spent during the visit, and time spent after the visit on documentation       KAYLENE Faith-CNP       [1]   Family History  Problem Relation Name Age of Onset    Heart disease Mother      Heart disease Father      Heart disease Brother      No Known Problems Son      Heart disease Maternal Grandmother      Heart disease Maternal Grandfather      Heart disease Paternal Grandmother      Heart disease Paternal Grandfather     [2]    Medications Prior to Admission   Medication Sig Dispense Refill Last Dose/Taking    aspirin 81 mg chewable tablet Chew 1 tablet (81 mg) once daily.       atorvastatin (Lipitor) 80 mg tablet Take 1 tablet (80 mg) by mouth once daily.       blood sugar diagnostic (Accu-Chek Guide test strips) strip Inject 1 strip under the skin early in the morning.. 100 strip 3     cholecalciferol (Vitamin D-3) 25 MCG (1000 UT) capsule Take 1 capsule (25 mcg) by mouth once daily.       cloNIDine (Catapres) 0.2 mg tablet Take 1 tablet (0.2 mg) by mouth 2 times a day.       cyanocobalamin (Vitamin B-12) 1,000 mcg tablet Take 1 tablet (1,000 mcg) by mouth once daily.       esomeprazole (NexIUM) 20 mg DR capsule Take 1 capsule (20 mg) by mouth once daily.       furosemide (Lasix) 20 mg tablet Take 1 tablet (20 mg) by mouth once daily.       hydrALAZINE (Apresoline) 25 mg tablet Take 1 tablet (25 mg) by mouth 3 times a day. 270 tablet 3     levothyroxine (Synthroid, Levoxyl) 88 mcg tablet Take 1 tablet (88 mcg) by mouth once daily. 90 tablet 2     losartan (Cozaar) 100 mg tablet Take 1 tablet (100 mg) by mouth once daily.       olmesartan (BENIcar) 40 mg tablet Take 0.5 tablets (20 mg) by mouth 2 times a day.       SITagliptin phosphate (Januvia) 50 mg tablet Take 1 tablet (50 mg) by mouth once daily. 90 tablet 2     vit C/E/Zn/coppr/lutein/zeaxan (PRESERVISION AREDS-2 ORAL) every 12 hours.      [3] aspirin, 81 mg, oral, Daily  atorvastatin, 80 mg, oral, Nightly  cloNIDine, 0.2 mg, oral, BID  furosemide, 20 mg, oral, Daily  guaiFENesin, 600 mg, oral, BID  hydrALAZINE, 25 mg, oral, TID  ipratropium-albuteroL, 3 mL, nebulization, TID  levoFLOXacin, 500 mg, intravenous, q24h  levothyroxine, 88 mcg, oral, Daily  losartan, 100 mg, oral, Daily  methylPREDNISolone sodium succinate (PF), 40 mg, intravenous, q12h  pantoprazole, 20 mg, oral, Daily before breakfast  SITagliptin phosphate, 50 mg, oral, Daily    [4]    [5] PRN medications:  acetaminophen **OR** acetaminophen **OR** acetaminophen, benzocaine-menthol, bisacodyl, dextromethorphan-guaifenesin, hydrocodone-homatropine, hydrOXYzine HCL, ipratropium-albuteroL, ondansetron ODT **OR** ondansetron, phenoL, polyethylene glycol

## 2025-05-05 NOTE — PROGRESS NOTES
Occupational Therapy    Evaluation    Patient Name: Fartun Carey  MRN: [id number]  Department: [facility]  Room: [facility]309A  Today's Date: 5/5/2025  Time Calculation  Start Time: 0820  Stop Time: 0833  Time Calculation (min): 13 min    Assessment  IP OT Assessment  OT Assessment: Patient is a 89 year old female admitted with bronchitis, HLD, HTN, CKD stage 3, and acute respiratory failure with hypoxia. Patient is presenting below baseline level with a decline in strength, balance, activity tolerance, and function, resulting in an increased need for assist with ADL/IADL tasks. Recommend skilled OT to maximize patient's safety and independence with daily tasks.  Prognosis: Good  Barriers to Discharge Home: Caregiver assistance, Physical needs  Caregiver Assistance: Patient lives alone and/or does not have reliable caregiver assistance  Physical Needs: 24hr mobility assistance needed, 24hr ADL assistance needed  Evaluation/Treatment Tolerance: Patient limited by fatigue  End of Session Communication: Bedside nurse  End of Session Patient Position: Up in chair, Alarm on  Plan:  Treatment Interventions: ADL retraining, Functional transfer training, UE strengthening/ROM, Endurance training, Patient/family training, Equipment evaluation/education, Compensatory technique education, Neuromuscular reeducation  OT Frequency: 3 times per week  OT Discharge Recommendations: Moderate intensity level of continued care  Equipment Recommended upon Discharge: Wheeled walker  OT Recommended Transfer Status: Assistive equipment (Comment), Assist of 1  OT - OK to Discharge: Yes    Subjective   Current Problem:  1. Bronchitis        2. Acute cough        3. Shortness of breath        4. Acute hypoxemic respiratory failure  Transthoracic Echo (TTE) Limited    Transthoracic Echo (TTE) Limited        General:  General  Reason for Referral: decline in ADLs bronchitis  Referred By: Dr. Edward  Past Medical History Relevant to Rehab: DM type 2,  OA, pulmonary nodule, hyponatremia, PVD  Family/Caregiver Present: No  Prior to Session Communication: Bedside nurse  Patient Position Received: Bed, 3 rail up, Alarm on  Preferred Learning Style: verbal, visual  General Comment: Patient is a 89 year old female who presented to the ED with c/o flulike symptoms. Patient admitted with bronchitis, HLD, HTN, CKD stage 3, acute respiratory failure with hypoxia. Patient is cleared by nursing for therapy. Patient in bed upon arrival and agreeable to participate. +2L O2 via NC  Precautions:  Hearing/Visual Limitations: + glasses, bilateral hearing aides  Medical Precautions: Fall precautions, Oxygen therapy device and L/min (2L O2 via NC)    Vital Signs Comment: HR 73 at EOB, O2 99% on 2L    Pain:  Pain Assessment  Pain Assessment: 0-10  0-10 (Numeric) Pain Score: 0 - No pain    Objective   Cognition:  Overall Cognitive Status: Within Functional Limits  Cognition Comments: pleasant and cooperative. able to follow commands throughout  Processing Speed: Delayed     Home Living:  Type of Home: Condo  Lives With: Alone  Home Adaptive Equipment: Walker rolling or standard  Home Layout: One level  Home Access: Level entry  Bathroom Shower/Tub: Walk-in shower  Bathroom Toilet: Handicapped height  Bathroom Equipment: Built-in shower seat, Grab bars in shower  Home Living Comments: patient denies fall hx. independent with ADLs/IADL at baseline   Prior Function:  Level of Gentry: Independent with ADLs and functional transfers, Independent with homemaking with ambulation  Receives Help From: Family, Neighbor  ADL Assistance: Independent  Homemaking Assistance: Independent  Ambulatory Assistance: Independent (recent use of rollator)  Vocational: Retired  Leisure: enjoys baking cookies  IADL History:  Homemaking Responsibilities: Yes  IADL Comments: + drives  ADL:  Eating Assistance: Stand by  Eating Deficit: Setup  Grooming Assistance: Minimal  Grooming Deficit: Steadying,  Supervision/safety, Increased time to complete, Standing with assistive device (per clinical judgement)  Bathing Assistance: Moderate  Bathing Deficit: Steadying, Supervision/safety, Increased time to complete , Perineal area, Buttocks, Right lower leg including foot, Left lower leg including foot (per clinical judgement)  UE Dressing Assistance: Minimal  UE Dressing Deficit: Pull over head, Pull around back, Pull down in back  LE Dressing Assistance: Moderate  LE Dressing Deficit: Steadying, Requires assistive device for steadying, Increased time to complete, Thread RLE into pants, Thread LLE into pants, Pull up over hips (per clinical judgement)  Toileting Assistance with Device: Moderate  Toileting Deficit: Steadying, Clothing management up, Clothing management down, Perineal hygiene (per clinical judgement)  Activity Tolerance:  Endurance: Decreased tolerance for upright activites  Activity Tolerance Comments: fatigues easily. on 2L O2  Bed Mobility/Transfers: Bed Mobility  Bed Mobility: Yes  Bed Mobility 1  Bed Mobility 1: Supine to sitting  Level of Assistance 1: Contact guard  Bed Mobility Comments 1: head of bed elevated, effortful    Transfers  Transfer: Yes  Transfer 1  Transfer From 1: Bed to  Transfer to 1: Stand  Technique 1: Sit to stand  Transfer Device 1: Walker  Transfer Level of Assistance 1: Minimum assistance  Trials/Comments 1: cues for proper hand placement. patient then turns with Min A and 2WW to be seated in the chair, cues to reach back prior to descent    Functional Mobility:  Functional Mobility  Functional Mobility Performed: No  Sitting Balance:  Static Sitting Balance  Static Sitting-Balance Support: Feet supported, Bilateral upper extremity supported  Static Sitting-Level of Assistance: Close supervision  Standing Balance:  Static Standing Balance  Static Standing-Balance Support: Bilateral upper extremity supported  Static Standing-Level of Assistance: Minimum assistance    IADL's:    Homemaking Responsibilities: Yes  IADL Comments: + drives  Vision: Vision - Basic Assessment  Current Vision: Wears glasses all the time  Sensation:  Sensation Comment: patient reports numbness/tingling in her hands  Strength:  Strength Comments: B UE 3/5 grossly  Coordination:  Movements are Fluid and Coordinated: No  Coordination Comment: slow, weak, effortful   Hand Function:  Hand Function  Gross Grasp: Functional  Coordination: Functional  Extremities: RUE   RUE : Exceptions to WFL (generalized weakness) and LUE   LUE: Exceptions to WFL (generalized weakness)    Outcome Measures: [facility] Daily Activity  Putting on and taking off regular lower body clothing: A lot  Bathing (including washing, rinsing, drying): A lot  Putting on and taking off regular upper body clothing: A lot  Toileting, which includes using toilet, bedpan or urinal: A lot  Taking care of personal grooming such as brushing teeth: A little  Eating Meals: A little  Daily Activity - Total Score: 14    Education Documentation  Body Mechanics, taught by Lennie Jackson OT at 5/5/2025  8:56 AM.  Learner: Patient  Readiness: Acceptance  Method: Explanation, Demonstration  Response: Needs Reinforcement  Comment: Education on OT POC. education on safe functional transfers and OOB activity participation    Precautions, taught by Lennie Jackson OT at 5/5/2025  8:56 AM.  Learner: Patient  Readiness: Acceptance  Method: Explanation, Demonstration  Response: Needs Reinforcement  Comment: Education on OT POC. education on safe functional transfers and OOB activity participation    ADL Training, taught by Lennie Jackson OT at 5/5/2025  8:56 AM.  Learner: Patient  Readiness: Acceptance  Method: Explanation, Demonstration  Response: Needs Reinforcement  Comment: Education on OT POC. education on safe functional transfers and OOB activity participation    Education Comments  No comments found.      Goals:   Encounter Problems       Encounter Problems (Active)        OT Goals       ADLs (Progressing)       Start:  05/05/25    Expected End:  05/26/25       Patient will complete ADL tasks, with modified independence, using AE need in order to increase patient's safety and independence with self-care tasks.          Functional Transfers (Progressing)       Start:  05/05/25    Expected End:  05/26/25       Patient will complete functional transfers with modified independence, using least restrictive device, in order to increase patient's safety and independence with daily tasks.          Functional Mobility  (Progressing)       Start:  05/05/25    Expected End:  05/26/25       Patient will demonstrate the ability to complete item retrieval and functional mobility with modified independence, in order to increase safety and independence with daily tasks.          Activity Tolerance  (Progressing)       Start:  05/05/25    Expected End:  05/26/25       Patient will demonstrate the ability to participate in functional activity at least >/= 25 minutes in order to increase patient's safety and independence with daily tasks.

## 2025-05-05 NOTE — ASSESSMENT & PLAN NOTE
May be related to her bronchitis  Wean patient from O2 as tolerated  Last echo showed LVEF 60%   Await limited echo today

## 2025-05-05 NOTE — TELEPHONE ENCOUNTER
----- Message from Jose Alejandro Hamilton sent at 5/4/2025  2:19 PM EDT -----    ----- Message -----  From: Interface, Radiology Results In  Sent: 5/2/2025   4:22 PM EDT  To: Jose Alejandro Hamilton MD

## 2025-05-05 NOTE — ASSESSMENT & PLAN NOTE
No history of COPD, asthma or tobacco use..    Imaging shows no acute finding  Non-productive cough   Flu A and B- coronavirus negative  Start on Solu-Medrol and levofloxacin  DuoNeb  Cough suppressant, Mucinex   Pulmonology following - appreciate recs

## 2025-05-05 NOTE — PROGRESS NOTES
"Fartun Carey is a 89 y.o. female on day 2 of admission presenting with Bronchitis.    Subjective   Patient resting in bed. States she doesn't feel well today. Mostly feels her breathing is at least as bad, if not worse, than when she came in. Denies chest pain, abdominal  pain, fevers, chills.        Objective     Physical Exam  Constitutional:       Appearance: She is ill-appearing.   HENT:      Head: Normocephalic and atraumatic.      Mouth/Throat:      Mouth: Mucous membranes are moist.      Pharynx: Oropharynx is clear.   Eyes:      Extraocular Movements: Extraocular movements intact.      Conjunctiva/sclera: Conjunctivae normal.      Pupils: Pupils are equal, round, and reactive to light.   Cardiovascular:      Rate and Rhythm: Normal rate and regular rhythm.      Pulses: Normal pulses.      Heart sounds: Normal heart sounds.   Pulmonary:      Effort: Pulmonary effort is normal.      Breath sounds: Wheezing and rhonchi present.   Abdominal:      General: Bowel sounds are normal.      Palpations: Abdomen is soft.      Tenderness: There is no abdominal tenderness.   Musculoskeletal:         General: Normal range of motion.      Cervical back: Normal range of motion and neck supple.   Skin:     General: Skin is warm and dry.      Capillary Refill: Capillary refill takes less than 2 seconds.   Neurological:      General: No focal deficit present.      Mental Status: She is alert and oriented to person, place, and time.   Psychiatric:         Mood and Affect: Mood normal.         Behavior: Behavior normal.         Last Recorded Vitals  Blood pressure (!) 137/48, pulse 61, temperature 36.3 °C (97.3 °F), temperature source Oral, resp. rate 18, height 1.549 m (5' 1\"), weight 72.6 kg (160 lb), SpO2 98%.  Intake/Output last 3 Shifts:  I/O last 3 completed shifts:  In: 250 (3.4 mL/kg) [IV Piggyback:250]  Out: 1150 (15.8 mL/kg) [Urine:1150 (0.4 mL/kg/hr)]  Weight: 72.6 kg     Relevant Results  Results for orders placed or " performed during the hospital encounter of 05/03/25 (from the past 24 hours)   CBC   Result Value Ref Range    WBC 9.4 4.4 - 11.3 x10*3/uL    nRBC 0.0 0.0 - 0.0 /100 WBCs    RBC 2.88 (L) 4.00 - 5.20 x10*6/uL    Hemoglobin 8.7 (L) 12.0 - 16.0 g/dL    Hematocrit 27.6 (L) 36.0 - 46.0 %    MCV 96 80 - 100 fL    MCH 30.2 26.0 - 34.0 pg    MCHC 31.5 (L) 32.0 - 36.0 g/dL    RDW 14.6 (H) 11.5 - 14.5 %    Platelets 123 (L) 150 - 450 x10*3/uL   Transthoracic Echo (TTE) Limited   Result Value Ref Range    BSA 1.77 m2     *Note: Due to a large number of results and/or encounters for the requested time period, some results have not been displayed. A complete set of results can be found in Results Review.     ECG 12 lead  Result Date: 5/5/2025   Poor data quality, interpretation may be adversely affected Sinus tachycardia with Premature ventricular complexes or Fusion complexes Left axis deviation Pulmonary disease pattern Abnormal ECG Confirmed by Pancho Mondragon ([id number]) on 5/5/2025 8:28:35 AM    CT angio chest for pulmonary embolism  Result Date: 5/3/2025  Interpreted By:  Pancho Barth, STUDY: CT ANGIO CHEST FOR PULMONARY EMBOLISM;  5/3/2025 5:41 pm   INDICATION: Signs/Symptoms:tachycardic, hypoxic, concern for PE vs pneumonia.   COMPARISON: 2023   ACCESSION NUMBER(S): [id number]   ORDERING CLINICIAN: VIVIANE RILEY   TECHNIQUE: Helical data acquisition of the chest was obtained after intravenous administration of 75 ML Omnipaque 350 as per PE protocol. Images were reformatted in coronal and sagittal planes. Axial and coronal maximum intensity projection (MIP) images were created and reviewed.   FINDINGS: Ground-glass increasing left upper lobe nodule measures 1.7 cm on image 94 concerning for slow growing neoplasm   Other numerous bilateral pulmonary nodules appear similar in size. Reference right lower lobe nodule measuring a cm on image 163. Reference 8 mm right lower lobe nodule on image 184. Other numerous nodules  detected remain stable   Mosaic attenuation in the lungs suggesting small airway or small vessel inflammation. No findings of acute pulmonary embolism. Coronary calcification no evidence of acute thoracic pathology.   Changes of old healed granulomas disease. Robust vascular calcification.   Nephroliths detected. Largest visualized stone measures 9 mm.   Demineralized bones       1. No evidence of acute pulmonary embolism 2. Senescent changes. Heart size is enlarged. Changes of old healed granulomas disease 3. Multiple stable pulmonary nodules. Enlarging left upper lobe ground-glass nodule now measuring under 2 cm concerning for slow growing neoplasm. 4. Bronchial thickening noted. Mild mosaic attenuation seen in the lungs. Query bronchial inflammation. Senescent changes.   MACRO: None.   Signed by: Pancho Barth 5/3/2025 6:19 PM Dictation workstation:   [id number]    XR chest 2 views  Result Date: 5/3/2025  Interpreted By:  Amilcar Galdamez, STUDY: XR CHEST 2 VIEWS;  5/3/2025 3:34 pm   INDICATION: Signs/Symptoms:shortness of breath.     COMPARISON: 02/21/2024   ACCESSION NUMBER(S): [id number]   ORDERING CLINICIAN: VIVIANE RILEY   FINDINGS: Stable heart and mediastinal contours. No pneumothorax or pleural fluid. No focal consolidation or alveolar edema. Mild atherosclerotic calcification of the aortic arch. Mild elevation of the right hemidiaphragm.       1.  No radiographic evidence of acute cardiopulmonary disease.       MACRO: None   Signed by: Amilcar Galdamez 5/3/2025 4:37 PM Dictation workstation:   [id number]    XR cervical spine 2-3 views  Result Date: 5/2/2025  Interpreted By:  Anais Roa, STUDY: XR CERVICAL SPINE 2-3 VIEWS; ;  5/1/2025 12:10 pm   INDICATION: Signs/Symptoms:acute on chronic  neck pain with bilateral hand numbness.   ,M54.2 Cervicalgia,R29.898 Other symptoms and signs involving the musculoskeletal system   COMPARISON: None.   ACCESSION NUMBER(S): [id number]   ORDERING CLINICIAN: ADALI  ZNIDARSIC   FINDINGS:   Reversal normal cervical lordosis is seen.   Osteopenia is present. The prevertebral soft tissues are unremarkable.   Endplate sclerosis and spur formation is seen throughout the cervical spine. Narrowing of C3-4, C4-5 C5-6 and C6-7 disc space is seen. Uncovertebral joint hypertrophy seen.   No acute fracture or dislocation is seen.       Reversal normal cervical lordosis and degenerative changes.     MACRO: None   Signed by: Anais Roa 5/2/2025 4:20 PM Dictation workstation:   [id number]      Assessment & Plan  Bronchitis  No history of COPD, asthma or tobacco use..    Imaging shows no acute finding  Non-productive cough   Flu A and B- coronavirus negative  Start on Solu-Medrol and levofloxacin  DuoNeb  Cough suppressant, Mucinex   Pulmonology following - appreciate recs   Acute respiratory failure with hypoxia  May be related to her bronchitis  Wean patient from O2 as tolerated  Last echo showed LVEF 60%   Await limited echo today   Hypertension  Continue home medications   Vitals as ordered   Check AM labs   Hyperlipidemia  Continue home statin   Stage 3b chronic kidney disease (Multi)  Creatinine and GFR at the baseline  Monitor kidney function  Blood bacterial culture positive  1 of 2 with gram + Cocci noted today   - Continue to monitor - may be contaminant vs bacteremia   Continue Levaquin at this time   Consult ID     DVT prophylaxis   SCDs     Lidya Washington, KAYLENE-CNP

## 2025-05-06 ENCOUNTER — APPOINTMENT (OUTPATIENT)
Dept: NEUROLOGY | Facility: CLINIC | Age: OVER 89
End: 2025-05-06
Payer: MEDICARE

## 2025-05-06 ENCOUNTER — APPOINTMENT (OUTPATIENT)
Dept: PODIATRY | Facility: CLINIC | Age: OVER 89
End: 2025-05-06
Payer: MEDICARE

## 2025-05-06 ENCOUNTER — APPOINTMENT (OUTPATIENT)
Dept: RADIOLOGY | Facility: HOSPITAL | Age: OVER 89
DRG: 189 | End: 2025-05-06
Payer: MEDICARE

## 2025-05-06 LAB
ANION GAP SERPL CALCULATED.3IONS-SCNC: 16 MMOL/L (ref 10–20)
BUN SERPL-MCNC: 56 MG/DL (ref 6–23)
CALCIUM SERPL-MCNC: 7.6 MG/DL (ref 8.6–10.3)
CHLORIDE SERPL-SCNC: 97 MMOL/L (ref 98–107)
CK SERPL-CCNC: 149 U/L (ref 0–215)
CO2 SERPL-SCNC: 22 MMOL/L (ref 21–32)
CREAT SERPL-MCNC: 1.99 MG/DL (ref 0.5–1.05)
EGFRCR SERPLBLD CKD-EPI 2021: 23 ML/MIN/1.73M*2
ERYTHROCYTE [DISTWIDTH] IN BLOOD BY AUTOMATED COUNT: 14.7 % (ref 11.5–14.5)
GLUCOSE SERPL-MCNC: 202 MG/DL (ref 74–99)
HCT VFR BLD AUTO: 27.9 % (ref 36–46)
HGB BLD-MCNC: 8.7 G/DL (ref 12–16)
LEGIONELLA AG UR QL: NEGATIVE
MAGNESIUM SERPL-MCNC: 1.68 MG/DL (ref 1.6–2.4)
MCH RBC QN AUTO: 30 PG (ref 26–34)
MCHC RBC AUTO-ENTMCNC: 31.2 G/DL (ref 32–36)
MCV RBC AUTO: 96 FL (ref 80–100)
NRBC BLD-RTO: 0 /100 WBCS (ref 0–0)
PLATELET # BLD AUTO: 136 X10*3/UL (ref 150–450)
POTASSIUM SERPL-SCNC: 4.5 MMOL/L (ref 3.5–5.3)
RBC # BLD AUTO: 2.9 X10*6/UL (ref 4–5.2)
SODIUM SERPL-SCNC: 130 MMOL/L (ref 136–145)
VANCOMYCIN SERPL-MCNC: 8.9 UG/ML (ref 5–20)
WBC # BLD AUTO: 10 X10*3/UL (ref 4.4–11.3)

## 2025-05-06 PROCEDURE — 80048 BASIC METABOLIC PNL TOTAL CA: CPT | Performed by: NURSE PRACTITIONER

## 2025-05-06 PROCEDURE — 97535 SELF CARE MNGMENT TRAINING: CPT | Mod: GO

## 2025-05-06 PROCEDURE — 2500000001 HC RX 250 WO HCPCS SELF ADMINISTERED DRUGS (ALT 637 FOR MEDICARE OP): Performed by: NURSE PRACTITIONER

## 2025-05-06 PROCEDURE — 99232 SBSQ HOSP IP/OBS MODERATE 35: CPT | Performed by: NURSE PRACTITIONER

## 2025-05-06 PROCEDURE — 97530 THERAPEUTIC ACTIVITIES: CPT | Mod: GP

## 2025-05-06 PROCEDURE — 36415 COLL VENOUS BLD VENIPUNCTURE: CPT | Performed by: NURSE PRACTITIONER

## 2025-05-06 PROCEDURE — 80202 ASSAY OF VANCOMYCIN: CPT

## 2025-05-06 PROCEDURE — 2500000004 HC RX 250 GENERAL PHARMACY W/ HCPCS (ALT 636 FOR OP/ED): Performed by: NURSE PRACTITIONER

## 2025-05-06 PROCEDURE — 2500000004 HC RX 250 GENERAL PHARMACY W/ HCPCS (ALT 636 FOR OP/ED): Mod: JZ | Performed by: NURSE PRACTITIONER

## 2025-05-06 PROCEDURE — 76770 US EXAM ABDO BACK WALL COMP: CPT | Performed by: RADIOLOGY

## 2025-05-06 PROCEDURE — 2500000005 HC RX 250 GENERAL PHARMACY W/O HCPCS: Performed by: INTERNAL MEDICINE

## 2025-05-06 PROCEDURE — 82550 ASSAY OF CK (CPK): CPT | Performed by: INTERNAL MEDICINE

## 2025-05-06 PROCEDURE — 2500000004 HC RX 250 GENERAL PHARMACY W/ HCPCS (ALT 636 FOR OP/ED): Mod: JZ | Performed by: INTERNAL MEDICINE

## 2025-05-06 PROCEDURE — 1200000002 HC GENERAL ROOM WITH TELEMETRY DAILY

## 2025-05-06 PROCEDURE — 76770 US EXAM ABDO BACK WALL COMP: CPT

## 2025-05-06 PROCEDURE — 83735 ASSAY OF MAGNESIUM: CPT | Performed by: INTERNAL MEDICINE

## 2025-05-06 PROCEDURE — 85027 COMPLETE CBC AUTOMATED: CPT | Performed by: NURSE PRACTITIONER

## 2025-05-06 PROCEDURE — 2500000002 HC RX 250 W HCPCS SELF ADMINISTERED DRUGS (ALT 637 FOR MEDICARE OP, ALT 636 FOR OP/ED): Performed by: NURSE PRACTITIONER

## 2025-05-06 PROCEDURE — 97110 THERAPEUTIC EXERCISES: CPT | Mod: GP

## 2025-05-06 RX ORDER — SODIUM CHLORIDE 9 MG/ML
80 INJECTION, SOLUTION INTRAVENOUS CONTINUOUS
Status: DISCONTINUED | OUTPATIENT
Start: 2025-05-06 | End: 2025-05-07

## 2025-05-06 RX ORDER — VANCOMYCIN 1.75 G/350ML
1250 INJECTION, SOLUTION INTRAVENOUS ONCE
Status: DISCONTINUED | OUTPATIENT
Start: 2025-05-06 | End: 2025-05-06

## 2025-05-06 RX ADMIN — ATORVASTATIN CALCIUM 80 MG: 80 TABLET, FILM COATED ORAL at 21:38

## 2025-05-06 RX ADMIN — METHYLPREDNISOLONE SODIUM SUCCINATE 40 MG: 40 INJECTION, POWDER, FOR SOLUTION INTRAMUSCULAR; INTRAVENOUS at 09:39

## 2025-05-06 RX ADMIN — METHYLPREDNISOLONE SODIUM SUCCINATE 30 MG: 40 INJECTION, POWDER, FOR SOLUTION INTRAMUSCULAR; INTRAVENOUS at 21:38

## 2025-05-06 RX ADMIN — Medication 2 L/MIN: at 07:10

## 2025-05-06 RX ADMIN — LEVOTHYROXINE SODIUM 88 MCG: 0.09 TABLET ORAL at 05:39

## 2025-05-06 RX ADMIN — HYDRALAZINE HYDROCHLORIDE 25 MG: 25 TABLET ORAL at 21:38

## 2025-05-06 RX ADMIN — IRON SUCROSE 200 MG: 20 INJECTION, SOLUTION INTRAVENOUS at 05:39

## 2025-05-06 RX ADMIN — GUAIFENESIN 600 MG: 600 TABLET ORAL at 09:39

## 2025-05-06 RX ADMIN — GUAIFENESIN 600 MG: 600 TABLET ORAL at 21:38

## 2025-05-06 RX ADMIN — ASPIRIN 81 MG: 81 TABLET, CHEWABLE ORAL at 09:39

## 2025-05-06 RX ADMIN — LEVOFLOXACIN 500 MG: 5 INJECTION, SOLUTION INTRAVENOUS at 18:24

## 2025-05-06 RX ADMIN — HYDRALAZINE HYDROCHLORIDE 25 MG: 25 TABLET ORAL at 16:01

## 2025-05-06 RX ADMIN — HYDRALAZINE HYDROCHLORIDE 25 MG: 25 TABLET ORAL at 09:39

## 2025-05-06 RX ADMIN — SODIUM CHLORIDE 80 ML/HR: 900 INJECTION, SOLUTION INTRAVENOUS at 11:52

## 2025-05-06 RX ADMIN — PANTOPRAZOLE SODIUM 20 MG: 20 TABLET, DELAYED RELEASE ORAL at 05:39

## 2025-05-06 ASSESSMENT — COGNITIVE AND FUNCTIONAL STATUS - GENERAL
DRESSING REGULAR LOWER BODY CLOTHING: A LITTLE
EATING MEALS: A LITTLE
HELP NEEDED FOR BATHING: A LITTLE
CLIMB 3 TO 5 STEPS WITH RAILING: TOTAL
EATING MEALS: A LITTLE
TURNING FROM BACK TO SIDE WHILE IN FLAT BAD: A LITTLE
DRESSING REGULAR UPPER BODY CLOTHING: A LITTLE
STANDING UP FROM CHAIR USING ARMS: A LOT
PERSONAL GROOMING: A LITTLE
WALKING IN HOSPITAL ROOM: A LOT
MOVING TO AND FROM BED TO CHAIR: A LITTLE
MOBILITY SCORE: 14
MOVING FROM LYING ON BACK TO SITTING ON SIDE OF FLAT BED WITH BEDRAILS: A LITTLE
WALKING IN HOSPITAL ROOM: A LOT
DRESSING REGULAR LOWER BODY CLOTHING: A LOT
DAILY ACTIVITIY SCORE: 17
HELP NEEDED FOR BATHING: A LOT
CLIMB 3 TO 5 STEPS WITH RAILING: A LOT
DRESSING REGULAR UPPER BODY CLOTHING: A LITTLE
STANDING UP FROM CHAIR USING ARMS: A LITTLE
PERSONAL GROOMING: A LITTLE
MOVING TO AND FROM BED TO CHAIR: A LITTLE
DAILY ACTIVITIY SCORE: 15
TURNING FROM BACK TO SIDE WHILE IN FLAT BAD: A LITTLE
MOBILITY SCORE: 17
TOILETING: A LOT
TOILETING: A LOT

## 2025-05-06 ASSESSMENT — PAIN SCALES - GENERAL
PAINLEVEL_OUTOF10: 0 - NO PAIN

## 2025-05-06 ASSESSMENT — ACTIVITIES OF DAILY LIVING (ADL)
BATHING_COMMENTS: LB SPONGE BATH, ASSISTANCE TO WASH DISTAL B LE
BATHING_LEVEL_OF_ASSISTANCE: MINIMUM ASSISTANCE
BATHING_WHERE_ASSESSED: SITTING SINKSIDE
HOME_MANAGEMENT_TIME_ENTRY: 28

## 2025-05-06 ASSESSMENT — PAIN - FUNCTIONAL ASSESSMENT
PAIN_FUNCTIONAL_ASSESSMENT: 0-10
PAIN_FUNCTIONAL_ASSESSMENT: 0-10

## 2025-05-06 NOTE — PROGRESS NOTES
Fartun Carey is a 89 y.o. female on day 3 of admission presenting with Bronchitis.    Subjective   Interval History:   Afebrile, no chills  Feeling better  No shortness of breath  Mild occasional nonproductive cough  No nausea, vomiting or diarrhea       Objective   Range of Vitals (last 24 hours)  Heart Rate:  [48-84]   Temp:  [36 °C (96.8 °F)-37 °C (98.6 °F)]   Resp:  [16-18]   BP: (105-156)/(44-59)   SpO2:  [88 %-98 %]   Daily Weight  05/03/25 : 72.6 kg (160 lb)    Body mass index is 30.23 kg/m².    Physical Exam  Constitutional:       Appearance: Normal appearance.   HENT:      Head: Normocephalic and atraumatic.      Nose: Nose normal.      Mouth/Throat:      Mouth: Mucous membranes are moist.      Pharynx: Oropharynx is clear.   Eyes:      General: No scleral icterus.  Cardiovascular:      Rate and Rhythm: Normal rate and regular rhythm.   Pulmonary:      Effort: Pulmonary effort is normal.      Breath sounds: Wheezing present.   Abdominal:      General: Bowel sounds are normal.      Palpations: Abdomen is soft.   Musculoskeletal:         General: Normal range of motion.      Cervical back: Normal range of motion and neck supple.   Skin:     General: Skin is warm and dry.   Neurological:      Mental Status: She is alert.      Comments: Awake, alert    Psychiatric:         Mood and Affect: Mood normal.         Behavior: Behavior normal.     Antibiotics  levoFLOXacin - 500 mg/100 mL    Relevant Results  Labs  Results from last 72 hours   Lab Units 05/06/25  0509 05/05/25  0403 05/04/25  0520 05/03/25  1527   WBC AUTO x10*3/uL 10.0 9.4 7.1 5.3   HEMOGLOBIN g/dL 8.7* 8.7* 9.6* 10.7*   HEMATOCRIT % 27.9* 27.6* 33.3* 33.5*   PLATELETS AUTO x10*3/uL 136* 123* 137* 144*   NEUTROS PCT AUTO %  --   --   --  57.8   LYMPHS PCT AUTO %  --   --   --  22.3   MONOS PCT AUTO %  --   --   --  18.9   EOS PCT AUTO %  --   --   --  0.4     Results from last 72 hours   Lab Units 05/06/25  0509 05/05/25  1817 05/04/25  0520    SODIUM mmol/L 130* 131* 138   POTASSIUM mmol/L 4.5 4.1 3.9   CHLORIDE mmol/L 97* 97* 102   CO2 mmol/L 22 24 26   BUN mg/dL 56* 54* 25*   CREATININE mg/dL 1.99* 2.18* 1.28*   GLUCOSE mg/dL 202* 116* 176*   CALCIUM mg/dL 7.6* 7.7* 8.4*   ANION GAP mmol/L 16 14 14   EGFR mL/min/1.73m*2 23* 20* 38*     Results from last 72 hours   Lab Units 05/03/25  1527   ALK PHOS U/L 130   BILIRUBIN TOTAL mg/dL 0.4   PROTEIN TOTAL g/dL 6.6   ALT U/L 16   AST U/L 22   ALBUMIN g/dL 3.9     Estimated Creatinine Clearance: 15.1 mL/min (A) (by C-G formula based on SCr of 1.99 mg/dL (H)).  CRP   Date Value Ref Range Status   08/18/2022 0.6 0 - 2.0 MG/DL Final     Comment:     Performed at [place] [id number]     Microbiology  Susceptibility data from last 14 days.  Collected Specimen Info Organism   05/03/25 Blood culture from Peripheral Venipuncture Staphylococcus capitis       Imaging  Transthoracic Echo (TTE) Limited  Result Date: 5/5/2025            [place] [id number]             Phone [phone] TRANSTHORACIC ECHOCARDIOGRAM REPORT Patient Name:       FRANKIE Estrella Physician:    [id number] Brijesh Jerez MD Study Date:         5/5/2025             Ordering Provider:    [id number] RENEE LAWTON MRN/PID:            [id number]             Fellow: Accession#:         [id number]         Nurse: Date of Birth/Age:  6/10/1928 / 89 years Sonographer:          Amilcar Gatica RDCS Gender Assigned at  F                    Additional Staff: Birth: Height:             154.94 cm            Admit Date: Weight:             72.58 kg             Admission Status:     Inpatient -                                                                Routine BSA / BMI:          1.72 m2 / 30.23      Department Location:  [facility]                     kg/m2  Blood Pressure: 137 /48 mmHg Study Type:    TRANSTHORACIC ECHO (TTE) LIMITED Diagnosis/ICD: Dyspnea, unspecified-R06.00 Indication:    dyspnea CPT Codes:     Echo Limited-63223; Doppler Limited-10160; Color Doppler-48568 Patient History: Valve Disorders:   Aortic Stenosis. Pertinent History: HTN, Hyperlipidemia and CAD. CKD. Study Detail: The following Echo studies were performed: 2D, M-Mode, Doppler and               color flow. Technically challenging study due to body habitus.  PHYSICIAN INTERPRETATION: Left Ventricle: The left ventricular systolic function is normal, with a visually estimated ejection fraction of 60-65%. There are no regional wall motion abnormalities. The left ventricular cavity size is normal. There is mild increased septal and normal posterior left ventricular wall thickness. Spectral Doppler shows a normal pattern of left ventricular diastolic filling. Left Atrium: The left atrial size is mildly dilated. Right Ventricle: The right ventricle is normal in size. There is normal right ventriclar wall thickness. There is normal right ventricular global systolic function. Right Atrium: The right atrial size is normal. Aortic Valve: The aortic valve is trileaflet. There is mild aortic valve cusp calcification. There is reduced systolic aortic valve leaflet excursion. There is evidence of mild aortic valve stenosis. The aortic valve dimensionless index is 0.38. There is no evidence of aortic valve regurgitation. The peak instantaneous gradient of the aortic valve is 18 mmHg. The mean gradient of the aortic valve is 10 mmHg. Mitral Valve: The mitral valve is normal in structure. There is normal mitral valve leaflet mobility. There is mild mitral annular calcification. There is trace mitral valve regurgitation. Tricuspid Valve: The tricuspid valve is structurally normal. There is normal tricuspid valve leaflet mobility. There is mild to moderate tricuspid regurgitation. Pulmonic Valve: The pulmonic  Graciela Isopo valve is structurally normal. There is trace pulmonic valve regurgitation. Pericardium: No pericardial effusion noted. Aorta: The aortic root is normal. There is no dilatation of the aortic arch. There is no dilatation of the ascending aorta. There is no dilatation of the aortic root. Pulmonary Artery: The pulmonary artery is normal in size. The tricuspid regurgitant velocity is 2.72 m/s, and with an estimated right atrial pressure of 3 mmHg, the estimated pulmonary artery pressure is normal with the RVSP at 32.6 mmHg. The estimated PASP is 33 mmHg.  CONCLUSIONS:  1. The left ventricular systolic function is normal, with a visually estimated ejection fraction of 60-65%.  2. Trace mitral valve regurgitation.  3. Mild to moderate tricuspid regurgitation.  4. Mild aortic valve stenosis.  5. The peak instantaneous gradient of the aortic valve is 18 mmHg. QUANTITATIVE DATA SUMMARY:  2D MEASUREMENTS:           Normal Ranges: Ao Root d:       3.20 cm   (2.0-3.7cm) IVSd:            0.98 cm   (0.6-1.1cm) LVPWd:           0.80 cm   (0.6-1.1cm) LVIDd:           4.37 cm   (3.9-5.9cm) LVIDs:           2.29 cm LV Mass Index:   72.4 g/m2 LV % FS          47.6 %  LEFT ATRIUM:                Normal Ranges: LA Volume Index: 33.0 ml/m2  AORTA MEASUREMENTS:         Normal Ranges: Ao Sinus, d:        3.20 cm (2.1-3.5cm) Asc Ao, d:          2.80 cm (2.1-3.4cm)  LV SYSTOLIC FUNCTION:                      Normal Ranges: EF-A4C View:    62 % (>=55%) EF-Visual:      63 % LV EF Reported: 63 %  LV DIASTOLIC FUNCTION:             Normal Ranges: MV Peak E:             0.87 m/s    (0.7-1.2 m/s) MV Peak A:             0.96 m/s    (0.42-0.7 m/s) E/A Ratio:             0.90        (1.0-2.2) MV A Dur:              180.00 msec  MITRAL VALVE:          Normal Ranges: MV DT:        250 msec (150-240msec)  AORTIC VALVE:                      Normal Ranges: AoV Vmax:                2.14 m/s  (<=1.7m/s) AoV Peak P.3 mmHg (<20mmHg) AoV Mean  PG:             10.0 mmHg (1.7-11.5mmHg) LVOT Max Joseph:            0.89 m/s  (<=1.1m/s) AoV VTI:                 54.70 cm  (18-25cm) LVOT VTI:                21.00 cm LVOT Diameter:           1.80 cm   (1.8-2.4cm) AoV Area, VTI:           0.98 cm2  (2.5-5.5cm2) AoV Area,Vmax:           1.06 cm2  (2.5-4.5cm2) AoV Dimensionless Index: 0.38  TRICUSPID VALVE/RVSP:          Normal Ranges: Peak TR Velocity:     2.72 m/s RV Syst Pressure:     33 mmHg  (< 30mmHg)  [id number] Brijesh Jerez MD Electronically signed on 5/5/2025 at 5:48:18 PM  ** Final **     CT angio chest for pulmonary embolism  Result Date: 5/3/2025  Interpreted By:  Pancho Barth, STUDY: CT ANGIO CHEST FOR PULMONARY EMBOLISM;  5/3/2025 5:41 pm   INDICATION: Signs/Symptoms:tachycardic, hypoxic, concern for PE vs pneumonia.   COMPARISON: 2023   ACCESSION NUMBER(S): [id number]   ORDERING CLINICIAN: VIVIANE RILEY   TECHNIQUE: Helical data acquisition of the chest was obtained after intravenous administration of 75 ML Omnipaque 350 as per PE protocol. Images were reformatted in coronal and sagittal planes. Axial and coronal maximum intensity projection (MIP) images were created and reviewed.   FINDINGS: Ground-glass increasing left upper lobe nodule measures 1.7 cm on image 94 concerning for slow growing neoplasm   Other numerous bilateral pulmonary nodules appear similar in size. Reference right lower lobe nodule measuring a cm on image 163. Reference 8 mm right lower lobe nodule on image 184. Other numerous nodules detected remain stable   Mosaic attenuation in the lungs suggesting small airway or small vessel inflammation. No findings of acute pulmonary embolism. Coronary calcification no evidence of acute thoracic pathology.   Changes of old healed granulomas disease. Robust vascular calcification.   Nephroliths detected. Largest visualized stone measures 9 mm.   Demineralized bones       1. No evidence of acute pulmonary embolism 2. Senescent changes. Heart  size is enlarged. Changes of old healed granulomas disease 3. Multiple stable pulmonary nodules. Enlarging left upper lobe ground-glass nodule now measuring under 2 cm concerning for slow growing neoplasm. 4. Bronchial thickening noted. Mild mosaic attenuation seen in the lungs. Query bronchial inflammation. Senescent changes.   MACRO: None.   Signed by: Pancho Barth 5/3/2025 6:19 PM Dictation workstation:   [id number]    XR chest 2 views  Result Date: 5/3/2025  Interpreted By:  Amilcar Galdamez, STUDY: XR CHEST 2 VIEWS;  5/3/2025 3:34 pm   INDICATION: Signs/Symptoms:shortness of breath.     COMPARISON: 02/21/2024   ACCESSION NUMBER(S): [id number]   ORDERING CLINICIAN: VIVIANE RILEY   FINDINGS: Stable heart and mediastinal contours. No pneumothorax or pleural fluid. No focal consolidation or alveolar edema. Mild atherosclerotic calcification of the aortic arch. Mild elevation of the right hemidiaphragm.       1.  No radiographic evidence of acute cardiopulmonary disease.       MACRO: None   Signed by: Amilcar Galdamez 5/3/2025 4:37 PM Dictation workstation:   [id number]    XR cervical spine 2-3 views  Result Date: 5/2/2025  Interpreted By:  Anais Roa, STUDY: XR CERVICAL SPINE 2-3 VIEWS; ;  5/1/2025 12:10 pm   INDICATION: Signs/Symptoms:acute on chronic  neck pain with bilateral hand numbness.   ,M54.2 Cervicalgia,R29.898 Other symptoms and signs involving the musculoskeletal system   COMPARISON: None.   ACCESSION NUMBER(S): [id number]   ORDERING CLINICIAN: ADALI LEMUS   FINDINGS:   Reversal normal cervical lordosis is seen.   Osteopenia is present. The prevertebral soft tissues are unremarkable.   Endplate sclerosis and spur formation is seen throughout the cervical spine. Narrowing of C3-4, C4-5 C5-6 and C6-7 disc space is seen. Uncovertebral joint hypertrophy seen.   No acute fracture or dislocation is seen.       Reversal normal cervical lordosis and degenerative changes.     MACRO: None   Signed by:  Anais Salgadogene 5/2/2025 4:20 PM Dictation workstation:   [id number]            Assessment/Plan   Acute hypoxic respiratory failure, resolved-on room air  Pulmonary nodules  CT showed bronchial thickening-bronchitis  Positive blood culture -staphylococcus capitis- 1/ 2-contaminant   Pyuria   Has reported intolerances to amoxicillin, Augmentin-chart reviewed tolerated follow-up amoxicillin on 4/20/2025- allergy to Keflex  Chronic kidney disease  History of urine culture E.coli-pansensitive, Enterococcus-susceptible to ampicillin      Levaquin  Discontinue IV vancomycin  Methylprednisolone per pulmonary   Monitor renal function  Follow up blood culture  Follow up Repeat blood culture   Legionella urine antigen  Mycoplasma IgM  Supplemental oxygen , wean as tolerated   Supportive care   Pulmonary follow up  De-escalate based on work up/culture results      Total time spent caring for the patient today was 25 minutes. This includes time spent before the visit reviewing the chart, time spent during the visit, and time spent after the visit on documentation     KAYLENE Faith-CNP   room air

## 2025-05-06 NOTE — ASSESSMENT & PLAN NOTE
1 of 2 with gram + Cocci noted 5/5  - Continue to monitor - may be contaminant vs bacteremia   Continue Levaquin at this time   Consult ID - appreciate recs   Vanco added   Monitor cultures

## 2025-05-06 NOTE — PROGRESS NOTES
Occupational Therapy    Occupational Therapy Treatment    Name: Fartun Carey  MRN: [id number]  Department: [address]  Room: [address] [place] [address]  Date: 05/06/25  Time Calculation  Start Time: 0749  Stop Time: 0817  Time Calculation (min): 28 min    Assessment:  OT Assessment: Patient reports she is feeling better this date. She is able to complete ADL routine, however, continues to be limited with decreased activity tolerance. Patient will continue to benefit from skilled OT to maximize safety and independence with daily tasks.  Prognosis: Good  Barriers to Discharge Home: Caregiver assistance, Physical needs  Caregiver Assistance: Patient lives alone and/or does not have reliable caregiver assistance  Physical Needs: 24hr mobility assistance needed, 24hr ADL assistance needed  Evaluation/Treatment Tolerance: Patient limited by fatigue  End of Session Communication: Bedside nurse  End of Session Patient Position: Up in chair, Alarm on  Plan:  Treatment Interventions: ADL retraining, Functional transfer training, UE strengthening/ROM, Endurance training, Patient/family training, Equipment evaluation/education, Compensatory technique education  OT Frequency: 3 times per week  OT Discharge Recommendations: Moderate intensity level of continued care  Equipment Recommended upon Discharge: Wheeled walker  OT Recommended Transfer Status: Assistive equipment (Comment), Assist of 1  OT - OK to Discharge: Yes    Subjective   Previous Visit Info:  OT Last Visit  OT Received On: 05/06/25  General:  General  Reason for Referral: decline in ADLs, bronchitis, SOB, weakness  Referred By: Dr. Edward  Past Medical History Relevant to Rehab: DM type 2, OA, pulmonary nodule, hyponatremia, PVD  Family/Caregiver Present: No  Prior to Session Communication: Bedside nurse  Patient Position Received: Bed, 3 rail up, Alarm on  Preferred Learning Style: verbal, visual  General Comment: Patient cleared by nursing for therapy. Patient in bed upon arrival  and agreeable to participate.  Precautions:  Medical Precautions: Fall precautions    Vital Signs Comment: 96% on room air at EOB    Pain Assessment:  Pain Assessment  Pain Assessment: 0-10  0-10 (Numeric) Pain Score: 0 - No pain    Objective   Cognition:  Overall Cognitive Status: Within Functional Limits  Cognition Comments: pleasant and cooperative. able to follow commands throughout  Processing Speed: Delayed  Activities of Daily Living: Feeding  Feeding Level of Assistance: Setup  Feeding Where Assessed: Chair  Feeding Comments: all items within reach, assistance to open containers    Grooming  Grooming Level of Assistance: Close supervision  Grooming Where Assessed: Sitting sinkside  Grooming Comments: wash face and brush teeth    UE Bathing  UE Bathing Level of Assistance: Close supervision  UE Bathing Where Assessed: Sitting sinkside  UE Bathing Comments: UB sponge bath    LE Bathing  LE Bathing Level of Assistance: Minimum assistance  LE Bathing Where Assessed: Sitting sinkside  LE Bathing Comments: LB sponge bath, assistance to wash distal B LE    UE Dressing  UE Dressing Level of Assistance: Minimum assistance  UE Dressing Where Assessed: Chair  UE Dressing Comments: doff/don gown    LE Dressing  LE Dressing: Yes  Sock Level of Assistance: Moderate assistance  Adult Briefs Level of Assistance: Moderate assistance  LE Dressing Where Assessed: Chair  LE Dressing Comments: assistance to thread B LE into brief and don over hips in standing. patient able to doff socks, assistance to don due to fatigue    Toileting  Toileting Level of Assistance: Moderate assistance  Where Assessed: Other (Comment) (standing at 2WW)  Toileting Comments: mendoza cath in place, assistance for posterior hygiene tasks    Functional Standing Tolerance:  Functional Standing Tolerance  Time: ~30 seconds  Activity: during ADLs  Functional Standing Tolerance Comments: Min A, decreased standing tolerance, chair behind patient for  safety  Bed Mobility/Transfers: Bed Mobility  Bed Mobility: Yes  Bed Mobility 1  Bed Mobility 1: Supine to sitting  Level of Assistance 1: Close supervision  Bed Mobility Comments 1: head of bed elevated, use of bed rails    Transfers  Transfer: Yes  Transfer 1  Transfer From 1: Bed to  Transfer to 1: Stand  Technique 1: Sit to stand  Transfer Device 1: Walker  Transfer Level of Assistance 1: Minimum assistance  Trials/Comments 1: for steady assist and cues for safety  Transfers 2  Transfer From 2: Chair with arms to  Transfer to 2: Stand  Technique 2: Sit to stand  Transfer Device 2: Walker  Transfer Level of Assistance 2: Minimum assistance  Trials/Comments 2: x2 trials    Functional Mobility:  Functional Mobility  Functional Mobility Performed: Yes  Functional Mobility 1  Surface 1: Level tile  Device 1: Rolling walker  Assistance 1: Minimum assistance  Comments 1: to and from the bathroom. close chair follow for safety due to decreased activity tolerance  Sitting Balance:  Static Sitting Balance  Static Sitting-Balance Support: Feet supported, Bilateral upper extremity supported  Static Sitting-Level of Assistance: Close supervision  Standing Balance:  Static Standing Balance  Static Standing-Balance Support: Bilateral upper extremity supported  Static Standing-Level of Assistance: Minimum assistance  Static Standing-Comment/Number of Minutes: unsteady, ~30 seconds    RUE   RUE : Within Functional Limits and LUE   LUE: Within Functional Limits    Outcome Measures:  [facility] Daily Activity  Putting on and taking off regular lower body clothing: A lot  Bathing (including washing, rinsing, drying): A lot  Putting on and taking off regular upper body clothing: A little  Toileting, which includes using toilet, bedpan or urinal: A lot  Taking care of personal grooming such as brushing teeth: A little  Eating Meals: A little  Daily Activity - Total Score: 15    Education Documentation  Body Mechanics, taught by Lennie  BOZENA Jackson at 5/6/2025  8:52 AM.  Learner: Patient  Readiness: Acceptance  Method: Explanation, Demonstration  Response: Needs Reinforcement, Verbalizes Understanding  Comment: Reviewed OT POC. education on safe functional txfers/mobility and ADL completion. discussed energy conservation techniques    Precautions, taught by Lennie Jackson OT at 5/6/2025  8:52 AM.  Learner: Patient  Readiness: Acceptance  Method: Explanation, Demonstration  Response: Needs Reinforcement, Verbalizes Understanding  Comment: Reviewed OT POC. education on safe functional txfers/mobility and ADL completion. discussed energy conservation techniques    ADL Training, taught by Lennie Jackson OT at 5/6/2025  8:52 AM.  Learner: Patient  Readiness: Acceptance  Method: Explanation, Demonstration  Response: Needs Reinforcement, Verbalizes Understanding  Comment: Reviewed OT POC. education on safe functional txfers/mobility and ADL completion. discussed energy conservation techniques    Body Mechanics, taught by Lennie Jackson OT at 5/5/2025  8:56 AM.  Learner: Patient  Readiness: Acceptance  Method: Explanation, Demonstration  Response: Needs Reinforcement  Comment: Education on OT POC. education on safe functional transfers and OOB activity participation    Precautions, taught by Lennie Jackson OT at 5/5/2025  8:56 AM.  Learner: Patient  Readiness: Acceptance  Method: Explanation, Demonstration  Response: Needs Reinforcement  Comment: Education on OT POC. education on safe functional transfers and OOB activity participation    ADL Training, taught by Lennie Jackson OT at 5/5/2025  8:56 AM.  Learner: Patient  Readiness: Acceptance  Method: Explanation, Demonstration  Response: Needs Reinforcement  Comment: Education on OT POC. education on safe functional transfers and OOB activity participation    Education Comments  No comments found.      Goals:  Encounter Problems       Encounter Problems (Active)       OT Goals       ADLs (Progressing)        Start:  05/05/25    Expected End:  05/26/25       Patient will complete ADL tasks, with modified independence, using AE need in order to increase patient's safety and independence with self-care tasks.          Functional Transfers (Progressing)       Start:  05/05/25    Expected End:  05/26/25       Patient will complete functional transfers with modified independence, using least restrictive device, in order to increase patient's safety and independence with daily tasks.          Functional Mobility  (Progressing)       Start:  05/05/25    Expected End:  05/26/25       Patient will demonstrate the ability to complete item retrieval and functional mobility with modified independence, in order to increase safety and independence with daily tasks.          Activity Tolerance  (Progressing)       Start:  05/05/25    Expected End:  05/26/25       Patient will demonstrate the ability to participate in functional activity at least >/= 25 minutes in order to increase patient's safety and independence with daily tasks.

## 2025-05-06 NOTE — ASSESSMENT & PLAN NOTE
VINCENT noted yesterday evening   Slightly improved with IV fluids   BP medications, lasix held   Consult nephrology (she is known to Dr. TESHA Gaxiola)   Monitor kidney function

## 2025-05-06 NOTE — PROGRESS NOTES
"Fartun Carey is a 89 y.o. female on day 3 of admission presenting with Bronchitis.    Subjective   Patient resting in bed. Feels better this morning. Denies chest pain, abdominal pain, fevers, chills. Feels less short of breath today        Objective     Physical Exam  Constitutional:       Appearance: Normal appearance.   HENT:      Head: Normocephalic and atraumatic.      Mouth/Throat:      Mouth: Mucous membranes are moist.      Pharynx: Oropharynx is clear.   Eyes:      Extraocular Movements: Extraocular movements intact.      Conjunctiva/sclera: Conjunctivae normal.      Pupils: Pupils are equal, round, and reactive to light.   Cardiovascular:      Rate and Rhythm: Normal rate and regular rhythm.      Pulses: Normal pulses.      Heart sounds: Normal heart sounds.   Pulmonary:      Effort: Pulmonary effort is normal.   Abdominal:      General: Bowel sounds are normal.      Palpations: Abdomen is soft.      Tenderness: There is abdominal tenderness.   Musculoskeletal:         General: Normal range of motion.      Cervical back: Normal range of motion and neck supple.   Skin:     General: Skin is warm and dry.      Capillary Refill: Capillary refill takes less than 2 seconds.   Neurological:      General: No focal deficit present.      Mental Status: She is alert and oriented to person, place, and time.   Psychiatric:         Mood and Affect: Mood normal.         Behavior: Behavior normal.         Last Recorded Vitals  Blood pressure 156/59, pulse 75, temperature 36.5 °C (97.7 °F), temperature source Temporal, resp. rate 16, height 1.549 m (5' 1\"), weight 72.6 kg (160 lb), SpO2 (!) 88%.  Intake/Output last 3 Shifts:  I/O last 3 completed shifts:  In: 860.8 (11.9 mL/kg) [P.O.:100; I.V.:260.8 (3.6 mL/kg); IV Piggyback:500]  Out: 1350 (18.6 mL/kg) [Urine:1350 (0.5 mL/kg/hr)]  Weight: 72.6 kg     Relevant Results  Results for orders placed or performed during the hospital encounter of 05/03/25 (from the past 24 " hours)   Transthoracic Echo (TTE) Limited   Result Value Ref Range    AV pk luz maria 2.14 m/s    LVOT diam 1.80 cm    AV mn grad 10 mmHg    MV E/A ratio 0.90     LV EF 63 %    RVSP 32.6 mmHg    LVIDd 4.37 cm    AV pk grad 18 mmHg    Aortic Valve Area by Continuity of VTI 0.98 cm2    Aortic Valve Area by Continuity of Peak Velocity 1.06 cm2    LV A4C EF 62.2    Blood Culture    Specimen: Peripheral Venipuncture; Blood culture   Result Value Ref Range    Blood Culture Loaded on Instrument - Culture in progress    Blood Culture    Specimen: Peripheral Venipuncture; Blood culture   Result Value Ref Range    Blood Culture Loaded on Instrument - Culture in progress    Urinalysis with Reflex Culture and Microscopic   Result Value Ref Range    Color, Urine Yellow Light-Yellow, Yellow, Dark-Yellow    Appearance, Urine Turbid (N) Clear    Specific Gravity, Urine 1.019 1.005 - 1.035    pH, Urine 5.0 5.0, 5.5, 6.0, 6.5, 7.0, 7.5, 8.0    Protein, Urine NEGATIVE NEGATIVE, 10 (TRACE), 20 (TRACE) mg/dL    Glucose, Urine Normal Normal mg/dL    Blood, Urine NEGATIVE NEGATIVE mg/dL    Ketones, Urine NEGATIVE NEGATIVE mg/dL    Bilirubin, Urine NEGATIVE NEGATIVE mg/dL    Urobilinogen, Urine Normal Normal mg/dL    Nitrite, Urine NEGATIVE NEGATIVE    Leukocyte Esterase, Urine 250 Sarahi/uL (A) NEGATIVE   Extra Urine Gray Tube   Result Value Ref Range    Extra Tube 293    Microscopic Only, Urine   Result Value Ref Range    WBC, Urine >50 (A) 1-5, NONE /HPF    RBC, Urine 3-5 NONE, 1-2, 3-5 /HPF    Squamous Epithelial Cells, Urine 1-9 (SPARSE) Reference range not established. /HPF    Bacteria, Urine 3+ (A) NONE SEEN /HPF    Mucus, Urine FEW Reference range not established. /LPF    Hyaline Casts, Urine 2+ (A) NONE /LPF   Basic metabolic panel   Result Value Ref Range    Glucose 116 (H) 74 - 99 mg/dL    Sodium 131 (L) 136 - 145 mmol/L    Potassium 4.1 3.5 - 5.3 mmol/L    Chloride 97 (L) 98 - 107 mmol/L    Bicarbonate 24 21 - 32 mmol/L    Anion Gap 14  10 - 20 mmol/L    Urea Nitrogen 54 (H) 6 - 23 mg/dL    Creatinine 2.18 (H) 0.50 - 1.05 mg/dL    eGFR 20 (L) >60 mL/min/1.73m*2    Calcium 7.7 (L) 8.6 - 10.3 mg/dL   Magnesium   Result Value Ref Range    Magnesium 1.57 (L) 1.60 - 2.40 mg/dL   CBC   Result Value Ref Range    WBC 10.0 4.4 - 11.3 x10*3/uL    nRBC 0.0 0.0 - 0.0 /100 WBCs    RBC 2.90 (L) 4.00 - 5.20 x10*6/uL    Hemoglobin 8.7 (L) 12.0 - 16.0 g/dL    Hematocrit 27.9 (L) 36.0 - 46.0 %    MCV 96 80 - 100 fL    MCH 30.0 26.0 - 34.0 pg    MCHC 31.2 (L) 32.0 - 36.0 g/dL    RDW 14.7 (H) 11.5 - 14.5 %    Platelets 136 (L) 150 - 450 x10*3/uL   Basic Metabolic Panel   Result Value Ref Range    Glucose 202 (H) 74 - 99 mg/dL    Sodium 130 (L) 136 - 145 mmol/L    Potassium 4.5 3.5 - 5.3 mmol/L    Chloride 97 (L) 98 - 107 mmol/L    Bicarbonate 22 21 - 32 mmol/L    Anion Gap 16 10 - 20 mmol/L    Urea Nitrogen 56 (H) 6 - 23 mg/dL    Creatinine 1.99 (H) 0.50 - 1.05 mg/dL    eGFR 23 (L) >60 mL/min/1.73m*2    Calcium 7.6 (L) 8.6 - 10.3 mg/dL   Vancomycin   Result Value Ref Range    Vancomycin 8.9 5.0 - 20.0 ug/mL     *Note: Due to a large number of results and/or encounters for the requested time period, some results have not been displayed. A complete set of results can be found in Results Review.     Transthoracic Echo (TTE) Limited  Result Date: 5/5/2025            [facility] [address], [place] [address]             Phone [phone] TRANSTHORACIC ECHOCARDIOGRAM REPORT Patient Name:       FRANKIE Estrella Physician:    [id number] Brijesh Jerez MD Study Date:         5/5/2025             Ordering Provider:    [id number] RENEE LAWTON MRN/PID:            [id number]             Fellow: Accession#:         [id number]         Nurse: Date of Birth/Age:  6/10/1928 / 89 years Sonographer:          Amilcar Gatica [facility] Gender  Assigned at  F                    Additional Staff: Birth: Height:             154.94 cm            Admit Date: Weight:             72.58 kg             Admission Status:     Inpatient -                                                                Routine BSA / BMI:          1.72 m2 / 30.23      Department Location:  [facility]                     kg/m2 Blood Pressure: 137 /48 mmHg Study Type:    TRANSTHORACIC ECHO (TTE) LIMITED Diagnosis/ICD: Dyspnea, unspecified-R06.00 Indication:    dyspnea CPT Codes:     Echo Limited-05635; Doppler Limited-19565; Color Doppler-09927 Patient History: Valve Disorders:   Aortic Stenosis. Pertinent History: HTN, Hyperlipidemia and CAD. CKD. Study Detail: The following Echo studies were performed: 2D, M-Mode, Doppler and               color flow. Technically challenging study due to body habitus.  PHYSICIAN INTERPRETATION: Left Ventricle: The left ventricular systolic function is normal, with a visually estimated ejection fraction of 60-65%. There are no regional wall motion abnormalities. The left ventricular cavity size is normal. There is mild increased septal and normal posterior left ventricular wall thickness. Spectral Doppler shows a normal pattern of left ventricular diastolic filling. Left Atrium: The left atrial size is mildly dilated. Right Ventricle: The right ventricle is normal in size. There is normal right ventriclar wall thickness. There is normal right ventricular global systolic function. Right Atrium: The right atrial size is normal. Aortic Valve: The aortic valve is trileaflet. There is mild aortic valve cusp calcification. There is reduced systolic aortic valve leaflet excursion. There is evidence of mild aortic valve stenosis. The aortic valve dimensionless index is 0.38. There is no evidence of aortic valve regurgitation. The peak instantaneous gradient of the aortic valve is 18 mmHg. The mean gradient of the aortic valve is 10 mmHg. Mitral Valve: The  mitral valve is normal in structure. There is normal mitral valve leaflet mobility. There is mild mitral annular calcification. There is trace mitral valve regurgitation. Tricuspid Valve: The tricuspid valve is structurally normal. There is normal tricuspid valve leaflet mobility. There is mild to moderate tricuspid regurgitation. Pulmonic Valve: The pulmonic valve is structurally normal. There is trace pulmonic valve regurgitation. Pericardium: No pericardial effusion noted. Aorta: The aortic root is normal. There is no dilatation of the aortic arch. There is no dilatation of the ascending aorta. There is no dilatation of the aortic root. Pulmonary Artery: The pulmonary artery is normal in size. The tricuspid regurgitant velocity is 2.72 m/s, and with an estimated right atrial pressure of 3 mmHg, the estimated pulmonary artery pressure is normal with the RVSP at 32.6 mmHg. The estimated PASP is 33 mmHg.  CONCLUSIONS:  1. The left ventricular systolic function is normal, with a visually estimated ejection fraction of 60-65%.  2. Trace mitral valve regurgitation.  3. Mild to moderate tricuspid regurgitation.  4. Mild aortic valve stenosis.  5. The peak instantaneous gradient of the aortic valve is 18 mmHg. QUANTITATIVE DATA SUMMARY:  2D MEASUREMENTS:           Normal Ranges: Ao Root d:       3.20 cm   (2.0-3.7cm) IVSd:            0.98 cm   (0.6-1.1cm) LVPWd:           0.80 cm   (0.6-1.1cm) LVIDd:           4.37 cm   (3.9-5.9cm) LVIDs:           2.29 cm LV Mass Index:   72.4 g/m2 LV % FS          47.6 %  LEFT ATRIUM:                Normal Ranges: LA Volume Index: 33.0 ml/m2  AORTA MEASUREMENTS:         Normal Ranges: Ao Sinus, d:        3.20 cm (2.1-3.5cm) Asc Ao, d:          2.80 cm (2.1-3.4cm)  LV SYSTOLIC FUNCTION:                      Normal Ranges: EF-A4C View:    62 % (>=55%) EF-Visual:      63 % LV EF Reported: 63 %  LV DIASTOLIC FUNCTION:             Normal Ranges: MV Peak E:             0.87 m/s    (0.7-1.2  m/s) MV Peak A:             0.96 m/s    (0.42-0.7 m/s) E/A Ratio:             0.90        (1.0-2.2) MV A Dur:              180.00 msec  MITRAL VALVE:          Normal Ranges: MV DT:        250 msec (150-240msec)  AORTIC VALVE:                      Normal Ranges: AoV Vmax:                2.14 m/s  (<=1.7m/s) AoV Peak P.3 mmHg (<20mmHg) AoV Mean PG:             10.0 mmHg (1.7-11.5mmHg) LVOT Max Joseph:            0.89 m/s  (<=1.1m/s) AoV VTI:                 54.70 cm  (18-25cm) LVOT VTI:                21.00 cm LVOT Diameter:           1.80 cm   (1.8-2.4cm) AoV Area, VTI:           0.98 cm2  (2.5-5.5cm2) AoV Area,Vmax:           1.06 cm2  (2.5-4.5cm2) AoV Dimensionless Index: 0.38  TRICUSPID VALVE/RVSP:          Normal Ranges: Peak TR Velocity:     2.72 m/s RV Syst Pressure:     33 mmHg  (< 30mmHg)  [id number] Brijesh Jerez MD Electronically signed on 2025 at 5:48:18 PM  ** Final **         This patient has a urinary catheter   Reason for the urinary catheter remaining today? urinary retention/bladder outlet obstruction, acute or chronic      Assessment & Plan  Bronchitis  No history of COPD, asthma or tobacco use..    Imaging shows no acute finding  Non-productive cough   Flu A and B- coronavirus negative  Start on Solu-Medrol and levofloxacin  DuoNeb  Cough suppressant, Mucinex   Pulmonology following - appreciate recs   Acute respiratory failure with hypoxia  May be related to her bronchitis  Down to room air today   Last echo showed LVEF 60%   Limited echo  with preserved LVEF   Hypertension  Home meds held in the setting of hypotension,. VINCENT  - Plan to resume as tolerated   Vitals as ordered   Check AM labs   Hyperlipidemia  Continue home statin   Stage 3b chronic kidney disease (Multi)  VINCENT noted yesterday evening   Slightly improved with IV fluids   BP medications, lasix held   Consult nephrology (she is known to Dr. TESHA Gaxiola)   Monitor kidney function  Blood bacterial culture positive  1 of 2 with  gram + Cocci noted 5/5  - Continue to monitor - may be contaminant vs bacteremia   Continue Levaquin at this time   Consult ID - appreciate recs   Vanco added   Monitor cultures     DVT prophylaxis   Heparin     Lidya Washington, APRN-CNP

## 2025-05-06 NOTE — PROGRESS NOTES
05/06/25 1041   Discharge Planning   Living Arrangements Alone   Support Systems Children;Friends/neighbors   Home or Post Acute Services In home services   Type of Home Care Services Home OT;Home PT;Home nursing visits   Expected Discharge Disposition Home H  ([facility] - Internal order)     Patient seen at bedside, feeling much better today. Agreed to Home Health Care.    PLAN: Discharge to home with [facility] - will need an internal order

## 2025-05-06 NOTE — CONSULTS
Reason For Consult  Acute kidney injury    History Of Present Illness  Fartun Carey is a 89 y.o. female with a past medical history of diabetes mellitus, hypertension, chronic kidney disease stage III (baseline creatinine 1.1-1.4) who presented to the hospital with a cough, shortness of breath, bronchitis.  Says these symptoms are improving but she does report poor appetite.  Notably she received contrast on 5/3.  Creatinine trended up to 2.2 and is now starting to trend down to 1.99.  Denies any NSAID use.  Denies having seen a nephrologist before.  We are consulted management of acute kidney injury on chronic kidney disease stage III.     Past Medical History  She has a past medical history of Diabetes (Multi), Diabetes mellitus (Multi), Disease of thyroid gland, Hypertension, and Renal disease.    Surgical History  She has a past surgical history that includes Hysterectomy; Back surgery; and Appendectomy.     Social History  She reports that she has never smoked. She has never used smokeless tobacco. She reports that she does not drink alcohol and does not use drugs.    Family History  Family History[1]     Allergies  Amoxicillin, Amoxicillin-pot clavulanate, Glimepiride, Lisinopril, Metformin hcl, Tetracycline hcl, and Cephalexin    Review of Systems  10 point review of systems was obtained and is negative other than what is indicated above in the HPI     Physical Exam  General: Awake, alert, no acute distress  Head/ears/nose/throat:  Normocephalic, atraumatic, mild dry mucous membranes  Neck:  No jugular venous distention, neck supple  Respiratory: Mildly coarse breath sounds, normal respiratory effort  Cardiovascular: Some distant heart sounds however no rubs  Gastrointestinal:  Soft, positive bowel sounds, no rebound or guarding  Extremities:  No edema, cyanosis  Musculoskeletal:  No injury or deformity noted  Neurologic:  Alert, responsive, cooperative with exam  Skin:  No ulcers noted, dry          I&O  24HR    Intake/Output Summary (Last 24 hours) at 5/6/2025 1136  Last data filed at 5/6/2025 0731  Gross per 24 hour   Intake 810.83 ml   Output 1425 ml   Net -614.17 ml       Vitals 24HR  Heart Rate:  [48-84]   Temp:  [36 °C (96.8 °F)-37 °C (98.6 °F)]   Resp:  [16-18]   BP: (105-156)/(44-59)   SpO2:  [88 %-98 %]       Relevant Results  Results for orders placed or performed during the hospital encounter of 05/03/25 (from the past 24 hours)   Transthoracic Echo (TTE) Limited   Result Value Ref Range    AV pk luz maria 2.14 m/s    LVOT diam 1.80 cm    AV mn grad 10 mmHg    MV E/A ratio 0.90     LV EF 63 %    RVSP 32.6 mmHg    LVIDd 4.37 cm    AV pk grad 18 mmHg    Aortic Valve Area by Continuity of VTI 0.98 cm2    Aortic Valve Area by Continuity of Peak Velocity 1.06 cm2    LV A4C EF 62.2    Blood Culture    Specimen: Peripheral Venipuncture; Blood culture   Result Value Ref Range    Blood Culture Loaded on Instrument - Culture in progress    Blood Culture    Specimen: Peripheral Venipuncture; Blood culture   Result Value Ref Range    Blood Culture Loaded on Instrument - Culture in progress    Urinalysis with Reflex Culture and Microscopic   Result Value Ref Range    Color, Urine Yellow Light-Yellow, Yellow, Dark-Yellow    Appearance, Urine Turbid (N) Clear    Specific Gravity, Urine 1.019 1.005 - 1.035    pH, Urine 5.0 5.0, 5.5, 6.0, 6.5, 7.0, 7.5, 8.0    Protein, Urine NEGATIVE NEGATIVE, 10 (TRACE), 20 (TRACE) mg/dL    Glucose, Urine Normal Normal mg/dL    Blood, Urine NEGATIVE NEGATIVE mg/dL    Ketones, Urine NEGATIVE NEGATIVE mg/dL    Bilirubin, Urine NEGATIVE NEGATIVE mg/dL    Urobilinogen, Urine Normal Normal mg/dL    Nitrite, Urine NEGATIVE NEGATIVE    Leukocyte Esterase, Urine 250 Sarahi/uL (A) NEGATIVE   Extra Urine Gray Tube   Result Value Ref Range    Extra Tube 293    Microscopic Only, Urine   Result Value Ref Range    WBC, Urine >50 (A) 1-5, NONE /HPF    RBC, Urine 3-5 NONE, 1-2, 3-5 /HPF    Squamous Epithelial  Cells, Urine 1-9 (SPARSE) Reference range not established. /HPF    Bacteria, Urine 3+ (A) NONE SEEN /HPF    Mucus, Urine FEW Reference range not established. /LPF    Hyaline Casts, Urine 2+ (A) NONE /LPF   Basic metabolic panel   Result Value Ref Range    Glucose 116 (H) 74 - 99 mg/dL    Sodium 131 (L) 136 - 145 mmol/L    Potassium 4.1 3.5 - 5.3 mmol/L    Chloride 97 (L) 98 - 107 mmol/L    Bicarbonate 24 21 - 32 mmol/L    Anion Gap 14 10 - 20 mmol/L    Urea Nitrogen 54 (H) 6 - 23 mg/dL    Creatinine 2.18 (H) 0.50 - 1.05 mg/dL    eGFR 20 (L) >60 mL/min/1.73m*2    Calcium 7.7 (L) 8.6 - 10.3 mg/dL   Magnesium   Result Value Ref Range    Magnesium 1.57 (L) 1.60 - 2.40 mg/dL   CBC   Result Value Ref Range    WBC 10.0 4.4 - 11.3 x10*3/uL    nRBC 0.0 0.0 - 0.0 /100 WBCs    RBC 2.90 (L) 4.00 - 5.20 x10*6/uL    Hemoglobin 8.7 (L) 12.0 - 16.0 g/dL    Hematocrit 27.9 (L) 36.0 - 46.0 %    MCV 96 80 - 100 fL    MCH 30.0 26.0 - 34.0 pg    MCHC 31.2 (L) 32.0 - 36.0 g/dL    RDW 14.7 (H) 11.5 - 14.5 %    Platelets 136 (L) 150 - 450 x10*3/uL   Basic Metabolic Panel   Result Value Ref Range    Glucose 202 (H) 74 - 99 mg/dL    Sodium 130 (L) 136 - 145 mmol/L    Potassium 4.5 3.5 - 5.3 mmol/L    Chloride 97 (L) 98 - 107 mmol/L    Bicarbonate 22 21 - 32 mmol/L    Anion Gap 16 10 - 20 mmol/L    Urea Nitrogen 56 (H) 6 - 23 mg/dL    Creatinine 1.99 (H) 0.50 - 1.05 mg/dL    eGFR 23 (L) >60 mL/min/1.73m*2    Calcium 7.6 (L) 8.6 - 10.3 mg/dL   Vancomycin   Result Value Ref Range    Vancomycin 8.9 5.0 - 20.0 ug/mL   Creatine Kinase   Result Value Ref Range    Creatine Kinase 149 0 - 215 U/L   Magnesium   Result Value Ref Range    Magnesium 1.68 1.60 - 2.40 mg/dL     *Note: Due to a large number of results and/or encounters for the requested time period, some results have not been displayed. A complete set of results can be found in Results Review.          Assessment & Plan  Bronchitis    Hyperlipidemia    Hypertension    Stage 3b chronic  kidney disease (Multi)    Acute respiratory failure with hypoxia    Blood bacterial culture positive    Acute kidney injury likely secondary to combination of IV contrast and volume depletion from poor p.o. intake  Chronic disease stage III (baseline creatinine 1.1-1.4)  Bronchitis  Hypertension  Hypomagnesemia, resolved  Iron deficiency anemia    Plan: Continue IV fluids for likely one more day, ordered normal saline at 80 cc an hour.  Check renal ultrasound.  Monitor urine culture results.  On IV Venofer for iron deficiency.  Agree with holding the ARB.  Thank you for your consultation.    Celso Gaxiola MD         [1]   Family History  Problem Relation Name Age of Onset    Heart disease Mother      Heart disease Father      Heart disease Brother      No Known Problems Son      Heart disease Maternal Grandmother      Heart disease Maternal Grandfather      Heart disease Paternal Grandmother      Heart disease Paternal Grandfather

## 2025-05-06 NOTE — ASSESSMENT & PLAN NOTE
May be related to her bronchitis  Down to room air today   Last echo showed LVEF 60%   Limited echo 5/5 with preserved LVEF

## 2025-05-06 NOTE — CARE PLAN
The patient's goals for the shift include  rest   Problem: Safety - Adult  Goal: Free from fall injury  Outcome: Progressing     Problem: Pain - Adult  Goal: Verbalizes/displays adequate comfort level or baseline comfort level  Outcome: Progressing     Problem: Nutrition  Goal: Nutrient intake appropriate for maintaining nutritional needs  Outcome: Progressing       The clinical goals for the shift include monitor vitals

## 2025-05-06 NOTE — CARE PLAN
The patient's goals for the shift include      The clinical goals for the shift include Comfort, rest

## 2025-05-06 NOTE — PROGRESS NOTES
"Fartun Carey is a 89 y.o. female on day 3 of admission seen in follow-up for acute hypoxic respiratory failure, lung nodules, acute bronchitis    Subjective   On 2 L nasal cannula oxygen; oxygen sats %. \"I'm feeling much better today.\" Denies pain. Afebrile.     Objective     Physical Exam  Vitals and nursing note reviewed.   Constitutional:       Appearance: She is obese.   HENT:      Head: Normocephalic and atraumatic.      Nose: Nose normal.      Mouth/Throat:      Mouth: Mucous membranes are moist.   Eyes:      Extraocular Movements: Extraocular movements intact.      Conjunctiva/sclera: Conjunctivae normal.      Pupils: Pupils are equal, round, and reactive to light.   Cardiovascular:      Rate and Rhythm: Normal rate and regular rhythm.      Pulses: Normal pulses.      Heart sounds: Normal heart sounds.   Pulmonary:      Effort: Pulmonary effort is normal.      Comments: Lungs diminished but clear.   Abdominal:      General: Bowel sounds are normal.      Palpations: Abdomen is soft.   Musculoskeletal:         General: Normal range of motion.   Skin:     General: Skin is warm and dry.      Capillary Refill: Capillary refill takes less than 2 seconds.   Neurological:      General: No focal deficit present.      Mental Status: She is alert and oriented to person, place, and time.   Psychiatric:         Mood and Affect: Mood normal.         Behavior: Behavior normal.         Last Recorded Vitals  Blood pressure 156/59, pulse 63, temperature 36.5 °C (97.7 °F), temperature source Temporal, resp. rate 16, height 1.549 m (5' 1\"), weight 72.6 kg (160 lb), SpO2 96%.      Intake/Output Summary (Last 24 hours) at 5/6/2025 0841  Last data filed at 5/6/2025 0731  Gross per 24 hour   Intake 810.83 ml   Output 1425 ml   Net -614.17 ml      Scheduled medications:  aspirin, 81 mg, oral, Daily  atorvastatin, 80 mg, oral, Nightly  cloNIDine, 0.2 mg, oral, BID  furosemide, 20 mg, oral, Daily  hydrALAZINE, 25 mg, oral, " TID  ipratropium-albuteroL, 3 mL, nebulization, TID  levoFLOXacin, 500 mg, intravenous, q24h  levothyroxine, 88 mcg, oral, Daily  losartan, 100 mg, oral, Daily  methylPREDNISolone sodium succinate (PF), 40 mg, intravenous, q12h  pantoprazole, 20 mg, oral, Daily before breakfast  SITagliptin phosphate, 50 mg, oral, Daily    PRN medications: acetaminophen **OR** acetaminophen **OR** acetaminophen, benzocaine-menthol, bisacodyl, dextromethorphan-guaifenesin, guaiFENesin, hydrocodone-homatropine, hydrOXYzine HCL, ipratropium-albuteroL, ondansetron ODT **OR** ondansetron, phenoL, polyethylene glycol    Relevant Results  Results for orders placed or performed during the hospital encounter of 05/03/25 (from the past 24 hours)   Transthoracic Echo (TTE) Limited   Result Value Ref Range    AV pk luz maria 2.14 m/s    LVOT diam 1.80 cm    AV mn grad 10 mmHg    MV E/A ratio 0.90     LV EF 63 %    RVSP 32.6 mmHg    LVIDd 4.37 cm    AV pk grad 18 mmHg    Aortic Valve Area by Continuity of VTI 0.98 cm2    Aortic Valve Area by Continuity of Peak Velocity 1.06 cm2    LV A4C EF 62.2    Blood Culture    Specimen: Peripheral Venipuncture; Blood culture   Result Value Ref Range    Blood Culture Loaded on Instrument - Culture in progress    Blood Culture    Specimen: Peripheral Venipuncture; Blood culture   Result Value Ref Range    Blood Culture Loaded on Instrument - Culture in progress    Urinalysis with Reflex Culture and Microscopic   Result Value Ref Range    Color, Urine Yellow Light-Yellow, Yellow, Dark-Yellow    Appearance, Urine Turbid (N) Clear    Specific Gravity, Urine 1.019 1.005 - 1.035    pH, Urine 5.0 5.0, 5.5, 6.0, 6.5, 7.0, 7.5, 8.0    Protein, Urine NEGATIVE NEGATIVE, 10 (TRACE), 20 (TRACE) mg/dL    Glucose, Urine Normal Normal mg/dL    Blood, Urine NEGATIVE NEGATIVE mg/dL    Ketones, Urine NEGATIVE NEGATIVE mg/dL    Bilirubin, Urine NEGATIVE NEGATIVE mg/dL    Urobilinogen, Urine Normal Normal mg/dL    Nitrite, Urine  NEGATIVE NEGATIVE    Leukocyte Esterase, Urine 250 Sarahi/uL (A) NEGATIVE   Extra Urine Gray Tube   Result Value Ref Range    Extra Tube 293    Microscopic Only, Urine   Result Value Ref Range    WBC, Urine >50 (A) 1-5, NONE /HPF    RBC, Urine 3-5 NONE, 1-2, 3-5 /HPF    Squamous Epithelial Cells, Urine 1-9 (SPARSE) Reference range not established. /HPF    Bacteria, Urine 3+ (A) NONE SEEN /HPF    Mucus, Urine FEW Reference range not established. /LPF    Hyaline Casts, Urine 2+ (A) NONE /LPF   Basic metabolic panel   Result Value Ref Range    Glucose 116 (H) 74 - 99 mg/dL    Sodium 131 (L) 136 - 145 mmol/L    Potassium 4.1 3.5 - 5.3 mmol/L    Chloride 97 (L) 98 - 107 mmol/L    Bicarbonate 24 21 - 32 mmol/L    Anion Gap 14 10 - 20 mmol/L    Urea Nitrogen 54 (H) 6 - 23 mg/dL    Creatinine 2.18 (H) 0.50 - 1.05 mg/dL    eGFR 20 (L) >60 mL/min/1.73m*2    Calcium 7.7 (L) 8.6 - 10.3 mg/dL   Magnesium   Result Value Ref Range    Magnesium 1.57 (L) 1.60 - 2.40 mg/dL   CBC   Result Value Ref Range    WBC 10.0 4.4 - 11.3 x10*3/uL    nRBC 0.0 0.0 - 0.0 /100 WBCs    RBC 2.90 (L) 4.00 - 5.20 x10*6/uL    Hemoglobin 8.7 (L) 12.0 - 16.0 g/dL    Hematocrit 27.9 (L) 36.0 - 46.0 %    MCV 96 80 - 100 fL    MCH 30.0 26.0 - 34.0 pg    MCHC 31.2 (L) 32.0 - 36.0 g/dL    RDW 14.7 (H) 11.5 - 14.5 %    Platelets 136 (L) 150 - 450 x10*3/uL   Basic Metabolic Panel   Result Value Ref Range    Glucose 202 (H) 74 - 99 mg/dL    Sodium 130 (L) 136 - 145 mmol/L    Potassium 4.5 3.5 - 5.3 mmol/L    Chloride 97 (L) 98 - 107 mmol/L    Bicarbonate 22 21 - 32 mmol/L    Anion Gap 16 10 - 20 mmol/L    Urea Nitrogen 56 (H) 6 - 23 mg/dL    Creatinine 1.99 (H) 0.50 - 1.05 mg/dL    eGFR 23 (L) >60 mL/min/1.73m*2    Calcium 7.6 (L) 8.6 - 10.3 mg/dL   Vancomycin   Result Value Ref Range    Vancomycin 8.9 5.0 - 20.0 ug/mL     *Note: Due to a large number of results and/or encounters for the requested time period, some results have not been displayed. A complete set of  results can be found in Results Review.      Transthoracic Echo (TTE) Limited With Doppler And Color  Result Date: 5/05/20265  CONCLUSIONS: 1. The left ventricular systolic function is normal, with a visually estimated ejection fraction of 60-65%. 2. Trace mitral valve regurgitation. 3. Mild to moderate tricuspid regurgitation. 4. Mild aortic valve stenosis.   5. The peak instantaneous gradient of the aortic valve is 18 mmHg.    CT angio chest for pulmonary embolism  Result Date: 5/3/2025  Interpreted By:  Pancho Barth, STUDY: CT ANGIO CHEST FOR PULMONARY EMBOLISM;  5/3/2025 5:41 pm   INDICATION: Signs/Symptoms:tachycardic, hypoxic, concern for PE vs pneumonia.   COMPARISON: 2023   ACCESSION NUMBER(S): [id number]   ORDERING CLINICIAN: VIVIANE RIELY   TECHNIQUE: Helical data acquisition of the chest was obtained after intravenous administration of 75 ML Omnipaque 350 as per PE protocol. Images were reformatted in coronal and sagittal planes. Axial and coronal maximum intensity projection (MIP) images were created and reviewed.   FINDINGS: Ground-glass increasing left upper lobe nodule measures 1.7 cm on image 94 concerning for slow growing neoplasm   Other numerous bilateral pulmonary nodules appear similar in size. Reference right lower lobe nodule measuring a cm on image 163. Reference 8 mm right lower lobe nodule on image 184. Other numerous nodules detected remain stable   Mosaic attenuation in the lungs suggesting small airway or small vessel inflammation. No findings of acute pulmonary embolism. Coronary calcification no evidence of acute thoracic pathology.   Changes of old healed granulomas disease. Robust vascular calcification.   Nephroliths detected. Largest visualized stone measures 9 mm.   Demineralized bones  1. No evidence of acute pulmonary embolism 2. Senescent changes. Heart size is enlarged. Changes of old healed granulomas disease 3. Multiple stable pulmonary nodules. Enlarging left upper  lobe ground-glass nodule now measuring under 2 cm concerning for slow growing neoplasm. 4. Bronchial thickening noted. Mild mosaic attenuation seen in the lungs. Query bronchial inflammation. Senescent changes.       XR chest 2 views  Result Date: 5/3/2025  FINDINGS: Stable heart and mediastinal contours. No pneumothorax or pleural fluid. No focal consolidation or alveolar edema. Mild atherosclerotic calcification of the aortic arch. Mild elevation of the right hemidiaphragm.  1.  No radiographic evidence of acute cardiopulmonary disease.           XR cervical spine 2-3 views  Result Date: 5/2/2025  FINDINGS:   Reversal normal cervical lordosis is seen.   Osteopenia is present. The prevertebral soft tissues are unremarkable.   Endplate sclerosis and spur formation is seen throughout the cervical spine. Narrowing of C3-4, C4-5 C5-6 and C6-7 disc space is seen. Uncovertebral joint hypertrophy seen.   No acute fracture or dislocation is seen.  Reversal normal cervical lordosis and degenerative changes.          Assessment & Plan    Acute hypoxic respiratory failure  Wean oxygen as sats allow  Continue nebulized bronchodilator  Continuous pulse oximetry  Incentive spirometry/pulmonary hygiene     Lung nodules  CT angio with multiple stable pulmonary nodules, enlarging left upper lobe ground-glass nodule now measuring under 2 cm concerning for slow growing neoplasm  CT scan chest wo IV contrast October '23 with numerous noncalcified pulmonary nodules bilaterally concerning for pulmonary metastatic disease, largest in the right lung measures approximately 9 mm, the largest in the left lung measures approximately 8 [id number] in 3 months is recommended  Patient seen by Dr. Plascencia for lung nodules when hospitalized here October '23 because patient was asymptomatic and given her age recommended deferring aggressive pursuit of biopsy at that time and outpatient follow-up imaging-patient does not recall getting any scans  after this  PET-CT scan as outpatient    Acute bronchitis  1/2 blood cultures with gram-with Staphylococcus capitis-likely a contaminant  Repeat blood cultures pending  Continue emperic IV levofloxacin, IV vancomycin given x 1 dose  IV Solu-Medrol-will decrease dose  Sputum if able  Follow-up cultures  FEV 1 when optimized     Acute kidney injury on chronic kidney disease stage lllb  Lasix on hold  Hold Nephrotoxic agents  Nephrology consultation    Prophylaxis  Subcutaneous heparin  Protonix    PT/OT/out of bed    Whit Miguel, APRN-CNP  Pulmonary & Critical Care Medicine

## 2025-05-06 NOTE — PROGRESS NOTES
Vancomycin Dosing by Pharmacy- [facility] INITIAL    Fartun Carey is a 89 y.o. year old female who Pharmacy has been consulted for vancomycin dosing for positive blood cultures. Based on the patient's indication and renal status this patient will be dosed based on a target level of Other Indication: 15-20 mcg/mL    Patient is currently in [facility].  - Patient admitted with Stage 3b CKD- sCr increased more than 50% compared to baseline after initial vancomycin dose.    Initial Dosin mg x 1    Visit Vitals  /59 (BP Location: Left arm, Patient Position: Lying)   Pulse 63   Temp 36.5 °C (97.7 °F) (Temporal)   Resp 16           Lab Results   Component Value Date    CREATININE 1.99 (H) 2025    CREATININE 2.18 (H) 2025    CREATININE 1.28 (H) 2025    CREATININE 1.24 (H) 2025    CREATININE 1.54 (H) 2025       I/O last 3 completed shifts:  In: 860.8 (11.9 mL/kg) [P.O.:100; I.V.:260.8 (3.6 mL/kg); IV Piggyback:500]  Out: 1350 (18.6 mL/kg) [Urine:1350 (0.5 mL/kg/hr)]  Weight: 72.6 kg     Lab Results   Component Value Date    PATIENTTEMP 37.0 09/15/2018          Assessment/Plan     Random level within 24 hours of first dose will be obtained on  at 0500. May be obtained sooner if clinically indicated.   Will continue to monitor renal function daily while on vancomycin and order serum creatinine at least every 48 hours if not already ordered.  Follow for continued vancomycin needs, clinical response, and signs/symptoms of toxicity.     HUGO MONTERROSO

## 2025-05-06 NOTE — PROGRESS NOTES
Physical Therapy    Physical Therapy Treatment    Patient Name: Fartun Carey  MRN: [id number]  Department: [facility] 3 S  Room: [address] [place], [place] [address]  Today's Date: 5/6/2025  Time Calculation  Start Time: 0843  Stop Time: 0908  Time Calculation (min): 25 min         Assessment/Plan   PT Assessment  PT Assessment Results: Decreased strength, Decreased endurance, Impaired balance, Decreased mobility, Decreased safety awareness  Rehab Prognosis: Good  Barriers to Discharge Home: Physical needs  Physical Needs: Ambulating household distances limited by function/safety  Evaluation/Treatment Tolerance: Patient limited by fatigue  Medical Staff Made Aware: Yes  Strengths: Attitude of self, Premorbid level of function, Ability to acquire knowledge  Barriers to Participation: Comorbidities  End of Session Communication: PCT/NA/CTA  Assessment Comment: Pt gives effort as able, limited tolerance to  activity, quick fatigue, desat to mid 80's, now on RA.  End of Session Patient Position: Bed, 3 rail up, Alarm on  PT Plan  Inpatient/Swing Bed or Outpatient: Inpatient  PT Plan  Treatment/Interventions: Bed mobility, Transfer training, Gait training, Balance training, Strengthening, Endurance training, Therapeutic exercise, Therapeutic activity  PT Plan: Ongoing PT  PT Frequency: 4 times per week (increased to progress activity as tolerated)  PT Discharge Recommendations: Moderate intensity level of continued care  Equipment Recommended upon Discharge: Wheeled walker  PT Recommended Transfer Status: Assist x1, Assist x2, Assistive device  PT - OK to Discharge: Yes      General Visit Information:   PT  Visit  PT Received On: 05/06/25  General  Reason for Referral: Impaired mobility secondary to generalized weakness, SOB and bronchitis.  Referred By: Dr. Edward  Past Medical History Relevant to Rehab: DM type 2, OA, pulmonary nodule, hyponatremia, PVD  Family/Caregiver Present: No  Prior to Session Communication: PCT/NA/CTA  Patient Position  Received: Up in chair, Alarm on  Preferred Learning Style: verbal, visual  General Comment: Pt reports feeling tired, wanting to get back to bed    Subjective   Precautions:  Precautions  Hearing/Visual Limitations: + glasses, bilateral hearing aides  Medical Precautions: Fall precautions (currrently on RA)     Date/Time Vitals Session Patient Position Pulse Resp SpO2 BP MAP (mmHg)    05/06/25 0843 Pre PT  Sitting  75  --  88 %  --  --           Vital Signs Comment: Pt able to increase to 94% w/ cues for breathing. 92% at end of session at rest     Objective   Pain:  Pain Assessment  Pain Assessment: 0-10  0-10 (Numeric) Pain Score: 0 - No pain  Cognition:  Cognition  Overall Cognitive Status: Within Functional Limits  Coordination:  Coordination Comment: a little shaky w/ increased activity and fatigue  Postural Control:  Dynamic Standing Balance  Dynamic Standing-Balance Support: Bilateral upper extremity supported  Dynamic Standing-Level of Assistance: Minimum assistance, Contact guard  Dynamic Standing-Balance: Turning (steps at bedside)    Activity Tolerance:  Activity Tolerance  Endurance: Decreased tolerance for upright activites  Activity Tolerance Comments: Fair-/Fair (quick fatigue)  Treatments:  Therapeutic Exercise  Therapeutic Exercise Activity 1: Pt performed supine bilat ankle pumps, quad sets, glute sets x 20 reps, heel slides, hip abd slides, SAQs x 15 reps, occasional rest break during exercise session.    Bed Mobility 1  Bed Mobility 1: Sitting to supine  Level of Assistance 1: Close supervision  Bed Mobility Comments 1: slight effort to get LEs into bed    Ambulation/Gait Training  Ambulation/Gait Training Performed: Yes  Ambulation/Gait Training 1  Surface 1: Level tile  Device 1: Rolling walker  Assistance 1: Minimum assistance, Contact guard  Comments/Distance (ft) 1: Pt able to take steps forward and back at bedside from bed to chair, ~ 3', pt moving slowly, no buckling, however, pt w/ quick  fatigue, starting to get a little shaky, needing to sit. Pt w/ SpO2 87% post amb, HR up to 103; recovery ~ 1 minute to 94%, HR 83.  Transfer 1  Technique 1: Sit to stand, Stand to sit  Transfer Device 1: Walker  Transfer Level of Assistance 1: Minimum assistance  Trials/Comments 1: from chair w/ arms to EOB, good hand placement, moves slowly, pauses before making next move. (quick fatigue)    Outcome Measures:  [facility] Basic Mobility  Turning from your back to your side while in a flat bed without using bedrails: A little  Moving from lying on your back to sitting on the side of a flat bed without using bedrails: A little  Moving to and from bed to chair (including a wheelchair): A little  Standing up from a chair using your arms (e.g. wheelchair or bedside chair): A lot  To walk in hospital room: A lot  Climbing 3-5 steps with railing: Total  Basic Mobility - Total Score: 14    Education Documentation  Home Exercise Program, taught by Court Ramirez, PT at 5/6/2025  9:42 AM.  Learner: Patient  Readiness: Acceptance  Method: Explanation, Demonstration  Response: Verbalizes Understanding    Mobility Training, taught by Court Ramirez, PT at 5/6/2025  9:42 AM.  Learner: Patient  Readiness: Acceptance  Method: Explanation, Demonstration  Response: Verbalizes Understanding    Education Comments  No comments found.        OP EDUCATION:       Encounter Problems       Encounter Problems (Active)       Balance       LTG - Patient will maintain balance to allow for safe mobility (Progressing)       Start:  05/04/25    Expected End:  05/18/25       Patient will maintain balance to allow for safe mobility with decreased risk for falls.         STG - Maintains dynamic standing balance with upper extremity support (Progressing)       Start:  05/04/25    Expected End:  05/11/25       INTERVENTIONS:1. Practice standing with minimal support.2. Educate patient about standing tolerance.3. Educate patient about independence with gait,  transfers, and ADL's.4. Educate patient about use of assistive device.5. Educate patient about self-directed care.            Mobility       LTG - Patient will ambulate household distance (Progressing)       Start:  05/04/25    Expected End:  05/18/25       Patient will ambulate household distance with rolling walker in order to return to PLOF with decreased risk for falls.         STG - Patient will ambulate up and down a curb/step (Progressing)       Start:  05/04/25    Expected End:  05/11/25       Patient will ambulate up and down a curb/step in order to enter/exit home.            PT Transfers       LTG - Patient will transfer from one surface to another (Progressing)       Start:  05/04/25    Expected End:  05/18/25       Patient will transfer from one surface to another with Ekaterina in order to return to PLOF.         STG - Patient will transfer sit to and from stand (Progressing)       Start:  05/04/25    Expected End:  05/11/25       Patient will transfer sit to and from stand with CGA in order to increase level of independence.            Pain - Adult

## 2025-05-06 NOTE — ASSESSMENT & PLAN NOTE
Home meds held in the setting of hypotension,. [facility]  - Plan to resume as tolerated   Vitals as ordered   Check AM labs

## 2025-05-07 ENCOUNTER — PHARMACY VISIT (OUTPATIENT)
Dept: PHARMACY | Facility: CLINIC | Age: OVER 89
End: 2025-05-07
Payer: MEDICARE

## 2025-05-07 ENCOUNTER — HOME HEALTH ADMISSION (OUTPATIENT)
Dept: HOME HEALTH SERVICES | Facility: HOME HEALTH | Age: OVER 89
End: 2025-05-07
Payer: MEDICARE

## 2025-05-07 ENCOUNTER — APPOINTMENT (OUTPATIENT)
Dept: PHYSICAL THERAPY | Facility: CLINIC | Age: OVER 89
End: 2025-05-07
Payer: MEDICARE

## 2025-05-07 PROBLEM — J40 BRONCHITIS: Status: RESOLVED | Noted: 2025-05-03 | Resolved: 2025-05-07

## 2025-05-07 PROBLEM — J96.01 ACUTE RESPIRATORY FAILURE WITH HYPOXIA: Status: RESOLVED | Noted: 2025-05-03 | Resolved: 2025-05-07

## 2025-05-07 PROBLEM — R78.81 BLOOD BACTERIAL CULTURE POSITIVE: Status: RESOLVED | Noted: 2025-05-05 | Resolved: 2025-05-07

## 2025-05-07 LAB
ANION GAP SERPL CALCULATED.3IONS-SCNC: 12 MMOL/L (ref 10–20)
BUN SERPL-MCNC: 53 MG/DL (ref 6–23)
CALCIUM SERPL-MCNC: 8.1 MG/DL (ref 8.6–10.3)
CHLORIDE SERPL-SCNC: 106 MMOL/L (ref 98–107)
CO2 SERPL-SCNC: 23 MMOL/L (ref 21–32)
CREAT SERPL-MCNC: 1.53 MG/DL (ref 0.5–1.05)
EGFRCR SERPLBLD CKD-EPI 2021: 31 ML/MIN/1.73M*2
ERYTHROCYTE [DISTWIDTH] IN BLOOD BY AUTOMATED COUNT: 14.4 % (ref 11.5–14.5)
GLUCOSE SERPL-MCNC: 175 MG/DL (ref 74–99)
HCT VFR BLD AUTO: 30.4 % (ref 36–46)
HGB BLD-MCNC: 9.6 G/DL (ref 12–16)
M PNEUMO IGM SER IA-ACNC: 0.29 U/L
MCH RBC QN AUTO: 29.6 PG (ref 26–34)
MCHC RBC AUTO-ENTMCNC: 31.6 G/DL (ref 32–36)
MCV RBC AUTO: 94 FL (ref 80–100)
NRBC BLD-RTO: 0 /100 WBCS (ref 0–0)
PLATELET # BLD AUTO: 153 X10*3/UL (ref 150–450)
POTASSIUM SERPL-SCNC: 4.4 MMOL/L (ref 3.5–5.3)
RBC # BLD AUTO: 3.24 X10*6/UL (ref 4–5.2)
SODIUM SERPL-SCNC: 137 MMOL/L (ref 136–145)
VANCOMYCIN SERPL-MCNC: 6.3 UG/ML (ref 5–20)
WBC # BLD AUTO: 11 X10*3/UL (ref 4.4–11.3)

## 2025-05-07 PROCEDURE — 99232 SBSQ HOSP IP/OBS MODERATE 35: CPT | Performed by: NURSE PRACTITIONER

## 2025-05-07 PROCEDURE — 2500000001 HC RX 250 WO HCPCS SELF ADMINISTERED DRUGS (ALT 637 FOR MEDICARE OP): Performed by: STUDENT IN AN ORGANIZED HEALTH CARE EDUCATION/TRAINING PROGRAM

## 2025-05-07 PROCEDURE — 36415 COLL VENOUS BLD VENIPUNCTURE: CPT | Performed by: NURSE PRACTITIONER

## 2025-05-07 PROCEDURE — 2500000004 HC RX 250 GENERAL PHARMACY W/ HCPCS (ALT 636 FOR OP/ED): Mod: JW | Performed by: NURSE PRACTITIONER

## 2025-05-07 PROCEDURE — 2500000001 HC RX 250 WO HCPCS SELF ADMINISTERED DRUGS (ALT 637 FOR MEDICARE OP): Performed by: NURSE PRACTITIONER

## 2025-05-07 PROCEDURE — 1100000001 HC PRIVATE ROOM DAILY

## 2025-05-07 PROCEDURE — 2500000004 HC RX 250 GENERAL PHARMACY W/ HCPCS (ALT 636 FOR OP/ED): Mod: JZ | Performed by: INTERNAL MEDICINE

## 2025-05-07 PROCEDURE — 2500000001 HC RX 250 WO HCPCS SELF ADMINISTERED DRUGS (ALT 637 FOR MEDICARE OP)

## 2025-05-07 PROCEDURE — 85027 COMPLETE CBC AUTOMATED: CPT | Performed by: NURSE PRACTITIONER

## 2025-05-07 PROCEDURE — 2500000004 HC RX 250 GENERAL PHARMACY W/ HCPCS (ALT 636 FOR OP/ED): Mod: JZ | Performed by: NURSE PRACTITIONER

## 2025-05-07 PROCEDURE — 94669 MECHANICAL CHEST WALL OSCILL: CPT

## 2025-05-07 PROCEDURE — 97535 SELF CARE MNGMENT TRAINING: CPT | Mod: GO

## 2025-05-07 PROCEDURE — 80048 BASIC METABOLIC PNL TOTAL CA: CPT | Performed by: NURSE PRACTITIONER

## 2025-05-07 PROCEDURE — 97110 THERAPEUTIC EXERCISES: CPT | Mod: GP,CQ

## 2025-05-07 PROCEDURE — 2500000002 HC RX 250 W HCPCS SELF ADMINISTERED DRUGS (ALT 637 FOR MEDICARE OP, ALT 636 FOR OP/ED): Performed by: NURSE PRACTITIONER

## 2025-05-07 PROCEDURE — RXMED WILLOW AMBULATORY MEDICATION CHARGE

## 2025-05-07 PROCEDURE — 97116 GAIT TRAINING THERAPY: CPT | Mod: GP,CQ

## 2025-05-07 PROCEDURE — 80202 ASSAY OF VANCOMYCIN: CPT

## 2025-05-07 RX ORDER — HYDRALAZINE HYDROCHLORIDE 50 MG/1
50 TABLET, FILM COATED ORAL 3 TIMES DAILY
Qty: 90 TABLET | Refills: 1 | Status: SHIPPED | OUTPATIENT
Start: 2025-05-07 | End: 2025-05-12 | Stop reason: HOSPADM

## 2025-05-07 RX ORDER — HYDRALAZINE HYDROCHLORIDE 50 MG/1
50 TABLET, FILM COATED ORAL 3 TIMES DAILY
Status: DISCONTINUED | OUTPATIENT
Start: 2025-05-07 | End: 2025-05-07

## 2025-05-07 RX ORDER — CLONIDINE HYDROCHLORIDE 0.1 MG/1
0.1 TABLET ORAL 2 TIMES DAILY
Status: DISCONTINUED | OUTPATIENT
Start: 2025-05-07 | End: 2025-05-09

## 2025-05-07 RX ORDER — HYDRALAZINE HYDROCHLORIDE 50 MG/1
50 TABLET, FILM COATED ORAL 3 TIMES DAILY
Status: DISCONTINUED | OUTPATIENT
Start: 2025-05-07 | End: 2025-05-09

## 2025-05-07 RX ORDER — GUAIFENESIN 600 MG/1
600 TABLET, EXTENDED RELEASE ORAL 2 TIMES DAILY
Start: 2025-05-07

## 2025-05-07 RX ORDER — LEVOFLOXACIN 500 MG/1
500 TABLET, FILM COATED ORAL DAILY
Qty: 3 TABLET | Refills: 0 | Status: SHIPPED | OUTPATIENT
Start: 2025-05-07 | End: 2025-05-12 | Stop reason: HOSPADM

## 2025-05-07 RX ORDER — PREDNISONE 10 MG/1
TABLET ORAL
Qty: 35 TABLET | Refills: 0 | Status: SHIPPED | OUTPATIENT
Start: 2025-05-07 | End: 2025-05-12 | Stop reason: HOSPADM

## 2025-05-07 RX ADMIN — GUAIFENESIN 600 MG: 600 TABLET ORAL at 21:34

## 2025-05-07 RX ADMIN — METHYLPREDNISOLONE SODIUM SUCCINATE 30 MG: 40 INJECTION, POWDER, FOR SOLUTION INTRAMUSCULAR; INTRAVENOUS at 10:10

## 2025-05-07 RX ADMIN — CLONIDINE HYDROCHLORIDE 0.1 MG: 0.1 TABLET ORAL at 04:28

## 2025-05-07 RX ADMIN — LEVOFLOXACIN 500 MG: 5 INJECTION, SOLUTION INTRAVENOUS at 18:28

## 2025-05-07 RX ADMIN — ATORVASTATIN CALCIUM 80 MG: 80 TABLET, FILM COATED ORAL at 21:34

## 2025-05-07 RX ADMIN — METHYLPREDNISOLONE SODIUM SUCCINATE 20 MG: 40 INJECTION, POWDER, FOR SOLUTION INTRAMUSCULAR; INTRAVENOUS at 21:33

## 2025-05-07 RX ADMIN — PANTOPRAZOLE SODIUM 20 MG: 20 TABLET, DELAYED RELEASE ORAL at 06:00

## 2025-05-07 RX ADMIN — HYDRALAZINE HYDROCHLORIDE 50 MG: 50 TABLET ORAL at 21:34

## 2025-05-07 RX ADMIN — ACETAMINOPHEN 650 MG: 325 TABLET ORAL at 21:41

## 2025-05-07 RX ADMIN — ASPIRIN 81 MG: 81 TABLET, CHEWABLE ORAL at 10:10

## 2025-05-07 RX ADMIN — LEVOTHYROXINE SODIUM 88 MCG: 0.09 TABLET ORAL at 05:59

## 2025-05-07 RX ADMIN — HYDRALAZINE HYDROCHLORIDE 50 MG: 50 TABLET ORAL at 16:17

## 2025-05-07 RX ADMIN — IRON SUCROSE 200 MG: 20 INJECTION, SOLUTION INTRAVENOUS at 05:59

## 2025-05-07 RX ADMIN — HYDRALAZINE HYDROCHLORIDE 50 MG: 50 TABLET ORAL at 10:10

## 2025-05-07 RX ADMIN — CLONIDINE HYDROCHLORIDE 0.1 MG: 0.1 TABLET ORAL at 21:34

## 2025-05-07 RX ADMIN — GUAIFENESIN 600 MG: 600 TABLET ORAL at 10:10

## 2025-05-07 RX ADMIN — SODIUM CHLORIDE 80 ML/HR: 900 INJECTION, SOLUTION INTRAVENOUS at 04:34

## 2025-05-07 ASSESSMENT — ACTIVITIES OF DAILY LIVING (ADL)
HOME_MANAGEMENT_TIME_ENTRY: 38
BATHING_LEVEL_OF_ASSISTANCE: CLOSE SUPERVISION;MINIMAL VERBAL CUES
BATHING_WHERE_ASSESSED: SITTING SINKSIDE
HOME_MANAGEMENT_TIME_ENTRY: 38

## 2025-05-07 ASSESSMENT — COGNITIVE AND FUNCTIONAL STATUS - GENERAL
HELP NEEDED FOR BATHING: A LITTLE
EATING MEALS: A LITTLE
MOVING FROM LYING ON BACK TO SITTING ON SIDE OF FLAT BED WITH BEDRAILS: A LITTLE
DAILY ACTIVITIY SCORE: 17
HELP NEEDED FOR BATHING: A LITTLE
PERSONAL GROOMING: A LITTLE
TOILETING: A LITTLE
MOBILITY SCORE: 16
MOVING TO AND FROM BED TO CHAIR: A LITTLE
DAILY ACTIVITIY SCORE: 18
WALKING IN HOSPITAL ROOM: A LITTLE
CLIMB 3 TO 5 STEPS WITH RAILING: TOTAL
STANDING UP FROM CHAIR USING ARMS: A LITTLE
PERSONAL GROOMING: A LITTLE
DRESSING REGULAR UPPER BODY CLOTHING: A LITTLE
EATING MEALS: A LITTLE
CLIMB 3 TO 5 STEPS WITH RAILING: A LOT
WALKING IN HOSPITAL ROOM: A LOT
DRESSING REGULAR UPPER BODY CLOTHING: A LITTLE
MOVING TO AND FROM BED TO CHAIR: A LITTLE
STANDING UP FROM CHAIR USING ARMS: A LITTLE
DRESSING REGULAR LOWER BODY CLOTHING: A LITTLE
TOILETING: A LOT
DRESSING REGULAR LOWER BODY CLOTHING: A LITTLE
TURNING FROM BACK TO SIDE WHILE IN FLAT BAD: A LITTLE
MOBILITY SCORE: 18

## 2025-05-07 ASSESSMENT — PAIN SCALES - GENERAL
PAINLEVEL_OUTOF10: 3
PAINLEVEL_OUTOF10: 0 - NO PAIN
PAINLEVEL_OUTOF10: 0 - NO PAIN

## 2025-05-07 ASSESSMENT — PAIN DESCRIPTION - DESCRIPTORS: DESCRIPTORS: ACHING

## 2025-05-07 ASSESSMENT — PAIN - FUNCTIONAL ASSESSMENT
PAIN_FUNCTIONAL_ASSESSMENT: 0-10

## 2025-05-07 ASSESSMENT — PAIN DESCRIPTION - LOCATION: LOCATION: BACK

## 2025-05-07 NOTE — CARE PLAN
The patient's goals for the shift include      The clinical goals for the shift include remain hemodynamically stable      Problem: Pain - Adult  Goal: Verbalizes/displays adequate comfort level or baseline comfort level  Outcome: Progressing     Problem: Safety - Adult  Goal: Free from fall injury  Outcome: Progressing     Problem: Discharge Planning  Goal: Discharge to home or other facility with appropriate resources  Outcome: Progressing     Problem: Chronic Conditions and Co-morbidities  Goal: Patient's chronic conditions and co-morbidity symptoms are monitored and maintained or improved  Outcome: Progressing     Problem: Nutrition  Goal: Nutrient intake appropriate for maintaining nutritional needs  Outcome: Progressing     Problem: Fall/Injury  Goal: Verbalize understanding of risk factor reduction measures to prevent injury from fall in the home  Outcome: Progressing     Problem: Fall/Injury  Goal: Not fall by end of shift  Outcome: Met  Goal: Be free from injury by end of the shift  Outcome: Met  Goal: Verbalize understanding of personal risk factors for fall in the hospital  Outcome: Met  Goal: Use assistive devices by end of the shift  Outcome: Met  Goal: Pace activities to prevent fatigue by end of the shift  Outcome: Met

## 2025-05-07 NOTE — PROGRESS NOTES
Fartun Carey is a 89 y.o. female on day 4 of admission presenting with Bronchitis.    Subjective   Interval History:   Tachycardic when walking with respiratory therapy today  Sitting up in chair  Reports feeling tired  Reports poor appetite   Afebrile, no chills  No shortness of breath at rest   Mild occasional nonproductive cough  Denies nausea, vomiting or diarrhea       Objective   Range of Vitals (last 24 hours)  Heart Rate:  [73-84]   Temp:  [36.6 °C (97.9 °F)-37 °C (98.6 °F)]   Resp:  [16-20]   BP: (146-190)/(58-79)   SpO2:  [93 %-98 %]   Daily Weight  05/03/25 : 72.6 kg (160 lb)    Body mass index is 30.23 kg/m².    Physical Exam  Constitutional:       Appearance: Normal appearance.   HENT:      Head: Normocephalic and atraumatic.      Nose: Nose normal.      Mouth/Throat:      Mouth: Mucous membranes are moist.      Pharynx: Oropharynx is clear.   Eyes:      General: No scleral icterus.  Cardiovascular:      Rate and Rhythm: Normal rate and regular rhythm.   Pulmonary:      Decreased bilateral breath sounds   Abdominal:      General: Bowel sounds are normal.      Palpations: Abdomen is soft.   Musculoskeletal:         General: Normal range of motion.      Cervical back: Normal range of motion and neck supple.   Skin:     General: Skin is warm and dry.   Neurological:      Mental Status: She is alert.      Comments: Awake, alert    Psychiatric:         Mood and Affect: Mood normal.         Behavior: Behavior normal.     Antibiotics  levoFLOXacin - 500 mg, 500 mg/100 mL    Relevant Results  Labs  Results from last 72 hours   Lab Units 05/07/25  0458 05/06/25  0509 05/05/25  0403   WBC AUTO x10*3/uL 11.0 10.0 9.4   HEMOGLOBIN g/dL 9.6* 8.7* 8.7*   HEMATOCRIT % 30.4* 27.9* 27.6*   PLATELETS AUTO x10*3/uL 153 136* 123*     Results from last 72 hours   Lab Units 05/07/25  0459 05/06/25  0509 05/05/25  1817   SODIUM mmol/L 137 130* 131*   POTASSIUM mmol/L 4.4 4.5 4.1   CHLORIDE mmol/L 106 97* 97*   CO2 mmol/L 23  22 24   BUN mg/dL 53* 56* 54*   CREATININE mg/dL 1.53* 1.99* 2.18*   GLUCOSE mg/dL 175* 202* 116*   CALCIUM mg/dL 8.1* 7.6* 7.7*   ANION GAP mmol/L 12 16 14   EGFR mL/min/1.73m*2 31* 23* 20*           Estimated Creatinine Clearance: 19.6 mL/min (A) (by C-G formula based on SCr of 1.53 mg/dL (H)).  CRP   Date Value Ref Range Status   08/18/2022 0.6 0 - 2.0 MG/DL Final     Comment:     Performed at [address] [address]     Microbiology  Susceptibility data from last 14 days.  Collected Specimen Info Organism   05/05/25 Urine from Indwelling (Alvarado) Catheter Escherichia coli   05/03/25 Blood culture from Peripheral Venipuncture Staphylococcus capitis       Imaging  Transthoracic Echo (TTE) Limited  Result Date: 5/5/2025            [facility] [address], [place] [id number]             Phone [phone] TRANSTHORACIC ECHOCARDIOGRAM REPORT Patient Name:       FRANKIE WESTBROOK        Reading Physician:    [id number] Brijesh Jerez MD Study Date:         5/5/2025             Ordering Provider:    [id number] RENEE LAWTON MRN/PID:            [id number]             Fellow: Accession#:         [id number]         Nurse: Date of Birth/Age:  6/10/1928 / 89 years Sonographer:          Amilcar Gatica RDCS Gender Assigned at  F                    Additional Staff: Birth: Height:             154.94 cm            Admit Date: Weight:             72.58 kg             Admission Status:     Inpatient -                                                                Routine BSA / BMI:          1.72 m2 / 30.23      Department Location:  [facility]                     kg/m2 Blood Pressure: 137 /48 mmHg Study Type:    TRANSTHORACIC ECHO (TTE) LIMITED Diagnosis/ICD: Dyspnea, unspecified-R06.00 Indication:    dyspnea CPT Codes:     Echo Limited-13655; Doppler Limited-12812; Color  Doppler-[id number] Patient History: Valve Disorders:   Aortic Stenosis. Pertinent History: HTN, Hyperlipidemia and CAD. CKD. Study Detail: The following Echo studies were performed: 2D, M-Mode, Doppler and               color flow. Technically challenging study due to body habitus.  PHYSICIAN INTERPRETATION: Left Ventricle: The left ventricular systolic function is normal, with a visually estimated ejection fraction of 60-65%. There are no regional wall motion abnormalities. The left ventricular cavity size is normal. There is mild increased septal and normal posterior left ventricular wall thickness. Spectral Doppler shows a normal pattern of left ventricular diastolic filling. Left Atrium: The left atrial size is mildly dilated. Right Ventricle: The right ventricle is normal in size. There is normal right ventriclar wall thickness. There is normal right ventricular global systolic function. Right Atrium: The right atrial size is normal. Aortic Valve: The aortic valve is trileaflet. There is mild aortic valve cusp calcification. There is reduced systolic aortic valve leaflet excursion. There is evidence of mild aortic valve stenosis. The aortic valve dimensionless index is 0.38. There is no evidence of aortic valve regurgitation. The peak instantaneous gradient of the aortic valve is 18 mmHg. The mean gradient of the aortic valve is 10 mmHg. Mitral Valve: The mitral valve is normal in structure. There is normal mitral valve leaflet mobility. There is mild mitral annular calcification. There is trace mitral valve regurgitation. Tricuspid Valve: The tricuspid valve is structurally normal. There is normal tricuspid valve leaflet mobility. There is mild to moderate tricuspid regurgitation. Pulmonic Valve: The pulmonic valve is structurally normal. There is trace pulmonic valve regurgitation. Pericardium: No pericardial effusion noted. Aorta: The aortic root is normal. There is no dilatation of the aortic arch. There is no  dilatation of the ascending aorta. There is no dilatation of the aortic root. Pulmonary Artery: The pulmonary artery is normal in size. The tricuspid regurgitant velocity is 2.72 m/s, and with an estimated right atrial pressure of 3 mmHg, the estimated pulmonary artery pressure is normal with the RVSP at 32.6 mmHg. The estimated PASP is 33 mmHg.  CONCLUSIONS:  1. The left ventricular systolic function is normal, with a visually estimated ejection fraction of 60-65%.  2. Trace mitral valve regurgitation.  3. Mild to moderate tricuspid regurgitation.  4. Mild aortic valve stenosis.  5. The peak instantaneous gradient of the aortic valve is 18 mmHg. QUANTITATIVE DATA SUMMARY:  2D MEASUREMENTS:           Normal Ranges: Ao Root d:       3.20 cm   (2.0-3.7cm) IVSd:            0.98 cm   (0.6-1.1cm) LVPWd:           0.80 cm   (0.6-1.1cm) LVIDd:           4.37 cm   (3.9-5.9cm) LVIDs:           2.29 cm LV Mass Index:   72.4 g/m2 LV % FS          47.6 %  LEFT ATRIUM:                Normal Ranges: LA Volume Index: 33.0 ml/m2  AORTA MEASUREMENTS:         Normal Ranges: Ao Sinus, d:        3.20 cm (2.1-3.5cm) Asc Ao, d:          2.80 cm (2.1-3.4cm)  LV SYSTOLIC FUNCTION:                      Normal Ranges: EF-A4C View:    62 % (>=55%) EF-Visual:      63 % LV EF Reported: 63 %  LV DIASTOLIC FUNCTION:             Normal Ranges: MV Peak E:             0.87 m/s    (0.7-1.2 m/s) MV Peak A:             0.96 m/s    (0.42-0.7 m/s) E/A Ratio:             0.90        (1.0-2.2) MV A Dur:              180.00 msec  MITRAL VALVE:          Normal Ranges: MV DT:        250 msec (150-240msec)  AORTIC VALVE:                      Normal Ranges: AoV Vmax:                2.14 m/s  (<=1.7m/s) AoV Peak P.3 mmHg (<20mmHg) AoV Mean PG:             10.0 mmHg (1.7-11.5mmHg) LVOT Max Joseph:            0.89 m/s  (<=1.1m/s) AoV VTI:                 54.70 cm  (18-25cm) LVOT VTI:                21.00 cm LVOT Diameter:           1.80 cm    (1.8-2.4cm) AoV Area, VTI:           0.98 cm2  (2.5-5.5cm2) AoV Area,Vmax:           1.06 cm2  (2.5-4.5cm2) AoV Dimensionless Index: 0.38  TRICUSPID VALVE/RVSP:          Normal Ranges: Peak TR Velocity:     2.72 m/s RV Syst Pressure:     33 mmHg  (< 30mmHg)  [id number] Brijesh Jerez MD Electronically signed on 5/5/2025 at 5:48:18 PM  ** Final **     CT angio chest for pulmonary embolism  Result Date: 5/3/2025  Interpreted By:  Pancho Barth, STUDY: CT ANGIO CHEST FOR PULMONARY EMBOLISM;  5/3/2025 5:41 pm   INDICATION: Signs/Symptoms:tachycardic, hypoxic, concern for PE vs pneumonia.   COMPARISON: 2023   ACCESSION NUMBER(S): [id number]   ORDERING CLINICIAN: VIVIANE RILEY   TECHNIQUE: Helical data acquisition of the chest was obtained after intravenous administration of 75 ML Omnipaque 350 as per PE protocol. Images were reformatted in coronal and sagittal planes. Axial and coronal maximum intensity projection (MIP) images were created and reviewed.   FINDINGS: Ground-glass increasing left upper lobe nodule measures 1.7 cm on image 94 concerning for slow growing neoplasm   Other numerous bilateral pulmonary nodules appear similar in size. Reference right lower lobe nodule measuring a cm on image 163. Reference 8 mm right lower lobe nodule on image 184. Other numerous nodules detected remain stable   Mosaic attenuation in the lungs suggesting small airway or small vessel inflammation. No findings of acute pulmonary embolism. Coronary calcification no evidence of acute thoracic pathology.   Changes of old healed granulomas disease. Robust vascular calcification.   Nephroliths detected. Largest visualized stone measures 9 mm.   Demineralized bones       1. No evidence of acute pulmonary embolism 2. Senescent changes. Heart size is enlarged. Changes of old healed granulomas disease 3. Multiple stable pulmonary nodules. Enlarging left upper lobe ground-glass nodule now measuring under 2 cm concerning for slow growing  neoplasm. 4. Bronchial thickening noted. Mild mosaic attenuation seen in the lungs. Query bronchial inflammation. Senescent changes.   MACRO: None.   Signed by: Pancho Barth 5/3/2025 6:19 PM Dictation workstation:   [id number]    XR chest 2 views  Result Date: 5/3/2025  Interpreted By:  Amilcar Galdamez, STUDY: XR CHEST 2 VIEWS;  5/3/2025 3:34 pm   INDICATION: Signs/Symptoms:shortness of breath.     COMPARISON: 02/21/2024   ACCESSION NUMBER(S): [id number]   ORDERING CLINICIAN: VIVIANE RILEY   FINDINGS: Stable heart and mediastinal contours. No pneumothorax or pleural fluid. No focal consolidation or alveolar edema. Mild atherosclerotic calcification of the aortic arch. Mild elevation of the right hemidiaphragm.       1.  No radiographic evidence of acute cardiopulmonary disease.       MACRO: None   Signed by: Amilcar Galdamez 5/3/2025 4:37 PM Dictation workstation:   [id number]    XR cervical spine 2-3 views  Result Date: 5/2/2025  Interpreted By:  Anais Roa, STUDY: XR CERVICAL SPINE 2-3 VIEWS; ;  5/1/2025 12:10 pm   INDICATION: Signs/Symptoms:acute on chronic  neck pain with bilateral hand numbness.   ,M54.2 Cervicalgia,R29.898 Other symptoms and signs involving the musculoskeletal system   COMPARISON: None.   ACCESSION NUMBER(S): [id number]   ORDERING CLINICIAN: ADALI LEMUS   FINDINGS:   Reversal normal cervical lordosis is seen.   Osteopenia is present. The prevertebral soft tissues are unremarkable.   Endplate sclerosis and spur formation is seen throughout the cervical spine. Narrowing of C3-4, C4-5 C5-6 and C6-7 disc space is seen. Uncovertebral joint hypertrophy seen.   No acute fracture or dislocation is seen.       Reversal normal cervical lordosis and degenerative changes.     MACRO: None   Signed by: Anais Roa 5/2/2025 4:20 PM Dictation workstation:   [id number]            Assessment/Plan   Acute hypoxic respiratory failure, resolved-on room air  Pulmonary nodules  CT showed bronchial  thickening-bronchitis  Positive blood culture -staphylococcus capitis- 1/ 2-contaminant   E.coli Urinary tract infection    Has reported intolerances to amoxicillin, Augmentin-chart reviewed tolerated follow-up amoxicillin on 4/20/2025- allergy to Keflex  Chronic kidney disease  History of urine culture E.coli-pansensitive, Enterococcus-susceptible to ampicillin      Levaquin  Methylprednisolone per pulmonary   Monitor renal function  Follow up blood culture  Follow up urine culture   Follow up Repeat blood culture   Follow up Mycoplasma IgM  Supplemental oxygen , wean as tolerated   Supportive care   Pulmonary follow up  De-escalate based on culture results      Total time spent caring for the patient today was 25 minutes. This includes time spent before the visit reviewing the chart, time spent during the visit, and time spent after the visit on documentation     KAYLENE Faith-CNP

## 2025-05-07 NOTE — PROGRESS NOTES
"Fartun Carey is a 89 y.o. female on day 4 of admission presenting with Bronchitis.    Subjective   Patient resting in bed. Denies chest pain, abdominal pain, fevers, chills. Feels breathing is improved, but still coughing at times.        Objective     Physical Exam  Constitutional:       Appearance: Normal appearance.   HENT:      Head: Normocephalic and atraumatic.      Mouth/Throat:      Mouth: Mucous membranes are moist.      Pharynx: Oropharynx is clear.   Eyes:      Extraocular Movements: Extraocular movements intact.      Conjunctiva/sclera: Conjunctivae normal.      Pupils: Pupils are equal, round, and reactive to light.   Cardiovascular:      Rate and Rhythm: Normal rate and regular rhythm.      Pulses: Normal pulses.      Heart sounds: Normal heart sounds.   Pulmonary:      Effort: Pulmonary effort is normal.      Breath sounds: Wheezing and rhonchi present.   Abdominal:      General: Bowel sounds are normal.      Palpations: Abdomen is soft.      Tenderness: There is no abdominal tenderness.   Musculoskeletal:         General: Normal range of motion.      Cervical back: Normal range of motion and neck supple.   Skin:     General: Skin is warm and dry.      Capillary Refill: Capillary refill takes less than 2 seconds.   Neurological:      General: No focal deficit present.      Mental Status: She is alert and oriented to person, place, and time.   Psychiatric:         Mood and Affect: Mood normal.         Behavior: Behavior normal.         Last Recorded Vitals  Blood pressure 156/61, pulse 73, temperature 36.6 °C (97.9 °F), temperature source Temporal, resp. rate 16, height 1.549 m (5' 1\"), weight 72.6 kg (160 lb), SpO2 95%.  Intake/Output last 3 Shifts:  I/O last 3 completed shifts:  In: 2455.5 (33.8 mL/kg) [I.V.:2172.2 (29.9 mL/kg); IV Piggyback:283.3]  Out: 3925 (54.1 mL/kg) [Urine:3925 (1.5 mL/kg/hr)]  Weight: 72.6 kg     Relevant Results  Results for orders placed or performed during the hospital " encounter of 05/03/25 (from the past 24 hours)   CBC   Result Value Ref Range    WBC 11.0 4.4 - 11.3 x10*3/uL    nRBC 0.0 0.0 - 0.0 /100 WBCs    RBC 3.24 (L) 4.00 - 5.20 x10*6/uL    Hemoglobin 9.6 (L) 12.0 - 16.0 g/dL    Hematocrit 30.4 (L) 36.0 - 46.0 %    MCV 94 80 - 100 fL    MCH 29.6 26.0 - 34.0 pg    MCHC 31.6 (L) 32.0 - 36.0 g/dL    RDW 14.4 11.5 - 14.5 %    Platelets 153 150 - 450 x10*3/uL   Basic Metabolic Panel   Result Value Ref Range    Glucose 175 (H) 74 - 99 mg/dL    Sodium 137 136 - 145 mmol/L    Potassium 4.4 3.5 - 5.3 mmol/L    Chloride 106 98 - 107 mmol/L    Bicarbonate 23 21 - 32 mmol/L    Anion Gap 12 10 - 20 mmol/L    Urea Nitrogen 53 (H) 6 - 23 mg/dL    Creatinine 1.53 (H) 0.50 - 1.05 mg/dL    eGFR 31 (L) >60 mL/min/1.73m*2    Calcium 8.1 (L) 8.6 - 10.3 mg/dL   Vancomycin   Result Value Ref Range    Vancomycin 6.3 5.0 - 20.0 ug/mL     *Note: Due to a large number of results and/or encounters for the requested time period, some results have not been displayed. A complete set of results can be found in Results Review.     US renal complete  Result Date: 5/6/2025  Interpreted By:  Maninder Clark, STUDY: US RENAL COMPLETE;  5/6/2025 1:35 pm   INDICATION: Signs/Symptoms:VINCENT.     COMPARISON: 03/01/2024.   ACCESSION NUMBER(S): [id number]   ORDERING CLINICIAN: TOD THOMAS   TECHNIQUE: Real-time sonographic evaluation of the kidneys and urinary bladder was performed.   FINDINGS: RIGHT KIDNEY: Right kidney measures  9.9 cm.  Right central renal small hyperechoic focus may represent nonobstructing calculus. No right hydronephrosis. No discrete renal lesion is visualized.   LEFT KIDNEY: Left kidney measures  9.4 cm.  Left central renal small hyperechoic foci may represent nonobstructing calculi. No left hydronephrosis. No discrete renal lesion is visualized.   BLADDER: Urinary bladder was fully decompressed by Alvarado catheter and could not be evaluated at this time.       No hydronephrosis bilaterally.    Urinary bladder fully decompressed by Alvarado catheter and could not be evaluated.   MACRO: None.   Signed by: Maninder Eduardo 5/6/2025 2:09 PM Dictation workstation:   [id number]      Assessment & Plan    Bronchitis  No history of COPD, asthma or tobacco use..    Imaging shows no acute finding  Non-productive cough   Flu A and B- coronavirus negative  Solu-Medrol and levofloxacin  DuoNeb  Cough suppressant, Mucinex   Pulmonology following - appreciate recs     Acute respiratory failure with hypoxia  May be related to her bronchitis  Weaned to room air   Last echo showed LVEF 60%   Limited echo 5/5 with preserved LVEF   Resolved     Hypertension  Home meds held in the setting of hypotension,. VINCENT  - Plan to resume as tolerated   - Hold lasix and losartan at discharge and follow up with nephrology and PCP   - Check labs in one week   Vitals as ordered   Check AM labs    Hyperlipidemia  Continue home statin     Stage 3b chronic kidney disease (Multi)  VINCENT noted   Improved with IV fluids   - Continue to hold lasix and losartan   BP medications, lasix held   Consult nephrology (she is known to Dr. TESHA Gaxiola) - appreciate recs; signed off   Monitor kidney function    Blood bacterial culture positive  1 of 2 with gram + Cocci noted 5/5  - Continue to monitor - may be contaminant vs bacteremia   Continue Levaquin at this time   Consult ID - appreciate recs   Vanco added   Monitor cultures      UTI   Culture with e-coli   - Await sensitivity   - Continue levaquin at this time     DVT prophylaxis   Heparin     Lidya Washington, APRN-CNP

## 2025-05-07 NOTE — PROGRESS NOTES
Physical Therapy    Physical Therapy Treatment    Patient Name: Fartun Carey  MRN: [id number]  Department: [facility] 3 S  Room: 309[facility]  Today's Date: 5/7/2025  Time Calculation  Start Time: 1345  Stop Time: 1412  Time Calculation (min): 27 min         Assessment/Plan   PT Assessment  PT Assessment Results: Decreased strength, Decreased endurance, Impaired balance, Decreased mobility, Decreased safety awareness  Rehab Prognosis: Good  Barriers to Discharge Home: Physical needs  Physical Needs: Ambulating household distances limited by function/safety  Evaluation/Treatment Tolerance: Patient limited by fatigue  Medical Staff Made Aware: Yes  Strengths: Ability to acquire knowledge, Attitude of self, Premorbid level of function  Barriers to Participation: Comorbidities  End of Session Communication: Bedside nurse  Assessment Comment: Mobility and tolerance to activity slowly improving. Pt was able to ambulate short distance today, fatigued and required seated rest breaks. SpO2 mid/high 90's with activity on room air however HR elevating.  End of Session Patient Position: Up in chair, Alarm on    PT Plan  Inpatient/Swing Bed or Outpatient: Inpatient  PT Plan  Treatment/Interventions: Bed mobility, Transfer training, Gait training, Balance training, Strengthening, Endurance training, Therapeutic exercise, Therapeutic activity  PT Plan: Ongoing PT  PT Frequency: 4 times per week  PT Discharge Recommendations: Moderate intensity level of continued care  Equipment Recommended upon Discharge: Wheeled walker  PT Recommended Transfer Status: Assist x1, Assistive device  PT - OK to Discharge: Yes      General Visit Information:   PT  Visit  PT Received On: 05/07/25  General  Reason for Referral: Impaired mobility secondary to generalized weakness, SOB and bronchitis.  Referred By: Dr. Edward  Past Medical History Relevant to Rehab: DM type 2, OA, pulmonary nodule, hyponatremia, PVD  Prior to Session Communication: Bedside  nurse  Patient Position Received: Bed, 3 rail up, Alarm on  Preferred Learning Style: verbal, visual  General Comment: Pt agreeable to PT. RT present for ambulation to do home O2 precert.    Subjective   Precautions:  Precautions  Hearing/Visual Limitations: + glasses, bilateral hearing aides  Medical Precautions: Fall precautions        Objective   Pain:  Pain Assessment  Pain Assessment: 0-10  0-10 (Numeric) Pain Score: 0 - No pain    Cognition:  Cognition  Overall Cognitive Status: Within Functional Limits  Following Commands: Follows multistep commands without difficulty  Cognition Comments: Pt pleasant and cooperative. Follows commands without difficulty.    Activity Tolerance:  Activity Tolerance  Endurance: Decreased tolerance for upright activites    Treatments:  Therapeutic Exercise  Therapeutic Exercise Activity 1: Pt performed seated bilat LE ankle pumps, heel raises, glute sets, LAQ, hip flexion, dennis hip adduction and resisted hip abduction x20 reps each. Rest breaks as needed due to fatigue.    Bed Mobility 1  Bed Mobility 1: Supine to sitting  Level of Assistance 1: Close supervision  Bed Mobility Comments 1: Head of bed elevated. Pt using bedrail to assist. Pt denied dizziness upon sitting.    Ambulation/Gait Training 1  Surface 1: Level tile  Device 1: Rolling walker  Assistance 1: Minimum assistance, Contact guard  Quality of Gait 1: Diminished heel strike, Inconsistent stride length, Decreased step length  Comments/Distance (ft) 1: Pt ambulated with RW ~20' x2 trials, min/CGA. Seated rest break between trials. Slow pace, inconsistent steps. Mildly unsteady but no actual LOB. Mod fatigue. No c/o dizziness. SpO2 ranging from 94-96% on room air. Did note '[id number] made aware.    Transfer 1  Transfer From 1: Bed to  Transfer to 1: Stand  Technique 1: Sit to stand  Transfer Device 1: Walker  Transfer Level of Assistance 1: Contact guard  Transfers 2  Transfer From 2: Stand to  Transfer to 2: Sit,  Chair without arms  Technique 2: Stand to sit, Sit to stand  Transfer Device 2: Walker  Transfer Level of Assistance 2: Contact guard  Trials/Comments 2: Effortful sit to stand from chair without arms. Pt able to complete without assist.  Transfers 3  Transfer From 3: Stand to  Transfer to 3: Sit, Chair with arms  Technique 3: Stand to sit  Transfer Device 3: Walker  Transfer Level of Assistance 3: Contact guard    Outcome Measures:  [facility] Basic Mobility  Turning from your back to your side while in a flat bed without using bedrails: A little  Moving from lying on your back to sitting on the side of a flat bed without using bedrails: A little  Moving to and from bed to chair (including a wheelchair): A little  Standing up from a chair using your arms (e.g. wheelchair or bedside chair): A little  To walk in hospital room: A little  Climbing 3-5 steps with railing: Total  Basic Mobility - Total Score: 16    Education Documentation  Precautions, taught by Alison Solomon PTA at 5/7/2025  3:30 PM.  Learner: Patient  Readiness: Acceptance  Method: Explanation  Response: Verbalizes Understanding, Needs Reinforcement  Comment: Safety with mobility.    Body Mechanics, taught by Alison Solomon PTA at 5/7/2025  3:30 PM.  Learner: Patient  Readiness: Acceptance  Method: Explanation  Response: Verbalizes Understanding, Needs Reinforcement  Comment: Safety with mobility.    Home Exercise Program, taught by Alison Solomon PTA at 5/7/2025  3:30 PM.  Learner: Patient  Readiness: Acceptance  Method: Explanation  Response: Verbalizes Understanding, Needs Reinforcement  Comment: Safety with mobility.    Mobility Training, taught by Alison Solomon PTA at 5/7/2025  3:30 PM.  Learner: Patient  Readiness: Acceptance  Method: Explanation  Response: Verbalizes Understanding, Needs Reinforcement  Comment: Safety with mobility.    Education Comments  No comments found.        OP EDUCATION:       Encounter Problems       Encounter Problems (Active)        Balance       LTG - Patient will maintain balance to allow for safe mobility (Progressing)       Start:  05/04/25    Expected End:  05/18/25       Patient will maintain balance to allow for safe mobility with decreased risk for falls.         STG - Maintains dynamic standing balance with upper extremity support (Progressing)       Start:  05/04/25    Expected End:  05/11/25       INTERVENTIONS:1. Practice standing with minimal support.2. Educate patient about standing tolerance.3. Educate patient about independence with gait, transfers, and ADL's.4. Educate patient about use of assistive device.5. Educate patient about self-directed care.            Mobility       LTG - Patient will ambulate household distance (Progressing)       Start:  05/04/25    Expected End:  05/18/25       Patient will ambulate household distance with rolling walker in order to return to PLOF with decreased risk for falls.         STG - Patient will ambulate up and down a curb/step (Progressing)       Start:  05/04/25    Expected End:  05/11/25       Patient will ambulate up and down a curb/step in order to enter/exit home.            PT Transfers       LTG - Patient will transfer from one surface to another (Progressing)       Start:  05/04/25    Expected End:  05/18/25       Patient will transfer from one surface to another with Ekaterina in order to return to PLOF.         STG - Patient will transfer sit to and from stand (Progressing)       Start:  05/04/25    Expected End:  05/11/25       Patient will transfer sit to and from stand with CGA in order to increase level of independence.            Pain - Adult

## 2025-05-07 NOTE — PROGRESS NOTES
"Fartun Carey is a 89 y.o. female on day 4 of admission seen in follow-up for acute hypoxic respiratory failure, lung nodules, acute bronchitis    Subjective   On room air; oxygen sats 93%. No respiratory complaints. Denies pain. Afebrile.     Objective     Physical Exam  Vitals and nursing note reviewed.   Constitutional:       Appearance: She is obese.   HENT:      Head: Normocephalic and atraumatic.      Nose: Nose normal.      Mouth/Throat:      Mouth: Mucous membranes are moist.   Eyes:      Extraocular Movements: Extraocular movements intact.      Conjunctiva/sclera: Conjunctivae normal.      Pupils: Pupils are equal, round, and reactive to light.   Cardiovascular:      Rate and Rhythm: Normal rate and regular rhythm.      Pulses: Normal pulses.      Heart sounds: Normal heart sounds.   Pulmonary:      Effort: Pulmonary effort is normal.      Breath sounds: Normal breath sounds.      Comments: Lungs diminished but clear.   Abdominal:      General: Bowel sounds are normal.      Palpations: Abdomen is soft.   Musculoskeletal:         General: Normal range of motion.   Skin:     General: Skin is warm and dry.      Capillary Refill: Capillary refill takes less than 2 seconds.   Neurological:      General: No focal deficit present.      Mental Status: She is alert and oriented to person, place, and time.   Psychiatric:         Mood and Affect: Mood normal.         Behavior: Behavior normal.         Last Recorded Vitals  Blood pressure 180/76, pulse 73, temperature 36.6 °C (97.9 °F), temperature source Temporal, resp. rate 16, height 1.549 m (5' 1\"), weight 72.6 kg (160 lb), SpO2 93%.      Intake/Output Summary (Last 24 hours) at 5/7/2025 0822  Last data filed at 5/7/2025 0633  Gross per 24 hour   Intake 1911.33 ml   Output 2500 ml   Net -588.67 ml      Scheduled medications:  aspirin, 81 mg, oral, Daily  atorvastatin, 80 mg, oral, Nightly  cloNIDine, 0.2 mg, oral, BID  furosemide, 20 mg, oral, Daily  hydrALAZINE, " 25 mg, oral, TID  ipratropium-albuteroL, 3 mL, nebulization, TID  levoFLOXacin, 500 mg, intravenous, q24h  levothyroxine, 88 mcg, oral, Daily  losartan, 100 mg, oral, Daily  methylPREDNISolone sodium succinate (PF), 40 mg, intravenous, q12h  pantoprazole, 20 mg, oral, Daily before breakfast  SITagliptin phosphate, 50 mg, oral, Daily    PRN medications: acetaminophen **OR** acetaminophen **OR** acetaminophen, benzocaine-menthol, bisacodyl, dextromethorphan-guaifenesin, guaiFENesin, hydrocodone-homatropine, hydrOXYzine HCL, ipratropium-albuteroL, ondansetron ODT **OR** ondansetron, phenoL, polyethylene glycol    Relevant Results  Results for orders placed or performed during the hospital encounter of 05/03/25 (from the past 24 hours)   CBC   Result Value Ref Range    WBC 11.0 4.4 - 11.3 x10*3/uL    nRBC 0.0 0.0 - 0.0 /100 WBCs    RBC 3.24 (L) 4.00 - 5.20 x10*6/uL    Hemoglobin 9.6 (L) 12.0 - 16.0 g/dL    Hematocrit 30.4 (L) 36.0 - 46.0 %    MCV 94 80 - 100 fL    MCH 29.6 26.0 - 34.0 pg    MCHC 31.6 (L) 32.0 - 36.0 g/dL    RDW 14.4 11.5 - 14.5 %    Platelets 153 150 - 450 x10*3/uL   Basic Metabolic Panel   Result Value Ref Range    Glucose 175 (H) 74 - 99 mg/dL    Sodium 137 136 - 145 mmol/L    Potassium 4.4 3.5 - 5.3 mmol/L    Chloride 106 98 - 107 mmol/L    Bicarbonate 23 21 - 32 mmol/L    Anion Gap 12 10 - 20 mmol/L    Urea Nitrogen 53 (H) 6 - 23 mg/dL    Creatinine 1.53 (H) 0.50 - 1.05 mg/dL    eGFR 31 (L) >60 mL/min/1.73m*2    Calcium 8.1 (L) 8.6 - 10.3 mg/dL   Vancomycin   Result Value Ref Range    Vancomycin 6.3 5.0 - 20.0 ug/mL     *Note: Due to a large number of results and/or encounters for the requested time period, some results have not been displayed. A complete set of results can be found in Results Review.      US renal complete  Result Date: 5/06/2025  FINDINGS: RIGHT KIDNEY: Right kidney measures  9.9 cm.  Right central renal small hyperechoic focus may represent nonobstructing calculus. No right  hydronephrosis. No discrete renal lesion is visualized. LEFT KIDNEY: Left kidney measures  9.4 cm.  Left central renal small hyperechoic foci may represent nonobstructing calculi. No left hydronephrosis. No discrete renal lesion is visualized. BLADDER: Urinary bladder was fully decompressed by Alvarado catheter and could not be evaluated at this time. IMPRESSION: No hydronephrosis bilaterally. Urinary bladder fully decompressed by Alvarado catheter and could not be evaluated.    Transthoracic Echo (TTE) Limited With Doppler And Color  Result Date: 5/05/20265  CONCLUSIONS: 1. The left ventricular systolic function is normal, with a visually estimated ejection fraction of 60-65%. 2. Trace mitral valve regurgitation. 3. Mild to moderate tricuspid regurgitation. 4. Mild aortic valve stenosis. 5. The peak instantaneous gradient of the aortic valve is 18 mmHg.    CT angio chest for pulmonary embolism  Result Date: 5/3/2025  Interpreted By:  Pancho Barth, STUDY: CT ANGIO CHEST FOR PULMONARY EMBOLISM;  5/3/2025 5:41 pm   INDICATION: Signs/Symptoms:tachycardic, hypoxic, concern for PE vs pneumonia.   COMPARISON: 2023   ACCESSION NUMBER(S): [id number]   ORDERING CLINICIAN: VIVIANE RILEY   TECHNIQUE: Helical data acquisition of the chest was obtained after intravenous administration of 75 ML Omnipaque 350 as per PE protocol. Images were reformatted in coronal and sagittal planes. Axial and coronal maximum intensity projection (MIP) images were created and reviewed.   FINDINGS: Ground-glass increasing left upper lobe nodule measures 1.7 cm on image 94 concerning for slow growing neoplasm   Other numerous bilateral pulmonary nodules appear similar in size. Reference right lower lobe nodule measuring a cm on image 163. Reference 8 mm right lower lobe nodule on image 184. Other numerous nodules detected remain stable   Mosaic attenuation in the lungs suggesting small airway or small vessel inflammation. No findings of acute  pulmonary embolism. Coronary calcification no evidence of acute thoracic pathology.   Changes of old healed granulomas disease. Robust vascular calcification.   Nephroliths detected. Largest visualized stone measures 9 mm.   Demineralized bones  1. No evidence of acute pulmonary embolism 2. Senescent changes. Heart size is enlarged. Changes of old healed granulomas disease 3. Multiple stable pulmonary nodules. Enlarging left upper lobe ground-glass nodule now measuring under 2 cm concerning for slow growing neoplasm. 4. Bronchial thickening noted. Mild mosaic attenuation seen in the lungs. Query bronchial inflammation. Senescent changes.       XR chest 2 views  Result Date: 5/3/2025  FINDINGS: Stable heart and mediastinal contours. No pneumothorax or pleural fluid. No focal consolidation or alveolar edema. Mild atherosclerotic calcification of the aortic arch. Mild elevation of the right hemidiaphragm.  1.  No radiographic evidence of acute cardiopulmonary disease.           XR cervical spine 2-3 views  Result Date: 5/2/2025  FINDINGS:   Reversal normal cervical lordosis is seen.   Osteopenia is present. The prevertebral soft tissues are unremarkable.   Endplate sclerosis and spur formation is seen throughout the cervical spine. Narrowing of C3-4, C4-5 C5-6 and C6-7 disc space is seen. Uncovertebral joint hypertrophy seen.   No acute fracture or dislocation is seen.  Reversal normal cervical lordosis and degenerative changes.          Assessment & Plan    Acute hypoxic respiratory failure  Stable on room air  Home oxygen certification prior to discharge  Continue nebulized bronchodilator  Continuous pulse oximetry  Incentive spirometry/pulmonary hygiene     Lung nodules  CT angio with multiple stable pulmonary nodules, enlarging left upper lobe ground-glass nodule now measuring under 2 cm concerning for slow growing neoplasm  CT scan chest wo IV contrast October '23 with numerous noncalcified pulmonary nodules  bilaterally concerning for pulmonary metastatic disease, largest in the right lung measures approximately 9 mm, the largest in the left lung measures approximately 8 [id number] in 3 months is recommended  Patient seen by Dr. Plascencia for lung nodules when hospitalized here October '23 because patient was asymptomatic and given her age recommended deferring aggressive pursuit of biopsy at that time and outpatient follow-up imaging-patient does not recall getting any scans after this  PET-CT scan as outpatient    Acute bronchitis  1/2 blood cultures with gram-with Staphylococcus capitis-likely a contaminant  V levofloxacin, IV vancomycin given x 1 dose  IV Solu-Medrol-will decrease dose and discharge on prednisone taper  Sputum if able  Follow-up cultures  FEV 1 when optimized or as outpatient  Infectious Disease follow-up    Acute kidney injury on chronic kidney disease stage lllb  Baseline creatinine 1.1-1.4  Lasix on hold  Hold Nephrotoxic agents  IV fluids  Nephrology follow-up    Prophylaxis  Subcutaneous heparin  Protonix    PT/OT/out of bed    Stable for discharge from a pulmonary standpoint after home oxygen certification  Follow-up with Dr. Winchester in 2 weeks to discuss timing of PET-CT scan    KAYLENE Winslow-CNP  Pulmonary & Critical Care Medicine

## 2025-05-07 NOTE — PROGRESS NOTES
05/07/25 1308   Discharge Planning   Living Arrangements Alone   Support Systems Children;Friends/neighbors   Type of Residence Private residence   Home or Post Acute Services In home services   Type of Home Care Services Home OT;Home PT;Home nursing visits   Expected Discharge Disposition Home H  ([facility])   Does the patient need discharge transport arranged? No     Per nephrology  after discharge will need a renal function panel in a week and follow-up in 2 weeks.  Per Pulmonary note:  ---Stable for discharge from a pulmonary standpoint after home oxygen certification  ---Follow-up with Dr. Winchester in 2 weeks to discuss timing of PET-CT scan    PLAN: Discharge to home with [facility]. May need home oxygen, will need home oxygen certification.

## 2025-05-07 NOTE — PROGRESS NOTES
Spiritual Care Visit  Spiritual Care Request    Reason for Visit:  Routine Visit: Introduction     Request Received From:       Focus of Care:  Visited With: Patient         Refer to :          Spiritual Care Assessment    Spiritual Assessment:                      Care Provided:  Intended Effects: Establish rapport and connectedness, Build relationship of care and support, Convey a calming presence, Demonstrate caring and concern, Lessen someone's feelings of isolation  Methods: Offer emotional support    Sense of Community and or Bahai Affiliation:  Baptist         Addressed Needs/Concerns and/or Kiki Through:  Bahai Encounters  Bahai Needs: Prayer       Outcome:  Outcome of Spiritual Care Visit: Identifying spiritual/emotional issues, Comfort/healing presence     Advance Directives:         Spiritual Care Annotation        Annotation: Patient received a visit from the Spiritual care volunteer while admitted.  This patient was offered emotional and spiritual support no other needs were expressed. Spiritual care will remain available for support as requested.     Volunter Initial : Clarisse GAMA    Reviewed and Submitted by Chaplain Guerra

## 2025-05-07 NOTE — CARE PLAN
Problem: Pain - Adult  Goal: Verbalizes/displays adequate comfort level or baseline comfort level  Outcome: Progressing     Problem: Safety - Adult  Goal: Free from fall injury  Outcome: Progressing     Problem: Discharge Planning  Goal: Discharge to home or other facility with appropriate resources  Outcome: Progressing     Problem: Chronic Conditions and Co-morbidities  Goal: Patient's chronic conditions and co-morbidity symptoms are monitored and maintained or improved  Outcome: Progressing     Problem: Nutrition  Goal: Nutrient intake appropriate for maintaining nutritional needs  Outcome: Progressing     Problem: Fall/Injury  Goal: Verbalize understanding of risk factor reduction measures to prevent injury from fall in the home  Outcome: Progressing

## 2025-05-07 NOTE — PROGRESS NOTES
Occupational Therapy    OT Treatment    Patient Name: Fartun Carey  MRN: [id number]  Department: [facility] 3 S  Room: [address] [place], [place] [address]  Today's Date: 5/7/2025  Time Calculation  Start Time: 0719  Stop Time: 0757  Time Calculation (min): 38 min      Supervised by licensed occupational therapist throughout Lennie Jackson OTR/L  Assessment:  OT Assessment: Pt is making progress with POC goals, however, still requires frequent rest breaks during mobility and ADL tasks. Pt c/o dizziness after completing ADLs this session and requested back to bed. /58  Prognosis: Good  Barriers to Discharge Home: Caregiver assistance, Physical needs  Caregiver Assistance: Patient lives alone and/or does not have reliable caregiver assistance  Physical Needs: 24hr mobility assistance needed, 24hr ADL assistance needed  Evaluation/Treatment Tolerance: Patient limited by fatigue  End of Session Communication: Bedside nurse  End of Session Patient Position: Bed, 3 rail up, Alarm on  OT Assessment Results: Decreased ADL status, Decreased upper extremity strength, Decreased safe judgment during ADL, Decreased endurance, Decreased functional mobility, Decreased gross motor control, Decreased IADLs  Prognosis: Good  Evaluation/Treatment Tolerance: Patient limited by fatigue  Strengths: Attitude of self, Premorbid level of function  Barriers to Participation: Comorbidities  Plan:  Treatment Interventions: ADL retraining, Functional transfer training, UE strengthening/ROM, Endurance training, Patient/family training, Equipment evaluation/education, Compensatory technique education  OT Frequency: 3 times per week  OT Discharge Recommendations: Moderate intensity level of continued care  Equipment Recommended upon Discharge: Wheeled walker  OT Recommended Transfer Status: Assist of 1  OT - OK to Discharge: Yes  Treatment Interventions: ADL retraining, Functional transfer training, UE strengthening/ROM, Endurance training, Patient/family training, Equipment  evaluation/education, Compensatory technique education    Subjective   Previous Visit Info:  OT Last Visit  OT Received On: 05/07/25  General:  General  Reason for Referral: Impaired mobility secondary to generalized weakness, SOB and bronchitis.  Referred By: Dr. Edward  Past Medical History Relevant to Rehab: DM type 2, OA, pulmonary nodule, hyponatremia, PVD  Prior to Session Communication: Bedside nurse  Patient Position Received: Bed, 3 rail up, Alarm on  General Comment: Patient agreeable to OT session  Precautions:  Hearing/Visual Limitations: + glasses, bilateral hearing aides  Medical Precautions: Fall precautions        Vital Signs Comment: /76 at session start, decreased to 154/58 at end of session.     Pain:  Pain Assessment  Pain Assessment: 0-10  0-10 (Numeric) Pain Score: 0 - No pain    Objective    Cognition:  Cognition  Overall Cognitive Status: Within Functional Limits  Following Commands: Follows multistep commands without difficulty    Activities of Daily Living: Grooming  Grooming Level of Assistance: Close supervision  Grooming Where Assessed: Standing sinkside  Grooming Comments: wash face and brush teeth    UE Bathing  UE Bathing Level of Assistance: Close supervision  UE Bathing Where Assessed: Sitting sinkside  UE Bathing Comments: spongebathing    LE Bathing  LE Bathing Level of Assistance: Close supervision, Minimal verbal cues  LE Bathing Where Assessed: Sitting sinkside  LE Bathing Comments: spongebathing, therpist instructed on figure 4 technique to bath distal LE. Educated on use of long handled sponge at home prn for energy conservation. pt declined completing tricia hygiene d/t increased fatigue    UE Dressing  UE Dressing Level of Assistance: Close supervision  UE Dressing Where Assessed: Chair  UE Dressing Comments: [facility]    LE Dressing  LE Dressing: Yes  Sock Level of Assistance: Close supervision, Minimal verbal cues  LE Dressing Where Assessed: Chair  LE  Dressing Comments: therapist instructed on figure 4 technique to don/doff socks, pt able to complete with supv    Toileting  Toileting Comments: +kelly  Functional Standing Tolerance:  Time: ~1min  Activity: self care  Functional Standing Tolerance Comments: close supervision, required seated rest break after 1 min  Bed Mobility/Transfers: Bed Mobility  Bed Mobility: Yes  Bed Mobility 1  Bed Mobility 1: Sitting to supine  Level of Assistance 1: Close supervision  Bed Mobility Comments 1: HOB elevated, additional effort for BLE to EOB  Bed Mobility 2  Bed Mobility  2: Supine to sitting  Level of Assistance 2: Close supervision  Bed Mobility Comments 2: HOB flat    Transfers  Transfer: Yes  Transfer 1  Transfer From 1: Bed to  Transfer to 1: Chair with arms  Technique 1: Sit to stand, Stand to sit  Transfer Device 1: Walker  Transfer Level of Assistance 1: Minimum assistance  Trials/Comments 1: good transfer technique  Transfers 2  Transfer From 2: Chair with arms to  Transfer to 2: Chair with arms  Technique 2: Sit to stand, Stand to sit  Transfer Device 2: Walker  Transfer Level of Assistance 2: Contact guard  Transfers 3  Transfer From 3: Chair with arms to  Transfer to 3: Bed  Technique 3: Sit to stand, Stand to sit  Transfer Device 3: Walker  Transfer Level of Assistance 3: Minimum assistance  Trials/Comments 3: good transfer technique, pt requested back to bed with c/o dizziness      Functional Mobility:  Functional Mobility  Functional Mobility Performed: Yes  Functional Mobility 1  Surface 1: Level tile  Device 1: Rolling walker  Assistance 1: Contact guard  Comments 1: short householod distance in room x2 trials, close chair follow for safety    Standing Balance:  Static Standing Balance  Static Standing-Balance Support: Bilateral upper extremity supported (supported on countertop intermittently)  Static Standing-Level of Assistance: Close supervision  Static Standing-Comment/Number of Minutes: ~1min, fair  balance, use of counter for support as needed      Outcome Measures:[facility] Daily Activity  Putting on and taking off regular lower body clothing: A little  Bathing (including washing, rinsing, drying): A little  Putting on and taking off regular upper body clothing: A little  Toileting, which includes using toilet, bedpan or urinal: A little  Taking care of personal grooming such as brushing teeth: A little  Eating Meals: A little  Daily Activity - Total Score: 18    OT Adult Other Outcome Measures  Modified Barthel Index (Daniel version) 64          Education Documentation  ADL Training, taught by LUIS DANIEL Cornelius at 5/7/2025  8:29 AM.  Learner: Patient  Readiness: Acceptance  Method: Explanation, Demonstration  Response: Verbalizes Understanding, Demonstrated Understanding  Comment: instruction in techniques for energy conservation during ADLs and education on use of AE    Education Comments  No comments found.      Goals:  Encounter Problems       Encounter Problems (Active)       OT Goals       ADLs (Progressing)       Start:  05/05/25    Expected End:  05/26/25       Patient will complete ADL tasks, with modified independence, using AE need in order to increase patient's safety and independence with self-care tasks.          Functional Transfers (Progressing)       Start:  05/05/25    Expected End:  05/26/25       Patient will complete functional transfers with modified independence, using least restrictive device, in order to increase patient's safety and independence with daily tasks.          Functional Mobility  (Progressing)       Start:  05/05/25    Expected End:  05/26/25       Patient will demonstrate the ability to complete item retrieval and functional mobility with modified independence, in order to increase safety and independence with daily tasks.          Activity Tolerance  (Progressing)       Start:  05/05/25    Expected End:  05/26/25       Patient will demonstrate the ability to participate  in functional activity at least >/= 25 minutes in order to increase patient's safety and independence with daily tasks.

## 2025-05-07 NOTE — CARE PLAN
Patient was walked to the doorway of her room on room air and her pulse ox was 95% however her heart rate increased to 138 so we did not walk her in the hallway.  She rested sitting in a chair by the doorway and then she went back to the bedside chair. Pulse ox at rest in the chair was 94% on room air and her heart rate returned to 100 after a few minutes of rest.  Nursing made aware of increase in the heart rate.

## 2025-05-08 LAB
ANION GAP SERPL CALCULATED.3IONS-SCNC: 15 MMOL/L (ref 10–20)
BACTERIA BLD CULT: NORMAL
BUN SERPL-MCNC: 46 MG/DL (ref 6–23)
CALCIUM SERPL-MCNC: 8.1 MG/DL (ref 8.6–10.3)
CHLORIDE SERPL-SCNC: 104 MMOL/L (ref 98–107)
CO2 SERPL-SCNC: 19 MMOL/L (ref 21–32)
CREAT SERPL-MCNC: 1.34 MG/DL (ref 0.5–1.05)
EGFRCR SERPLBLD CKD-EPI 2021: 36 ML/MIN/1.73M*2
ERYTHROCYTE [DISTWIDTH] IN BLOOD BY AUTOMATED COUNT: 14.5 % (ref 11.5–14.5)
GLUCOSE SERPL-MCNC: 176 MG/DL (ref 74–99)
HCT VFR BLD AUTO: 30.4 % (ref 36–46)
HGB BLD-MCNC: 9.5 G/DL (ref 12–16)
MCH RBC QN AUTO: 30.1 PG (ref 26–34)
MCHC RBC AUTO-ENTMCNC: 31.3 G/DL (ref 32–36)
MCV RBC AUTO: 96 FL (ref 80–100)
NRBC BLD-RTO: 0 /100 WBCS (ref 0–0)
PLATELET # BLD AUTO: 164 X10*3/UL (ref 150–450)
POTASSIUM SERPL-SCNC: 4.1 MMOL/L (ref 3.5–5.3)
RBC # BLD AUTO: 3.16 X10*6/UL (ref 4–5.2)
SODIUM SERPL-SCNC: 134 MMOL/L (ref 136–145)
WBC # BLD AUTO: 10 X10*3/UL (ref 4.4–11.3)

## 2025-05-08 PROCEDURE — 2500000001 HC RX 250 WO HCPCS SELF ADMINISTERED DRUGS (ALT 637 FOR MEDICARE OP): Performed by: NURSE PRACTITIONER

## 2025-05-08 PROCEDURE — 82374 ASSAY BLOOD CARBON DIOXIDE: CPT | Performed by: NURSE PRACTITIONER

## 2025-05-08 PROCEDURE — 2500000004 HC RX 250 GENERAL PHARMACY W/ HCPCS (ALT 636 FOR OP/ED): Mod: JZ | Performed by: NURSE PRACTITIONER

## 2025-05-08 PROCEDURE — 99232 SBSQ HOSP IP/OBS MODERATE 35: CPT | Performed by: NURSE PRACTITIONER

## 2025-05-08 PROCEDURE — 2500000004 HC RX 250 GENERAL PHARMACY W/ HCPCS (ALT 636 FOR OP/ED): Performed by: INTERNAL MEDICINE

## 2025-05-08 PROCEDURE — 2500000004 HC RX 250 GENERAL PHARMACY W/ HCPCS (ALT 636 FOR OP/ED): Performed by: NURSE PRACTITIONER

## 2025-05-08 PROCEDURE — 85027 COMPLETE CBC AUTOMATED: CPT | Performed by: NURSE PRACTITIONER

## 2025-05-08 PROCEDURE — 2500000001 HC RX 250 WO HCPCS SELF ADMINISTERED DRUGS (ALT 637 FOR MEDICARE OP): Performed by: SURGERY

## 2025-05-08 PROCEDURE — 2500000001 HC RX 250 WO HCPCS SELF ADMINISTERED DRUGS (ALT 637 FOR MEDICARE OP)

## 2025-05-08 PROCEDURE — 1100000001 HC PRIVATE ROOM DAILY

## 2025-05-08 PROCEDURE — 36415 COLL VENOUS BLD VENIPUNCTURE: CPT | Performed by: NURSE PRACTITIONER

## 2025-05-08 PROCEDURE — 2500000001 HC RX 250 WO HCPCS SELF ADMINISTERED DRUGS (ALT 637 FOR MEDICARE OP): Performed by: STUDENT IN AN ORGANIZED HEALTH CARE EDUCATION/TRAINING PROGRAM

## 2025-05-08 PROCEDURE — 2500000002 HC RX 250 W HCPCS SELF ADMINISTERED DRUGS (ALT 637 FOR MEDICARE OP, ALT 636 FOR OP/ED): Performed by: NURSE PRACTITIONER

## 2025-05-08 PROCEDURE — 99221 1ST HOSP IP/OBS SF/LOW 40: CPT | Performed by: SURGERY

## 2025-05-08 RX ORDER — SODIUM CHLORIDE 9 MG/ML
50 INJECTION, SOLUTION INTRAVENOUS CONTINUOUS
Status: DISCONTINUED | OUTPATIENT
Start: 2025-05-08 | End: 2025-05-09

## 2025-05-08 RX ORDER — SODIUM BICARBONATE 650 MG/1
650 TABLET ORAL 2 TIMES DAILY
Status: DISCONTINUED | OUTPATIENT
Start: 2025-05-08 | End: 2025-05-09

## 2025-05-08 RX ORDER — PREDNISONE 20 MG/1
20 TABLET ORAL EVERY 12 HOURS
Status: DISCONTINUED | OUTPATIENT
Start: 2025-05-08 | End: 2025-05-11

## 2025-05-08 RX ORDER — DOCUSATE SODIUM 100 MG/1
100 CAPSULE, LIQUID FILLED ORAL 2 TIMES DAILY
Status: DISCONTINUED | OUTPATIENT
Start: 2025-05-08 | End: 2025-05-13 | Stop reason: HOSPADM

## 2025-05-08 RX ORDER — MIRTAZAPINE 7.5 MG/1
7.5 TABLET, FILM COATED ORAL NIGHTLY
Status: DISCONTINUED | OUTPATIENT
Start: 2025-05-08 | End: 2025-05-09

## 2025-05-08 RX ADMIN — SODIUM BICARBONATE 650 MG TABLET 650 MG: at 09:53

## 2025-05-08 RX ADMIN — GUAIFENESIN 600 MG: 600 TABLET ORAL at 21:01

## 2025-05-08 RX ADMIN — MIRTAZAPINE 7.5 MG: 7.5 TABLET, FILM COATED ORAL at 21:01

## 2025-05-08 RX ADMIN — CLONIDINE HYDROCHLORIDE 0.1 MG: 0.1 TABLET ORAL at 18:31

## 2025-05-08 RX ADMIN — LEVOTHYROXINE SODIUM 88 MCG: 0.09 TABLET ORAL at 06:26

## 2025-05-08 RX ADMIN — DOCUSATE SODIUM 100 MG: 100 CAPSULE, LIQUID FILLED ORAL at 13:14

## 2025-05-08 RX ADMIN — SODIUM BICARBONATE 650 MG TABLET 650 MG: at 21:01

## 2025-05-08 RX ADMIN — METHYLPREDNISOLONE SODIUM SUCCINATE 20 MG: 40 INJECTION, POWDER, FOR SOLUTION INTRAMUSCULAR; INTRAVENOUS at 09:53

## 2025-05-08 RX ADMIN — HYDRALAZINE HYDROCHLORIDE 50 MG: 50 TABLET ORAL at 15:30

## 2025-05-08 RX ADMIN — IRON SUCROSE 200 MG: 20 INJECTION, SOLUTION INTRAVENOUS at 06:26

## 2025-05-08 RX ADMIN — ASPIRIN 81 MG: 81 TABLET, CHEWABLE ORAL at 09:52

## 2025-05-08 RX ADMIN — CLONIDINE HYDROCHLORIDE 0.1 MG: 0.1 TABLET ORAL at 09:53

## 2025-05-08 RX ADMIN — HYDRALAZINE HYDROCHLORIDE 50 MG: 50 TABLET ORAL at 21:00

## 2025-05-08 RX ADMIN — PANTOPRAZOLE SODIUM 20 MG: 20 TABLET, DELAYED RELEASE ORAL at 06:26

## 2025-05-08 RX ADMIN — HYDROCODONE BITARTRATE AND HOMATROPINE METHYLBROMIDE 5 ML: 5; 1.5 SOLUTION ORAL at 23:52

## 2025-05-08 RX ADMIN — LEVOFLOXACIN 500 MG: 5 INJECTION, SOLUTION INTRAVENOUS at 18:10

## 2025-05-08 RX ADMIN — SODIUM CHLORIDE 50 ML/HR: 900 INJECTION, SOLUTION INTRAVENOUS at 13:36

## 2025-05-08 RX ADMIN — ATORVASTATIN CALCIUM 80 MG: 80 TABLET, FILM COATED ORAL at 21:01

## 2025-05-08 RX ADMIN — HYDRALAZINE HYDROCHLORIDE 50 MG: 50 TABLET ORAL at 09:53

## 2025-05-08 RX ADMIN — PREDNISONE 20 MG: 20 TABLET ORAL at 21:00

## 2025-05-08 RX ADMIN — GUAIFENESIN 600 MG: 600 TABLET ORAL at 09:53

## 2025-05-08 RX ADMIN — DOCUSATE SODIUM 100 MG: 100 CAPSULE, LIQUID FILLED ORAL at 21:00

## 2025-05-08 ASSESSMENT — COGNITIVE AND FUNCTIONAL STATUS - GENERAL
PERSONAL GROOMING: A LITTLE
TOILETING: A LOT
EATING MEALS: A LITTLE
DRESSING REGULAR UPPER BODY CLOTHING: A LITTLE
MOVING TO AND FROM BED TO CHAIR: A LITTLE
PERSONAL GROOMING: A LITTLE
STANDING UP FROM CHAIR USING ARMS: A LITTLE
DRESSING REGULAR LOWER BODY CLOTHING: A LITTLE
MOBILITY SCORE: 18
DRESSING REGULAR LOWER BODY CLOTHING: A LITTLE
STANDING UP FROM CHAIR USING ARMS: A LITTLE
MOBILITY SCORE: 18
WALKING IN HOSPITAL ROOM: A LOT
HELP NEEDED FOR BATHING: A LITTLE
DAILY ACTIVITIY SCORE: 17
CLIMB 3 TO 5 STEPS WITH RAILING: A LOT
HELP NEEDED FOR BATHING: A LITTLE
DRESSING REGULAR UPPER BODY CLOTHING: A LITTLE
MOVING TO AND FROM BED TO CHAIR: A LITTLE
EATING MEALS: A LITTLE
TOILETING: A LOT
WALKING IN HOSPITAL ROOM: A LOT
CLIMB 3 TO 5 STEPS WITH RAILING: A LOT
DAILY ACTIVITIY SCORE: 17

## 2025-05-08 ASSESSMENT — ENCOUNTER SYMPTOMS
ANOREXIA: 1
SHORTNESS OF BREATH: 1
CONSTIPATION: 1
WHEEZING: 1
ABDOMINAL PAIN: 0
CARDIOVASCULAR NEGATIVE: 1
NAUSEA: 0

## 2025-05-08 ASSESSMENT — PAIN - FUNCTIONAL ASSESSMENT: PAIN_FUNCTIONAL_ASSESSMENT: 0-10

## 2025-05-08 ASSESSMENT — PAIN SCALES - GENERAL
PAINLEVEL_OUTOF10: 0 - NO PAIN
PAINLEVEL_OUTOF10: 0 - NO PAIN

## 2025-05-08 NOTE — CONSULTS
"Reason For Consult  Urinary retention    History Of Present Illness  Fartun Carey is a 89 y.o. female presenting with acute respiratory failure.  She was hospitalized and started on antibiotics for presumed pneumonia.  During her hospital stay a catheter was placed for urinary retention.  Urine culture revealed E. coli bacteria.  She had no previous voiding issues at home.  Antibiotics have been started.  She denies any hematuria.  She has been having constipation.     Past Medical History  She has a past medical history of Diabetes (Multi), Diabetes mellitus (Multi), Disease of thyroid gland, Hypertension, and Renal disease.    Surgical History  She has a past surgical history that includes Hysterectomy; Back surgery; and Appendectomy.     Social History  She reports that she has never smoked. She has never used smokeless tobacco. She reports that she does not drink alcohol and does not use drugs.    Family History  Family History[1]     Allergies  Amoxicillin, Amoxicillin-pot clavulanate, Glimepiride, Lisinopril, Metformin hcl, Tetracycline hcl, and Cephalexin    Review of Systems  Review of Systems   Constitutional: Positive for malaise/fatigue.   Cardiovascular: Negative.    Respiratory:  Positive for shortness of breath and wheezing.    Gastrointestinal:  Positive for anorexia and constipation. Negative for abdominal pain and nausea.   Genitourinary:  Positive for incomplete emptying.          Physical Exam  Awake, alert, no distress  Breathing comfortably  Abdomen soft, ND, NT  Alvarado with clear urine       Last Recorded Vitals  Blood pressure 137/85, pulse 85, temperature 36.9 °C (98.4 °F), temperature source Temporal, resp. rate 18, height 1.549 m (5' 1\"), weight 72.6 kg (160 lb), SpO2 92%.    Relevant Results      Results for orders placed or performed during the hospital encounter of 05/03/25 (from the past 24 hours)   CBC   Result Value Ref Range    WBC 10.0 4.4 - 11.3 x10*3/uL    nRBC 0.0 0.0 - 0.0 /100 " WBCs    RBC 3.16 (L) 4.00 - 5.20 x10*6/uL    Hemoglobin 9.5 (L) 12.0 - 16.0 g/dL    Hematocrit 30.4 (L) 36.0 - 46.0 %    MCV 96 80 - 100 fL    MCH 30.1 26.0 - 34.0 pg    MCHC 31.3 (L) 32.0 - 36.0 g/dL    RDW 14.5 11.5 - 14.5 %    Platelets 164 150 - 450 x10*3/uL   Basic Metabolic Panel   Result Value Ref Range    Glucose 176 (H) 74 - 99 mg/dL    Sodium 134 (L) 136 - 145 mmol/L    Potassium 4.1 3.5 - 5.3 mmol/L    Chloride 104 98 - 107 mmol/L    Bicarbonate 19 (L) 21 - 32 mmol/L    Anion Gap 15 10 - 20 mmol/L    Urea Nitrogen 46 (H) 6 - 23 mg/dL    Creatinine 1.34 (H) 0.50 - 1.05 mg/dL    eGFR 36 (L) >60 mL/min/1.73m*2    Calcium 8.1 (L) 8.6 - 10.3 mg/dL     *Note: Due to a large number of results and/or encounters for the requested time period, some results have not been displayed. A complete set of results can be found in Results Review.          Assessment/Plan     Urinary retention, acute cystitis.     Agree with antibiotics.  Follow urine cultures.  I will add Colace to her bowel regimen.  Consider a voiding trial once she is ready for discharge.        I spent 45 minutes in the professional and overall care of this patient.      Berenice Catherine MD         [1]   Family History  Problem Relation Name Age of Onset    Heart disease Mother      Heart disease Father      Heart disease Brother      No Known Problems Son      Heart disease Maternal Grandmother      Heart disease Maternal Grandfather      Heart disease Paternal Grandmother      Heart disease Paternal Grandfather

## 2025-05-08 NOTE — PROGRESS NOTES
"Fartun Carey is a 89 y.o. female on day 5 of admission presenting with Bronchitis.    Subjective   Patient resting in bed. States she doesn't feel well and is concerned about not eating. States she just doesn't have an appetite. Denies chest pain, shortness of breath, abdominal pain, fevers, chills.         Objective     Physical Exam  Constitutional:       Appearance: Normal appearance.   HENT:      Head: Normocephalic and atraumatic.      Mouth/Throat:      Mouth: Mucous membranes are moist.      Pharynx: Oropharynx is clear.   Eyes:      Extraocular Movements: Extraocular movements intact.      Conjunctiva/sclera: Conjunctivae normal.      Pupils: Pupils are equal, round, and reactive to light.   Cardiovascular:      Rate and Rhythm: Normal rate and regular rhythm.      Pulses: Normal pulses.      Heart sounds: Normal heart sounds.   Pulmonary:      Effort: Pulmonary effort is normal.      Breath sounds: Normal breath sounds.   Abdominal:      General: Bowel sounds are normal.      Palpations: Abdomen is soft.      Tenderness: There is no abdominal tenderness.   Musculoskeletal:         General: Normal range of motion.      Cervical back: Normal range of motion and neck supple.   Skin:     General: Skin is warm and dry.      Capillary Refill: Capillary refill takes less than 2 seconds.   Neurological:      General: No focal deficit present.      Mental Status: She is alert and oriented to person, place, and time.   Psychiatric:         Mood and Affect: Mood normal.         Behavior: Behavior normal.         Last Recorded Vitals  Blood pressure 137/85, pulse 85, temperature 36.9 °C (98.4 °F), temperature source Temporal, resp. rate 18, height 1.549 m (5' 1\"), weight 72.6 kg (160 lb), SpO2 92%.  Intake/Output last 3 Shifts:  I/O last 3 completed shifts:  In: 1564 (21.6 mL/kg) [P.O.:480; I.V.:1084 (14.9 mL/kg)]  Out: 3175 (43.7 mL/kg) [Urine:3175 (1.2 mL/kg/hr)]  Weight: 72.6 kg     Relevant Results  Results for " orders placed or performed during the hospital encounter of 05/03/25 (from the past 24 hours)   CBC   Result Value Ref Range    WBC 10.0 4.4 - 11.3 x10*3/uL    nRBC 0.0 0.0 - 0.0 /100 WBCs    RBC 3.16 (L) 4.00 - 5.20 x10*6/uL    Hemoglobin 9.5 (L) 12.0 - 16.0 g/dL    Hematocrit 30.4 (L) 36.0 - 46.0 %    MCV 96 80 - 100 fL    MCH 30.1 26.0 - 34.0 pg    MCHC 31.3 (L) 32.0 - 36.0 g/dL    RDW 14.5 11.5 - 14.5 %    Platelets 164 150 - 450 x10*3/uL   Basic Metabolic Panel   Result Value Ref Range    Glucose 176 (H) 74 - 99 mg/dL    Sodium 134 (L) 136 - 145 mmol/L    Potassium 4.1 3.5 - 5.3 mmol/L    Chloride 104 98 - 107 mmol/L    Bicarbonate 19 (L) 21 - 32 mmol/L    Anion Gap 15 10 - 20 mmol/L    Urea Nitrogen 46 (H) 6 - 23 mg/dL    Creatinine 1.34 (H) 0.50 - 1.05 mg/dL    eGFR 36 (L) >60 mL/min/1.73m*2    Calcium 8.1 (L) 8.6 - 10.3 mg/dL     *Note: Due to a large number of results and/or encounters for the requested time period, some results have not been displayed. A complete set of results can be found in Results Review.     US renal complete  Result Date: 5/6/2025  Interpreted By:  Maninder Clark, STUDY: US RENAL COMPLETE;  5/6/2025 1:35 pm   INDICATION: Signs/Symptoms:VINCENT.     COMPARISON: 03/01/2024.   ACCESSION NUMBER(S): [id number]   ORDERING CLINICIAN: TOD THOMAS   TECHNIQUE: Real-time sonographic evaluation of the kidneys and urinary bladder was performed.   FINDINGS: RIGHT KIDNEY: Right kidney measures  9.9 cm.  Right central renal small hyperechoic focus may represent nonobstructing calculus. No right hydronephrosis. No discrete renal lesion is visualized.   LEFT KIDNEY: Left kidney measures  9.4 cm.  Left central renal small hyperechoic foci may represent nonobstructing calculi. No left hydronephrosis. No discrete renal lesion is visualized.   BLADDER: Urinary bladder was fully decompressed by Alvarado catheter and could not be evaluated at this time.       No hydronephrosis bilaterally.   Urinary bladder  fully decompressed by Alvarado catheter and could not be evaluated.   MACRO: None.   Signed by: Maninder Clark 5/6/2025 2:09 PM Dictation workstation:   [id number]        This patient has a urinary catheter   Reason for the urinary catheter remaining today? urinary retention/bladder outlet obstruction, acute or chronic        Assessment & Plan      Bronchitis  No history of COPD, asthma or tobacco use..    Imaging shows no acute finding  Non-productive cough   Flu A and B- coronavirus negative  Solu-Medrol and levofloxacin  DuoNeb  Cough suppressant, Mucinex   Pulmonology following - appreciate recs      Acute respiratory failure with hypoxia  May be related to her bronchitis  Weaned to room air   Last echo showed LVEF 60%   Limited echo 5/5 with preserved LVEF   Resolved      Hypertension  Home meds held in the setting of hypotension,. VINCENT  - Plan to resume as tolerated   - Hold lasix and losartan at discharge and follow up with nephrology and PCP   - Check labs in one week   Vitals as ordered   Check AM labs     Hyperlipidemia  Continue home statin      Stage 3b chronic kidney disease (Multi)  VINCENT noted   Improved with IV fluids   - Continue to hold lasix and losartan   BP medications, lasix held   Consult nephrology (she is known to Dr. TESHA Gaxiola) - appreciate recs; signed off   Monitor kidney function     Blood bacterial culture positive  1 of 2 with gram + Cocci noted 5/5  - Continue to monitor - may be contaminant vs bacteremia   Continue Levaquin at this time   Consult ID - appreciate recs   Vanco added and then stopped   Monitor cultures - repeat cultures negative to date      UTI   Culture with e-coli   - Await sensitivity   - Continue levaquin at this time      Poor appetite  - IV fluids overnight   - Add mirtazepine at bedtime     DVT prophylaxis   Heparin       Lidya Washington, APRN-CNP

## 2025-05-08 NOTE — PROGRESS NOTES
Creatinine continues to improve is now back to baseline.  Bicarb below goal will start sodium bicarb 650 mg twice daily.  Blood pressure improved.  Noted she had urinary retention a Alvarado catheter was placed and urology was consulted.  After discharge she will need a renal function panel in 1 week and follow-up with us in the office in 2 weeks.    Celso Gaxiola MD

## 2025-05-08 NOTE — CARE PLAN
The patient's goals for the shift include      The clinical goals for the shift include void and keep bladder below 450ml      Problem: Pain - Adult  Goal: Verbalizes/displays adequate comfort level or baseline comfort level  Outcome: Progressing     Problem: Safety - Adult  Goal: Free from fall injury  Outcome: Progressing     Problem: Discharge Planning  Goal: Discharge to home or other facility with appropriate resources  Outcome: Progressing     Problem: Chronic Conditions and Co-morbidities  Goal: Patient's chronic conditions and co-morbidity symptoms are monitored and maintained or improved  Outcome: Progressing     Problem: Nutrition  Goal: Nutrient intake appropriate for maintaining nutritional needs  Outcome: Progressing     Problem: Fall/Injury  Goal: Verbalize understanding of risk factor reduction measures to prevent injury from fall in the home  Outcome: Progressing

## 2025-05-08 NOTE — PROGRESS NOTES
"Fartun Carey is a 89 y.o. female on day 5 of admission seen in follow-up for acute hypoxic respiratory failure, lung nodules, acute bronchitis    Subjective   On room air; oxygen sats 92%. Endorses fatigue. No respiratory complaints. Denies pain. Afebrile.     Objective     Physical Exam  Vitals and nursing note reviewed.   Constitutional:       Appearance: She is obese.   HENT:      Head: Normocephalic and atraumatic.      Nose: Nose normal.      Mouth/Throat:      Mouth: Mucous membranes are moist.   Eyes:      Extraocular Movements: Extraocular movements intact.      Conjunctiva/sclera: Conjunctivae normal.      Pupils: Pupils are equal, round, and reactive to light.   Cardiovascular:      Rate and Rhythm: Normal rate and regular rhythm.      Pulses: Normal pulses.      Heart sounds: Normal heart sounds.   Pulmonary:      Effort: Pulmonary effort is normal.      Breath sounds: Normal breath sounds.      Comments: Lungs diminished but clear.   Abdominal:      General: Bowel sounds are normal.      Palpations: Abdomen is soft.   Musculoskeletal:         General: Normal range of motion.   Skin:     General: Skin is warm and dry.      Capillary Refill: Capillary refill takes less than 2 seconds.   Neurological:      General: No focal deficit present.      Mental Status: She is alert and oriented to person, place, and time.   Psychiatric:         Mood and Affect: Mood normal.         Behavior: Behavior normal.         Last Recorded Vitals  Blood pressure 137/85, pulse 85, temperature 36.9 °C (98.4 °F), temperature source Temporal, resp. rate 18, height 1.549 m (5' 1\"), weight 72.6 kg (160 lb), SpO2 92%.      Intake/Output Summary (Last 24 hours) at 5/8/2025 0915  Last data filed at 5/8/2025 0343  Gross per 24 hour   Intake 480 ml   Output 1175 ml   Net -695 ml      Scheduled medications:  aspirin, 81 mg, oral, Daily  atorvastatin, 80 mg, oral, Nightly  cloNIDine, 0.2 mg, oral, BID  furosemide, 20 mg, oral, " Daily  hydrALAZINE, 25 mg, oral, TID  ipratropium-albuteroL, 3 mL, nebulization, TID  levoFLOXacin, 500 mg, intravenous, q24h  levothyroxine, 88 mcg, oral, Daily  losartan, 100 mg, oral, Daily  methylPREDNISolone sodium succinate (PF), 40 mg, intravenous, q12h  pantoprazole, 20 mg, oral, Daily before breakfast  SITagliptin phosphate, 50 mg, oral, Daily    PRN medications: acetaminophen **OR** acetaminophen **OR** acetaminophen, benzocaine-menthol, bisacodyl, dextromethorphan-guaifenesin, guaiFENesin, hydrocodone-homatropine, hydrOXYzine HCL, ipratropium-albuteroL, ondansetron ODT **OR** ondansetron, phenoL, polyethylene glycol    Relevant Results  Results for orders placed or performed during the hospital encounter of 05/03/25 (from the past 24 hours)   CBC   Result Value Ref Range    WBC 10.0 4.4 - 11.3 x10*3/uL    nRBC 0.0 0.0 - 0.0 /100 WBCs    RBC 3.16 (L) 4.00 - 5.20 x10*6/uL    Hemoglobin 9.5 (L) 12.0 - 16.0 g/dL    Hematocrit 30.4 (L) 36.0 - 46.0 %    MCV 96 80 - 100 fL    MCH 30.1 26.0 - 34.0 pg    MCHC 31.3 (L) 32.0 - 36.0 g/dL    RDW 14.5 11.5 - 14.5 %    Platelets 164 150 - 450 x10*3/uL   Basic Metabolic Panel   Result Value Ref Range    Glucose 176 (H) 74 - 99 mg/dL    Sodium 134 (L) 136 - 145 mmol/L    Potassium 4.1 3.5 - 5.3 mmol/L    Chloride 104 98 - 107 mmol/L    Bicarbonate 19 (L) 21 - 32 mmol/L    Anion Gap 15 10 - 20 mmol/L    Urea Nitrogen 46 (H) 6 - 23 mg/dL    Creatinine 1.34 (H) 0.50 - 1.05 mg/dL    eGFR 36 (L) >60 mL/min/1.73m*2    Calcium 8.1 (L) 8.6 - 10.3 mg/dL     *Note: Due to a large number of results and/or encounters for the requested time period, some results have not been displayed. A complete set of results can be found in Results Review.      US renal complete  Result Date: 5/06/2025  FINDINGS: RIGHT KIDNEY: Right kidney measures  9.9 cm.  Right central renal small hyperechoic focus may represent nonobstructing calculus. No right hydronephrosis. No discrete renal lesion is  visualized. LEFT KIDNEY: Left kidney measures  9.4 cm.  Left central renal small hyperechoic foci may represent nonobstructing calculi. No left hydronephrosis. No discrete renal lesion is visualized. BLADDER: Urinary bladder was fully decompressed by Alvarado catheter and could not be evaluated at this time. IMPRESSION: No hydronephrosis bilaterally. Urinary bladder fully decompressed by Alvarado catheter and could not be evaluated.    Transthoracic Echo (TTE) Limited With Doppler And Color  Result Date: 5/05/20265  CONCLUSIONS: 1. The left ventricular systolic function is normal, with a visually estimated ejection fraction of 60-65%. 2. Trace mitral valve regurgitation. 3. Mild to moderate tricuspid regurgitation. 4. Mild aortic valve stenosis. 5. The peak instantaneous gradient of the aortic valve is 18 mmHg.    CT angio chest for pulmonary embolism  Result Date: 5/3/2025  Interpreted By:  Pancho Barth, STUDY: CT ANGIO CHEST FOR PULMONARY EMBOLISM;  5/3/2025 5:41 pm   INDICATION: Signs/Symptoms:tachycardic, hypoxic, concern for PE vs pneumonia.   COMPARISON: 2023   ACCESSION NUMBER(S): [id number]   ORDERING CLINICIAN: VIVIANE RILEY   TECHNIQUE: Helical data acquisition of the chest was obtained after intravenous administration of 75 ML Omnipaque 350 as per PE protocol. Images were reformatted in coronal and sagittal planes. Axial and coronal maximum intensity projection (MIP) images were created and reviewed.   FINDINGS: Ground-glass increasing left upper lobe nodule measures 1.7 cm on image 94 concerning for slow growing neoplasm   Other numerous bilateral pulmonary nodules appear similar in size. Reference right lower lobe nodule measuring a cm on image 163. Reference 8 mm right lower lobe nodule on image 184. Other numerous nodules detected remain stable   Mosaic attenuation in the lungs suggesting small airway or small vessel inflammation. No findings of acute pulmonary embolism. Coronary calcification no  evidence of acute thoracic pathology.   Changes of old healed granulomas disease. Robust vascular calcification.   Nephroliths detected. Largest visualized stone measures 9 mm.   Demineralized bones  1. No evidence of acute pulmonary embolism 2. Senescent changes. Heart size is enlarged. Changes of old healed granulomas disease 3. Multiple stable pulmonary nodules. Enlarging left upper lobe ground-glass nodule now measuring under 2 cm concerning for slow growing neoplasm. 4. Bronchial thickening noted. Mild mosaic attenuation seen in the lungs. Query bronchial inflammation. Senescent changes.       XR chest 2 views  Result Date: 5/3/2025  FINDINGS: Stable heart and mediastinal contours. No pneumothorax or pleural fluid. No focal consolidation or alveolar edema. Mild atherosclerotic calcification of the aortic arch. Mild elevation of the right hemidiaphragm.  1.  No radiographic evidence of acute cardiopulmonary disease.           XR cervical spine 2-3 views  Result Date: 5/2/2025  FINDINGS:   Reversal normal cervical lordosis is seen.   Osteopenia is present. The prevertebral soft tissues are unremarkable.   Endplate sclerosis and spur formation is seen throughout the cervical spine. Narrowing of C3-4, C4-5 C5-6 and C6-7 disc space is seen. Uncovertebral joint hypertrophy seen.   No acute fracture or dislocation is seen.  Reversal normal cervical lordosis and degenerative changes.          Assessment & Plan    Acute hypoxic respiratory failure  Stable on room air  Home oxygen certification prior to discharge  Continue nebulized bronchodilator  Continuous pulse oximetry  Incentive spirometry/pulmonary hygiene     Lung nodules  CT angio with multiple stable pulmonary nodules, enlarging left upper lobe ground-glass nodule now measuring under 2 cm concerning for slow growing neoplasm  CT scan chest wo IV contrast October '23 with numerous noncalcified pulmonary nodules bilaterally concerning for pulmonary metastatic  disease, largest in the right lung measures approximately 9 mm, the largest in the left lung measures approximately 8 [id number]-mw in 3 months is recommended  Patient seen by Dr. Plascencia for lung nodules when hospitalized here October '23 because patient was asymptomatic and given her age recommended deferring aggressive pursuit of biopsy at that time and outpatient follow-up imaging-patient does not recall getting any scans after this  Follow-up with Dr. Winchester in 2 weeks to discuss timing of PET-CT scan    Acute bronchitis  1/2 blood cultures with gram-with Staphylococcus capitis-likely a contaminant  IV levofloxacin, IV vancomycin given x 1 dose  IV Solu-Medrol-will change to prednisone and discharge on taper  Follow-up cultures  FEV 1 when optimized or as outpatient  Infectious Disease follow-up    Acute kidney injury on chronic kidney disease stage lllb  Baseline creatinine 1.1-1.4  Lasix on hold  Hold Nephrotoxic agents  IV fluids  Nephrology follow-up    Urinary tract infection  + E. Coli  Continue IV levofloxacin  Sensitivities pending    Prophylaxis  Subcutaneous heparin  Protonix    PT/OT/out of bed      Whit Miguel APRN-CNP  Pulmonary & Critical Care Medicine

## 2025-05-08 NOTE — NURSING NOTE
Patient had her mendoza removed for a void trial at 1pm 5/7/25. She had not been able to void bgy the 8 hr piotr and her bladder scan was 286. Spoke with CHRISTOPHER Gillespie and as she was not >450, we were doing bladder scan in a couple of hours for the time being. She was able to void small amounts, but by Bladder scan at 1am was 333 and then at 3:30am her bladder scan was 599, she was determined to pee and was able to void only 100ml out. The second scan showed 499. Spoke with NP again and she agreed it was time to place the mendoza back. Orders placed and 16Fr mendoza placed. Patient tolerated well. 525ml urine out on insertion

## 2025-05-08 NOTE — PROGRESS NOTES
Fartun Carey is a 89 y.o. female on day 5 of admission presenting with Bronchitis.    Subjective   Interval History:   Failed voiding trial-mendoza catheter replaced   Afebrile, no chills  No shortness of breath at rest   Mild occasional nonproductive cough  Denies nausea, vomiting or diarrhea   Urine culture susceptibility pending     Objective   Range of Vitals (last 24 hours)  Heart Rate:  []   Temp:  [36.3 °C (97.3 °F)-37 °C (98.6 °F)]   Resp:  [18-20]   BP: (137-172)/(61-85)   SpO2:  [92 %-97 %]   Daily Weight  05/03/25 : 72.6 kg (160 lb)    Body mass index is 30.23 kg/m².    Physical Exam  Constitutional:       Appearance: Normal appearance.   HENT:      Head: Normocephalic and atraumatic.      Nose: Nose normal.      Mouth/Throat:      Mouth: Mucous membranes are moist.      Pharynx: Oropharynx is clear.   Eyes:      General: No scleral icterus.  Cardiovascular:      Rate and Rhythm: Normal rate and regular rhythm.   Pulmonary:      Decreased bilateral breath sounds   Abdominal:      General: Bowel sounds are normal.      Palpations: Abdomen is soft.   Musculoskeletal:         General: Normal range of motion.      Cervical back: Normal range of motion and neck supple.   Skin:     General: Skin is warm and dry.   Neurological:      Mental Status: She is alert.      Comments: Awake, alert    Psychiatric:         Mood and Affect: Mood normal.         Behavior: Behavior normal.     Antibiotics  levoFLOXacin - 500 mg, 500 mg/100 mL    Relevant Results  Labs  Results from last 72 hours   Lab Units 05/08/25 0449 05/07/25  0458 05/06/25  0509   WBC AUTO x10*3/uL 10.0 11.0 10.0   HEMOGLOBIN g/dL 9.5* 9.6* 8.7*   HEMATOCRIT % 30.4* 30.4* 27.9*   PLATELETS AUTO x10*3/uL 164 153 136*     Results from last 72 hours   Lab Units 05/08/25  0449 05/07/25  0459 05/06/25  0509   SODIUM mmol/L 134* 137 130*   POTASSIUM mmol/L 4.1 4.4 4.5   CHLORIDE mmol/L 104 106 97*   CO2 mmol/L 19* 23 22   BUN mg/dL 46* 53* 56*    CREATININE mg/dL 1.34* 1.53* 1.99*   GLUCOSE mg/dL 176* 175* 202*   CALCIUM mg/dL 8.1* 8.1* 7.6*   ANION GAP mmol/L 15 12 16   EGFR mL/min/1.73m*2 36* 31* 23*           Estimated Creatinine Clearance: 22.4 mL/min (A) (by C-G formula based on SCr of 1.34 mg/dL (H)).  CRP   Date Value Ref Range Status   08/18/2022 0.6 0 - 2.0 MG/DL Final     Comment:     Performed at [address] [address]     Microbiology  Susceptibility data from last 14 days.  Collected Specimen Info Organism   05/05/25 Urine from Indwelling (Alvarado) Catheter Escherichia coli   05/03/25 Blood culture from Peripheral Venipuncture Staphylococcus capitis       Imaging  Transthoracic Echo (TTE) Limited  Result Date: 5/5/2025            [facility] [address], [place] [id number]             Phone [phone] TRANSTHORACIC ECHOCARDIOGRAM REPORT Patient Name:       FRANKIE Estrella Physician:    [id number] Birjesh Jerez MD Study Date:         5/5/2025             Ordering Provider:    [id number] RENEE LAWTON MRN/PID:            [id number]             Fellow: Accession#:         [id number]         Nurse: Date of Birth/Age:  6/10/1928 / 89 years Sonographer:          Amilcar Gatica RDCS Gender Assigned at  F                    Additional Staff: Birth: Height:             154.94 cm            Admit Date: Weight:             72.58 kg             Admission Status:     Inpatient -                                                                Routine BSA / BMI:          1.72 m2 / 30.23      Department Location:  [facility]                     kg/m2 Blood Pressure: 137 /48 mmHg Study Type:    TRANSTHORACIC ECHO (TTE) LIMITED Diagnosis/ICD: Dyspnea, unspecified-R06.00 Indication:    dyspnea CPT Codes:     Echo Limited-44815; Doppler Limited-17072; Color Doppler-93807 Patient History:  Valve Disorders:   Aortic Stenosis. Pertinent History: HTN, Hyperlipidemia and CAD. CKD. Study Detail: The following Echo studies were performed: 2D, M-Mode, Doppler and               color flow. Technically challenging study due to body habitus.  PHYSICIAN INTERPRETATION: Left Ventricle: The left ventricular systolic function is normal, with a visually estimated ejection fraction of 60-65%. There are no regional wall motion abnormalities. The left ventricular cavity size is normal. There is mild increased septal and normal posterior left ventricular wall thickness. Spectral Doppler shows a normal pattern of left ventricular diastolic filling. Left Atrium: The left atrial size is mildly dilated. Right Ventricle: The right ventricle is normal in size. There is normal right ventriclar wall thickness. There is normal right ventricular global systolic function. Right Atrium: The right atrial size is normal. Aortic Valve: The aortic valve is trileaflet. There is mild aortic valve cusp calcification. There is reduced systolic aortic valve leaflet excursion. There is evidence of mild aortic valve stenosis. The aortic valve dimensionless index is 0.38. There is no evidence of aortic valve regurgitation. The peak instantaneous gradient of the aortic valve is 18 mmHg. The mean gradient of the aortic valve is 10 mmHg. Mitral Valve: The mitral valve is normal in structure. There is normal mitral valve leaflet mobility. There is mild mitral annular calcification. There is trace mitral valve regurgitation. Tricuspid Valve: The tricuspid valve is structurally normal. There is normal tricuspid valve leaflet mobility. There is mild to moderate tricuspid regurgitation. Pulmonic Valve: The pulmonic valve is structurally normal. There is trace pulmonic valve regurgitation. Pericardium: No pericardial effusion noted. Aorta: The aortic root is normal. There is no dilatation of the aortic arch. There is no dilatation of the ascending  aorta. There is no dilatation of the aortic root. Pulmonary Artery: The pulmonary artery is normal in size. The tricuspid regurgitant velocity is 2.72 m/s, and with an estimated right atrial pressure of 3 mmHg, the estimated pulmonary artery pressure is normal with the RVSP at 32.6 mmHg. The estimated PASP is 33 mmHg.  CONCLUSIONS:  1. The left ventricular systolic function is normal, with a visually estimated ejection fraction of 60-65%.  2. Trace mitral valve regurgitation.  3. Mild to moderate tricuspid regurgitation.  4. Mild aortic valve stenosis.  5. The peak instantaneous gradient of the aortic valve is 18 mmHg. QUANTITATIVE DATA SUMMARY:  2D MEASUREMENTS:           Normal Ranges: Ao Root d:       3.20 cm   (2.0-3.7cm) IVSd:            0.98 cm   (0.6-1.1cm) LVPWd:           0.80 cm   (0.6-1.1cm) LVIDd:           4.37 cm   (3.9-5.9cm) LVIDs:           2.29 cm LV Mass Index:   72.4 g/m2 LV % FS          47.6 %  LEFT ATRIUM:                Normal Ranges: LA Volume Index: 33.0 ml/m2  AORTA MEASUREMENTS:         Normal Ranges: Ao Sinus, d:        3.20 cm (2.1-3.5cm) Asc Ao, d:          2.80 cm (2.1-3.4cm)  LV SYSTOLIC FUNCTION:                      Normal Ranges: EF-A4C View:    62 % (>=55%) EF-Visual:      63 % LV EF Reported: 63 %  LV DIASTOLIC FUNCTION:             Normal Ranges: MV Peak E:             0.87 m/s    (0.7-1.2 m/s) MV Peak A:             0.96 m/s    (0.42-0.7 m/s) E/A Ratio:             0.90        (1.0-2.2) MV A Dur:              180.00 msec  MITRAL VALVE:          Normal Ranges: MV DT:        250 msec (150-240msec)  AORTIC VALVE:                      Normal Ranges: AoV Vmax:                2.14 m/s  (<=1.7m/s) AoV Peak P.3 mmHg (<20mmHg) AoV Mean PG:             10.0 mmHg (1.7-11.5mmHg) LVOT Max Joseph:            0.89 m/s  (<=1.1m/s) AoV VTI:                 54.70 cm  (18-25cm) LVOT VTI:                21.00 cm LVOT Diameter:           1.80 cm   (1.8-2.4cm) AoV Area, VTI:            0.98 cm2  (2.5-5.5cm2) AoV Area,Vmax:           1.06 cm2  (2.5-4.5cm2) AoV Dimensionless Index: 0.38  TRICUSPID VALVE/RVSP:          Normal Ranges: Peak TR Velocity:     2.72 m/s RV Syst Pressure:     33 mmHg  (< 30mmHg)  [id number] Brijesh Jerez MD Electronically signed on 5/5/2025 at 5:48:18 PM  ** Final **     CT angio chest for pulmonary embolism  Result Date: 5/3/2025  Interpreted By:  Pancho Barth, STUDY: CT ANGIO CHEST FOR PULMONARY EMBOLISM;  5/3/2025 5:41 pm   INDICATION: Signs/Symptoms:tachycardic, hypoxic, concern for PE vs pneumonia.   COMPARISON: 2023   ACCESSION NUMBER(S): [id number]   ORDERING CLINICIAN: VIVIANE RILEY   TECHNIQUE: Helical data acquisition of the chest was obtained after intravenous administration of 75 ML Omnipaque 350 as per PE protocol. Images were reformatted in coronal and sagittal planes. Axial and coronal maximum intensity projection (MIP) images were created and reviewed.   FINDINGS: Ground-glass increasing left upper lobe nodule measures 1.7 cm on image 94 concerning for slow growing neoplasm   Other numerous bilateral pulmonary nodules appear similar in size. Reference right lower lobe nodule measuring a cm on image 163. Reference 8 mm right lower lobe nodule on image 184. Other numerous nodules detected remain stable   Mosaic attenuation in the lungs suggesting small airway or small vessel inflammation. No findings of acute pulmonary embolism. Coronary calcification no evidence of acute thoracic pathology.   Changes of old healed granulomas disease. Robust vascular calcification.   Nephroliths detected. Largest visualized stone measures 9 mm.   Demineralized bones       1. No evidence of acute pulmonary embolism 2. Senescent changes. Heart size is enlarged. Changes of old healed granulomas disease 3. Multiple stable pulmonary nodules. Enlarging left upper lobe ground-glass nodule now measuring under 2 cm concerning for slow growing neoplasm. 4. Bronchial thickening noted.  Mild mosaic attenuation seen in the lungs. Query bronchial inflammation. Senescent changes.   MACRO: None.   Signed by: Pancho Barth 5/3/2025 6:19 PM Dictation workstation:   [id number]    XR chest 2 views  Result Date: 5/3/2025  Interpreted By:  Amilcar Galdamez, STUDY: XR CHEST 2 VIEWS;  5/3/2025 3:34 pm   INDICATION: Signs/Symptoms:shortness of breath.     COMPARISON: 02/21/2024   ACCESSION NUMBER(S): [id number]   ORDERING CLINICIAN: VIVIANE RILEY   FINDINGS: Stable heart and mediastinal contours. No pneumothorax or pleural fluid. No focal consolidation or alveolar edema. Mild atherosclerotic calcification of the aortic arch. Mild elevation of the right hemidiaphragm.       1.  No radiographic evidence of acute cardiopulmonary disease.       MACRO: None   Signed by: Amilcar Galdamez 5/3/2025 4:37 PM Dictation workstation:   [id number]    XR cervical spine 2-3 views  Result Date: 5/2/2025  Interpreted By:  Anais Roa, STUDY: XR CERVICAL SPINE 2-3 VIEWS; ;  5/1/2025 12:10 pm   INDICATION: Signs/Symptoms:acute on chronic  neck pain with bilateral hand numbness.   ,M54.2 Cervicalgia,R29.898 Other symptoms and signs involving the musculoskeletal system   COMPARISON: None.   ACCESSION NUMBER(S): [id number]   ORDERING CLINICIAN: ADALI LEMUS   FINDINGS:   Reversal normal cervical lordosis is seen.   Osteopenia is present. The prevertebral soft tissues are unremarkable.   Endplate sclerosis and spur formation is seen throughout the cervical spine. Narrowing of C3-4, C4-5 C5-6 and C6-7 disc space is seen. Uncovertebral joint hypertrophy seen.   No acute fracture or dislocation is seen.       Reversal normal cervical lordosis and degenerative changes.     MACRO: None   Signed by: Anais Roa 5/2/2025 4:20 PM Dictation workstation:   [id number]            Assessment/Plan   Acute hypoxic respiratory failure, resolved-on room air  Pulmonary nodules  CT showed bronchial thickening-bronchitis  Positive blood  culture -staphylococcus capitis- 1/ 2-contaminant   E.coli Urinary tract infection-susceptibility pending     Has reported intolerances to amoxicillin, Augmentin-chart reviewed tolerated follow-up amoxicillin on 4/20/2025- allergy to Keflex  Chronic kidney disease  History of urine culture E.coli-pansensitive, Enterococcus-susceptible to ampicillin      Levaquin  Methylprednisolone per pulmonary   Monitor renal function  Follow up blood culture  Follow up urine culture   Follow up Repeat blood culture   Supplemental oxygen , wean as tolerated   Supportive care   Pulmonary follow up  De-escalate based on culture results      Total time spent caring for the patient today was 25 minutes. This includes time spent before the visit reviewing the chart, time spent during the visit, and time spent after the visit on documentation     Alice Smith, KAYLENE-CNP

## 2025-05-08 NOTE — CARE PLAN
Problem: Nutrition  Goal: Nutrient intake appropriate for maintaining nutritional needs  Outcome: Progressing     Problem: Chronic Conditions and Co-morbidities  Goal: Patient's chronic conditions and co-morbidity symptoms are monitored and maintained or improved  Outcome: Progressing   The patient's goals for the shift include      The clinical goals for the shift include monitor output

## 2025-05-08 NOTE — DOCUMENTATION CLARIFICATION NOTE
"    PATIENT:               FARTUN WESTBROOK  ACCT #:                  [id number]  MRN:                       [id number]  :                       6/10/1928  ADMIT DATE:       5/3/2025 3:16 PM  DISCH DATE:  RESPONDING PROVIDER #:        [id number]          PROVIDER RESPONSE TEXT:    Patient treated for Cystitis without Sepsis    CDI QUERY TEXT:    Clarification        Instruction:  Based on your assessment of the patient and the clinical information, please provide the requested documentation by clicking on the appropriate radio button and enter any additional information if prompted.    Question: Is there a diagnosis indicative of a patient meeting SIRS criteria and with organ dysfunction in the setting of Cystitis infection    When answering this query, please exercise your independent professional judgment. The fact that a question is being asked, does not imply that any particular answer is desired or expected.    The patient's clinical indicators include:  Clinical Information:  5/3 Fartun Westbrook is a 89 y.o. female scented from home due to cough and shortness of breath since yesterday.    Clinical Indicators:  HR:  70/92198/109/87/97/116/102 (5/3)  RR:  20//19/29/21/19// (5/3)  Urine Culture:  E Coli  Creat:  1.24/1.28/2.18/1.99/1.53/1.34    Nephrology consult:  \"Acute kidney injury\"  \"Acute kidney injury likely secondary to combination of IV contrast and volume depletion from poor p.o. intake\"    Treatment:  NS bolus 250 IV/500 cc bolus; levoquin IV; BC/UA&Culture; Serial Labs; ID Consult; CXR; TTE; CT Chest; US Real;  Nephrology Consult;    Risk Factors:  Age; DM; HTN; CKD 3;  Options provided:  -- Sepsis 2/2 Cystitis with renal organ dysfunction of VINCENT  -- Patient treated for Cystitis without Sepsis  -- Other - I will add my own diagnosis  -- Refer to Clinical Documentation Reviewer    Query created by: Danika Anderson on 2025 2:45 PM      Electronically signed by:  KIRSTEN MAURICE-CNP " 5/8/2025 3:03 PM

## 2025-05-09 LAB
ANION GAP SERPL CALCULATED.3IONS-SCNC: 11 MMOL/L (ref 10–20)
BACTERIA BLD AEROBE CULT: ABNORMAL
BACTERIA BLD CULT: ABNORMAL
BUN SERPL-MCNC: 40 MG/DL (ref 6–23)
CALCIUM SERPL-MCNC: 8.3 MG/DL (ref 8.6–10.3)
CHLORIDE SERPL-SCNC: 107 MMOL/L (ref 98–107)
CO2 SERPL-SCNC: 25 MMOL/L (ref 21–32)
CREAT SERPL-MCNC: 1.18 MG/DL (ref 0.5–1.05)
EGFRCR SERPLBLD CKD-EPI 2021: 42 ML/MIN/1.73M*2
ERYTHROCYTE [DISTWIDTH] IN BLOOD BY AUTOMATED COUNT: 14.6 % (ref 11.5–14.5)
GLUCOSE SERPL-MCNC: 165 MG/DL (ref 74–99)
GRAM STN SPEC: ABNORMAL
HCT VFR BLD AUTO: 28.5 % (ref 36–46)
HGB BLD-MCNC: 8.8 G/DL (ref 12–16)
MCH RBC QN AUTO: 29.3 PG (ref 26–34)
MCHC RBC AUTO-ENTMCNC: 30.9 G/DL (ref 32–36)
MCV RBC AUTO: 95 FL (ref 80–100)
NRBC BLD-RTO: 0 /100 WBCS (ref 0–0)
PLATELET # BLD AUTO: 163 X10*3/UL (ref 150–450)
POTASSIUM SERPL-SCNC: 4.2 MMOL/L (ref 3.5–5.3)
RBC # BLD AUTO: 3 X10*6/UL (ref 4–5.2)
SODIUM SERPL-SCNC: 139 MMOL/L (ref 136–145)
WBC # BLD AUTO: 8.1 X10*3/UL (ref 4.4–11.3)

## 2025-05-09 PROCEDURE — 2500000004 HC RX 250 GENERAL PHARMACY W/ HCPCS (ALT 636 FOR OP/ED): Performed by: NURSE PRACTITIONER

## 2025-05-09 PROCEDURE — 2500000001 HC RX 250 WO HCPCS SELF ADMINISTERED DRUGS (ALT 637 FOR MEDICARE OP): Performed by: SURGERY

## 2025-05-09 PROCEDURE — 1100000001 HC PRIVATE ROOM DAILY

## 2025-05-09 PROCEDURE — 2500000001 HC RX 250 WO HCPCS SELF ADMINISTERED DRUGS (ALT 637 FOR MEDICARE OP): Performed by: NURSE PRACTITIONER

## 2025-05-09 PROCEDURE — 2500000004 HC RX 250 GENERAL PHARMACY W/ HCPCS (ALT 636 FOR OP/ED): Performed by: INTERNAL MEDICINE

## 2025-05-09 PROCEDURE — 2500000004 HC RX 250 GENERAL PHARMACY W/ HCPCS (ALT 636 FOR OP/ED): Mod: JZ | Performed by: NURSE PRACTITIONER

## 2025-05-09 PROCEDURE — 2500000001 HC RX 250 WO HCPCS SELF ADMINISTERED DRUGS (ALT 637 FOR MEDICARE OP): Performed by: INTERNAL MEDICINE

## 2025-05-09 PROCEDURE — 80048 BASIC METABOLIC PNL TOTAL CA: CPT | Performed by: NURSE PRACTITIONER

## 2025-05-09 PROCEDURE — 99232 SBSQ HOSP IP/OBS MODERATE 35: CPT | Performed by: NURSE PRACTITIONER

## 2025-05-09 PROCEDURE — 2500000002 HC RX 250 W HCPCS SELF ADMINISTERED DRUGS (ALT 637 FOR MEDICARE OP, ALT 636 FOR OP/ED): Performed by: NURSE PRACTITIONER

## 2025-05-09 PROCEDURE — 36415 COLL VENOUS BLD VENIPUNCTURE: CPT | Performed by: NURSE PRACTITIONER

## 2025-05-09 PROCEDURE — 85027 COMPLETE CBC AUTOMATED: CPT | Performed by: NURSE PRACTITIONER

## 2025-05-09 PROCEDURE — 2500000001 HC RX 250 WO HCPCS SELF ADMINISTERED DRUGS (ALT 637 FOR MEDICARE OP)

## 2025-05-09 PROCEDURE — 97530 THERAPEUTIC ACTIVITIES: CPT | Mod: GO

## 2025-05-09 RX ORDER — CLONIDINE HYDROCHLORIDE 0.2 MG/1
0.2 TABLET ORAL 2 TIMES DAILY
Status: DISCONTINUED | OUTPATIENT
Start: 2025-05-09 | End: 2025-05-09

## 2025-05-09 RX ORDER — SODIUM CHLORIDE 9 MG/ML
50 INJECTION, SOLUTION INTRAVENOUS CONTINUOUS
Status: DISCONTINUED | OUTPATIENT
Start: 2025-05-09 | End: 2025-05-10

## 2025-05-09 RX ORDER — CLONIDINE HYDROCHLORIDE 0.1 MG/1
0.1 TABLET ORAL 2 TIMES DAILY
Status: DISCONTINUED | OUTPATIENT
Start: 2025-05-09 | End: 2025-05-13 | Stop reason: HOSPADM

## 2025-05-09 RX ORDER — HYDRALAZINE HYDROCHLORIDE 25 MG/1
25 TABLET, FILM COATED ORAL 3 TIMES DAILY
Status: DISCONTINUED | OUTPATIENT
Start: 2025-05-09 | End: 2025-05-13 | Stop reason: HOSPADM

## 2025-05-09 RX ORDER — BENZONATATE 100 MG/1
100 CAPSULE ORAL 3 TIMES DAILY PRN
Status: DISCONTINUED | OUTPATIENT
Start: 2025-05-09 | End: 2025-05-13 | Stop reason: HOSPADM

## 2025-05-09 RX ADMIN — CLONIDINE HYDROCHLORIDE 0.1 MG: 0.1 TABLET ORAL at 21:59

## 2025-05-09 RX ADMIN — HYDRALAZINE HYDROCHLORIDE 50 MG: 50 TABLET ORAL at 08:11

## 2025-05-09 RX ADMIN — PREDNISONE 20 MG: 20 TABLET ORAL at 08:10

## 2025-05-09 RX ADMIN — LEVOTHYROXINE SODIUM 88 MCG: 0.09 TABLET ORAL at 05:13

## 2025-05-09 RX ADMIN — LEVOFLOXACIN 500 MG: 5 INJECTION, SOLUTION INTRAVENOUS at 17:55

## 2025-05-09 RX ADMIN — DOCUSATE SODIUM 100 MG: 100 CAPSULE, LIQUID FILLED ORAL at 22:00

## 2025-05-09 RX ADMIN — DOCUSATE SODIUM 100 MG: 100 CAPSULE, LIQUID FILLED ORAL at 08:10

## 2025-05-09 RX ADMIN — GUAIFENESIN 600 MG: 600 TABLET ORAL at 08:10

## 2025-05-09 RX ADMIN — ACETAMINOPHEN 650 MG: 325 TABLET ORAL at 21:59

## 2025-05-09 RX ADMIN — CLONIDINE HYDROCHLORIDE 0.2 MG: 0.2 TABLET ORAL at 08:11

## 2025-05-09 RX ADMIN — HYDRALAZINE HYDROCHLORIDE 25 MG: 25 TABLET ORAL at 21:59

## 2025-05-09 RX ADMIN — GUAIFENESIN 600 MG: 600 TABLET ORAL at 21:59

## 2025-05-09 RX ADMIN — HYDRALAZINE HYDROCHLORIDE 25 MG: 25 TABLET ORAL at 16:13

## 2025-05-09 RX ADMIN — IRON SUCROSE 200 MG: 20 INJECTION, SOLUTION INTRAVENOUS at 05:12

## 2025-05-09 RX ADMIN — PREDNISONE 20 MG: 20 TABLET ORAL at 21:59

## 2025-05-09 RX ADMIN — PANTOPRAZOLE SODIUM 20 MG: 20 TABLET, DELAYED RELEASE ORAL at 05:13

## 2025-05-09 RX ADMIN — ASPIRIN 81 MG: 81 TABLET, CHEWABLE ORAL at 08:11

## 2025-05-09 RX ADMIN — ATORVASTATIN CALCIUM 80 MG: 80 TABLET, FILM COATED ORAL at 21:59

## 2025-05-09 RX ADMIN — SODIUM BICARBONATE 650 MG TABLET 650 MG: at 08:11

## 2025-05-09 ASSESSMENT — PAIN - FUNCTIONAL ASSESSMENT
PAIN_FUNCTIONAL_ASSESSMENT: 0-10

## 2025-05-09 ASSESSMENT — COGNITIVE AND FUNCTIONAL STATUS - GENERAL
HELP NEEDED FOR BATHING: A LOT
DRESSING REGULAR LOWER BODY CLOTHING: A LOT
PERSONAL GROOMING: A LITTLE
DRESSING REGULAR UPPER BODY CLOTHING: A LITTLE
EATING MEALS: A LITTLE
DAILY ACTIVITIY SCORE: 15
TOILETING: A LOT

## 2025-05-09 ASSESSMENT — PAIN SCALES - GENERAL
PAINLEVEL_OUTOF10: 3
PAINLEVEL_OUTOF10: 0 - NO PAIN

## 2025-05-09 ASSESSMENT — PAIN DESCRIPTION - DESCRIPTORS: DESCRIPTORS: ACHING

## 2025-05-09 ASSESSMENT — PAIN DESCRIPTION - LOCATION: LOCATION: ABDOMEN

## 2025-05-09 ASSESSMENT — PAIN DESCRIPTION - ORIENTATION: ORIENTATION: LEFT;LOWER

## 2025-05-09 NOTE — PROGRESS NOTES
Fartun Carey is a 89 y.o. female on day 6 of admission presenting with Bronchitis.    Subjective   Interval History:   More tired today  Afebrile, no chills  No shortness of breath at rest   Denies nausea, vomiting or diarrhea   Urine culture susceptibility pending     Objective   Range of Vitals (last 24 hours)  Heart Rate:  []   Temp:  [36.6 °C (97.9 °F)-37 °C (98.6 °F)]   Resp:  [16-18]   BP: ()/(46-94)   SpO2:  [94 %-95 %]   Daily Weight  05/03/25 : 72.6 kg (160 lb)    Body mass index is 30.23 kg/m².    Physical Exam  Constitutional:       Appearance: Normal appearance.   HENT:      Head: Normocephalic and atraumatic.      Nose: Nose normal.      Mouth/Throat:      Mouth: Mucous membranes are moist.      Pharynx: Oropharynx is clear.   Eyes:      General: No scleral icterus.  Cardiovascular:      Rate and Rhythm: Normal rate and regular rhythm.   Pulmonary:      Decreased bilateral breath sounds   Abdominal:      General: Bowel sounds are normal.      Palpations: Abdomen is soft.   Musculoskeletal:         General: Normal range of motion.      Cervical back: Normal range of motion and neck supple.   Skin:     General: Skin is warm and dry.   Neurological:      Mental Status: She is alert.      Comments: Awake, alert    Psychiatric:         Mood and Affect: Mood normal.         Behavior: Behavior normal.     Antibiotics  levoFLOXacin - 500 mg, 500 mg/100 mL    Relevant Results  Labs  Results from last 72 hours   Lab Units 05/09/25 0417 05/08/25 0449 05/07/25  0458   WBC AUTO x10*3/uL 8.1 10.0 11.0   HEMOGLOBIN g/dL 8.8* 9.5* 9.6*   HEMATOCRIT % 28.5* 30.4* 30.4*   PLATELETS AUTO x10*3/uL 163 164 153     Results from last 72 hours   Lab Units 05/09/25 0417 05/08/25 0449 05/07/25  0459   SODIUM mmol/L 139 134* 137   POTASSIUM mmol/L 4.2 4.1 4.4   CHLORIDE mmol/L 107 104 106   CO2 mmol/L 25 19* 23   BUN mg/dL 40* 46* 53*   CREATININE mg/dL 1.18* 1.34* 1.53*   GLUCOSE mg/dL 165* 176* 175*   CALCIUM  mg/dL 8.3* 8.1* 8.1*   ANION GAP mmol/L 11 15 12   EGFR mL/min/1.73m*2 42* 36* 31*           Estimated Creatinine Clearance: 25.4 mL/min (A) (by C-G formula based on SCr of 1.18 mg/dL (H)).  CRP   Date Value Ref Range Status   08/18/2022 0.6 0 - 2.0 MG/DL Final     Comment:     Performed at [address] [address]     Microbiology  Susceptibility data from last 14 days.  Collected Specimen Info Organism   05/05/25 Urine from Indwelling (Alvarado) Catheter Escherichia coli   05/03/25 Blood culture from Peripheral Venipuncture Staphylococcus capitis       Imaging  Transthoracic Echo (TTE) Limited  Result Date: 5/5/2025            [address], [place] [id number]             Phone [phone] TRANSTHORACIC ECHOCARDIOGRAM REPORT Patient Name:       FRANKIE WESTBROOK        Reading Physician:    [id number] Brijesh Jerez MD Study Date:         5/5/2025             Ordering Provider:    [id number] RENEE LAWTON MRN/PID:            [id number]             Fellow: Accession#:         [id number]         Nurse: Date of Birth/Age:  6/10/1928 / 89 years Sonographer:          Amilcar Gatica RDCS Gender Assigned at  F                    Additional Staff: Birth: Height:             154.94 cm            Admit Date: Weight:             72.58 kg             Admission Status:     Inpatient -                                                                Routine BSA / BMI:          1.72 m2 / 30.23      Department Location:  [facility]                     kg/m2 Blood Pressure: 137 /48 mmHg Study Type:    TRANSTHORACIC ECHO (TTE) LIMITED Diagnosis/ICD: Dyspnea, unspecified-R06.00 Indication:    dyspnea CPT Codes:     Echo Limited-96284; Doppler Limited-37607; Color Doppler-16282 Patient History: Valve Disorders:   Aortic Stenosis. Pertinent History: HTN, Hyperlipidemia and  CAD. CKD. Study Detail: The following Echo studies were performed: 2D, M-Mode, Doppler and               color flow. Technically challenging study due to body habitus.  PHYSICIAN INTERPRETATION: Left Ventricle: The left ventricular systolic function is normal, with a visually estimated ejection fraction of 60-65%. There are no regional wall motion abnormalities. The left ventricular cavity size is normal. There is mild increased septal and normal posterior left ventricular wall thickness. Spectral Doppler shows a normal pattern of left ventricular diastolic filling. Left Atrium: The left atrial size is mildly dilated. Right Ventricle: The right ventricle is normal in size. There is normal right ventriclar wall thickness. There is normal right ventricular global systolic function. Right Atrium: The right atrial size is normal. Aortic Valve: The aortic valve is trileaflet. There is mild aortic valve cusp calcification. There is reduced systolic aortic valve leaflet excursion. There is evidence of mild aortic valve stenosis. The aortic valve dimensionless index is 0.38. There is no evidence of aortic valve regurgitation. The peak instantaneous gradient of the aortic valve is 18 mmHg. The mean gradient of the aortic valve is 10 mmHg. Mitral Valve: The mitral valve is normal in structure. There is normal mitral valve leaflet mobility. There is mild mitral annular calcification. There is trace mitral valve regurgitation. Tricuspid Valve: The tricuspid valve is structurally normal. There is normal tricuspid valve leaflet mobility. There is mild to moderate tricuspid regurgitation. Pulmonic Valve: The pulmonic valve is structurally normal. There is trace pulmonic valve regurgitation. Pericardium: No pericardial effusion noted. Aorta: The aortic root is normal. There is no dilatation of the aortic arch. There is no dilatation of the ascending aorta. There is no dilatation of the aortic root. Pulmonary Artery: The pulmonary  artery is normal in size. The tricuspid regurgitant velocity is 2.72 m/s, and with an estimated right atrial pressure of 3 mmHg, the estimated pulmonary artery pressure is normal with the RVSP at 32.6 mmHg. The estimated PASP is 33 mmHg.  CONCLUSIONS:  1. The left ventricular systolic function is normal, with a visually estimated ejection fraction of 60-65%.  2. Trace mitral valve regurgitation.  3. Mild to moderate tricuspid regurgitation.  4. Mild aortic valve stenosis.  5. The peak instantaneous gradient of the aortic valve is 18 mmHg. QUANTITATIVE DATA SUMMARY:  2D MEASUREMENTS:           Normal Ranges: Ao Root d:       3.20 cm   (2.0-3.7cm) IVSd:            0.98 cm   (0.6-1.1cm) LVPWd:           0.80 cm   (0.6-1.1cm) LVIDd:           4.37 cm   (3.9-5.9cm) LVIDs:           2.29 cm LV Mass Index:   72.4 g/m2 LV % FS          47.6 %  LEFT ATRIUM:                Normal Ranges: LA Volume Index: 33.0 ml/m2  AORTA MEASUREMENTS:         Normal Ranges: Ao Sinus, d:        3.20 cm (2.1-3.5cm) Asc Ao, d:          2.80 cm (2.1-3.4cm)  LV SYSTOLIC FUNCTION:                      Normal Ranges: EF-A4C View:    62 % (>=55%) EF-Visual:      63 % LV EF Reported: 63 %  LV DIASTOLIC FUNCTION:             Normal Ranges: MV Peak E:             0.87 m/s    (0.7-1.2 m/s) MV Peak A:             0.96 m/s    (0.42-0.7 m/s) E/A Ratio:             0.90        (1.0-2.2) MV A Dur:              180.00 msec  MITRAL VALVE:          Normal Ranges: MV DT:        250 msec (150-240msec)  AORTIC VALVE:                      Normal Ranges: AoV Vmax:                2.14 m/s  (<=1.7m/s) AoV Peak P.3 mmHg (<20mmHg) AoV Mean PG:             10.0 mmHg (1.7-11.5mmHg) LVOT Max Joseph:            0.89 m/s  (<=1.1m/s) AoV VTI:                 54.70 cm  (18-25cm) LVOT VTI:                21.00 cm LVOT Diameter:           1.80 cm   (1.8-2.4cm) AoV Area, VTI:           0.98 cm2  (2.5-5.5cm2) AoV Area,Vmax:           1.06 cm2  (2.5-4.5cm2) AoV  Dimensionless Index: 0.38  TRICUSPID VALVE/RVSP:          Normal Ranges: Peak TR Velocity:     2.72 m/s RV Syst Pressure:     33 mmHg  (< 30mmHg)  [id number] Brijesh Jerez MD Electronically signed on 5/5/2025 at 5:48:18 PM  ** Final **     CT angio chest for pulmonary embolism  Result Date: 5/3/2025  Interpreted By:  Pancho Barth, STUDY: CT ANGIO CHEST FOR PULMONARY EMBOLISM;  5/3/2025 5:41 pm   INDICATION: Signs/Symptoms:tachycardic, hypoxic, concern for PE vs pneumonia.   COMPARISON: 2023   ACCESSION NUMBER(S): [id number]   ORDERING CLINICIAN: VIVIANE RILEY   TECHNIQUE: Helical data acquisition of the chest was obtained after intravenous administration of 75 ML Omnipaque 350 as per PE protocol. Images were reformatted in coronal and sagittal planes. Axial and coronal maximum intensity projection (MIP) images were created and reviewed.   FINDINGS: Ground-glass increasing left upper lobe nodule measures 1.7 cm on image 94 concerning for slow growing neoplasm   Other numerous bilateral pulmonary nodules appear similar in size. Reference right lower lobe nodule measuring a cm on image 163. Reference 8 mm right lower lobe nodule on image 184. Other numerous nodules detected remain stable   Mosaic attenuation in the lungs suggesting small airway or small vessel inflammation. No findings of acute pulmonary embolism. Coronary calcification no evidence of acute thoracic pathology.   Changes of old healed granulomas disease. Robust vascular calcification.   Nephroliths detected. Largest visualized stone measures 9 mm.   Demineralized bones       1. No evidence of acute pulmonary embolism 2. Senescent changes. Heart size is enlarged. Changes of old healed granulomas disease 3. Multiple stable pulmonary nodules. Enlarging left upper lobe ground-glass nodule now measuring under 2 cm concerning for slow growing neoplasm. 4. Bronchial thickening noted. Mild mosaic attenuation seen in the lungs. Query bronchial inflammation.  Senescent changes.   MACRO: None.   Signed by: Pancho Barth 5/3/2025 6:19 PM Dictation workstation:   [id number]    XR chest 2 views  Result Date: 5/3/2025  Interpreted By:  Amilcar Galdamez, STUDY: XR CHEST 2 VIEWS;  5/3/2025 3:34 pm   INDICATION: Signs/Symptoms:shortness of breath.     COMPARISON: 02/21/2024   ACCESSION NUMBER(S): [id number]   ORDERING CLINICIAN: VIVIANE RILEY   FINDINGS: Stable heart and mediastinal contours. No pneumothorax or pleural fluid. No focal consolidation or alveolar edema. Mild atherosclerotic calcification of the aortic arch. Mild elevation of the right hemidiaphragm.       1.  No radiographic evidence of acute cardiopulmonary disease.       MACRO: None   Signed by: Amilcar Galdamez 5/3/2025 4:37 PM Dictation workstation:   [id number]    XR cervical spine 2-3 views  Result Date: 5/2/2025  Interpreted By:  Anais Roa, STUDY: XR CERVICAL SPINE 2-3 VIEWS; ;  5/1/2025 12:10 pm   INDICATION: Signs/Symptoms:acute on chronic  neck pain with bilateral hand numbness.   ,M54.2 Cervicalgia,R29.898 Other symptoms and signs involving the musculoskeletal system   COMPARISON: None.   ACCESSION NUMBER(S): [id number]   ORDERING CLINICIAN: ADALI LEMUS   FINDINGS:   Reversal normal cervical lordosis is seen.   Osteopenia is present. The prevertebral soft tissues are unremarkable.   Endplate sclerosis and spur formation is seen throughout the cervical spine. Narrowing of C3-4, C4-5 C5-6 and C6-7 disc space is seen. Uncovertebral joint hypertrophy seen.   No acute fracture or dislocation is seen.       Reversal normal cervical lordosis and degenerative changes.     MACRO: None   Signed by: Anais Roa 5/2/2025 4:20 PM Dictation workstation:   [id number]            Assessment/Plan   Acute hypoxic respiratory failure, resolved-on room air  Pulmonary nodules  CT showed bronchial thickening-bronchitis  Positive blood culture -staphylococcus capitis- 1/ 2-contaminant   E.coli Urinary tract  infection-susceptibility pending     Has reported intolerances to amoxicillin, Augmentin-chart reviewed tolerated follow-up amoxicillin on 4/20/2025- allergy to Keflex  Chronic kidney disease  History of urine culture E.coli-pansensitive, Enterococcus-susceptible to ampicillin      Levaquin  Methylprednisolone per pulmonary   Monitor renal function  Follow up blood culture  Follow up urine culture   Follow up Repeat blood culture   Supplemental oxygen , wean as tolerated   Supportive care   Pulmonary follow up  De-escalate based on culture results      Total time spent caring for the patient today was 25 minutes. This includes time spent before the visit reviewing the chart, time spent during the visit, and time spent after the visit on documentation     Alice Smith, APRN-CNP

## 2025-05-09 NOTE — PROGRESS NOTES
"Fartun Carey is a 89 y.o. female on day 6 of admission presenting with Bronchitis.    Subjective   Patient resting in bed, lethargic today. Denies chest pain, shortness of breath. Continues to have poor appetite.        Objective     Physical Exam  Constitutional:       Appearance: She is ill-appearing.   HENT:      Head: Normocephalic and atraumatic.      Mouth/Throat:      Mouth: Mucous membranes are moist.      Pharynx: Oropharynx is clear.   Eyes:      Extraocular Movements: Extraocular movements intact.      Conjunctiva/sclera: Conjunctivae normal.      Pupils: Pupils are equal, round, and reactive to light.   Cardiovascular:      Rate and Rhythm: Normal rate and regular rhythm.      Pulses: Normal pulses.      Heart sounds: Normal heart sounds.   Pulmonary:      Effort: Pulmonary effort is normal.      Breath sounds: Rhonchi present.   Abdominal:      General: Bowel sounds are normal.      Palpations: Abdomen is soft.      Tenderness: There is no abdominal tenderness.   Musculoskeletal:         General: Normal range of motion.      Cervical back: Normal range of motion and neck supple.   Skin:     General: Skin is warm and dry.      Capillary Refill: Capillary refill takes less than 2 seconds.   Neurological:      General: No focal deficit present.      Mental Status: She is oriented to person, place, and time and easily aroused. She is lethargic.   Psychiatric:         Mood and Affect: Mood normal.         Behavior: Behavior normal.         Last Recorded Vitals  Blood pressure 134/59, pulse 74, temperature 36.6 °C (97.9 °F), temperature source Temporal, resp. rate 16, height 1.549 m (5' 1\"), weight 72.6 kg (160 lb), SpO2 94%.  Intake/Output last 3 Shifts:  I/O last 3 completed shifts:  In: 1.7 (0 mL/kg) [I.V.:1.7 (0 mL/kg)]  Out: 3550 (48.9 mL/kg) [Urine:3550 (1.4 mL/kg/hr)]  Weight: 72.6 kg     Relevant Results  Results for orders placed or performed during the hospital encounter of 05/03/25 (from the past " 24 hours)   CBC   Result Value Ref Range    WBC 8.1 4.4 - 11.3 x10*3/uL    nRBC 0.0 0.0 - 0.0 /100 WBCs    RBC 3.00 (L) 4.00 - 5.20 x10*6/uL    Hemoglobin 8.8 (L) 12.0 - 16.0 g/dL    Hematocrit 28.5 (L) 36.0 - 46.0 %    MCV 95 80 - 100 fL    MCH 29.3 26.0 - 34.0 pg    MCHC 30.9 (L) 32.0 - 36.0 g/dL    RDW 14.6 (H) 11.5 - 14.5 %    Platelets 163 150 - 450 x10*3/uL   Basic Metabolic Panel   Result Value Ref Range    Glucose 165 (H) 74 - 99 mg/dL    Sodium 139 136 - 145 mmol/L    Potassium 4.2 3.5 - 5.3 mmol/L    Chloride 107 98 - 107 mmol/L    Bicarbonate 25 21 - 32 mmol/L    Anion Gap 11 10 - 20 mmol/L    Urea Nitrogen 40 (H) 6 - 23 mg/dL    Creatinine 1.18 (H) 0.50 - 1.05 mg/dL    eGFR 42 (L) >60 mL/min/1.73m*2    Calcium 8.3 (L) 8.6 - 10.3 mg/dL     *Note: Due to a large number of results and/or encounters for the requested time period, some results have not been displayed. A complete set of results can be found in Results Review.       Assessment & Plan    Bronchitis  No history of COPD, asthma or tobacco use..    Imaging shows no acute finding  Non-productive cough   Flu A and B- coronavirus negative  Solu-Medrol and levofloxacin  DuoNeb  Cough suppressant, Mucinex   Pulmonology following - appreciate recs   Improved      Acute respiratory failure with hypoxia  May be related to her bronchitis  Weaned to room air   Last echo showed LVEF 60%   Limited echo 5/5 with preserved LVEF   Resolved      Hypertension  Home meds held in the setting of hypotension,. VINCENT  - Plan to resume as tolerated - hypertensive for 2 days in the setting of decreased anti-hypertensives. Increased clonidine to home dose of 0.2 mg today and she had significant orthostatic response. Medications decreased and will continue IV fluids overnight   - Hold lasix and losartan at discharge and follow up with nephrology and PCP   - Check labs in one week after discharge   Vitals as ordered   Check AM labs  Recheck orthostatic vitals in AM       Hyperlipidemia  Continue home statin      Stage 3b chronic kidney disease (Multi)  VINCENT noted   Improved with IV fluids   - Continue to hold lasix and losartan at discharge   BP medications, lasix held   Consult nephrology (she is known to Dr. TESHA Gaxiola) - appreciate recs; signed off   Monitor kidney function     Blood bacterial culture positive  1 of 2 with gram + Cocci noted 5/5  - Continue to monitor - may be contaminant vs bacteremia   Continue Levaquin at this time   Consult ID - appreciate recs   Vanco added and then stopped   Monitor cultures - repeat cultures negative to date      UTI   Culture with e-coli   - Await sensitivity   - Continue levaquin at this time   - ID following      Poor appetite  - IV fluids overnight   - Stop mirtazapine in the setting of increased lethargy.   - Continue to monitor   - Add supplements       DVT prophylaxis   Heparin     Lidya Washington APRN-CNP

## 2025-05-09 NOTE — PROGRESS NOTES
VINCENT resolved, Cr now actually better than baseline.  Bicarb normalized can stop bicarb tablets. Had a hypotensive episode this morning which per charting now looks to be improving. Will decrease hydralazine to 25 mg TID and decreased clonidine to 0.1 mg BID with hold parameters in place to avoid hypotension.  Noted she had urinary retention a Alvarado catheter was placed and urology was consulted.  Will sign off at this time, please call back with any questions, thank you. Patient can follow-up with us with a renal function panel in 1 week and office visit in 2 weeks.     Celso Gaxiola MD

## 2025-05-09 NOTE — PROGRESS NOTES
05/09/25 1141   Discharge Planning   Support Systems Children;Friends/neighbors   Type of Residence Private residence   Home or Post Acute Services In home services   Type of Home Care Services Home OT;Home PT   Expected Discharge Disposition Home H  ([facility])   Does the patient need discharge transport arranged? No     Per nephrology note:  ---Had a hypotensive episode this morning.  ---Will decrease hydralazine to 25 mg TID and decreased clonidine to 0.1 mg BID with hold parameters in place to avoid hypotension.    ---Noted she had urinary retention a Alvarado catheter was placed and urology was consulted.    ---Patient can follow-up with nephrology with a renal function panel in 1 week and office visit in 2 weeks.     Awaiting to be medically cleared for discharge. [facility] ordered.

## 2025-05-09 NOTE — CARE PLAN
The patient's goals for the shift include      The clinical goals for the shift include Comfort, rest    Over the shift, the patient did not make progress toward the following goals. Barriers to progression include . Recommendations to address these barriers include .

## 2025-05-09 NOTE — PROGRESS NOTES
Reason for Disposition   MILD difficulty breathing (e.g., minimal/no SOB at rest, SOB with walking, pulse < 100) of new onset or worse than normal    Protocols used: ST BREATHING DIFFICULTY-A-OH    Ms. Johansen states she has been experiencing sob with exertion and fatigue for 10 days. She is unsure if symptoms are related to her thyroid or anemia. Patient offered an appointment. She prefers to speak with Dr. Stiles first.    Occupational Therapy    Occupational Therapy Treatment    Name: Fartun Carey  MRN: [id number]  Department: [address]  Room: [address] [place] [address]  Date: 05/09/25  Time Calculation  Start Time: 0904  Stop Time: 0914  Time Calculation (min): 10 min  Therapist returned to patient's room from 10:25 to 10:34 for ADLs    Assessment:  OT Assessment: Patient is limited this date due to her BP dropping when out of bed and not feeling well. She will continue to benefit from skilled OT to maximize patient's safety and independence with daily tasks.  Prognosis: Good  Barriers to Discharge Home: Caregiver assistance, Physical needs  Caregiver Assistance: Patient lives alone and/or does not have reliable caregiver assistance  Physical Needs: 24hr mobility assistance needed, 24hr ADL assistance needed  Evaluation/Treatment Tolerance: Patient limited by fatigue (low BP)  End of Session Communication: Bedside nurse  End of Session Patient Position: Bed, 3 rail up, Alarm on  Plan:  Treatment Interventions: ADL retraining, Functional transfer training, Endurance training, Compensatory technique education  OT Frequency: 3 times per week  OT Discharge Recommendations: Moderate intensity level of continued care  Equipment Recommended upon Discharge: Wheeled walker  OT Recommended Transfer Status: Assistive equipment (Comment), Assist of 1  OT - OK to Discharge: Yes    Subjective   Previous Visit Info:  OT Last Visit  OT Received On: 05/09/25  General:  General  Reason for Referral: decline in ADLs, secondary to generalized weakness, SOB and bronchitis.  Referred By: Dr. Edward  Past Medical History Relevant to Rehab: DM type 2, OA, pulmonary nodule, hyponatremia, PVD  Family/Caregiver Present: No  Prior to Session Communication: Bedside nurse  Patient Position Received: Up in chair, Alarm on  Preferred Learning Style: verbal, visual  General Comment: Patient is cleared for therapy. She is in the chair upon arrival, c/o dizziness and feeling like she  "is \"going to pass out\" BP taken and patient returned to bed. Therapist returns to patients room at 10:25, patient still not feeling well, requesting to brush teeth and wash face  Precautions:  Hearing/Visual Limitations: + glasses, bilateral hearing aides  Medical Precautions: Fall precautions    Vital Signs Comment: BP in the chair 83/47 (MAP 59). Returned to bed: /46 (MAP 66)    Pain Assessment:  Pain Assessment  Pain Assessment: 0-10  0-10 (Numeric) Pain Score: 0 - No pain (\"i just dont feel well\")    Objective   Cognition:  Overall Cognitive Status: Within Functional Limits  Cognition Comments: pleasant and cooperative. limited by not feeling well  Processing Speed: Delayed  Activities of Daily Living: Grooming  Grooming Level of Assistance: Setup  Grooming Where Assessed: Bed level  Grooming Comments: wash face and brush teeth    Bed Mobility/Transfers: Bed Mobility  Bed Mobility: Yes  Bed Mobility 1  Bed Mobility 1: Sitting to supine  Level of Assistance 1: Minimum assistance  Bed Mobility Comments 1: assist for B LE    Transfers  Transfer: Yes  Transfer 1  Transfer From 1: Chair with arms to  Transfer to 1: Stand  Technique 1: Sit to stand  Transfer Level of Assistance 1: Minimum assistance, +2, Arm in arm assistance  Trials/Comments 1: arm in arm assist, as patient is not feeling well. 2 person for safety to stand from the chair and pivot to the EOB    Functional Mobility:  Functional Mobility  Functional Mobility Performed: No (limited by low BP this date)    RUE   RUE : Within Functional Limits and LUE   LUE: Within Functional Limits    Outcome Measures:  [facility] Daily Activity  Putting on and taking off regular lower body clothing: A lot  Bathing (including washing, rinsing, drying): A lot  Putting on and taking off regular upper body clothing: A little  Toileting, which includes using toilet, bedpan or urinal: A lot  Taking care of personal grooming such as brushing teeth: A little  Eating Meals: A " little  Daily Activity - Total Score: 15    Education Documentation  Body Mechanics, taught by Lennie Jackson OT at 5/9/2025 10:44 AM.  Learner: Patient  Readiness: Acceptance  Method: Explanation, Demonstration  Response: Needs Reinforcement, Verbalizes Understanding  Comment: Reviewed OT POC and safe functional transfers    Precautions, taught by Lennie Jackson OT at 5/9/2025 10:44 AM.  Learner: Patient  Readiness: Acceptance  Method: Explanation, Demonstration  Response: Needs Reinforcement, Verbalizes Understanding  Comment: Reviewed OT POC and safe functional transfers    ADL Training, taught by Lennie Jackson OT at 5/9/2025 10:44 AM.  Learner: Patient  Readiness: Acceptance  Method: Explanation, Demonstration  Response: Needs Reinforcement, Verbalizes Understanding  Comment: Reviewed OT POC and safe functional transfers    Education Comments  No comments found.      Goals:  Encounter Problems       Encounter Problems (Active)       OT Goals       ADLs (Not Progressing)       Start:  05/05/25    Expected End:  05/26/25       Patient will complete ADL tasks, with modified independence, using AE need in order to increase patient's safety and independence with self-care tasks.          Functional Transfers (Not Progressing)       Start:  05/05/25    Expected End:  05/26/25       Patient will complete functional transfers with modified independence, using least restrictive device, in order to increase patient's safety and independence with daily tasks.          Functional Mobility  (Not Progressing)       Start:  05/05/25    Expected End:  05/26/25       Patient will demonstrate the ability to complete item retrieval and functional mobility with modified independence, in order to increase safety and independence with daily tasks.          Activity Tolerance  (Not Progressing)       Start:  05/05/25    Expected End:  05/26/25       Patient will demonstrate the ability to participate in functional activity at  least >/= 25 minutes in order to increase patient's safety and independence with daily tasks.

## 2025-05-09 NOTE — PROGRESS NOTES
05/09/25 1546   Discharge Planning   Living Arrangements Alone   Support Systems Children;Friends/neighbors   Who is requesting discharge planning? Provider   Home or Post Acute Services In home services   Type of Home Care Services Home OT;Home PT;Home nursing visits   Expected Discharge Disposition Home H  ([facility])     PER NOTES:  ---hypertensive for 2 days in the setting of decreased anti-hypertensives.   ---increased clonidine to home dose of 0.2 mg today and she had significant orthostatic response. ---Medications decreased and will continue IV fluids overnight   --- Hold lasix and losartan at discharge and follow up with nephrology and PCP   ---Check labs in one week after discharge   ---IV fluids overnight   --- Stop mirtazapine in the setting of increased lethargy.       PLAN Discharge to home with [facility]

## 2025-05-09 NOTE — PROGRESS NOTES
"Fartun Carey is a 89 y.o. female on day 6 of admission seen in follow-up for acute hypoxic respiratory failure, lung nodules, acute bronchitis    Subjective   Patient sleeping, awakens easily. On room air; oxygen sats 94%. Endorses fatigue. No respiratory complaints. Denies pain. Afebrile.     Objective     Physical Exam  Vitals and nursing note reviewed.   Constitutional:       Appearance: She is obese.   HENT:      Head: Normocephalic and atraumatic.      Nose: Nose normal.      Mouth/Throat:      Mouth: Mucous membranes are moist.   Eyes:      Extraocular Movements: Extraocular movements intact.      Conjunctiva/sclera: Conjunctivae normal.      Pupils: Pupils are equal, round, and reactive to light.   Cardiovascular:      Rate and Rhythm: Normal rate and regular rhythm.      Pulses: Normal pulses.      Heart sounds: Normal heart sounds.   Pulmonary:      Effort: Pulmonary effort is normal.      Breath sounds: Normal breath sounds.      Comments: Lungs diminished but clear.   Abdominal:      General: Bowel sounds are normal.      Palpations: Abdomen is soft.   Musculoskeletal:         General: Normal range of motion.   Skin:     General: Skin is warm and dry.      Capillary Refill: Capillary refill takes less than 2 seconds.   Neurological:      General: No focal deficit present.      Mental Status: She is alert and oriented to person, place, and time.   Psychiatric:         Mood and Affect: Mood normal.         Behavior: Behavior normal.         Last Recorded Vitals  Blood pressure (!) 197/94, pulse 89, temperature 36.6 °C (97.9 °F), temperature source Temporal, resp. rate 16, height 1.549 m (5' 1\"), weight 72.6 kg (160 lb), SpO2 94%.      Intake/Output Summary (Last 24 hours) at 5/9/2025 0846  Last data filed at 5/9/2025 0542  Gross per 24 hour   Intake 1.67 ml   Output 2925 ml   Net -2923.33 ml      Scheduled medications:  aspirin, 81 mg, oral, Daily  atorvastatin, 80 mg, oral, Nightly  cloNIDine, 0.2 mg, " oral, BID  furosemide, 20 mg, oral, Daily  hydrALAZINE, 25 mg, oral, TID  ipratropium-albuteroL, 3 mL, nebulization, TID  levoFLOXacin, 500 mg, intravenous, q24h  levothyroxine, 88 mcg, oral, Daily  losartan, 100 mg, oral, Daily  methylPREDNISolone sodium succinate (PF), 40 mg, intravenous, q12h  pantoprazole, 20 mg, oral, Daily before breakfast  SITagliptin phosphate, 50 mg, oral, Daily    PRN medications: acetaminophen **OR** acetaminophen **OR** acetaminophen, benzocaine-menthol, bisacodyl, dextromethorphan-guaifenesin, guaiFENesin, hydrocodone-homatropine, hydrOXYzine HCL, ipratropium-albuteroL, ondansetron ODT **OR** ondansetron, phenoL, polyethylene glycol    Relevant Results  Results for orders placed or performed during the hospital encounter of 05/03/25 (from the past 24 hours)   CBC   Result Value Ref Range    WBC 8.1 4.4 - 11.3 x10*3/uL    nRBC 0.0 0.0 - 0.0 /100 WBCs    RBC 3.00 (L) 4.00 - 5.20 x10*6/uL    Hemoglobin 8.8 (L) 12.0 - 16.0 g/dL    Hematocrit 28.5 (L) 36.0 - 46.0 %    MCV 95 80 - 100 fL    MCH 29.3 26.0 - 34.0 pg    MCHC 30.9 (L) 32.0 - 36.0 g/dL    RDW 14.6 (H) 11.5 - 14.5 %    Platelets 163 150 - 450 x10*3/uL   Basic Metabolic Panel   Result Value Ref Range    Glucose 165 (H) 74 - 99 mg/dL    Sodium 139 136 - 145 mmol/L    Potassium 4.2 3.5 - 5.3 mmol/L    Chloride 107 98 - 107 mmol/L    Bicarbonate 25 21 - 32 mmol/L    Anion Gap 11 10 - 20 mmol/L    Urea Nitrogen 40 (H) 6 - 23 mg/dL    Creatinine 1.18 (H) 0.50 - 1.05 mg/dL    eGFR 42 (L) >60 mL/min/1.73m*2    Calcium 8.3 (L) 8.6 - 10.3 mg/dL     *Note: Due to a large number of results and/or encounters for the requested time period, some results have not been displayed. A complete set of results can be found in Results Review.      US renal complete  Result Date: 5/06/2025  FINDINGS: RIGHT KIDNEY: Right kidney measures  9.9 cm.  Right central renal small hyperechoic focus may represent nonobstructing calculus. No right hydronephrosis. No  discrete renal lesion is visualized. LEFT KIDNEY: Left kidney measures  9.4 cm.  Left central renal small hyperechoic foci may represent nonobstructing calculi. No left hydronephrosis. No discrete renal lesion is visualized. BLADDER: Urinary bladder was fully decompressed by Alvarado catheter and could not be evaluated at this time. IMPRESSION: No hydronephrosis bilaterally. Urinary bladder fully decompressed by Alvarado catheter and could not be evaluated.    Transthoracic Echo (TTE) Limited With Doppler And Color  Result Date: 5/05/20265  CONCLUSIONS: 1. The left ventricular systolic function is normal, with a visually estimated ejection fraction of 60-65%. 2. Trace mitral valve regurgitation. 3. Mild to moderate tricuspid regurgitation. 4. Mild aortic valve stenosis. 5. The peak instantaneous gradient of the aortic valve is 18 mmHg.    CT angio chest for pulmonary embolism  Result Date: 5/3/2025  Interpreted By:  Pancho Barth, STUDY: CT ANGIO CHEST FOR PULMONARY EMBOLISM;  5/3/2025 5:41 pm   INDICATION: Signs/Symptoms:tachycardic, hypoxic, concern for PE vs pneumonia.   COMPARISON: 2023   ACCESSION NUMBER(S): [id number]   ORDERING CLINICIAN: VIVIANE RILEY   TECHNIQUE: Helical data acquisition of the chest was obtained after intravenous administration of 75 ML Omnipaque 350 as per PE protocol. Images were reformatted in coronal and sagittal planes. Axial and coronal maximum intensity projection (MIP) images were created and reviewed.   FINDINGS: Ground-glass increasing left upper lobe nodule measures 1.7 cm on image 94 concerning for slow growing neoplasm   Other numerous bilateral pulmonary nodules appear similar in size. Reference right lower lobe nodule measuring a cm on image 163. Reference 8 mm right lower lobe nodule on image 184. Other numerous nodules detected remain stable   Mosaic attenuation in the lungs suggesting small airway or small vessel inflammation. No findings of acute pulmonary embolism.  Coronary calcification no evidence of acute thoracic pathology.   Changes of old healed granulomas disease. Robust vascular calcification.   Nephroliths detected. Largest visualized stone measures 9 mm.   Demineralized bones  1. No evidence of acute pulmonary embolism 2. Senescent changes. Heart size is enlarged. Changes of old healed granulomas disease 3. Multiple stable pulmonary nodules. Enlarging left upper lobe ground-glass nodule now measuring under 2 cm concerning for slow growing neoplasm. 4. Bronchial thickening noted. Mild mosaic attenuation seen in the lungs. Query bronchial inflammation. Senescent changes.       XR chest 2 views  Result Date: 5/3/2025  FINDINGS: Stable heart and mediastinal contours. No pneumothorax or pleural fluid. No focal consolidation or alveolar edema. Mild atherosclerotic calcification of the aortic arch. Mild elevation of the right hemidiaphragm.  1.  No radiographic evidence of acute cardiopulmonary disease.           XR cervical spine 2-3 views  Result Date: 5/2/2025  FINDINGS:   Reversal normal cervical lordosis is seen.   Osteopenia is present. The prevertebral soft tissues are unremarkable.   Endplate sclerosis and spur formation is seen throughout the cervical spine. Narrowing of C3-4, C4-5 C5-6 and C6-7 disc space is seen. Uncovertebral joint hypertrophy seen.   No acute fracture or dislocation is seen.  Reversal normal cervical lordosis and degenerative changes.          Assessment & Plan    Acute hypoxic respiratory failure  Stable on room air  Home oxygen certification prior to discharge  Continue nebulized bronchodilator  Continuous pulse oximetry  Incentive spirometry/pulmonary hygiene     Lung nodules  CT angio with multiple stable pulmonary nodules, enlarging left upper lobe ground-glass nodule now measuring under 2 cm concerning for slow growing neoplasm  CT scan chest wo IV contrast October '23 with numerous noncalcified pulmonary nodules bilaterally concerning  for pulmonary metastatic disease, largest in the right lung measures approximately 9 mm, the largest in the left lung measures approximately 8 [id number] in 3 months is recommended  Patient seen by Dr. Plascencia for lung nodules when hospitalized here October '23 because patient was asymptomatic and given her age recommended deferring aggressive pursuit of biopsy at that time and outpatient follow-up imaging-patient does not recall getting any scans after this  Follow-up with Dr. Winchester in 2 weeks to discuss timing of PET-CT scan    Acute bronchitis  1/2 blood cultures with gram-with Staphylococcus capitis-likely a contaminant  IV levofloxacin, IV vancomycin given x 1 dose  IV Solu-Medrol-will change to prednisone and discharge on taper  Follow-up cultures  FEV 1 when optimized or as outpatient  Infectious Disease follow-up    Acute kidney injury on chronic kidney disease stage lllb  Baseline creatinine 1.1-1.4  Lasix on hold  Hold Nephrotoxic agents  IV fluids completed  Nephrology signed off    Urinary tract infection  + E. Coli  Continue IV levofloxacin  Sensitivities pending    Prophylaxis  Subcutaneous heparin  Protonix    PT/OT/out of bed    Will see as needed this weekend    KAYLENE Winslow-CNP  Pulmonary & Critical Care Medicine

## 2025-05-10 LAB
ANION GAP SERPL CALCULATED.3IONS-SCNC: 11 MMOL/L (ref 10–20)
BACTERIA BLD CULT: NORMAL
BACTERIA BLD CULT: NORMAL
BACTERIA UR CULT: ABNORMAL
BUN SERPL-MCNC: 39 MG/DL (ref 6–23)
CALCIUM SERPL-MCNC: 8 MG/DL (ref 8.6–10.3)
CHLORIDE SERPL-SCNC: 108 MMOL/L (ref 98–107)
CO2 SERPL-SCNC: 23 MMOL/L (ref 21–32)
CREAT SERPL-MCNC: 1.22 MG/DL (ref 0.5–1.05)
EGFRCR SERPLBLD CKD-EPI 2021: 41 ML/MIN/1.73M*2
ERYTHROCYTE [DISTWIDTH] IN BLOOD BY AUTOMATED COUNT: 15 % (ref 11.5–14.5)
GLUCOSE SERPL-MCNC: 189 MG/DL (ref 74–99)
HCT VFR BLD AUTO: 28.1 % (ref 36–46)
HGB BLD-MCNC: 8.8 G/DL (ref 12–16)
MCH RBC QN AUTO: 30.1 PG (ref 26–34)
MCHC RBC AUTO-ENTMCNC: 31.3 G/DL (ref 32–36)
MCV RBC AUTO: 96 FL (ref 80–100)
NRBC BLD-RTO: 0 /100 WBCS (ref 0–0)
PLATELET # BLD AUTO: 180 X10*3/UL (ref 150–450)
POTASSIUM SERPL-SCNC: 4.2 MMOL/L (ref 3.5–5.3)
RBC # BLD AUTO: 2.92 X10*6/UL (ref 4–5.2)
SODIUM SERPL-SCNC: 138 MMOL/L (ref 136–145)
WBC # BLD AUTO: 11.9 X10*3/UL (ref 4.4–11.3)

## 2025-05-10 PROCEDURE — 1100000001 HC PRIVATE ROOM DAILY

## 2025-05-10 PROCEDURE — 80048 BASIC METABOLIC PNL TOTAL CA: CPT | Performed by: NURSE PRACTITIONER

## 2025-05-10 PROCEDURE — 2500000001 HC RX 250 WO HCPCS SELF ADMINISTERED DRUGS (ALT 637 FOR MEDICARE OP): Performed by: INTERNAL MEDICINE

## 2025-05-10 PROCEDURE — 2500000004 HC RX 250 GENERAL PHARMACY W/ HCPCS (ALT 636 FOR OP/ED): Performed by: NURSE PRACTITIONER

## 2025-05-10 PROCEDURE — 85027 COMPLETE CBC AUTOMATED: CPT | Performed by: NURSE PRACTITIONER

## 2025-05-10 PROCEDURE — 2500000002 HC RX 250 W HCPCS SELF ADMINISTERED DRUGS (ALT 637 FOR MEDICARE OP, ALT 636 FOR OP/ED): Performed by: NURSE PRACTITIONER

## 2025-05-10 PROCEDURE — 97530 THERAPEUTIC ACTIVITIES: CPT | Mod: GP

## 2025-05-10 PROCEDURE — 2500000001 HC RX 250 WO HCPCS SELF ADMINISTERED DRUGS (ALT 637 FOR MEDICARE OP): Performed by: NURSE PRACTITIONER

## 2025-05-10 PROCEDURE — 99232 SBSQ HOSP IP/OBS MODERATE 35: CPT | Performed by: NURSE PRACTITIONER

## 2025-05-10 PROCEDURE — 2500000004 HC RX 250 GENERAL PHARMACY W/ HCPCS (ALT 636 FOR OP/ED): Mod: JZ | Performed by: INTERNAL MEDICINE

## 2025-05-10 PROCEDURE — 36415 COLL VENOUS BLD VENIPUNCTURE: CPT | Performed by: NURSE PRACTITIONER

## 2025-05-10 RX ADMIN — BENZONATATE 100 MG: 100 CAPSULE ORAL at 21:47

## 2025-05-10 RX ADMIN — HYDRALAZINE HYDROCHLORIDE 25 MG: 25 TABLET ORAL at 09:10

## 2025-05-10 RX ADMIN — CLONIDINE HYDROCHLORIDE 0.1 MG: 0.1 TABLET ORAL at 21:48

## 2025-05-10 RX ADMIN — PREDNISONE 20 MG: 20 TABLET ORAL at 10:51

## 2025-05-10 RX ADMIN — PREDNISONE 20 MG: 20 TABLET ORAL at 21:48

## 2025-05-10 RX ADMIN — ATORVASTATIN CALCIUM 80 MG: 80 TABLET, FILM COATED ORAL at 21:48

## 2025-05-10 RX ADMIN — PANTOPRAZOLE SODIUM 20 MG: 20 TABLET, DELAYED RELEASE ORAL at 05:49

## 2025-05-10 RX ADMIN — ASPIRIN 81 MG: 81 TABLET, CHEWABLE ORAL at 09:10

## 2025-05-10 RX ADMIN — ERTAPENEM SODIUM 500 MG: 1 INJECTION, POWDER, LYOPHILIZED, FOR SOLUTION INTRAMUSCULAR; INTRAVENOUS at 14:38

## 2025-05-10 RX ADMIN — HYDRALAZINE HYDROCHLORIDE 25 MG: 25 TABLET ORAL at 15:48

## 2025-05-10 RX ADMIN — HYDRALAZINE HYDROCHLORIDE 25 MG: 25 TABLET ORAL at 21:48

## 2025-05-10 RX ADMIN — LEVOTHYROXINE SODIUM 88 MCG: 0.09 TABLET ORAL at 05:49

## 2025-05-10 RX ADMIN — GUAIFENESIN 600 MG: 600 TABLET ORAL at 09:10

## 2025-05-10 RX ADMIN — GUAIFENESIN 600 MG: 600 TABLET ORAL at 21:48

## 2025-05-10 RX ADMIN — CLONIDINE HYDROCHLORIDE 0.1 MG: 0.1 TABLET ORAL at 09:10

## 2025-05-10 ASSESSMENT — COGNITIVE AND FUNCTIONAL STATUS - GENERAL
CLIMB 3 TO 5 STEPS WITH RAILING: A LOT
MOVING FROM LYING ON BACK TO SITTING ON SIDE OF FLAT BED WITH BEDRAILS: A LITTLE
STANDING UP FROM CHAIR USING ARMS: A LITTLE
DRESSING REGULAR UPPER BODY CLOTHING: A LITTLE
WALKING IN HOSPITAL ROOM: A LITTLE
DRESSING REGULAR LOWER BODY CLOTHING: A LITTLE
MOVING TO AND FROM BED TO CHAIR: A LITTLE
DAILY ACTIVITIY SCORE: 20
STANDING UP FROM CHAIR USING ARMS: A LITTLE
HELP NEEDED FOR BATHING: A LITTLE
MOBILITY SCORE: 18
CLIMB 3 TO 5 STEPS WITH RAILING: A LOT
MOVING TO AND FROM BED TO CHAIR: A LITTLE
PERSONAL GROOMING: A LITTLE
MOVING TO AND FROM BED TO CHAIR: A LITTLE
HELP NEEDED FOR BATHING: A LITTLE
CLIMB 3 TO 5 STEPS WITH RAILING: A LOT
EATING MEALS: A LITTLE
TOILETING: A LOT
WALKING IN HOSPITAL ROOM: A LITTLE
MOBILITY SCORE: 17
STANDING UP FROM CHAIR USING ARMS: A LITTLE
MOBILITY SCORE: 19
TURNING FROM BACK TO SIDE WHILE IN FLAT BAD: A LITTLE
DRESSING REGULAR LOWER BODY CLOTHING: A LITTLE
WALKING IN HOSPITAL ROOM: A LOT
TOILETING: A LITTLE

## 2025-05-10 ASSESSMENT — PAIN SCALES - GENERAL
PAINLEVEL_OUTOF10: 0 - NO PAIN

## 2025-05-10 ASSESSMENT — PAIN - FUNCTIONAL ASSESSMENT: PAIN_FUNCTIONAL_ASSESSMENT: 0-10

## 2025-05-10 NOTE — TREATMENT PLAN
Interval discussion with primary team  Positive urine culture for ESBL E. coli  Chart reviewed-patient has tolerated meropenem.  Will discontinue Levaquin  IV Invanz for 3 to 7 days

## 2025-05-10 NOTE — PROGRESS NOTES
Physical Therapy    Physical Therapy Treatment    Patient Name: Fartun Carey  MRN: [id number]  Department: [address]  Room: [address] [place], [place] [address]  Today's Date: 5/10/2025  Time Calculation  Start Time: 1512  Stop Time: 1549  Time Calculation (min): 37 min    Assessment/Plan   PT Assessment  PT Assessment Results: Decreased strength, Decreased endurance, Impaired balance, Decreased mobility, Decreased safety awareness  Rehab Prognosis: Good  Barriers to Discharge Home: Caregiver assistance  Caregiver Assistance: Patient lives alone and/or does not have reliable caregiver assistance, Caregiver assistance needed per identified barriers - however, level of patient's required assistance exceeds assistance available at home  Physical Needs: Ambulating household distances limited by function/safety, 24hr ADL assistance needed, Intermittent mobility assistance needed, Intermittent ADL assistance needed  Evaluation/Treatment Tolerance: Patient tolerated treatment well, Patient limited by fatigue  Medical Staff Made Aware: Yes  Strengths: Premorbid level of function  Barriers to Participation: Comorbidities, Insight into problems  End of Session Communication: Bedside nurse  Assessment Comment: Patient with significant improvements in activity tolerance this session. Notably, patient was able to perform orthostatics and ambulation trial without desaturating, or SOB. Some lighthedness on coming to stand, which subsided. No lightheadness was reported during ambulation trial. Patient continues to require Mod intensity rehab at d/c.  End of Session Patient Position: Up in chair, Alarm on  PT Plan  Inpatient/Swing Bed or Outpatient: Inpatient  PT Plan  Treatment/Interventions: Bed mobility, Transfer training, Balance training, Strengthening, Endurance training, Gait training, Therapeutic exercise, Therapeutic activity  PT Plan: Ongoing PT  PT Frequency: 4 times per week  PT Discharge Recommendations: Moderate intensity level of continued  care  Equipment Recommended upon Discharge: Wheeled walker  PT Recommended Transfer Status: Assist x1, Assistive device  PT - OK to Discharge: Yes      General Visit Information:   PT  Visit  PT Received On: 05/10/25  Response to Previous Treatment: Patient with no complaints from previous session.  General  Reason for Referral: decline in ADLs, secondary to generalized weakness, SOB and bronchitis.  Referred By: Dr. Edward  Past Medical History Relevant to Rehab: DM type 2, OA, pulmonary nodule, hyponatremia, PVD  Family/Caregiver Present: No  Prior to Session Communication: Bedside nurse  Patient Position Received: Bed, 3 rail up, Alarm on  Preferred Learning Style: verbal, visual  General Comment: Nurse and patient agreeable to tx. Nurse present to assist with orthostatics.    Subjective   Precautions:  Precautions  Medical Precautions: Fall precautions     Date/Time Vitals Session Patient Position Pulse Resp SpO2 BP MAP (mmHg)    05/10/25 1512 Pre PT  --  70  --  96 %  162/62  95     05/10/25 1513 Post PT  --  78  --  --  --  --     05/10/25 1605 --  --  118  18  96 %  116/63  81             Objective   Pain:  Pain Assessment  Pain Assessment: 0-10  0-10 (Numeric) Pain Score: 0 - No pain  Cognition:  Cognition  Overall Cognitive Status: Within Functional Limits  Coordination:  Movements are Fluid and Coordinated: No  Postural Control:  Static Sitting Balance  Static Sitting-Balance Support: Bilateral upper extremity supported, Feet unsupported  Static Sitting-Level of Assistance: Contact guard  Static Sitting-Comment/Number of Minutes: 4  Static Standing Balance  Static Standing-Balance Support: Bilateral upper extremity supported  Static Standing-Level of Assistance: Minimum assistance  Static Standing-Comment/Number of Minutes: Initially Min A when standing due to lightheadedness which subsided with prolonged standing    Activity Tolerance:  Activity Tolerance  Endurance: Tolerates 30 min exercise with  multiple rests  Activity Tolerance Comments: Improved activity tolerance this session, does require intermittent rest breaks. No c/o dizziness or SOB this session  Treatments:    Therapeutic Activity  Therapeutic Activity Performed: Yes  Therapeutic Activity 1: Mobility as noted    Bed Mobility  Bed Mobility: Yes  Bed Mobility 1  Bed Mobility 1: Supine to sitting  Level of Assistance 1: Minimum assistance  Bed Mobility Comments 1: Patient utilizing bed rails to roll to sidelying, assist at trunk to facilitate righting to seated upright.    Ambulation/Gait Training  Ambulation/Gait Training Performed: Yes  Ambulation/Gait Training 1  Surface 1: Level tile  Device 1: Rolling walker  Gait Support Devices: Gait belt  Assistance 1: Minimum assistance  Quality of Gait 1: Diminished heel strike, Inconsistent stride length, Decreased step length, Forward flexed posture  Comments/Distance (ft) 1: Patient able to amb approx 40' from room and back in frey with approx 95% O2 saturation, no c/o SOB or lightheadedness  Transfers  Transfer: Yes  Transfer 1  Transfer From 1: Bed to  Transfer to 1: Stand  Technique 1: Sit to stand  Transfer Device 1: Walker, Gait belt  Transfer Level of Assistance 1: Moderate assistance  Trials/Comments 1: Mod A at gait belt to facilitate transition from sit to stand. Unsteadiness noted for approx 1 minute.  Transfers 2  Transfer From 2: Stand to  Transfer to 2: Chair without arms  Technique 2: Stand to sit  Transfer Device 2: Gait belt, Walker  Transfer Level of Assistance 2: Minimum assistance  Trials/Comments 2: Assist at gait belt to control descent  Transfers 3  Transfer From 3: Chair without arms to  Transfer to 3: Stand  Technique 3: Stand to sit  Transfer Device 3: Walker, Gait belt  Transfer Level of Assistance 3: Minimum assistance  Trials/Comments 3: Assist to come to stand at gait belt.  Transfers 4  Transfer From 4: Stand to  Transfer to 4: Chair with arms  Technique 4: Stand to  sit  Transfer Device 4: Gait belt, Walker  Transfer Level of Assistance 4: Minimum assistance, Minimal verbal cues  Trials/Comments 4: VCs and hand over hand assist to facilitate reaching for arm rests to control descent. Physical assist to control descent    Stairs  Stairs: No    Outcome Measures:  [facility] Basic Mobility  Turning from your back to your side while in a flat bed without using bedrails: A little  Moving from lying on your back to sitting on the side of a flat bed without using bedrails: A little  Moving to and from bed to chair (including a wheelchair): A little  Standing up from a chair using your arms (e.g. wheelchair or bedside chair): A little  To walk in hospital room: A little  Climbing 3-5 steps with railing: A lot  Basic Mobility - Total Score: 17    Education Documentation  Mobility Training, taught by Saad Llanes PT at 5/10/2025  4:47 PM.  Learner: Patient  Readiness: Acceptance  Method: Explanation  Response: Needs Reinforcement  Comment: Educated patient on importance of in hospital mobility in maintaining physical function, role of PT in the inpatient setting.    Education Comments  No comments found.        OP EDUCATION:       Encounter Problems       Encounter Problems (Active)       Balance       LTG - Patient will maintain balance to allow for safe mobility (Progressing)       Start:  05/04/25    Expected End:  05/18/25       Patient will maintain balance to allow for safe mobility with decreased risk for falls.         STG - Maintains dynamic standing balance with upper extremity support (Progressing)       Start:  05/04/25    Expected End:  05/11/25       INTERVENTIONS:1. Practice standing with minimal support.2. Educate patient about standing tolerance.3. Educate patient about independence with gait, transfers, and ADL's.4. Educate patient about use of assistive device.5. Educate patient about self-directed care.            Mobility       LTG - Patient will ambulate household distance  (Progressing)       Start:  05/04/25    Expected End:  05/18/25       Patient will ambulate household distance with rolling walker in order to return to PLOF with decreased risk for falls.         STG - Patient will ambulate up and down a curb/step (Progressing)       Start:  05/04/25    Expected End:  05/11/25       Patient will ambulate up and down a curb/step in order to enter/exit home.            PT Transfers       LTG - Patient will transfer from one surface to another (Progressing)       Start:  05/04/25    Expected End:  05/18/25       Patient will transfer from one surface to another with Ekaterina in order to return to PLOF.         STG - Patient will transfer sit to and from stand (Progressing)       Start:  05/04/25    Expected End:  05/11/25       Patient will transfer sit to and from stand with CGA in order to increase level of independence.            Pain - Adult

## 2025-05-10 NOTE — PROGRESS NOTES
Fartun Carey is a 89 y.o. female on day 7 of admission presenting with Bronchitis.    Subjective   Interval History:   Afebrile, no chills  No cough, chest pain or shortness of breath  No nausea vomiting or diarrhea  Nurse present during evaluation    Review of Systems   All other systems reviewed and are negative.      Objective   Range of Vitals (last 24 hours)  Heart Rate:  [65-87]   Temp:  [36.5 °C (97.7 °F)-37.1 °C (98.8 °F)]   Resp:  [16-20]   BP: (123-168)/(59-67)   SpO2:  [92 %-98 %]   Daily Weight  05/03/25 : 72.6 kg (160 lb)    Body mass index is 30.23 kg/m².    Physical Exam  Constitutional:       Appearance: Normal appearance.   HENT:      Head: Normocephalic and atraumatic.      Nose: Nose normal.      Mouth/Throat:      Mouth: Mucous membranes are moist.      Pharynx: Oropharynx is clear.   Eyes:      General: No scleral icterus.  Cardiovascular:      Rate and Rhythm: Normal rate and regular rhythm.   Pulmonary:      Decreased bilateral breath sounds   Abdominal:      General: Bowel sounds are normal.      Palpations: Abdomen is soft.   Musculoskeletal:         General: Normal range of motion.      Cervical back: Normal range of motion and neck supple.   Skin:     General: Skin is warm and dry.   Neurological:      Mental Status: She is alert.      Comments: Awake, alert    Psychiatric:         Mood and Affect: Mood normal.         Behavior: Behavior normal.     Antibiotics  levoFLOXacin - 500 mg, 500 mg/100 mL    Relevant Results  Labs  Results from last 72 hours   Lab Units 05/10/25  0512 05/09/25  0417 05/08/25  0449   WBC AUTO x10*3/uL 11.9* 8.1 10.0   HEMOGLOBIN g/dL 8.8* 8.8* 9.5*   HEMATOCRIT % 28.1* 28.5* 30.4*   PLATELETS AUTO x10*3/uL 180 163 164     Results from last 72 hours   Lab Units 05/10/25  0512 05/09/25  0417 05/08/25  0449   SODIUM mmol/L 138 139 134*   POTASSIUM mmol/L 4.2 4.2 4.1   CHLORIDE mmol/L 108* 107 104   CO2 mmol/L 23 25 19*   BUN mg/dL 39* 40* 46*   CREATININE mg/dL  1.22* 1.18* 1.34*   GLUCOSE mg/dL 189* 165* 176*   CALCIUM mg/dL 8.0* 8.3* 8.1*   ANION GAP mmol/L 11 11 15   EGFR mL/min/1.73m*2 41* 42* 36*         Estimated Creatinine Clearance: 24.6 mL/min (A) (by C-G formula based on SCr of 1.22 mg/dL (H)).  CRP   Date Value Ref Range Status   08/18/2022 0.6 0 - 2.0 MG/DL Final     Comment:     Performed at [address] [address]     Microbiology  Susceptibility data from last 14 days.  Collected Specimen Info Organism   05/05/25 Urine from Indwelling (Alvarado) Catheter Escherichia coli   05/03/25 Blood culture from Peripheral Venipuncture Staphylococcus capitis     Imaging  US renal complete  Result Date: 5/6/2025  Interpreted By:  Maninder Clark, STUDY: US RENAL COMPLETE;  5/6/2025 1:35 pm   INDICATION: Signs/Symptoms:VINCENT.     COMPARISON: 03/01/2024.   ACCESSION NUMBER(S): [id number]   ORDERING CLINICIAN: TOD THOMAS   TECHNIQUE: Real-time sonographic evaluation of the kidneys and urinary bladder was performed.   FINDINGS: RIGHT KIDNEY: Right kidney measures  9.9 cm.  Right central renal small hyperechoic focus may represent nonobstructing calculus. No right hydronephrosis. No discrete renal lesion is visualized.   LEFT KIDNEY: Left kidney measures  9.4 cm.  Left central renal small hyperechoic foci may represent nonobstructing calculi. No left hydronephrosis. No discrete renal lesion is visualized.   BLADDER: Urinary bladder was fully decompressed by Alvarado catheter and could not be evaluated at this time.       No hydronephrosis bilaterally.   Urinary bladder fully decompressed by Alvarado catheter and could not be evaluated.   MACRO: None.   Signed by: Maninder Clark 5/6/2025 2:09 PM Dictation workstation:   [id number]    Transthoracic Echo (TTE) Limited  Result Date: 5/5/2025            [facility] [address], [place] [address]             Phone [phone] TRANSTHORACIC ECHOCARDIOGRAM REPORT Patient Name:       FRANKIE PARKINSON  PIETRO Estrella Physician:    [id number] Brijesh Jerez MD Study Date:         5/5/2025             Ordering Provider:    [id number] RENEE LAWTON MRN/PID:            [id number]             Fellow: Accession#:         [id number]         Nurse: Date of Birth/Age:  6/10/1928 / 89 years Sonographer:          Amilcar Gatica RDCS Gender Assigned at  F                    Additional Staff: Birth: Height:             154.94 cm            Admit Date: Weight:             72.58 kg             Admission Status:     Inpatient -                                                                Routine BSA / BMI:          1.72 m2 / 30.23      Department Location:  [facility]                     kg/m2 Blood Pressure: 137 /48 mmHg Study Type:    TRANSTHORACIC ECHO (TTE) LIMITED Diagnosis/ICD: Dyspnea, unspecified-R06.00 Indication:    dyspnea CPT Codes:     Echo Limited-18230; Doppler Limited-09827; Color Doppler-82993 Patient History: Valve Disorders:   Aortic Stenosis. Pertinent History: HTN, Hyperlipidemia and CAD. CKD. Study Detail: The following Echo studies were performed: 2D, M-Mode, Doppler and               color flow. Technically challenging study due to body habitus.  PHYSICIAN INTERPRETATION: Left Ventricle: The left ventricular systolic function is normal, with a visually estimated ejection fraction of 60-65%. There are no regional wall motion abnormalities. The left ventricular cavity size is normal. There is mild increased septal and normal posterior left ventricular wall thickness. Spectral Doppler shows a normal pattern of left ventricular diastolic filling. Left Atrium: The left atrial size is mildly dilated. Right Ventricle: The right ventricle is normal in size. There is normal right ventriclar wall thickness. There is normal right ventricular global systolic function. Right Atrium: The right  atrial size is normal. Aortic Valve: The aortic valve is trileaflet. There is mild aortic valve cusp calcification. There is reduced systolic aortic valve leaflet excursion. There is evidence of mild aortic valve stenosis. The aortic valve dimensionless index is 0.38. There is no evidence of aortic valve regurgitation. The peak instantaneous gradient of the aortic valve is 18 mmHg. The mean gradient of the aortic valve is 10 mmHg. Mitral Valve: The mitral valve is normal in structure. There is normal mitral valve leaflet mobility. There is mild mitral annular calcification. There is trace mitral valve regurgitation. Tricuspid Valve: The tricuspid valve is structurally normal. There is normal tricuspid valve leaflet mobility. There is mild to moderate tricuspid regurgitation. Pulmonic Valve: The pulmonic valve is structurally normal. There is trace pulmonic valve regurgitation. Pericardium: No pericardial effusion noted. Aorta: The aortic root is normal. There is no dilatation of the aortic arch. There is no dilatation of the ascending aorta. There is no dilatation of the aortic root. Pulmonary Artery: The pulmonary artery is normal in size. The tricuspid regurgitant velocity is 2.72 m/s, and with an estimated right atrial pressure of 3 mmHg, the estimated pulmonary artery pressure is normal with the RVSP at 32.6 mmHg. The estimated PASP is 33 mmHg.  CONCLUSIONS:  1. The left ventricular systolic function is normal, with a visually estimated ejection fraction of 60-65%.  2. Trace mitral valve regurgitation.  3. Mild to moderate tricuspid regurgitation.  4. Mild aortic valve stenosis.  5. The peak instantaneous gradient of the aortic valve is 18 mmHg. QUANTITATIVE DATA SUMMARY:  2D MEASUREMENTS:           Normal Ranges: Ao Root d:       3.20 cm   (2.0-3.7cm) IVSd:            0.98 cm   (0.6-1.1cm) LVPWd:           0.80 cm   (0.6-1.1cm) LVIDd:           4.37 cm   (3.9-5.9cm) LVIDs:           2.29 cm LV Mass Index:    72.4 g/m2 LV % FS          47.6 %  LEFT ATRIUM:                Normal Ranges: LA Volume Index: 33.0 ml/m2  AORTA MEASUREMENTS:         Normal Ranges: Ao Sinus, d:        3.20 cm (2.1-3.5cm) Asc Ao, d:          2.80 cm (2.1-3.4cm)  LV SYSTOLIC FUNCTION:                      Normal Ranges: EF-A4C View:    62 % (>=55%) EF-Visual:      63 % LV EF Reported: 63 %  LV DIASTOLIC FUNCTION:             Normal Ranges: MV Peak E:             0.87 m/s    (0.7-1.2 m/s) MV Peak A:             0.96 m/s    (0.42-0.7 m/s) E/A Ratio:             0.90        (1.0-2.2) MV A Dur:              180.00 msec  MITRAL VALVE:          Normal Ranges: MV DT:        250 msec (150-240msec)  AORTIC VALVE:                      Normal Ranges: AoV Vmax:                2.14 m/s  (<=1.7m/s) AoV Peak P.3 mmHg (<20mmHg) AoV Mean PG:             10.0 mmHg (1.7-11.5mmHg) LVOT Max Joseph:            0.89 m/s  (<=1.1m/s) AoV VTI:                 54.70 cm  (18-25cm) LVOT VTI:                21.00 cm LVOT Diameter:           1.80 cm   (1.8-2.4cm) AoV Area, VTI:           0.98 cm2  (2.5-5.5cm2) AoV Area,Vmax:           1.06 cm2  (2.5-4.5cm2) AoV Dimensionless Index: 0.38  TRICUSPID VALVE/RVSP:          Normal Ranges: Peak TR Velocity:     2.72 m/s RV Syst Pressure:     33 mmHg  (< 30mmHg)  [id number] Brijesh Jerez MD Electronically signed on 2025 at 5:48:18 PM  ** Final **     ECG 12 lead  Result Date: 2025   Poor data quality, interpretation may be adversely affected Sinus tachycardia with Premature ventricular complexes or Fusion complexes Left axis deviation Pulmonary disease pattern Abnormal ECG Confirmed by Pancho Mondragon ([id number]) on 2025 8:28:35 AM    CT angio chest for pulmonary embolism  Result Date: 5/3/2025  Interpreted By:  Pancho Barth, STUDY: CT ANGIO CHEST FOR PULMONARY EMBOLISM;  5/3/2025 5:41 pm   INDICATION: Signs/Symptoms:tachycardic, hypoxic, concern for PE vs pneumonia.   COMPARISON:    ACCESSION NUMBER(S): [id number]    ORDERING CLINICIAN: VIVIANE RILEY   TECHNIQUE: Helical data acquisition of the chest was obtained after intravenous administration of 75 ML Omnipaque 350 as per PE protocol. Images were reformatted in coronal and sagittal planes. Axial and coronal maximum intensity projection (MIP) images were created and reviewed.   FINDINGS: Ground-glass increasing left upper lobe nodule measures 1.7 cm on image 94 concerning for slow growing neoplasm   Other numerous bilateral pulmonary nodules appear similar in size. Reference right lower lobe nodule measuring a cm on image 163. Reference 8 mm right lower lobe nodule on image 184. Other numerous nodules detected remain stable   Mosaic attenuation in the lungs suggesting small airway or small vessel inflammation. No findings of acute pulmonary embolism. Coronary calcification no evidence of acute thoracic pathology.   Changes of old healed granulomas disease. Robust vascular calcification.   Nephroliths detected. Largest visualized stone measures 9 mm.   Demineralized bones       1. No evidence of acute pulmonary embolism 2. Senescent changes. Heart size is enlarged. Changes of old healed granulomas disease 3. Multiple stable pulmonary nodules. Enlarging left upper lobe ground-glass nodule now measuring under 2 cm concerning for slow growing neoplasm. 4. Bronchial thickening noted. Mild mosaic attenuation seen in the lungs. Query bronchial inflammation. Senescent changes.   MACRO: None.   Signed by: Pancho Barth 5/3/2025 6:19 PM Dictation workstation:   [id number]    XR chest 2 views  Result Date: 5/3/2025  Interpreted By:  Amilcar Galdamez, STUDY: XR CHEST 2 VIEWS;  5/3/2025 3:34 pm   INDICATION: Signs/Symptoms:shortness of breath.     COMPARISON: 02/21/2024   ACCESSION NUMBER(S): [id number]   ORDERING CLINICIAN: VIVIANE RILEY   FINDINGS: Stable heart and mediastinal contours. No pneumothorax or pleural fluid. No focal consolidation or alveolar edema. Mild  atherosclerotic calcification of the aortic arch. Mild elevation of the right hemidiaphragm.       1.  No radiographic evidence of acute cardiopulmonary disease.       MACRO: None   Signed by: Amilcar Galdamez 5/3/2025 4:37 PM Dictation workstation:   [id number]    XR cervical spine 2-3 views  Result Date: 5/2/2025  Interpreted By:  Anais Roa, STUDY: XR CERVICAL SPINE 2-3 VIEWS; ;  5/1/2025 12:10 pm   INDICATION: Signs/Symptoms:acute on chronic  neck pain with bilateral hand numbness.   ,M54.2 Cervicalgia,R29.898 Other symptoms and signs involving the musculoskeletal system   COMPARISON: None.   ACCESSION NUMBER(S): [id number]   ORDERING CLINICIAN: ADALI LEMUS   FINDINGS:   Reversal normal cervical lordosis is seen.   Osteopenia is present. The prevertebral soft tissues are unremarkable.   Endplate sclerosis and spur formation is seen throughout the cervical spine. Narrowing of C3-4, C4-5 C5-6 and C6-7 disc space is seen. Uncovertebral joint hypertrophy seen.   No acute fracture or dislocation is seen.       Reversal normal cervical lordosis and degenerative changes.     MACRO: None   Signed by: Anais Roa 5/2/2025 4:20 PM Dictation workstation:   [id number]     Assessment/Plan   Acute hypoxic respiratory failure, resolved-on room air  Pulmonary nodules  CT showed bronchial thickening-bronchitis  Positive blood culture -staphylococcus capitis- 1/ 2-contaminant, repeat blood culture negative  E.coli Urinary tract infection-susceptibility pending     Has reported intolerances to amoxicillin, Augmentin-chart reviewed tolerated follow-up amoxicillin on 4/20/2025- allergy to Keflex  Chronic kidney disease, stable  History of urine culture E.coli-pansensitive, Enterococcus-susceptible to ampicillin      Levaquin-avoid medications that interact with Levaquin-discontinue Zofran  Continue prednisone  Monitor renal function  Follow up urine culture-E. coli susceptibility  Follow up Repeat blood culture    Supplemental oxygen , wean as tolerated   Supportive care   Pulmonary follow up  De-escalate based on culture results     This is a complex infectious disease issue and the following was performed today (for more details please see the above note): Management decisions reflecting the added complexity (e.g., changes in antimicrobial therapy, infection control strategies).     Dexter Nieto MD

## 2025-05-10 NOTE — PROGRESS NOTES
"Fartun Carey is a 89 y.o. female on day 7 of admission presenting with Bronchitis.    Subjective   Patient resting in bed. Denies chest pain, abdominal pain, fevers, chills. Appetite remains poor. Shortness of breath slightly improved        Objective     Physical Exam  Constitutional:       Appearance: Normal appearance.   HENT:      Head: Normocephalic and atraumatic.      Mouth/Throat:      Mouth: Mucous membranes are moist.      Pharynx: Oropharynx is clear.   Eyes:      Extraocular Movements: Extraocular movements intact.      Conjunctiva/sclera: Conjunctivae normal.      Pupils: Pupils are equal, round, and reactive to light.   Cardiovascular:      Rate and Rhythm: Normal rate and regular rhythm.      Pulses: Normal pulses.      Heart sounds: Normal heart sounds.   Pulmonary:      Effort: Pulmonary effort is normal.      Breath sounds: Rhonchi present.   Abdominal:      General: Bowel sounds are normal.      Palpations: Abdomen is soft.      Tenderness: There is no abdominal tenderness.   Musculoskeletal:         General: Normal range of motion.      Cervical back: Normal range of motion and neck supple.   Skin:     General: Skin is warm and dry.      Capillary Refill: Capillary refill takes less than 2 seconds.   Neurological:      General: No focal deficit present.      Mental Status: She is alert and oriented to person, place, and time.   Psychiatric:         Mood and Affect: Mood normal.         Behavior: Behavior normal.         Last Recorded Vitals  Blood pressure 153/66, pulse 82, temperature 36.5 °C (97.7 °F), temperature source Temporal, resp. rate 20, height 1.549 m (5' 1\"), weight 72.6 kg (160 lb), SpO2 92%.  Intake/Output last 3 Shifts:  I/O last 3 completed shifts:  In: 0 (0 mL/kg)   Out: 2350 (32.4 mL/kg) [Urine:2350 (0.9 mL/kg/hr)]  Weight: 72.6 kg     Relevant Results  Results for orders placed or performed during the hospital encounter of 05/03/25 (from the past 24 hours)   CBC   Result Value " Ref Range    WBC 11.9 (H) 4.4 - 11.3 x10*3/uL    nRBC 0.0 0.0 - 0.0 /100 WBCs    RBC 2.92 (L) 4.00 - 5.20 x10*6/uL    Hemoglobin 8.8 (L) 12.0 - 16.0 g/dL    Hematocrit 28.1 (L) 36.0 - 46.0 %    MCV 96 80 - 100 fL    MCH 30.1 26.0 - 34.0 pg    MCHC 31.3 (L) 32.0 - 36.0 g/dL    RDW 15.0 (H) 11.5 - 14.5 %    Platelets 180 150 - 450 x10*3/uL   Basic Metabolic Panel   Result Value Ref Range    Glucose 189 (H) 74 - 99 mg/dL    Sodium 138 136 - 145 mmol/L    Potassium 4.2 3.5 - 5.3 mmol/L    Chloride 108 (H) 98 - 107 mmol/L    Bicarbonate 23 21 - 32 mmol/L    Anion Gap 11 10 - 20 mmol/L    Urea Nitrogen 39 (H) 6 - 23 mg/dL    Creatinine 1.22 (H) 0.50 - 1.05 mg/dL    eGFR 41 (L) >60 mL/min/1.73m*2    Calcium 8.0 (L) 8.6 - 10.3 mg/dL     *Note: Due to a large number of results and/or encounters for the requested time period, some results have not been displayed. A complete set of results can be found in Results Review.         This patient has a urinary catheter   Reason for the urinary catheter remaining today? urinary retention/bladder outlet obstruction, acute or chronic      Assessment & Plan      Bronchitis  No history of COPD, asthma or tobacco use..    Imaging shows no acute finding  Non-productive cough   Flu A and B- coronavirus negative  Prednisone taper   DuoNeb  Cough suppressant, Mucinex   Pulmonology following - appreciate recs   Improved      Acute respiratory failure with hypoxia  May be related to her bronchitis  Weaned to room air   Last echo showed LVEF 60%   Limited echo 5/5 with preserved LVEF   Resolved      Hypertension  Home meds held in the setting of hypotension,. VINCENT  - Plan to resume as tolerated - hypertensive for 2 days in the setting of decreased anti-hypertensives. Increased clonidine to home dose of 0.2 mg today and she had significant orthostatic response. Medications decreased and will continue IV fluids overnight   - Hold lasix and losartan at discharge and follow up with nephrology and  PCP   - Check labs in one week after discharge   Vitals as ordered   Check AM labs  Recheck orthostatic vitals today      Hyperlipidemia  Continue home statin      Stage 3b chronic kidney disease (Multi)  VINCENT noted   Improved with IV fluids   - Continue to hold lasix and losartan at discharge   BP medications, lasix held   Consult nephrology (she is known to Dr. TESHA Gaxiola) - appreciate recs; signed off   Monitor kidney function     Blood bacterial culture positive  1 of 2 with gram + Cocci noted 5/5  - Continue to monitor - Appears to be contaminant   Consult ID - appreciate recs   Vanco added and then stopped   Monitor cultures - repeat cultures negative to date      UTI   Culture with e-coli   - Sensitivity shows resistance to levaquin   - Invanz started per Dr. Nieto   - ID following - appreciate recs      Poor appetite  - Add supplements    - Encourage oral intake   - Seemed to not tolerate mirtazapine      DVT prophylaxis   Heparin     Lidya Washington, APRN-CNP

## 2025-05-11 VITALS
BODY MASS INDEX: 30.21 KG/M2 | RESPIRATION RATE: 20 BRPM | HEART RATE: 69 BPM | SYSTOLIC BLOOD PRESSURE: 144 MMHG | OXYGEN SATURATION: 97 % | DIASTOLIC BLOOD PRESSURE: 55 MMHG | WEIGHT: 160 LBS | HEIGHT: 61 IN | TEMPERATURE: 98.6 F

## 2025-05-11 LAB
ANION GAP SERPL CALCULATED.3IONS-SCNC: 10 MMOL/L (ref 10–20)
BUN SERPL-MCNC: 37 MG/DL (ref 6–23)
CALCIUM SERPL-MCNC: 8 MG/DL (ref 8.6–10.3)
CHLORIDE SERPL-SCNC: 108 MMOL/L (ref 98–107)
CO2 SERPL-SCNC: 24 MMOL/L (ref 21–32)
CREAT SERPL-MCNC: 1.16 MG/DL (ref 0.5–1.05)
EGFRCR SERPLBLD CKD-EPI 2021: 43 ML/MIN/1.73M*2
ERYTHROCYTE [DISTWIDTH] IN BLOOD BY AUTOMATED COUNT: 15 % (ref 11.5–14.5)
GLUCOSE SERPL-MCNC: 190 MG/DL (ref 74–99)
HCT VFR BLD AUTO: 28.1 % (ref 36–46)
HGB BLD-MCNC: 8.7 G/DL (ref 12–16)
MCH RBC QN AUTO: 29.8 PG (ref 26–34)
MCHC RBC AUTO-ENTMCNC: 31 G/DL (ref 32–36)
MCV RBC AUTO: 96 FL (ref 80–100)
NRBC BLD-RTO: 0.2 /100 WBCS (ref 0–0)
PLATELET # BLD AUTO: 184 X10*3/UL (ref 150–450)
POTASSIUM SERPL-SCNC: 4.3 MMOL/L (ref 3.5–5.3)
RBC # BLD AUTO: 2.92 X10*6/UL (ref 4–5.2)
SODIUM SERPL-SCNC: 138 MMOL/L (ref 136–145)
WBC # BLD AUTO: 12.6 X10*3/UL (ref 4.4–11.3)

## 2025-05-11 PROCEDURE — 99232 SBSQ HOSP IP/OBS MODERATE 35: CPT | Performed by: NURSE PRACTITIONER

## 2025-05-11 PROCEDURE — 2500000002 HC RX 250 W HCPCS SELF ADMINISTERED DRUGS (ALT 637 FOR MEDICARE OP, ALT 636 FOR OP/ED): Performed by: NURSE PRACTITIONER

## 2025-05-11 PROCEDURE — 2500000001 HC RX 250 WO HCPCS SELF ADMINISTERED DRUGS (ALT 637 FOR MEDICARE OP): Performed by: NURSE PRACTITIONER

## 2025-05-11 PROCEDURE — 1100000001 HC PRIVATE ROOM DAILY

## 2025-05-11 PROCEDURE — 2500000001 HC RX 250 WO HCPCS SELF ADMINISTERED DRUGS (ALT 637 FOR MEDICARE OP): Performed by: INTERNAL MEDICINE

## 2025-05-11 PROCEDURE — 36415 COLL VENOUS BLD VENIPUNCTURE: CPT | Performed by: NURSE PRACTITIONER

## 2025-05-11 PROCEDURE — 2500000004 HC RX 250 GENERAL PHARMACY W/ HCPCS (ALT 636 FOR OP/ED): Performed by: NURSE PRACTITIONER

## 2025-05-11 PROCEDURE — 80048 BASIC METABOLIC PNL TOTAL CA: CPT | Performed by: NURSE PRACTITIONER

## 2025-05-11 PROCEDURE — 85027 COMPLETE CBC AUTOMATED: CPT | Performed by: NURSE PRACTITIONER

## 2025-05-11 PROCEDURE — 2500000004 HC RX 250 GENERAL PHARMACY W/ HCPCS (ALT 636 FOR OP/ED): Performed by: INTERNAL MEDICINE

## 2025-05-11 RX ORDER — PREDNISONE 20 MG/1
20 TABLET ORAL DAILY
Status: DISCONTINUED | OUTPATIENT
Start: 2025-05-11 | End: 2025-05-13 | Stop reason: HOSPADM

## 2025-05-11 RX ADMIN — ATORVASTATIN CALCIUM 80 MG: 80 TABLET, FILM COATED ORAL at 20:01

## 2025-05-11 RX ADMIN — HYDRALAZINE HYDROCHLORIDE 25 MG: 25 TABLET ORAL at 20:01

## 2025-05-11 RX ADMIN — HYDRALAZINE HYDROCHLORIDE 25 MG: 25 TABLET ORAL at 08:00

## 2025-05-11 RX ADMIN — GUAIFENESIN 600 MG: 600 TABLET ORAL at 20:01

## 2025-05-11 RX ADMIN — ASPIRIN 81 MG: 81 TABLET, CHEWABLE ORAL at 08:00

## 2025-05-11 RX ADMIN — HYDRALAZINE HYDROCHLORIDE 25 MG: 25 TABLET ORAL at 14:50

## 2025-05-11 RX ADMIN — CLONIDINE HYDROCHLORIDE 0.1 MG: 0.1 TABLET ORAL at 20:01

## 2025-05-11 RX ADMIN — BENZONATATE 100 MG: 100 CAPSULE ORAL at 20:04

## 2025-05-11 RX ADMIN — GUAIFENESIN 600 MG: 600 TABLET ORAL at 08:00

## 2025-05-11 RX ADMIN — ERTAPENEM SODIUM 500 MG: 1 INJECTION, POWDER, LYOPHILIZED, FOR SOLUTION INTRAMUSCULAR; INTRAVENOUS at 14:50

## 2025-05-11 RX ADMIN — CLONIDINE HYDROCHLORIDE 0.1 MG: 0.1 TABLET ORAL at 08:00

## 2025-05-11 RX ADMIN — PREDNISONE 20 MG: 20 TABLET ORAL at 08:00

## 2025-05-11 RX ADMIN — PANTOPRAZOLE SODIUM 20 MG: 20 TABLET, DELAYED RELEASE ORAL at 05:25

## 2025-05-11 RX ADMIN — LEVOTHYROXINE SODIUM 88 MCG: 0.09 TABLET ORAL at 05:25

## 2025-05-11 ASSESSMENT — COGNITIVE AND FUNCTIONAL STATUS - GENERAL
DRESSING REGULAR LOWER BODY CLOTHING: A LITTLE
HELP NEEDED FOR BATHING: A LITTLE
TOILETING: A LITTLE
TOILETING: A LITTLE
MOBILITY SCORE: 19
CLIMB 3 TO 5 STEPS WITH RAILING: A LOT
CLIMB 3 TO 5 STEPS WITH RAILING: A LOT
HELP NEEDED FOR BATHING: A LITTLE
MOVING TO AND FROM BED TO CHAIR: A LITTLE
DAILY ACTIVITIY SCORE: 20
DRESSING REGULAR LOWER BODY CLOTHING: A LITTLE
DAILY ACTIVITIY SCORE: 20
WALKING IN HOSPITAL ROOM: A LITTLE
STANDING UP FROM CHAIR USING ARMS: A LITTLE
WALKING IN HOSPITAL ROOM: A LITTLE
DRESSING REGULAR UPPER BODY CLOTHING: A LITTLE
DRESSING REGULAR UPPER BODY CLOTHING: A LITTLE
MOVING TO AND FROM BED TO CHAIR: A LITTLE
STANDING UP FROM CHAIR USING ARMS: A LITTLE
MOBILITY SCORE: 19

## 2025-05-11 ASSESSMENT — PAIN SCALES - GENERAL
PAINLEVEL_OUTOF10: 0 - NO PAIN
PAINLEVEL_OUTOF10: 0 - NO PAIN

## 2025-05-11 NOTE — PROGRESS NOTES
"Fartun Carey is a 89 y.o. female on day 8 of admission presenting with Bronchitis.    Subjective   Patient resting in bed. Denies chest pain, shortness of breath, abdominal pain, fevers, chills. Hopeful to go home tomorrow afternoon.        Objective     Physical Exam  Constitutional:       Appearance: Normal appearance.   HENT:      Head: Normocephalic and atraumatic.      Mouth/Throat:      Mouth: Mucous membranes are moist.      Pharynx: Oropharynx is clear.   Eyes:      Extraocular Movements: Extraocular movements intact.      Conjunctiva/sclera: Conjunctivae normal.      Pupils: Pupils are equal, round, and reactive to light.   Cardiovascular:      Rate and Rhythm: Normal rate and regular rhythm.      Pulses: Normal pulses.      Heart sounds: Normal heart sounds.   Pulmonary:      Effort: Pulmonary effort is normal.      Breath sounds: Rhonchi present.   Abdominal:      General: Bowel sounds are normal.      Palpations: Abdomen is soft.      Tenderness: There is no abdominal tenderness.   Musculoskeletal:         General: Normal range of motion.      Cervical back: Normal range of motion and neck supple.   Skin:     General: Skin is warm and dry.      Capillary Refill: Capillary refill takes less than 2 seconds.   Neurological:      General: No focal deficit present.      Mental Status: She is alert and oriented to person, place, and time.   Psychiatric:         Mood and Affect: Mood normal.         Behavior: Behavior normal.         Last Recorded Vitals  Blood pressure 159/66, pulse 69, temperature 36.6 °C (97.9 °F), temperature source Temporal, resp. rate 16, height 1.549 m (5' 1\"), weight 72.6 kg (160 lb), SpO2 96%.  Intake/Output last 3 Shifts:  I/O last 3 completed shifts:  In: 50 (0.7 mL/kg) [IV Piggyback:50]  Out: 1825 (25.1 mL/kg) [Urine:1825 (0.7 mL/kg/hr)]  Weight: 72.6 kg     Relevant Results  Results for orders placed or performed during the hospital encounter of 05/03/25 (from the past 24 hours) "   CBC   Result Value Ref Range    WBC 12.6 (H) 4.4 - 11.3 x10*3/uL    nRBC 0.2 (H) 0.0 - 0.0 /100 WBCs    RBC 2.92 (L) 4.00 - 5.20 x10*6/uL    Hemoglobin 8.7 (L) 12.0 - 16.0 g/dL    Hematocrit 28.1 (L) 36.0 - 46.0 %    MCV 96 80 - 100 fL    MCH 29.8 26.0 - 34.0 pg    MCHC 31.0 (L) 32.0 - 36.0 g/dL    RDW 15.0 (H) 11.5 - 14.5 %    Platelets 184 150 - 450 x10*3/uL   Basic Metabolic Panel   Result Value Ref Range    Glucose 190 (H) 74 - 99 mg/dL    Sodium 138 136 - 145 mmol/L    Potassium 4.3 3.5 - 5.3 mmol/L    Chloride 108 (H) 98 - 107 mmol/L    Bicarbonate 24 21 - 32 mmol/L    Anion Gap 10 10 - 20 mmol/L    Urea Nitrogen 37 (H) 6 - 23 mg/dL    Creatinine 1.16 (H) 0.50 - 1.05 mg/dL    eGFR 43 (L) >60 mL/min/1.73m*2    Calcium 8.0 (L) 8.6 - 10.3 mg/dL     *Note: Due to a large number of results and/or encounters for the requested time period, some results have not been displayed. A complete set of results can be found in Results Review.     No results found.    Assessment & Plan      Bronchitis  No history of COPD, asthma or tobacco use..    Imaging shows no acute finding  Non-productive cough   Flu A and B- coronavirus negative  Prednisone taper   - Different than original discharge prescription. I decreased to 20 mg once daily today. Please instruct patient on taper prior to discharge as she already has prescription in room   DuoNeb  Cough suppressant, Mucinex   Pulmonology following - appreciate recs   Improved      Acute respiratory failure with hypoxia  May be related to her bronchitis  Weaned to room air   Limited echo 5/5 with preserved LVEF   Resolved      Hypertension  Home meds held in the setting of hypotension,. VINCENT  - Plan to resume as tolerated - hypertensive for 2 days in the setting of decreased anti-hypertensives. Increased clonidine to home dose of 0.2 mg  and she had significant orthostatic response. Medications decreased  again.   - Hold lasix and losartan at discharge and follow up with nephrology  and PCP   - Check labs in one week after discharge   Vitals as ordered   Check AM labs  Orthostatic hypotension resolved      Hyperlipidemia  Continue home statin      Stage 3b chronic kidney disease (Multi)  VINCENT noted   Improved with IV fluids   - Continue to hold lasix and losartan at discharge   BP medications, lasix held   Consult nephrology (she is known to Dr. TESHA Gaxiola) - appreciate recs; signed off   Monitor kidney function     Blood bacterial culture positive  1 of 2 with gram + Cocci noted 5/5  - Continue to monitor - Appears to be contaminant   Consult ID - appreciate recs   Vanco added and then stopped   Monitor cultures - repeat cultures negative to date      UTI   Culture with e-coli   - Sensitivity shows resistance to levaquin   - Invanz started per Dr. Nieto   - ID following - appreciate recs   - Tentative plan to stop after tomorrow's dose.      Poor appetite  - Add supplements    - Encourage oral intake   - Seemed to not tolerate mirtazapine   - Improving     Urinary retention   Re-attempt voiding trial tomorrow   discontinue mendoza at 0600 tomorrow   If retains urine again, re-insert mendoza and follow up with Dr. Catherine as outpatient     Tentative plan for discharge to home with home are tomorrow after 3rd dose of Invanz.      DVT prophylaxis   Heparin     Lidya Washington, APRN-CNP

## 2025-05-11 NOTE — PROGRESS NOTES
Fartun Carey is a 89 y.o. female on day 8 of admission presenting with Bronchitis.    Subjective   Interval History:   Afebrile, no chills  No cough, chest pain or shortness of breath  No nausea, vomiting or diarrhea  No dysuria, frequency urgency     Review of Systems   All other systems reviewed and are negative.      Objective   Range of Vitals (last 24 hours)  Heart Rate:  []   Temp:  [36.1 °C (97 °F)-36.6 °C (97.9 °F)]   Resp:  [15-18]   BP: (116-177)/(62-79)   SpO2:  [96 %]   Daily Weight  05/03/25 : 72.6 kg (160 lb)    Body mass index is 30.23 kg/m².    Physical Exam  Constitutional:       Appearance: Normal appearance.   HENT:      Head: Normocephalic and atraumatic.      Nose: Nose normal.      Mouth/Throat:      Mouth: Mucous membranes are moist.      Pharynx: Oropharynx is clear.   Eyes:      General: No scleral icterus.  Cardiovascular:      Rate and Rhythm: Normal rate and regular rhythm.   Pulmonary:      Decreased bilateral breath sounds   Abdominal:      General: Bowel sounds are normal.      Palpations: Abdomen is soft.   Musculoskeletal:         General: Normal range of motion.      Cervical back: Normal range of motion and neck supple.   Skin:     General: Skin is warm and dry.   Neurological:      Mental Status: She is alert.      Comments: Awake, alert    Psychiatric:         Mood and Affect: Mood normal.         Behavior: Behavior normal.        Antibiotics  ertapenem (INVanz) IV in 50 mL NS  levoFLOXacin - 500 mg    Relevant Results  Labs  Results from last 72 hours   Lab Units 05/11/25  0521 05/10/25  0512 05/09/25  0417   WBC AUTO x10*3/uL 12.6* 11.9* 8.1   HEMOGLOBIN g/dL 8.7* 8.8* 8.8*   HEMATOCRIT % 28.1* 28.1* 28.5*   PLATELETS AUTO x10*3/uL 184 180 163     Results from last 72 hours   Lab Units 05/11/25  0521 05/10/25  0512 05/09/25  0417   SODIUM mmol/L 138 138 139   POTASSIUM mmol/L 4.3 4.2 4.2   CHLORIDE mmol/L 108* 108* 107   CO2 mmol/L 24 23 25   BUN mg/dL 37* 39* 40*    CREATININE mg/dL 1.16* 1.22* 1.18*   GLUCOSE mg/dL 190* 189* 165*   CALCIUM mg/dL 8.0* 8.0* 8.3*   ANION GAP mmol/L 10 11 11   EGFR mL/min/1.73m*2 43* 41* 42*         Estimated Creatinine Clearance: 25.8 mL/min (A) (by C-G formula based on SCr of 1.16 mg/dL (H)).  CRP   Date Value Ref Range Status   08/18/2022 0.6 0 - 2.0 MG/DL Final     Comment:     Performed at [address] [address]     Microbiology  Susceptibility data from last 14 days.  Collected Specimen Info Organism Amoxicillin/Clavulanate Ampicillin Ampicillin/Sulbactam Aztreonam Cefazolin Cefazolin (uncomplicated UTIs only) Cefepime Cefotaxime Ceftazidime Ceftriaxone Cefuroxime (oral)   05/05/25 Urine from Indwelling (Alvarado) Catheter Escherichia coli  I  R  R  R  R  R  R  R  R  R  R   05/03/25 Blood culture from Peripheral Venipuncture Staphylococcus capitis                Collected Specimen Info Organism Ciprofloxacin Ertapenem Gentamicin Levofloxacin Meropenem Nitrofurantoin Piperacillin/Tazobactam Trimethoprim/Sulfamethoxazole   05/05/25 Urine from Indwelling (Alvarado) Catheter Escherichia coli  R  S  S  R  S  S  R  R   05/03/25 Blood culture from Peripheral Venipuncture Staphylococcus capitis             Imaging  US renal complete  Result Date: 5/6/2025  Interpreted By:  Maninder Clark, STUDY: US RENAL COMPLETE;  5/6/2025 1:35 pm   INDICATION: Signs/Symptoms:VINCENT.     COMPARISON: 03/01/2024.   ACCESSION NUMBER(S): [id number]   ORDERING CLINICIAN: TOD THOMAS   TECHNIQUE: Real-time sonographic evaluation of the kidneys and urinary bladder was performed.   FINDINGS: RIGHT KIDNEY: Right kidney measures  9.9 cm.  Right central renal small hyperechoic focus may represent nonobstructing calculus. No right hydronephrosis. No discrete renal lesion is visualized.   LEFT KIDNEY: Left kidney measures  9.4 cm.  Left central renal small hyperechoic foci may represent nonobstructing calculi. No left hydronephrosis. No discrete renal lesion  is visualized.   BLADDER: Urinary bladder was fully decompressed by Alvarado catheter and could not be evaluated at this time.       No hydronephrosis bilaterally.   Urinary bladder fully decompressed by Alvarado catheter and could not be evaluated.   MACRO: None.   Signed by: Maninder Clark 5/6/2025 2:09 PM Dictation workstation:   [id number]    Transthoracic Echo (TTE) Limited  Result Date: 5/5/2025            [facility] [address], [place] [address]             Phone [phone] TRANSTHORACIC ECHOCARDIOGRAM REPORT Patient Name:       FRANKIE TESHA WESTBROOK        Reading Physician:    [id number] Brijesh Jerez MD Study Date:         5/5/2025             Ordering Provider:    [id number] RENEE LAWTON MRN/PID:            [id number]             Fellow: Accession#:         [id number]         Nurse: Date of Birth/Age:  6/10/1928 / 89 years Sonographer:          Amilcar Gatica RDCS Gender Assigned at  F                    Additional Staff: Birth: Height:             154.94 cm            Admit Date: Weight:             72.58 kg             Admission Status:     Inpatient -                                                                Routine BSA / BMI:          1.72 m2 / 30.23      [facility]  [facility]                     kg/m2 Blood Pressure: 137 /48 mmHg Study Type:    TRANSTHORACIC ECHO (TTE) LIMITED Diagnosis/ICD: Dyspnea, unspecified-R06.00 Indication:    dyspnea CPT Codes:     Echo Limited-18237; Doppler Limited-10941; Color Doppler-11648 Patient History: Valve Disorders:   Aortic Stenosis. Pertinent History: HTN, Hyperlipidemia and CAD. CKD. Study Detail: The following Echo studies were performed: 2D, M-Mode, Doppler and               color flow. Technically challenging study due to body habitus.  PHYSICIAN INTERPRETATION: Left Ventricle: The left ventricular systolic  function is normal, with a visually estimated ejection fraction of 60-65%. There are no regional wall motion abnormalities. The left ventricular cavity size is normal. There is mild increased septal and normal posterior left ventricular wall thickness. Spectral Doppler shows a normal pattern of left ventricular diastolic filling. Left Atrium: The left atrial size is mildly dilated. Right Ventricle: The right ventricle is normal in size. There is normal right ventriclar wall thickness. There is normal right ventricular global systolic function. Right Atrium: The right atrial size is normal. Aortic Valve: The aortic valve is trileaflet. There is mild aortic valve cusp calcification. There is reduced systolic aortic valve leaflet excursion. There is evidence of mild aortic valve stenosis. The aortic valve dimensionless index is 0.38. There is no evidence of aortic valve regurgitation. The peak instantaneous gradient of the aortic valve is 18 mmHg. The mean gradient of the aortic valve is 10 mmHg. Mitral Valve: The mitral valve is normal in structure. There is normal mitral valve leaflet mobility. There is mild mitral annular calcification. There is trace mitral valve regurgitation. Tricuspid Valve: The tricuspid valve is structurally normal. There is normal tricuspid valve leaflet mobility. There is mild to moderate tricuspid regurgitation. Pulmonic Valve: The pulmonic valve is structurally normal. There is trace pulmonic valve regurgitation. Pericardium: No pericardial effusion noted. Aorta: The aortic root is normal. There is no dilatation of the aortic arch. There is no dilatation of the ascending aorta. There is no dilatation of the aortic root. Pulmonary Artery: The pulmonary artery is normal in size. The tricuspid regurgitant velocity is 2.72 m/s, and with an estimated right atrial pressure of 3 mmHg, the estimated pulmonary artery pressure is normal with the RVSP at 32.6 mmHg. The estimated PASP is 33 mmHg.   CONCLUSIONS:  1. The left ventricular systolic function is normal, with a visually estimated ejection fraction of 60-65%.  2. Trace mitral valve regurgitation.  3. Mild to moderate tricuspid regurgitation.  4. Mild aortic valve stenosis.  5. The peak instantaneous gradient of the aortic valve is 18 mmHg. QUANTITATIVE DATA SUMMARY:  2D MEASUREMENTS:           Normal Ranges: Ao Root d:       3.20 cm   (2.0-3.7cm) IVSd:            0.98 cm   (0.6-1.1cm) LVPWd:           0.80 cm   (0.6-1.1cm) LVIDd:           4.37 cm   (3.9-5.9cm) LVIDs:           2.29 cm LV Mass Index:   72.4 g/m2 LV % FS          47.6 %  LEFT ATRIUM:                Normal Ranges: LA Volume Index: 33.0 ml/m2  AORTA MEASUREMENTS:         Normal Ranges: Ao Sinus, d:        3.20 cm (2.1-3.5cm) Asc Ao, d:          2.80 cm (2.1-3.4cm)  LV SYSTOLIC FUNCTION:                      Normal Ranges: EF-A4C View:    62 % (>=55%) EF-Visual:      63 % LV EF Reported: 63 %  LV DIASTOLIC FUNCTION:             Normal Ranges: MV Peak E:             0.87 m/s    (0.7-1.2 m/s) MV Peak A:             0.96 m/s    (0.42-0.7 m/s) E/A Ratio:             0.90        (1.0-2.2) MV A Dur:              180.00 msec  MITRAL VALVE:          Normal Ranges: MV DT:        250 msec (150-240msec)  AORTIC VALVE:                      Normal Ranges: AoV Vmax:                2.14 m/s  (<=1.7m/s) AoV Peak P.3 mmHg (<20mmHg) AoV Mean PG:             10.0 mmHg (1.7-11.5mmHg) LVOT Max Joseph:            0.89 m/s  (<=1.1m/s) AoV VTI:                 54.70 cm  (18-25cm) LVOT VTI:                21.00 cm LVOT Diameter:           1.80 cm   (1.8-2.4cm) AoV Area, VTI:           0.98 cm2  (2.5-5.5cm2) AoV Area,Vmax:           1.06 cm2  (2.5-4.5cm2) AoV Dimensionless Index: 0.38  TRICUSPID VALVE/RVSP:          Normal Ranges: Peak TR Velocity:     2.72 m/s RV Syst Pressure:     33 mmHg  (< 30mmHg)  [id number] Brijesh Jerez MD Electronically signed on 2025 at 5:48:18 PM  ** Final **     ECG 12  lead  Result Date: 5/5/2025   Poor data quality, interpretation may be adversely affected Sinus tachycardia with Premature ventricular complexes or Fusion complexes Left axis deviation Pulmonary disease pattern Abnormal ECG Confirmed by Pancho Mondragon ([id number]) on 5/5/2025 8:28:35 AM    CT angio chest for pulmonary embolism  Result Date: 5/3/2025  Interpreted By:  Pancho Barth, STUDY: CT ANGIO CHEST FOR PULMONARY EMBOLISM;  5/3/2025 5:41 pm   INDICATION: Signs/Symptoms:tachycardic, hypoxic, concern for PE vs pneumonia.   COMPARISON: 2023   ACCESSION NUMBER(S): [id number]   ORDERING CLINICIAN: VIVIANE RILEY   TECHNIQUE: Helical data acquisition of the chest was obtained after intravenous administration of 75 ML Omnipaque 350 as per PE protocol. Images were reformatted in coronal and sagittal planes. Axial and coronal maximum intensity projection (MIP) images were created and reviewed.   FINDINGS: Ground-glass increasing left upper lobe nodule measures 1.7 cm on image 94 concerning for slow growing neoplasm   Other numerous bilateral pulmonary nodules appear similar in size. Reference right lower lobe nodule measuring a cm on image 163. Reference 8 mm right lower lobe nodule on image 184. Other numerous nodules detected remain stable   Mosaic attenuation in the lungs suggesting small airway or small vessel inflammation. No findings of acute pulmonary embolism. Coronary calcification no evidence of acute thoracic pathology.   Changes of old healed granulomas disease. Robust vascular calcification.   Nephroliths detected. Largest visualized stone measures 9 mm.   Demineralized bones       1. No evidence of acute pulmonary embolism 2. Senescent changes. Heart size is enlarged. Changes of old healed granulomas disease 3. Multiple stable pulmonary nodules. Enlarging left upper lobe ground-glass nodule now measuring under 2 cm concerning for slow growing neoplasm. 4. Bronchial thickening noted. Mild mosaic attenuation  seen in the lungs. Query bronchial inflammation. Senescent changes.   MACRO: None.   Signed by: Pancho Barth 5/3/2025 6:19 PM Dictation workstation:   [id number]    XR chest 2 views  Result Date: 5/3/2025  Interpreted By:  Amilcar Galdamez, STUDY: XR CHEST 2 VIEWS;  5/3/2025 3:34 pm   INDICATION: Signs/Symptoms:shortness of breath.     COMPARISON: 02/21/2024   ACCESSION NUMBER(S): [id number]   ORDERING CLINICIAN: VIVIANE RILEY   FINDINGS: Stable heart and mediastinal contours. No pneumothorax or pleural fluid. No focal consolidation or alveolar edema. Mild atherosclerotic calcification of the aortic arch. Mild elevation of the right hemidiaphragm.       1.  No radiographic evidence of acute cardiopulmonary disease.       MACRO: None   Signed by: Amilcar Galdamez 5/3/2025 4:37 PM Dictation workstation:   [id number]    XR cervical spine 2-3 views  Result Date: 5/2/2025  Interpreted By:  Anais Roa, STUDY: XR CERVICAL SPINE 2-3 VIEWS; ;  5/1/2025 12:10 pm   INDICATION: Signs/Symptoms:acute on chronic  neck pain with bilateral hand numbness.   ,M54.2 Cervicalgia,R29.898 Other symptoms and signs involving the musculoskeletal system   COMPARISON: None.   ACCESSION NUMBER(S): [id number]   ORDERING CLINICIAN: ADALI LEMUS   FINDINGS:   Reversal normal cervical lordosis is seen.   Osteopenia is present. The prevertebral soft tissues are unremarkable.   Endplate sclerosis and spur formation is seen throughout the cervical spine. Narrowing of C3-4, C4-5 C5-6 and C6-7 disc space is seen. Uncovertebral joint hypertrophy seen.   No acute fracture or dislocation is seen.       Reversal normal cervical lordosis and degenerative changes.     MACRO: None   Signed by: Anais Roa 5/2/2025 4:20 PM Dictation workstation:   [id number]      Assessment/Plan   Acute hypoxic respiratory failure, resolved-on room air  Pulmonary nodules  CT showed bronchial thickening-bronchitis  Positive blood culture -staphylococcus capitis- 1/  2-contaminant, repeat blood culture negative  E.coli Urinary tract infection-susceptibility pending     Has reported intolerances to amoxicillin, Augmentin-chart reviewed tolerated follow-up amoxicillin on 4/20/2025- allergy to Keflex  Chronic kidney disease, stable  History of urine culture E.coli-pansensitive, Enterococcus-susceptible to ampicillin      Invanz for 3 days  Discontinue levofloxacin  Continue prednisone, taper by primary  Monitor renal function  Follow up Repeat blood culture   Supplemental oxygen , as needed  Supportive care   Pulmonary follow up  Long-term plan is 3 days of antibiotic therapy  Repeat urine culture prior to discharge  Plan discussed with primary team, anticipate discharge tomorrow     This is a complex infectious disease issue and the following was performed today (for more details please see the above note): Management decisions reflecting the added complexity (e.g., changes in antimicrobial therapy, infection control strategies).     Dexter Nieto MD

## 2025-05-11 NOTE — CARE PLAN
The patient's goals for the shift include      The clinical goals for the shift include safety      Problem: Pain - Adult  Goal: Verbalizes/displays adequate comfort level or baseline comfort level  Outcome: Progressing     Problem: Safety - Adult  Goal: Free from fall injury  Outcome: Progressing     Problem: Discharge Planning  Goal: Discharge to home or other facility with appropriate resources  Outcome: Progressing     Problem: Chronic Conditions and Co-morbidities  Goal: Patient's chronic conditions and co-morbidity symptoms are monitored and maintained or improved  Outcome: Progressing     Problem: Nutrition  Goal: Nutrient intake appropriate for maintaining nutritional needs  Outcome: Progressing     Problem: Fall/Injury  Goal: Verbalize understanding of risk factor reduction measures to prevent injury from fall in the home  Outcome: Progressing

## 2025-05-12 ENCOUNTER — APPOINTMENT (OUTPATIENT)
Dept: RADIOLOGY | Facility: HOSPITAL | Age: OVER 89
DRG: 189 | End: 2025-05-12
Payer: MEDICARE

## 2025-05-12 ENCOUNTER — APPOINTMENT (OUTPATIENT)
Dept: CARDIOLOGY | Facility: HOSPITAL | Age: OVER 89
DRG: 189 | End: 2025-05-12
Payer: MEDICARE

## 2025-05-12 ENCOUNTER — PHARMACY VISIT (OUTPATIENT)
Dept: PHARMACY | Facility: CLINIC | Age: OVER 89
End: 2025-05-12
Payer: MEDICARE

## 2025-05-12 LAB
ANION GAP SERPL CALCULATED.3IONS-SCNC: 11 MMOL/L (ref 10–20)
BUN SERPL-MCNC: 35 MG/DL (ref 6–23)
CALCIUM SERPL-MCNC: 8.3 MG/DL (ref 8.6–10.3)
CARDIAC TROPONIN I PNL SERPL HS: 15 NG/L (ref 0–13)
CARDIAC TROPONIN I PNL SERPL HS: 16 NG/L (ref 0–13)
CHLORIDE SERPL-SCNC: 106 MMOL/L (ref 98–107)
CO2 SERPL-SCNC: 25 MMOL/L (ref 21–32)
CREAT SERPL-MCNC: 1.09 MG/DL (ref 0.5–1.05)
EGFRCR SERPLBLD CKD-EPI 2021: 47 ML/MIN/1.73M*2
ERYTHROCYTE [DISTWIDTH] IN BLOOD BY AUTOMATED COUNT: 14.7 % (ref 11.5–14.5)
GLUCOSE SERPL-MCNC: 126 MG/DL (ref 74–99)
HCT VFR BLD AUTO: 29.7 % (ref 36–46)
HGB BLD-MCNC: 9.4 G/DL (ref 12–16)
MCH RBC QN AUTO: 29.8 PG (ref 26–34)
MCHC RBC AUTO-ENTMCNC: 31.6 G/DL (ref 32–36)
MCV RBC AUTO: 94 FL (ref 80–100)
NRBC BLD-RTO: 0.1 /100 WBCS (ref 0–0)
PLATELET # BLD AUTO: 191 X10*3/UL (ref 150–450)
POTASSIUM SERPL-SCNC: 3.8 MMOL/L (ref 3.5–5.3)
RBC # BLD AUTO: 3.15 X10*6/UL (ref 4–5.2)
SODIUM SERPL-SCNC: 138 MMOL/L (ref 136–145)
WBC # BLD AUTO: 15.6 X10*3/UL (ref 4.4–11.3)

## 2025-05-12 PROCEDURE — 2500000001 HC RX 250 WO HCPCS SELF ADMINISTERED DRUGS (ALT 637 FOR MEDICARE OP): Performed by: NURSE PRACTITIONER

## 2025-05-12 PROCEDURE — 2500000001 HC RX 250 WO HCPCS SELF ADMINISTERED DRUGS (ALT 637 FOR MEDICARE OP): Performed by: SURGERY

## 2025-05-12 PROCEDURE — 87086 URINE CULTURE/COLONY COUNT: CPT | Mod: TRILAB | Performed by: NURSE PRACTITIONER

## 2025-05-12 PROCEDURE — 93005 ELECTROCARDIOGRAM TRACING: CPT

## 2025-05-12 PROCEDURE — 2500000001 HC RX 250 WO HCPCS SELF ADMINISTERED DRUGS (ALT 637 FOR MEDICARE OP): Performed by: INTERNAL MEDICINE

## 2025-05-12 PROCEDURE — 97535 SELF CARE MNGMENT TRAINING: CPT | Mod: GO,CO

## 2025-05-12 PROCEDURE — 82374 ASSAY BLOOD CARBON DIOXIDE: CPT | Performed by: NURSE PRACTITIONER

## 2025-05-12 PROCEDURE — 71046 X-RAY EXAM CHEST 2 VIEWS: CPT

## 2025-05-12 PROCEDURE — 1100000001 HC PRIVATE ROOM DAILY

## 2025-05-12 PROCEDURE — 2500000005 HC RX 250 GENERAL PHARMACY W/O HCPCS

## 2025-05-12 PROCEDURE — 2500000004 HC RX 250 GENERAL PHARMACY W/ HCPCS (ALT 636 FOR OP/ED): Mod: JZ

## 2025-05-12 PROCEDURE — RXMED WILLOW AMBULATORY MEDICATION CHARGE

## 2025-05-12 PROCEDURE — 97110 THERAPEUTIC EXERCISES: CPT | Mod: GP

## 2025-05-12 PROCEDURE — 84484 ASSAY OF TROPONIN QUANT: CPT | Performed by: NURSE PRACTITIONER

## 2025-05-12 PROCEDURE — 99232 SBSQ HOSP IP/OBS MODERATE 35: CPT | Performed by: NURSE PRACTITIONER

## 2025-05-12 PROCEDURE — 36415 COLL VENOUS BLD VENIPUNCTURE: CPT | Performed by: NURSE PRACTITIONER

## 2025-05-12 PROCEDURE — 71046 X-RAY EXAM CHEST 2 VIEWS: CPT | Performed by: RADIOLOGY

## 2025-05-12 PROCEDURE — 97110 THERAPEUTIC EXERCISES: CPT | Mod: GO,CO

## 2025-05-12 PROCEDURE — 2500000004 HC RX 250 GENERAL PHARMACY W/ HCPCS (ALT 636 FOR OP/ED): Performed by: NURSE PRACTITIONER

## 2025-05-12 PROCEDURE — 85027 COMPLETE CBC AUTOMATED: CPT | Performed by: NURSE PRACTITIONER

## 2025-05-12 PROCEDURE — 2500000002 HC RX 250 W HCPCS SELF ADMINISTERED DRUGS (ALT 637 FOR MEDICARE OP, ALT 636 FOR OP/ED): Performed by: NURSE PRACTITIONER

## 2025-05-12 RX ORDER — HYDRALAZINE HYDROCHLORIDE 25 MG/1
25 TABLET, FILM COATED ORAL 3 TIMES DAILY
Qty: 90 TABLET | Refills: 0 | Status: SHIPPED | OUTPATIENT
Start: 2025-05-12 | End: 2025-06-11

## 2025-05-12 RX ORDER — PREDNISONE 10 MG/1
TABLET ORAL
Qty: 18 TABLET | Refills: 0 | Status: SHIPPED | OUTPATIENT
Start: 2025-05-13 | End: 2025-05-28

## 2025-05-12 RX ORDER — CLONIDINE HYDROCHLORIDE 0.1 MG/1
0.1 TABLET ORAL 2 TIMES DAILY
Qty: 60 TABLET | Refills: 0 | Status: SHIPPED | OUTPATIENT
Start: 2025-05-12 | End: 2025-06-11

## 2025-05-12 RX ADMIN — PREDNISONE 20 MG: 20 TABLET ORAL at 08:09

## 2025-05-12 RX ADMIN — HYDRALAZINE HYDROCHLORIDE 25 MG: 25 TABLET ORAL at 15:51

## 2025-05-12 RX ADMIN — ATORVASTATIN CALCIUM 80 MG: 80 TABLET, FILM COATED ORAL at 20:00

## 2025-05-12 RX ADMIN — DOCUSATE SODIUM 100 MG: 100 CAPSULE, LIQUID FILLED ORAL at 20:00

## 2025-05-12 RX ADMIN — ERTAPENEM SODIUM 500 MG: 1 INJECTION, POWDER, LYOPHILIZED, FOR SOLUTION INTRAMUSCULAR; INTRAVENOUS at 12:45

## 2025-05-12 RX ADMIN — PANTOPRAZOLE SODIUM 20 MG: 20 TABLET, DELAYED RELEASE ORAL at 05:42

## 2025-05-12 RX ADMIN — GUAIFENESIN 600 MG: 600 TABLET ORAL at 20:00

## 2025-05-12 RX ADMIN — CLONIDINE HYDROCHLORIDE 0.1 MG: 0.1 TABLET ORAL at 08:09

## 2025-05-12 RX ADMIN — LEVOTHYROXINE SODIUM 88 MCG: 0.09 TABLET ORAL at 05:42

## 2025-05-12 RX ADMIN — DOCUSATE SODIUM 100 MG: 100 CAPSULE, LIQUID FILLED ORAL at 08:09

## 2025-05-12 RX ADMIN — GUAIFENESIN 600 MG: 600 TABLET ORAL at 08:09

## 2025-05-12 RX ADMIN — ASPIRIN 81 MG: 81 TABLET, CHEWABLE ORAL at 08:09

## 2025-05-12 RX ADMIN — HYDRALAZINE HYDROCHLORIDE 25 MG: 25 TABLET ORAL at 20:00

## 2025-05-12 RX ADMIN — CLONIDINE HYDROCHLORIDE 0.1 MG: 0.1 TABLET ORAL at 20:00

## 2025-05-12 RX ADMIN — HYDRALAZINE HYDROCHLORIDE 25 MG: 25 TABLET ORAL at 08:09

## 2025-05-12 ASSESSMENT — COGNITIVE AND FUNCTIONAL STATUS - GENERAL
MOBILITY SCORE: 19
MOVING FROM LYING ON BACK TO SITTING ON SIDE OF FLAT BED WITH BEDRAILS: A LITTLE
WALKING IN HOSPITAL ROOM: A LITTLE
MOBILITY SCORE: 19
EATING MEALS: A LITTLE
TOILETING: A LITTLE
DRESSING REGULAR UPPER BODY CLOTHING: A LITTLE
TURNING FROM BACK TO SIDE WHILE IN FLAT BAD: A LITTLE
TOILETING: A LOT
DAILY ACTIVITIY SCORE: 17
HELP NEEDED FOR BATHING: A LITTLE
WALKING IN HOSPITAL ROOM: A LITTLE
CLIMB 3 TO 5 STEPS WITH RAILING: A LOT
MOBILITY SCORE: 14
STANDING UP FROM CHAIR USING ARMS: A LITTLE
DRESSING REGULAR LOWER BODY CLOTHING: A LITTLE
WALKING IN HOSPITAL ROOM: TOTAL
DRESSING REGULAR UPPER BODY CLOTHING: A LITTLE
HELP NEEDED FOR BATHING: A LITTLE
PERSONAL GROOMING: A LITTLE
DAILY ACTIVITIY SCORE: 20
MOVING TO AND FROM BED TO CHAIR: A LITTLE
STANDING UP FROM CHAIR USING ARMS: A LITTLE
HELP NEEDED FOR BATHING: A LITTLE
TOILETING: A LITTLE
DRESSING REGULAR LOWER BODY CLOTHING: A LITTLE
MOVING TO AND FROM BED TO CHAIR: A LITTLE
CLIMB 3 TO 5 STEPS WITH RAILING: A LOT
CLIMB 3 TO 5 STEPS WITH RAILING: TOTAL
DRESSING REGULAR LOWER BODY CLOTHING: A LITTLE
DRESSING REGULAR UPPER BODY CLOTHING: A LITTLE
STANDING UP FROM CHAIR USING ARMS: A LITTLE
DAILY ACTIVITIY SCORE: 20
MOVING TO AND FROM BED TO CHAIR: A LITTLE

## 2025-05-12 ASSESSMENT — PAIN SCALES - GENERAL
PAINLEVEL_OUTOF10: 0 - NO PAIN

## 2025-05-12 ASSESSMENT — PAIN - FUNCTIONAL ASSESSMENT
PAIN_FUNCTIONAL_ASSESSMENT: 0-10
PAIN_FUNCTIONAL_ASSESSMENT: 0-10

## 2025-05-12 ASSESSMENT — ACTIVITIES OF DAILY LIVING (ADL): HOME_MANAGEMENT_TIME_ENTRY: 20

## 2025-05-12 NOTE — PROGRESS NOTES
"Fartun Carey is a 89 y.o. female on day 9 of admission presenting with Bronchitis.    Subjective   Patient seen and examined with nurse present.  Is complaining of pain/tightness in her breast and dyspnea.         Objective     Physical Exam  Constitutional:       General: She is not in acute distress.     Appearance: She is ill-appearing. She is not toxic-appearing.   Cardiovascular:      Rate and Rhythm: Normal rate and regular rhythm.      Heart sounds: Murmur heard.   Pulmonary:      Effort: Pulmonary effort is normal. No respiratory distress.      Breath sounds: Normal breath sounds. No wheezing, rhonchi or rales.   Abdominal:      General: Bowel sounds are normal.      Palpations: Abdomen is soft.   Musculoskeletal:      Right lower leg: No edema.      Left lower leg: No edema.   Skin:     General: Skin is warm and dry.   Neurological:      General: No focal deficit present.      Mental Status: She is alert and oriented to person, place, and time.         Last Recorded Vitals  Blood pressure 130/60, pulse 70, temperature 36.3 °C (97.3 °F), temperature source Temporal, resp. rate 16, height 1.549 m (5' 1\"), weight 72.6 kg (160 lb), SpO2 98%.  Intake/Output last 3 Shifts:  I/O last 3 completed shifts:  In: - (0 mL/kg)   Out: 2695 (37.1 mL/kg) [Urine:2695 (1 mL/kg/hr)]  Weight: 72.6 kg     Relevant Results    Scheduled medications  Scheduled Medications[1]  Continuous medications  Continuous Medications[2]  PRN medications  PRN Medications[3]     following data reviewed    WBC- 15.6  Hgb-9.4  Hct-29.7  Platelets-191        Na-138  K+-3.8  Cl-106  Bicarb-25  BUN-35  creatinine-1.1                    Assessment and plan    Assessment and Plan      Acute Respiratory Failure with hypoxia  -Oxygen PRN, wean as tolerated  -currently on R/A     Bronchitis/Dyspnea  -CTA with bronchial thickening with mild mosaic attenuation  -CXR  no acute findings  -ECHO with an EF 60-65%  -empiric ATB for now  - Blood cultures from " 5/3/2025, likely contaminant.  1 vial grew staphylococcus capitis  -repeat BC from 5/5, NGTD     Lung Nodule  -pulmonology saw pt and recommend PET-CT scan outpt   -follow up with Dr Winchester in 2 weeks     DM II  -monitor blood glucose  -continue home jardiance  -HgbA1C 6.8 (12/2024)     CKD   -baseline creatinine 1.2 to 1.5  -stable   -renally dose medications  -avoid nephrotoxic agents  -follow up with Dr Gaxiola in 2 weeks, RFP in one week      Hyperlipidemia  -continue statin     HTN  -monitor blood pressure 130/60  -hydralazine decreased to 25mg TID and clonidine decreased to 0.1mg BID    UTI   -ID following  - IV Invanz  -urine cx grew >=100,000 CFU/mL Escherichia coli Abnormal  (ESBL)    Urinary Retention  -trail void   -urology saw pt and      Disposition  Above plan discussed with pt and he is in agreement   Likely discharge in next 24 hrs          Court Gonzalez, APRN-CNP         [1] aspirin, 81 mg, oral, Daily  atorvastatin, 80 mg, oral, Nightly  cloNIDine, 0.1 mg, oral, BID  docusate sodium, 100 mg, oral, BID  ertapenem (Invanz), 500 mg, intravenous, q24h  guaiFENesin, 600 mg, oral, BID  hydrALAZINE, 25 mg, oral, TID  levothyroxine, 88 mcg, oral, Daily  pantoprazole, 20 mg, oral, Daily before breakfast  predniSONE, 20 mg, oral, Daily  [2]    [3] PRN medications: acetaminophen **OR** acetaminophen **OR** acetaminophen, benzocaine-menthol, benzonatate, bisacodyl, hydrOXYzine HCL, ipratropium-albuteroL, oxygen, phenoL, polyethylene glycol

## 2025-05-12 NOTE — PROGRESS NOTES
Occupational Therapy    Occupational Therapy Treatment    Name: Fartun Carey  MRN: [id number]  Department: [address]  Room: [address] [place] [address]  Date: 05/12/25  Time Calculation  Start Time: 0845  Stop Time: 0914  Time Calculation (min): 29 min  Assessment:  Evaluation/Treatment Tolerance: Patient limited by fatigue  Medical Staff Made Aware: Yes  End of Session Communication: Bedside nurse  End of Session Patient Position: Up in chair, Alarm on  Plan:  Treatment Interventions: ADL retraining, Functional transfer training, Endurance training, Compensatory technique education  OT Frequency: 3 times per week  OT Discharge Recommendations: Moderate intensity level of continued care  Equipment Recommended upon Discharge: Wheeled walker  OT Recommended Transfer Status: Assistive equipment (Comment), Assist of 1  OT - OK to Discharge: Yes    Subjective   Previous Visit Info:  OT Last Visit  OT Received On: 05/12/25  General:  General  Prior to Session Communication: Bedside nurse  Patient Position Received: Bed, 3 rail up, Alarm on  Preferred Learning Style: verbal, visual  Precautions:  Hearing/Visual Limitations: + glasses, bilateral hearing aides  Medical Precautions: Fall precautions (isolation precautions)  Pain Assessment:  Pain Assessment  Pain Assessment: 0-10  0-10 (Numeric) Pain Score: 0 - No pain    Objective   Cognition:  Overall Cognitive Status: Within Functional Limits  Activities of Daily Living: Grooming  Grooming Level of Assistance: Setup  Grooming Where Assessed: Chair  Grooming Comments: to wash face andhands, comb hair    LE Dressing  LE Dressing: Yes  Pants Level of Assistance: Contact guard  Sock Level of Assistance: Contact guard  LE Dressing Where Assessed: Edge of bed  LE Dressing Comments: using cross leg tech    Functional Standing Tolerance:  Functional Standing Tolerance  Time: 2 min during clothing mgt while standing at EOB using FWW  Bed Mobility/Transfers: Bed Mobility 1  Bed Mobility 1: Supine to  sitting  Level of Assistance 1: Close supervision  Bed Mobility Comments 1: use of bed rails.    Transfers 2  Transfer From 2: Sit to  Transfer to 2: Stand  Technique 2: Stand pivot  Transfer Device 2: Gait belt, Walker  Transfer Level of Assistance 2: Contact guard  Therapy/Activity: Therapeutic Exercise  Therapeutic Exercise Performed: Yes  IP THER EXER PROGRAM -Written copy provided as HEP  1 set of 10 reps bilaterally  Shoulder flexion  Shoulder abduction  Horizontal abduction  Bicep curls  FA supination/pronation  Tricep presses    Outcome Measures:  [facility] Daily Activity  Putting on and taking off regular lower body clothing: A little  Bathing (including washing, rinsing, drying): A little  Putting on and taking off regular upper body clothing: A little  Toileting, which includes using toilet, bedpan or urinal: A lot  Taking care of personal grooming such as brushing teeth: A little  Eating Meals: A little  Daily Activity - Total Score: 17        Education Documentation  Handouts, taught by JUSTA Bucio at 5/12/2025  2:11 PM.  Learner: Patient  Readiness: Acceptance  Method: Explanation, Demonstration, Handout  Response: Verbalizes Understanding, Demonstrated Understanding    Body Mechanics, taught by JUSTA Bucio at 5/12/2025  2:11 PM.  Learner: Patient  Readiness: Acceptance  Method: Explanation, Demonstration, Handout  Response: Verbalizes Understanding, Demonstrated Understanding    Precautions, taught by JUSTA Bucio at 5/12/2025  2:11 PM.  Learner: Patient  Readiness: Acceptance  Method: Explanation, Demonstration, Handout  Response: Verbalizes Understanding, Demonstrated Understanding    Home Exercise Program, taught by JUSTA Bucio at 5/12/2025  2:11 PM.  Learner: Patient  Readiness: Acceptance  Method: Explanation, Demonstration, Handout  Response: Verbalizes Understanding, Demonstrated Understanding    ADL Training, taught by JUSTA Bucio at 5/12/2025  2:11  PM.  Learner: Patient  Readiness: Acceptance  Method: Explanation, Demonstration, Handout  Response: Verbalizes Understanding, Demonstrated Understanding    Education Comments  No comments found.      Goals:  Encounter Problems       Encounter Problems (Active)       OT Goals       ADLs (Progressing)       Start:  05/05/25    Expected End:  05/26/25       Patient will complete ADL tasks, with modified independence, using AE need in order to increase patient's safety and independence with self-care tasks.          Functional Transfers (Progressing)       Start:  05/05/25    Expected End:  05/26/25       Patient will complete functional transfers with modified independence, using least restrictive device, in order to increase patient's safety and independence with daily tasks.          Functional Mobility  (Progressing)       Start:  05/05/25    Expected End:  05/26/25       Patient will demonstrate the ability to complete item retrieval and functional mobility with modified independence, in order to increase safety and independence with daily tasks.          Activity Tolerance  (Progressing)       Start:  05/05/25    Expected End:  05/26/25       Patient will demonstrate the ability to participate in functional activity at least >/= 25 minutes in order to increase patient's safety and independence with daily tasks.

## 2025-05-12 NOTE — DISCHARGE SUMMARY
Discharge Diagnosis  Bronchitis           Issues Requiring Follow-Up      Discharge Meds     Medication List      PAUSE taking these medications     furosemide 20 mg tablet; Wait to take this until your doctor or other   care provider tells you to start again.; Commonly known as: Lasix   losartan 100 mg tablet; Wait to take this until your doctor or other   care provider tells you to start again.; Commonly known as: Cozaar   olmesartan 40 mg tablet; Wait to take this until your doctor or other   care provider tells you to start again.; Commonly known as: BENIcar     START taking these medications     guaiFENesin 600 mg 12 hr tablet; Commonly known as: Mucinex; Take 1   tablet (600 mg) by mouth 2 times a day. Do not crush, chew, or split.   predniSONE 10 mg tablet; Commonly known as: Deltasone; Take 2 tablets   (20 mg) by mouth once daily for 5 days, THEN 1 tablet (10 mg) once daily   for 5 days, THEN 1 tablet (10 mg) every other day for 5 days.; Start   taking on: May 13, 2025     CHANGE how you take these medications     cloNIDine 0.1 mg tablet; Commonly known as: Catapres; Take 1 tablet (0.1   mg) by mouth 2 times a day.; What changed: medication strength, how much   to take     CONTINUE taking these medications     Accu-Chek Guide test strips; Generic drug: blood sugar diagnostic;   Inject 1 strip under the skin early in the morning..   aspirin 81 mg chewable tablet   atorvastatin 80 mg tablet; Commonly known as: Lipitor   cholecalciferol 25 mcg (1,000 units) capsule; Commonly known as: Vitamin   D-3   hydrALAZINE 25 mg tablet; Commonly known as: Apresoline; Take 1 tablet   (25 mg) by mouth 3 times a day.   levothyroxine 88 mcg tablet; Commonly known as: Synthroid, Levoxyl; Take   1 tablet (88 mcg) by mouth once daily.   PRESERVISION AREDS-2 ORAL   SITagliptin phosphate 50 mg tablet; Commonly known as: Januvia; Take 1   tablet (50 mg) by mouth once daily.   Vitamin B-12 1,000 mcg tablet; Generic drug:  cyanocobalamin       Test Results Pending At Discharge  Pending Labs       Order Current Status    Extra Urine Gray Tube Collected (05/03/25 1928)    Urinalysis with Reflex Culture and Microscopic In process    Urine Culture In process            Hospital Course  HPI from admission  Fartun Carey is a 89 y.o. female scented from home due to cough and shortness of breath since yesterday.  She denies any sore throat runny nose or headache.  No fever or chills.  Patient denies any head history with vertigo she is older neurology and feels better with the therapy.  She uses a walker no fall.  Denies any recent illness.  Complains of discomfort on her chest from cough.  She denies any nausea, vomiting, diarrhea, constipation, or abdominal pain.  Has a small bowel movement at the ED.  Complaint having difficulty urinating but no no dysuria.       During hospital course pt was seen by pulmonology, urology, nephrology and infectious disease.  Patient had a urine culture that grew ESBL and she was treated with Invanz for 3-day course.  She will be discharged home on no antibiotics.  Patient had a voiding trial prior to discharge which she failed.  She will follow-up with urology on an outpatient basis.  Patient was seen by pulmonology for abnormal CAT scan.  She will follow-up with Dr. Hoover on an outpatient basis for further PET/CT.  Will also follow-up with nephrology on an outpatient basis in 2 weeks.  She will have a renal function panel in 1 week prior to her appointment.  The be of his been discussed with patient.  Patient was seen by PT, OT and she is agreeable to home health care.  Patient will be discharged in stable condition.  I have also updated her daughter Olive who is in agreement    Above Case and plan discussed collaborating physician, time spent on discharge 35 minutes                                                           Pertinent Physical Exam At Time of Discharge  Physical Exam    Outpatient  Follow-Up  Future Appointments   Date Time Provider Department Center   5/19/2025 11:30 AM Alfredito Church, PT [email or web address] [facility]   5/21/2025 11:30 AM Alfredito Church, PT Tami [facility]   6/10/2025 11:00 AM Jose Alejandro Hamilton MD [id number] [facility]   6/18/2025  3:45 PM Gabriela Zamora DPM [id number] [facility]   8/28/2025 11:00 AM Dallas Ruiz MD [id number] [facility]         Court Gonzalez, APRN-CNP

## 2025-05-12 NOTE — PROGRESS NOTES
"Fartun Carey is a 89 y.o. female on day 9 of admission seen in follow-up for acute hypoxic respiratory failure, lung nodules, acute bronchitis    Subjective   Sitting up in chair. On room air; oxygen sats 98%. No respiratory complaints. Denies pain. Afebrile.     Objective     Physical Exam  Vitals and nursing note reviewed.   Constitutional:       Appearance: She is ill-appearing.   HENT:      Head: Normocephalic and atraumatic.      Nose: Nose normal.      Mouth/Throat:      Mouth: Mucous membranes are moist.   Eyes:      Extraocular Movements: Extraocular movements intact.      Conjunctiva/sclera: Conjunctivae normal.      Pupils: Pupils are equal, round, and reactive to light.   Cardiovascular:      Rate and Rhythm: Normal rate and regular rhythm.      Pulses: Normal pulses.      Heart sounds: Murmur heard.   Pulmonary:      Effort: Pulmonary effort is normal.      Breath sounds: Rales present.      Comments: Bibasilar rales.   Abdominal:      General: Bowel sounds are normal.      Palpations: Abdomen is soft.   Musculoskeletal:         General: Normal range of motion.   Skin:     General: Skin is warm and dry.      Capillary Refill: Capillary refill takes less than 2 seconds.   Neurological:      General: No focal deficit present.      Mental Status: She is alert and oriented to person, place, and time.   Psychiatric:         Mood and Affect: Mood normal.         Behavior: Behavior normal.         Last Recorded Vitals  Blood pressure 166/70, pulse 70, temperature 36.3 °C (97.3 °F), temperature source Temporal, resp. rate 16, height 1.549 m (5' 1\"), weight 72.6 kg (160 lb), SpO2 98%.      Intake/Output Summary (Last 24 hours) at 5/12/2025 0904  Last data filed at 5/12/2025 0600  Gross per 24 hour   Intake --   Output 2120 ml   Net -2120 ml      Scheduled medications:  aspirin, 81 mg, oral, Daily  atorvastatin, 80 mg, oral, Nightly  cloNIDine, 0.2 mg, oral, BID  furosemide, 20 mg, oral, Daily  hydrALAZINE, 25 " mg, oral, TID  ipratropium-albuteroL, 3 mL, nebulization, TID  levoFLOXacin, 500 mg, intravenous, q24h  levothyroxine, 88 mcg, oral, Daily  losartan, 100 mg, oral, Daily  methylPREDNISolone sodium succinate (PF), 40 mg, intravenous, q12h  pantoprazole, 20 mg, oral, Daily before breakfast  SITagliptin phosphate, 50 mg, oral, Daily    PRN medications: acetaminophen **OR** acetaminophen **OR** acetaminophen, benzocaine-menthol, bisacodyl, dextromethorphan-guaifenesin, guaiFENesin, hydrocodone-homatropine, hydrOXYzine HCL, ipratropium-albuteroL, ondansetron ODT **OR** ondansetron, phenoL, polyethylene glycol    Relevant Results  Results for orders placed or performed during the hospital encounter of 05/03/25 (from the past 24 hours)   Basic Metabolic Panel   Result Value Ref Range    Glucose 126 (H) 74 - 99 mg/dL    Sodium 138 136 - 145 mmol/L    Potassium 3.8 3.5 - 5.3 mmol/L    Chloride 106 98 - 107 mmol/L    Bicarbonate 25 21 - 32 mmol/L    Anion Gap 11 10 - 20 mmol/L    Urea Nitrogen 35 (H) 6 - 23 mg/dL    Creatinine 1.09 (H) 0.50 - 1.05 mg/dL    eGFR 47 (L) >60 mL/min/1.73m*2    Calcium 8.3 (L) 8.6 - 10.3 mg/dL   CBC   Result Value Ref Range    WBC 15.6 (H) 4.4 - 11.3 x10*3/uL    nRBC 0.1 (H) 0.0 - 0.0 /100 WBCs    RBC 3.15 (L) 4.00 - 5.20 x10*6/uL    Hemoglobin 9.4 (L) 12.0 - 16.0 g/dL    Hematocrit 29.7 (L) 36.0 - 46.0 %    MCV 94 80 - 100 fL    MCH 29.8 26.0 - 34.0 pg    MCHC 31.6 (L) 32.0 - 36.0 g/dL    RDW 14.7 (H) 11.5 - 14.5 %    Platelets 191 150 - 450 x10*3/uL     *Note: Due to a large number of results and/or encounters for the requested time period, some results have not been displayed. A complete set of results can be found in Results Review.      XR chest 2 views  Result Date: 5/12/2025  FINDINGS: Normal heart, mediastinum, and lungs. There is calcified plaque within the aortic arch. There is a levoscoliosis of the lower thoracic and upper lumbar spine. IMPRESSION: No acute cardiopulmonary  disease.    US renal complete  Result Date: 5/06/2025  FINDINGS: RIGHT KIDNEY: Right kidney measures  9.9 cm.  Right central renal small hyperechoic focus may represent nonobstructing calculus. No right hydronephrosis. No discrete renal lesion is visualized. LEFT KIDNEY: Left kidney measures  9.4 cm.  Left central renal small hyperechoic foci may represent nonobstructing calculi. No left hydronephrosis. No discrete renal lesion is visualized. BLADDER: Urinary bladder was fully decompressed by Alvarado catheter and could not be evaluated at this time. IMPRESSION: No hydronephrosis bilaterally. Urinary bladder fully decompressed by Alvarado catheter and could not be evaluated.    Transthoracic Echo (TTE) Limited With Doppler And Color  Result Date: 5/05/20265  CONCLUSIONS: 1. The left ventricular systolic function is normal, with a visually estimated ejection fraction of 60-65%. 2. Trace mitral valve regurgitation. 3. Mild to moderate tricuspid regurgitation. 4. Mild aortic valve stenosis. 5. The peak instantaneous gradient of the aortic valve is 18 mmHg.    CT angio chest for pulmonary embolism  Result Date: 5/3/2025  Interpreted By:  Pancho Barth, STUDY: CT ANGIO CHEST FOR PULMONARY EMBOLISM;  5/3/2025 5:41 pm   INDICATION: Signs/Symptoms:tachycardic, hypoxic, concern for PE vs pneumonia.   COMPARISON: 2023   ACCESSION NUMBER(S): [id number]   ORDERING CLINICIAN: VIVIANE RILEY   TECHNIQUE: Helical data acquisition of the chest was obtained after intravenous administration of 75 ML Omnipaque 350 as per PE protocol. Images were reformatted in coronal and sagittal planes. Axial and coronal maximum intensity projection (MIP) images were created and reviewed.   FINDINGS: Ground-glass increasing left upper lobe nodule measures 1.7 cm on image 94 concerning for slow growing neoplasm   Other numerous bilateral pulmonary nodules appear similar in size. Reference right lower lobe nodule measuring a cm on image 163. Reference  8 mm right lower lobe nodule on image 184. Other numerous nodules detected remain stable   Mosaic attenuation in the lungs suggesting small airway or small vessel inflammation. No findings of acute pulmonary embolism. Coronary calcification no evidence of acute thoracic pathology.   Changes of old healed granulomas disease. Robust vascular calcification.   Nephroliths detected. Largest visualized stone measures 9 mm.   Demineralized bones  1. No evidence of acute pulmonary embolism 2. Senescent changes. Heart size is enlarged. Changes of old healed granulomas disease 3. Multiple stable pulmonary nodules. Enlarging left upper lobe ground-glass nodule now measuring under 2 cm concerning for slow growing neoplasm. 4. Bronchial thickening noted. Mild mosaic attenuation seen in the lungs. Query bronchial inflammation. Senescent changes.       XR chest 2 views  Result Date: 5/3/2025  FINDINGS: Stable heart and mediastinal contours. No pneumothorax or pleural fluid. No focal consolidation or alveolar edema. Mild atherosclerotic calcification of the aortic arch. Mild elevation of the right hemidiaphragm.  1.  No radiographic evidence of acute cardiopulmonary disease.           XR cervical spine 2-3 views  Result Date: 5/2/2025  FINDINGS:   Reversal normal cervical lordosis is seen.   Osteopenia is present. The prevertebral soft tissues are unremarkable.   Endplate sclerosis and spur formation is seen throughout the cervical spine. Narrowing of C3-4, C4-5 C5-6 and C6-7 disc space is seen. Uncovertebral joint hypertrophy seen.   No acute fracture or dislocation is seen.  Reversal normal cervical lordosis and degenerative changes.          Assessment & Plan    Acute hypoxic respiratory failure  Stable on room air  Home oxygen certification prior to discharge  Continue nebulized bronchodilator  Continuous pulse oximetry  Incentive spirometry/pulmonary hygiene     Lung nodules  CT angio with multiple stable pulmonary nodules,  enlarging left upper lobe ground-glass nodule now measuring under 2 cm concerning for slow growing neoplasm  CT scan chest wo IV contrast October '23 with numerous noncalcified pulmonary nodules bilaterally concerning for pulmonary metastatic disease, largest in the right lung measures approximately 9 mm, the largest in the left lung measures approximately 8 [id number] in 3 months is recommended  Patient seen by Dr. Plascencia for lung nodules when hospitalized here October '23 because patient was asymptomatic and given her age recommended deferring aggressive pursuit of biopsy at that time and outpatient follow-up imaging-patient does not recall getting any scans after this  Follow-up with Dr. Winchester in 2 weeks to discuss timing of PET-CT scan    Acute bronchitis  1/2 blood cultures with gram-with Staphylococcus capitis-likely a contaminant  Continue prednisone and discharge on taper  Follow-up cultures  FEV 1 when optimized or as outpatient    Acute kidney injury on chronic kidney disease stage lllb  Baseline creatinine 1.1-1.4  Lasix on hold  Hold Nephrotoxic agents  IV fluids completed  Nephrology signed off    Urinary tract infection  >100,000 E. Coli (ESBL)  IV ertapenem-long term pain is 3 days   Repeat urine culture prior to discharge  Infectious Disease follow-up    Urinary retention  Alvarado discontinued  Voiding trial      PT/OT/out of bed      Whit Miguel, APRN-CNP  Pulmonary & Critical Care Medicine

## 2025-05-12 NOTE — PROGRESS NOTES
Physical Therapy    Physical Therapy Treatment    Patient Name: Fartun Carey  MRN: [id number]  Department: [address]  Room: [address] [place], [place] [address]  Today's Date: 5/12/2025  Time Calculation  Start Time: 0926  Stop Time: 0939  Time Calculation (min): 13 min         Assessment/Plan   PT Assessment  PT Assessment Results: Decreased strength, Decreased endurance, Impaired balance, Decreased mobility, Decreased safety awareness  Rehab Prognosis: Good  Barriers to Discharge Home: Caregiver assistance  Caregiver Assistance: Patient lives alone and/or does not have reliable caregiver assistance, Caregiver assistance needed per identified barriers - however, level of patient's required assistance exceeds assistance available at home  Physical Needs: Ambulating household distances limited by function/safety, 24hr ADL assistance needed, Intermittent mobility assistance needed, Intermittent ADL assistance needed  Evaluation/Treatment Tolerance: Other (Comment) (limited by lightheadedness this date)  Medical Staff Made Aware: Yes (discussed with RN)  Strengths: Premorbid level of function  Barriers to Participation: Comorbidities  End of Session Communication: Bedside nurse  Assessment Comment: Patient with increasing lightheadedness in sitting, not able to attempt ambulation this date. Returned to bed. RN notified of change. Patient lives alone and requires min A x 1 for mobility at this time. Moderate intensity rehab remains appropriate especially as she continues to deal with being lightheaded.  End of Session Patient Position: Bed, 2 rail up, Alarm on  PT Plan  Inpatient/Swing Bed or Outpatient: Inpatient  PT Plan  Treatment/Interventions: Bed mobility, Transfer training, Therapeutic activity, Therapeutic exercise  PT Plan: Ongoing PT  PT Frequency: 4 times per week  PT Discharge Recommendations: Moderate intensity level of continued care  Equipment Recommended upon Discharge: Wheeled walker  PT Recommended Transfer Status: Assist x1,  Assistive device  PT - OK to Discharge: Yes      General Visit Information:   PT  Visit  PT Received On: 05/12/25  Response to Previous Treatment: Patient with no complaints from previous session.  General  Reason for Referral: impaired mobility, secondary to generalized weakness, SOB and bronchitis.  Referred By: Dr. Edward  Past Medical History Relevant to Rehab: DM type 2, OA, pulmonary nodule, hyponatremia, PVD  Prior to Session Communication: Bedside nurse  Patient Position Received: Bed, 3 rail up, Alarm on  Preferred Learning Style: verbal, visual  General Comment: RN clears patient for treatment.    Subjective   Precautions:  Precautions  Hearing/Visual Limitations: + glasses, bilateral hearing aides  Medical Precautions: Fall precautions  Precautions Comment: Full code     Date/Time Vitals Session Patient Position Pulse Resp SpO2 BP MAP (mmHg)    05/12/25 0926 During PT  Sitting  --  --  --  130/60  --           Vital Signs Comment: Patient sitting up in chair, no change in position. Patient reports dizziness/lightheadedness /60 and returned to supine     Objective   Pain:  Pain Assessment  Pain Assessment: 0-10  0-10 (Numeric) Pain Score: 0 - No pain  Cognition:  Cognition  Overall Cognitive Status: Within Functional Limits  Following Commands: Follows multistep commands without difficulty  Cognition Comments: pleasant and cooperative, able to follow commands throughout.  Processing Speed: Delayed  Coordination:  Upper Body Coordination: WFL  Lower Body Coordination: slower rate overall  Postural Control:  Postural Control  Postural Control: Within Functional Limits  Static Sitting Balance  Static Sitting-Balance Support: Bilateral upper extremity supported, Feet unsupported  Static Sitting-Level of Assistance: Close supervision  Static Standing Balance  Static Standing-Balance Support: Bilateral upper extremity supported  Static Standing-Level of Assistance: Minimum assistance  Static  Standing-Comment/Number of Minutes: min A x 1 overall for safety due to lightheadedness  Dynamic Standing Balance  Dynamic Standing-Balance Support: Bilateral upper extremity supported  Dynamic Standing-Level of Assistance: Minimum assistance  Dynamic Standing-Balance: Turning  Dynamic Standing-Comments: bed to chair min A x 1 with B UE support on therapist's arms  Extremity/Trunk Assessments:     Activity Tolerance:  Activity Tolerance  Endurance: Decreased tolerance for upright activites  Early Mobility/Exercise Safety Screen: Proceed with mobilization - No exclusion criteria met  Activity Tolerance Comments: decreased tolerance this date for out of bed activity, reports being lightheaded in sitting and wiht standing requested return to bed.  Treatments:  Therapeutic Exercise  Therapeutic Exercise Activity 1: Pt performed seated bilat LE ankle pumps, heel raises, glute sets, LAQ, hip flexion, dennis hip adduction and resisted hip abduction x20 reps each. Rest breaks as needed due to fatigue.    Bed Mobility 1  Bed Mobility 1: Sitting to supine  Level of Assistance 1: Close supervision  Bed Mobility Comments 1: Patient transitions to flat bed using bed rails, sit to supine uses  a bedrail.    Ambulation/Gait Training  Ambulation/Gait Training Performed: No (no able to complete ambulation this date due to increasing dizziness in standing)  Transfers 2  Transfer From 2: Chair with arms to  Transfer to 2: Stand  Transfer Device 2: Gait belt, Walker  Transfer Level of Assistance 2: Minimum assistance  Trials/Comments 2: min A to stand, able to support on therapist's arms and take 4 steps around to transfer from chair to bed    Outcome Measures:  [facility] Basic Mobility  Turning from your back to your side while in a flat bed without using bedrails: A little  Moving from lying on your back to sitting on the side of a flat bed without using bedrails: A little  Moving to and from bed to chair (including a wheelchair): A  little  Standing up from a chair using your arms (e.g. wheelchair or bedside chair): A little  To walk in hospital room: Total  Climbing 3-5 steps with railing: Total  Basic Mobility - Total Score: 14    Education Documentation  Mobility Training, taught by Zaria Salvador PT at 5/12/2025  9:55 AM.  Learner: Patient  Readiness: Acceptance  Method: Explanation  Response: Verbalizes Understanding  Comment: Education provided re: safe mobility technique, lightheadedness                Encounter Problems       Encounter Problems (Active)       Balance       LTG - Patient will maintain balance to allow for safe mobility (Progressing)       Start:  05/04/25    Expected End:  05/18/25       Patient will maintain balance to allow for safe mobility with decreased risk for falls.         STG - Maintains dynamic standing balance with upper extremity support (Progressing)       Start:  05/04/25    Expected End:  05/11/25       INTERVENTIONS:1. Practice standing with minimal support.2. Educate patient about standing tolerance.3. Educate patient about independence with gait, transfers, and ADL's.4. Educate patient about use of assistive device.5. Educate patient about self-directed care.            Mobility       LTG - Patient will ambulate household distance (Progressing)       Start:  05/04/25    Expected End:  05/18/25       Patient will ambulate household distance with rolling walker in order to return to PLOF with decreased risk for falls.         STG - Patient will ambulate up and down a curb/step (Progressing)       Start:  05/04/25    Expected End:  05/11/25       Patient will ambulate up and down a curb/step in order to enter/exit home.            PT Transfers       LTG - Patient will transfer from one surface to another (Progressing)       Start:  05/04/25    Expected End:  05/18/25       Patient will transfer from one surface to another with Ekaterina in order to return to PLOF.         STG - Patient will transfer sit  to and from stand (Progressing)       Start:  05/04/25    Expected End:  05/11/25       Patient will transfer sit to and from stand with CGA in order to increase level of independence.            Pain - Adult

## 2025-05-12 NOTE — PROGRESS NOTES
Fartun Carey is a 89 y.o. female on day 9 of admission presenting with Bronchitis.    Subjective   Interval History:   Reports right chest pain and dyspnea this morning-no pain currently  Primary team ordered EKG and chest xray-results spending  Interval discussion with primary tea  Afebrile, no chills  No shortness of breath at rest  Intermittent nonproductive cough  No nausea, vomiting or diarrhea     Objective   Range of Vitals (last 24 hours)  Heart Rate:  [66-80]   Temp:  [36.3 °C (97.3 °F)-37 °C (98.6 °F)]   Resp:  [16-20]   BP: (130-198)/(55-71)   SpO2:  [97 %-98 %]   Daily Weight  05/03/25 : 72.6 kg (160 lb)    Body mass index is 30.23 kg/m².    Physical Exam  Constitutional:       Appearance: Normal appearance.   HENT:      Head: Normocephalic and atraumatic.      Nose: Nose normal.      General: No scleral icterus.  Cardiovascular:      Rate and Rhythm: Normal rate and regular rhythm, murmur  Pulmonary:      Rhonchi bilateral   Abdominal:      General: Bowel sounds are normal.      Palpations: Abdomen is soft.   Musculoskeletal:         General: Normal range of motion.      Cervical back: Normal range of motion and neck supple.   Skin:     General: Skin is warm and dry.   Neurological:      Mental Status: She is alert.      Comments: Awake, alert    Psychiatric:         Mood and Affect: Mood normal.         Behavior: Behavior normal.     Antibiotics  ertapenem (Invanz) - 500 mg/50 mL  levoFLOXacin - 500 mg    Relevant Results  Labs  Results from last 72 hours   Lab Units 05/12/25  0436 05/11/25  0521 05/10/25  0512   WBC AUTO x10*3/uL 15.6* 12.6* 11.9*   HEMOGLOBIN g/dL 9.4* 8.7* 8.8*   HEMATOCRIT % 29.7* 28.1* 28.1*   PLATELETS AUTO x10*3/uL 191 184 180     Results from last 72 hours   Lab Units 05/12/25  0435 05/11/25  0521 05/10/25  0512   SODIUM mmol/L 138 138 138   POTASSIUM mmol/L 3.8 4.3 4.2   CHLORIDE mmol/L 106 108* 108*   CO2 mmol/L 25 24 23   BUN mg/dL 35* 37* 39*   CREATININE mg/dL 1.09* 1.16*  1.22*   GLUCOSE mg/dL 126* 190* 189*   CALCIUM mg/dL 8.3* 8.0* 8.0*   ANION GAP mmol/L 11 10 11   EGFR mL/min/1.73m*2 47* 43* 41*           Estimated Creatinine Clearance: 27.5 mL/min (A) (by C-G formula based on SCr of 1.09 mg/dL (H)).  CRP   Date Value Ref Range Status   08/18/2022 0.6 0 - 2.0 MG/DL Final     Comment:     Performed at [place] [id number]     Microbiology  Susceptibility data from last 14 days.  Collected Specimen Info Organism Amoxicillin/Clavulanate Ampicillin Ampicillin/Sulbactam Aztreonam Cefazolin Cefazolin (uncomplicated UTIs only) Cefepime Cefotaxime Ceftazidime Ceftriaxone Cefuroxime (oral)   05/05/25 Urine from Indwelling ([place]) Catheter Escherichia coli  I  R  R  R  R  R  R  R  R  R  R   05/03/25 Blood culture from Peripheral Venipuncture Staphylococcus capitis                Collected Specimen Info Organism Ciprofloxacin Ertapenem Gentamicin Levofloxacin Meropenem Nitrofurantoin Piperacillin/Tazobactam Trimethoprim/Sulfamethoxazole   05/05/25 Urine from Indwelling ([place]) Catheter Escherichia coli  R  S  S  R  S  S  R  R   05/03/25 Blood culture from Peripheral Venipuncture Staphylococcus capitis               Imaging  Transthoracic Echo (TTE) Limited  Result Date: 5/5/2025            [facility] [address], [place] [id number]             Phone [phone] TRANSTHORACIC ECHOCARDIOGRAM REPORT Patient Name:       FRANKIE Estrella Physician:    [id number] Brijesh Jerez MD Study Date:         5/5/2025             Ordering Provider:    [id number] RENEE LAWTON MRN/PID:            [id number]             Fellow: Accession#:         [id number]         Nurse: Date of Birth/Age:  6/10/1928 / 89 years Sonographer:          Amilcar Gatica RDCS Gender Assigned at  F                    Additional Staff: Birth: Height:              154.94 cm            Admit Date: Weight:             72.58 kg             Admission Status:     Inpatient -                                                                Routine BSA / BMI:          1.72 m2 / 30.23      [facility]                     kg/m2 Blood Pressure: 137 /48 mmHg Study Type:    TRANSTHORACIC ECHO (TTE) LIMITED Diagnosis/ICD: Dyspnea, unspecified-R06.00 Indication:    dyspnea CPT Codes:     Echo Limited-16732; Doppler Limited-36274; Color Doppler-91871 Patient History: Valve Disorders:   Aortic Stenosis. Pertinent History: HTN, Hyperlipidemia and CAD. CKD. Study Detail: The following Echo studies were performed: 2D, M-Mode, Doppler and               color flow. Technically challenging study due to body habitus.  PHYSICIAN INTERPRETATION: Left Ventricle: The left ventricular systolic function is normal, with a visually estimated ejection fraction of 60-65%. There are no regional wall motion abnormalities. The left ventricular cavity size is normal. There is mild increased septal and normal posterior left ventricular wall thickness. Spectral Doppler shows a normal pattern of left ventricular diastolic filling. Left Atrium: The left atrial size is mildly dilated. Right Ventricle: The right ventricle is normal in size. There is normal right ventriclar wall thickness. There is normal right ventricular global systolic function. Right Atrium: The right atrial size is normal. Aortic Valve: The aortic valve is trileaflet. There is mild aortic valve cusp calcification. There is reduced systolic aortic valve leaflet excursion. There is evidence of mild aortic valve stenosis. The aortic valve dimensionless index is 0.38. There is no evidence of aortic valve regurgitation. The peak instantaneous gradient of the aortic valve is 18 mmHg. The mean gradient of the aortic valve is 10 mmHg. Mitral Valve: The mitral valve is normal in structure. There is normal mitral valve leaflet  mobility. There is mild mitral annular calcification. There is trace mitral valve regurgitation. Tricuspid Valve: The tricuspid valve is structurally normal. There is normal tricuspid valve leaflet mobility. There is mild to moderate tricuspid regurgitation. Pulmonic Valve: The pulmonic valve is structurally normal. There is trace pulmonic valve regurgitation. Pericardium: No pericardial effusion noted. Aorta: The aortic root is normal. There is no dilatation of the aortic arch. There is no dilatation of the ascending aorta. There is no dilatation of the aortic root. Pulmonary Artery: The pulmonary artery is normal in size. The tricuspid regurgitant velocity is 2.72 m/s, and with an estimated right atrial pressure of 3 mmHg, the estimated pulmonary artery pressure is normal with the RVSP at 32.6 mmHg. The estimated PASP is 33 mmHg.  CONCLUSIONS:  1. The left ventricular systolic function is normal, with a visually estimated ejection fraction of 60-65%.  2. Trace mitral valve regurgitation.  3. Mild to moderate tricuspid regurgitation.  4. Mild aortic valve stenosis.  5. The peak instantaneous gradient of the aortic valve is 18 mmHg. QUANTITATIVE DATA SUMMARY:  2D MEASUREMENTS:           Normal Ranges: Ao Root d:       3.20 cm   (2.0-3.7cm) IVSd:            0.98 cm   (0.6-1.1cm) LVPWd:           0.80 cm   (0.6-1.1cm) LVIDd:           4.37 cm   (3.9-5.9cm) LVIDs:           2.29 cm LV Mass Index:   72.4 g/m2 LV % FS          47.6 %  LEFT ATRIUM:                Normal Ranges: LA Volume Index: 33.0 ml/m2  AORTA MEASUREMENTS:         Normal Ranges: Ao Sinus, d:        3.20 cm (2.1-3.5cm) Asc Ao, d:          2.80 cm (2.1-3.4cm)  LV SYSTOLIC FUNCTION:                      Normal Ranges: EF-A4C View:    62 % (>=55%) EF-Visual:      63 % LV EF Reported: 63 %  LV DIASTOLIC FUNCTION:             Normal Ranges: MV Peak E:             0.87 m/s    (0.7-1.2 m/s) MV Peak A:             0.96 m/s    (0.42-0.7 m/s) E/A Ratio:              0.90        (1.0-2.2) MV A Dur:              180.00 msec  MITRAL VALVE:          Normal Ranges: MV DT:        250 msec (150-240msec)  AORTIC VALVE:                      Normal Ranges: AoV Vmax:                2.14 m/s  (<=1.7m/s) AoV Peak P.3 mmHg (<20mmHg) AoV Mean PG:             10.0 mmHg (1.7-11.5mmHg) LVOT Max Joseph:            0.89 m/s  (<=1.1m/s) AoV VTI:                 54.70 cm  (18-25cm) LVOT VTI:                21.00 cm LVOT Diameter:           1.80 cm   (1.8-2.4cm) AoV Area, VTI:           0.98 cm2  (2.5-5.5cm2) AoV Area,Vmax:           1.06 cm2  (2.5-4.5cm2) AoV Dimensionless Index: 0.38  TRICUSPID VALVE/RVSP:          Normal Ranges: Peak TR Velocity:     2.72 m/s RV Syst Pressure:     33 mmHg  (< 30mmHg)  [id number] Brijesh Jerez MD Electronically signed on 2025 at 5:48:18 PM  ** Final **     CT angio chest for pulmonary embolism  Result Date: 5/3/2025  Interpreted By:  Pancho Barth, STUDY: CT ANGIO CHEST FOR PULMONARY EMBOLISM;  5/3/2025 5:41 pm   INDICATION: Signs/Symptoms:tachycardic, hypoxic, concern for PE vs pneumonia.   COMPARISON:    ACCESSION NUMBER(S): [id number]   ORDERING CLINICIAN: VIVIANE RILEY   TECHNIQUE: Helical data acquisition of the chest was obtained after intravenous administration of 75 ML Omnipaque 350 as per PE protocol. Images were reformatted in coronal and sagittal planes. Axial and coronal maximum intensity projection (MIP) images were created and reviewed.   FINDINGS: Ground-glass increasing left upper lobe nodule measures 1.7 cm on image 94 concerning for slow growing neoplasm   Other numerous bilateral pulmonary nodules appear similar in size. Reference right lower lobe nodule measuring a cm on image 163. Reference 8 mm right lower lobe nodule on image 184. Other numerous nodules detected remain stable   Mosaic attenuation in the lungs suggesting small airway or small vessel inflammation. No findings of acute pulmonary embolism. Coronary  calcification no evidence of acute thoracic pathology.   Changes of old healed granulomas disease. Robust vascular calcification.   Nephroliths detected. Largest visualized stone measures 9 mm.   Demineralized bones       1. No evidence of acute pulmonary embolism 2. Senescent changes. Heart size is enlarged. Changes of old healed granulomas disease 3. Multiple stable pulmonary nodules. Enlarging left upper lobe ground-glass nodule now measuring under 2 cm concerning for slow growing neoplasm. 4. Bronchial thickening noted. Mild mosaic attenuation seen in the lungs. Query bronchial inflammation. Senescent changes.   MACRO: None.   Signed by: Pancho Barth 5/3/2025 6:19 PM Dictation workstation:   [id number]    XR chest 2 views  Result Date: 5/3/2025  Interpreted By:  Amilcar Galdamez, STUDY: XR CHEST 2 VIEWS;  5/3/2025 3:34 pm   INDICATION: Signs/Symptoms:shortness of breath.     COMPARISON: 02/21/2024   ACCESSION NUMBER(S): [id number]   ORDERING CLINICIAN: VIVIANE RILEY   FINDINGS: Stable heart and mediastinal contours. No pneumothorax or pleural fluid. No focal consolidation or alveolar edema. Mild atherosclerotic calcification of the aortic arch. Mild elevation of the right hemidiaphragm.       1.  No radiographic evidence of acute cardiopulmonary disease.       MACRO: None   Signed by: Amilcar Galdamez 5/3/2025 4:37 PM Dictation workstation:   [id number]    XR cervical spine 2-3 views  Result Date: 5/2/2025  Interpreted By:  Anais Roa, STUDY: XR CERVICAL SPINE 2-3 VIEWS; ;  5/1/2025 12:10 pm   INDICATION: Signs/Symptoms:acute on chronic  neck pain with bilateral hand numbness.   ,M54.2 Cervicalgia,R29.898 Other symptoms and signs involving the musculoskeletal system   COMPARISON: None.   ACCESSION NUMBER(S): [id number]   ORDERING CLINICIAN: ADALI LEMUS   FINDINGS:   Reversal normal cervical lordosis is seen.   Osteopenia is present. The prevertebral soft tissues are unremarkable.   Endplate sclerosis  and spur formation is seen throughout the cervical spine. Narrowing of C3-4, C4-5 C5-6 and C6-7 disc space is seen. Uncovertebral joint hypertrophy seen.   No acute fracture or dislocation is seen.       Reversal normal cervical lordosis and degenerative changes.     MACRO: None   Signed by: Anais Roa 5/2/2025 4:20 PM Dictation workstation:   [id number]            Assessment/Plan   Acute hypoxic respiratory failure, resolved-on room air  Pulmonary nodules  CT showed bronchial thickening-bronchitis  Positive blood culture -staphylococcus capitis- 1/ 2-contaminant, repeat blood culture negative  ESBL E.coli Urinary tract infection  Has reported intolerances to amoxicillin, Augmentin-chart reviewed tolerated follow-up amoxicillin on 4/20/2025- allergy to Keflex  Chronic kidney disease, stable  History of urine culture E.coli-pansensitive, Enterococcus-susceptible to ampicillin      Invanz for 3 days  Continue prednisone, taper by primary  Follow up chest xray ordered by primary   Monitor renal function  Supplemental oxygen , as needed  Supportive care   Pulmonary follow up  Long-term plan is 3 days of antibiotic therapy  Repeat urine culture prior to discharge    KAYLENE Faith-CNP

## 2025-05-12 NOTE — PROGRESS NOTES
05/12/25 1105   Discharge Planning   Living Arrangements Alone   Support Systems Children;Friends/neighbors   Who is requesting discharge planning? Provider   Home or Post Acute Services In home services   Type of Home Care Services Home nursing visits;Home OT;Home PT   Expected Discharge Disposition Home H  ([facility])     PER NOTES:  ---ID following  --- IV Invanz  ---urine cx grew >=100,000 CFU/mL Escherichia coli Abnormal  (ESBL)  ---Patient has Urinary Retention, mendoza removed and will have trail void   ---Likely discharge in next 24 hrs    PLAN: Home with [facility]

## 2025-05-13 ENCOUNTER — DOCUMENTATION (OUTPATIENT)
Dept: HOME HEALTH SERVICES | Facility: HOME HEALTH | Age: OVER 89
End: 2025-05-13
Payer: MEDICARE

## 2025-05-13 ENCOUNTER — TELEPHONE (OUTPATIENT)
Dept: PRIMARY CARE | Facility: CLINIC | Age: OVER 89
End: 2025-05-13
Payer: MEDICARE

## 2025-05-13 VITALS
SYSTOLIC BLOOD PRESSURE: 156 MMHG | RESPIRATION RATE: 18 BRPM | HEIGHT: 61 IN | TEMPERATURE: 97.9 F | WEIGHT: 160 LBS | HEART RATE: 67 BPM | OXYGEN SATURATION: 96 % | BODY MASS INDEX: 30.21 KG/M2 | DIASTOLIC BLOOD PRESSURE: 63 MMHG

## 2025-05-13 LAB
ANION GAP SERPL CALCULATED.3IONS-SCNC: 11 MMOL/L (ref 10–20)
BUN SERPL-MCNC: 31 MG/DL (ref 6–23)
CALCIUM SERPL-MCNC: 8.4 MG/DL (ref 8.6–10.3)
CHLORIDE SERPL-SCNC: 105 MMOL/L (ref 98–107)
CO2 SERPL-SCNC: 26 MMOL/L (ref 21–32)
CREAT SERPL-MCNC: 1.14 MG/DL (ref 0.5–1.05)
EGFRCR SERPLBLD CKD-EPI 2021: 44 ML/MIN/1.73M*2
ERYTHROCYTE [DISTWIDTH] IN BLOOD BY AUTOMATED COUNT: 14.6 % (ref 11.5–14.5)
GLUCOSE SERPL-MCNC: 118 MG/DL (ref 74–99)
HCT VFR BLD AUTO: 29.6 % (ref 36–46)
HGB BLD-MCNC: 9.4 G/DL (ref 12–16)
MCH RBC QN AUTO: 29.7 PG (ref 26–34)
MCHC RBC AUTO-ENTMCNC: 31.8 G/DL (ref 32–36)
MCV RBC AUTO: 94 FL (ref 80–100)
NRBC BLD-RTO: 0.2 /100 WBCS (ref 0–0)
PLATELET # BLD AUTO: 205 X10*3/UL (ref 150–450)
POTASSIUM SERPL-SCNC: 3.8 MMOL/L (ref 3.5–5.3)
RBC # BLD AUTO: 3.16 X10*6/UL (ref 4–5.2)
SODIUM SERPL-SCNC: 138 MMOL/L (ref 136–145)
WBC # BLD AUTO: 14.8 X10*3/UL (ref 4.4–11.3)

## 2025-05-13 PROCEDURE — 2500000001 HC RX 250 WO HCPCS SELF ADMINISTERED DRUGS (ALT 637 FOR MEDICARE OP): Performed by: INTERNAL MEDICINE

## 2025-05-13 PROCEDURE — 2500000002 HC RX 250 W HCPCS SELF ADMINISTERED DRUGS (ALT 637 FOR MEDICARE OP, ALT 636 FOR OP/ED): Performed by: NURSE PRACTITIONER

## 2025-05-13 PROCEDURE — 99232 SBSQ HOSP IP/OBS MODERATE 35: CPT | Performed by: NURSE PRACTITIONER

## 2025-05-13 PROCEDURE — 2500000004 HC RX 250 GENERAL PHARMACY W/ HCPCS (ALT 636 FOR OP/ED): Performed by: NURSE PRACTITIONER

## 2025-05-13 PROCEDURE — 85027 COMPLETE CBC AUTOMATED: CPT | Performed by: NURSE PRACTITIONER

## 2025-05-13 PROCEDURE — 99231 SBSQ HOSP IP/OBS SF/LOW 25: CPT | Performed by: NURSE PRACTITIONER

## 2025-05-13 PROCEDURE — 80048 BASIC METABOLIC PNL TOTAL CA: CPT | Performed by: NURSE PRACTITIONER

## 2025-05-13 PROCEDURE — 2500000001 HC RX 250 WO HCPCS SELF ADMINISTERED DRUGS (ALT 637 FOR MEDICARE OP): Performed by: SURGERY

## 2025-05-13 PROCEDURE — 2500000001 HC RX 250 WO HCPCS SELF ADMINISTERED DRUGS (ALT 637 FOR MEDICARE OP): Performed by: NURSE PRACTITIONER

## 2025-05-13 PROCEDURE — 36415 COLL VENOUS BLD VENIPUNCTURE: CPT | Performed by: NURSE PRACTITIONER

## 2025-05-13 RX ADMIN — GUAIFENESIN 600 MG: 600 TABLET ORAL at 08:48

## 2025-05-13 RX ADMIN — ASPIRIN 81 MG: 81 TABLET, CHEWABLE ORAL at 08:48

## 2025-05-13 RX ADMIN — CLONIDINE HYDROCHLORIDE 0.1 MG: 0.1 TABLET ORAL at 08:48

## 2025-05-13 RX ADMIN — HYDRALAZINE HYDROCHLORIDE 25 MG: 25 TABLET ORAL at 08:48

## 2025-05-13 RX ADMIN — PANTOPRAZOLE SODIUM 20 MG: 20 TABLET, DELAYED RELEASE ORAL at 05:30

## 2025-05-13 RX ADMIN — DOCUSATE SODIUM 100 MG: 100 CAPSULE, LIQUID FILLED ORAL at 08:48

## 2025-05-13 RX ADMIN — PREDNISONE 20 MG: 20 TABLET ORAL at 08:48

## 2025-05-13 RX ADMIN — LEVOTHYROXINE SODIUM 88 MCG: 0.09 TABLET ORAL at 05:30

## 2025-05-13 ASSESSMENT — COGNITIVE AND FUNCTIONAL STATUS - GENERAL
CLIMB 3 TO 5 STEPS WITH RAILING: A LOT
DAILY ACTIVITIY SCORE: 20
MOBILITY SCORE: 19
DRESSING REGULAR UPPER BODY CLOTHING: A LITTLE
TOILETING: A LITTLE
STANDING UP FROM CHAIR USING ARMS: A LITTLE
HELP NEEDED FOR BATHING: A LITTLE
DRESSING REGULAR LOWER BODY CLOTHING: A LITTLE
WALKING IN HOSPITAL ROOM: A LITTLE
MOVING TO AND FROM BED TO CHAIR: A LITTLE

## 2025-05-13 ASSESSMENT — PAIN SCALES - GENERAL: PAINLEVEL_OUTOF10: 0 - NO PAIN

## 2025-05-13 NOTE — PROGRESS NOTES
05/13/25 0931   Discharge Planning   Living Arrangements Alone   Support Systems Children;Friends/neighbors   Home or Post Acute Services In home services   Type of Home Care Services Home nursing visits;Home OT;Home PT   Expected Discharge Disposition Home H  ([facility])   Does the patient need discharge transport arranged? No     Patient is being discharged to home, with a mendoza and [facility] will follow up with patient and process for SOC 24-48. Daughter will transport patient to home.

## 2025-05-13 NOTE — PROGRESS NOTES
"Fartun Carey is a 89 y.o. female on day 10 of admission presenting with Bronchitis.    Subjective   Pt seen and examined with nurse present.  No new complaints.  Pt denies any chest pain, palpitations, dyspnea.  Pt daughter was not able to pick her up yesterday.         Objective     Physical Exam  Constitutional:       General: She is not in acute distress.     Appearance: She is not ill-appearing or toxic-appearing.   Cardiovascular:      Rate and Rhythm: Normal rate and regular rhythm.      Heart sounds: Murmur heard.   Pulmonary:      Effort: Pulmonary effort is normal. No respiratory distress.      Breath sounds: Normal breath sounds. No wheezing, rhonchi or rales.   Abdominal:      General: Bowel sounds are normal.      Palpations: Abdomen is soft.   Musculoskeletal:      Right lower leg: No edema.      Left lower leg: No edema.   Skin:     General: Skin is warm and dry.   Neurological:      General: No focal deficit present.      Mental Status: She is alert and oriented to person, place, and time.         Last Recorded Vitals  Blood pressure 156/63, pulse 67, temperature 36.6 °C (97.9 °F), temperature source Temporal, resp. rate 18, height 1.549 m (5' 1\"), weight 72.6 kg (160 lb), SpO2 96%.  Intake/Output last 3 Shifts:  I/O last 3 completed shifts:  In: 295 (4.1 mL/kg) [P.O.:240; IV Piggyback:55]  Out: 2800 (38.6 mL/kg) [Urine:2800 (1.1 mL/kg/hr)]  Weight: 72.6 kg     Relevant Results    Scheduled medications  Scheduled Medications[1]  Continuous medications  Continuous Medications[2]  PRN medications  PRN Medications[3]            Assessment and Plan      Acute Respiratory Failure with hypoxia  -Oxygen PRN, wean as tolerated  -currently on R/A     Bronchitis/Dyspnea  -CTA with bronchial thickening with mild mosaic attenuation  -CXR  no acute findings  -ECHO with an EF 60-65%  -empiric ATB for now  - Blood cultures from 5/3/2025, likely contaminant.  1 vial grew staphylococcus capitis  -repeat BC from 5/5, " NGTD      Lung Nodule  -pulmonology saw pt and recommend PET-CT scan outpt   -follow up with Dr Winchester in 2 weeks     DM II  -monitor blood glucose  -continue home jardiance  -HgbA1C 6.8 (12/2024)     CKD   -baseline creatinine 1.2 to 1.5  -stable   -renally dose medications  -avoid nephrotoxic agents  -follow up with Dr Gaxiola in 2 weeks, RFP in one week      Hyperlipidemia  -continue statin     HTN  -monitor blood pressure 130/60  -hydralazine decreased to 25mg TID and clonidine decreased to 0.1mg BID     UTI   -ID following  - IV Invanz  -urine cx grew >=100,000 CFU/mL Escherichia coli Abnormal  (ESBL)     Urinary Retention  -trail void   -urology saw pt and      Disposition  Above plan discussed with pt and he is in agreement   Discharge home with [facility] today                   KAYLENE Duran-CNP         [1] aspirin, 81 mg, oral, Daily  atorvastatin, 80 mg, oral, Nightly  cloNIDine, 0.1 mg, oral, BID  docusate sodium, 100 mg, oral, BID  ertapenem (Invanz), 500 mg, intravenous, q24h  guaiFENesin, 600 mg, oral, BID  hydrALAZINE, 25 mg, oral, TID  levothyroxine, 88 mcg, oral, Daily  pantoprazole, 20 mg, oral, Daily before breakfast  predniSONE, 20 mg, oral, Daily  [2]    [3] PRN medications: acetaminophen **OR** acetaminophen **OR** acetaminophen, benzocaine-menthol, benzonatate, bisacodyl, hydrOXYzine HCL, ipratropium-albuteroL, oxygen, phenoL, polyethylene glycol

## 2025-05-13 NOTE — TELEPHONE ENCOUNTER
Her med list was reviewed and they have changed so many of her meds around that it is hard to tell what they recommend for her to take. What is her BP running? Can she follow up this week for follow up hospital and bring her meds with her?

## 2025-05-13 NOTE — TELEPHONE ENCOUNTER
Patient just home from the hospital and they told her she can resume taking Olmesartan 20 mg 2 times a day. Patient does not have a bottle of those and would like to know is she supposed to be taking them

## 2025-05-13 NOTE — CARE PLAN
The patient's goals for the shift include  safety,     The clinical goals for the shift include safety    Problem: Safety - Adult  Goal: Free from fall injury  Outcome: Progressing

## 2025-05-13 NOTE — HH CARE COORDINATION
Home Care received a Referral for Nursing, Physical Therapy, and Occupational Therapy. We have processed the referral for a Start of Care on 05/14-05/15.     If you have any questions or concerns, please feel free to contact us at [phone]. Follow the prompts, enter your five digit zip code, and you will be directed to your care team on [facility] 1.

## 2025-05-13 NOTE — PROGRESS NOTES
"Fartun Carey is a 89 y.o. female on day 10 of admission seen in follow-up for acute hypoxic respiratory failure, lung nodules, acute bronchitis    Subjective   Sitting up in chair. On room air; oxygen sats 96%. No respiratory complaints. Denies pain. Afebrile.     Objective     Physical Exam  Vitals and nursing note reviewed.   Constitutional:       Appearance: She is ill-appearing.   HENT:      Head: Normocephalic and atraumatic.      Nose: Nose normal.      Mouth/Throat:      Mouth: Mucous membranes are moist.   Eyes:      Extraocular Movements: Extraocular movements intact.      Conjunctiva/sclera: Conjunctivae normal.      Pupils: Pupils are equal, round, and reactive to light.   Cardiovascular:      Rate and Rhythm: Normal rate and regular rhythm.      Pulses: Normal pulses.      Heart sounds: Murmur heard.   Pulmonary:      Effort: Pulmonary effort is normal.      Breath sounds: Rales present.      Comments: Bibasilar rales.   Abdominal:      General: Bowel sounds are normal.      Palpations: Abdomen is soft.   Musculoskeletal:         General: Normal range of motion.   Skin:     General: Skin is warm and dry.      Capillary Refill: Capillary refill takes less than 2 seconds.   Neurological:      General: No focal deficit present.      Mental Status: She is alert and oriented to person, place, and time.   Psychiatric:         Mood and Affect: Mood normal.         Behavior: Behavior normal.         Last Recorded Vitals  Blood pressure 156/63, pulse 67, temperature 36.6 °C (97.9 °F), temperature source Temporal, resp. rate 18, height 1.549 m (5' 1\"), weight 72.6 kg (160 lb), SpO2 96%.      Intake/Output Summary (Last 24 hours) at 5/13/2025 0846  Last data filed at 5/12/2025 2701  Gross per 24 hour   Intake 295 ml   Output 1000 ml   Net -705 ml      Scheduled medications:  aspirin, 81 mg, oral, Daily  atorvastatin, 80 mg, oral, Nightly  cloNIDine, 0.2 mg, oral, BID  furosemide, 20 mg, oral, Daily  hydrALAZINE, " 25 mg, oral, TID  ipratropium-albuteroL, 3 mL, nebulization, TID  levoFLOXacin, 500 mg, intravenous, q24h  levothyroxine, 88 mcg, oral, Daily  losartan, 100 mg, oral, Daily  methylPREDNISolone sodium succinate (PF), 40 mg, intravenous, q12h  pantoprazole, 20 mg, oral, Daily before breakfast  SITagliptin phosphate, 50 mg, oral, Daily    PRN medications: acetaminophen **OR** acetaminophen **OR** acetaminophen, benzocaine-menthol, bisacodyl, dextromethorphan-guaifenesin, guaiFENesin, hydrocodone-homatropine, hydrOXYzine HCL, ipratropium-albuteroL, ondansetron ODT **OR** ondansetron, phenoL, polyethylene glycol    Relevant Results  Results for orders placed or performed during the hospital encounter of 05/03/25 (from the past 24 hours)   Electrocardiogram, 12-lead PRN ACS symptoms   Result Value Ref Range    Ventricular Rate 65 BPM    Atrial Rate 65 BPM    CT Interval 156 ms    QRS Duration 78 ms    QT Interval 410 ms    QTC Calculation(Bazett) 426 ms    P Axis -73 degrees    R Axis -32 degrees    T Axis 43 degrees    QRS Count 10 beats    Q Onset 211 ms    P Onset 133 ms    P Offset 190 ms    T Offset 416 ms    QTC Fredericia 420 ms   Troponin I, High Sensitivity, Initial   Result Value Ref Range    Troponin I, High Sensitivity 15 (H) 0 - 13 ng/L   Troponin, High Sensitivity, 1 Hour   Result Value Ref Range    Troponin I, High Sensitivity 16 (H) 0 - 13 ng/L   CBC   Result Value Ref Range    WBC 14.8 (H) 4.4 - 11.3 x10*3/uL    nRBC 0.2 (H) 0.0 - 0.0 /100 WBCs    RBC 3.16 (L) 4.00 - 5.20 x10*6/uL    Hemoglobin 9.4 (L) 12.0 - 16.0 g/dL    Hematocrit 29.6 (L) 36.0 - 46.0 %    MCV 94 80 - 100 fL    MCH 29.7 26.0 - 34.0 pg    MCHC 31.8 (L) 32.0 - 36.0 g/dL    RDW 14.6 (H) 11.5 - 14.5 %    Platelets 205 150 - 450 x10*3/uL   Basic Metabolic Panel   Result Value Ref Range    Glucose 118 (H) 74 - 99 mg/dL    Sodium 138 136 - 145 mmol/L    Potassium 3.8 3.5 - 5.3 mmol/L    Chloride 105 98 - 107 mmol/L    Bicarbonate 26 21 - 32  mmol/L    Anion Gap 11 10 - 20 mmol/L    Urea Nitrogen 31 (H) 6 - 23 mg/dL    Creatinine 1.14 (H) 0.50 - 1.05 mg/dL    eGFR 44 (L) >60 mL/min/1.73m*2    Calcium 8.4 (L) 8.6 - 10.3 mg/dL     *Note: Due to a large number of results and/or encounters for the requested time period, some results have not been displayed. A complete set of results can be found in Results Review.      XR chest 2 views  Result Date: 5/12/2025  FINDINGS: Normal heart, mediastinum, and lungs. There is calcified plaque within the aortic arch. There is a levoscoliosis of the lower thoracic and upper lumbar spine. IMPRESSION: No acute cardiopulmonary disease.    US renal complete  Result Date: 5/06/2025  FINDINGS: RIGHT KIDNEY: Right kidney measures  9.9 cm.  Right central renal small hyperechoic focus may represent nonobstructing calculus. No right hydronephrosis. No discrete renal lesion is visualized. LEFT KIDNEY: Left kidney measures  9.4 cm.  Left central renal small hyperechoic foci may represent nonobstructing calculi. No left hydronephrosis. No discrete renal lesion is visualized. BLADDER: Urinary bladder was fully decompressed by Alvarado catheter and could not be evaluated at this time. IMPRESSION: No hydronephrosis bilaterally. Urinary bladder fully decompressed by Alvarado catheter and could not be evaluated.    Transthoracic Echo (TTE) Limited With Doppler And Color  Result Date: 5/05/20265  CONCLUSIONS: 1. The left ventricular systolic function is normal, with a visually estimated ejection fraction of 60-65%. 2. Trace mitral valve regurgitation. 3. Mild to moderate tricuspid regurgitation. 4. Mild aortic valve stenosis. 5. The peak instantaneous gradient of the aortic valve is 18 mmHg.    CT angio chest for pulmonary embolism  Result Date: 5/3/2025  Interpreted By:  Pancho Barth, STUDY: CT ANGIO CHEST FOR PULMONARY EMBOLISM;  5/3/2025 5:41 pm   INDICATION: Signs/Symptoms:tachycardic, hypoxic, concern for PE vs pneumonia.   COMPARISON:  2023   ACCESSION NUMBER(S): [id number]   ORDERING CLINICIAN: VIVIANE RILEY   TECHNIQUE: Helical data acquisition of the chest was obtained after intravenous administration of 75 ML Omnipaque 350 as per PE protocol. Images were reformatted in coronal and sagittal planes. Axial and coronal maximum intensity projection (MIP) images were created and reviewed.   FINDINGS: Ground-glass increasing left upper lobe nodule measures 1.7 cm on image 94 concerning for slow growing neoplasm   Other numerous bilateral pulmonary nodules appear similar in size. Reference right lower lobe nodule measuring a cm on image 163. Reference 8 mm right lower lobe nodule on image 184. Other numerous nodules detected remain stable   Mosaic attenuation in the lungs suggesting small airway or small vessel inflammation. No findings of acute pulmonary embolism. Coronary calcification no evidence of acute thoracic pathology.   Changes of old healed granulomas disease. Robust vascular calcification.   Nephroliths detected. Largest visualized stone measures 9 mm.   Demineralized bones  1. No evidence of acute pulmonary embolism 2. Senescent changes. Heart size is enlarged. Changes of old healed granulomas disease 3. Multiple stable pulmonary nodules. Enlarging left upper lobe ground-glass nodule now measuring under 2 cm concerning for slow growing neoplasm. 4. Bronchial thickening noted. Mild mosaic attenuation seen in the lungs. Query bronchial inflammation. Senescent changes.       XR chest 2 views  Result Date: 5/3/2025  FINDINGS: Stable heart and mediastinal contours. No pneumothorax or pleural fluid. No focal consolidation or alveolar edema. Mild atherosclerotic calcification of the aortic arch. Mild elevation of the right hemidiaphragm.  1.  No radiographic evidence of acute cardiopulmonary disease.           XR cervical spine 2-3 views  Result Date: 5/2/2025  FINDINGS:   Reversal normal cervical lordosis is seen.   Osteopenia is present.  The prevertebral soft tissues are unremarkable.   Endplate sclerosis and spur formation is seen throughout the cervical spine. Narrowing of C3-4, C4-5 C5-6 and C6-7 disc space is seen. Uncovertebral joint hypertrophy seen.   No acute fracture or dislocation is seen.  Reversal normal cervical lordosis and degenerative changes.          Assessment & Plan    Acute hypoxic respiratory failure  Stable on room air  Continue nebulized bronchodilator  Continuous pulse oximetry  Incentive spirometry/pulmonary hygiene     Lung nodules  CT angio with multiple stable pulmonary nodules, enlarging left upper lobe ground-glass nodule now measuring under 2 cm concerning for slow growing neoplasm  CT scan chest wo IV contrast October '23 with numerous noncalcified pulmonary nodules bilaterally concerning for pulmonary metastatic disease, largest in the right lung measures approximately 9 mm, the largest in the left lung measures approximately 8 [id number] in 3 months is recommended  Patient seen by Dr. Plascencia for lung nodules when hospitalized here October '23 because patient was asymptomatic and given her age recommended deferring aggressive pursuit of biopsy at that time and outpatient follow-up imaging-patient does not recall getting any scans after this  Follow-up with Dr. Winchester in 2 weeks to discuss timing of PET-CT scan    Acute bronchitis  1/2 blood cultures with gram-with Staphylococcus capitis-likely a contaminant  Continue prednisone and discharge on taper  Follow-up cultures  FEV 1 when optimized or as outpatient    Acute kidney injury on chronic kidney disease stage lllb  Baseline creatinine 1.1-1.4  Lasix on hold  Hold Nephrotoxic agents  IV fluids completed  Nephrology signed off    Urinary tract infection  >100,000 E. Coli (ESBL)  IV ertapenem-long term pain is 3 days   Repeat urine culture pending  Infectious Disease follow-up    Urinary retention  Alvarado discontinued  Voiding trial      PT/OT/out of bed      Whit  TESHA Miguel APRN-CNP  Pulmonary & Critical Care Medicine

## 2025-05-13 NOTE — TELEPHONE ENCOUNTER
Spoke with pt's daughter - she does not have current BP readings  >Scheduled Hospital follow up 05/15/25

## 2025-05-14 ENCOUNTER — APPOINTMENT (OUTPATIENT)
Dept: PHYSICAL THERAPY | Facility: CLINIC | Age: OVER 89
End: 2025-05-14
Payer: MEDICARE

## 2025-05-14 ENCOUNTER — PATIENT OUTREACH (OUTPATIENT)
Dept: PRIMARY CARE | Facility: CLINIC | Age: OVER 89
End: 2025-05-14
Payer: MEDICARE

## 2025-05-14 ENCOUNTER — DOCUMENTATION (OUTPATIENT)
Dept: PHYSICAL THERAPY | Facility: CLINIC | Age: OVER 89
End: 2025-05-14
Payer: MEDICARE

## 2025-05-14 DIAGNOSIS — N17.9 AKI (ACUTE KIDNEY INJURY): ICD-10-CM

## 2025-05-14 LAB — BACTERIA UR CULT: NO GROWTH

## 2025-05-14 NOTE — PROGRESS NOTES
Physical Therapy    Discharge Summary    Name: Fartun Carey  MRN: [id number]  : 6/10/1928  Date: 25    Discharge Summary: PT    Discharge Information: Date of discharge 25    Discharge Status: at last visit pt dizziness was much more controlled with resolution of BPPV, and use of abd binder and compression hose to control orthostatic hypo.    Rehab Discharge Reason: Other pt was admitted to hospital, and now will be getting home PT

## 2025-05-14 NOTE — PROGRESS NOTES
Discharge Facility: [facility]     Discharge Diagnosis:    Bronchitis     Admission Date: 5/3/2025   Discharge Date:  5/13/2025     PCP Appointment Date: 5/15/2025     Specialist Appointment Date:     Follow up with Parviz Winchester MD (Pulmonary Disease) in 2 weeks (5/27/2025); Please call to schedule an appointment. Pt aware     Schedule an appointment with Celso Gaxiola MD (Nephrology) in 2 weeks (5/27/2025); Check renal function panel 1 week after discharge. Please monitor home blood pressure daily and hold any blood pressure medications for systolic BP <105. Pt aware     Schedule an appointment with Berenice Catherine MD (Urology) in 2 weeks (5/27/2025) Pt aware    Follow up with  [facility]  in process     Cardiology F/U / Pt aware     Hospital Encounter and Summary Linked: Yes    ED to Hosp-Admission (Discharged) with Manisha Edward MD (05/03/2025)     See discharge assessment below for further details     Wrap Up  Wrap Up Additional Comments: Patient states doing fine, aware  of all med changes reviewed, PCP F/U [facility] by office /pt 5/15, patient aware of F/U's to zora , reviewed , aware of lab work due. Patient aware of DC instructions reviewed, monitoring BP at home[facility] Patient encouraged to call providers for any questions concerns  or change in condition. Patient states has assistance in community. (5/14/2025  9:38 AM)    Engagement  Call Start Time: 0938 (5/14/2025  9:38 AM)    Medications  Medications reviewed with patient/caregiver?: Yes (5/14/2025  9:38 AM)  Is the patient having any side effects they believe may be caused by any medication additions or changes?: No (5/14/2025  9:38 AM)  Does the patient have all medications ordered at discharge?: -- (will have friend  mucinex) (5/14/2025  9:38 AM)  Care Management Interventions: No intervention needed (5/14/2025  9:38 AM)  Prescription Comments: START taking these  medications     guaiFENesin 600 mg 12 hr tablet; Commonly known as: Mucinex; Take 1   tablet (600 mg) by mouth 2 times a day. Do not crush, chew, or split.   predniSONE 10 mg tablet; Commonly known as: Deltasone; Take 2 tablets   (20 mg) by mouth once daily for 5 days, THEN 1 tablet (10 mg) once daily   for 5 days, THEN 1 tablet (10 mg) every other day for 5 days.; Start   taking on: May 13, 2025     CHANGE how you take these medications     cloNIDine 0.1 mg tablet; Commonly known as: Catapres; Take 1 tablet (0.1   mg) by mouth 2 times a day.; What changed: medication strength, how much   to take (5/14/2025  9:38 AM)  Is the patient taking all medications as directed (includes completed medication regime)?: -- (Pt aware of changes) (5/14/2025  9:38 AM)  Care Management Interventions: Provided patient education (5/14/2025  9:38 AM)  Medication Comments: PAUSE taking these medications     furosemide 20 mg tablet; Wait to take this until your doctor or other   care provider tells you to start again.; Commonly known as: Lasix   losartan 100 mg tablet; Wait to take this until your doctor or other   care provider tells you to start again.; Commonly known as: Cozaar   olmesartan 40 mg tablet; Wait to take this until your doctor or other   care provider tells you to start again.; Commonly known as: BENIcar (5/14/2025  9:38 AM)    Appointments  Does the patient have a primary care provider?: Yes (5/14/2025  9:38 AM)  Care Management Interventions: Verified appointment date/time/provider (5/14/2025  9:38 AM)  Care Management Interventions: Advised to schedule with specialist (5/14/2025  9:38 AM)    Self Management  What is the home health agency?: [facility] in process (5/14/2025  9:38 AM)  What Durable Medical Equipment (DME) was ordered?: NA (5/14/2025  9:38 AM)    Patient Teaching  Does the patient have access to their discharge instructions?: Yes (5/14/2025  9:38 AM)  Care Management Interventions: Reviewed instructions with  patient (5/14/2025  9:38 AM)  What is the patient's perception of their health status since discharge?: Improving (5/14/2025  9:38 AM)  Is the patient/caregiver able to teach back the hierarchy of who to call/visit for symptoms/problems? PCP, Specialist, Home Health nurse, Urgent Care, ED, 911: Yes (5/14/2025  9:38 AM)

## 2025-05-15 ENCOUNTER — TELEPHONE (OUTPATIENT)
Dept: INPATIENT UNIT | Facility: HOSPITAL | Age: OVER 89
End: 2025-05-15
Payer: MEDICARE

## 2025-05-15 ENCOUNTER — OFFICE VISIT (OUTPATIENT)
Dept: PRIMARY CARE | Facility: CLINIC | Age: OVER 89
End: 2025-05-15
Payer: MEDICARE

## 2025-05-15 VITALS
HEART RATE: 64 BPM | SYSTOLIC BLOOD PRESSURE: 108 MMHG | DIASTOLIC BLOOD PRESSURE: 50 MMHG | WEIGHT: 158.8 LBS | HEIGHT: 61 IN | OXYGEN SATURATION: 95 % | BODY MASS INDEX: 29.98 KG/M2

## 2025-05-15 DIAGNOSIS — J98.4 PULMONARY LESION: ICD-10-CM

## 2025-05-15 DIAGNOSIS — I10 PRIMARY HYPERTENSION: ICD-10-CM

## 2025-05-15 DIAGNOSIS — K59.00 CONSTIPATION, UNSPECIFIED CONSTIPATION TYPE: ICD-10-CM

## 2025-05-15 DIAGNOSIS — N18.32 STAGE 3B CHRONIC KIDNEY DISEASE (MULTI): ICD-10-CM

## 2025-05-15 DIAGNOSIS — Z97.8 FOLEY CATHETER IN PLACE: ICD-10-CM

## 2025-05-15 DIAGNOSIS — R91.1 PULMONARY NODULE: ICD-10-CM

## 2025-05-15 DIAGNOSIS — R53.1 WEAKNESS: ICD-10-CM

## 2025-05-15 DIAGNOSIS — R33.9 URINARY RETENTION: Primary | ICD-10-CM

## 2025-05-15 LAB
ANION GAP SERPL CALCULATED.4IONS-SCNC: 12 MMOL/L (CALC) (ref 7–17)
BUN SERPL-MCNC: 28 MG/DL (ref 7–25)
BUN/CREAT SERPL: 24 (CALC) (ref 6–22)
CALCIUM SERPL-MCNC: 8.6 MG/DL (ref 8.6–10.4)
CHLORIDE SERPL-SCNC: 103 MMOL/L (ref 98–110)
CO2 SERPL-SCNC: 23 MMOL/L (ref 20–32)
CREAT SERPL-MCNC: 1.16 MG/DL (ref 0.6–0.95)
EGFRCR SERPLBLD CKD-EPI 2021: 43 ML/MIN/1.73M2
GLUCOSE SERPL-MCNC: 183 MG/DL (ref 65–99)
POTASSIUM SERPL-SCNC: 4.3 MMOL/L (ref 3.5–5.3)
SODIUM SERPL-SCNC: 138 MMOL/L (ref 135–146)

## 2025-05-15 PROCEDURE — 3078F DIAST BP <80 MM HG: CPT | Performed by: FAMILY MEDICINE

## 2025-05-15 PROCEDURE — 3074F SYST BP LT 130 MM HG: CPT | Performed by: FAMILY MEDICINE

## 2025-05-15 PROCEDURE — 1160F RVW MEDS BY RX/DR IN RCRD: CPT | Performed by: FAMILY MEDICINE

## 2025-05-15 PROCEDURE — 99496 TRANSJ CARE MGMT HIGH F2F 7D: CPT | Performed by: FAMILY MEDICINE

## 2025-05-15 PROCEDURE — 1111F DSCHRG MED/CURRENT MED MERGE: CPT | Performed by: FAMILY MEDICINE

## 2025-05-15 PROCEDURE — 1159F MED LIST DOCD IN RCRD: CPT | Performed by: FAMILY MEDICINE

## 2025-05-15 PROCEDURE — 1126F AMNT PAIN NOTED NONE PRSNT: CPT | Performed by: FAMILY MEDICINE

## 2025-05-15 RX ORDER — DOCUSATE SODIUM 100 MG/1
100 CAPSULE, LIQUID FILLED ORAL 2 TIMES DAILY PRN
Qty: 60 CAPSULE | Refills: 0 | Status: SHIPPED | OUTPATIENT
Start: 2025-05-15

## 2025-05-15 ASSESSMENT — PAIN SCALES - GENERAL: PAINLEVEL_OUTOF10: 0-NO PAIN

## 2025-05-16 ENCOUNTER — PATIENT OUTREACH (OUTPATIENT)
Dept: PRIMARY CARE | Facility: CLINIC | Age: OVER 89
End: 2025-05-16
Payer: MEDICARE

## 2025-05-16 ENCOUNTER — HOME CARE VISIT (OUTPATIENT)
Dept: HOME HEALTH SERVICES | Facility: HOME HEALTH | Age: OVER 89
End: 2025-05-16
Payer: MEDICARE

## 2025-05-16 VITALS
BODY MASS INDEX: 29.27 KG/M2 | OXYGEN SATURATION: 100 % | SYSTOLIC BLOOD PRESSURE: 124 MMHG | WEIGHT: 155 LBS | DIASTOLIC BLOOD PRESSURE: 60 MMHG | HEART RATE: 60 BPM | HEIGHT: 61 IN | RESPIRATION RATE: 16 BRPM | TEMPERATURE: 97.2 F

## 2025-05-16 PROCEDURE — G0299 HHS/HOSPICE OF RN EA 15 MIN: HCPCS | Mod: HHH

## 2025-05-16 PROCEDURE — 169592 NO-PAY CLAIM PROCEDURE

## 2025-05-16 SDOH — ECONOMIC STABILITY: FOOD INSECURITY: MEALS PER DAY: 3

## 2025-05-16 ASSESSMENT — ACTIVITIES OF DAILY LIVING (ADL)
SHOPPING ASSESSED: 1
BATHING ASSESSED: 1
GROOMING_CURRENT_FUNCTION: STAND BY ASSIST
CURRENT_FUNCTION: STAND BY ASSIST
ORAL_CARE_ASSESSED: 1
TOILETING: STAND BY ASSIST
SHOPPING: DEPENDENT
DRESSING_LB_CURRENT_FUNCTION: STAND BY ASSIST
TOILETING: 1
LAUNDRY: DEPENDENT
TRANSPORTATION: DEPENDENT
USING THE TELPHONE: INDEPENDENT
TRANSPORTATION ASSESSED: 1
ENTERING_EXITING_HOME: MAXIMUM ASSIST
HOUSEKEEPING ASSESSED: 1
FEEDING: STAND BY ASSIST
FEEDING ASSESSED: 1
TELEPHONE USE ASSESSED: 1
PREPARING MEALS: NEEDS ASSISTANCE
AMBULATION ASSISTANCE: STAND BY ASSIST
LAUNDRY ASSESSED: 1
AMBULATION ASSISTANCE: 1
PHYSICAL TRANSFERS ASSESSED: 1
ORAL_CARE_CURRENT_FUNCTION: INDEPENDENT
BATHING_CURRENT_FUNCTION: STAND BY ASSIST
LIGHT HOUSEKEEPING: DEPENDENT
OASIS_M1830: 03
GROOMING ASSESSED: 1
DRESSING_UB_CURRENT_FUNCTION: STAND BY ASSIST

## 2025-05-16 ASSESSMENT — ENCOUNTER SYMPTOMS
APPETITE LEVEL: FAIR
DIZZINESS: 1
PERSON REPORTING PAIN: PATIENT
OCCASIONAL FEELINGS OF UNSTEADINESS: 1
HYPERTENSION: 1
MUSCLE WEAKNESS: 1
STOOL FREQUENCY: LESS THAN DAILY
HOARSE VOICE: 1
SHORTNESS OF BREATH: 1
BLURRED VISION: 1
DYSPNEA ACTIVITY LEVEL: AFTER AMBULATING MORE THAN 20 FT
LAST BOWEL MOVEMENT: 67340
DENIES PAIN: 1
COUGH: 1

## 2025-05-16 NOTE — Clinical Note
Patient seen for SOC today.  SHe is S/P Bronchitis.  Patient experienced urinary retention and currently has a mendoza catheter in place.  Urine is clear yellow.  Catheter care discussed.  To see urologist on Tuesday.  Patient denies SOB, she is currently on steroids.  She does have end expiratory wheezes in the left upper lobe.  Patient has an occassional cough, no mucus brought up.

## 2025-05-16 NOTE — PROGRESS NOTES
Confirmation of at least 2 patient identifiers.    Completed telephonic follow-up with patient after recent visit with PCP   Spoke to patient during outreach call.    Patient reports feeling: Improved    Patient has questions or concerns about medications: No    Have all prescribed medications been filled? Yes    Patient has necessary resources to manage their care? Yes    Patient has questions or concerns? No    Next care management follow-up approximately within one month.  Care  information provided to patient.    Patient has F/U Urology 5/20 states after she has mendoza cath out and stabilizes will zora with the following cards pulm, neph         Encouraged to call providers for any questions concerns or change in condition

## 2025-05-17 LAB
ATRIAL RATE: 65 BPM
P AXIS: -73 DEGREES
P OFFSET: 190 MS
P ONSET: 133 MS
PR INTERVAL: 156 MS
Q ONSET: 211 MS
QRS COUNT: 10 BEATS
QRS DURATION: 78 MS
QT INTERVAL: 410 MS
QTC CALCULATION(BAZETT): 426 MS
QTC FREDERICIA: 420 MS
R AXIS: -32 DEGREES
T AXIS: 43 DEGREES
T OFFSET: 416 MS
VENTRICULAR RATE: 65 BPM

## 2025-05-18 ENCOUNTER — HOME CARE VISIT (OUTPATIENT)
Dept: HOME HEALTH SERVICES | Facility: HOME HEALTH | Age: OVER 89
End: 2025-05-18
Payer: MEDICARE

## 2025-05-18 PROCEDURE — G0151 HHCP-SERV OF PT,EA 15 MIN: HCPCS | Mod: HHH

## 2025-05-18 ASSESSMENT — ENCOUNTER SYMPTOMS
DENIES PAIN: 1
CONFUSION: 0
SHORTNESS OF BREATH: 0
DIZZINESS: 0
WEAKNESS: 0
ARTHRALGIAS: 1
FREQUENCY: 0
FEVER: 0
FATIGUE: 1
PERSON REPORTING PAIN: PATIENT
VOMITING: 0
CHILLS: 0
CONSTIPATION: 1
MYALGIAS: 1
PALPITATIONS: 0
NERVOUS/ANXIOUS: 0
NAUSEA: 0
HEMATURIA: 0
WHEEZING: 1

## 2025-05-18 NOTE — HOME HEALTH
Hospitalized at [facility] 5 3 25 to 5 13 25 for bronchitis.  I was in the hospital wtih bronchitis.  I am not coughing nearly as much.  Catheter placed during hospital stay.    Was driving a car, no walker and no catheter but did use a straight cane.    Walking with rollator walker.   Not ready to move to a cane.    Bedroom, bed, bathroom, garage, walking 150 feet with rollator.  Supervision of one.   Backed into bathroom over a loose rug.      Written exercises of  Seated  1.  Ankle pumps  2.  Full arc quads  and introduced written exercises of standing  3.  Hip flexion  4.  Hip extension  5.  Hip abduction  6.  Knee flexion  7.  Toe raises        for up to to 2 minutes each.  Patient timed performing all of these during my visit today.

## 2025-05-19 ENCOUNTER — TELEPHONE (OUTPATIENT)
Dept: PRIMARY CARE | Facility: CLINIC | Age: OVER 89
End: 2025-05-19
Payer: MEDICARE

## 2025-05-19 ENCOUNTER — APPOINTMENT (OUTPATIENT)
Dept: PHYSICAL THERAPY | Facility: CLINIC | Age: OVER 89
End: 2025-05-19
Payer: MEDICARE

## 2025-05-19 DIAGNOSIS — I10 PRIMARY HYPERTENSION: Primary | ICD-10-CM

## 2025-05-19 RX ORDER — LOSARTAN POTASSIUM 25 MG/1
25 TABLET ORAL DAILY
Qty: 100 TABLET | Refills: 3 | Status: SHIPPED | OUTPATIENT
Start: 2025-05-19 | End: 2026-06-23

## 2025-05-19 NOTE — TELEPHONE ENCOUNTER
I recommend that she start on losartan 25 mg - take 1 tablet a day/ What pharmacy does she want me to send the new script to?  
Patient notified. [facility] in [place].  
Pt called stating her blood pressure is high  Reading : 159/66  Pt asking pcp what she should do. Please advise.   
No

## 2025-05-19 NOTE — PROGRESS NOTES
Subjective   Patient ID: Fartun Carey is a 89 y.o. female who presents for Hospital Follow-up.    Admitted for acute onset of SOB - then developed UTI and issues with urinary retention, discharged with a mendoza, she is to follow up with urologist asap.  Also found to have lung lesion  - is to follow up with pulmonary  In the hospital from 5/3-5/12/25. Is to have home PT and skilled nursing, has a private aide that helps her    Hospital course  Fartun Carey is a 89 y.o. female scented from home due to cough and shortness of breath since yesterday.  She denies any sore throat runny nose or headache.  No fever or chills.  Patient denies any head history with vertigo she is older neurology and feels better with the therapy.  She uses a walker no fall.  Denies any recent illness.  Complains of discomfort on her chest from cough.  She denies any nausea, vomiting, diarrhea, constipation, or abdominal pain.  Has a small bowel movement at the ED.  Complaint having difficulty urinating but no no dysuria.         During hospital course pt was seen by pulmonology, urology, nephrology and infectious disease.  Patient had a urine culture that grew ESBL and she was treated with Invanz for 3-day course.  She will be discharged home on no antibiotics.  Patient had a voiding trial prior to discharge which she failed.  She will follow-up with urology on an outpatient basis.  Patient was seen by pulmonology for abnormal CAT scan.  She will follow-up with Dr. Hoover on an outpatient basis for further PET/CT.  Will also follow-up with nephrology on an outpatient basis in 2 weeks.  She will have a renal function panel in 1 week prior to her appointment.  The be of his been discussed with patient.  Patient was seen by PT, OT and she is agreeable to home health care.  Patient will be discharged in stable condition.  I have also updated her daughter Olive who is in agreement              Review of Systems   Constitutional:  Positive for  "fatigue. Negative for chills and fever.   HENT:  Negative for ear pain and postnasal drip.    Respiratory:  Positive for wheezing. Negative for shortness of breath.    Cardiovascular:  Negative for chest pain, palpitations and leg swelling.   Gastrointestinal:  Positive for constipation. Negative for nausea and vomiting.   Genitourinary:  Negative for frequency and hematuria.   Musculoskeletal:  Positive for arthralgias and myalgias.   Skin:  Negative for rash.   Neurological:  Negative for dizziness and weakness.   Psychiatric/Behavioral:  Negative for confusion. The patient is not nervous/anxious.        Objective   /50   Pulse 64   Ht 1.549 m (5' 1\")   Wt 72 kg (158 lb 12.8 oz)   SpO2 95%   BMI 30.00 kg/m²     Physical Exam  Vitals reviewed.   Constitutional:       General: She is not in acute distress.     Appearance: Normal appearance. She is not ill-appearing.   Cardiovascular:      Rate and Rhythm: Normal rate and regular rhythm.      Heart sounds: Normal heart sounds. No murmur heard.  Pulmonary:      Breath sounds: Wheezing present. No rhonchi.   Abdominal:      Tenderness: There is no abdominal tenderness.      Comments: Alvarado in place - she is in a wheelchair   Musculoskeletal:      Right lower leg: No edema.      Left lower leg: No edema.   Skin:     Findings: No rash.   Neurological:      General: No focal deficit present.      Mental Status: She is alert and oriented to person, place, and time.   Psychiatric:         Mood and Affect: Mood normal.         Behavior: Behavior normal.         Assessment/Plan   Problem List Items Addressed This Visit           ICD-10-CM    Pulmonary nodule R91.1    Hypertension I10    Stage 3b chronic kidney disease (Multi) N18.32    Weakness R53.1     Other Visit Diagnoses         Codes      Urinary retention    -  Primary R33.9    followup with urologist     Relevant Orders    Referral to Urology      Pulmonary lesion     J98.4    follow up with pulmonary for " concerning lung lesion - CT chest 3 months     Relevant Orders    CT chest wo IV contrast      Constipation, unspecified constipation type     K59.00    recommend to continue colace and increased fluid intake     Relevant Medications    docusate sodium (Colace) 100 mg capsule      Alvarado catheter in place     Z97.8    follow up with urologist     Relevant Orders    Referral to Urology

## 2025-05-20 ENCOUNTER — APPOINTMENT (OUTPATIENT)
Dept: UROLOGY | Facility: CLINIC | Age: OVER 89
End: 2025-05-20
Payer: MEDICARE

## 2025-05-20 DIAGNOSIS — E11.42 TYPE 2 DIABETES MELLITUS WITH DIABETIC POLYNEUROPATHY, WITHOUT LONG-TERM CURRENT USE OF INSULIN: ICD-10-CM

## 2025-05-20 DIAGNOSIS — J96.01 ACUTE RESPIRATORY FAILURE WITH HYPOXIA: ICD-10-CM

## 2025-05-20 DIAGNOSIS — N18.32 CHRONIC KIDNEY DISEASE, STAGE 3B (MULTI): ICD-10-CM

## 2025-05-20 DIAGNOSIS — E11.9 TYPE 2 DIABETES MELLITUS WITHOUT COMPLICATION, WITHOUT LONG-TERM CURRENT USE OF INSULIN: ICD-10-CM

## 2025-05-20 DIAGNOSIS — R33.8 ACUTE URINARY RETENTION: Primary | ICD-10-CM

## 2025-05-20 DIAGNOSIS — N18.31 CHRONIC KIDNEY DISEASE, STAGE 3A (MULTI): ICD-10-CM

## 2025-05-20 DIAGNOSIS — E11.22 TYPE 2 DIABETES MELLITUS WITH CHRONIC KIDNEY DISEASE, WITHOUT LONG-TERM CURRENT USE OF INSULIN, UNSPECIFIED CKD STAGE (MULTI): ICD-10-CM

## 2025-05-20 PROCEDURE — 99212 OFFICE O/P EST SF 10 MIN: CPT | Performed by: SURGERY

## 2025-05-20 PROCEDURE — 1126F AMNT PAIN NOTED NONE PRSNT: CPT | Performed by: SURGERY

## 2025-05-20 PROCEDURE — 1160F RVW MEDS BY RX/DR IN RCRD: CPT | Performed by: SURGERY

## 2025-05-20 PROCEDURE — 1111F DSCHRG MED/CURRENT MED MERGE: CPT | Performed by: SURGERY

## 2025-05-20 PROCEDURE — 1159F MED LIST DOCD IN RCRD: CPT | Performed by: SURGERY

## 2025-05-20 PROCEDURE — 1036F TOBACCO NON-USER: CPT | Performed by: SURGERY

## 2025-05-20 ASSESSMENT — PAIN SCALES - GENERAL: PAINLEVEL_OUTOF10: 0-NO PAIN

## 2025-05-20 NOTE — PROGRESS NOTES
Subjective   Patient ID: Fartun Carey is a 89 y.o. female who presents for Establish Care and cath removal .      HPI  She was originally seen in the hospital.  She had acute urinary retention.  She was hospitalized with an exacerbation of bronchitis.  Since then she returned home.  Her respiratory symptoms have improved.  She did not have any real voiding issues before being hospitalized.  She is on a bowel regimen.                Objective   Physical Exam  Well nourished  Abdomen soft, ND, NT  Alvarado with clear urine                Assessment/Plan   Problem List Items Addressed This Visit    None  Visit Diagnoses         Codes      Acute urinary retention    -  Primary R33.8             We will remove her catheter today for a voiding trial.  She will monitor her voiding.  I did  her on timed voiding and double voiding.          Berenice Catherine MD 05/20/25 1:34 PM

## 2025-05-21 ENCOUNTER — HOME CARE VISIT (OUTPATIENT)
Dept: HOME HEALTH SERVICES | Facility: HOME HEALTH | Age: OVER 89
End: 2025-05-21
Payer: MEDICARE

## 2025-05-21 ENCOUNTER — APPOINTMENT (OUTPATIENT)
Dept: PHYSICAL THERAPY | Facility: CLINIC | Age: OVER 89
End: 2025-05-21
Payer: MEDICARE

## 2025-05-21 VITALS
OXYGEN SATURATION: 96 % | HEART RATE: 76 BPM | DIASTOLIC BLOOD PRESSURE: 42 MMHG | SYSTOLIC BLOOD PRESSURE: 100 MMHG | RESPIRATION RATE: 16 BRPM | TEMPERATURE: 97.3 F

## 2025-05-21 VITALS — SYSTOLIC BLOOD PRESSURE: 168 MMHG | HEART RATE: 68 BPM | DIASTOLIC BLOOD PRESSURE: 56 MMHG | OXYGEN SATURATION: 97 %

## 2025-05-21 PROCEDURE — G0299 HHS/HOSPICE OF RN EA 15 MIN: HCPCS | Mod: HHH

## 2025-05-21 PROCEDURE — G0152 HHCP-SERV OF OT,EA 15 MIN: HCPCS | Mod: HHH

## 2025-05-21 ASSESSMENT — ACTIVITIES OF DAILY LIVING (ADL)
AMBULATION ASSISTANCE: 1
GROOMING_CURRENT_FUNCTION: INDEPENDENT
TOILETING: INDEPENDENT
PREPARING MEALS: NEEDS ASSISTANCE
DRESSING_UB_CURRENT_FUNCTION: INDEPENDENT
BATHING_CURRENT_FUNCTION: INDEPENDENT
AMBULATION ASSISTANCE: INDEPENDENT
GROOMING ASSESSED: 1
BATHING ASSESSED: 1
TOILETING: 1
DRESSING_LB_CURRENT_FUNCTION: INDEPENDENT

## 2025-05-21 ASSESSMENT — ENCOUNTER SYMPTOMS
DENIES PAIN: 1
BOWEL PATTERN NORMAL: 1
CHANGE IN APPETITE: DECREASED
PERSON REPORTING PAIN: PATIENT
BLURRED VISION: 1
APPETITE LEVEL: FAIR

## 2025-05-23 ENCOUNTER — HOME CARE VISIT (OUTPATIENT)
Dept: HOME HEALTH SERVICES | Facility: HOME HEALTH | Age: OVER 89
End: 2025-05-23
Payer: MEDICARE

## 2025-05-23 PROCEDURE — G0151 HHCP-SERV OF PT,EA 15 MIN: HCPCS | Mod: HHH

## 2025-05-23 ASSESSMENT — ENCOUNTER SYMPTOMS
DENIES PAIN: 1
PERSON REPORTING PAIN: PATIENT
DENIES PAIN: 1
PERSON REPORTING PAIN: PATIENT

## 2025-05-23 ASSESSMENT — ACTIVITIES OF DAILY LIVING (ADL)
AMBULATION ASSISTANCE: MODERATE ASSIST
AMBULATION ASSISTANCE: 1

## 2025-05-23 NOTE — HOME HEALTH
I am feeling better in every way.  5 20 25 it came out.  I have been going since then.  I want to get back to using my cane.  I am currently using my 4 wheeled rollator walker.        21 seconds TUG    Hospitalized at [facility] 5 3 25 to 5 13 25 for bronchitis. I was in the hospital wtih bronchitis. I am not coughing nearly as much. Catheter placed during hospital stay. Was driving a car, no walker and no catheter but did use a straight cane.     Walking with rollator walker. Not ready to move to a cane. Bedroom, bed, bathroom, garage, walking 150 feet with rollator. Supervision of one. Backed into bathroom over a loose rug.     Attempted to walk with straight cane.  She responded by taking small based quad cane and walked about 20 feet but was tentative and much slower, not ready to use the cane full time.    Written exercises of   Seated   1. Ankle pumps   2. Full arc quads   and introduced written exercises of   standing   3. Hip flexion   4. Hip extension   5. Hip abduction   6. Knee flexion   7. Toe raises for up to to 2 minutes each.          Patient timed performing all of these during my visit today.

## 2025-05-27 ENCOUNTER — TELEPHONE (OUTPATIENT)
Dept: PRIMARY CARE | Facility: CLINIC | Age: OVER 89
End: 2025-05-27
Payer: MEDICARE

## 2025-05-27 NOTE — TELEPHONE ENCOUNTER
I am okay with these readings for now... How is she feeling at this time? If she wants - we could also consider increasing her losartan to 25 mg 2 x a day.

## 2025-05-28 ENCOUNTER — HOME CARE VISIT (OUTPATIENT)
Dept: HOME HEALTH SERVICES | Facility: HOME HEALTH | Age: OVER 89
End: 2025-05-28
Payer: MEDICARE

## 2025-05-28 VITALS
DIASTOLIC BLOOD PRESSURE: 60 MMHG | SYSTOLIC BLOOD PRESSURE: 124 MMHG | RESPIRATION RATE: 16 BRPM | OXYGEN SATURATION: 92 % | HEIGHT: 61 IN | BODY MASS INDEX: 29.27 KG/M2 | WEIGHT: 155 LBS | TEMPERATURE: 96.7 F | HEART RATE: 76 BPM

## 2025-05-28 DIAGNOSIS — I10 PRIMARY HYPERTENSION: Primary | ICD-10-CM

## 2025-05-28 PROCEDURE — G0299 HHS/HOSPICE OF RN EA 15 MIN: HCPCS | Mod: HHH

## 2025-05-28 RX ORDER — LOSARTAN POTASSIUM 25 MG/1
25 TABLET ORAL 2 TIMES DAILY
Qty: 180 TABLET | Refills: 3 | Status: ON HOLD | OUTPATIENT
Start: 2025-05-28 | End: 2026-05-28

## 2025-05-28 ASSESSMENT — ACTIVITIES OF DAILY LIVING (ADL)
SHOPPING ASSESSED: 1
LIGHT HOUSEKEEPING: NEEDS ASSISTANCE
TRANSPORTATION ASSESSED: 1
TOILETING: 1
PREPARING MEALS: INDEPENDENT
LAUNDRY: INDEPENDENT
TRANSPORTATION: INDEPENDENT
BATHING_CURRENT_FUNCTION: INDEPENDENT
GROOMING ASSESSED: 1
BATHING ASSESSED: 1
LAUNDRY ASSESSED: 1
AMBULATION ASSISTANCE: 1
FEEDING ASSESSED: 1
AMBULATION ASSISTANCE: STAND BY ASSIST
HOUSEKEEPING ASSESSED: 1
TOILETING: INDEPENDENT
GROOMING_CURRENT_FUNCTION: INDEPENDENT
DRESSING_UB_CURRENT_FUNCTION: INDEPENDENT
ORAL_CARE_CURRENT_FUNCTION: INDEPENDENT
USING THE TELPHONE: INDEPENDENT
CURRENT_FUNCTION: INDEPENDENT
DRESSING_LB_CURRENT_FUNCTION: INDEPENDENT
TELEPHONE USE ASSESSED: 1
SHOPPING: NEEDS ASSISTANCE
ORAL_CARE_ASSESSED: 1
FEEDING: INDEPENDENT
PHYSICAL TRANSFERS ASSESSED: 1

## 2025-05-28 ASSESSMENT — ENCOUNTER SYMPTOMS
OCCASIONAL FEELINGS OF UNSTEADINESS: 0
PERSON REPORTING PAIN: PATIENT
CHANGE IN APPETITE: INCREASED
STOOL FREQUENCY: DAILY
MUSCLE WEAKNESS: 1
APPETITE LEVEL: GOOD
DENIES PAIN: 1

## 2025-05-28 NOTE — TELEPHONE ENCOUNTER
Spoke with patient and she stated she is feeling like a whole new person. She is going to start taking 25mg of the losartan 2 times a day. She will discuss it further with you when she comes in on 6/10/2025

## 2025-05-30 ENCOUNTER — HOME CARE VISIT (OUTPATIENT)
Dept: HOME HEALTH SERVICES | Facility: HOME HEALTH | Age: OVER 89
End: 2025-05-30
Payer: MEDICARE

## 2025-05-30 ASSESSMENT — ENCOUNTER SYMPTOMS
PERSON REPORTING PAIN: PATIENT
DENIES PAIN: 1

## 2025-05-30 ASSESSMENT — ACTIVITIES OF DAILY LIVING (ADL)
OASIS_M1830: 00
HOME_HEALTH_OASIS: 00

## 2025-05-31 ENCOUNTER — APPOINTMENT (OUTPATIENT)
Dept: RADIOLOGY | Facility: HOSPITAL | Age: OVER 89
End: 2025-05-31
Payer: MEDICARE

## 2025-05-31 ENCOUNTER — HOSPITAL ENCOUNTER (INPATIENT)
Facility: HOSPITAL | Age: OVER 89
End: 2025-05-31
Admitting: INTERNAL MEDICINE
Payer: MEDICARE

## 2025-05-31 ENCOUNTER — APPOINTMENT (OUTPATIENT)
Dept: CARDIOLOGY | Facility: HOSPITAL | Age: OVER 89
End: 2025-05-31
Payer: MEDICARE

## 2025-05-31 DIAGNOSIS — N30.00 ACUTE CYSTITIS WITHOUT HEMATURIA: ICD-10-CM

## 2025-05-31 DIAGNOSIS — R78.81 BACTEREMIA: ICD-10-CM

## 2025-05-31 DIAGNOSIS — R53.1 GENERALIZED WEAKNESS: Primary | ICD-10-CM

## 2025-05-31 DIAGNOSIS — A41.51 SEPSIS DUE TO ESCHERICHIA COLI WITHOUT ACUTE ORGAN DYSFUNCTION (MULTI): ICD-10-CM

## 2025-05-31 DIAGNOSIS — R06.02 SHORTNESS OF BREATH: ICD-10-CM

## 2025-05-31 DIAGNOSIS — Z16.12 ESBL (EXTENDED SPECTRUM BETA-LACTAMASE) PRODUCING BACTERIA INFECTION: ICD-10-CM

## 2025-05-31 DIAGNOSIS — E83.42 HYPOMAGNESEMIA: ICD-10-CM

## 2025-05-31 DIAGNOSIS — I48.11 LONGSTANDING PERSISTENT ATRIAL FIBRILLATION (MULTI): ICD-10-CM

## 2025-05-31 DIAGNOSIS — I48.0 PAROXYSMAL ATRIAL FIBRILLATION (MULTI): ICD-10-CM

## 2025-05-31 DIAGNOSIS — A41.9 SEPSIS, DUE TO UNSPECIFIED ORGANISM, UNSPECIFIED WHETHER ACUTE ORGAN DYSFUNCTION PRESENT (MULTI): ICD-10-CM

## 2025-05-31 DIAGNOSIS — N39.0 CHRONIC UTI: ICD-10-CM

## 2025-05-31 DIAGNOSIS — A49.9 ESBL (EXTENDED SPECTRUM BETA-LACTAMASE) PRODUCING BACTERIA INFECTION: ICD-10-CM

## 2025-05-31 DIAGNOSIS — R79.89 ELEVATED TROPONIN: ICD-10-CM

## 2025-05-31 LAB
ALBUMIN SERPL BCP-MCNC: 3.3 G/DL (ref 3.4–5)
ALP SERPL-CCNC: 165 U/L (ref 33–136)
ALT SERPL W P-5'-P-CCNC: 11 U/L (ref 7–45)
ANION GAP BLDV CALCULATED.4IONS-SCNC: 9 MMOL/L (ref 10–25)
ANION GAP SERPL CALCULATED.3IONS-SCNC: 15 MMOL/L (ref 10–20)
AST SERPL W P-5'-P-CCNC: 14 U/L (ref 9–39)
BASE EXCESS BLDV CALC-SCNC: -0.2 MMOL/L (ref -2–3)
BASOPHILS # BLD MANUAL: 0 X10*3/UL (ref 0–0.1)
BASOPHILS NFR BLD MANUAL: 0 %
BILIRUB SERPL-MCNC: 0.5 MG/DL (ref 0–1.2)
BNP SERPL-MCNC: 362 PG/ML (ref 0–99)
BODY TEMPERATURE: 37 DEGREES CELSIUS
BUN SERPL-MCNC: 27 MG/DL (ref 6–23)
CA-I BLDV-SCNC: 1.13 MMOL/L (ref 1.1–1.33)
CALCIUM SERPL-MCNC: 8.2 MG/DL (ref 8.6–10.3)
CARDIAC TROPONIN I PNL SERPL HS: 77 NG/L (ref 0–13)
CHLORIDE BLDV-SCNC: 104 MMOL/L (ref 98–107)
CHLORIDE SERPL-SCNC: 102 MMOL/L (ref 98–107)
CO2 SERPL-SCNC: 21 MMOL/L (ref 21–32)
CREAT SERPL-MCNC: 1.47 MG/DL (ref 0.5–1.05)
EGFRCR SERPLBLD CKD-EPI 2021: 33 ML/MIN/1.73M*2
EOSINOPHIL # BLD MANUAL: 0 X10*3/UL (ref 0–0.4)
EOSINOPHIL NFR BLD MANUAL: 0 %
ERYTHROCYTE [DISTWIDTH] IN BLOOD BY AUTOMATED COUNT: 15.2 % (ref 11.5–14.5)
FLUAV RNA RESP QL NAA+PROBE: NOT DETECTED
FLUBV RNA RESP QL NAA+PROBE: NOT DETECTED
GLUCOSE BLDV-MCNC: 94 MG/DL (ref 74–99)
GLUCOSE SERPL-MCNC: 100 MG/DL (ref 74–99)
HCO3 BLDV-SCNC: 23.9 MMOL/L (ref 22–26)
HCT VFR BLD AUTO: 33.2 % (ref 36–46)
HCT VFR BLD EST: 30 % (ref 36–46)
HGB BLD-MCNC: 10.4 G/DL (ref 12–16)
HGB BLDV-MCNC: 9.9 G/DL (ref 12–16)
IMM GRANULOCYTES # BLD AUTO: 0.03 X10*3/UL (ref 0–0.5)
IMM GRANULOCYTES NFR BLD AUTO: 0.7 % (ref 0–0.9)
INHALED O2 CONCENTRATION: 21 %
LACTATE BLDV-SCNC: 1.9 MMOL/L (ref 0.4–2)
LACTATE BLDV-SCNC: 2.5 MMOL/L (ref 0.4–2)
LACTATE SERPL-SCNC: 2.5 MMOL/L (ref 0.4–2)
LYMPHOCYTES # BLD MANUAL: 0.41 X10*3/UL (ref 0.8–3)
LYMPHOCYTES NFR BLD MANUAL: 9 %
MAGNESIUM SERPL-MCNC: 1.15 MG/DL (ref 1.6–2.4)
MCH RBC QN AUTO: 30.4 PG (ref 26–34)
MCHC RBC AUTO-ENTMCNC: 31.3 G/DL (ref 32–36)
MCV RBC AUTO: 97 FL (ref 80–100)
MONOCYTES # BLD MANUAL: 0 X10*3/UL (ref 0.05–0.8)
MONOCYTES NFR BLD MANUAL: 0 %
NEUTROPHILS # BLD MANUAL: 4.1 X10*3/UL (ref 1.6–5.5)
NEUTS BAND # BLD MANUAL: 0.86 X10*3/UL (ref 0–0.5)
NEUTS BAND NFR BLD MANUAL: 19 %
NEUTS SEG # BLD MANUAL: 3.24 X10*3/UL (ref 1.6–5)
NEUTS SEG NFR BLD MANUAL: 72 %
NRBC BLD-RTO: 0.4 /100 WBCS (ref 0–0)
OXYHGB MFR BLDV: 56.6 % (ref 45–75)
PCO2 BLDV: 36 MM HG (ref 41–51)
PH BLDV: 7.43 PH (ref 7.33–7.43)
PHOSPHATE SERPL-MCNC: 2 MG/DL (ref 2.5–4.9)
PLATELET # BLD AUTO: 111 X10*3/UL (ref 150–450)
PO2 BLDV: 31 MM HG (ref 35–45)
POLYCHROMASIA BLD QL SMEAR: ABNORMAL
POTASSIUM BLDV-SCNC: 4.1 MMOL/L (ref 3.5–5.3)
POTASSIUM SERPL-SCNC: 3.8 MMOL/L (ref 3.5–5.3)
PROT SERPL-MCNC: 6.1 G/DL (ref 6.4–8.2)
RBC # BLD AUTO: 3.42 X10*6/UL (ref 4–5.2)
RBC MORPH BLD: ABNORMAL
RSV RNA RESP QL NAA+PROBE: NOT DETECTED
SAO2 % BLDV: 57 % (ref 45–75)
SARS-COV-2 RNA RESP QL NAA+PROBE: NOT DETECTED
SODIUM BLDV-SCNC: 133 MMOL/L (ref 136–145)
SODIUM SERPL-SCNC: 134 MMOL/L (ref 136–145)
TOTAL CELLS COUNTED BLD: 100
TSH SERPL-ACNC: 3.55 MIU/L (ref 0.44–3.98)
WBC # BLD AUTO: 4.5 X10*3/UL (ref 4.4–11.3)

## 2025-05-31 PROCEDURE — 96361 HYDRATE IV INFUSION ADD-ON: CPT

## 2025-05-31 PROCEDURE — 36415 COLL VENOUS BLD VENIPUNCTURE: CPT

## 2025-05-31 PROCEDURE — 83735 ASSAY OF MAGNESIUM: CPT

## 2025-05-31 PROCEDURE — 85027 COMPLETE CBC AUTOMATED: CPT

## 2025-05-31 PROCEDURE — 83880 ASSAY OF NATRIURETIC PEPTIDE: CPT

## 2025-05-31 PROCEDURE — 87077 CULTURE AEROBIC IDENTIFY: CPT | Mod: TRILAB

## 2025-05-31 PROCEDURE — 84132 ASSAY OF SERUM POTASSIUM: CPT

## 2025-05-31 PROCEDURE — 93005 ELECTROCARDIOGRAM TRACING: CPT

## 2025-05-31 PROCEDURE — 84484 ASSAY OF TROPONIN QUANT: CPT

## 2025-05-31 PROCEDURE — 99291 CRITICAL CARE FIRST HOUR: CPT

## 2025-05-31 PROCEDURE — 83605 ASSAY OF LACTIC ACID: CPT

## 2025-05-31 PROCEDURE — 71275 CT ANGIOGRAPHY CHEST: CPT | Performed by: STUDENT IN AN ORGANIZED HEALTH CARE EDUCATION/TRAINING PROGRAM

## 2025-05-31 PROCEDURE — 2550000001 HC RX 255 CONTRASTS

## 2025-05-31 PROCEDURE — 84443 ASSAY THYROID STIM HORMONE: CPT

## 2025-05-31 PROCEDURE — 87637 SARSCOV2&INF A&B&RSV AMP PRB: CPT

## 2025-05-31 PROCEDURE — 96365 THER/PROPH/DIAG IV INF INIT: CPT

## 2025-05-31 PROCEDURE — 84100 ASSAY OF PHOSPHORUS: CPT

## 2025-05-31 PROCEDURE — 2500000004 HC RX 250 GENERAL PHARMACY W/ HCPCS (ALT 636 FOR OP/ED): Mod: JZ

## 2025-05-31 PROCEDURE — 71275 CT ANGIOGRAPHY CHEST: CPT

## 2025-05-31 PROCEDURE — 85007 BL SMEAR W/DIFF WBC COUNT: CPT

## 2025-05-31 RX ORDER — MAGNESIUM SULFATE HEPTAHYDRATE 40 MG/ML
2 INJECTION, SOLUTION INTRAVENOUS ONCE
Status: COMPLETED | OUTPATIENT
Start: 2025-05-31 | End: 2025-06-01

## 2025-05-31 RX ADMIN — IOHEXOL 75 ML: 350 INJECTION, SOLUTION INTRAVENOUS at 23:31

## 2025-05-31 RX ADMIN — SODIUM CHLORIDE 500 ML: 900 INJECTION, SOLUTION INTRAVENOUS at 22:48

## 2025-05-31 RX ADMIN — MAGNESIUM SULFATE HEPTAHYDRATE 2 G: 40 INJECTION, SOLUTION INTRAVENOUS at 23:43

## 2025-05-31 ASSESSMENT — PAIN - FUNCTIONAL ASSESSMENT: PAIN_FUNCTIONAL_ASSESSMENT: 0-10

## 2025-05-31 ASSESSMENT — PAIN SCALES - GENERAL: PAINLEVEL_OUTOF10: 0 - NO PAIN

## 2025-05-31 NOTE — HOME HEALTH
Called, spoke with patient who indicated that she is not in need of future visits.  She plans to drive tomorrow for an appointment.   She went on to report that she plans to follow through with my recommendations for exercise and safety and is grateful for my involvement.  I made 2 out of 3 scheduled visits through today.

## 2025-06-01 VITALS
TEMPERATURE: 98.4 F | HEIGHT: 60 IN | RESPIRATION RATE: 16 BRPM | WEIGHT: 159 LBS | SYSTOLIC BLOOD PRESSURE: 119 MMHG | HEART RATE: 73 BPM | BODY MASS INDEX: 31.22 KG/M2 | OXYGEN SATURATION: 93 % | DIASTOLIC BLOOD PRESSURE: 50 MMHG

## 2025-06-01 PROBLEM — N18.9 ACUTE ON CHRONIC RENAL FAILURE: Status: ACTIVE | Noted: 2025-06-01

## 2025-06-01 PROBLEM — N30.00 ACUTE CYSTITIS WITHOUT HEMATURIA: Status: ACTIVE | Noted: 2025-06-01

## 2025-06-01 PROBLEM — E83.42 HYPOMAGNESEMIA: Status: ACTIVE | Noted: 2025-06-01

## 2025-06-01 PROBLEM — I48.91 A-FIB (MULTI): Status: ACTIVE | Noted: 2025-06-01

## 2025-06-01 PROBLEM — N17.9 ACUTE ON CHRONIC RENAL FAILURE: Status: ACTIVE | Noted: 2025-06-01

## 2025-06-01 PROBLEM — R53.1 GENERALIZED WEAKNESS: Status: ACTIVE | Noted: 2025-06-01

## 2025-06-01 PROBLEM — A41.51 SEPSIS DUE TO ESCHERICHIA COLI WITHOUT ACUTE ORGAN DYSFUNCTION (MULTI): Status: ACTIVE | Noted: 2025-06-01

## 2025-06-01 LAB
ANION GAP SERPL CALCULATED.3IONS-SCNC: 12 MMOL/L (ref 10–20)
APPEARANCE UR: ABNORMAL
BACTERIA BLD CULT: NORMAL
BACTERIA BLD CULT: NORMAL
BILIRUB UR STRIP.AUTO-MCNC: NEGATIVE MG/DL
BUN SERPL-MCNC: 27 MG/DL (ref 6–23)
CALCIUM SERPL-MCNC: 7.5 MG/DL (ref 8.6–10.3)
CARDIAC TROPONIN I PNL SERPL HS: 85 NG/L (ref 0–13)
CHLORIDE SERPL-SCNC: 108 MMOL/L (ref 98–107)
CO2 SERPL-SCNC: 19 MMOL/L (ref 21–32)
COLOR UR: ABNORMAL
CREAT SERPL-MCNC: 1.4 MG/DL (ref 0.5–1.05)
EGFRCR SERPLBLD CKD-EPI 2021: 35 ML/MIN/1.73M*2
GLUCOSE BLD MANUAL STRIP-MCNC: 116 MG/DL (ref 74–99)
GLUCOSE BLD MANUAL STRIP-MCNC: 156 MG/DL (ref 74–99)
GLUCOSE BLD MANUAL STRIP-MCNC: 156 MG/DL (ref 74–99)
GLUCOSE BLD MANUAL STRIP-MCNC: 160 MG/DL (ref 74–99)
GLUCOSE SERPL-MCNC: 164 MG/DL (ref 74–99)
GLUCOSE UR STRIP.AUTO-MCNC: NORMAL MG/DL
KETONES UR STRIP.AUTO-MCNC: NEGATIVE MG/DL
LACTATE SERPL-SCNC: 1.9 MMOL/L (ref 0.4–2)
LEUKOCYTE ESTERASE UR QL STRIP.AUTO: ABNORMAL
MAGNESIUM SERPL-MCNC: 1.63 MG/DL (ref 1.6–2.4)
NITRITE UR QL STRIP.AUTO: NEGATIVE
PH UR STRIP.AUTO: 5.5 [PH]
POTASSIUM SERPL-SCNC: 4.1 MMOL/L (ref 3.5–5.3)
PROT UR STRIP.AUTO-MCNC: ABNORMAL MG/DL
RBC # UR STRIP.AUTO: ABNORMAL MG/DL
RBC #/AREA URNS AUTO: ABNORMAL /HPF
SODIUM SERPL-SCNC: 135 MMOL/L (ref 136–145)
SP GR UR STRIP.AUTO: 1.03
UROBILINOGEN UR STRIP.AUTO-MCNC: NORMAL MG/DL
WBC #/AREA URNS AUTO: >50 /HPF
WBC CLUMPS #/AREA URNS AUTO: ABNORMAL /HPF

## 2025-06-01 PROCEDURE — 99223 1ST HOSP IP/OBS HIGH 75: CPT | Performed by: INTERNAL MEDICINE

## 2025-06-01 PROCEDURE — 96375 TX/PRO/DX INJ NEW DRUG ADDON: CPT

## 2025-06-01 PROCEDURE — 96361 HYDRATE IV INFUSION ADD-ON: CPT

## 2025-06-01 PROCEDURE — 80048 BASIC METABOLIC PNL TOTAL CA: CPT | Performed by: NURSE PRACTITIONER

## 2025-06-01 PROCEDURE — 2500000004 HC RX 250 GENERAL PHARMACY W/ HCPCS (ALT 636 FOR OP/ED)

## 2025-06-01 PROCEDURE — 81003 URINALYSIS AUTO W/O SCOPE: CPT

## 2025-06-01 PROCEDURE — 1200000002 HC GENERAL ROOM WITH TELEMETRY DAILY

## 2025-06-01 PROCEDURE — 99232 SBSQ HOSP IP/OBS MODERATE 35: CPT | Performed by: NURSE PRACTITIONER

## 2025-06-01 PROCEDURE — 96366 THER/PROPH/DIAG IV INF ADDON: CPT

## 2025-06-01 PROCEDURE — 2500000004 HC RX 250 GENERAL PHARMACY W/ HCPCS (ALT 636 FOR OP/ED): Performed by: INTERNAL MEDICINE

## 2025-06-01 PROCEDURE — 2500000001 HC RX 250 WO HCPCS SELF ADMINISTERED DRUGS (ALT 637 FOR MEDICARE OP): Performed by: INTERNAL MEDICINE

## 2025-06-01 PROCEDURE — 82947 ASSAY GLUCOSE BLOOD QUANT: CPT

## 2025-06-01 PROCEDURE — 36415 COLL VENOUS BLD VENIPUNCTURE: CPT | Performed by: NURSE PRACTITIONER

## 2025-06-01 PROCEDURE — 2500000002 HC RX 250 W HCPCS SELF ADMINISTERED DRUGS (ALT 637 FOR MEDICARE OP, ALT 636 FOR OP/ED): Performed by: INTERNAL MEDICINE

## 2025-06-01 PROCEDURE — 83735 ASSAY OF MAGNESIUM: CPT | Performed by: NURSE PRACTITIONER

## 2025-06-01 PROCEDURE — 87086 URINE CULTURE/COLONY COUNT: CPT | Mod: TRILAB

## 2025-06-01 PROCEDURE — 99497 ADVNCD CARE PLAN 30 MIN: CPT | Performed by: NURSE PRACTITIONER

## 2025-06-01 RX ORDER — HEPARIN SODIUM 5000 [USP'U]/ML
5000 INJECTION, SOLUTION INTRAVENOUS; SUBCUTANEOUS EVERY 8 HOURS
Status: DISCONTINUED | OUTPATIENT
Start: 2025-06-01 | End: 2025-06-05 | Stop reason: HOSPADM

## 2025-06-01 RX ORDER — ATORVASTATIN CALCIUM 80 MG/1
80 TABLET, FILM COATED ORAL NIGHTLY
Status: DISCONTINUED | OUTPATIENT
Start: 2025-06-01 | End: 2025-06-05 | Stop reason: HOSPADM

## 2025-06-01 RX ORDER — LEVOFLOXACIN 5 MG/ML
250 INJECTION, SOLUTION INTRAVENOUS ONCE
Status: COMPLETED | OUTPATIENT
Start: 2025-06-01 | End: 2025-06-01

## 2025-06-01 RX ORDER — ACETAMINOPHEN 650 MG/1
650 SUPPOSITORY RECTAL EVERY 4 HOURS PRN
Status: DISCONTINUED | OUTPATIENT
Start: 2025-06-01 | End: 2025-06-05 | Stop reason: HOSPADM

## 2025-06-01 RX ORDER — SODIUM CHLORIDE, SODIUM LACTATE, POTASSIUM CHLORIDE, CALCIUM CHLORIDE 600; 310; 30; 20 MG/100ML; MG/100ML; MG/100ML; MG/100ML
100 INJECTION, SOLUTION INTRAVENOUS CONTINUOUS
Status: ACTIVE | OUTPATIENT
Start: 2025-06-01 | End: 2025-06-02

## 2025-06-01 RX ORDER — CLONIDINE HYDROCHLORIDE 0.1 MG/1
0.1 TABLET ORAL 2 TIMES DAILY
Status: DISCONTINUED | OUTPATIENT
Start: 2025-06-01 | End: 2025-06-01

## 2025-06-01 RX ORDER — INSULIN LISPRO 100 [IU]/ML
0-10 INJECTION, SOLUTION INTRAVENOUS; SUBCUTANEOUS
Status: DISCONTINUED | OUTPATIENT
Start: 2025-06-01 | End: 2025-06-05 | Stop reason: HOSPADM

## 2025-06-01 RX ORDER — DEXTROSE 50 % IN WATER (D50W) INTRAVENOUS SYRINGE
25
Status: DISCONTINUED | OUTPATIENT
Start: 2025-06-01 | End: 2025-06-05 | Stop reason: HOSPADM

## 2025-06-01 RX ORDER — LEVOTHYROXINE SODIUM 88 UG/1
88 TABLET ORAL DAILY
Status: DISCONTINUED | OUTPATIENT
Start: 2025-06-01 | End: 2025-06-05 | Stop reason: HOSPADM

## 2025-06-01 RX ORDER — LANOLIN ALCOHOL/MO/W.PET/CERES
1000 CREAM (GRAM) TOPICAL DAILY
Status: DISCONTINUED | OUTPATIENT
Start: 2025-06-01 | End: 2025-06-05 | Stop reason: HOSPADM

## 2025-06-01 RX ORDER — DOCUSATE SODIUM 100 MG/1
100 CAPSULE, LIQUID FILLED ORAL DAILY
Status: DISCONTINUED | OUTPATIENT
Start: 2025-06-01 | End: 2025-06-05 | Stop reason: HOSPADM

## 2025-06-01 RX ORDER — DEXTROSE 50 % IN WATER (D50W) INTRAVENOUS SYRINGE
12.5
Status: DISCONTINUED | OUTPATIENT
Start: 2025-06-01 | End: 2025-06-05 | Stop reason: HOSPADM

## 2025-06-01 RX ORDER — HYDRALAZINE HYDROCHLORIDE 25 MG/1
25 TABLET, FILM COATED ORAL 3 TIMES DAILY
Status: DISCONTINUED | OUTPATIENT
Start: 2025-06-01 | End: 2025-06-05 | Stop reason: HOSPADM

## 2025-06-01 RX ORDER — NAPROXEN SODIUM 220 MG/1
81 TABLET, FILM COATED ORAL DAILY
Status: DISCONTINUED | OUTPATIENT
Start: 2025-06-01 | End: 2025-06-05 | Stop reason: HOSPADM

## 2025-06-01 RX ORDER — PANTOPRAZOLE SODIUM 40 MG/1
40 TABLET, DELAYED RELEASE ORAL
Status: DISCONTINUED | OUTPATIENT
Start: 2025-06-01 | End: 2025-06-05 | Stop reason: HOSPADM

## 2025-06-01 RX ORDER — DILTIAZEM HYDROCHLORIDE 180 MG/1
180 CAPSULE, COATED, EXTENDED RELEASE ORAL DAILY
Status: DISCONTINUED | OUTPATIENT
Start: 2025-06-01 | End: 2025-06-02

## 2025-06-01 RX ORDER — MEROPENEM AND SODIUM CHLORIDE 1 G/50ML
1 INJECTION, SOLUTION INTRAVENOUS EVERY 12 HOURS
Status: DISCONTINUED | OUTPATIENT
Start: 2025-06-01 | End: 2025-06-05

## 2025-06-01 RX ORDER — PANTOPRAZOLE SODIUM 40 MG/10ML
40 INJECTION, POWDER, LYOPHILIZED, FOR SOLUTION INTRAVENOUS
Status: DISCONTINUED | OUTPATIENT
Start: 2025-06-01 | End: 2025-06-05 | Stop reason: HOSPADM

## 2025-06-01 RX ORDER — ACETAMINOPHEN 160 MG/5ML
650 SOLUTION ORAL EVERY 4 HOURS PRN
Status: DISCONTINUED | OUTPATIENT
Start: 2025-06-01 | End: 2025-06-05 | Stop reason: HOSPADM

## 2025-06-01 RX ORDER — ACETAMINOPHEN 325 MG/1
650 TABLET ORAL EVERY 4 HOURS PRN
Status: DISCONTINUED | OUTPATIENT
Start: 2025-06-01 | End: 2025-06-05 | Stop reason: HOSPADM

## 2025-06-01 RX ADMIN — SODIUM CHLORIDE, SODIUM LACTATE, POTASSIUM CHLORIDE, AND CALCIUM CHLORIDE 100 ML/HR: 600; 310; 30; 20 INJECTION, SOLUTION INTRAVENOUS at 16:21

## 2025-06-01 RX ADMIN — ATORVASTATIN CALCIUM 80 MG: 80 TABLET, FILM COATED ORAL at 21:27

## 2025-06-01 RX ADMIN — LEVOFLOXACIN 250 MG: 250 INJECTION, SOLUTION INTRAVENOUS at 03:03

## 2025-06-01 RX ADMIN — ASPIRIN 81 MG: 81 TABLET, CHEWABLE ORAL at 09:55

## 2025-06-01 RX ADMIN — CLONIDINE HYDROCHLORIDE 0.1 MG: 0.1 TABLET ORAL at 09:55

## 2025-06-01 RX ADMIN — HEPARIN SODIUM 5000 UNITS: 5000 INJECTION, SOLUTION INTRAVENOUS; SUBCUTANEOUS at 05:57

## 2025-06-01 RX ADMIN — PANTOPRAZOLE SODIUM 40 MG: 40 TABLET, DELAYED RELEASE ORAL at 06:00

## 2025-06-01 RX ADMIN — LEVOTHYROXINE SODIUM 88 MCG: 0.09 TABLET ORAL at 05:57

## 2025-06-01 RX ADMIN — SODIUM CHLORIDE, SODIUM LACTATE, POTASSIUM CHLORIDE, AND CALCIUM CHLORIDE 100 ML/HR: 600; 310; 30; 20 INJECTION, SOLUTION INTRAVENOUS at 05:52

## 2025-06-01 RX ADMIN — HYDRALAZINE HYDROCHLORIDE 25 MG: 25 TABLET ORAL at 09:55

## 2025-06-01 RX ADMIN — SODIUM CHLORIDE 500 ML: 900 INJECTION, SOLUTION INTRAVENOUS at 01:33

## 2025-06-01 RX ADMIN — MEROPENEM AND SODIUM CHLORIDE 1 G: 1 INJECTION, SOLUTION INTRAVENOUS at 05:53

## 2025-06-01 RX ADMIN — HEPARIN SODIUM 5000 UNITS: 5000 INJECTION, SOLUTION INTRAVENOUS; SUBCUTANEOUS at 14:49

## 2025-06-01 RX ADMIN — HYDRALAZINE HYDROCHLORIDE 25 MG: 25 TABLET ORAL at 21:27

## 2025-06-01 RX ADMIN — Medication 1000 MCG: at 09:55

## 2025-06-01 RX ADMIN — MEROPENEM AND SODIUM CHLORIDE 1 G: 1 INJECTION, SOLUTION INTRAVENOUS at 17:22

## 2025-06-01 RX ADMIN — ACETAMINOPHEN 650 MG: 325 TABLET ORAL at 17:22

## 2025-06-01 RX ADMIN — HEPARIN SODIUM 5000 UNITS: 5000 INJECTION, SOLUTION INTRAVENOUS; SUBCUTANEOUS at 21:28

## 2025-06-01 SDOH — ECONOMIC STABILITY: FOOD INSECURITY: WITHIN THE PAST 12 MONTHS, YOU WORRIED THAT YOUR FOOD WOULD RUN OUT BEFORE YOU GOT THE MONEY TO BUY MORE.: NEVER TRUE

## 2025-06-01 SDOH — ECONOMIC STABILITY: FOOD INSECURITY: WITHIN THE PAST 12 MONTHS, THE FOOD YOU BOUGHT JUST DIDN'T LAST AND YOU DIDN'T HAVE MONEY TO GET MORE.: NEVER TRUE

## 2025-06-01 SDOH — ECONOMIC STABILITY: HOUSING INSECURITY: IN THE LAST 12 MONTHS, WAS THERE A TIME WHEN YOU WERE NOT ABLE TO PAY THE MORTGAGE OR RENT ON TIME?: NO

## 2025-06-01 SDOH — SOCIAL STABILITY: SOCIAL INSECURITY: WITHIN THE LAST YEAR, HAVE YOU BEEN AFRAID OF YOUR PARTNER OR EX-PARTNER?: NO

## 2025-06-01 SDOH — SOCIAL STABILITY: SOCIAL INSECURITY: WITHIN THE LAST YEAR, HAVE YOU BEEN HUMILIATED OR EMOTIONALLY ABUSED IN OTHER WAYS BY YOUR PARTNER OR EX-PARTNER?: NO

## 2025-06-01 SDOH — ECONOMIC STABILITY: TRANSPORTATION INSECURITY: IN THE PAST 12 MONTHS, HAS LACK OF TRANSPORTATION KEPT YOU FROM MEDICAL APPOINTMENTS OR FROM GETTING MEDICATIONS?: NO

## 2025-06-01 SDOH — ECONOMIC STABILITY: HOUSING INSECURITY: AT ANY TIME IN THE PAST 12 MONTHS, WERE YOU HOMELESS OR LIVING IN A SHELTER (INCLUDING NOW)?: NO

## 2025-06-01 SDOH — ECONOMIC STABILITY: INCOME INSECURITY: IN THE PAST 12 MONTHS HAS THE ELECTRIC, GAS, OIL, OR WATER COMPANY THREATENED TO SHUT OFF SERVICES IN YOUR HOME?: NO

## 2025-06-01 SDOH — ECONOMIC STABILITY: FOOD INSECURITY: HOW HARD IS IT FOR YOU TO PAY FOR THE VERY BASICS LIKE FOOD, HOUSING, MEDICAL CARE, AND HEATING?: NOT HARD AT ALL

## 2025-06-01 SDOH — SOCIAL STABILITY: SOCIAL INSECURITY: DO YOU FEEL UNSAFE GOING BACK TO THE PLACE WHERE YOU ARE LIVING?: NO

## 2025-06-01 SDOH — ECONOMIC STABILITY: HOUSING INSECURITY: IN THE PAST 12 MONTHS, HOW MANY TIMES HAVE YOU MOVED WHERE YOU WERE LIVING?: 0

## 2025-06-01 SDOH — SOCIAL STABILITY: SOCIAL INSECURITY: ARE YOU OR HAVE YOU BEEN THREATENED OR ABUSED PHYSICALLY, EMOTIONALLY, OR SEXUALLY BY ANYONE?: NO

## 2025-06-01 SDOH — SOCIAL STABILITY: SOCIAL INSECURITY: HAVE YOU HAD ANY THOUGHTS OF HARMING ANYONE ELSE?: NO

## 2025-06-01 SDOH — SOCIAL STABILITY: SOCIAL INSECURITY: HAS ANYONE EVER THREATENED TO HURT YOUR FAMILY OR YOUR PETS?: NO

## 2025-06-01 SDOH — SOCIAL STABILITY: SOCIAL INSECURITY: ABUSE: ADULT

## 2025-06-01 SDOH — SOCIAL STABILITY: SOCIAL INSECURITY: DO YOU FEEL ANYONE HAS EXPLOITED OR TAKEN ADVANTAGE OF YOU FINANCIALLY OR OF YOUR PERSONAL PROPERTY?: NO

## 2025-06-01 SDOH — SOCIAL STABILITY: SOCIAL INSECURITY: ARE THERE ANY APPARENT SIGNS OF INJURIES/BEHAVIORS THAT COULD BE RELATED TO ABUSE/NEGLECT?: NO

## 2025-06-01 SDOH — SOCIAL STABILITY: SOCIAL INSECURITY: DOES ANYONE TRY TO KEEP YOU FROM HAVING/CONTACTING OTHER FRIENDS OR DOING THINGS OUTSIDE YOUR HOME?: NO

## 2025-06-01 SDOH — SOCIAL STABILITY: SOCIAL INSECURITY: HAVE YOU HAD THOUGHTS OF HARMING ANYONE ELSE?: NO

## 2025-06-01 ASSESSMENT — PAIN - FUNCTIONAL ASSESSMENT
PAIN_FUNCTIONAL_ASSESSMENT: 0-10

## 2025-06-01 ASSESSMENT — COGNITIVE AND FUNCTIONAL STATUS - GENERAL
CLIMB 3 TO 5 STEPS WITH RAILING: A LITTLE
MOBILITY SCORE: 22
DAILY ACTIVITIY SCORE: 23
TOILETING: A LITTLE
WALKING IN HOSPITAL ROOM: A LITTLE
PATIENT BASELINE BEDBOUND: NO
DAILY ACTIVITIY SCORE: 24
MOBILITY SCORE: 24

## 2025-06-01 ASSESSMENT — ACTIVITIES OF DAILY LIVING (ADL)
DRESSING YOURSELF: INDEPENDENT
LACK_OF_TRANSPORTATION: NO
JUDGMENT_ADEQUATE_SAFELY_COMPLETE_DAILY_ACTIVITIES: YES
ASSISTIVE_DEVICE: HEARING AID - RIGHT;HEARING AID - LEFT;EYEGLASSES;WALKER
GROOMING: INDEPENDENT
GROOMING: INDEPENDENT
ASSISTIVE_DEVICE: HEARING AID - RIGHT;HEARING AID - LEFT;EYEGLASSES;WALKER
BATHING: INDEPENDENT
PATIENT'S MEMORY ADEQUATE TO SAFELY COMPLETE DAILY ACTIVITIES?: YES
BATHING: INDEPENDENT
LACK_OF_TRANSPORTATION: NO
TOILETING: INDEPENDENT
PATIENT'S MEMORY ADEQUATE TO SAFELY COMPLETE DAILY ACTIVITIES?: YES
FEEDING YOURSELF: INDEPENDENT
ADEQUATE_TO_COMPLETE_ADL: YES
TOILETING: INDEPENDENT
FEEDING YOURSELF: INDEPENDENT
DRESSING YOURSELF: INDEPENDENT
HEARING - LEFT EAR: HEARING AID
HEARING - RIGHT EAR: HEARING AID
HEARING - RIGHT EAR: HEARING AID
HEARING - LEFT EAR: HEARING AID
ADEQUATE_TO_COMPLETE_ADL: YES
WALKS IN HOME: INDEPENDENT
WALKS IN HOME: INDEPENDENT

## 2025-06-01 ASSESSMENT — PATIENT HEALTH QUESTIONNAIRE - PHQ9
2. FEELING DOWN, DEPRESSED OR HOPELESS: NOT AT ALL
1. LITTLE INTEREST OR PLEASURE IN DOING THINGS: NOT AT ALL
SUM OF ALL RESPONSES TO PHQ9 QUESTIONS 1 & 2: 0

## 2025-06-01 ASSESSMENT — LIFESTYLE VARIABLES
SKIP TO QUESTIONS 9-10: 1
HOW MANY STANDARD DRINKS CONTAINING ALCOHOL DO YOU HAVE ON A TYPICAL DAY: PATIENT DOES NOT DRINK
AUDIT-C TOTAL SCORE: 0
AUDIT-C TOTAL SCORE: 0
HOW OFTEN DO YOU HAVE A DRINK CONTAINING ALCOHOL: NEVER
HOW OFTEN DO YOU HAVE 6 OR MORE DRINKS ON ONE OCCASION: NEVER

## 2025-06-01 ASSESSMENT — ENCOUNTER SYMPTOMS
HEMATOLOGIC/LYMPHATIC NEGATIVE: 1
CHILLS: 1
MUSCULOSKELETAL NEGATIVE: 1
PSYCHIATRIC NEGATIVE: 1
NEUROLOGICAL NEGATIVE: 1
EYES NEGATIVE: 1
CARDIOVASCULAR NEGATIVE: 1
GASTROINTESTINAL NEGATIVE: 1
ALLERGIC/IMMUNOLOGIC NEGATIVE: 1
RESPIRATORY NEGATIVE: 1
ENDOCRINE NEGATIVE: 1

## 2025-06-01 ASSESSMENT — PAIN SCALES - GENERAL
PAINLEVEL_OUTOF10: 0 - NO PAIN
PAINLEVEL_OUTOF10: 0 - NO PAIN
PAINLEVEL_OUTOF10: 1
PAINLEVEL_OUTOF10: 0 - NO PAIN
PAINLEVEL_OUTOF10: 3

## 2025-06-01 ASSESSMENT — PAIN DESCRIPTION - LOCATION: LOCATION: HEAD

## 2025-06-01 NOTE — H&P
History Of Present Illness  Fartun Carey is a 89 y.o. female presenting with chills not feeling well.  This patient was recently hospital treated for urinary tract infection as well as pneumonia.  She was discharged home on tapering steroids.  Over the last 2 days she started experiencing chills and generalized weakness.  Denies any particular chest pain abdominal pain denied any dysuria she does have some shortness of breath at baseline.  Emergency room impression the ER physician was feeling tract infection sepsis questionable pneumonia versus heart failure.  She did receive fluids and antibiotics  When I came to evaluate came in room 9 in the emergency room now she states that she feels much better.  Past Medical History  She has a past medical history of Diabetes (Multi), Diabetes mellitus (Multi), Disease of thyroid gland, Hypertension, and Renal disease.  During most recent admission she had a poor resistant E. coli in urine culture  Surgical History  She has a past surgical history that includes Hysterectomy; Back surgery; and Appendectomy.  Reviewed  Social History  She reports that she has never smoked. She has never used smokeless tobacco. She reports that she does not drink alcohol and does not use drugs.  She lives alone independently  Family History  Family History[1]     Allergies  Amoxicillin, Amoxicillin-pot clavulanate, Glimepiride, Lisinopril, Metformin hcl, Tetracycline hcl, and Cephalexin    ROS  Review of Systems   Constitutional:  Positive for chills.   HENT: Negative.     Eyes: Negative.    Respiratory: Negative.     Cardiovascular: Negative.    Gastrointestinal: Negative.    Endocrine: Negative.    Genitourinary: Negative.    Musculoskeletal: Negative.    Skin: Negative.    Allergic/Immunologic: Negative.    Neurological: Negative.    Hematological: Negative.    Psychiatric/Behavioral: Negative.     All other systems reviewed and are negative.       Last Recorded Vitals  /54   Pulse  PRE-OPERATIVE NOTE   Chief Complaint had concerns including Office Visit and Pre-Op Exam.  Surgery Date: 9/18/24  Planned Surgery: left ankle tibio talo calcaneal fusion, Achilles tenotomy   Pre-op Diagnosis: Arthritis of left ankle  Requesting Surgeon: Dr. Vasiliy Lazo MD    Subjective   BRIEF HISTORY LEADING to SURGERY: Gene Coy is a 85 year old male here for pre-operative anesthesia consult for surgery as requested by the surgeon.   Follows with cardiology, cleared by cards for procedure. On eliquis for afib. On aspirin, statin for CAD.   Feeling well. No CP, SOB, dizziness, headache.    Review of Systems  A complete review of systems was performed and negative except as documented above.    Objective   PHYSICAL EXAM  Vitals:    09/10/24 1201   BP: 106/60   Pulse: (!) 55   Resp: 18   Temp: 97.2 °F (36.2 °C)   SpO2: 96%   Weight: 112 kg (246 lb 14.6 oz)   Height: 5' 10\" (1.778 m)   PainSc:  0     Physical Exam  Constitutional:       General: He is not in acute distress.     Appearance: Normal appearance.   HENT:      Head: Normocephalic and atraumatic.   Eyes:      Extraocular Movements: Extraocular movements intact.      Conjunctiva/sclera: Conjunctivae normal.   Cardiovascular:      Rate and Rhythm: Normal rate.      Heart sounds: No murmur heard.     Comments: Irregularly irregular rhythm  Pulmonary:      Effort: Pulmonary effort is normal.      Breath sounds: Normal breath sounds. No wheezing.   Skin:     General: Skin is warm and dry.      Findings: No rash.   Neurological:      General: No focal deficit present.      Mental Status: He is alert and oriented to person, place, and time.      Motor: No weakness.               RESULTS REVIEW         Most Recent  Na+  141 (4/5/2024)  K+  4.7 (4/5/2024)   Glucose  136 (4/5/2024)  GFR  44 (4/5/2024)   The last eGFR was abnormal at 44 (4/5/2024).    Most Recent  WBC  9.7 (4/5/2024)  HGB  12 (4/5/2024)     Platelet  263 (4/5/2024)       The last Hemoglobin was  (!) 102   Temp 36.8 °C (98.2 °F) (Temporal)   Resp 18   Wt 61.4 kg (135 lb 5.8 oz)   SpO2 98%     Physical Exam  I saw this patient emergency room bed #9   female alert José x 3 cooperative no distress  Normocephalic/atraumatic EOMI PERRLA  Neck supple  Chest very faint bilateral crackles  Heart regular pulse systolic murmur  Abdomen soft nontender benign exam no rebound tenderness no guarding  Extremities no peripheral edema  Neurologic examination gross muscles are nonfocal  Psych no psychosis  Skin no rash    Relevant Results  Troponin 77-95 creatinine 1.47 lactic acid 2.5 then 1.9 WBC count 4.5 hemoglobin 10.4 platelet count 111 COVID flu RSV negative UA strongly positive for UTI chest x-ray negative CT angio debating between pulmonary edema versus viral illness with bilateral pulmonary nodules stable EKG sinus tachycardia    ASSESSMENT/PLAN  Assessment/Plan   Assessment & Plan  Generalized weakness    Type 2 diabetes mellitus, without long-term current use of insulin (Multi)    Primary hypertension    Acute cystitis without hematuria    Sepsis due to Escherichia coli without acute organ dysfunction (Multi)    Acute on chronic renal failure  This may get worse after contrast for CT angio so we will continue fluids  6/1,     IV antibiotics will choose meropenem based on most recent urine culture from early May   Supportive treatment    Manisha Edward MD         [1]   Family History  Problem Relation Name Age of Onset    Heart disease Mother      Heart disease Father      Heart disease Brother      No Known Problems Son      Heart disease Maternal Grandmother      Heart disease Maternal Grandfather      Heart disease Paternal Grandmother      Heart disease Paternal Grandfather        abnormal at 12 (4/5/2024) (Normal 13 - 17 g/dL).      PAST MEDICAL HISTORIES     Medications: has a current medication list which includes the following prescription(s): furosemide, nystatin-triamcinolone, eliquis, losartan, nifedipine xl, paroxetine, pravastatin, levothyroxine, blood pressure cuff, coenzyme q10, menthol (topical analgesic), true metrix meter, cholecalciferol, trueplus lancets 33g, blood glucose, acetaminophen, aspirin, tiotropium, and albuterol.    Problem List: has Chronic obstructive lung disease  (CMD); Essential hypertension; Allergic rhinitis; Type 2 diabetes mellitus with diabetic cataract, without long-term current use of insulin  (CMD); Other and unspecified hyperlipidemia; Essential hypertriglyceridemia; Multiple lung nodules; Obstructive sleep apnea of adult; Vitamin D deficiency; Atherosclerosis of coronary artery; Valvular heart disease; Dilated cardiomyopathy  (CMD); Aortic valve stenosis, mild; Age-related nuclear cataract of left eye; Arthralgia of multiple sites; Benign prostatic hyperplasia with urinary frequency; Sensorineural hearing loss, bilateral; History of stroke without residual deficits; Anemia; BPH with obstruction/lower urinary tract symptoms; History of percutaneous transluminal coronary angioplasty; Morbid (severe) obesity due to excess calories  (CMD); Mixed hyperlipidemia; Chronic constipation; Chronic pain of both hips; Lung nodule; Mitral valve stenosis and aortic valve stenosis; Coronary arteriosclerosis; Obesity; Hyperlipidemia; Counseling, unspecified; COPD mixed type  (CMD); Hypothyroidism; Stented coronary artery; Left ear pain; Abnormal electrocardiogram (ECG) (EKG); Cough; Major depressive disorder, single episode, unspecified; Chronic obstructive pulmonary disease, unspecified  (CMD); Hypertensive heart disease with chronic systolic congestive heart failure  (CMD); Stage 3a chronic kidney disease  (CMD); Need for vaccination; Type 2 diabetes mellitus with  morbid obesity  (CMD); Dyslipidemia associated with type 2 diabetes mellitus  (CMD); Encounter for Medicare annual wellness exam; and Atrial fibrillation  (CMD) on their problem list.    Medical:  has a past medical history of Anemia, Anxiety, Aortic stenosis, Benign cyst of kidney, CAD (coronary artery disease), COPD (chronic obstructive pulmonary disease)  (CMD), DM (diabetes mellitus)  (CMD), HTN (hypertension), Hyperlipidemia, Hypertriglyceridemia, Osteoarthritis, and Sleep apnea.    Surgical:  has a past surgical history that includes total knee replacement (Right); Ankle arthroscopy w/ open repair (1994); PTCA (1990); and cataract extraction extracapsular w/ intraocular lens implantation (Bilateral).    Family: family history is not on file.    Social:  reports that he quit smoking about 10 years ago. His smoking use included cigarettes. He has never used smokeless tobacco. He reports current alcohol use of about 1.0 standard drink of alcohol per week. He reports that he does not use drugs.    Allergies: is allergic to atorvastatin and simvastatin.  SURGICAL RISK AND SCREENINGS     Family History Risks  Adverse Reaction to Anesthesia: No  Bleeding or Blood Disorder: No  Malignant Hyperthermia: No    Personal Surgical History  Anesthetic Complications: No  Bleeding Problems: No  Problems with Surgery: No  Obstructive Sleep Apnea Of Adult is on Problem List    Malnutrition Screening Tool Current weight 247 pounds  Have you recently lost weight without trying? No  Have you been eating poorly because of a decreased appetite? No  Score = 0, Not at Risk       Functional Capacity  Are you able to do light housework, dusting, wash dishes, climb a flight of stairs or walk up a hill without chest pain or problems breathing (>4 METS)? Yes    Revised Cardiac Risk Index  Intermediate or Higher Risk Surgery Yes   History of Ischemic Heart Disease or Cardiac Chest Pain Yes; Stented Coronary Artery   Congestive Heart Failure  Yes; Ejection Fraction 63 (11/28/2023)   History of Stroke or TIA Yes   Last Creatinine >2 No; 1.53 g/dL (04/05/2024)   Prescribed Insulin No   Estimated Risk of a Major Cardiac Complication 3 or more risk factors = 15%       Acute Kidney Injury Prevention:  KEN Risk is High based on criteria below:  High Risk GFR <= 45. The last eGFR was abnormal at 44 (4/5/2024).  Chronic Kidney Disease  Diabetes  Atherosclerotic Heart Disease  Age >65  Recommendations  - 24 hours before surgery hold ACE-I/ARB, Diuretics and Potassium   - 5 days before surgery hold NSAIDs  - Surgical Team may Consider Inpatient Nephrology Consult    Advance care planning documents on file - yes     ASSESSMENT AND PLAN        1. Pre-op evaluation  Cleared for surgery, moderate cardiac risk. Cleared by cardiology as well.  Labs as below.  EKG with afib, RBBB, LAFB  -     CBC with Automated Differential; Future  -     Basic Metabolic Panel; Future  -     Urinalysis & Reflex Microscopy With Culture If Indicated; Future  -     Electrocardiogram 12-Lead  2. Paroxysmal atrial fibrillation  (CMD)  Following with cardiology. Afib on EKG today.  On eliquis 5mg BID.  Metoprolol stopped due to bradycardia.  3. Chronic diastolic congestive heart failure  (CMD)  Stable.   On lasix 40mg daily.  4. Hypothyroidism, unspecified type  Currently on levothyroxine 112mcg daily.  Repeat TSH.  -     Thyroid Stimulating Hormone Reflex; Future  5. Essential hypertension  Controlled.  Continue current regimen.  6. Dyslipidemia associated with type 2 diabetes mellitus  (CMD)  Continue statin.  7. Type 2 diabetes mellitus with morbid obesity  (CMD)  Last A1c 5.9% 7/31/23, controlled.  Repeat A1c.   Diet controlled.  8. Chronic obstructive pulmonary disease, unspecified COPD type  (CMD)  Stable. Continue inhalers prn.  9. Coronary arteriosclerosis  Following with cards.  On aspirin, statin.    Preop Optimization  - OPTIMIZED for SURGERY: YES patient is medically optimized for  surgery.  - Workup reviewed and/or ordered: See Orders  - Pre-Op management of pacemaker, dialysis or steroids: not needed.  - Post-Op DVT prophylaxis: per surgical team  - Smoking Status: Former Smoker (Quit 01/01/2014).     Pertinent Conditions    #Type 2 Diabetes Mellitus With Morbid Obesity (Cmd) the last A1c was 5.9 (07/31/2023), this is trending down compared to  6.1 (04/12/2023).    #Hypertensive Heart Disease With Chronic Systolic Congestive Heart Failure (Cmd) today's blood pressure was /60    #Stage 3a Chronic Kidney Disease (Cmd) the last eGFR was 44 (4/5/2024)    #Anemia the last Hemoglobin was 12 (4/5/2024) in comparison the second to last result was 11.9 (7/31/2023).    #Hypothyroidism the last TSH was 0.71 (7/31/2023) in comparison the second to last result was 1.485 (4/12/2023)    #Atrial Fibrillation (Cmd) current heart rate is 55.    #Obstructive Sleep Apnea Of Adult    #History of Stroke or TIA    #Stented Coronary Artery    #Chronic Obstructive Pulmonary Disease, Unspecified (Cmd)    #Heart Failure Ejection Fraction 63 (11/28/2023)    Medications to Hold   Medications and Supplements:  - Morning of surgery hold any Vitamins AND for 2 weeks prior hold any Vitamin E or Herbals     Anticoagulation:   - 5 days before low or intermediate bleeding risk surgery hold (aspirin 81 MG tablet [[id number]])  - 2 days before low or intermediate bleeding risk surgery hold (Eliquis 5 MG Tab [[id number]])    Antidiabetic:none on med list    KEN Risk (Diuretics, ACE-I/ARB, NSAIDs):   - 24 hours before surgery hold (losartan (COZAAR) 100 MG tablet [[id number]])  - 24 hours before surgery hold (furosemide (LASIX) 40 MG tablet [[id number]]) and if taking potassium      Continue all other medications unless an alternative plan was advised with the surgeon and/or specialist.      Follow Up  Return for f/u with PCP.    Electronically signed by: Jose Reza MD  Copy sent to: Dr. Vasiliy Lazo MD

## 2025-06-01 NOTE — PROGRESS NOTES
Fartun Carey is a 89 y.o. female on day 0 of admission presenting with Generalized weakness.      Subjective   Feels much better. Currently NSR on telemetry. No palpations. NO chills or nausea. Has gotten up to BR 3 times today, no dizziness.        Objective     Last Recorded Vitals  BP (!) 108/47 (BP Location: Left arm, Patient Position: Lying)   Pulse 83   Temp 36.9 °C (98.4 °F) (Temporal)   Resp 17   Wt 72.1 kg (159 lb)   SpO2 96%   Intake/Output last 3 Shifts:    Intake/Output Summary (Last 24 hours) at 6/1/2025 1257  Last data filed at 6/1/2025 1000  Gross per 24 hour   Intake 663.33 ml   Output 400 ml   Net 263.33 ml       Admission Weight  Weight: 61.4 kg (135 lb 5.8 oz) (05/31/25 2227)    Daily Weight  06/01/25 : 72.1 kg (159 lb)    Image Results  CT angio chest for pulmonary embolism  Narrative: Interpreted By:  Syd Barnard,   STUDY:  CT ANGIO CHEST FOR PULMONARY EMBOLISM;  5/31/2025 11:37 pm      INDICATION:  Signs/Symptoms:sob, tachy.          COMPARISON:  05/03/2025      ACCESSION NUMBER(S):  [id number]      ORDERING CLINICIAN:  DEDRICK CAVAZOS      TECHNIQUE:  Contiguous axial images of the chest were obtained after the  intravenous administration of iodinated contrast using angiographic  PE protocol. Coronal and sagittal reformatted images were  reconstructed from the axial data. MIP images were created on an  independent workstation and reviewed.      FINDINGS:      MEDIASTINUM AND LYMPH NODES:  The esophageal wall appears within  normal limits.  No enlarged intrathoracic or axillary lymph nodes by  imaging criteria. No pneumomediastinum.      VESSELS:  Normal caliber thoracic aorta without dissection. Mild  aortic atherosclerosis.  No acute pulmonary embolism.      HEART: Normal size. Aortic valvular calcifications. Severe coronary  artery calcifications. Small pericardial effusion.      LUNG, AIRWAYS, PLEURA: Trace pleural effusions. Ill-defined central  predominant ground-glass opacities  in all lobes of both lungs are  noted and are increased slightly from prior study. There is mild  septal thickening centrally. There are again numerous solid bilateral  pulmonary nodules that are stable from 05/03/2025. To the extent  visualized on prior CT abdomen/pelvis studies such as 10/09/2023  these were present and appear grossly unchanged and likely reflects  sequela of remote granulomatous infection, given that some are  partially calcified. No pneumothorax.      OSSEOUS STRUCTURES: No acute osseous abnormality. Severe degenerative  disc height loss in the lower thoracic and upper lumbar spine. No  high-grade canal stenosis.      CHEST WALL SOFT TISSUES: No discernible acute abnormality.      UPPER ABDOMEN/OTHER: Calcified granulomas in the spleen. There is  diffuse pancreatic ductal dilatation and pancreatic atrophy.  Appearance is chronic dating back to 08/09/2023.      Impression: No acute pulmonary embolism.      Increased prominence and extent of central predominant ground-glass  opacities raising suspicion for pulmonary edema versus viral  infection, the former favored the please correlate clinically.      Small pleural effusion.      Numerous bilateral solid pulmonary nodules, some with speckled  calcifications and given stability dating back to 2023 and splenic  calcified granulomas these are most favored to represent sequela of  remote granulomatous infection.      MACRO:  None.      Signed by: Syd Barnard 6/1/2025 12:57 AM  Dictation workstation:   [id number]      Physical Exam  Vitals and nursing note reviewed.   Constitutional:       General: She is not in acute distress.     Appearance: She is not ill-appearing.   HENT:      Head: Normocephalic and atraumatic.      Nose: Nose normal.      Mouth/Throat:      Mouth: Mucous membranes are moist.      Pharynx: Oropharynx is clear.   Eyes:      Extraocular Movements: Extraocular movements intact.      Pupils: Pupils are equal, round, and  reactive to light.   Cardiovascular:      Rate and Rhythm: Normal rate and regular rhythm.      Pulses: Normal pulses.      Heart sounds: No murmur heard.  Pulmonary:      Effort: Pulmonary effort is normal. No respiratory distress.      Breath sounds: Normal breath sounds.   Abdominal:      General: Abdomen is flat. Bowel sounds are normal. There is no distension.      Palpations: Abdomen is soft.      Tenderness: There is no abdominal tenderness.   Musculoskeletal:         General: No swelling, tenderness, deformity or signs of injury. Normal range of motion.      Cervical back: Normal range of motion and neck supple.      Right lower leg: No edema.      Left lower leg: No edema.   Skin:     General: Skin is warm and dry.      Capillary Refill: Capillary refill takes 2 to 3 seconds.   Neurological:      General: No focal deficit present.      Mental Status: She is alert and oriented to person, place, and time.   Psychiatric:         Mood and Affect: Mood normal.         Relevant Results  Results for orders placed or performed during the hospital encounter of 05/31/25 (from the past 24 hours)   CBC and Auto Differential   Result Value Ref Range    WBC 4.5 4.4 - 11.3 x10*3/uL    nRBC 0.4 (H) 0.0 - 0.0 /100 WBCs    RBC 3.42 (L) 4.00 - 5.20 x10*6/uL    Hemoglobin 10.4 (L) 12.0 - 16.0 g/dL    Hematocrit 33.2 (L) 36.0 - 46.0 %    MCV 97 80 - 100 fL    MCH 30.4 26.0 - 34.0 pg    MCHC 31.3 (L) 32.0 - 36.0 g/dL    RDW 15.2 (H) 11.5 - 14.5 %    Platelets 111 (L) 150 - 450 x10*3/uL    Immature Granulocytes %, Automated 0.7 0.0 - 0.9 %    Immature Granulocytes Absolute, Automated 0.03 0.00 - 0.50 x10*3/uL   Phosphorus   Result Value Ref Range    Phosphorus 2.0 (L) 2.5 - 4.9 mg/dL   Magnesium   Result Value Ref Range    Magnesium 1.15 (L) 1.60 - 2.40 mg/dL   Comprehensive metabolic panel   Result Value Ref Range    Glucose 100 (H) 74 - 99 mg/dL    Sodium 134 (L) 136 - 145 mmol/L    Potassium 3.8 3.5 - 5.3 mmol/L    Chloride  102 98 - 107 mmol/L    Bicarbonate 21 21 - 32 mmol/L    Anion Gap 15 10 - 20 mmol/L    Urea Nitrogen 27 (H) 6 - 23 mg/dL    Creatinine 1.47 (H) 0.50 - 1.05 mg/dL    eGFR 33 (L) >60 mL/min/1.73m*2    Calcium 8.2 (L) 8.6 - 10.3 mg/dL    Albumin 3.3 (L) 3.4 - 5.0 g/dL    Alkaline Phosphatase 165 (H) 33 - 136 U/L    Total Protein 6.1 (L) 6.4 - 8.2 g/dL    AST 14 9 - 39 U/L    Bilirubin, Total 0.5 0.0 - 1.2 mg/dL    ALT 11 7 - 45 U/L   B-Type Natriuretic Peptide   Result Value Ref Range     (H) 0 - 99 pg/mL   TSH with reflex to Free T4 if abnormal   Result Value Ref Range    Thyroid Stimulating Hormone 3.55 0.44 - 3.98 mIU/L   Troponin I, High Sensitivity, Initial   Result Value Ref Range    Troponin I, High Sensitivity 77 (HH) 0 - 13 ng/L   Blood Gas Venous Full Panel   Result Value Ref Range    POCT pH, Venous 7.43 7.33 - 7.43 pH    POCT pCO2, Venous 36 (L) 41 - 51 mm Hg    POCT pO2, Venous 31 (L) 35 - 45 mm Hg    POCT SO2, Venous 57 45 - 75 %    POCT Oxy Hemoglobin, Venous 56.6 45.0 - 75.0 %    POCT Hematocrit Calculated, Venous 30.0 (L) 36.0 - 46.0 %    POCT Sodium, Venous 133 (L) 136 - 145 mmol/L    POCT Potassium, Venous 4.1 3.5 - 5.3 mmol/L    POCT Chloride, Venous 104 98 - 107 mmol/L    POCT Ionized Calicum, Venous 1.13 1.10 - 1.33 mmol/L    POCT Glucose, Venous 94 74 - 99 mg/dL    POCT Lactate, Venous 2.5 (H) 0.4 - 2.0 mmol/L    POCT Base Excess, Venous -0.2 -2.0 - 3.0 mmol/L    POCT HCO3 Calculated, Venous 23.9 22.0 - 26.0 mmol/L    POCT Hemoglobin, Venous 9.9 (L) 12.0 - 16.0 g/dL    POCT Anion Gap, Venous 9.0 (L) 10.0 - 25.0 mmol/L    Patient Temperature 37.0 degrees Celsius    FiO2 21 %   Lactate   Result Value Ref Range    Lactate 2.5 (H) 0.4 - 2.0 mmol/L   Manual Differential   Result Value Ref Range    Neutrophils %, Manual 72.0 40.0 - 80.0 %    Bands %, Manual 19.0 0.0 - 5.0 %    Lymphocytes %, Manual 9.0 13.0 - 44.0 %    Monocytes %, Manual 0.0 2.0 - 10.0 %    Eosinophils %, Manual 0.0 0.0 - 6.0 %     Basophils %, Manual 0.0 0.0 - 2.0 %    Seg Neutrophils Absolute, Manual 3.24 1.60 - 5.00 x10*3/uL    Bands Absolute, Manual 0.86 (H) 0.00 - 0.50 x10*3/uL    Lymphocytes Absolute, Manual 0.41 (L) 0.80 - 3.00 x10*3/uL    Monocytes Absolute, Manual 0.00 (L) 0.05 - 0.80 x10*3/uL    Eosinophils Absolute, Manual 0.00 0.00 - 0.40 x10*3/uL    Basophils Absolute, Manual 0.00 0.00 - 0.10 x10*3/uL    Total Cells Counted 100     Neutrophils Absolute, Manual 4.10 1.60 - 5.50 x10*3/uL    RBC Morphology See Below     Polychromasia Mild    Sars-CoV-2, Influenza A/B and RSV PCR   Result Value Ref Range    Coronavirus 2019, PCR Not Detected Not Detected    Flu A Result Not Detected Not Detected    Flu B Result Not Detected Not Detected    RSV PCR Not Detected Not Detected   Troponin, High Sensitivity, 1 Hour   Result Value Ref Range    Troponin I, High Sensitivity 85 (HH) 0 - 13 ng/L   Blood Gas Lactic Acid, Venous   Result Value Ref Range    POCT Lactate, Venous 1.9 0.4 - 2.0 mmol/L   Lactate   Result Value Ref Range    Lactate 1.9 0.4 - 2.0 mmol/L   Urinalysis with Reflex Culture and Microscopic   Result Value Ref Range    Color, Urine Light-Orange (N) Light-Yellow, Yellow, Dark-Yellow    Appearance, Urine Ex.Turbid (N) Clear    Specific Gravity, Urine 1.029 1.005 - 1.035    pH, Urine 5.5 5.0, 5.5, 6.0, 6.5, 7.0, 7.5, 8.0    Protein, Urine 70 (1+) (A) NEGATIVE, 10 (TRACE), 20 (TRACE) mg/dL    Glucose, Urine Normal Normal mg/dL    Blood, Urine 0.03 (TRACE) (A) NEGATIVE mg/dL    Ketones, Urine NEGATIVE NEGATIVE mg/dL    Bilirubin, Urine NEGATIVE NEGATIVE mg/dL    Urobilinogen, Urine Normal Normal mg/dL    Nitrite, Urine NEGATIVE NEGATIVE    Leukocyte Esterase, Urine 500 Sarahi/uL (A) NEGATIVE   Microscopic Only, Urine   Result Value Ref Range    WBC, Urine >50 (A) 1-5, NONE /HPF    WBC Clumps, Urine MANY Reference range not established. /HPF    RBC, Urine 11-20 (A) NONE, 1-2, 3-5 /HPF   POCT GLUCOSE   Result Value Ref Range    POCT  Glucose 116 (H) 74 - 99 mg/dL   Basic metabolic panel   Result Value Ref Range    Glucose 164 (H) 74 - 99 mg/dL    Sodium 135 (L) 136 - 145 mmol/L    Potassium 4.1 3.5 - 5.3 mmol/L    Chloride 108 (H) 98 - 107 mmol/L    Bicarbonate 19 (L) 21 - 32 mmol/L    Anion Gap 12 10 - 20 mmol/L    Urea Nitrogen 27 (H) 6 - 23 mg/dL    Creatinine 1.40 (H) 0.50 - 1.05 mg/dL    eGFR 35 (L) >60 mL/min/1.73m*2    Calcium 7.5 (L) 8.6 - 10.3 mg/dL   Magnesium   Result Value Ref Range    Magnesium 1.63 1.60 - 2.40 mg/dL   POCT GLUCOSE   Result Value Ref Range    POCT Glucose 160 (H) 74 - 99 mg/dL     *Note: Due to a large number of results and/or encounters for the requested time period, some results have not been displayed. A complete set of results can be found in Results Review.    CT angio chest for pulmonary embolism  Result Date: 6/1/2025  Interpreted By:  Syd Barnard, STUDY: CT ANGIO CHEST FOR PULMONARY EMBOLISM;  5/31/2025 11:37 pm   INDICATION: Signs/Symptoms:sob, tachy.     COMPARISON: 05/03/2025   ACCESSION NUMBER(S): [id number]   ORDERING CLINICIAN: DEDRICK CAVAZOS   TECHNIQUE: Contiguous axial images of the chest were obtained after the intravenous administration of iodinated contrast using angiographic PE protocol. Coronal and sagittal reformatted images were reconstructed from the axial data. MIP images were created on an independent workstation and reviewed.   FINDINGS:   MEDIASTINUM AND LYMPH NODES:  The esophageal wall appears within normal limits.  No enlarged intrathoracic or axillary lymph nodes by imaging criteria. No pneumomediastinum.   VESSELS:  Normal caliber thoracic aorta without dissection. Mild aortic atherosclerosis.  No acute pulmonary embolism.   HEART: Normal size. Aortic valvular calcifications. Severe coronary artery calcifications. Small pericardial effusion.   LUNG, AIRWAYS, PLEURA: Trace pleural effusions. Ill-defined central predominant ground-glass opacities in all lobes of both lungs are  noted and are increased slightly from prior study. There is mild septal thickening centrally. There are again numerous solid bilateral pulmonary nodules that are stable from 05/03/2025. To the extent visualized on prior CT abdomen/pelvis studies such as 10/09/2023 these were present and appear grossly unchanged and likely reflects sequela of remote granulomatous infection, given that some are partially calcified. No pneumothorax.   OSSEOUS STRUCTURES: No acute osseous abnormality. Severe degenerative disc height loss in the lower thoracic and upper lumbar spine. No high-grade canal stenosis.   CHEST WALL SOFT TISSUES: No discernible acute abnormality.   UPPER ABDOMEN/OTHER: Calcified granulomas in the spleen. There is diffuse pancreatic ductal dilatation and pancreatic atrophy. Appearance is chronic dating back to 08/09/2023.       No acute pulmonary embolism.   Increased prominence and extent of central predominant ground-glass opacities raising suspicion for pulmonary edema versus viral infection, the former favored the please correlate clinically.   Small pleural effusion.   Numerous bilateral solid pulmonary nodules, some with speckled calcifications and given stability dating back to 2023 and splenic calcified granulomas these are most favored to represent sequela of remote granulomatous infection.   MACRO: None.   Signed by: Syd Barnard 6/1/2025 12:57 AM Dictation workstation:   [id number]    Electrocardiogram, 12-lead PRN ACS symptoms  Result Date: 5/17/2025  Unusual P axis, possible ectopic atrial rhythm with undetermined rhythm irregularity Left axis deviation Abnormal ECG When compared with ECG of 03-MAY-2025 15:30, Ectopic atrial rhythm has replaced Sinus rhythm Vent. rate has decreased BY  45 BPM Confirmed by David Monroy ([id number]) on 5/17/2025 8:34:06 AM    XR chest 2 views  Result Date: 5/12/2025  Interpreted By:  Analia Francois, STUDY: XR CHEST 2 VIEWS 5/12/2025 10:45 am   INDICATION:  Signs/Symptoms:dyspnea, pain. Cough.   COMPARISON: 05/03/2025   ACCESSION NUMBER(S): [id number]   ORDERING CLINICIAN: VENANCIO AHMADI   TECHNIQUE: PA and lateral views of the chest were acquired.   FINDINGS: Normal heart, mediastinum, and lungs. There is calcified plaque within the aortic arch. There is a levoscoliosis of the lower thoracic and upper lumbar spine.       No acute cardiopulmonary disease.   Signed by: Analia Francois 5/12/2025 12:34 PM Dictation workstation:   [id number]    US renal complete  Result Date: 5/6/2025  Interpreted By:  Maninder Clark, STUDY: US RENAL COMPLETE;  5/6/2025 1:35 pm   INDICATION: Signs/Symptoms:VINCENT.     COMPARISON: 03/01/2024.   ACCESSION NUMBER(S): [id number]   ORDERING CLINICIAN: TOD THOMAS   TECHNIQUE: Real-time sonographic evaluation of the kidneys and urinary bladder was performed.   FINDINGS: RIGHT KIDNEY: Right kidney measures  9.9 cm.  Right central renal small hyperechoic focus may represent nonobstructing calculus. No right hydronephrosis. No discrete renal lesion is visualized.   LEFT KIDNEY: Left kidney measures  9.4 cm.  Left central renal small hyperechoic foci may represent nonobstructing calculi. No left hydronephrosis. No discrete renal lesion is visualized.   BLADDER: Urinary bladder was fully decompressed by Alvarado catheter and could not be evaluated at this time.       No hydronephrosis bilaterally.   Urinary bladder fully decompressed by Alvarado catheter and could not be evaluated.   MACRO: None.   Signed by: Maninder Clark 5/6/2025 2:09 PM Dictation workstation:   [id number]    Transthoracic Echo (TTE) Limited  Result Date: 5/5/2025            [facility] [address], [place] [address]             Phone [phone] TRANSTHORACIC ECHOCARDIOGRAM REPORT Patient Name:       FRANKIE Estrella Physician:    [id number] Brijesh Jerez MD Study Date:         5/5/2025              Ordering Provider:    [id number] RENEE LAWTON MRN/PID:            [id number]             Fellow: Accession#:         [id number]         Nurse: Date of Birth/Age:  6/10/1928 / 89 years Sonographer:          Amilcar Gatica RDCS Gender Assigned at  F                    Additional Staff: Birth: Height:             154.94 cm            Admit Date: Weight:             72.58 kg             Admission Status:     Inpatient -                                                                Routine BSA / BMI:          1.72 m2 / 30.23      Department Location:  [facility]                     kg/m2 Blood Pressure: 137 /48 mmHg Study Type:    TRANSTHORACIC ECHO (TTE) LIMITED Diagnosis/ICD: Dyspnea, unspecified-R06.00 Indication:    dyspnea CPT Codes:     Echo Limited-79729; Doppler Limited-21829; Color Doppler-68995 Patient History: Valve Disorders:   Aortic Stenosis. Pertinent History: HTN, Hyperlipidemia and CAD. CKD. Study Detail: The following Echo studies were performed: 2D, M-Mode, Doppler and               color flow. Technically challenging study due to body habitus.  PHYSICIAN INTERPRETATION: Left Ventricle: The left ventricular systolic function is normal, with a visually estimated ejection fraction of 60-65%. There are no regional wall motion abnormalities. The left ventricular cavity size is normal. There is mild increased septal and normal posterior left ventricular wall thickness. Spectral Doppler shows a normal pattern of left ventricular diastolic filling. Left Atrium: The left atrial size is mildly dilated. Right Ventricle: The right ventricle is normal in size. There is normal right ventriclar wall thickness. There is normal right ventricular global systolic function. Right Atrium: The right atrial size is normal. Aortic Valve: The aortic valve is trileaflet. There is mild aortic valve cusp calcification. There is reduced systolic aortic valve  leaflet excursion. There is evidence of mild aortic valve stenosis. The aortic valve dimensionless index is 0.38. There is no evidence of aortic valve regurgitation. The peak instantaneous gradient of the aortic valve is 18 mmHg. The mean gradient of the aortic valve is 10 mmHg. Mitral Valve: The mitral valve is normal in structure. There is normal mitral valve leaflet mobility. There is mild mitral annular calcification. There is trace mitral valve regurgitation. Tricuspid Valve: The tricuspid valve is structurally normal. There is normal tricuspid valve leaflet mobility. There is mild to moderate tricuspid regurgitation. Pulmonic Valve: The pulmonic valve is structurally normal. There is trace pulmonic valve regurgitation. Pericardium: No pericardial effusion noted. Aorta: The aortic root is normal. There is no dilatation of the aortic arch. There is no dilatation of the ascending aorta. There is no dilatation of the aortic root. Pulmonary Artery: The pulmonary artery is normal in size. The tricuspid regurgitant velocity is 2.72 m/s, and with an estimated right atrial pressure of 3 mmHg, the estimated pulmonary artery pressure is normal with the RVSP at 32.6 mmHg. The estimated PASP is 33 mmHg.  CONCLUSIONS:  1. The left ventricular systolic function is normal, with a visually estimated ejection fraction of 60-65%.  2. Trace mitral valve regurgitation.  3. Mild to moderate tricuspid regurgitation.  4. Mild aortic valve stenosis.  5. The peak instantaneous gradient of the aortic valve is 18 mmHg. QUANTITATIVE DATA SUMMARY:  2D MEASUREMENTS:           Normal Ranges: Ao Root d:       3.20 cm   (2.0-3.7cm) IVSd:            0.98 cm   (0.6-1.1cm) LVPWd:           0.80 cm   (0.6-1.1cm) LVIDd:           4.37 cm   (3.9-5.9cm) LVIDs:           2.29 cm LV Mass Index:   72.4 g/m2 LV % FS          47.6 %  LEFT ATRIUM:                Normal Ranges: LA Volume Index: 33.0 ml/m2  AORTA MEASUREMENTS:         Normal Ranges: Ao  Sinus, d:        3.20 cm (2.1-3.5cm) Asc Ao, d:          2.80 cm (2.1-3.4cm)  LV SYSTOLIC FUNCTION:                      Normal Ranges: EF-A4C View:    62 % (>=55%) EF-Visual:      63 % LV EF Reported: 63 %  LV DIASTOLIC FUNCTION:             Normal Ranges: MV Peak E:             0.87 m/s    (0.7-1.2 m/s) MV Peak A:             0.96 m/s    (0.42-0.7 m/s) E/A Ratio:             0.90        (1.0-2.2) MV A Dur:              180.00 msec  MITRAL VALVE:          Normal Ranges: MV DT:        250 msec (150-240msec)  AORTIC VALVE:                      Normal Ranges: AoV Vmax:                2.14 m/s  (<=1.7m/s) AoV Peak P.3 mmHg (<20mmHg) AoV Mean PG:             10.0 mmHg (1.7-11.5mmHg) LVOT Max Joseph:            0.89 m/s  (<=1.1m/s) AoV VTI:                 54.70 cm  (18-25cm) LVOT VTI:                21.00 cm LVOT Diameter:           1.80 cm   (1.8-2.4cm) AoV Area, VTI:           0.98 cm2  (2.5-5.5cm2) AoV Area,Vmax:           1.06 cm2  (2.5-4.5cm2) AoV Dimensionless Index: 0.38  TRICUSPID VALVE/RVSP:          Normal Ranges: Peak TR Velocity:     2.72 m/s RV Syst Pressure:     33 mmHg  (< 30mmHg)  [id number] Brijesh Jerez MD Electronically signed on 2025 at 5:48:18 PM  ** Final **     ECG 12 lead  Result Date: 2025   Poor data quality, interpretation may be adversely affected Sinus tachycardia with Premature ventricular complexes or Fusion complexes Left axis deviation Pulmonary disease pattern Abnormal ECG Confirmed by Pancho Mondragon ([id number]) on 2025 8:28:35 AM    CT angio chest for pulmonary embolism  Result Date: 5/3/2025  Interpreted By:  Pancho Barth, STUDY: CT ANGIO CHEST FOR PULMONARY EMBOLISM;  5/3/2025 5:41 pm   INDICATION: Signs/Symptoms:tachycardic, hypoxic, concern for PE vs pneumonia.   COMPARISON:    ACCESSION NUMBER(S): [id number]   ORDERING CLINICIAN: VIVIANE RILEY   TECHNIQUE: Helical data acquisition of the chest was obtained after intravenous administration of 75 ML  Omnipaque 350 as per PE protocol. Images were reformatted in coronal and sagittal planes. Axial and coronal maximum intensity projection (MIP) images were created and reviewed.   FINDINGS: Ground-glass increasing left upper lobe nodule measures 1.7 cm on image 94 concerning for slow growing neoplasm   Other numerous bilateral pulmonary nodules appear similar in size. Reference right lower lobe nodule measuring a cm on image 163. Reference 8 mm right lower lobe nodule on image 184. Other numerous nodules detected remain stable   Mosaic attenuation in the lungs suggesting small airway or small vessel inflammation. No findings of acute pulmonary embolism. Coronary calcification no evidence of acute thoracic pathology.   Changes of old healed granulomas disease. Robust vascular calcification.   Nephroliths detected. Largest visualized stone measures 9 mm.   Demineralized bones       1. No evidence of acute pulmonary embolism 2. Senescent changes. Heart size is enlarged. Changes of old healed granulomas disease 3. Multiple stable pulmonary nodules. Enlarging left upper lobe ground-glass nodule now measuring under 2 cm concerning for slow growing neoplasm. 4. Bronchial thickening noted. Mild mosaic attenuation seen in the lungs. Query bronchial inflammation. Senescent changes.   MACRO: None.   Signed by: Pancho Barth 5/3/2025 6:19 PM Dictation workstation:   [id number]    XR chest 2 views  Result Date: 5/3/2025  Interpreted By:  Amilcar Galdamez, STUDY: XR CHEST 2 VIEWS;  5/3/2025 3:34 pm   INDICATION: Signs/Symptoms:shortness of breath.     COMPARISON: 02/21/2024   ACCESSION NUMBER(S): [id number]   ORDERING CLINICIAN: VIVIANE RILEY   FINDINGS: Stable heart and mediastinal contours. No pneumothorax or pleural fluid. No focal consolidation or alveolar edema. Mild atherosclerotic calcification of the aortic arch. Mild elevation of the right hemidiaphragm.       1.  No radiographic evidence of acute cardiopulmonary  disease.       MACRO: None   Signed by: Amilcar Galdamez 5/3/2025 4:37 PM Dictation workstation:   [id number]       Assessment & Plan  Acute cystitis without hematuria  Sepsis due to Escherichia coli without acute organ dysfunction (Multi)  -last urine cx 5/5 with ESBL ecoli  -cont meropenem for now  -await urine culture and blood culture results.   Acute on chronic renal failure  Stage 3b chronic kidney disease (Multi)  This may get worse after contrast for CT angio so we will continue fluids for next 12hrs  -repeat BMP in am  -avoid nephrotoxic agents.   -Cr today 1.4  Generalized weakness  Pt/ot eval  Type 2 diabetes mellitus, without long-term current use of insulin (Multi)  -SSI for now  Primary hypertension  -asa, statin, hydralazine, ARB on hold for now.   Hypothyroid  -cont home synthroid dose  Hypomagnesemia  Hyponatremia  -monitor BMP  -replete as needed.   Sinus tachycardia  -EKG showed ST on admission,   -received IVF fluid bolus x 2  -cardiology consulted.   -Recent EKG showed multifocal atrial tachycardia, no clear afib per cardiology.   -NO OAC per cardiology  -started cardizem PO daily     PT/OT eval for discharge planning.     I did discuss code status with patient, given age and comorbid conditions, patient high risk for adverse outcomes with CPR/life support measures. Patient tells me that her ultimate goal is to return home to prior level of function. We discussed that with CPR, patient unlikely to return to prior level of function. Patient to discuss with her daughter. She tells me that her daughter Olive is her HPOA and she has a living will. Time 20min in acp discussion.      Samantha Vila, KAYLENE-CNP

## 2025-06-01 NOTE — CARE PLAN
Problem: Pain - Adult  Goal: Verbalizes/displays adequate comfort level or baseline comfort level  Outcome: Progressing     Problem: Safety - Adult  Goal: Free from fall injury  Outcome: Progressing     Problem: Discharge Planning  Goal: Discharge to home or other facility with appropriate resources  Outcome: Progressing     Problem: Chronic Conditions and Co-morbidities  Goal: Patient's chronic conditions and co-morbidity symptoms are monitored and maintained or improved  Outcome: Progressing     Problem: Nutrition  Goal: Nutrient intake appropriate for maintaining nutritional needs  Outcome: Progressing     Problem: Diabetes  Goal: Achieve decreasing blood glucose levels by end of shift  Outcome: Progressing  Goal: Increase stability of blood glucose readings by end of shift  Outcome: Progressing  Goal: Decrease in ketones present in urine by end of shift  Outcome: Progressing  Goal: Maintain electrolyte levels within acceptable range throughout shift  Outcome: Progressing  Goal: Maintain glucose levels >70mg/dl to <250mg/dl throughout shift  Outcome: Progressing  Goal: No changes in neurological exam by end of shift  Outcome: Progressing  Goal: Learn about and adhere to nutrition recommendations by end of shift  Outcome: Progressing  Goal: Vital signs within normal range for age by end of shift  Outcome: Progressing  Goal: Increase self care and/or family involovement by end of shift  Outcome: Progressing  Goal: Receive DSME education by end of shift  Outcome: Progressing   The patient's goals for the shift include      The clinical goals for the shift include Pt comfort, IV antibiotics

## 2025-06-01 NOTE — CARE PLAN
The patient's goals for the shift include      The clinical goals for the shift include Pt comfort, IV antibiotics

## 2025-06-01 NOTE — CONSULTS
Inpatient consult to Cardiology  Consult performed by: David Monroy MD  Consult ordered by: Manisha Edward MD  Reason for consult: Multifocal atrial tachycardia, Shortness of breath        History Of Present Illness:    Fartun Carey is a 89 y.o. female presenting with not feeling well and shortness of breath.  Patient was recently treated for urinary tract infection as well as pneumonia/COPD.  Was discharged home on tapered steroids dose and antibiotics.  Patient returns to the hospital for complaining of fatigue weakness.  Patient denies having chest pain.  Has been complaining of shortness of breath.  Denies having nausea vomiting diaphoresis.  Patient currently on antibiotics for possible resistant to urinary tract infection and pneumonia.  Underwent chest CT that showed atypical infiltrates questionable pulmonary edema versus atypical infection.  BNP was slightly elevated.  Patient denies having any chest pain.  Troponin were mildly elevated.  Still complaining of shortness of breath.  Denies having orthopnea or PND.  I was asked to see her because her EKG was showing also irregular beats.  Review of systems.  10 point review of systems otherwise negative     Last Recorded Vitals:  Vitals:    06/01/25 0330 06/01/25 0411 06/01/25 0734 06/01/25 1141   BP: 138/54 140/57 125/51 (!) 108/47   BP Location:  Left arm Left arm Left arm   Patient Position:  Lying Lying Lying   Pulse: (!) 102 87 84 83   Resp: 18 15 17 17   Temp:  37.6 °C (99.7 °F) 36.6 °C (97.9 °F) 36.9 °C (98.4 °F)   TempSrc:  Temporal Temporal Temporal   SpO2: 98% 98% 95% 96%   Weight:  72.1 kg (159 lb)     Height:  1.524 m (5')         Last Labs:  CBC - 5/31/2025: 10:39 PM  4.5 10.4 111    33.2      CMP - 6/1/2025: 11:20 AM  7.5 6.1 14 --- 0.5   2.0 3.3 11 165      PTT - No results in last year.  _   _ _     Troponin I, High Sensitivity   Date/Time Value Ref Range Status   05/31/2025 11:44 PM 85 (HH) 0 - 13 ng/L Final     Comment:      Previous result verified on 5/31/2025 2329 on specimen/case [id number] called with component [facility] for procedure Troponin I, High Sensitivity, Initial with value 77 ng/L.   05/31/2025 10:39 PM 77 (HH) 0 - 13 ng/L Final   05/12/2025 11:19 AM 16 (H) 0 - 13 ng/L Final     BNP   Date/Time Value Ref Range Status   05/31/2025 10:39  (H) 0 - 99 pg/mL Final   05/03/2025 03:27  (H) 0 - 99 pg/mL Final     HEMOGLOBIN A1c   Date/Time Value Ref Range Status   03/04/2025 11:11 AM 7.4 (H) <5.7 % of total Hgb Final     Comment:     For someone without known diabetes, a hemoglobin A1c  value of 6.5% or greater indicates that they may have   diabetes and this should be confirmed with a follow-up   test.     For someone with known diabetes, a value <7% indicates   that their diabetes is well controlled and a value   greater than or equal to 7% indicates suboptimal   control. A1c targets should be individualized based on   duration of diabetes, age, comorbid conditions, and   other considerations.     Currently, no consensus exists regarding use of  hemoglobin A1c for diagnosis of diabetes for children.           Hemoglobin A1C   Date/Time Value Ref Range Status   12/02/2024 09:08 AM 6.8 (H) See comment % Final   07/23/2024 11:13 AM 7.0 (H) see below % Final     LDL-CHOLESTEROL   Date/Time Value Ref Range Status   03/04/2025 11:11 AM 47 mg/dL (calc) Final     Comment:     Reference range: <100     Desirable range <100 mg/dL for primary prevention;    <70 mg/dL for patients with CHD or diabetic patients   with > or = 2 CHD risk factors.     LDL-C is now calculated using the Sarika   calculation, which is a validated novel method providing   better accuracy than the Friedewald equation in the   estimation of LDL-C.   Sd PIERCE et al. JANETH. 2013;310(19): 8239-8381   (http://education.[employer]/faq/AOV165)       LDL Calculated   Date/Time Value Ref Range Status   12/02/2024 09:08 AM 53 <=99 mg/dL Final      Comment:                                 Near   Borderline      AGE      Desirable  Optimal    High     High     Very High     0-19 Y     0 - 109     ---    110-129   >/= 130     ----    20-24 Y     0 - 119     ---    120-159   >/= 160     ----      >24 Y     0 -  99   100-129  130-159   160-189     >/=190     07/23/2024 11:13 AM 47 <=99 mg/dL Final     Comment:                                 Near   Borderline      AGE      Desirable  Optimal    High     High     Very High     0-19 Y     0 - 109     ---    110-129   >/= 130     ----    20-24 Y     0 - 119     ---    120-159   >/= 160     ----      >24 Y     0 -  99   100-129  130-159   160-189     >/=190     04/04/2024 11:02 AM 44 <=99 mg/dL Final     Comment:                                 Near   Borderline      AGE      Desirable  Optimal    High     High     Very High     0-19 Y     0 - 109     ---    110-129   >/= 130     ----    20-24 Y     0 - 119     ---    120-159   >/= 160     ----      >24 Y     0 -  99   100-129  130-159   160-189     >/=190       VLDL   Date/Time Value Ref Range Status   12/02/2024 09:08 AM 38 0 - 40 mg/dL Final   07/23/2024 11:13 AM 44 (H) 0 - 40 mg/dL Final   04/04/2024 11:02 AM 38 0 - 40 mg/dL Final      Last I/O:  I/O last 3 completed shifts:  In: - (0 mL/kg)   Out: 200 (2.8 mL/kg) [Urine:200 (0.1 mL/kg/hr)]  Weight: 72.1 kg     Past Cardiology Tests (Last 3 Years):  EKG:  Electrocardiogram, 12-lead PRN ACS symptoms 05/12/2025      ECG 12 lead 05/03/2025      ECG 12 Lead 02/21/2024    Echo:  Transthoracic Echo (TTE) Limited 05/05/2025      Transthoracic Echo (TTE) Complete 11/24/2023    Ejection Fractions:  EF   Date/Time Value Ref Range Status   05/05/2025 02:02 PM 63 %      Cath:  No results found for this or any previous visit from the past 1095 days.    Stress Test:  No results found for this or any previous visit from the past 1095 days.    Cardiac Imaging:  No results found for this or any previous visit from the past  1095 days.      Past Medical History:  She has a past medical history of Diabetes (Multi), Diabetes mellitus (Multi), Disease of thyroid gland, Hypertension, and Renal disease.    Past Surgical History:  She has a past surgical history that includes Hysterectomy; Back surgery; and Appendectomy.      Social History:  She reports that she has never smoked. She has never used smokeless tobacco. She reports that she does not drink alcohol and does not use drugs.    Family History:  Family History[1]     Allergies:  Amoxicillin, Amoxicillin-pot clavulanate, Glimepiride, Lisinopril, Metformin hcl, Tetracycline hcl, and Cephalexin    Inpatient Medications:  Scheduled Medications[2]  PRN Medications[3]  Continuous Medications[4]  Outpatient Medications:  Current Outpatient Medications   Medication Instructions    aspirin 81 mg chewable tablet 1 tablet, Daily    atorvastatin (Lipitor) 80 mg tablet 1 tablet, Daily    blood sugar diagnostic (Accu-Chek Guide test strips) strip 1 strip, subcutaneous, Daily    cholecalciferol (Vitamin D-3) 25 MCG (1000 UT) capsule 1 capsule, Daily    cloNIDine (CATAPRES) 0.1 mg, oral, 2 times daily    cyanocobalamin (Vitamin B-12) 1,000 mcg tablet 1 tablet, Daily    docusate sodium (COLACE) 100 mg, oral, 2 times daily PRN    hydrALAZINE (APRESOLINE) 25 mg, oral, 3 times daily    levothyroxine (SYNTHROID, LEVOXYL) 88 mcg, oral, Daily    losartan (COZAAR) 25 mg, oral, 2 times daily, patient started taking BID on 5/28 after reporting her BPs to her PCP    SITagliptin phosphate (JANUVIA) 50 mg, oral, Daily    vit C/E/Zn/coppr/lutein/zeaxan (PRESERVISION AREDS-2 ORAL) 1 capsule, Every 12 hours       Physical Exam:  General: Patient is in no acute distress.  HEENT: atraumatic normocephalic.  Neck: is supple jugular venous pressure within normal limits no thyromegaly.  Cardiovascular ir #1 shortness of breath.  Regular rate and rhythm normal heart sounds no murmurs rubs or gallops.  Lungs: clear to  auscultation bilaterally.  Abdomen: is soft nontender.  Extremities warm to touch no edema.  Neurologic examination: patient is awake alert oriented to person, place, date/time.  Psychiatric examination: patient has good insight denies feeling suicidal and depressed.  Pulses 2+ intact bilaterally     Assessment/Plan   #1 shortness of breath.  Patient admitted to the hospital for weakness shortness of breath.  Was treated recently for pneumonia/urinary tract infection.  She may be slightly overloaded.  BNP slightly elevated.  Sodium slightly low.  Has no lower extremity edema.  Will give her one-time furosemide 20 mg IV and reassess.  Meanwhile patient had a recent echocardiogram in May 3, 2025 showed normal ejection fraction and mild pulmonary hypertension with mild aortic valve stenosis.      #2 irregular heartbeat.  EKG showing multifocal atrial tachycardia.  There is no clear evidence of atrial fibrillation.  Patient may have had atrial fibrillation in the past I asked her she is not sure neither her daughter.  I do not recommend oral anticoagulation for the time being.  I will stop her clonidine and switch her to diltiazem 180 mg oral daily for better rate control and may help with multifocal atrial tachycardia.  Patient received IV magnesium recommend magnesium and potassium within normal limit.  Check TSH.  Echocardiogram showed normal ejection fraction recently.    #3 hypertension.  Will stop clonidine and switch her to diltiazem 180 mg oral daily for better heart rate control as well as to control her blood pressure.  Diastolic blood pressure was low.  We will monitor.    4.  Urinary tract infection/leading to weakness management by primary team.    #5 elevated troponin.  Patient has likely elevated troponin.  No evidence of ischemia on the EKG.  Has no chest pain.  Recommend monitoring for now.  Outpatient follow-up      Site Assessment Clean;Dry;Intact 06/01/25 8074   Dressing Status Clean;Dry 06/01/25  0726   Number of days: 1       Peripheral IV 05/31/25 20 G Anterior;Left;Upper Arm (Active)   Site Assessment Clean;Intact;Dry 06/01/25 0725   Dressing Status Clean;Dry 06/01/25 0725   Number of days: 1       Code Status:  Full Code    David Monroy MD         [1]   Family History  Problem Relation Name Age of Onset    Heart disease Mother      Heart disease Father      Heart disease Brother      No Known Problems Son      Heart disease Maternal Grandmother      Heart disease Maternal Grandfather      Heart disease Paternal Grandmother      Heart disease Paternal Grandfather     [2]   Scheduled medications   Medication Dose Route Frequency    aspirin  81 mg oral Daily    atorvastatin  80 mg oral Nightly    cloNIDine  0.1 mg oral BID    cyanocobalamin  1,000 mcg oral Daily    docusate sodium  100 mg oral Daily    heparin (porcine)  5,000 Units subcutaneous q8h    hydrALAZINE  25 mg oral TID    insulin lispro  0-10 Units subcutaneous TID AC    levothyroxine  88 mcg oral Daily    meropenem  1 g intravenous q12h    pantoprazole  40 mg oral Daily before breakfast    Or    pantoprazole  40 mg intravenous Daily before breakfast   [3]   PRN medications   Medication    acetaminophen    Or    acetaminophen    Or    acetaminophen    dextrose    dextrose    glucagon   [4]   Continuous Medications   Medication Dose Last Rate    lactated Ringer's  100 mL/hr 100 mL/hr (06/01/25 0552)

## 2025-06-01 NOTE — ED PROVIDER NOTES
HPI   Chief Complaint   Patient presents with    Fatigue     Pt called ems for fatigue believing that she had a UTI. Pt denies all  symptoms. Pt is in a fib rvr at 150-160 and endorses SOB. Pt denies taking any medications for a fib.        Patient is a 89-year-old female presenting with a chief complaint of fatigue.  Patient says she is feels very fatigued, that she might have a UTI, patient was tachycardic for EMS, they also noted that she seemed to be somewhat short of breath, patient has no fevers or chills, no chest pain, no dizziness or blurred vision, has similar episode beginning of the month, that led to admission, patient has no other acute complaints at this time.      History provided by:  Patient, EMS personnel and relative          Patient History   Medical History[1]  Surgical History[2]  Family History[3]  Social History[4]    Physical Exam   ED Triage Vitals [05/31/25 2227]   Temperature Heart Rate Respirations BP   36.8 °C (98.2 °F) (!) 155 18 149/84      Pulse Ox Temp Source Heart Rate Source Patient Position   96 % Temporal Monitor --      BP Location FiO2 (%)     -- --       Physical Exam  Vitals and nursing note reviewed. Exam conducted with a chaperone present.   Constitutional:       General: She is not in acute distress.     Appearance: Normal appearance. She is well-developed and normal weight. She is not ill-appearing, toxic-appearing or diaphoretic.   HENT:      Head: Normocephalic and atraumatic.      Nose: Nose normal.      Mouth/Throat:      Mouth: Mucous membranes are moist.      Pharynx: Oropharynx is clear.   Eyes:      Extraocular Movements: Extraocular movements intact.      Conjunctiva/sclera: Conjunctivae normal.      Pupils: Pupils are equal, round, and reactive to light.   Cardiovascular:      Rate and Rhythm: Regular rhythm. Tachycardia present.      Pulses: Normal pulses.      Heart sounds: Normal heart sounds. No murmur heard.  Pulmonary:      Effort: Pulmonary effort is  normal. No respiratory distress.      Breath sounds: Normal breath sounds.   Abdominal:      General: Abdomen is flat. Bowel sounds are normal.      Palpations: Abdomen is soft.      Tenderness: There is no abdominal tenderness.   Musculoskeletal:         General: No swelling. Normal range of motion.      Cervical back: Normal range of motion and neck supple.   Skin:     General: Skin is warm and dry.      Capillary Refill: Capillary refill takes less than 2 seconds.   Neurological:      General: No focal deficit present.      Mental Status: She is alert and oriented to person, place, and time. Mental status is at baseline.      Cranial Nerves: No cranial nerve deficit.   Psychiatric:         Mood and Affect: Mood normal.         Behavior: Behavior normal.         Thought Content: Thought content normal.         Judgment: Judgment normal.           ED Course & MDM   ED Course as of 06/01/25 0248   Sat May 31, 2025   2235 EKG interpreted by myself independently, EKG shows a sinus tachycardia, rate 146 bpm, W[place] interval 142, QRS 66, , QTc 433, frequent PVCs, no ST elevation or depression, negative acute MI. [MARKOS]      ED Course User Index  [MARKOS] Delmar Machado,          Diagnoses as of 06/01/25 0248   Generalized weakness   Hypomagnesemia   Elevated troponin   Shortness of breath   Acute cystitis without hematuria   Sepsis, due to unspecified organism, unspecified whether acute organ dysfunction present (Multi)                 No data recorded     Pinky Coma Scale Score: 15 (05/31/25 2231 : Adriana Almanza RN)                           Medical Decision Making  Patient seen and evaluated at bedside, patient is in no acute distress.  I will order a CBC, CMP, lactate, blood cultures, 500 cc normal saline, VBG, COVID, flu, RSV, magnesium, troponin, BNP, TSH, CT angio PE study,. Differential diagnosis includes but is not limited to UTI, electrolyte, flu, RSV, PE, ACS,  Patient's VBG shows a LLO554, phosphorus 2.0,  magnesium 1.15, this is replenished, CMP shows sodium 134, BUN 27, creatinine 1.47, GFR 33, , hemoglobin 10.4, patient's troponin 77, repeat troponin 85, TSH within normal limits, urinalysis is indicative of infection, patient given IV Levaquin, patient CT scan as below, patient given a 500 cc additional bolus of fluids, patient will be admitted to the general medicine team for further evaluation and treatment.  No  Diagnosis: Acute cystitis, elevated troponin, generalized weakness, hypomagnesemia, sepsis, shortness of breath  CT angio chest for pulmonary embolism   Final Result    No acute pulmonary embolism.          Increased prominence and extent of central predominant ground-glass    opacities raising suspicion for pulmonary edema versus viral    infection, the former favored the please correlate clinically.          Small pleural effusion.          Numerous bilateral solid pulmonary nodules, some with speckled    calcifications and given stability dating back to 2023 and splenic    calcified granulomas these are most favored to represent sequela of    remote granulomatous infection.          MACRO:    None.          Signed by: Syd Barnard 6/1/2025 12:57 AM    Dictation workstation:   [id number]     Results for orders placed or performed during the hospital encounter of 05/31/25  -CBC and Auto Differential:   Collection Time: 05/31/25 10:39 PM       Result                      Value             Ref Range           WBC                         4.5               4.4 - 11.3 x*       nRBC                        0.4 (H)           0.0 - 0.0 /1*       RBC                         3.42 (L)          4.00 - 5.20 *       Hemoglobin                  10.4 (L)          12.0 - 16.0 *       Hematocrit                  33.2 (L)          36.0 - 46.0 %       MCV                         97                80 - 100 fL         MCH                         30.4              26.0 - 34.0 *       MCHC                         31.3 (L)          32.0 - 36.0 *       RDW                         15.2 (H)          11.5 - 14.5 %       Platelets                   111 (L)           150 - 450 x1*       Immature Granulocytes *     0.7               0.0 - 0.9 %         Immature Granulocytes *     0.03              0.00 - 0.50 *  -Phosphorus:   Collection Time: 05/31/25 10:39 PM       Result                      Value             Ref Range           Phosphorus                  2.0 (L)           2.5 - 4.9 mg*  -Magnesium:   Collection Time: 05/31/25 10:39 PM       Result                      Value             Ref Range           Magnesium                   1.15 (L)          1.60 - 2.40 *  -Comprehensive metabolic panel:   Collection Time: 05/31/25 10:39 PM       Result                      Value             Ref Range           Glucose                     100 (H)           74 - 99 mg/dL       Sodium                      134 (L)           136 - 145 mm*       Potassium                   3.8               3.5 - 5.3 mm*       Chloride                    102               98 - 107 mmo*       Bicarbonate                 21                21 - 32 mmol*       Anion Gap                   15                10 - 20 mmol*       Urea Nitrogen               27 (H)            6 - 23 mg/dL        Creatinine                  1.47 (H)          0.50 - 1.05 *       eGFR                        33 (L)            >60 mL/min/1*       Calcium                     8.2 (L)           8.6 - 10.3 m*       Albumin                     3.3 (L)           3.4 - 5.0 g/*       Alkaline Phosphatase        165 (H)           33 - 136 U/L        Total Protein               6.1 (L)           6.4 - 8.2 g/*       AST                         14                9 - 39 U/L          Bilirubin, Total            0.5               0.0 - 1.2 mg*       ALT                         11                7 - 45 U/L     -B-Type Natriuretic Peptide:   Collection Time: 05/31/25 10:39 PM       Result                       Value             Ref Range           BNP                         362 (H)           0 - 99 pg/mL   -TSH with reflex to Free T4 if abnormal:   Collection Time: 05/31/25 10:39 PM       Result                      Value             Ref Range           Thyroid Stimulating Ho*     3.55              0.44 - 3.98 *  -Troponin I, High Sensitivity, Initial:   Collection Time: 05/31/25 10:39 PM       Result                      Value             Ref Range           Troponin I, High Sensi*     77 (HH)           0 - 13 ng/L    -Blood Gas Venous Full Panel:   Collection Time: 05/31/25 10:39 PM       Result                      Value             Ref Range           POCT pH, Venous             7.43              7.33 - 7.43 *       POCT pCO2, Venous           36 (L)            41 - 51 mm Hg       POCT pO2, Venous            31 (L)            35 - 45 mm Hg       POCT SO2, Venous            57                45 - 75 %           POCT Oxy Hemoglobin, V*     56.6              45.0 - 75.0 %       POCT Hematocrit Calcul*     30.0 (L)          36.0 - 46.0 %       POCT Sodium, Venous         133 (L)           136 - 145 mm*       POCT Potassium, Venous      4.1               3.5 - 5.3 mm*       POCT Chloride, Venous       104               98 - 107 mmo*       POCT Ionized Calicum, *     1.13              1.10 - 1.33 *       POCT Glucose, Venous        94                74 - 99 mg/dL       POCT Lactate, Venous        2.5 (H)           0.4 - 2.0 mm*       POCT Base Excess, Veno*     -0.2              -2.0 - 3.0 m*       POCT HCO3 Calculated, *     23.9              22.0 - 26.0 *       POCT Hemoglobin, Venous     9.9 (L)           12.0 - 16.0 *       POCT Anion Gap, Venous      9.0 (L)           10.0 - 25.0 *       Patient Temperature         37.0              degrees Cels*       FiO2                        21                %              -Lactate:   Collection Time: 05/31/25 10:39 PM       Result                      Value             Ref  Range           Lactate                     2.5 (H)           0.4 - 2.0 mm*  -Manual Differential:   Collection Time: 05/31/25 10:39 PM       Result                      Value             Ref Range           Neutrophils %, Manual       72.0              40.0 - 80.0 %       Bands %, Manual             19.0              0.0 - 5.0 %         Lymphocytes %, Manual       9.0               13.0 - 44.0 %       Monocytes %, Manual         0.0               2.0 - 10.0 %        Eosinophils %, Manual       0.0               0.0 - 6.0 %         Basophils %, Manual         0.0               0.0 - 2.0 %         Seg Neutrophils Absolu*     3.24              1.60 - 5.00 *       Bands Absolute, Manual      0.86 (H)          0.00 - 0.50 *       Lymphocytes Absolute, *     0.41 (L)          0.80 - 3.00 *       Monocytes Absolute, Ma*     0.00 (L)          0.05 - 0.80 *       Eosinophils Absolute, *     0.00              0.00 - 0.40 *       Basophils Absolute, Ma*     0.00              0.00 - 0.10 *       Total Cells Counted         100                                   Neutrophils Absolute, *     4.10              1.60 - 5.50 *       RBC Morphology              See Below                             Polychromasia               Mild                             -Sars-CoV-2, Influenza A/B and RSV PCR:   Collection Time: 05/31/25 10:40 PM       Result                      Value             Ref Range           Coronavirus 2019, PCR       Not Detected      Not Detected        Flu A Result                Not Detected      Not Detected        Flu B Result                Not Detected      Not Detected        RSV PCR                     Not Detected      Not Detected   -Troponin, High Sensitivity, 1 Hour:   Collection Time: 05/31/25 11:44 PM       Result                      Value             Ref Range           Troponin I, High Sensi*     85 (HH)           0 - 13 ng/L    -Blood Gas Lactic Acid, Venous:   Collection Time: 05/31/25 11:44 PM        Result                      Value             Ref Range           POCT Lactate, Venous        1.9               0.4 - 2.0 mm*  -Lactate:   Collection Time: 05/31/25 11:44 PM       Result                      Value             Ref Range           Lactate                     1.9               0.4 - 2.0 mm*  -Urinalysis with Reflex Culture and Microscopic:   Collection Time: 06/01/25  1:52 AM       Result                      Value             Ref Range           Color, Urine                                  Light-Yellow*   Light-Orange (N)       Appearance, Urine           Ex.Turbid (N)     Clear               Specific Gravity, Urine     1.029             1.005 - 1.035       pH, Urine                   5.5               5.0, 5.5, 6.*       Protein, Urine              70 (1+) (A)       NEGATIVE, 10*       Glucose, Urine              Normal            Normal mg/dL        Blood, Urine                                  NEGATIVE mg/*   0.03 (TRACE) (A)       Ketones, Urine              NEGATIVE          NEGATIVE mg/*       Bilirubin, Urine            NEGATIVE          NEGATIVE mg/*       Urobilinogen, Urine         Normal            Normal mg/dL        Nitrite, Urine              NEGATIVE          NEGATIVE            Leukocyte Esterase, Ur*                       NEGATIVE        500 Sarahi/uL (A)  -Microscopic Only, Urine:   Collection Time: 06/01/25  1:52 AM       Result                      Value             Ref Range           WBC, Urine                  >50 (A)           1-5, NONE /H*       WBC Clumps, Urine           MANY              Reference ra*       RBC, Urine                  11-20 (A)         NONE, 1-2, 3*  .        Procedure  Critical Care    Performed by: Delmar Machado DO  Authorized by: Delmar Machado DO    Critical care provider statement:     Critical care time (minutes):  45    Critical care time was exclusive of:  Separately billable procedures and treating other patients and teaching time    Critical care was  necessary to treat or prevent imminent or life-threatening deterioration of the following conditions:  Sepsis    Critical care was time spent personally by me on the following activities:  Blood draw for specimens, development of treatment plan with patient or surrogate, discussions with consultants, discussions with primary provider, evaluation of patient's response to treatment, obtaining history from patient or surrogate, re-evaluation of patient's condition, pulse oximetry, ordering and review of radiographic studies, ordering and review of laboratory studies and ordering and performing treatments and interventions    Care discussed with: admitting provider      Sections of this report were created using voice-to-text technology and may contain errors in translation    Delmar Machado DO  Emergency Medicine           [1]   Past Medical History:  Diagnosis Date    Diabetes (Multi)     Diabetes mellitus (Multi)     Disease of thyroid gland     Hypertension     Renal disease    [2]   Past Surgical History:  Procedure Laterality Date    APPENDECTOMY      BACK SURGERY      HYSTERECTOMY     [3]   Family History  Problem Relation Name Age of Onset    Heart disease Mother      Heart disease Father      Heart disease Brother      No Known Problems Son      Heart disease Maternal Grandmother      Heart disease Maternal Grandfather      Heart disease Paternal Grandmother      Heart disease Paternal Grandfather     [4]   Social History  Tobacco Use    Smoking status: Never    Smokeless tobacco: Never   Vaping Use    Vaping status: Never Used   Substance Use Topics    Alcohol use: Never    Drug use: Never        Delmar Machado DO  06/01/25 0248

## 2025-06-02 ENCOUNTER — APPOINTMENT (OUTPATIENT)
Dept: CARDIOLOGY | Facility: HOSPITAL | Age: OVER 89
End: 2025-06-02
Payer: MEDICARE

## 2025-06-02 LAB
ACANTHOCYTES BLD QL SMEAR: ABNORMAL
ALBUMIN SERPL BCP-MCNC: 2.5 G/DL (ref 3.4–5)
ALP SERPL-CCNC: 99 U/L (ref 33–136)
ALT SERPL W P-5'-P-CCNC: 7 U/L (ref 7–45)
ANION GAP SERPL CALCULATED.3IONS-SCNC: 11 MMOL/L (ref 10–20)
AST SERPL W P-5'-P-CCNC: 9 U/L (ref 9–39)
ATRIAL RATE: 146 BPM
BASOPHILS # BLD MANUAL: 0 X10*3/UL (ref 0–0.1)
BASOPHILS NFR BLD MANUAL: 0 %
BILIRUB SERPL-MCNC: 0.6 MG/DL (ref 0–1.2)
BUN SERPL-MCNC: 26 MG/DL (ref 6–23)
BURR CELLS BLD QL SMEAR: ABNORMAL
CALCIUM SERPL-MCNC: 7.8 MG/DL (ref 8.6–10.3)
CHLORIDE SERPL-SCNC: 106 MMOL/L (ref 98–107)
CO2 SERPL-SCNC: 22 MMOL/L (ref 21–32)
CREAT SERPL-MCNC: 1.26 MG/DL (ref 0.5–1.05)
EGFRCR SERPLBLD CKD-EPI 2021: 39 ML/MIN/1.73M*2
EOSINOPHIL # BLD MANUAL: 0 X10*3/UL (ref 0–0.4)
EOSINOPHIL NFR BLD MANUAL: 0 %
ERYTHROCYTE [DISTWIDTH] IN BLOOD BY AUTOMATED COUNT: 15.4 % (ref 11.5–14.5)
GLUCOSE BLD MANUAL STRIP-MCNC: 105 MG/DL (ref 74–99)
GLUCOSE BLD MANUAL STRIP-MCNC: 142 MG/DL (ref 74–99)
GLUCOSE BLD MANUAL STRIP-MCNC: 163 MG/DL (ref 74–99)
GLUCOSE BLD MANUAL STRIP-MCNC: 163 MG/DL (ref 74–99)
GLUCOSE SERPL-MCNC: 97 MG/DL (ref 74–99)
HCT VFR BLD AUTO: 26.8 % (ref 36–46)
HGB BLD-MCNC: 8.4 G/DL (ref 12–16)
IMM GRANULOCYTES # BLD AUTO: 0.11 X10*3/UL (ref 0–0.5)
IMM GRANULOCYTES NFR BLD AUTO: 1 % (ref 0–0.9)
LYMPHOCYTES # BLD MANUAL: 0.95 X10*3/UL (ref 0.8–3)
LYMPHOCYTES NFR BLD MANUAL: 9 %
MCH RBC QN AUTO: 30.3 PG (ref 26–34)
MCHC RBC AUTO-ENTMCNC: 31.3 G/DL (ref 32–36)
MCV RBC AUTO: 97 FL (ref 80–100)
MONOCYTES # BLD MANUAL: 0.95 X10*3/UL (ref 0.05–0.8)
MONOCYTES NFR BLD MANUAL: 9 %
NEUTROPHILS # BLD MANUAL: 8.61 X10*3/UL (ref 1.6–5.5)
NEUTS BAND # BLD MANUAL: 0.63 X10*3/UL (ref 0–0.5)
NEUTS BAND NFR BLD MANUAL: 6 %
NEUTS SEG # BLD MANUAL: 7.98 X10*3/UL (ref 1.6–5)
NEUTS SEG NFR BLD MANUAL: 76 %
NRBC BLD-RTO: 0 /100 WBCS (ref 0–0)
OVALOCYTES BLD QL SMEAR: ABNORMAL
P AXIS: 56 DEGREES
P OFFSET: 190 MS
P ONSET: 159 MS
PLATELET # BLD AUTO: 110 X10*3/UL (ref 150–450)
POLYCHROMASIA BLD QL SMEAR: ABNORMAL
POTASSIUM SERPL-SCNC: 4.2 MMOL/L (ref 3.5–5.3)
PR INTERVAL: 142 MS
PROT SERPL-MCNC: 4.6 G/DL (ref 6.4–8.2)
Q ONSET: 230 MS
QRS COUNT: 24 BEATS
QRS DURATION: 66 MS
QT INTERVAL: 278 MS
QTC CALCULATION(BAZETT): 433 MS
QTC FREDERICIA: 373 MS
R AXIS: -43 DEGREES
RBC # BLD AUTO: 2.77 X10*6/UL (ref 4–5.2)
RBC MORPH BLD: ABNORMAL
SODIUM SERPL-SCNC: 135 MMOL/L (ref 136–145)
T AXIS: 78 DEGREES
T OFFSET: 369 MS
TOTAL CELLS COUNTED BLD: 100
VENTRICULAR RATE: 146 BPM
WBC # BLD AUTO: 10.5 X10*3/UL (ref 4.4–11.3)

## 2025-06-02 PROCEDURE — 2500000001 HC RX 250 WO HCPCS SELF ADMINISTERED DRUGS (ALT 637 FOR MEDICARE OP): Performed by: INTERNAL MEDICINE

## 2025-06-02 PROCEDURE — 82947 ASSAY GLUCOSE BLOOD QUANT: CPT

## 2025-06-02 PROCEDURE — 93005 ELECTROCARDIOGRAM TRACING: CPT

## 2025-06-02 PROCEDURE — 2500000002 HC RX 250 W HCPCS SELF ADMINISTERED DRUGS (ALT 637 FOR MEDICARE OP, ALT 636 FOR OP/ED): Performed by: INTERNAL MEDICINE

## 2025-06-02 PROCEDURE — 80053 COMPREHEN METABOLIC PANEL: CPT | Performed by: INTERNAL MEDICINE

## 2025-06-02 PROCEDURE — 2500000001 HC RX 250 WO HCPCS SELF ADMINISTERED DRUGS (ALT 637 FOR MEDICARE OP)

## 2025-06-02 PROCEDURE — 36415 COLL VENOUS BLD VENIPUNCTURE: CPT | Performed by: NURSE PRACTITIONER

## 2025-06-02 PROCEDURE — 99232 SBSQ HOSP IP/OBS MODERATE 35: CPT

## 2025-06-02 PROCEDURE — 36415 COLL VENOUS BLD VENIPUNCTURE: CPT | Performed by: INTERNAL MEDICINE

## 2025-06-02 PROCEDURE — 1200000002 HC GENERAL ROOM WITH TELEMETRY DAILY

## 2025-06-02 PROCEDURE — 85007 BL SMEAR W/DIFF WBC COUNT: CPT | Performed by: INTERNAL MEDICINE

## 2025-06-02 PROCEDURE — 2500000004 HC RX 250 GENERAL PHARMACY W/ HCPCS (ALT 636 FOR OP/ED): Performed by: INTERNAL MEDICINE

## 2025-06-02 PROCEDURE — 87040 BLOOD CULTURE FOR BACTERIA: CPT | Mod: TRILAB | Performed by: NURSE PRACTITIONER

## 2025-06-02 PROCEDURE — 85027 COMPLETE CBC AUTOMATED: CPT | Performed by: INTERNAL MEDICINE

## 2025-06-02 PROCEDURE — 99232 SBSQ HOSP IP/OBS MODERATE 35: CPT | Performed by: NURSE PRACTITIONER

## 2025-06-02 PROCEDURE — 93010 ELECTROCARDIOGRAM REPORT: CPT | Performed by: INTERNAL MEDICINE

## 2025-06-02 RX ORDER — METOPROLOL SUCCINATE 25 MG/1
25 TABLET, EXTENDED RELEASE ORAL DAILY
Status: DISCONTINUED | OUTPATIENT
Start: 2025-06-02 | End: 2025-06-05 | Stop reason: HOSPADM

## 2025-06-02 RX ADMIN — MEROPENEM AND SODIUM CHLORIDE 1 G: 1 INJECTION, SOLUTION INTRAVENOUS at 06:11

## 2025-06-02 RX ADMIN — HYDRALAZINE HYDROCHLORIDE 25 MG: 25 TABLET ORAL at 09:00

## 2025-06-02 RX ADMIN — HEPARIN SODIUM 5000 UNITS: 5000 INJECTION, SOLUTION INTRAVENOUS; SUBCUTANEOUS at 06:11

## 2025-06-02 RX ADMIN — MEROPENEM AND SODIUM CHLORIDE 1 G: 1 INJECTION, SOLUTION INTRAVENOUS at 18:11

## 2025-06-02 RX ADMIN — ASPIRIN 81 MG: 81 TABLET, CHEWABLE ORAL at 09:00

## 2025-06-02 RX ADMIN — SODIUM CHLORIDE, SODIUM LACTATE, POTASSIUM CHLORIDE, AND CALCIUM CHLORIDE 100 ML/HR: 600; 310; 30; 20 INJECTION, SOLUTION INTRAVENOUS at 02:57

## 2025-06-02 RX ADMIN — HEPARIN SODIUM 5000 UNITS: 5000 INJECTION, SOLUTION INTRAVENOUS; SUBCUTANEOUS at 22:24

## 2025-06-02 RX ADMIN — HYDRALAZINE HYDROCHLORIDE 25 MG: 25 TABLET ORAL at 21:19

## 2025-06-02 RX ADMIN — ATORVASTATIN CALCIUM 80 MG: 80 TABLET, FILM COATED ORAL at 21:19

## 2025-06-02 RX ADMIN — LEVOTHYROXINE SODIUM 88 MCG: 0.09 TABLET ORAL at 06:11

## 2025-06-02 RX ADMIN — ACETAMINOPHEN 650 MG: 325 TABLET ORAL at 19:42

## 2025-06-02 RX ADMIN — HYDRALAZINE HYDROCHLORIDE 25 MG: 25 TABLET ORAL at 15:15

## 2025-06-02 RX ADMIN — Medication 1000 MCG: at 09:00

## 2025-06-02 RX ADMIN — METOPROLOL SUCCINATE 25 MG: 25 TABLET, EXTENDED RELEASE ORAL at 11:57

## 2025-06-02 RX ADMIN — DOCUSATE SODIUM 100 MG: 100 CAPSULE, LIQUID FILLED ORAL at 09:00

## 2025-06-02 RX ADMIN — ACETAMINOPHEN 650 MG: 325 TABLET ORAL at 10:39

## 2025-06-02 RX ADMIN — DILTIAZEM HYDROCHLORIDE 180 MG: 180 CAPSULE, COATED, EXTENDED RELEASE ORAL at 09:00

## 2025-06-02 RX ADMIN — PANTOPRAZOLE SODIUM 40 MG: 40 TABLET, DELAYED RELEASE ORAL at 06:11

## 2025-06-02 RX ADMIN — INSULIN LISPRO 2 UNITS: 100 INJECTION, SOLUTION INTRAVENOUS; SUBCUTANEOUS at 11:57

## 2025-06-02 RX ADMIN — HEPARIN SODIUM 5000 UNITS: 5000 INJECTION, SOLUTION INTRAVENOUS; SUBCUTANEOUS at 15:15

## 2025-06-02 ASSESSMENT — ENCOUNTER SYMPTOMS
CARDIOVASCULAR NEGATIVE: 1
COUGH: 1
EYES NEGATIVE: 1
CHILLS: 1
FATIGUE: 1
WEAKNESS: 1
MUSCULOSKELETAL NEGATIVE: 1
GASTROINTESTINAL NEGATIVE: 1
ENDOCRINE NEGATIVE: 1
PSYCHIATRIC NEGATIVE: 1
FEVER: 1

## 2025-06-02 ASSESSMENT — PAIN SCALES - GENERAL
PAINLEVEL_OUTOF10: 0 - NO PAIN

## 2025-06-02 ASSESSMENT — COGNITIVE AND FUNCTIONAL STATUS - GENERAL
TOILETING: A LITTLE
STANDING UP FROM CHAIR USING ARMS: A LITTLE
HELP NEEDED FOR BATHING: A LITTLE
WALKING IN HOSPITAL ROOM: A LITTLE
CLIMB 3 TO 5 STEPS WITH RAILING: A LITTLE
PERSONAL GROOMING: A LITTLE
MOBILITY SCORE: 20
DAILY ACTIVITIY SCORE: 20
DRESSING REGULAR UPPER BODY CLOTHING: A LITTLE
MOVING TO AND FROM BED TO CHAIR: A LITTLE

## 2025-06-02 ASSESSMENT — PAIN - FUNCTIONAL ASSESSMENT
PAIN_FUNCTIONAL_ASSESSMENT: 0-10
PAIN_FUNCTIONAL_ASSESSMENT: 0-10

## 2025-06-02 NOTE — PROGRESS NOTES
06/02/25 1509   Discharge Planning   Living Arrangements Alone   Support Systems Children;Family members;Friends/neighbors   Assistance Needed uses rollator   Type of Residence Private residence   Number of Stairs to Enter Residence 1   Number of Stairs Within Residence 0   Do you have animals or pets at home? Yes   Type of Animals or Pets dog   Who is requesting discharge planning? Patient   Home or Post Acute Services In home services   Type of Home Care Services Home PT;Home nursing visits   Expected Discharge Disposition Home H   Does the patient need discharge transport arranged? No   Patient Choice   Provider Choice list and CMS website (https://medicare.gov/care-compare#search) for post-acute Quality and Resource Measure Data were provided and reviewed with: Patient   Patient / Family choosing to utilize agency / facility established prior to hospitalization Yes  (Has had  HHC in the past, [facility], RN)     Patient is agreeable to [facility] - has had  in past and would like them again.  [facility] RN.    Internal referral will be needed.

## 2025-06-02 NOTE — CARE PLAN
The patient's goals for the shift include  comfort and safety, antibiotics, rest, feel better.     The clinical goals for the shift include monitor labs/VS, maintain ISO precautions, antibiotics, monitor for fever, encourage activity, maintain safety, no falls, promote rest, discharge planning.    No barriers toward meeting these goals. Call light and personal belongings within reach, fall risk interventions in place. Plan of care ongoing, no additional needs at this time.       Problem: Pain - Adult  Goal: Verbalizes/displays adequate comfort level or baseline comfort level  Outcome: Progressing     Problem: Safety - Adult  Goal: Free from fall injury  Outcome: Progressing     Problem: Discharge Planning  Goal: Discharge to home or other facility with appropriate resources  Outcome: Progressing     Problem: Chronic Conditions and Co-morbidities  Goal: Patient's chronic conditions and co-morbidity symptoms are monitored and maintained or improved  Outcome: Progressing     Problem: Nutrition  Goal: Nutrient intake appropriate for maintaining nutritional needs  Outcome: Progressing     Problem: Diabetes  Goal: Achieve decreasing blood glucose levels by end of shift  Outcome: Progressing  Goal: Increase stability of blood glucose readings by end of shift  Outcome: Progressing  Goal: Decrease in ketones present in urine by end of shift  Outcome: Progressing  Goal: Maintain electrolyte levels within acceptable range throughout shift  Outcome: Progressing  Goal: Maintain glucose levels >70mg/dl to <250mg/dl throughout shift  Outcome: Progressing  Goal: No changes in neurological exam by end of shift  Outcome: Progressing  Goal: Learn about and adhere to nutrition recommendations by end of shift  Outcome: Progressing  Goal: Vital signs within normal range for age by end of shift  Outcome: Progressing  Goal: Increase self care and/or family involovement by end of shift  Outcome: Progressing  Goal: Receive DSME education by  end of shift  Outcome: Progressing     Problem: Fall/Injury  Goal: Not fall by end of shift  Outcome: Progressing  Goal: Be free from injury by end of the shift  Outcome: Progressing  Goal: Verbalize understanding of personal risk factors for fall in the hospital  Outcome: Progressing  Goal: Verbalize understanding of risk factor reduction measures to prevent injury from fall in the home  Outcome: Progressing  Goal: Use assistive devices by end of the shift  Outcome: Progressing  Goal: Pace activities to prevent fatigue by end of the shift  Outcome: Progressing

## 2025-06-02 NOTE — PROGRESS NOTES
Fartun Carey is a 89 y.o. female on day 1 of admission presenting with Generalized weakness.      Subjective   Patient not feeling well, tired and short of breath. She got up and went into the bathroom where she became tachycardic. She was helped into bed and a 12 lead EKG was done; sinus tachycardic.        Objective     Last Recorded Vitals  /55   Pulse 70   Temp 35.5 °C (95.9 °F) (Temporal)   Resp 17   Wt 72.1 kg (159 lb)   SpO2 99%   Intake/Output last 3 Shifts:    Intake/Output Summary (Last 24 hours) at 6/2/2025 1526  Last data filed at 6/2/2025 1509  Gross per 24 hour   Intake 2031.67 ml   Output 900 ml   Net 1131.67 ml       Admission Weight  Weight: 61.4 kg (135 lb 5.8 oz) (05/31/25 2227)    Daily Weight  06/01/25 : 72.1 kg (159 lb)    Image Results      Physical Exam  Constitutional:       Appearance: Normal appearance.   Cardiovascular:      Rate and Rhythm: Normal rate and regular rhythm.      Pulses: Normal pulses.      Heart sounds: Normal heart sounds.   Pulmonary:      Effort: Pulmonary effort is normal.      Breath sounds: Normal breath sounds.   Abdominal:      General: Bowel sounds are normal.      Palpations: Abdomen is soft.   Musculoskeletal:         General: Normal range of motion.   Skin:     General: Skin is warm and dry.   Neurological:      Mental Status: She is alert and oriented to person, place, and time.         Relevant Results      Results for orders placed or performed during the hospital encounter of 05/31/25 (from the past 24 hours)   POCT GLUCOSE   Result Value Ref Range    POCT Glucose 156 (H) 74 - 99 mg/dL   POCT GLUCOSE   Result Value Ref Range    POCT Glucose 156 (H) 74 - 99 mg/dL   CBC and Auto Differential   Result Value Ref Range    WBC 10.5 4.4 - 11.3 x10*3/uL    nRBC 0.0 0.0 - 0.0 /100 WBCs    RBC 2.77 (L) 4.00 - 5.20 x10*6/uL    Hemoglobin 8.4 (L) 12.0 - 16.0 g/dL    Hematocrit 26.8 (L) 36.0 - 46.0 %    MCV 97 80 - 100 fL    MCH 30.3 26.0 - 34.0 pg    MCHC  31.3 (L) 32.0 - 36.0 g/dL    RDW 15.4 (H) 11.5 - 14.5 %    Platelets 110 (L) 150 - 450 x10*3/uL    Immature Granulocytes %, Automated 1.0 (H) 0.0 - 0.9 %    Immature Granulocytes Absolute, Automated 0.11 0.00 - 0.50 x10*3/uL   Comprehensive Metabolic Panel   Result Value Ref Range    Glucose 97 74 - 99 mg/dL    Sodium 135 (L) 136 - 145 mmol/L    Potassium 4.2 3.5 - 5.3 mmol/L    Chloride 106 98 - 107 mmol/L    Bicarbonate 22 21 - 32 mmol/L    Anion Gap 11 10 - 20 mmol/L    Urea Nitrogen 26 (H) 6 - 23 mg/dL    Creatinine 1.26 (H) 0.50 - 1.05 mg/dL    eGFR 39 (L) >60 mL/min/1.73m*2    Calcium 7.8 (L) 8.6 - 10.3 mg/dL    Albumin 2.5 (L) 3.4 - 5.0 g/dL    Alkaline Phosphatase 99 33 - 136 U/L    Total Protein 4.6 (L) 6.4 - 8.2 g/dL    AST 9 9 - 39 U/L    Bilirubin, Total 0.6 0.0 - 1.2 mg/dL    ALT 7 7 - 45 U/L   Manual Differential   Result Value Ref Range    Neutrophils %, Manual 76.0 40.0 - 80.0 %    Bands %, Manual 6.0 0.0 - 5.0 %    Lymphocytes %, Manual 9.0 13.0 - 44.0 %    Monocytes %, Manual 9.0 2.0 - 10.0 %    Eosinophils %, Manual 0.0 0.0 - 6.0 %    Basophils %, Manual 0.0 0.0 - 2.0 %    Seg Neutrophils Absolute, Manual 7.98 (H) 1.60 - 5.00 x10*3/uL    Bands Absolute, Manual 0.63 (H) 0.00 - 0.50 x10*3/uL    Lymphocytes Absolute, Manual 0.95 0.80 - 3.00 x10*3/uL    Monocytes Absolute, Manual 0.95 (H) 0.05 - 0.80 x10*3/uL    Eosinophils Absolute, Manual 0.00 0.00 - 0.40 x10*3/uL    Basophils Absolute, Manual 0.00 0.00 - 0.10 x10*3/uL    Total Cells Counted 100     Neutrophils Absolute, Manual 8.61 (H) 1.60 - 5.50 x10*3/uL    RBC Morphology See Below     Polychromasia Mild     Ovalocytes Few     [place] Cells Few     Acanthocytes Few    POCT GLUCOSE   Result Value Ref Range    POCT Glucose 105 (H) 74 - 99 mg/dL   Electrocardiogram, 12-lead PRN ACS symptoms   Result Value Ref Range    Ventricular Rate 103 BPM    Atrial Rate 107 BPM    WV Interval 192 ms    QRS Duration 68 ms    QT Interval 318 ms    QTC  Calculation(Bazett) 416 ms    P Axis 75 degrees    R Axis -30 degrees    T Axis 47 degrees    QRS Count 17 beats    Q Onset 229 ms    T Offset 388 ms    QTC Fredericia 381 ms   Electrocardiogram, 12-lead PRN ACS symptoms   Result Value Ref Range    Ventricular Rate 123 BPM    Atrial Rate 123 BPM    OK Interval 168 ms    QRS Duration 68 ms    QT Interval 322 ms    QTC Calculation(Bazett) 460 ms    P Axis 54 degrees    R Axis -24 degrees    T Axis 69 degrees    QRS Count 20 beats    Q Onset 226 ms    P Onset 142 ms    P Offset 201 ms    T Offset 387 ms    QTC Fredericia 408 ms   POCT GLUCOSE   Result Value Ref Range    POCT Glucose 163 (H) 74 - 99 mg/dL     *Note: Due to a large number of results and/or encounters for the requested time period, some results have not been displayed. A complete set of results can be found in Results Review.                             Assessment & Plan  Acute cystitis without hematuria  Sepsis due to Escherichia coli without acute organ dysfunction (Multi)  last urine cx 5/5 with ESBL ecoli  Blood culture growing E coli  Urine culture pending  Acute on chronic renal failure  Stage 3b chronic kidney disease (Multi)  This may get worse after contrast for CT angio so we will continue fluids for next 12hrs  void nephrotoxic agents.   Cr today 1.26  Generalized weakness  Pt/ot eval  Type 2 diabetes mellitus, without long-term current use of insulin (Multi)  SSI for now  Primary hypertension  asa, statin, hydralazine, ARB on hold for now.   Hypothyroid  cont home synthroid dose  Hypomagnesemia  Hyponatremia  monitor BMP  replete as needed    Plan of Care  Patient lives at home and plans to go home with [facility] at discharge.  Plan of care discussed with patient and collaborating physician.               Osiris Kendrick, APRN-CNP

## 2025-06-02 NOTE — CARE PLAN
The patient's goals for the shift include      The clinical goals for the shift include safety awareness, ambulation      Problem: Pain - Adult  Goal: Verbalizes/displays adequate comfort level or baseline comfort level  Outcome: Progressing     Problem: Safety - Adult  Goal: Free from fall injury  Outcome: Progressing     Problem: Chronic Conditions and Co-morbidities  Goal: Patient's chronic conditions and co-morbidity symptoms are monitored and maintained or improved  Outcome: Progressing     Problem: Nutrition  Goal: Nutrient intake appropriate for maintaining nutritional needs  Outcome: Progressing

## 2025-06-02 NOTE — PROGRESS NOTES
Fartun Carey is a 89 y.o. female on day 1 of admission presenting with Generalized weakness.    Subjective   Patient resting comfortably in bed.  She did have an episode today while ambulating to the restroom where her heart rate went into the 160s.  Telemetry was reading as atrial fibrillation, this is incorrect.  Patient currently comfortable.  She denies chest pain or palpitations or shortness of breath.       Objective     Physical Exam  Constitutional:       General: She is not in acute distress.     Appearance: She is ill-appearing.   Cardiovascular:      Rate and Rhythm: Normal rate and regular rhythm.      Pulses: Normal pulses.      Heart sounds: No murmur heard.  Pulmonary:      Effort: Pulmonary effort is normal.      Breath sounds: Normal breath sounds.   Musculoskeletal:      Right lower leg: No edema.      Left lower leg: No edema.   Skin:     General: Skin is warm and dry.   Neurological:      Mental Status: She is alert and oriented to person, place, and time.         Last Recorded Vitals  Blood pressure 138/59, pulse 98, temperature 37.3 °C (99.1 °F), temperature source Oral, resp. rate 16, height 1.524 m (5'), weight 72.1 kg (159 lb), SpO2 94%.  Intake/Output last 3 Shifts:  I/O last 3 completed shifts:  In: 2445 (33.9 mL/kg) [P.O.:250; I.V.:2195 (30.4 mL/kg)]  Out: 750 (10.4 mL/kg) [Urine:750 (0.3 mL/kg/hr)]  Weight: 72.1 kg     Relevant Results  Results for orders placed or performed during the hospital encounter of 05/31/25 (from the past 24 hours)   POCT GLUCOSE   Result Value Ref Range    POCT Glucose 160 (H) 74 - 99 mg/dL   POCT GLUCOSE   Result Value Ref Range    POCT Glucose 156 (H) 74 - 99 mg/dL   POCT GLUCOSE   Result Value Ref Range    POCT Glucose 156 (H) 74 - 99 mg/dL   CBC and Auto Differential   Result Value Ref Range    WBC 10.5 4.4 - 11.3 x10*3/uL    nRBC 0.0 0.0 - 0.0 /100 WBCs    RBC 2.77 (L) 4.00 - 5.20 x10*6/uL    Hemoglobin 8.4 (L) 12.0 - 16.0 g/dL    Hematocrit 26.8 (L) 36.0  - 46.0 %    MCV 97 80 - 100 fL    MCH 30.3 26.0 - 34.0 pg    MCHC 31.3 (L) 32.0 - 36.0 g/dL    RDW 15.4 (H) 11.5 - 14.5 %    Platelets 110 (L) 150 - 450 x10*3/uL    Immature Granulocytes %, Automated 1.0 (H) 0.0 - 0.9 %    Immature Granulocytes Absolute, Automated 0.11 0.00 - 0.50 x10*3/uL   Comprehensive Metabolic Panel   Result Value Ref Range    Glucose 97 74 - 99 mg/dL    Sodium 135 (L) 136 - 145 mmol/L    Potassium 4.2 3.5 - 5.3 mmol/L    Chloride 106 98 - 107 mmol/L    Bicarbonate 22 21 - 32 mmol/L    Anion Gap 11 10 - 20 mmol/L    Urea Nitrogen 26 (H) 6 - 23 mg/dL    Creatinine 1.26 (H) 0.50 - 1.05 mg/dL    eGFR 39 (L) >60 mL/min/1.73m*2    Calcium 7.8 (L) 8.6 - 10.3 mg/dL    Albumin 2.5 (L) 3.4 - 5.0 g/dL    Alkaline Phosphatase 99 33 - 136 U/L    Total Protein 4.6 (L) 6.4 - 8.2 g/dL    AST 9 9 - 39 U/L    Bilirubin, Total 0.6 0.0 - 1.2 mg/dL    ALT 7 7 - 45 U/L   Manual Differential   Result Value Ref Range    Neutrophils %, Manual 76.0 40.0 - 80.0 %    Bands %, Manual 6.0 0.0 - 5.0 %    Lymphocytes %, Manual 9.0 13.0 - 44.0 %    Monocytes %, Manual 9.0 2.0 - 10.0 %    Eosinophils %, Manual 0.0 0.0 - 6.0 %    Basophils %, Manual 0.0 0.0 - 2.0 %    Seg Neutrophils Absolute, Manual 7.98 (H) 1.60 - 5.00 x10*3/uL    Bands Absolute, Manual 0.63 (H) 0.00 - 0.50 x10*3/uL    Lymphocytes Absolute, Manual 0.95 0.80 - 3.00 x10*3/uL    Monocytes Absolute, Manual 0.95 (H) 0.05 - 0.80 x10*3/uL    Eosinophils Absolute, Manual 0.00 0.00 - 0.40 x10*3/uL    Basophils Absolute, Manual 0.00 0.00 - 0.10 x10*3/uL    Total Cells Counted 100     Neutrophils Absolute, Manual 8.61 (H) 1.60 - 5.50 x10*3/uL    RBC Morphology See Below     Polychromasia Mild     Ovalocytes Few     [place] Cells Few     Acanthocytes Few    POCT GLUCOSE   Result Value Ref Range    POCT Glucose 105 (H) 74 - 99 mg/dL     *Note: Due to a large number of results and/or encounters for the requested time period, some results have not been displayed. A complete  set of results can be found in Results Review.                         Assessment & Plan  Generalized weakness    Type 2 diabetes mellitus, without long-term current use of insulin (Multi)    Hypothyroid    Primary hypertension    Hyponatremia    Stage 3b chronic kidney disease (Multi)    Acute cystitis without hematuria    Sepsis due to Escherichia coli without acute organ dysfunction (Multi)    Acute on chronic renal failure    Hypomagnesemia    A-fib (Multi)    #1 shortness of breath.  Patient admitted to the hospital for weakness shortness of breath.  Was treated recently for pneumonia/urinary tract infection.  She may be slightly overloaded.  BNP slightly elevated.  Sodium slightly low.  Has no lower extremity edema.  Will give her one-time furosemide 20 mg IV and reassess.  Meanwhile patient had a recent echocardiogram in May 3, 2025 showed normal ejection fraction and mild pulmonary hypertension with mild aortic valve stenosis.       #2 irregular heartbeat.  EKG showing multifocal atrial tachycardia.  There is no clear evidence of atrial fibrillation.  Patient may have had atrial fibrillation in the past I asked her she is not sure neither her daughter.  I do not recommend oral anticoagulation for the time being.  Patient received IV magnesium recommend magnesium and potassium within normal limit.  Check TSH.  Echocardiogram showed normal ejection fraction recently.  Patient was started on diltiazem yesterday.  With very little effect.  Will discontinue diltiazem in favor of metoprolol.     #3 hypertension: Clonidine has been discontinued.  Will start metoprolol 25 mg daily.  Continue with hydralazine 25 mg 3 times daily.    4.  Urinary tract infection/leading to weakness management by primary team.     #5 elevated troponin.  Patient has likely elevated troponin.  No evidence of ischemia on the EKG.  Has no chest pain.  Recommend monitoring for now.  Outpatient follow-up      Overall impression/plan:    6/2:  As above.  Patient with episode of tachycardia after ambulation to the restroom.  Likely this is an atrial tachycardia.  There is no evidence of atrial fibrillation on telemetry thus no anticoagulation is recommended.  She was started on diltiazem 180 mg yesterday.  Will discontinue diltiazem in favor of metoprolol.  Hopefully with beta-blockade will better control her heart rates.  Will continue to monitor.  With her underlying UTI her heart rates may be difficult to control in the acute illness setting.  Blood pressure today 138/59.  Her lab work was reviewed reveals a potassium of 4.2, sodium of 135, creatinine 1.26.  Hemoglobin 8.4 hematocrit 26.8.  She is saturating well on room air at 94%.  She remains on IV antibiotics for a UTI.  She appears relatively euvolemic today.  Would not recommend further diuretic therapy.  Will continue to follow with you.      I spent 35 minutes in the professional and overall care of this patient.      Candelaria Evans PA-C

## 2025-06-02 NOTE — NURSING NOTE
"Event(s)/ Intervention Note  Patient name: Fartun Carey   MRN: [id number]   Patient location: 448/448-A     The patient had the following problems: sustained AFIB with tachycardia while ambulating/using the bathroom. Patient c/o SOB and \"not feeling well.\"  The event occurred at the following time: 1020a  The APRN/MD for the patient was contacted at: face-to-face, in person (JJ Kendrick)  The following intervention(s) were initiated: Patient assisted back to bed, ECG done per protocol. Vitals assessed prior to ambulating. NP assessed patient at bedside, ECGs read by NP and placed in the front of patient chart.   After the interventions, the following observations were made: Patient HR decreased once back in bed, VS stable (see below). Zofran and tylenol to be given per patient request.    Most recent VS  /59 (BP Location: Left arm, Patient Position: Lying)   Pulse 98   Temp 37.3 °C (99.1 °F) (Oral)   Resp 16   Ht 1.524 m (5')   Wt 72.1 kg (159 lb)   SpO2 94%   BMI 31.05 kg/m²        Plan of care ongoing, no additional needs at this time. Patient remains on continuous telemetry monitoring system. Call light and personal belongings within reach, fall risk interventions in place.   "

## 2025-06-02 NOTE — CONSULTS
Inpatient consult to Infectious Diseases  Consult performed by: KAYLENE Faith-CNP  Consult ordered by: ELAINE Xiong  Reason for consult: Positve blood culture          Primary MD: Jose Alejandro Hamilton MD        History Of Present Illness  Fartun Carey is a 89 y.o. female who presented to the emergency room with generalized ill feeling, positive chills and fever.  Recent hospitalization treated for ESBL E. Coli.  Blood cultures at that time were positive for Staphylococcus capitis-contaminant and repeat urine culture was negative.  Patient was seen and examined.  She reports intermittent fever and chills.  She reports generalized weakness.  She denies chest pain or shortness of breath.  She reports chronic intermittent nonproductive cough not new.  She reports some nasal congestion.  Maximum temperature recorded 38.6 °C.  Labs with no leukocytosis.  Urinalysis with pyuria.  Positive blood culture with E. coli.  Urine culture pending.  COVID influenza RSV PCR negative.  CT angio chest shows no acute pulmonary embolism.  Small pleural effusion.  Ground glass opacities-pulmonary edema versus viral infection.  History of recent ESBL UTI.  She is on IV meropenem.     Past Medical History  She has a past medical history of Diabetes (Multi), Diabetes mellitus (Multi), Disease of thyroid gland, Hypertension, and Renal disease.    Surgical History  She has a past surgical history that includes Hysterectomy; Back surgery; and Appendectomy.     Social History     Occupational History    Not on file   Tobacco Use    Smoking status: Never    Smokeless tobacco: Never   Vaping Use    Vaping status: Never Used   Substance and Sexual Activity    Alcohol use: Never    Drug use: Never    Sexual activity: Defer     Travel History   Travel since 05/02/25    No documented travel since 05/02/25              Family History  Family History[1]  Allergies  Amoxicillin, Amoxicillin-pot clavulanate, Glimepiride,  Lisinopril, Metformin hcl, Tetracycline hcl, and Cephalexin     Immunization History   Administered Date(s) Administered    Flu vaccine, quadrivalent, high-dose, preservative free, age 65y+ (FLUZONE) 10/14/2023    Flu vaccine, trivalent, preservative free, HIGH-DOSE, age 65y+ (Fluzone) 12/10/2015, 10/31/2016, 11/01/2017, 10/23/2018, 09/10/2019, 10/15/2020    Influenza, injectable, quadrivalent 10/07/2020, 10/18/2021, 10/06/2022    Influenza, seasonal, injectable 10/01/2009, 10/13/2010, 09/21/2011, 09/27/2012, 10/10/2013, 10/07/2014    MMR vaccine, subcutaneous (MMR II) 09/10/2019    Moderna COVID-19 vaccine, 12 years and older (50mcg/0.5mL)(Spikevax) 11/03/2023    Moderna SARS-CoV-2 Vaccination 01/20/2021, 02/17/2021, 10/25/2021, 04/27/2022    Novel influenza-H1N1-09, preservative-free 12/13/2009    Pfizer COVID-19 vaccine, 12 years and older, (30mcg/0.3mL) (Comirnaty) 09/27/2024    Pfizer COVID-19 vaccine, bivalent, age 12 years and older (30 mcg/0.3 mL) 10/06/2022    Pneumococcal conjugate vaccine, 13-valent (PREVNAR 13) 02/06/2014    Pneumococcal conjugate vaccine, 20-valent (PREVNAR 20) 08/01/2024    Pneumococcal polysaccharide vaccine, 23-valent, age 2 years and older (PNEUMOVAX 23) 10/31/2016    Tdap vaccine, age 7 year and older (BOOSTRIX, ADACEL) 08/14/2017    Zoster, live 12/09/2009     Medications  Home medications:  Prescriptions Prior to Admission[2]  Current medications:  Scheduled medications  Scheduled Medications[3]  Continuous medications  Continuous Medications[4]  PRN medications  PRN Medications[5]    Review of Systems   Constitutional:  Positive for chills, fatigue and fever.   HENT:  Positive for congestion.    Eyes: Negative.    Respiratory:  Positive for cough.    Cardiovascular: Negative.    Gastrointestinal: Negative.    Endocrine: Negative.    Genitourinary: Negative.    Musculoskeletal: Negative.    Skin: Negative.    Neurological:  Positive for weakness.   Psychiatric/Behavioral:  Negative.          Objective  Range of Vitals (last 24 hours)  Heart Rate:  []   Temp:  [36.4 °C (97.5 °F)-38.6 °C (101.5 °F)]   Resp:  [15-19]   BP: (107-146)/(47-66)   SpO2:  [93 %-98 %]   Daily Weight  06/01/25 : 72.1 kg (159 lb)    Body mass index is 31.05 kg/m².     Physical Exam  Constitutional:       Appearance: Normal appearance.   HENT:      Head: Normocephalic and atraumatic.      Nose: Nose normal.      Mouth/Throat:      Mouth: Mucous membranes are moist.      Pharynx: Oropharynx is clear.   Eyes:      General: No scleral icterus.  Cardiovascular:      Rate and Rhythm: Normal rate and regular rhythm.   Pulmonary:      Effort: Pulmonary effort is normal.      Breath sounds: Normal breath sounds.   Abdominal:      General: Bowel sounds are normal.      Palpations: Abdomen is soft.   Musculoskeletal:         General: Normal range of motion.      Cervical back: Normal range of motion and neck supple.   Skin:     General: Skin is warm and dry.   Neurological:      Mental Status: She is alert and oriented to person, place, and time.   Psychiatric:         Mood and Affect: Mood normal.         Behavior: Behavior normal.          Relevant Results  Outside Hospital Results  No  Labs  Results from last 72 hours   Lab Units 06/02/25  0411 05/31/25 2239   WBC AUTO x10*3/uL 10.5 4.5   HEMOGLOBIN g/dL 8.4* 10.4*   HEMATOCRIT % 26.8* 33.2*   PLATELETS AUTO x10*3/uL 110* 111*   LYMPHO PCT MAN % 9.0 9.0   MONO PCT MAN % 9.0 0.0   EOSINO PCT MAN % 0.0 0.0     Results from last 72 hours   Lab Units 06/02/25  0411 06/01/25  1120 05/31/25 2239   SODIUM mmol/L 135* 135* 134*   POTASSIUM mmol/L 4.2 4.1 3.8   CHLORIDE mmol/L 106 108* 102   CO2 mmol/L 22 19* 21   BUN mg/dL 26* 27* 27*   CREATININE mg/dL 1.26* 1.40* 1.47*   GLUCOSE mg/dL 97 164* 100*   CALCIUM mg/dL 7.8* 7.5* 8.2*   ANION GAP mmol/L 11 12 15   EGFR mL/min/1.73m*2 39* 35* 33*   PHOSPHORUS mg/dL  --   --  2.0*     Results from last 72 hours   Lab Units  "06/02/25  0411 05/31/25  2239   ALK PHOS U/L 99 165*   BILIRUBIN TOTAL mg/dL 0.6 0.5   PROTEIN TOTAL g/dL 4.6* 6.1*   ALT U/L 7 11   AST U/L 9 14   ALBUMIN g/dL 2.5* 3.3*     Estimated Creatinine Clearance: 23.1 mL/min (A) (by C-G formula based on SCr of 1.26 mg/dL (H)).  CRP   Date Value Ref Range Status   08/18/2022 0.6 0 - 2.0 MG/DL Final     Comment:     Performed at [address] [address]     Sedimentation Rate   Date Value Ref Range Status   08/18/2022 40 (H) 0 - 30 MM/HR Final     Comment:     Performed at [address] [address]   06/16/2022 19 0 - 30 MM/HR Final     Comment:     Performed at [address] [address]   12/07/2021 36 (H) 0 - 20 MM/HR Final     Comment:     Performed at [facility] [address] [facility] [address]     No results found for: \"HIV1X2\", \"HIVCONF\", \"AUKBDL8MC\"  No results found for: \"HEPCABINIT\", \"HEPCAB\", \"HCVPCRQUANT\"  Microbiology  Susceptibility data from last 90 days.  Collected Specimen Info Organism Amoxicillin/Clavulanate Ampicillin Ampicillin/Sulbactam Aztreonam Cefazolin Cefazolin (uncomplicated UTIs only) Cefepime Cefotaxime Ceftazidime Ceftriaxone Cefuroxime (oral)   05/31/25 Blood culture from Peripheral Venipuncture Escherichia coli              05/31/25 Blood culture from Peripheral Venipuncture Escherichia coli              05/05/25 Urine from Indwelling (Alvarado) Catheter Escherichia coli  I  R  R  R  R  R  R  R  R  R  R   05/03/25 Blood culture from Peripheral Venipuncture Staphylococcus capitis                Collected Specimen Info Organism Ciprofloxacin Ertapenem Gentamicin Levofloxacin Meropenem Nitrofurantoin Piperacillin/Tazobactam Trimethoprim/Sulfamethoxazole   05/31/25 Blood culture from Peripheral Venipuncture Escherichia coli           05/31/25 Blood culture from Peripheral Venipuncture Escherichia coli           05/05/25 Urine from Indwelling (Alvarado) Catheter Escherichia coli "  R  S  S  R  S  S  R  R   05/03/25 Blood culture from Peripheral Venipuncture Staphylococcus capitis                 Imaging  CT angio chest for pulmonary embolism  Result Date: 6/1/2025  Interpreted By:  Syd Barnard, STUDY: CT ANGIO CHEST FOR PULMONARY EMBOLISM;  5/31/2025 11:37 pm   INDICATION: Signs/Symptoms:sob, tachy.     COMPARISON: 05/03/2025   ACCESSION NUMBER(S): [id number]   ORDERING CLINICIAN: DEDRICK CAVAZOS   TECHNIQUE: Contiguous axial images of the chest were obtained after the intravenous administration of iodinated contrast using angiographic PE protocol. Coronal and sagittal reformatted images were reconstructed from the axial data. MIP images were created on an independent workstation and reviewed.   FINDINGS:   MEDIASTINUM AND LYMPH NODES:  The esophageal wall appears within normal limits.  No enlarged intrathoracic or axillary lymph nodes by imaging criteria. No pneumomediastinum.   VESSELS:  Normal caliber thoracic aorta without dissection. Mild aortic atherosclerosis.  No acute pulmonary embolism.   HEART: Normal size. Aortic valvular calcifications. Severe coronary artery calcifications. Small pericardial effusion.   LUNG, AIRWAYS, PLEURA: Trace pleural effusions. Ill-defined central predominant ground-glass opacities in all lobes of both lungs are noted and are increased slightly from prior study. There is mild septal thickening centrally. There are again numerous solid bilateral pulmonary nodules that are stable from 05/03/2025. To the extent visualized on prior CT abdomen/pelvis studies such as 10/09/2023 these were present and appear grossly unchanged and likely reflects sequela of remote granulomatous infection, given that some are partially calcified. No pneumothorax.   OSSEOUS STRUCTURES: No acute osseous abnormality. Severe degenerative disc height loss in the lower thoracic and upper lumbar spine. No high-grade canal stenosis.   CHEST WALL SOFT TISSUES: No discernible acute  abnormality.   UPPER ABDOMEN/OTHER: Calcified granulomas in the spleen. There is diffuse pancreatic ductal dilatation and pancreatic atrophy. Appearance is chronic dating back to 08/09/2023.       No acute pulmonary embolism.   Increased prominence and extent of central predominant ground-glass opacities raising suspicion for pulmonary edema versus viral infection, the former favored the please correlate clinically.   Small pleural effusion.   Numerous bilateral solid pulmonary nodules, some with speckled calcifications and given stability dating back to 2023 and splenic calcified granulomas these are most favored to represent sequela of remote granulomatous infection.   MACRO: None.   Signed by: Syd Barnard 6/1/2025 12:57 AM Dictation workstation:   [id number]    Electrocardiogram, 12-lead PRN ACS symptoms  Result Date: 5/17/2025  Unusual P axis, possible ectopic atrial rhythm with undetermined rhythm irregularity Left axis deviation Abnormal ECG When compared with ECG of 03-MAY-2025 15:30, Ectopic atrial rhythm has replaced Sinus rhythm Vent. rate has decreased BY  45 BPM Confirmed by David Monroy ([id number]) on 5/17/2025 8:34:06 AM    XR chest 2 views  Result Date: 5/12/2025  Interpreted By:  Analia Francois, STUDY: XR CHEST 2 VIEWS 5/12/2025 10:45 am   INDICATION: Signs/Symptoms:dyspnea, pain. Cough.   COMPARISON: 05/03/2025   ACCESSION NUMBER(S): [id number]   ORDERING CLINICIAN: VENANCIO AHMADI   TECHNIQUE: PA and lateral views of the chest were acquired.   FINDINGS: Normal heart, mediastinum, and lungs. There is calcified plaque within the aortic arch. There is a levoscoliosis of the lower thoracic and upper lumbar spine.       No acute cardiopulmonary disease.   Signed by: Analia Francois 5/12/2025 12:34 PM Dictation workstation:   [id number]    US renal complete  Result Date: 5/6/2025  Interpreted By:  Maninder Clark, STUDY: US RENAL COMPLETE;  5/6/2025 1:35 pm   INDICATION: Signs/Symptoms:VINCENT.     COMPARISON:  03/01/2024.   ACCESSION NUMBER(S): [id number]   ORDERING CLINICIAN: TOD THOMAS   TECHNIQUE: Real-time sonographic evaluation of the kidneys and urinary bladder was performed.   FINDINGS: RIGHT KIDNEY: Right kidney measures  9.9 cm.  Right central renal small hyperechoic focus may represent nonobstructing calculus. No right hydronephrosis. No discrete renal lesion is visualized.   LEFT KIDNEY: Left kidney measures  9.4 cm.  Left central renal small hyperechoic foci may represent nonobstructing calculi. No left hydronephrosis. No discrete renal lesion is visualized.   BLADDER: Urinary bladder was fully decompressed by Alvarado catheter and could not be evaluated at this time.       No hydronephrosis bilaterally.   Urinary bladder fully decompressed by Alvarado catheter and could not be evaluated.   MACRO: None.   Signed by: Maninder Clark 5/6/2025 2:09 PM Dictation workstation:   [id number]    Transthoracic Echo (TTE) Limited  Result Date: 5/5/2025            [place] [id number]             Phone [phone] TRANSTHORACIC ECHOCARDIOGRAM REPORT Patient Name:       FRANKIE WESTBROOK        Reading Physician:    [id number] Brijesh Jerez MD Study Date:         5/5/2025             Ordering Provider:    [id number] RENEE LAWTON MRN/PID:            [id number]             Fellow: Accession#:         [id number]         Nurse: Date of Birth/Age:  6/10/1928 / 89 years Sonographer:          Amilcar Gatica RDCS Gender Assigned at  F                    Additional Staff: Birth: Height:             154.94 cm            Admit Date: Weight:             72.58 kg             Admission Status:     Inpatient -                                                                Routine BSA / BMI:          1.72 m2 / 30.23      Department Location:  [facility]                     kg/m2  Blood Pressure: 137 /48 mmHg Study Type:    TRANSTHORACIC ECHO (TTE) LIMITED Diagnosis/ICD: Dyspnea, unspecified-R06.00 Indication:    dyspnea CPT Codes:     Echo Limited-90185; Doppler Limited-61999; Color Doppler-86161 Patient History: Valve Disorders:   Aortic Stenosis. Pertinent History: HTN, Hyperlipidemia and CAD. CKD. Study Detail: The following Echo studies were performed: 2D, M-Mode, Doppler and               color flow. Technically challenging study due to body habitus.  PHYSICIAN INTERPRETATION: Left Ventricle: The left ventricular systolic function is normal, with a visually estimated ejection fraction of 60-65%. There are no regional wall motion abnormalities. The left ventricular cavity size is normal. There is mild increased septal and normal posterior left ventricular wall thickness. Spectral Doppler shows a normal pattern of left ventricular diastolic filling. Left Atrium: The left atrial size is mildly dilated. Right Ventricle: The right ventricle is normal in size. There is normal right ventriclar wall thickness. There is normal right ventricular global systolic function. Right Atrium: The right atrial size is normal. Aortic Valve: The aortic valve is trileaflet. There is mild aortic valve cusp calcification. There is reduced systolic aortic valve leaflet excursion. There is evidence of mild aortic valve stenosis. The aortic valve dimensionless index is 0.38. There is no evidence of aortic valve regurgitation. The peak instantaneous gradient of the aortic valve is 18 mmHg. The mean gradient of the aortic valve is 10 mmHg. Mitral Valve: The mitral valve is normal in structure. There is normal mitral valve leaflet mobility. There is mild mitral annular calcification. There is trace mitral valve regurgitation. Tricuspid Valve: The tricuspid valve is structurally normal. There is normal tricuspid valve leaflet mobility. There is mild to moderate tricuspid regurgitation. Pulmonic Valve: The pulmonic  valve is structurally normal. There is trace pulmonic valve regurgitation. Pericardium: No pericardial effusion noted. Aorta: The aortic root is normal. There is no dilatation of the aortic arch. There is no dilatation of the ascending aorta. There is no dilatation of the aortic root. Pulmonary Artery: The pulmonary artery is normal in size. The tricuspid regurgitant velocity is 2.72 m/s, and with an estimated right atrial pressure of 3 mmHg, the estimated pulmonary artery pressure is normal with the RVSP at 32.6 mmHg. The estimated PASP is 33 mmHg.  CONCLUSIONS:  1. The left ventricular systolic function is normal, with a visually estimated ejection fraction of 60-65%.  2. Trace mitral valve regurgitation.  3. Mild to moderate tricuspid regurgitation.  4. Mild aortic valve stenosis.  5. The peak instantaneous gradient of the aortic valve is 18 mmHg. QUANTITATIVE DATA SUMMARY:  2D MEASUREMENTS:           Normal Ranges: Ao Root d:       3.20 cm   (2.0-3.7cm) IVSd:            0.98 cm   (0.6-1.1cm) LVPWd:           0.80 cm   (0.6-1.1cm) LVIDd:           4.37 cm   (3.9-5.9cm) LVIDs:           2.29 cm LV Mass Index:   72.4 g/m2 LV % FS          47.6 %  LEFT ATRIUM:                Normal Ranges: LA Volume Index: 33.0 ml/m2  AORTA MEASUREMENTS:         Normal Ranges: Ao Sinus, d:        3.20 cm (2.1-3.5cm) Asc Ao, d:          2.80 cm (2.1-3.4cm)  LV SYSTOLIC FUNCTION:                      Normal Ranges: EF-A4C View:    62 % (>=55%) EF-Visual:      63 % LV EF Reported: 63 %  LV DIASTOLIC FUNCTION:             Normal Ranges: MV Peak E:             0.87 m/s    (0.7-1.2 m/s) MV Peak A:             0.96 m/s    (0.42-0.7 m/s) E/A Ratio:             0.90        (1.0-2.2) MV A Dur:              180.00 msec  MITRAL VALVE:          Normal Ranges: MV DT:        250 msec (150-240msec)  AORTIC VALVE:                      Normal Ranges: AoV Vmax:                2.14 m/s  (<=1.7m/s) AoV Peak P.3 mmHg (<20mmHg) AoV Mean  PG:             10.0 mmHg (1.7-11.5mmHg) LVOT Max Joseph:            0.89 m/s  (<=1.1m/s) AoV VTI:                 54.70 cm  (18-25cm) LVOT VTI:                21.00 cm LVOT Diameter:           1.80 cm   (1.8-2.4cm) AoV Area, VTI:           0.98 cm2  (2.5-5.5cm2) AoV Area,Vmax:           1.06 cm2  (2.5-4.5cm2) AoV Dimensionless Index: 0.38  TRICUSPID VALVE/RVSP:          Normal Ranges: Peak TR Velocity:     2.72 m/s RV Syst Pressure:     33 mmHg  (< 30mmHg)  [id number] Brijesh Jerez MD Electronically signed on 5/5/2025 at 5:48:18 PM  ** Final **     CT angio chest for pulmonary embolism  Result Date: 5/3/2025  Interpreted By:  Pancho Barth, STUDY: CT ANGIO CHEST FOR PULMONARY EMBOLISM;  5/3/2025 5:41 pm   INDICATION: Signs/Symptoms:tachycardic, hypoxic, concern for PE vs pneumonia.   COMPARISON: 2023   ACCESSION NUMBER(S): [id number]   ORDERING CLINICIAN: VIVIANE RILEY   TECHNIQUE: Helical data acquisition of the chest was obtained after intravenous administration of 75 ML Omnipaque 350 as per PE protocol. Images were reformatted in coronal and sagittal planes. Axial and coronal maximum intensity projection (MIP) images were created and reviewed.   FINDINGS: Ground-glass increasing left upper lobe nodule measures 1.7 cm on image 94 concerning for slow growing neoplasm   Other numerous bilateral pulmonary nodules appear similar in size. Reference right lower lobe nodule measuring a cm on image 163. Reference 8 mm right lower lobe nodule on image 184. Other numerous nodules detected remain stable   Mosaic attenuation in the lungs suggesting small airway or small vessel inflammation. No findings of acute pulmonary embolism. Coronary calcification no evidence of acute thoracic pathology.   Changes of old healed granulomas disease. Robust vascular calcification.   Nephroliths detected. Largest visualized stone measures 9 mm.   Demineralized bones       1. No evidence of acute pulmonary embolism 2. Senescent changes. Heart  size is enlarged. Changes of old healed granulomas disease 3. Multiple stable pulmonary nodules. Enlarging left upper lobe ground-glass nodule now measuring under 2 cm concerning for slow growing neoplasm. 4. Bronchial thickening noted. Mild mosaic attenuation seen in the lungs. Query bronchial inflammation. Senescent changes.   MACRO: None.   Signed by: Pancho Barth 5/3/2025 6:19 PM Dictation workstation:   [id number]    XR chest 2 views  Result Date: 5/3/2025  Interpreted By:  Amilcar Galdamez, STUDY: XR CHEST 2 VIEWS;  5/3/2025 3:34 pm   INDICATION: Signs/Symptoms:shortness of breath.     COMPARISON: 02/21/2024   ACCESSION NUMBER(S): [id number]   ORDERING CLINICIAN: VIVIANE RILEY   FINDINGS: Stable heart and mediastinal contours. No pneumothorax or pleural fluid. No focal consolidation or alveolar edema. Mild atherosclerotic calcification of the aortic arch. Mild elevation of the right hemidiaphragm.       1.  No radiographic evidence of acute cardiopulmonary disease.       MACRO: None   Signed by: Amilcar Galdamez 5/3/2025 4:37 PM Dictation workstation:   [id number]       Assessment/Plan   E. Coli Bacteremia-likely secondary to UTI  Pyuria verses Urinary tract infection   Resolving Acute on chronic kidney disease  Recent ESBL UTI      Continue IV meropenem   Follow up blood culture  Follow up urine culture  Monitor temp and wbc  Supportive care  Repeat blood culture     KAYLENE Faith-CNP       [1]   Family History  Problem Relation Name Age of Onset    Heart disease Mother      Heart disease Father      Heart disease Brother      No Known Problems Son      Heart disease Maternal Grandmother      Heart disease Maternal Grandfather      Heart disease Paternal Grandmother      Heart disease Paternal Grandfather     [2]   Medications Prior to Admission   Medication Sig Dispense Refill Last Dose/Taking    aspirin 81 mg chewable tablet Chew 1 tablet (81 mg) once daily.   5/31/2025 Evening    atorvastatin  (Lipitor) 80 mg tablet Take 1 tablet (80 mg) by mouth once daily.   2025 Evening    blood sugar diagnostic (Accu-Chek Guide test strips) strip Inject 1 strip under the skin early in the morning.. 100 strip 3 More than a month Noon    cholecalciferol (Vitamin D-3) 25 MCG (1000 UT) capsule Take 1 capsule (25 mcg) by mouth once daily.   2025 Morning    cloNIDine (Catapres) 0.1 mg tablet Take 1 tablet (0.1 mg) by mouth 2 times a day. 60 tablet 0 2025 Evening    cyanocobalamin (Vitamin B-12) 1,000 mcg tablet Take 1 tablet (1,000 mcg) by mouth once daily.   2025 Morning    docusate sodium (Colace) 100 mg capsule Take 1 capsule (100 mg) by mouth 2 times a day as needed for constipation. 60 capsule 0 2025 Morning    hydrALAZINE (Apresoline) 25 mg tablet Take 1 tablet (25 mg) by mouth 3 times a day. 90 tablet 0 2025 Bedtime    levothyroxine (Synthroid, Levoxyl) 88 mcg tablet Take 1 tablet (88 mcg) by mouth once daily. 90 tablet 2 2025 Morning    losartan (Cozaar) 25 mg tablet Take 1 tablet (25 mg) by mouth 2 times a day. patient started taking BID on  after reporting her BPs to her  tablet 3 2025 Morning    SITagliptin phosphate (Januvia) 50 mg tablet Take 1 tablet (50 mg) by mouth once daily. 90 tablet 2 Past Week Morning    vit C/E/Zn/coppr/lutein/zeaxan (PRESERVISION AREDS-2 ORAL) Take 1 capsule by mouth every 12 hours.   2025 Morning    [] predniSONE (Deltasone) 10 mg tablet Take 2 tablets (20 mg) by mouth once daily for 5 days, THEN 1 tablet (10 mg) once daily for 5 days, THEN 1 tablet (10 mg) every other day for 5 days. 18 tablet 0    [3] aspirin, 81 mg, oral, Daily  atorvastatin, 80 mg, oral, Nightly  cyanocobalamin, 1,000 mcg, oral, Daily  docusate sodium, 100 mg, oral, Daily  heparin (porcine), 5,000 Units, subcutaneous, q8h  hydrALAZINE, 25 mg, oral, TID  insulin lispro, 0-10 Units, subcutaneous, TID AC  levothyroxine, 88 mcg, oral, Daily  meropenem, 1  g, intravenous, q12h  metoprolol succinate XL, 25 mg, oral, Daily  pantoprazole, 40 mg, oral, Daily before breakfast   Or  pantoprazole, 40 mg, intravenous, Daily before breakfast    [4]    [5] PRN medications: acetaminophen **OR** acetaminophen **OR** acetaminophen, dextrose, dextrose, glucagon

## 2025-06-03 LAB
ALBUMIN SERPL BCP-MCNC: 2.7 G/DL (ref 3.4–5)
ALP SERPL-CCNC: 113 U/L (ref 33–136)
ALT SERPL W P-5'-P-CCNC: 8 U/L (ref 7–45)
ANION GAP SERPL CALCULATED.3IONS-SCNC: 12 MMOL/L (ref 10–20)
AST SERPL W P-5'-P-CCNC: 11 U/L (ref 9–39)
ATRIAL RATE: 107 BPM
ATRIAL RATE: 123 BPM
BASOPHILS # BLD MANUAL: 0 X10*3/UL (ref 0–0.1)
BASOPHILS NFR BLD MANUAL: 0 %
BILIRUB SERPL-MCNC: 0.6 MG/DL (ref 0–1.2)
BUN SERPL-MCNC: 25 MG/DL (ref 6–23)
CALCIUM SERPL-MCNC: 8.3 MG/DL (ref 8.6–10.3)
CHLORIDE SERPL-SCNC: 106 MMOL/L (ref 98–107)
CO2 SERPL-SCNC: 22 MMOL/L (ref 21–32)
CREAT SERPL-MCNC: 1.22 MG/DL (ref 0.5–1.05)
EGFRCR SERPLBLD CKD-EPI 2021: 41 ML/MIN/1.73M*2
EOSINOPHIL # BLD MANUAL: 0.1 X10*3/UL (ref 0–0.4)
EOSINOPHIL NFR BLD MANUAL: 1 %
ERYTHROCYTE [DISTWIDTH] IN BLOOD BY AUTOMATED COUNT: 15.7 % (ref 11.5–14.5)
GLUCOSE BLD MANUAL STRIP-MCNC: 111 MG/DL (ref 74–99)
GLUCOSE BLD MANUAL STRIP-MCNC: 163 MG/DL (ref 74–99)
GLUCOSE BLD MANUAL STRIP-MCNC: 206 MG/DL (ref 74–99)
GLUCOSE SERPL-MCNC: 112 MG/DL (ref 74–99)
HCT VFR BLD AUTO: 27.8 % (ref 36–46)
HGB BLD-MCNC: 8.6 G/DL (ref 12–16)
IMM GRANULOCYTES # BLD AUTO: 0.21 X10*3/UL (ref 0–0.5)
IMM GRANULOCYTES NFR BLD AUTO: 2 % (ref 0–0.9)
IRON SATN MFR SERPL: 15 % (ref 25–45)
IRON SERPL-MCNC: 21 UG/DL (ref 35–150)
LYMPHOCYTES # BLD MANUAL: 1.04 X10*3/UL (ref 0.8–3)
LYMPHOCYTES NFR BLD MANUAL: 10 %
MCH RBC QN AUTO: 29.8 PG (ref 26–34)
MCHC RBC AUTO-ENTMCNC: 30.9 G/DL (ref 32–36)
MCV RBC AUTO: 96 FL (ref 80–100)
MONOCYTES # BLD MANUAL: 0.83 X10*3/UL (ref 0.05–0.8)
MONOCYTES NFR BLD MANUAL: 8 %
MYELOCYTES # BLD MANUAL: 0.1 X10*3/UL
MYELOCYTES NFR BLD MANUAL: 1 %
NEUTROPHILS # BLD MANUAL: 8.32 X10*3/UL (ref 1.6–5.5)
NEUTS BAND # BLD MANUAL: 0.21 X10*3/UL (ref 0–0.5)
NEUTS BAND NFR BLD MANUAL: 2 %
NEUTS SEG # BLD MANUAL: 8.11 X10*3/UL (ref 1.6–5)
NEUTS SEG NFR BLD MANUAL: 78 %
NEUTS VAC BLD QL SMEAR: PRESENT
NRBC BLD-RTO: 0 /100 WBCS (ref 0–0)
OVALOCYTES BLD QL SMEAR: ABNORMAL
P AXIS: 54 DEGREES
P AXIS: 75 DEGREES
P OFFSET: 201 MS
P ONSET: 142 MS
PLATELET # BLD AUTO: 124 X10*3/UL (ref 150–450)
POTASSIUM SERPL-SCNC: 4.2 MMOL/L (ref 3.5–5.3)
PR INTERVAL: 168 MS
PR INTERVAL: 192 MS
PROT SERPL-MCNC: 5 G/DL (ref 6.4–8.2)
Q ONSET: 226 MS
Q ONSET: 229 MS
QRS COUNT: 17 BEATS
QRS COUNT: 20 BEATS
QRS DURATION: 68 MS
QRS DURATION: 68 MS
QT INTERVAL: 318 MS
QT INTERVAL: 322 MS
QTC CALCULATION(BAZETT): 416 MS
QTC CALCULATION(BAZETT): 460 MS
QTC FREDERICIA: 381 MS
QTC FREDERICIA: 408 MS
R AXIS: -24 DEGREES
R AXIS: -30 DEGREES
RBC # BLD AUTO: 2.89 X10*6/UL (ref 4–5.2)
RBC MORPH BLD: ABNORMAL
SODIUM SERPL-SCNC: 136 MMOL/L (ref 136–145)
T AXIS: 47 DEGREES
T AXIS: 69 DEGREES
T OFFSET: 387 MS
T OFFSET: 388 MS
TIBC SERPL-MCNC: 138 UG/DL (ref 240–445)
TOTAL CELLS COUNTED BLD: 100
TOXIC GRANULES BLD QL SMEAR: PRESENT
UIBC SERPL-MCNC: 117 UG/DL (ref 110–370)
VENTRICULAR RATE: 103 BPM
VENTRICULAR RATE: 123 BPM
WBC # BLD AUTO: 10.4 X10*3/UL (ref 4.4–11.3)

## 2025-06-03 PROCEDURE — 85007 BL SMEAR W/DIFF WBC COUNT: CPT | Performed by: INTERNAL MEDICINE

## 2025-06-03 PROCEDURE — 2500000002 HC RX 250 W HCPCS SELF ADMINISTERED DRUGS (ALT 637 FOR MEDICARE OP, ALT 636 FOR OP/ED): Performed by: INTERNAL MEDICINE

## 2025-06-03 PROCEDURE — 83540 ASSAY OF IRON: CPT | Performed by: NURSE PRACTITIONER

## 2025-06-03 PROCEDURE — 82947 ASSAY GLUCOSE BLOOD QUANT: CPT

## 2025-06-03 PROCEDURE — 2500000001 HC RX 250 WO HCPCS SELF ADMINISTERED DRUGS (ALT 637 FOR MEDICARE OP): Performed by: INTERNAL MEDICINE

## 2025-06-03 PROCEDURE — 36415 COLL VENOUS BLD VENIPUNCTURE: CPT | Performed by: INTERNAL MEDICINE

## 2025-06-03 PROCEDURE — 1200000002 HC GENERAL ROOM WITH TELEMETRY DAILY

## 2025-06-03 PROCEDURE — 2500000004 HC RX 250 GENERAL PHARMACY W/ HCPCS (ALT 636 FOR OP/ED): Performed by: INTERNAL MEDICINE

## 2025-06-03 PROCEDURE — 99232 SBSQ HOSP IP/OBS MODERATE 35: CPT

## 2025-06-03 PROCEDURE — 99232 SBSQ HOSP IP/OBS MODERATE 35: CPT | Performed by: NURSE PRACTITIONER

## 2025-06-03 PROCEDURE — 2500000001 HC RX 250 WO HCPCS SELF ADMINISTERED DRUGS (ALT 637 FOR MEDICARE OP)

## 2025-06-03 PROCEDURE — 85027 COMPLETE CBC AUTOMATED: CPT | Performed by: INTERNAL MEDICINE

## 2025-06-03 PROCEDURE — 80053 COMPREHEN METABOLIC PANEL: CPT | Performed by: INTERNAL MEDICINE

## 2025-06-03 RX ADMIN — DOCUSATE SODIUM 100 MG: 100 CAPSULE, LIQUID FILLED ORAL at 09:08

## 2025-06-03 RX ADMIN — INSULIN LISPRO 4 UNITS: 100 INJECTION, SOLUTION INTRAVENOUS; SUBCUTANEOUS at 12:32

## 2025-06-03 RX ADMIN — HEPARIN SODIUM 5000 UNITS: 5000 INJECTION, SOLUTION INTRAVENOUS; SUBCUTANEOUS at 14:45

## 2025-06-03 RX ADMIN — PANTOPRAZOLE SODIUM 40 MG: 40 INJECTION, POWDER, FOR SOLUTION INTRAVENOUS at 06:03

## 2025-06-03 RX ADMIN — ATORVASTATIN CALCIUM 80 MG: 80 TABLET, FILM COATED ORAL at 20:50

## 2025-06-03 RX ADMIN — HYDRALAZINE HYDROCHLORIDE 25 MG: 25 TABLET ORAL at 09:08

## 2025-06-03 RX ADMIN — MEROPENEM AND SODIUM CHLORIDE 1 G: 1 INJECTION, SOLUTION INTRAVENOUS at 06:15

## 2025-06-03 RX ADMIN — MEROPENEM AND SODIUM CHLORIDE 1 G: 1 INJECTION, SOLUTION INTRAVENOUS at 17:29

## 2025-06-03 RX ADMIN — METOPROLOL SUCCINATE 25 MG: 25 TABLET, EXTENDED RELEASE ORAL at 09:08

## 2025-06-03 RX ADMIN — HYDRALAZINE HYDROCHLORIDE 25 MG: 25 TABLET ORAL at 14:45

## 2025-06-03 RX ADMIN — HYDRALAZINE HYDROCHLORIDE 25 MG: 25 TABLET ORAL at 20:50

## 2025-06-03 RX ADMIN — HEPARIN SODIUM 5000 UNITS: 5000 INJECTION, SOLUTION INTRAVENOUS; SUBCUTANEOUS at 22:30

## 2025-06-03 RX ADMIN — Medication 1000 MCG: at 09:08

## 2025-06-03 RX ADMIN — HEPARIN SODIUM 5000 UNITS: 5000 INJECTION, SOLUTION INTRAVENOUS; SUBCUTANEOUS at 06:03

## 2025-06-03 RX ADMIN — ASPIRIN 81 MG: 81 TABLET, CHEWABLE ORAL at 09:08

## 2025-06-03 RX ADMIN — INSULIN LISPRO 2 UNITS: 100 INJECTION, SOLUTION INTRAVENOUS; SUBCUTANEOUS at 17:29

## 2025-06-03 RX ADMIN — LEVOTHYROXINE SODIUM 88 MCG: 0.09 TABLET ORAL at 06:03

## 2025-06-03 ASSESSMENT — PAIN SCALES - GENERAL
PAINLEVEL_OUTOF10: 0 - NO PAIN
PAINLEVEL_OUTOF10: 0 - NO PAIN

## 2025-06-03 ASSESSMENT — COGNITIVE AND FUNCTIONAL STATUS - GENERAL
TOILETING: A LITTLE
DAILY ACTIVITIY SCORE: 20
PERSONAL GROOMING: A LITTLE
HELP NEEDED FOR BATHING: A LITTLE
STANDING UP FROM CHAIR USING ARMS: A LITTLE
MOVING TO AND FROM BED TO CHAIR: A LITTLE
DRESSING REGULAR UPPER BODY CLOTHING: A LITTLE
CLIMB 3 TO 5 STEPS WITH RAILING: A LITTLE
TURNING FROM BACK TO SIDE WHILE IN FLAT BAD: A LITTLE
WALKING IN HOSPITAL ROOM: A LITTLE
MOBILITY SCORE: 19

## 2025-06-03 NOTE — CARE PLAN
The patient's goals for the shift include      The clinical goals for the shift include Monitor temperature, encourage ambulation, safety, comfort

## 2025-06-03 NOTE — CARE PLAN
The patient's goals for the shift include      The clinical goals for the shift include safety, monitor VS, and ambulate with staff      Problem: Pain - Adult  Goal: Verbalizes/displays adequate comfort level or baseline comfort level  Outcome: Progressing     Problem: Safety - Adult  Goal: Free from fall injury  Outcome: Progressing     Problem: Discharge Planning  Goal: Discharge to home or other facility with appropriate resources  Outcome: Progressing     Problem: Chronic Conditions and Co-morbidities  Goal: Patient's chronic conditions and co-morbidity symptoms are monitored and maintained or improved  Outcome: Progressing     Problem: Nutrition  Goal: Nutrient intake appropriate for maintaining nutritional needs  Outcome: Progressing     Problem: Diabetes  Goal: Achieve decreasing blood glucose levels by end of shift  Outcome: Progressing  Goal: Increase stability of blood glucose readings by end of shift  Outcome: Progressing  Goal: Decrease in ketones present in urine by end of shift  Outcome: Progressing  Goal: Maintain electrolyte levels within acceptable range throughout shift  Outcome: Progressing  Goal: Maintain glucose levels >70mg/dl to <250mg/dl throughout shift  Outcome: Progressing  Goal: No changes in neurological exam by end of shift  Outcome: Progressing  Goal: Learn about and adhere to nutrition recommendations by end of shift  Outcome: Progressing  Goal: Vital signs within normal range for age by end of shift  Outcome: Progressing  Goal: Increase self care and/or family involovement by end of shift  Outcome: Progressing  Goal: Receive DSME education by end of shift  Outcome: Progressing     Problem: Fall/Injury  Goal: Not fall by end of shift  Outcome: Progressing  Goal: Be free from injury by end of the shift  Outcome: Progressing  Goal: Verbalize understanding of personal risk factors for fall in the hospital  Outcome: Progressing  Goal: Verbalize understanding of risk factor reduction  measures to prevent injury from fall in the home  Outcome: Progressing  Goal: Use assistive devices by end of the shift  Outcome: Progressing  Goal: Pace activities to prevent fatigue by end of the shift  Outcome: Progressing

## 2025-06-03 NOTE — PROGRESS NOTES
Fartun Carey is a 89 y.o. female on day 2 of admission presenting with Generalized weakness.    Subjective   Patient resting comfortably in bed.  She denies chest pain, palpitations or shortness of breath.  She was febrile yesterday evening and complained of chills.        Objective     Physical Exam  Constitutional:       General: She is not in acute distress.     Appearance: Normal appearance. She is not ill-appearing.   Cardiovascular:      Rate and Rhythm: Normal rate and regular rhythm.      Pulses: Normal pulses.      Heart sounds: Normal heart sounds.   Pulmonary:      Effort: Pulmonary effort is normal.      Breath sounds: Normal breath sounds.   Musculoskeletal:      Right lower leg: No edema.      Left lower leg: No edema.   Neurological:      Mental Status: She is alert and oriented to person, place, and time.         Last Recorded Vitals  Blood pressure 146/54, pulse 70, temperature 37.1 °C (98.8 °F), temperature source Temporal, resp. rate 16, height 1.524 m (5'), weight 72.1 kg (159 lb), SpO2 94%.  Intake/Output last 3 Shifts:  I/O last 3 completed shifts:  In: 2241.7 (31.1 mL/kg) [P.O.:810; I.V.:1281.7 (17.8 mL/kg); IV Piggyback:150]  Out: 1100 (15.3 mL/kg) [Urine:1100 (0.4 mL/kg/hr)]  Weight: 72.1 kg     Relevant Results  Results for orders placed or performed during the hospital encounter of 05/31/25 (from the past 24 hours)   Electrocardiogram, 12-lead PRN ACS symptoms   Result Value Ref Range    Ventricular Rate 103 BPM    Atrial Rate 107 BPM    WI Interval 192 ms    QRS Duration 68 ms    QT Interval 318 ms    QTC Calculation(Bazett) 416 ms    P Axis 75 degrees    R Axis -30 degrees    T Axis 47 degrees    QRS Count 17 beats    Q Onset 229 ms    T Offset 388 ms    QTC Fredericia 381 ms   Electrocardiogram, 12-lead PRN ACS symptoms   Result Value Ref Range    Ventricular Rate 123 BPM    Atrial Rate 123 BPM    WI Interval 168 ms    QRS Duration 68 ms    QT Interval 322 ms    QTC Calculation(Bazett)  460 ms    P Axis 54 degrees    R Axis -24 degrees    T Axis 69 degrees    QRS Count 20 beats    Q Onset 226 ms    P Onset 142 ms    P Offset 201 ms    T Offset 387 ms    QTC Fredericia 408 ms   POCT GLUCOSE   Result Value Ref Range    POCT Glucose 163 (H) 74 - 99 mg/dL   Blood Culture    Specimen: Peripheral Venipuncture; Blood culture   Result Value Ref Range    Blood Culture Loaded on Instrument - Culture in progress    Blood Culture    Specimen: Peripheral Venipuncture; Blood culture   Result Value Ref Range    Blood Culture Loaded on Instrument - Culture in progress    POCT GLUCOSE   Result Value Ref Range    POCT Glucose 142 (H) 74 - 99 mg/dL   POCT GLUCOSE   Result Value Ref Range    POCT Glucose 163 (H) 74 - 99 mg/dL   CBC and Auto Differential   Result Value Ref Range    WBC 10.4 4.4 - 11.3 x10*3/uL    nRBC 0.0 0.0 - 0.0 /100 WBCs    RBC 2.89 (L) 4.00 - 5.20 x10*6/uL    Hemoglobin 8.6 (L) 12.0 - 16.0 g/dL    Hematocrit 27.8 (L) 36.0 - 46.0 %    MCV 96 80 - 100 fL    MCH 29.8 26.0 - 34.0 pg    MCHC 30.9 (L) 32.0 - 36.0 g/dL    RDW 15.7 (H) 11.5 - 14.5 %    Platelets 124 (L) 150 - 450 x10*3/uL    Immature Granulocytes %, Automated 2.0 (H) 0.0 - 0.9 %    Immature Granulocytes Absolute, Automated 0.21 0.00 - 0.50 x10*3/uL   Comprehensive Metabolic Panel   Result Value Ref Range    Glucose 112 (H) 74 - 99 mg/dL    Sodium 136 136 - 145 mmol/L    Potassium 4.2 3.5 - 5.3 mmol/L    Chloride 106 98 - 107 mmol/L    Bicarbonate 22 21 - 32 mmol/L    Anion Gap 12 10 - 20 mmol/L    Urea Nitrogen 25 (H) 6 - 23 mg/dL    Creatinine 1.22 (H) 0.50 - 1.05 mg/dL    eGFR 41 (L) >60 mL/min/1.73m*2    Calcium 8.3 (L) 8.6 - 10.3 mg/dL    Albumin 2.7 (L) 3.4 - 5.0 g/dL    Alkaline Phosphatase 113 33 - 136 U/L    Total Protein 5.0 (L) 6.4 - 8.2 g/dL    AST 11 9 - 39 U/L    Bilirubin, Total 0.6 0.0 - 1.2 mg/dL    ALT 8 7 - 45 U/L   Manual Differential   Result Value Ref Range    Neutrophils %, Manual 78.0 40.0 - 80.0 %    Bands %,  Manual 2.0 0.0 - 5.0 %    Lymphocytes %, Manual 10.0 13.0 - 44.0 %    Monocytes %, Manual 8.0 2.0 - 10.0 %    Eosinophils %, Manual 1.0 0.0 - 6.0 %    Basophils %, Manual 0.0 0.0 - 2.0 %    Myelocytes %, Manual 1.0 0.0 - 0.0 %    Seg Neutrophils Absolute, Manual 8.11 (H) 1.60 - 5.00 x10*3/uL    Bands Absolute, Manual 0.21 0.00 - 0.50 x10*3/uL    Lymphocytes Absolute, Manual 1.04 0.80 - 3.00 x10*3/uL    Monocytes Absolute, Manual 0.83 (H) 0.05 - 0.80 x10*3/uL    Eosinophils Absolute, Manual 0.10 0.00 - 0.40 x10*3/uL    Basophils Absolute, Manual 0.00 0.00 - 0.10 x10*3/uL    Myelocytes Absolute, Manual 0.10 0.00 - 0.00 x10*3/uL    Total Cells Counted 100     Neutrophils Absolute, Manual 8.32 (H) 1.60 - 5.50 x10*3/uL    RBC Morphology See Below     Ovalocytes Few     Toxic Granulation Present     Vacuolated Neutrophils Present    POCT GLUCOSE   Result Value Ref Range    POCT Glucose 111 (H) 74 - 99 mg/dL     *Note: Due to a large number of results and/or encounters for the requested time period, some results have not been displayed. A complete set of results can be found in Results Review.                            Assessment & Plan  Generalized weakness    Type 2 diabetes mellitus, without long-term current use of insulin (Multi)    Hypothyroid    Primary hypertension    Hyponatremia    Stage 3b chronic kidney disease (Multi)    Acute cystitis without hematuria    Sepsis due to Escherichia coli without acute organ dysfunction (Multi)    Acute on chronic renal failure    Hypomagnesemia    A-fib (Multi)    #1 shortness of breath.  Patient admitted to the hospital for weakness shortness of breath.  Was treated recently for pneumonia/urinary tract infection.  She may be slightly overloaded.  BNP slightly elevated.  Sodium slightly low.  Has no lower extremity edema.  Will give her one-time furosemide 20 mg IV and reassess.  Meanwhile patient had a recent echocardiogram in May 3, 2025 showed normal ejection fraction and  mild pulmonary hypertension with mild aortic valve stenosis.       #2 irregular heartbeat.  EKG showing multifocal atrial tachycardia.  There is no  evidence of atrial fibrillation.    Check TSH.  Echocardiogram showed normal ejection fraction recently.  Patient was started on diltiazem yesterday.  With very little effect.  Will discontinue diltiazem in favor of metoprolol.     #3 hypertension: Clonidine has been discontinued.  Will start metoprolol 25 mg daily.  Continue with hydralazine 25 mg 3 times daily.     4.  Urinary tract infection/leading to weakness management by primary team.     #5 elevated troponin.  Patient has likely elevated troponin.  No evidence of ischemia on the EKG.  Has no chest pain.  Recommend monitoring for now.  Outpatient follow-up        Overall impression/plan:     6/2: As above.  Patient with episode of tachycardia after ambulation to the restroom.  Likely this is an atrial tachycardia.  There is no evidence of atrial fibrillation on telemetry thus no anticoagulation is recommended.  She was started on diltiazem 180 mg yesterday.  Will discontinue diltiazem in favor of metoprolol.  Hopefully with beta-blockade will better control her heart rates.  Will continue to monitor.  With her underlying UTI her heart rates may be difficult to control in the acute illness setting.  Blood pressure today 138/59.  Her lab work was reviewed reveals a potassium of 4.2, sodium of 135, creatinine 1.26.  Hemoglobin 8.4 hematocrit 26.8.  She is saturating well on room air at 94%.  She remains on IV antibiotics for a UTI.  She appears relatively euvolemic today.  Would not recommend further diuretic therapy.  Will continue to follow with you.       6/3: As above.  Patient was febrile yesterday evening complains of chills during this.  She denies chest pain, palpitations or shortness of breath.  Her heart rates are under much better control today with the metoprolol.  Recommend continuing this medication at  discharge.  She remains in normal sinus rhythm on telemetry with the occasional PVC of which she is asymptomatic.  She is euvolemic without any peripheral edema.  She is saturating well on room air at 94%.  She is mildly hypertensive this morning prior to her morning medications at 146/54. Overall stable from a cardiac standpoint.  She should follow with her cardiologist Dr. Reveles or she can follow-up with Dr. Flores in 2-3 weeks.  We will sign off.  Please not hesitate to reach out with questions or concerns.      I spent 35 minutes in the professional and overall care of this patient.      Candelaria Evans PA-C

## 2025-06-03 NOTE — PROGRESS NOTES
06/03/25 1111   Discharge Planning   Expected Discharge Disposition Home H  ([facility] in past)   Does the patient need discharge transport arranged? No     Home with [facility] at discharge.

## 2025-06-03 NOTE — PROGRESS NOTES
Fartun Carey is a 89 y.o. female on day 2 of admission presenting with Generalized weakness.    Subjective   Interval History:   Reports fatigue and generalized weakness  Fever yesterday night-38 C  No chills  No dysuria   No shortness of breath cough or chest pain  No nausea, vomiting or diarrhea         Objective   Range of Vitals (last 24 hours)  Heart Rate:  [68-88]   Temp:  [35.5 °C (95.9 °F)-38 °C (100.4 °F)]   Resp:  [15-17]   BP: (117-166)/(50-62)   SpO2:  [93 %-99 %]   Daily Weight  06/01/25 : 72.1 kg (159 lb)    Body mass index is 31.05 kg/m².    Physical Exam  Constitutional:       Appearance: Normal appearance.   HENT:      Head: Normocephalic and atraumatic.      Nose: Nose normal.      Mouth/Throat:      Mouth: Mucous membranes are moist.      Pharynx: Oropharynx is clear.   Eyes:      General: No scleral icterus.  Cardiovascular:      Rate and Rhythm: Normal rate and regular rhythm.   Pulmonary:      Effort: Pulmonary effort is normal.      Breath sounds: Normal breath sounds.   Abdominal:      General: Bowel sounds are normal.      Palpations: Abdomen is soft.   Musculoskeletal:         General: Normal range of motion.      Cervical back: Normal range of motion and neck supple.   Skin:     General: Skin is warm and dry.   Neurological:      Mental Status: She is alert and oriented to person, place, and time.   Psychiatric:         Mood and Affect: Mood normal.         Behavior: Behavior normal.        Antibiotics  meropenem - 1 gram/50 mL    Relevant Results  Labs  Results from last 72 hours   Lab Units 06/03/25  0612 06/02/25  0411 05/31/25  2239   WBC AUTO x10*3/uL 10.4 10.5 4.5   HEMOGLOBIN g/dL 8.6* 8.4* 10.4*   HEMATOCRIT % 27.8* 26.8* 33.2*   PLATELETS AUTO x10*3/uL 124* 110* 111*   LYMPHO PCT MAN % 10.0 9.0 9.0   MONO PCT MAN % 8.0 9.0 0.0   EOSINO PCT MAN % 1.0 0.0 0.0     Results from last 72 hours   Lab Units 06/03/25  0612 06/02/25  0411 06/01/25  1120 05/31/25  2239   SODIUM mmol/L 136  135* 135* 134*   POTASSIUM mmol/L 4.2 4.2 4.1 3.8   CHLORIDE mmol/L 106 106 108* 102   CO2 mmol/L 22 22 19* 21   BUN mg/dL 25* 26* 27* 27*   CREATININE mg/dL 1.22* 1.26* 1.40* 1.47*   GLUCOSE mg/dL 112* 97 164* 100*   CALCIUM mg/dL 8.3* 7.8* 7.5* 8.2*   ANION GAP mmol/L 12 11 12 15   EGFR mL/min/1.73m*2 41* 39* 35* 33*   PHOSPHORUS mg/dL  --   --   --  2.0*     Results from last 72 hours   Lab Units 06/03/25  0612 06/02/25  0411 05/31/25  2239   ALK PHOS U/L 113 99 165*   BILIRUBIN TOTAL mg/dL 0.6 0.6 0.5   PROTEIN TOTAL g/dL 5.0* 4.6* 6.1*   ALT U/L 8 7 11   AST U/L 11 9 14   ALBUMIN g/dL 2.7* 2.5* 3.3*     Estimated Creatinine Clearance: 23.9 mL/min (A) (by C-G formula based on SCr of 1.22 mg/dL (H)).  CRP   Date Value Ref Range Status   08/18/2022 0.6 0 - 2.0 MG/DL Final     Comment:     Performed at [address] [address]     Microbiology  Susceptibility data from last 14 days.  Collected Specimen Info Organism   06/01/25 Urine from Clean Catch/Voided Escherichia coli   05/31/25 Blood culture from Peripheral Venipuncture Escherichia coli   05/31/25 Blood culture from Peripheral Venipuncture Escherichia coli       Imaging  CT angio chest for pulmonary embolism  Result Date: 6/1/2025  Interpreted By:  Syd Barnard, STUDY: CT ANGIO CHEST FOR PULMONARY EMBOLISM;  5/31/2025 11:37 pm   INDICATION: Signs/Symptoms:sob, tachy.     COMPARISON: 05/03/2025   ACCESSION NUMBER(S): [id number]   ORDERING CLINICIAN: DEDRICK CAVAZOS   TECHNIQUE: Contiguous axial images of the chest were obtained after the intravenous administration of iodinated contrast using angiographic PE protocol. Coronal and sagittal reformatted images were reconstructed from the axial data. MIP images were created on an independent workstation and reviewed.   FINDINGS:   MEDIASTINUM AND LYMPH NODES:  The esophageal wall appears within normal limits.  No enlarged intrathoracic or axillary lymph nodes by imaging criteria. No  pneumomediastinum.   VESSELS:  Normal caliber thoracic aorta without dissection. Mild aortic atherosclerosis.  No acute pulmonary embolism.   HEART: Normal size. Aortic valvular calcifications. Severe coronary artery calcifications. Small pericardial effusion.   LUNG, AIRWAYS, PLEURA: Trace pleural effusions. Ill-defined central predominant ground-glass opacities in all lobes of both lungs are noted and are increased slightly from prior study. There is mild septal thickening centrally. There are again numerous solid bilateral pulmonary nodules that are stable from 05/03/2025. To the extent visualized on prior CT abdomen/pelvis studies such as 10/09/2023 these were present and appear grossly unchanged and likely reflects sequela of remote granulomatous infection, given that some are partially calcified. No pneumothorax.   OSSEOUS STRUCTURES: No acute osseous abnormality. Severe degenerative disc height loss in the lower thoracic and upper lumbar spine. No high-grade canal stenosis.   CHEST WALL SOFT TISSUES: No discernible acute abnormality.   UPPER ABDOMEN/OTHER: Calcified granulomas in the spleen. There is diffuse pancreatic ductal dilatation and pancreatic atrophy. Appearance is chronic dating back to 08/09/2023.       No acute pulmonary embolism.   Increased prominence and extent of central predominant ground-glass opacities raising suspicion for pulmonary edema versus viral infection, the former favored the please correlate clinically.   Small pleural effusion.   Numerous bilateral solid pulmonary nodules, some with speckled calcifications and given stability dating back to 2023 and splenic calcified granulomas these are most favored to represent sequela of remote granulomatous infection.   MACRO: None.   Signed by: Syd Barnard 6/1/2025 12:57 AM Dictation workstation:   [id number]    Electrocardiogram, 12-lead PRN ACS symptoms  Result Date: 5/17/2025  Unusual P axis, possible ectopic atrial rhythm with  undetermined rhythm irregularity Left axis deviation Abnormal ECG When compared with ECG of 03-MAY-2025 15:30, Ectopic atrial rhythm has replaced Sinus rhythm Vent. rate has decreased BY  45 BPM Confirmed by David Monroy ([id number]) on 5/17/2025 8:34:06 AM    XR chest 2 views  Result Date: 5/12/2025  Interpreted By:  Analia Francois, STUDY: XR CHEST 2 VIEWS 5/12/2025 10:45 am   INDICATION: Signs/Symptoms:dyspnea, pain. Cough.   COMPARISON: 05/03/2025   ACCESSION NUMBER(S): [id number]   ORDERING CLINICIAN: VENANCIO AHMADI   TECHNIQUE: PA and lateral views of the chest were acquired.   FINDINGS: Normal heart, mediastinum, and lungs. There is calcified plaque within the aortic arch. There is a levoscoliosis of the lower thoracic and upper lumbar spine.       No acute cardiopulmonary disease.   Signed by: Analia Francois 5/12/2025 12:34 PM Dictation workstation:   [id number]    US renal complete  Result Date: 5/6/2025  Interpreted By:  Maninder Clark, STUDY: US RENAL COMPLETE;  5/6/2025 1:35 pm   INDICATION: Signs/Symptoms:VINCENT.     COMPARISON: 03/01/2024.   ACCESSION NUMBER(S): [id number]   ORDERING CLINICIAN: TOD THOMAS   TECHNIQUE: Real-time sonographic evaluation of the kidneys and urinary bladder was performed.   FINDINGS: RIGHT KIDNEY: Right kidney measures  9.9 cm.  Right central renal small hyperechoic focus may represent nonobstructing calculus. No right hydronephrosis. No discrete renal lesion is visualized.   LEFT KIDNEY: Left kidney measures  9.4 cm.  Left central renal small hyperechoic foci may represent nonobstructing calculi. No left hydronephrosis. No discrete renal lesion is visualized.   BLADDER: Urinary bladder was fully decompressed by Alvarado catheter and could not be evaluated at this time.       No hydronephrosis bilaterally.   Urinary bladder fully decompressed by Alvarado catheter and could not be evaluated.   MACRO: None.   Signed by: Maninder Clark 5/6/2025 2:09 PM Dictation workstation:    [id number]    Transthoracic Echo (TTE) Limited  Result Date: 5/5/2025            [facility] [address], [place] [address]             Phone [phone] TRANSTHORACIC ECHOCARDIOGRAM REPORT Patient Name:       FRANKIE TESHA WESTBROOK        Reading Physician:    [id number] Brijesh Jerez MD Study Date:         5/5/2025             Ordering Provider:    [id number] RENEE LAWTON MRN/PID:            [id number]             Fellow: Accession#:         [id number]         Nurse: Date of Birth/Age:  6/10/1928 / 89 years Sonographer:          Amilcar Gatica RDCS Gender Assigned at  F                    Additional Staff: Birth: Height:             154.94 cm            Admit Date: Weight:             72.58 kg             Admission Status:     Inpatient -                                                                Routine BSA / BMI:          1.72 m2 / 30.23      Department Location:  [facility]                     kg/m2 Blood Pressure: 137 /48 mmHg Study Type:    TRANSTHORACIC ECHO (TTE) LIMITED Diagnosis/ICD: Dyspnea, unspecified-R06.00 Indication:    dyspnea CPT Codes:     Echo Limited-63729; Doppler Limited-95162; Color Doppler-79959 Patient History: Valve Disorders:   Aortic Stenosis. Pertinent History: HTN, Hyperlipidemia and CAD. CKD. Study Detail: The following Echo studies were performed: 2D, M-Mode, Doppler and               color flow. Technically challenging study due to body habitus.  PHYSICIAN INTERPRETATION: Left Ventricle: The left ventricular systolic function is normal, with a visually estimated ejection fraction of 60-65%. There are no regional wall motion abnormalities. The left ventricular cavity size is normal. There is mild increased septal and normal posterior left ventricular wall thickness. Spectral Doppler shows a normal pattern of left ventricular diastolic filling.  Left Atrium: The left atrial size is mildly dilated. Right Ventricle: The right ventricle is normal in size. There is normal right ventriclar wall thickness. There is normal right ventricular global systolic function. Right Atrium: The right atrial size is normal. Aortic Valve: The aortic valve is trileaflet. There is mild aortic valve cusp calcification. There is reduced systolic aortic valve leaflet excursion. There is evidence of mild aortic valve stenosis. The aortic valve dimensionless index is 0.38. There is no evidence of aortic valve regurgitation. The peak instantaneous gradient of the aortic valve is 18 mmHg. The mean gradient of the aortic valve is 10 mmHg. Mitral Valve: The mitral valve is normal in structure. There is normal mitral valve leaflet mobility. There is mild mitral annular calcification. There is trace mitral valve regurgitation. Tricuspid Valve: The tricuspid valve is structurally normal. There is normal tricuspid valve leaflet mobility. There is mild to moderate tricuspid regurgitation. Pulmonic Valve: The pulmonic valve is structurally normal. There is trace pulmonic valve regurgitation. Pericardium: No pericardial effusion noted. Aorta: The aortic root is normal. There is no dilatation of the aortic arch. There is no dilatation of the ascending aorta. There is no dilatation of the aortic root. Pulmonary Artery: The pulmonary artery is normal in size. The tricuspid regurgitant velocity is 2.72 m/s, and with an estimated right atrial pressure of 3 mmHg, the estimated pulmonary artery pressure is normal with the RVSP at 32.6 mmHg. The estimated PASP is 33 mmHg.  CONCLUSIONS:  1. The left ventricular systolic function is normal, with a visually estimated ejection fraction of 60-65%.  2. Trace mitral valve regurgitation.  3. Mild to moderate tricuspid regurgitation.  4. Mild aortic valve stenosis.  5. The peak instantaneous gradient of the aortic valve is 18 mmHg. QUANTITATIVE DATA SUMMARY:   2D MEASUREMENTS:           Normal Ranges: Ao Root d:       3.20 cm   (2.0-3.7cm) IVSd:            0.98 cm   (0.6-1.1cm) LVPWd:           0.80 cm   (0.6-1.1cm) LVIDd:           4.37 cm   (3.9-5.9cm) LVIDs:           2.29 cm LV Mass Index:   72.4 g/m2 LV % FS          47.6 %  LEFT ATRIUM:                Normal Ranges: LA Volume Index: 33.0 ml/m2  AORTA MEASUREMENTS:         Normal Ranges: Ao Sinus, d:        3.20 cm (2.1-3.5cm) Asc Ao, d:          2.80 cm (2.1-3.4cm)  LV SYSTOLIC FUNCTION:                      Normal Ranges: EF-A4C View:    62 % (>=55%) EF-Visual:      63 % LV EF Reported: 63 %  LV DIASTOLIC FUNCTION:             Normal Ranges: MV Peak E:             0.87 m/s    (0.7-1.2 m/s) MV Peak A:             0.96 m/s    (0.42-0.7 m/s) E/A Ratio:             0.90        (1.0-2.2) MV A Dur:              180.00 msec  MITRAL VALVE:          Normal Ranges: MV DT:        250 msec (150-240msec)  AORTIC VALVE:                      Normal Ranges: AoV Vmax:                2.14 m/s  (<=1.7m/s) AoV Peak P.3 mmHg (<20mmHg) AoV Mean PG:             10.0 mmHg (1.7-11.5mmHg) LVOT Max Joseph:            0.89 m/s  (<=1.1m/s) AoV VTI:                 54.70 cm  (18-25cm) LVOT VTI:                21.00 cm LVOT Diameter:           1.80 cm   (1.8-2.4cm) AoV Area, VTI:           0.98 cm2  (2.5-5.5cm2) AoV Area,Vmax:           1.06 cm2  (2.5-4.5cm2) AoV Dimensionless Index: 0.38  TRICUSPID VALVE/RVSP:          Normal Ranges: Peak TR Velocity:     2.72 m/s RV Syst Pressure:     33 mmHg  (< 30mmHg)  [id number] Brijesh Jerez MD Electronically signed on 2025 at 5:48:18 PM  ** Final **             Assessment/Plan   E. Coli Bacteremia-secondary to UTI  E.coli  Urinary tract infection   Resolving Acute on chronic kidney disease  Recent ESBL UTI        Continue IV meropenem   Follow up blood culture-follow up susceptibility   Follow up urine culture-follow up susceptibility   Monitor temp and wbc  Supportive care  Follow up Repeat  blood culture     Alice Smith, APRN-CNP

## 2025-06-04 ENCOUNTER — APPOINTMENT (OUTPATIENT)
Dept: CARDIOLOGY | Facility: HOSPITAL | Age: OVER 89
End: 2025-06-04
Payer: MEDICARE

## 2025-06-04 ENCOUNTER — HOME HEALTH ADMISSION (OUTPATIENT)
Dept: HOME HEALTH SERVICES | Facility: HOME HEALTH | Age: OVER 89
End: 2025-06-04
Payer: MEDICARE

## 2025-06-04 DIAGNOSIS — A49.9 ESBL (EXTENDED SPECTRUM BETA-LACTAMASE) PRODUCING BACTERIA INFECTION: ICD-10-CM

## 2025-06-04 DIAGNOSIS — Z16.12 ESBL (EXTENDED SPECTRUM BETA-LACTAMASE) PRODUCING BACTERIA INFECTION: ICD-10-CM

## 2025-06-04 DIAGNOSIS — R78.81 BACTEREMIA: ICD-10-CM

## 2025-06-04 LAB
ALBUMIN SERPL BCP-MCNC: 2.6 G/DL (ref 3.4–5)
ALP SERPL-CCNC: 96 U/L (ref 33–136)
ALT SERPL W P-5'-P-CCNC: 9 U/L (ref 7–45)
ANION GAP SERPL CALCULATED.3IONS-SCNC: 12 MMOL/L (ref 10–20)
AST SERPL W P-5'-P-CCNC: 12 U/L (ref 9–39)
BACTERIA BLD AEROBE CULT: ABNORMAL
BACTERIA BLD CULT: ABNORMAL
BACTERIA UR CULT: ABNORMAL
BASOPHILS # BLD MANUAL: 0 X10*3/UL (ref 0–0.1)
BASOPHILS NFR BLD MANUAL: 0 %
BILIRUB SERPL-MCNC: 0.4 MG/DL (ref 0–1.2)
BUN SERPL-MCNC: 22 MG/DL (ref 6–23)
CALCIUM SERPL-MCNC: 8 MG/DL (ref 8.6–10.3)
CHLORIDE SERPL-SCNC: 106 MMOL/L (ref 98–107)
CO2 SERPL-SCNC: 23 MMOL/L (ref 21–32)
CREAT SERPL-MCNC: 1.19 MG/DL (ref 0.5–1.05)
EGFRCR SERPLBLD CKD-EPI 2021: 42 ML/MIN/1.73M*2
EOSINOPHIL # BLD MANUAL: 0 X10*3/UL (ref 0–0.4)
EOSINOPHIL NFR BLD MANUAL: 0 %
ERYTHROCYTE [DISTWIDTH] IN BLOOD BY AUTOMATED COUNT: 15.8 % (ref 11.5–14.5)
GLUCOSE BLD MANUAL STRIP-MCNC: 111 MG/DL (ref 74–99)
GLUCOSE BLD MANUAL STRIP-MCNC: 129 MG/DL (ref 74–99)
GLUCOSE BLD MANUAL STRIP-MCNC: 161 MG/DL (ref 74–99)
GLUCOSE BLD MANUAL STRIP-MCNC: 178 MG/DL (ref 74–99)
GLUCOSE SERPL-MCNC: 120 MG/DL (ref 74–99)
GRAM STN SPEC: ABNORMAL
GRAM STN SPEC: ABNORMAL
HCT VFR BLD AUTO: 25.9 % (ref 36–46)
HGB BLD-MCNC: 8 G/DL (ref 12–16)
IMM GRANULOCYTES # BLD AUTO: 0.62 X10*3/UL (ref 0–0.5)
IMM GRANULOCYTES NFR BLD AUTO: 7.7 % (ref 0–0.9)
LYMPHOCYTES # BLD MANUAL: 1.05 X10*3/UL (ref 0.8–3)
LYMPHOCYTES NFR BLD MANUAL: 13 %
MCH RBC QN AUTO: 29.4 PG (ref 26–34)
MCHC RBC AUTO-ENTMCNC: 30.9 G/DL (ref 32–36)
MCV RBC AUTO: 95 FL (ref 80–100)
METAMYELOCYTES # BLD MANUAL: 0.32 X10*3/UL
METAMYELOCYTES NFR BLD MANUAL: 4 %
MONOCYTES # BLD MANUAL: 0.57 X10*3/UL (ref 0.05–0.8)
MONOCYTES NFR BLD MANUAL: 7 %
MYELOCYTES # BLD MANUAL: 0.73 X10*3/UL
MYELOCYTES NFR BLD MANUAL: 9 %
NEUTROPHILS # BLD MANUAL: 5.34 X10*3/UL (ref 1.6–5.5)
NEUTS BAND # BLD MANUAL: 0.24 X10*3/UL (ref 0–0.5)
NEUTS BAND NFR BLD MANUAL: 3 %
NEUTS SEG # BLD MANUAL: 5.1 X10*3/UL (ref 1.6–5)
NEUTS SEG NFR BLD MANUAL: 63 %
NEUTS VAC BLD QL SMEAR: PRESENT
NRBC BLD-RTO: 0 /100 WBCS (ref 0–0)
OVALOCYTES BLD QL SMEAR: ABNORMAL
PLATELET # BLD AUTO: 136 X10*3/UL (ref 150–450)
POTASSIUM SERPL-SCNC: 4.1 MMOL/L (ref 3.5–5.3)
PROMYELOCYTES # BLD MANUAL: 0.08 X10*3/UL
PROMYELOCYTES NFR BLD MANUAL: 1 %
PROT SERPL-MCNC: 4.7 G/DL (ref 6.4–8.2)
RBC # BLD AUTO: 2.72 X10*6/UL (ref 4–5.2)
RBC MORPH BLD: ABNORMAL
SODIUM SERPL-SCNC: 137 MMOL/L (ref 136–145)
TOTAL CELLS COUNTED BLD: 200
TOXIC GRANULES BLD QL SMEAR: PRESENT
WBC # BLD AUTO: 8.1 X10*3/UL (ref 4.4–11.3)

## 2025-06-04 PROCEDURE — 36415 COLL VENOUS BLD VENIPUNCTURE: CPT | Performed by: INTERNAL MEDICINE

## 2025-06-04 PROCEDURE — 2780000003 HC OR 278 NO HCPCS

## 2025-06-04 PROCEDURE — 2500000001 HC RX 250 WO HCPCS SELF ADMINISTERED DRUGS (ALT 637 FOR MEDICARE OP)

## 2025-06-04 PROCEDURE — 99232 SBSQ HOSP IP/OBS MODERATE 35: CPT | Performed by: NURSE PRACTITIONER

## 2025-06-04 PROCEDURE — 85027 COMPLETE CBC AUTOMATED: CPT | Performed by: INTERNAL MEDICINE

## 2025-06-04 PROCEDURE — 80053 COMPREHEN METABOLIC PANEL: CPT | Performed by: INTERNAL MEDICINE

## 2025-06-04 PROCEDURE — 2500000002 HC RX 250 W HCPCS SELF ADMINISTERED DRUGS (ALT 637 FOR MEDICARE OP, ALT 636 FOR OP/ED): Performed by: INTERNAL MEDICINE

## 2025-06-04 PROCEDURE — 82947 ASSAY GLUCOSE BLOOD QUANT: CPT

## 2025-06-04 PROCEDURE — 36410 VNPNXR 3YR/> PHY/QHP DX/THER: CPT

## 2025-06-04 PROCEDURE — 2500000004 HC RX 250 GENERAL PHARMACY W/ HCPCS (ALT 636 FOR OP/ED): Performed by: INTERNAL MEDICINE

## 2025-06-04 PROCEDURE — 2500000004 HC RX 250 GENERAL PHARMACY W/ HCPCS (ALT 636 FOR OP/ED): Performed by: NURSE PRACTITIONER

## 2025-06-04 PROCEDURE — 2500000001 HC RX 250 WO HCPCS SELF ADMINISTERED DRUGS (ALT 637 FOR MEDICARE OP): Performed by: INTERNAL MEDICINE

## 2025-06-04 PROCEDURE — C1751 CATH, INF, PER/CENT/MIDLINE: HCPCS

## 2025-06-04 PROCEDURE — 1100000001 HC PRIVATE ROOM DAILY

## 2025-06-04 PROCEDURE — 85007 BL SMEAR W/DIFF WBC COUNT: CPT | Performed by: INTERNAL MEDICINE

## 2025-06-04 RX ORDER — GRANULES FOR ORAL 3 G/1
3 POWDER ORAL ONCE
Qty: 3 G | Refills: 9 | Status: SHIPPED | OUTPATIENT
Start: 2025-06-04 | End: 2025-06-04

## 2025-06-04 RX ORDER — ONDANSETRON HYDROCHLORIDE 2 MG/ML
4 INJECTION, SOLUTION INTRAVENOUS EVERY 8 HOURS PRN
Status: DISCONTINUED | OUTPATIENT
Start: 2025-06-04 | End: 2025-06-05 | Stop reason: HOSPADM

## 2025-06-04 RX ORDER — ERTAPENEM 1 G/1
500 INJECTION, POWDER, LYOPHILIZED, FOR SOLUTION INTRAMUSCULAR; INTRAVENOUS DAILY
Qty: 5.5 G | Refills: 0 | Status: SHIPPED
Start: 2025-06-04 | End: 2025-06-15

## 2025-06-04 RX ORDER — ONDANSETRON 4 MG/1
4 TABLET, ORALLY DISINTEGRATING ORAL EVERY 8 HOURS PRN
Status: DISCONTINUED | OUTPATIENT
Start: 2025-06-04 | End: 2025-06-05 | Stop reason: HOSPADM

## 2025-06-04 RX ORDER — LIDOCAINE HYDROCHLORIDE 10 MG/ML
5 INJECTION, SOLUTION INFILTRATION; PERINEURAL ONCE
Status: DISCONTINUED | OUTPATIENT
Start: 2025-06-04 | End: 2025-06-05 | Stop reason: HOSPADM

## 2025-06-04 RX ADMIN — PANTOPRAZOLE SODIUM 40 MG: 40 TABLET, DELAYED RELEASE ORAL at 05:31

## 2025-06-04 RX ADMIN — HEPARIN SODIUM 5000 UNITS: 5000 INJECTION, SOLUTION INTRAVENOUS; SUBCUTANEOUS at 14:55

## 2025-06-04 RX ADMIN — INSULIN LISPRO 2 UNITS: 100 INJECTION, SOLUTION INTRAVENOUS; SUBCUTANEOUS at 11:20

## 2025-06-04 RX ADMIN — IRON SUCROSE 200 MG: 20 INJECTION, SOLUTION INTRAVENOUS at 09:27

## 2025-06-04 RX ADMIN — ASPIRIN 81 MG: 81 TABLET, CHEWABLE ORAL at 09:27

## 2025-06-04 RX ADMIN — LEVOTHYROXINE SODIUM 88 MCG: 0.09 TABLET ORAL at 05:31

## 2025-06-04 RX ADMIN — INSULIN LISPRO 2 UNITS: 100 INJECTION, SOLUTION INTRAVENOUS; SUBCUTANEOUS at 17:32

## 2025-06-04 RX ADMIN — DOCUSATE SODIUM 100 MG: 100 CAPSULE, LIQUID FILLED ORAL at 09:27

## 2025-06-04 RX ADMIN — HYDRALAZINE HYDROCHLORIDE 25 MG: 25 TABLET ORAL at 15:09

## 2025-06-04 RX ADMIN — MEROPENEM AND SODIUM CHLORIDE 1 G: 1 INJECTION, SOLUTION INTRAVENOUS at 18:17

## 2025-06-04 RX ADMIN — ATORVASTATIN CALCIUM 80 MG: 80 TABLET, FILM COATED ORAL at 21:00

## 2025-06-04 RX ADMIN — HYDRALAZINE HYDROCHLORIDE 25 MG: 25 TABLET ORAL at 09:27

## 2025-06-04 RX ADMIN — ONDANSETRON 4 MG: 4 TABLET, ORALLY DISINTEGRATING ORAL at 17:32

## 2025-06-04 RX ADMIN — HYDRALAZINE HYDROCHLORIDE 25 MG: 25 TABLET ORAL at 21:00

## 2025-06-04 RX ADMIN — HEPARIN SODIUM 5000 UNITS: 5000 INJECTION, SOLUTION INTRAVENOUS; SUBCUTANEOUS at 05:34

## 2025-06-04 RX ADMIN — METOPROLOL SUCCINATE 25 MG: 25 TABLET, EXTENDED RELEASE ORAL at 09:27

## 2025-06-04 RX ADMIN — MEROPENEM AND SODIUM CHLORIDE 1 G: 1 INJECTION, SOLUTION INTRAVENOUS at 05:31

## 2025-06-04 RX ADMIN — Medication 1000 MCG: at 09:28

## 2025-06-04 ASSESSMENT — COGNITIVE AND FUNCTIONAL STATUS - GENERAL
TOILETING: A LITTLE
MOVING TO AND FROM BED TO CHAIR: A LITTLE
TURNING FROM BACK TO SIDE WHILE IN FLAT BAD: A LITTLE
MOBILITY SCORE: 19
HELP NEEDED FOR BATHING: A LITTLE
CLIMB 3 TO 5 STEPS WITH RAILING: A LITTLE
WALKING IN HOSPITAL ROOM: A LITTLE
STANDING UP FROM CHAIR USING ARMS: A LITTLE
DAILY ACTIVITIY SCORE: 22

## 2025-06-04 ASSESSMENT — PAIN SCALES - GENERAL: PAINLEVEL_OUTOF10: 0 - NO PAIN

## 2025-06-04 NOTE — CARE PLAN
The patient's goals for the shift include      The clinical goals for the shift include safety awareness, ambulation,      Problem: Pain - Adult  Goal: Verbalizes/displays adequate comfort level or baseline comfort level  Outcome: Not Progressing     Problem: Safety - Adult  Goal: Free from fall injury  Outcome: Not Progressing     Problem: Discharge Planning  Goal: Discharge to home or other facility with appropriate resources  Outcome: Not Progressing     Problem: Chronic Conditions and Co-morbidities  Goal: Patient's chronic conditions and co-morbidity symptoms are monitored and maintained or improved  Outcome: Not Progressing     Problem: Nutrition  Goal: Nutrient intake appropriate for maintaining nutritional needs  Outcome: Not Progressing     Problem: Diabetes  Goal: Achieve decreasing blood glucose levels by end of shift  Outcome: Not Progressing  Goal: Increase stability of blood glucose readings by end of shift  Outcome: Not Progressing  Goal: Decrease in ketones present in urine by end of shift  Outcome: Not Progressing  Goal: Maintain electrolyte levels within acceptable range throughout shift  Outcome: Not Progressing  Goal: Maintain glucose levels >70mg/dl to <250mg/dl throughout shift  Outcome: Not Progressing  Goal: No changes in neurological exam by end of shift  Outcome: Not Progressing  Goal: Learn about and adhere to nutrition recommendations by end of shift  Outcome: Not Progressing  Goal: Vital signs within normal range for age by end of shift  Outcome: Not Progressing  Goal: Increase self care and/or family involovement by end of shift  Outcome: Not Progressing  Goal: Receive DSME education by end of shift  Outcome: Not Progressing     Problem: Fall/Injury  Goal: Not fall by end of shift  Outcome: Not Progressing  Goal: Be free from injury by end of the shift  Outcome: Not Progressing  Goal: Verbalize understanding of personal risk factors for fall in the hospital  Outcome: Not  Progressing  Goal: Verbalize understanding of risk factor reduction measures to prevent injury from fall in the home  Outcome: Not Progressing  Goal: Use assistive devices by end of the shift  Outcome: Not Progressing  Goal: Pace activities to prevent fatigue by end of the shift  Outcome: Not Progressing       Problem: Pain - Adult  Goal: Verbalizes/displays adequate comfort level or baseline comfort level  Outcome: Not Progressing     Problem: Safety - Adult  Goal: Free from fall injury  Outcome: Not Progressing     Problem: Discharge Planning  Goal: Discharge to home or other facility with appropriate resources  Outcome: Not Progressing     Problem: Chronic Conditions and Co-morbidities  Goal: Patient's chronic conditions and co-morbidity symptoms are monitored and maintained or improved  Outcome: Not Progressing     Problem: Nutrition  Goal: Nutrient intake appropriate for maintaining nutritional needs  Outcome: Not Progressing     Problem: Diabetes  Goal: Achieve decreasing blood glucose levels by end of shift  Outcome: Not Progressing  Goal: Increase stability of blood glucose readings by end of shift  Outcome: Not Progressing  Goal: Decrease in ketones present in urine by end of shift  Outcome: Not Progressing  Goal: Maintain electrolyte levels within acceptable range throughout shift  Outcome: Not Progressing  Goal: Maintain glucose levels >70mg/dl to <250mg/dl throughout shift  Outcome: Not Progressing  Goal: No changes in neurological exam by end of shift  Outcome: Not Progressing  Goal: Learn about and adhere to nutrition recommendations by end of shift  Outcome: Not Progressing  Goal: Vital signs within normal range for age by end of shift  Outcome: Not Progressing  Goal: Increase self care and/or family involovement by end of shift  Outcome: Not Progressing  Goal: Receive DSME education by end of shift  Outcome: Not Progressing     Problem: Fall/Injury  Goal: Not fall by end of shift  Outcome: Not  Progressing  Goal: Be free from injury by end of the shift  Outcome: Not Progressing  Goal: Verbalize understanding of personal risk factors for fall in the hospital  Outcome: Not Progressing  Goal: Verbalize understanding of risk factor reduction measures to prevent injury from fall in the home  Outcome: Not Progressing  Goal: Use assistive devices by end of the shift  Outcome: Not Progressing  Goal: Pace activities to prevent fatigue by end of the shift  Outcome: Not Progressing

## 2025-06-04 NOTE — ASSESSMENT & PLAN NOTE
-SSI for now  
-asa, statin, hydralazine, ARB on hold for now.   
-cont home synthroid dose  
-last urine cx 5/5 with ESBL ecoli  -cont meropenem for now  -await urine culture and blood culture results.   
-last urine cx 5/5 with ESBL ecoli  -cont meropenem for now  -await urine culture and blood culture results.   
-monitor BMP  -replete as needed.   
-monitor BMP  -replete as needed.   
PT/OT eval  
SSI for now  
This may get worse after contrast for CT angio so we will continue fluids  
This may get worse after contrast for CT angio so we will continue fluids for next 12hrs  -repeat BMP in am  -avoid nephrotoxic agents.   -Cr today 1.4  
This may get worse after contrast for CT angio so we will continue fluids for next 12hrs  -repeat BMP in am  -avoid nephrotoxic agents.   -Cr today 1.4  
This may get worse after contrast for CT angio so we will continue fluids for next 12hrs  void nephrotoxic agents.   Cr today 1.26  
This may get worse after contrast for CT angio so we will continue fluids for next 12hrs  void nephrotoxic agents.   Cr today 1.26  
This may get worse after contrast for CT angio, avoid nephrotoxic agents.   Cr today 1.19  
This may get worse after contrast for CT angio, avoid nephrotoxic agents.   Cr today 1.19  
This may get worse after contrast for CT angio, avoid nephrotoxic agents.   Cr today 1.22  
This may get worse after contrast for CT angio, avoid nephrotoxic agents.   Cr today 1.22  
asa, statin, hydralazine, ARB on hold for now.   
cont home synthroid dose  
last urine cx 5/5 with ESBL ecoli  Blood culture growing E coli  Urine culture grew E coli, ESBL  
last urine cx 5/5 with ESBL ecoli  Blood culture growing E coli  Urine culture grew E coli, ESBL  
last urine cx 5/5 with ESBL ecoli  Blood culture growing E coli  Urine culture pending  
monitor BMP  replete as needed    Plan of Care  Patient lives at home and plans to go home with [facility] at discharge.  Plan of care discussed with patient and collaborating physician.    
monitor BMP  replete as needed    Plan of Care  Patient lives at home and plans to go home with [facility] at discharge and IV antibiotics for 14 days. She is willing to learn how to do this herself.   Plan of care discussed with patient and collaborating physician.    
monitor BMP  replete as needed    Plan of Care  Patient lives at home and plans to go home with [facility] at discharge and IV antibiotics for 14 days. She is willing to learn how to do this herself.   Plan of care discussed with patient and collaborating physician.    
1 or 2

## 2025-06-04 NOTE — PROGRESS NOTES
Fartun Carey is a 89 y.o. female on day 3 of admission presenting with Generalized weakness.    Subjective   Interval History:   Reports feeling better  Afebrile, no chills  No dysuria   No shortness of breath cough or chest pain  No nausea, vomiting or diarrhea       Objective   Range of Vitals (last 24 hours)  Heart Rate:  [65-78]   Temp:  [36.9 °C (98.4 °F)-37.8 °C (100 °F)]   Resp:  [16-17]   BP: (123-148)/(53-94)   SpO2:  [93 %-95 %]   Daily Weight  06/01/25 : 72.1 kg (159 lb)    Body mass index is 31.05 kg/m².    Physical Exam  Constitutional:       Appearance: Normal appearance.   HENT:      Head: Normocephalic and atraumatic.      Nose: Nose normal.      Mouth/Throat:      Mouth: Mucous membranes are moist.      Pharynx: Oropharynx is clear.   Eyes:      General: No scleral icterus.  Cardiovascular:      Rate and Rhythm: Normal rate and regular rhythm.   Pulmonary:      Effort: Pulmonary effort is normal.   Abdominal:      General: Bowel sounds are normal.      Palpations: Abdomen is soft.   Musculoskeletal:         General: Normal range of motion.      Cervical back: Normal range of motion and neck supple.   Skin:     General: Skin is warm and dry.   Neurological:      Mental Status: She is alert and oriented to person, place, and time.   Psychiatric:         Mood and Affect: Mood normal.         Behavior: Behavior normal.        Antibiotics  meropenem - 1 gram/50 mL    Relevant Results  Labs  Results from last 72 hours   Lab Units 06/04/25 0511 06/03/25 0612 06/02/25  0411   WBC AUTO x10*3/uL 8.1 10.4 10.5   HEMOGLOBIN g/dL 8.0* 8.6* 8.4*   HEMATOCRIT % 25.9* 27.8* 26.8*   PLATELETS AUTO x10*3/uL 136* 124* 110*   LYMPHO PCT MAN % 13.0 10.0 9.0   MONO PCT MAN % 7.0 8.0 9.0   EOSINO PCT MAN % 0.0 1.0 0.0     Results from last 72 hours   Lab Units 06/04/25  0511 06/03/25 0612 06/02/25  0411   SODIUM mmol/L 137 136 135*   POTASSIUM mmol/L 4.1 4.2 4.2   CHLORIDE mmol/L 106 106 106   CO2 mmol/L 23 22 22    BUN mg/dL 22 25* 26*   CREATININE mg/dL 1.19* 1.22* 1.26*   GLUCOSE mg/dL 120* 112* 97   CALCIUM mg/dL 8.0* 8.3* 7.8*   ANION GAP mmol/L 12 12 11   EGFR mL/min/1.73m*2 42* 41* 39*     Results from last 72 hours   Lab Units 06/04/25  0511 06/03/25  0612 06/02/25  0411   ALK PHOS U/L 96 113 99   BILIRUBIN TOTAL mg/dL 0.4 0.6 0.6   PROTEIN TOTAL g/dL 4.7* 5.0* 4.6*   ALT U/L 9 8 7   AST U/L 12 11 9   ALBUMIN g/dL 2.6* 2.7* 2.5*     Estimated Creatinine Clearance: 24.5 mL/min (A) (by C-G formula based on SCr of 1.19 mg/dL (H)).  CRP   Date Value Ref Range Status   08/18/2022 0.6 0 - 2.0 MG/DL Final     Comment:     Performed at [place] [id number]     Microbiology  Susceptibility data from last 14 days.  Collected Specimen Info Organism Amoxicillin/Clavulanate Ampicillin Ampicillin/Sulbactam Aztreonam Cefazolin Cefazolin (uncomplicated UTIs only) Cefepime Cefotaxime Ceftazidime Ceftriaxone Cefuroxime (oral)   06/01/25 Urine from Clean Catch/Voided Escherichia coli  I  R  R  R  R  R  R  R  R  R  R   05/31/25 Blood culture from Peripheral Venipuncture Escherichia coli              05/31/25 Blood culture from Peripheral Venipuncture Escherichia coli   R   R  R   R  R  R  R  R     Collected Specimen Info Organism Ciprofloxacin Ertapenem Gentamicin Levofloxacin Meropenem Nitrofurantoin Piperacillin/Tazobactam Trimethoprim/Sulfamethoxazole   06/01/25 Urine from Clean Catch/Voided Escherichia coli  R  S  S  R  S  S  SDD  R   05/31/25 Blood culture from Peripheral Venipuncture Escherichia coli           05/31/25 Blood culture from Peripheral Venipuncture Escherichia coli  R  S  S  R  S   SDD  R       Imaging  CT angio chest for pulmonary embolism  Result Date: 6/1/2025  Interpreted By:  Syd Barnard, STUDY: CT ANGIO CHEST FOR PULMONARY EMBOLISM;  5/31/2025 11:37 pm   INDICATION: Signs/Symptoms:sob, tachy.     COMPARISON: 05/03/2025   ACCESSION NUMBER(S): [id number]   ORDERING CLINICIAN: DEDRICK  CAVAZOS   TECHNIQUE: Contiguous axial images of the chest were obtained after the intravenous administration of iodinated contrast using angiographic PE protocol. Coronal and sagittal reformatted images were reconstructed from the axial data. MIP images were created on an independent workstation and reviewed.   FINDINGS:   MEDIASTINUM AND LYMPH NODES:  The esophageal wall appears within normal limits.  No enlarged intrathoracic or axillary lymph nodes by imaging criteria. No pneumomediastinum.   VESSELS:  Normal caliber thoracic aorta without dissection. Mild aortic atherosclerosis.  No acute pulmonary embolism.   HEART: Normal size. Aortic valvular calcifications. Severe coronary artery calcifications. Small pericardial effusion.   LUNG, AIRWAYS, PLEURA: Trace pleural effusions. Ill-defined central predominant ground-glass opacities in all lobes of both lungs are noted and are increased slightly from prior study. There is mild septal thickening centrally. There are again numerous solid bilateral pulmonary nodules that are stable from 05/03/2025. To the extent visualized on prior CT abdomen/pelvis studies such as 10/09/2023 these were present and appear grossly unchanged and likely reflects sequela of remote granulomatous infection, given that some are partially calcified. No pneumothorax.   OSSEOUS STRUCTURES: No acute osseous abnormality. Severe degenerative disc height loss in the lower thoracic and upper lumbar spine. No high-grade canal stenosis.   CHEST WALL SOFT TISSUES: No discernible acute abnormality.   UPPER ABDOMEN/OTHER: Calcified granulomas in the spleen. There is diffuse pancreatic ductal dilatation and pancreatic atrophy. Appearance is chronic dating back to 08/09/2023.       No acute pulmonary embolism.   Increased prominence and extent of central predominant ground-glass opacities raising suspicion for pulmonary edema versus viral infection, the former favored the please correlate clinically.   Small  pleural effusion.   Numerous bilateral solid pulmonary nodules, some with speckled calcifications and given stability dating back to 2023 and splenic calcified granulomas these are most favored to represent sequela of remote granulomatous infection.   MACRO: None.   Signed by: Syd Barnard 6/1/2025 12:57 AM Dictation workstation:   [id number]    Electrocardiogram, 12-lead PRN ACS symptoms  Result Date: 5/17/2025  Unusual P axis, possible ectopic atrial rhythm with undetermined rhythm irregularity Left axis deviation Abnormal ECG When compared with ECG of 03-MAY-2025 15:30, Ectopic atrial rhythm has replaced Sinus rhythm Vent. rate has decreased BY  45 BPM Confirmed by David Monroy ([id number]) on 5/17/2025 8:34:06 AM    XR chest 2 views  Result Date: 5/12/2025  Interpreted By:  Analia Francois, STUDY: XR CHEST 2 VIEWS 5/12/2025 10:45 am   INDICATION: Signs/Symptoms:dyspnea, pain. Cough.   COMPARISON: 05/03/2025   ACCESSION NUMBER(S): [id number]   ORDERING CLINICIAN: VENANCIO AHMADI   TECHNIQUE: PA and lateral views of the chest were acquired.   FINDINGS: Normal heart, mediastinum, and lungs. There is calcified plaque within the aortic arch. There is a levoscoliosis of the lower thoracic and upper lumbar spine.       No acute cardiopulmonary disease.   Signed by: Analia Francois 5/12/2025 12:34 PM Dictation workstation:   [id number]    US renal complete  Result Date: 5/6/2025  Interpreted By:  Maninder Clark, STUDY: US RENAL COMPLETE;  5/6/2025 1:35 pm   INDICATION: Signs/Symptoms:VINCENT.     COMPARISON: 03/01/2024.   ACCESSION NUMBER(S): [id number]   ORDERING CLINICIAN: TOD THOMAS   TECHNIQUE: Real-time sonographic evaluation of the kidneys and urinary bladder was performed.   FINDINGS: RIGHT KIDNEY: Right kidney measures  9.9 cm.  Right central renal small hyperechoic focus may represent nonobstructing calculus. No right hydronephrosis. No discrete renal lesion is visualized.   LEFT KIDNEY: Left kidney measures  9.4 cm.   Left central renal small hyperechoic foci may represent nonobstructing calculi. No left hydronephrosis. No discrete renal lesion is visualized.   BLADDER: Urinary bladder was fully decompressed by Alvarado catheter and could not be evaluated at this time.       No hydronephrosis bilaterally.   Urinary bladder fully decompressed by Alvarado catheter and could not be evaluated.   MACRO: None.   Signed by: Maninder Clark 5/6/2025 2:09 PM Dictation workstation:   [id number]    Transthoracic Echo (TTE) Limited  Result Date: 5/5/2025            [facility] [address], [place] [address]             Phone [phone] TRANSTHORACIC ECHOCARDIOGRAM REPORT Patient Name:       FRANKIE WESTBROOK        Reading Physician:    [id number] Brijesh Jerez MD Study Date:         5/5/2025             Ordering Provider:    [id number] RENEE LAWTON MRN/PID:            [id number]             Fellow: Accession#:         [id number]         Nurse: Date of Birth/Age:  6/10/1928 / 89 years Sonographer:          Amilcar Gatica RD Gender Assigned at  F                    Additional Staff: Birth: Height:             154.94 cm            Admit Date: Weight:             72.58 kg             Admission Status:     Inpatient -                                                                Routine BSA / BMI:          1.72 m2 / 30.23      Department Location:  [facility]                     kg/m2 Blood Pressure: 137 /48 mmHg Study Type:    TRANSTHORACIC ECHO (TTE) LIMITED Diagnosis/ICD: Dyspnea, unspecified-R06.00 Indication:    dyspnea CPT Codes:     Echo Limited-12691; Doppler Limited-35519; Color Doppler-21189 Patient History: Valve Disorders:   Aortic Stenosis. Pertinent History: HTN, Hyperlipidemia and CAD. CKD. Study Detail: The following Echo studies were performed: 2D, M-Mode, Doppler and               color  flow. Technically challenging study due to body habitus.  PHYSICIAN INTERPRETATION: Left Ventricle: The left ventricular systolic function is normal, with a visually estimated ejection fraction of 60-65%. There are no regional wall motion abnormalities. The left ventricular cavity size is normal. There is mild increased septal and normal posterior left ventricular wall thickness. Spectral Doppler shows a normal pattern of left ventricular diastolic filling. Left Atrium: The left atrial size is mildly dilated. Right Ventricle: The right ventricle is normal in size. There is normal right ventriclar wall thickness. There is normal right ventricular global systolic function. Right Atrium: The right atrial size is normal. Aortic Valve: The aortic valve is trileaflet. There is mild aortic valve cusp calcification. There is reduced systolic aortic valve leaflet excursion. There is evidence of mild aortic valve stenosis. The aortic valve dimensionless index is 0.38. There is no evidence of aortic valve regurgitation. The peak instantaneous gradient of the aortic valve is 18 mmHg. The mean gradient of the aortic valve is 10 mmHg. Mitral Valve: The mitral valve is normal in structure. There is normal mitral valve leaflet mobility. There is mild mitral annular calcification. There is trace mitral valve regurgitation. Tricuspid Valve: The tricuspid valve is structurally normal. There is normal tricuspid valve leaflet mobility. There is mild to moderate tricuspid regurgitation. Pulmonic Valve: The pulmonic valve is structurally normal. There is trace pulmonic valve regurgitation. Pericardium: No pericardial effusion noted. Aorta: The aortic root is normal. There is no dilatation of the aortic arch. There is no dilatation of the ascending aorta. There is no dilatation of the aortic root. Pulmonary Artery: The pulmonary artery is normal in size. The tricuspid regurgitant velocity is 2.72 m/s, and with an estimated right atrial  pressure of 3 mmHg, the estimated pulmonary artery pressure is normal with the RVSP at 32.6 mmHg. The estimated PASP is 33 mmHg.  CONCLUSIONS:  1. The left ventricular systolic function is normal, with a visually estimated ejection fraction of 60-65%.  2. Trace mitral valve regurgitation.  3. Mild to moderate tricuspid regurgitation.  4. Mild aortic valve stenosis.  5. The peak instantaneous gradient of the aortic valve is 18 mmHg. QUANTITATIVE DATA SUMMARY:  2D MEASUREMENTS:           Normal Ranges: Ao Root d:       3.20 cm   (2.0-3.7cm) IVSd:            0.98 cm   (0.6-1.1cm) LVPWd:           0.80 cm   (0.6-1.1cm) LVIDd:           4.37 cm   (3.9-5.9cm) LVIDs:           2.29 cm LV Mass Index:   72.4 g/m2 LV % FS          47.6 %  LEFT ATRIUM:                Normal Ranges: LA Volume Index: 33.0 ml/m2  AORTA MEASUREMENTS:         Normal Ranges: Ao Sinus, d:        3.20 cm (2.1-3.5cm) Asc Ao, d:          2.80 cm (2.1-3.4cm)  LV SYSTOLIC FUNCTION:                      Normal Ranges: EF-A4C View:    62 % (>=55%) EF-Visual:      63 % LV EF Reported: 63 %  LV DIASTOLIC FUNCTION:             Normal Ranges: MV Peak E:             0.87 m/s    (0.7-1.2 m/s) MV Peak A:             0.96 m/s    (0.42-0.7 m/s) E/A Ratio:             0.90        (1.0-2.2) MV A Dur:              180.00 msec  MITRAL VALVE:          Normal Ranges: MV DT:        250 msec (150-240msec)  AORTIC VALVE:                      Normal Ranges: AoV Vmax:                2.14 m/s  (<=1.7m/s) AoV Peak P.3 mmHg (<20mmHg) AoV Mean PG:             10.0 mmHg (1.7-11.5mmHg) LVOT Max Joseph:            0.89 m/s  (<=1.1m/s) AoV VTI:                 54.70 cm  (18-25cm) LVOT VTI:                21.00 cm LVOT Diameter:           1.80 cm   (1.8-2.4cm) AoV Area, VTI:           0.98 cm2  (2.5-5.5cm2) AoV Area,Vmax:           1.06 cm2  (2.5-4.5cm2) AoV Dimensionless Index: 0.38  TRICUSPID VALVE/RVSP:          Normal Ranges: Peak TR Velocity:     2.72 m/s RV Syst  Pressure:     33 mmHg  (< 30mmHg)  [id number] Brijesh Jerez MD Electronically signed on 5/5/2025 at 5:48:18 PM  ** Final **             Assessment/Plan   ESBL E. Coli Bacteremia-secondary to UTI  ESBL E.coli  Urinary tract infection   Resolving Acute on chronic kidney disease  Recent ESBL UTI        Continue IV meropenem   IV ertapenem 500 mg x 1 dose prior to discharge   Monitor temp and wbc  Supportive care  Follow up Repeat blood culture     midline placement   Long term plan IV ertapenem 500 mg daily (based on creatinine clearance 24.5 Cockroft Gault equation) for total 14 days till 6/15/2025  Weekly CBC with diff, CMP  Followed by po fosfomycin 3 grams x 1 dose start on 6/16/2025, repeat every 2 weeks-for 3 months for suppressive therapy   Follow up outpatient with Dr. Nieto   Will place orders when determined-Home with [facility]    Alice Smith, APRN-CNP

## 2025-06-04 NOTE — PROCEDURES
Vascular Access Team Procedure Note     Visit Date: 6/4/2025      Patient Name: Fartun Carey         MRN: [id number]             Procedure: Midline insertion     Right basilic 4F single lumen midline placed without difficulty, Catheter length 17cm with 2cm exposed, arm circumference 27cm. Brisk blood retirm and flushes easily, clamped and Curos cap intact. PIV x2 removed with tip intact. Patient tolerated well.                          Shelley Chavarria RN  6/4/2025  4:11 PM

## 2025-06-04 NOTE — PROGRESS NOTES
Fartun Carey is a 89 y.o. female on day 3 of admission presenting with Generalized weakness.      Subjective   Patient is feeling better than last 24 hours, she endorses slight chill not like the chills and rigors she had with a fever the day before. She has no pain or sob.        Objective     Last Recorded Vitals  /53 (BP Location: Left arm, Patient Position: Lying)   Pulse 67   Temp 36.7 °C (98.1 °F) (Temporal)   Resp 16   Wt 72.1 kg (159 lb)   SpO2 98%   Intake/Output last 3 Shifts:    Intake/Output Summary (Last 24 hours) at 6/4/2025 1640  Last data filed at 6/4/2025 1301  Gross per 24 hour   Intake 380 ml   Output 1200 ml   Net -820 ml       Admission Weight  Weight: 61.4 kg (135 lb 5.8 oz) (05/31/25 2227)    Daily Weight  06/01/25 : 72.1 kg (159 lb)    Image Results  Bedside Midline Imaging  These images are not reportable by radiology and will not be interpreted   by  Radiologists.      Physical Exam  Constitutional:       Appearance: Normal appearance.   Cardiovascular:      Rate and Rhythm: Normal rate and regular rhythm.      Pulses: Normal pulses.      Heart sounds: Normal heart sounds.   Pulmonary:      Effort: Pulmonary effort is normal.      Breath sounds: Normal breath sounds.   Abdominal:      General: Bowel sounds are normal.      Palpations: Abdomen is soft.   Musculoskeletal:         General: Normal range of motion.   Skin:     General: Skin is warm and dry.   Neurological:      Mental Status: She is alert and oriented to person, place, and time.         Relevant Results      Results for orders placed or performed during the hospital encounter of 05/31/25 (from the past 24 hours)   CBC and Auto Differential   Result Value Ref Range    WBC 8.1 4.4 - 11.3 x10*3/uL    nRBC 0.0 0.0 - 0.0 /100 WBCs    RBC 2.72 (L) 4.00 - 5.20 x10*6/uL    Hemoglobin 8.0 (L) 12.0 - 16.0 g/dL    Hematocrit 25.9 (L) 36.0 - 46.0 %    MCV 95 80 - 100 fL    MCH 29.4 26.0 - 34.0 pg    MCHC 30.9 (L) 32.0 - 36.0  g/dL    RDW 15.8 (H) 11.5 - 14.5 %    Platelets 136 (L) 150 - 450 x10*3/uL    Immature Granulocytes %, Automated 7.7 (H) 0.0 - 0.9 %    Immature Granulocytes Absolute, Automated 0.62 (H) 0.00 - 0.50 x10*3/uL   Comprehensive Metabolic Panel   Result Value Ref Range    Glucose 120 (H) 74 - 99 mg/dL    Sodium 137 136 - 145 mmol/L    Potassium 4.1 3.5 - 5.3 mmol/L    Chloride 106 98 - 107 mmol/L    Bicarbonate 23 21 - 32 mmol/L    Anion Gap 12 10 - 20 mmol/L    Urea Nitrogen 22 6 - 23 mg/dL    Creatinine 1.19 (H) 0.50 - 1.05 mg/dL    eGFR 42 (L) >60 mL/min/1.73m*2    Calcium 8.0 (L) 8.6 - 10.3 mg/dL    Albumin 2.6 (L) 3.4 - 5.0 g/dL    Alkaline Phosphatase 96 33 - 136 U/L    Total Protein 4.7 (L) 6.4 - 8.2 g/dL    AST 12 9 - 39 U/L    Bilirubin, Total 0.4 0.0 - 1.2 mg/dL    ALT 9 7 - 45 U/L   Manual Differential   Result Value Ref Range    Neutrophils %, Manual 63.0 40.0 - 80.0 %    Bands %, Manual 3.0 0.0 - 5.0 %    Lymphocytes %, Manual 13.0 13.0 - 44.0 %    Monocytes %, Manual 7.0 2.0 - 10.0 %    Eosinophils %, Manual 0.0 0.0 - 6.0 %    Basophils %, Manual 0.0 0.0 - 2.0 %    Metamyelocytes %, Manual 4.0 0.0 - 0.0 %    Myelocytes %, Manual 9.0 0.0 - 0.0 %    Promyelocytes %, Manual 1.0 0.0 - 0.0 %    Seg Neutrophils Absolute, Manual 5.10 (H) 1.60 - 5.00 x10*3/uL    Bands Absolute, Manual 0.24 0.00 - 0.50 x10*3/uL    Lymphocytes Absolute, Manual 1.05 0.80 - 3.00 x10*3/uL    Monocytes Absolute, Manual 0.57 0.05 - 0.80 x10*3/uL    Eosinophils Absolute, Manual 0.00 0.00 - 0.40 x10*3/uL    Basophils Absolute, Manual 0.00 0.00 - 0.10 x10*3/uL    Metamyelocytes Absolute, Manual 0.32 0.00 - 0.00 x10*3/uL    Myelocytes Absolute, Manual 0.73 0.00 - 0.00 x10*3/uL    Promyelocytes Absolute, Manual 0.08 0.00 - 0.00 x10*3/uL    Total Cells Counted 200     Neutrophils Absolute, Manual 5.34 1.60 - 5.50 x10*3/uL    RBC Morphology See Below     Ovalocytes Few     Toxic Granulation Present     Vacuolated Neutrophils Present    POCT  GLUCOSE   Result Value Ref Range    POCT Glucose 111 (H) 74 - 99 mg/dL   POCT GLUCOSE   Result Value Ref Range    POCT Glucose 178 (H) 74 - 99 mg/dL   POCT GLUCOSE   Result Value Ref Range    POCT Glucose 161 (H) 74 - 99 mg/dL     *Note: Due to a large number of results and/or encounters for the requested time period, some results have not been displayed. A complete set of results can be found in Results Review.                             Assessment & Plan  Acute cystitis without hematuria  Sepsis due to Escherichia coli without acute organ dysfunction (Multi)  last urine cx 5/5 with ESBL ecoli  Blood culture growing E coli  Urine culture grew E coli, ESBL  Acute on chronic renal failure  Stage 3b chronic kidney disease (Multi)  This may get worse after contrast for CT angio, avoid nephrotoxic agents.   Cr today 1.19  Generalized weakness  PT/OT eval  Type 2 diabetes mellitus, without long-term current use of insulin (Multi)  SSI for now  Primary hypertension  asa, statin, hydralazine, ARB on hold for now.   Hypothyroid  cont home synthroid dose  Hypomagnesemia  Hyponatremia  monitor BMP  replete as needed    Plan of Care  Patient lives at home and plans to go home with [facility] at discharge and IV antibiotics for 14 days. She is willing to learn how to do this herself.   Plan of care discussed with patient and collaborating physician.               Osiris Kendrick, APRN-CNP

## 2025-06-04 NOTE — CARE PLAN
The patient's goals for the shift include      The clinical goals for the shift include safety awareness, ambulation,    Problem: Safety - Adult  Goal: Free from fall injury  Outcome: Progressing     Problem: Chronic Conditions and Co-morbidities  Goal: Patient's chronic conditions and co-morbidity symptoms are monitored and maintained or improved  Outcome: Progressing     Problem: Diabetes  Goal: Achieve decreasing blood glucose levels by end of shift  Outcome: Progressing  Goal: Increase stability of blood glucose readings by end of shift  Outcome: Progressing  Goal: Decrease in ketones present in urine by end of shift  Outcome: Progressing  Goal: Maintain electrolyte levels within acceptable range throughout shift  Outcome: Progressing  Goal: Maintain glucose levels >70mg/dl to <250mg/dl throughout shift  Outcome: Progressing  Goal: No changes in neurological exam by end of shift  Outcome: Progressing  Goal: Learn about and adhere to nutrition recommendations by end of shift  Outcome: Progressing  Goal: Vital signs within normal range for age by end of shift  Outcome: Progressing  Goal: Increase self care and/or family involovement by end of shift  Outcome: Progressing  Goal: Receive DSME education by end of shift  Outcome: Progressing     Problem: Nutrition  Goal: Nutrient intake appropriate for maintaining nutritional needs  Outcome: Progressing

## 2025-06-05 ENCOUNTER — HOME INFUSION (OUTPATIENT)
Dept: INFUSION THERAPY | Age: OVER 89
End: 2025-06-05
Payer: MEDICARE

## 2025-06-05 ENCOUNTER — PHARMACY VISIT (OUTPATIENT)
Dept: PHARMACY | Facility: CLINIC | Age: OVER 89
End: 2025-06-05
Payer: MEDICARE

## 2025-06-05 ENCOUNTER — HOME CARE VISIT (OUTPATIENT)
Dept: HOME HEALTH SERVICES | Facility: HOME HEALTH | Age: OVER 89
End: 2025-06-05

## 2025-06-05 ENCOUNTER — DOCUMENTATION (OUTPATIENT)
Dept: HOME HEALTH SERVICES | Facility: HOME HEALTH | Age: OVER 89
End: 2025-06-05
Payer: MEDICARE

## 2025-06-05 VITALS
HEIGHT: 60 IN | SYSTOLIC BLOOD PRESSURE: 151 MMHG | TEMPERATURE: 98.6 F | HEART RATE: 56 BPM | DIASTOLIC BLOOD PRESSURE: 51 MMHG | RESPIRATION RATE: 16 BRPM | OXYGEN SATURATION: 95 % | WEIGHT: 159 LBS | BODY MASS INDEX: 31.22 KG/M2

## 2025-06-05 DIAGNOSIS — D64.9 ANEMIA, UNSPECIFIED TYPE: Primary | ICD-10-CM

## 2025-06-05 DIAGNOSIS — I12.9 HYPERTENSIVE CHRONIC KIDNEY DISEASE W STG 1-4/UNSP CHR KDNY: ICD-10-CM

## 2025-06-05 DIAGNOSIS — N30.00 ACUTE CYSTITIS WITHOUT HEMATURIA: ICD-10-CM

## 2025-06-05 LAB
ANION GAP SERPL CALCULATED.3IONS-SCNC: 11 MMOL/L (ref 10–20)
BACTERIA BLD AEROBE CULT: ABNORMAL
BACTERIA BLD CULT: ABNORMAL
BUN SERPL-MCNC: 19 MG/DL (ref 6–23)
CALCIUM SERPL-MCNC: 7.9 MG/DL (ref 8.6–10.3)
CHLORIDE SERPL-SCNC: 106 MMOL/L (ref 98–107)
CO2 SERPL-SCNC: 24 MMOL/L (ref 21–32)
CREAT SERPL-MCNC: 1.15 MG/DL (ref 0.5–1.05)
EGFRCR SERPLBLD CKD-EPI 2021: 44 ML/MIN/1.73M*2
ERYTHROCYTE [DISTWIDTH] IN BLOOD BY AUTOMATED COUNT: 15.8 % (ref 11.5–14.5)
GLUCOSE BLD MANUAL STRIP-MCNC: 112 MG/DL (ref 74–99)
GLUCOSE BLD MANUAL STRIP-MCNC: 165 MG/DL (ref 74–99)
GLUCOSE SERPL-MCNC: 123 MG/DL (ref 74–99)
GRAM STN SPEC: ABNORMAL
GRAM STN SPEC: ABNORMAL
HCT VFR BLD AUTO: 25.4 % (ref 36–46)
HGB BLD-MCNC: 7.9 G/DL (ref 12–16)
MCH RBC QN AUTO: 30.2 PG (ref 26–34)
MCHC RBC AUTO-ENTMCNC: 31.1 G/DL (ref 32–36)
MCV RBC AUTO: 97 FL (ref 80–100)
NRBC BLD-RTO: 0 /100 WBCS (ref 0–0)
PLATELET # BLD AUTO: 149 X10*3/UL (ref 150–450)
POTASSIUM SERPL-SCNC: 4.1 MMOL/L (ref 3.5–5.3)
RBC # BLD AUTO: 2.62 X10*6/UL (ref 4–5.2)
SODIUM SERPL-SCNC: 137 MMOL/L (ref 136–145)
WBC # BLD AUTO: 8.7 X10*3/UL (ref 4.4–11.3)

## 2025-06-05 PROCEDURE — 82947 ASSAY GLUCOSE BLOOD QUANT: CPT

## 2025-06-05 PROCEDURE — 80048 BASIC METABOLIC PNL TOTAL CA: CPT | Performed by: NURSE PRACTITIONER

## 2025-06-05 PROCEDURE — 2500000004 HC RX 250 GENERAL PHARMACY W/ HCPCS (ALT 636 FOR OP/ED): Performed by: NURSE PRACTITIONER

## 2025-06-05 PROCEDURE — 85027 COMPLETE CBC AUTOMATED: CPT | Performed by: NURSE PRACTITIONER

## 2025-06-05 PROCEDURE — 36415 COLL VENOUS BLD VENIPUNCTURE: CPT | Performed by: NURSE PRACTITIONER

## 2025-06-05 PROCEDURE — 2500000001 HC RX 250 WO HCPCS SELF ADMINISTERED DRUGS (ALT 637 FOR MEDICARE OP): Performed by: INTERNAL MEDICINE

## 2025-06-05 PROCEDURE — RXMED WILLOW AMBULATORY MEDICATION CHARGE

## 2025-06-05 PROCEDURE — 99239 HOSP IP/OBS DSCHRG MGMT >30: CPT | Performed by: NURSE PRACTITIONER

## 2025-06-05 PROCEDURE — 2500000004 HC RX 250 GENERAL PHARMACY W/ HCPCS (ALT 636 FOR OP/ED): Performed by: INTERNAL MEDICINE

## 2025-06-05 PROCEDURE — 2500000001 HC RX 250 WO HCPCS SELF ADMINISTERED DRUGS (ALT 637 FOR MEDICARE OP)

## 2025-06-05 PROCEDURE — 2500000002 HC RX 250 W HCPCS SELF ADMINISTERED DRUGS (ALT 637 FOR MEDICARE OP, ALT 636 FOR OP/ED): Performed by: INTERNAL MEDICINE

## 2025-06-05 RX ORDER — ACETAMINOPHEN 325 MG/1
650 TABLET ORAL EVERY 4 HOURS PRN
Start: 2025-06-05

## 2025-06-05 RX ORDER — GRANULES FOR ORAL 3 G/1
3 POWDER ORAL ONCE
Qty: 3 G | Refills: 8 | Status: SHIPPED | OUTPATIENT
Start: 2025-06-05 | End: 2025-06-10

## 2025-06-05 RX ORDER — METOPROLOL SUCCINATE 25 MG/1
25 TABLET, EXTENDED RELEASE ORAL DAILY
Qty: 30 TABLET | Refills: 0 | Status: SHIPPED | OUTPATIENT
Start: 2025-06-06 | End: 2025-07-06

## 2025-06-05 RX ADMIN — INSULIN LISPRO 2 UNITS: 100 INJECTION, SOLUTION INTRAVENOUS; SUBCUTANEOUS at 12:48

## 2025-06-05 RX ADMIN — LEVOTHYROXINE SODIUM 88 MCG: 0.09 TABLET ORAL at 06:01

## 2025-06-05 RX ADMIN — DOCUSATE SODIUM 100 MG: 100 CAPSULE, LIQUID FILLED ORAL at 09:47

## 2025-06-05 RX ADMIN — MEROPENEM AND SODIUM CHLORIDE 1 G: 1 INJECTION, SOLUTION INTRAVENOUS at 06:01

## 2025-06-05 RX ADMIN — ERTAPENEM SODIUM 500 MG: 1 INJECTION, POWDER, LYOPHILIZED, FOR SOLUTION INTRAMUSCULAR; INTRAVENOUS at 13:05

## 2025-06-05 RX ADMIN — METOPROLOL SUCCINATE 25 MG: 25 TABLET, EXTENDED RELEASE ORAL at 09:47

## 2025-06-05 RX ADMIN — PANTOPRAZOLE SODIUM 40 MG: 40 TABLET, DELAYED RELEASE ORAL at 06:01

## 2025-06-05 RX ADMIN — IRON SUCROSE 200 MG: 20 INJECTION, SOLUTION INTRAVENOUS at 06:01

## 2025-06-05 RX ADMIN — ASPIRIN 81 MG: 81 TABLET, CHEWABLE ORAL at 09:47

## 2025-06-05 RX ADMIN — HYDRALAZINE HYDROCHLORIDE 25 MG: 25 TABLET ORAL at 09:47

## 2025-06-05 RX ADMIN — Medication 1000 MCG: at 09:47

## 2025-06-05 ASSESSMENT — COGNITIVE AND FUNCTIONAL STATUS - GENERAL
TURNING FROM BACK TO SIDE WHILE IN FLAT BAD: A LITTLE
DAILY ACTIVITIY SCORE: 22
CLIMB 3 TO 5 STEPS WITH RAILING: A LITTLE
MOBILITY SCORE: 19
STANDING UP FROM CHAIR USING ARMS: A LITTLE
WALKING IN HOSPITAL ROOM: A LITTLE
MOVING TO AND FROM BED TO CHAIR: A LITTLE
TOILETING: A LITTLE
HELP NEEDED FOR BATHING: A LITTLE

## 2025-06-05 ASSESSMENT — PAIN SCALES - GENERAL
PAINLEVEL_OUTOF10: 0 - NO PAIN
PAINLEVEL_OUTOF10: 0 - NO PAIN

## 2025-06-05 NOTE — CARE PLAN
The patient's goals for the shift include      The clinical goals for the shift include safety awareness, rest      Problem: Pain - Adult  Goal: Verbalizes/displays adequate comfort level or baseline comfort level  Outcome: Progressing     Problem: Safety - Adult  Goal: Free from fall injury  Outcome: Progressing     Problem: Discharge Planning  Goal: Discharge to home or other facility with appropriate resources  Outcome: Progressing     Problem: Chronic Conditions and Co-morbidities  Goal: Patient's chronic conditions and co-morbidity symptoms are monitored and maintained or improved  Outcome: Progressing     Problem: Nutrition  Goal: Nutrient intake appropriate for maintaining nutritional needs  Outcome: Progressing     Problem: Diabetes  Goal: Achieve decreasing blood glucose levels by end of shift  Outcome: Progressing  Goal: Increase stability of blood glucose readings by end of shift  Outcome: Progressing  Goal: Decrease in ketones present in urine by end of shift  Outcome: Progressing  Goal: Maintain electrolyte levels within acceptable range throughout shift  Outcome: Progressing  Goal: Maintain glucose levels >70mg/dl to <250mg/dl throughout shift  Outcome: Progressing  Goal: No changes in neurological exam by end of shift  Outcome: Progressing  Goal: Learn about and adhere to nutrition recommendations by end of shift  Outcome: Progressing  Goal: Vital signs within normal range for age by end of shift  Outcome: Progressing  Goal: Increase self care and/or family involovement by end of shift  Outcome: Progressing  Goal: Receive DSME education by end of shift  Outcome: Progressing     Problem: Fall/Injury  Goal: Not fall by end of shift  Outcome: Progressing  Goal: Be free from injury by end of the shift  Outcome: Progressing  Goal: Verbalize understanding of personal risk factors for fall in the hospital  Outcome: Progressing  Goal: Verbalize understanding of risk factor reduction measures to prevent injury  from fall in the home  Outcome: Progressing  Goal: Use assistive devices by end of the shift  Outcome: Progressing  Goal: Pace activities to prevent fatigue by end of the shift  Outcome: Progressing

## 2025-06-05 NOTE — CARE PLAN
The patient's goals for the shift include      The clinical goals for the shift include safety awareness, rest      Problem: Safety - Adult  Goal: Free from fall injury  Outcome: Progressing     Problem: Chronic Conditions and Co-morbidities  Goal: Patient's chronic conditions and co-morbidity symptoms are monitored and maintained or improved  Outcome: Progressing     Problem: Diabetes  Goal: Achieve decreasing blood glucose levels by end of shift  Outcome: Progressing  Goal: Increase stability of blood glucose readings by end of shift  Outcome: Progressing  Goal: Decrease in ketones present in urine by end of shift  Outcome: Progressing  Goal: Maintain electrolyte levels within acceptable range throughout shift  Outcome: Progressing  Goal: Maintain glucose levels >70mg/dl to <250mg/dl throughout shift  Outcome: Progressing  Goal: No changes in neurological exam by end of shift  Outcome: Progressing  Goal: Learn about and adhere to nutrition recommendations by end of shift  Outcome: Progressing  Goal: Vital signs within normal range for age by end of shift  Outcome: Progressing  Goal: Increase self care and/or family involovement by end of shift  Outcome: Progressing  Goal: Receive DSME education by end of shift  Outcome: Progressing

## 2025-06-05 NOTE — HH CARE COORDINATION
Home Care received a Referral for Infusion and Nursing. We have processed the referral for a Start of Care on 6/6/25.     If you have any questions or concerns, please feel free to contact us at [phone]. Follow the prompts, enter your five digit zip code, and you will be directed to your care team on EAST 1.

## 2025-06-05 NOTE — PROGRESS NOTES
Fartun Carey is a 89 y.o. patient discharged from hospital to [facility] for skilled nursing and pharmacy services.  Pt ordered ertapenem 500 mg q24 SOC 6/6. Med ordered verified as accurate and appropriate for therapy.   Dr Nieto following.    Reviewed pt info as correct  Dx - ESBL bacteremia  RX Allergies[1]  No medication interactions  Labs are - weekly CMP, CBC w/ diff  Pt has SL MIDLINE placed 6/4/25 to flush per [facility] protocol  Med and med system - ertapenem via elastomeric HP thru 6/15  Care plan done today    Spoke at length with pt. Verified correct address: [address] [place] [id number] and phone number: [phone]. Reviewed [facility] services and answered all questions. RN to call pt with time of appt. Pt agreeable to delivery by 9 pm and confirmed will refrigerate med as appropriate.  to call when dropping off medication. [facility] offered to  - pt stated no questions at this time.    Pharmacy to mix 6/5 and deliver straight with supplies to match.  7x ertapenem 1 gm HP  DOS: 6/6 - 6/12    Follow up 6/12 - check labs and progress, deliver remainder straight       [1]   Allergies  Allergen Reactions    Amoxicillin Unknown    Amoxicillin-Pot Clavulanate Diarrhea    Glimepiride Diarrhea    Lisinopril Cough    Metformin Hcl Diarrhea    Tetracycline Hcl Diarrhea    Cephalexin Rash

## 2025-06-05 NOTE — PROGRESS NOTES
06/05/25 1113   Discharge Planning   Expected Discharge Disposition SNF  ([facility] and [facility])   Does the patient need discharge transport arranged? No     IMM obtained.   Home today after 1 x dose of Invanz.    HHC - SOC 6/6/25

## 2025-06-06 ENCOUNTER — HOME CARE VISIT (OUTPATIENT)
Dept: HOME HEALTH SERVICES | Facility: HOME HEALTH | Age: OVER 89
End: 2025-06-06
Payer: MEDICARE

## 2025-06-06 ENCOUNTER — PATIENT OUTREACH (OUTPATIENT)
Dept: PRIMARY CARE | Facility: CLINIC | Age: OVER 89
End: 2025-06-06
Payer: MEDICARE

## 2025-06-06 VITALS
DIASTOLIC BLOOD PRESSURE: 58 MMHG | RESPIRATION RATE: 20 BRPM | SYSTOLIC BLOOD PRESSURE: 140 MMHG | OXYGEN SATURATION: 93 % | HEART RATE: 54 BPM | TEMPERATURE: 98.4 F

## 2025-06-06 DIAGNOSIS — R78.81 BACTEREMIA: ICD-10-CM

## 2025-06-06 DIAGNOSIS — Z16.12 ESBL (EXTENDED SPECTRUM BETA-LACTAMASE) PRODUCING BACTERIA INFECTION: ICD-10-CM

## 2025-06-06 DIAGNOSIS — A49.9 ESBL (EXTENDED SPECTRUM BETA-LACTAMASE) PRODUCING BACTERIA INFECTION: ICD-10-CM

## 2025-06-06 LAB
BACTERIA BLD CULT: NORMAL
BACTERIA BLD CULT: NORMAL

## 2025-06-06 PROCEDURE — 169592 NO-PAY CLAIM PROCEDURE

## 2025-06-06 PROCEDURE — G0299 HHS/HOSPICE OF RN EA 15 MIN: HCPCS | Mod: HHH

## 2025-06-06 RX ADMIN — SODIUM CHLORIDE 10 ML: 9 INJECTION, SOLUTION INTRAMUSCULAR; INTRAVENOUS; SUBCUTANEOUS at 14:30

## 2025-06-06 RX ADMIN — Medication 5 ML: at 15:00

## 2025-06-06 RX ADMIN — SODIUM CHLORIDE 10 ML: 9 INJECTION, SOLUTION INTRAMUSCULAR; INTRAVENOUS; SUBCUTANEOUS at 15:00

## 2025-06-06 RX ADMIN — ERTAPENEM 500 MG: 1 INJECTION, POWDER, LYOPHILIZED, FOR SOLUTION INTRAMUSCULAR; INTRAVENOUS at 14:30

## 2025-06-06 ASSESSMENT — ACTIVITIES OF DAILY LIVING (ADL)
AMBULATION ASSISTANCE: STAND BY ASSIST
ENTERING_EXITING_HOME: SUPERVISION
CURRENT_FUNCTION: STAND BY ASSIST
OASIS_M1830: 03

## 2025-06-06 ASSESSMENT — ENCOUNTER SYMPTOMS
MUSCLE WEAKNESS: 1
CHANGE IN APPETITE: UNCHANGED
APPETITE LEVEL: FAIR
LAST BOWEL MOVEMENT: 67360
PERSON REPORTING PAIN: PATIENT
LOWER EXTREMITY EDEMA: 1
DENIES PAIN: 1

## 2025-06-06 NOTE — PROGRESS NOTES
Discharge Facility [facility]    Discharge Diagnosis:    Generalized weakness    Hypomagnesemia    Elevated troponin    Shortness of breath    Acute cystitis without hematuria    Sepsis, due to unspecified organism, unspecified whether acute organ dysfunction present (Multi)    Bacteremia    ESBL (extended spectrum beta-lactamase) producing bacteria infection    Paroxysmal atrial fibrillation (Multi)    Longstanding persistent atrial fibrillation (Multi)    Sepsis due to Escherichia coli without acute organ dysfunction (Multi)    Chronic UTI    Admission Date: 6/1/2025   Discharge Date:  6/4/2025    PCP Appointment Date: 6/10/2025       Specialist Appointment Date:       [facility] 6/6/2025    Follow up with Dexter Nieto History of ESBL, on suppressive therapy [address] [facility] [place] [id number] [phone]         Follow up with David Monroy in 2 weeks [address] [facility] [id number] [phone]      CBC and Auto Differential  (Once a week) Expires on: Jun 04, 2026 Fax results to Dr. Leslie [phone] Comprehensive Metabolic Panel  (Once a week) Expires on: Jun 04, 2026    Hospital Encounter and Summary Linked: Yes    ED to Hosp-Admission (Discharged) with Manisha Edward MD; Delmar Machado DO (05/31/2025)       See discharge assessment below for further details     Wrap Up  Wrap Up Additional Comments: I spoke with patient whom is aware of med changes along with PCP F/U 6/10. Patient aware to zora with providers as recommended. [facility] will be in home today. Patient states she has support from family in community. Patient encouraged  to call providers for any questions concerns or change in condition (6/6/2025 10:00 AM)    Engagement  Call Start Time: 1001 (6/6/2025 10:00 AM)    Medications  Medications reviewed with patient/caregiver?: Yes (6/6/2025 10:00 AM)  Is the patient having any side effects they believe may be caused by any medication additions or changes?: No (6/6/2025  10:00 AM)  Does the patient have all medications ordered at discharge?: Yes (6/6/2025 10:00 AM)  Care Management Interventions: No intervention needed (6/6/2025 10:00 AM)  Prescription Comments: START taking:  acetaminophen (Tylenol)   ertapenem (INVanz)   fosfomycin (Monurol)   metoprolol succinate XL (Toprol-XL)   Start taking on: June 6, 2025 (6/6/2025 10:00 AM)  Is the patient taking all medications as directed (includes completed medication regime)?: -- (Pt states has meds) (6/6/2025 10:00 AM)  Care Management Interventions: Provided patient education (6/6/2025 10:00 AM)  Medication Comments: STOP taking:  cloNIDine 0.1 mg tablet (Catapres)   losartan 25 mg tablet (Cozaar)   predniSONE 10 mg tablet (Deltasone) (6/6/2025 10:00 AM)    Appointments  Does the patient have a primary care provider?: Yes (6/6/2025 10:00 AM)  Care Management Interventions: Verified appointment date/time/provider (6/6/2025 10:00 AM)  Care Management Interventions: Advised to schedule with specialist (6/6/2025 10:00 AM)    Self Management  What is the home health agency?: [facility] in process (6/6/2025 10:00 AM)  Has home health visited the patient within 72 hours of discharge?: Yes (6/6/2025 10:00 AM)  What Durable Medical Equipment (DME) was ordered?: NA (6/6/2025 10:00 AM)    Patient Teaching  Does the patient have access to their discharge instructions?: Yes (6/6/2025 10:00 AM)  Care Management Interventions: Reviewed instructions with patient (6/6/2025 10:00 AM)  What is the patient's perception of their health status since discharge?: Improving (6/6/2025 10:00 AM)  Is the patient/caregiver able to teach back the hierarchy of who to call/visit for symptoms/problems? PCP, Specialist, Home Health nurse, Urgent Care, ED, 911: Yes (6/6/2025 10:00 AM)

## 2025-06-06 NOTE — DISCHARGE SUMMARY
Discharge Diagnosis  Generalized weakness           Issues Requiring Follow-Up  Follow-up with PCP  Follow-up with cardiology, Dr. Monroy  Follow-up with Dr. Nieto for possible suppressive therapy    Discharge Meds     Medication List      START taking these medications     acetaminophen 325 mg tablet; Commonly known as: Tylenol; Take 2 tablets   (650 mg) by mouth every 4 hours if needed for mild pain (1 - 3).   ertapenem 1 gram injection; Commonly known as: INVanz; Infuse 0.5 g (500   mg) into a venous catheter once daily for 11 days.   * fosfomycin 3 gram packet; Commonly known as: Monurol; Take 3 g by   mouth 1 time for 1 dose.   metoprolol succinate XL 25 mg 24 hr tablet; Commonly known as:   Toprol-XL; Take 1 tablet (25 mg) by mouth once daily. Do not crush or   chew.  * This list has 1 medication(s) that are the same as other medications   prescribed for you. Read the directions carefully, and ask your doctor or   other care provider to review them with you.     CONTINUE taking these medications     Accu-Chek Guide test strips; Generic drug: blood sugar diagnostic;   Inject 1 strip under the skin early in the morning..   aspirin 81 mg chewable tablet   atorvastatin 80 mg tablet; Commonly known as: Lipitor   cholecalciferol 25 mcg (1,000 units) capsule; Commonly known as: Vitamin   D-3   docusate sodium 100 mg capsule; Commonly known as: Colace; Take 1   capsule (100 mg) by mouth 2 times a day as needed for constipation.   hydrALAZINE 25 mg tablet; Commonly known as: Apresoline; Take 1 tablet   (25 mg) by mouth 3 times a day.   levothyroxine 88 mcg tablet; Commonly known as: Synthroid, Levoxyl; Take   1 tablet (88 mcg) by mouth once daily.   PRESERVISION AREDS-2 ORAL   SITagliptin phosphate 50 mg tablet; Commonly known as: Januvia; Take 1   tablet (50 mg) by mouth once daily.   Vitamin B-12 1,000 mcg tablet; Generic drug: cyanocobalamin     STOP taking these medications     cloNIDine 0.1 mg tablet; Commonly  known as: Catapres   losartan 25 mg tablet; Commonly known as: Cozaar   predniSONE 10 mg tablet; Commonly known as: Deltasone     ASK your doctor about these medications     alteplase 2 mg injection; Commonly known as: Cathflo Activase; 2 mL (2   mg) by intra-catheter route 1 time for 1 dose.; Ask about: Should I take   this medication?   * fosfomycin 3 gram packet; Commonly known as: Monurol; Take 3 g by   mouth 1 time for 1 dose.; Ask about: Should I take this medication?  * This list has 1 medication(s) that are the same as other medications   prescribed for you. Read the directions carefully, and ask your doctor or   other care provider to review them with you.       Test Results Pending At Discharge  Pending Labs       Order Current Status    Extra Urine Gray Tube Collected (06/01/25 0152)    Urinalysis with Reflex Culture and Microscopic In process    Blood Culture Preliminary result    Blood Culture Preliminary result            Hospital Course   Patient is a quite remarkable, energetic and Verito 89-year-old female who presented with chills and not feeling well.  She was recently in the hospital where she was treated for UTI and pneumonia and was discharged home with antibiotics and tapering stay off steroids.  2 to 3 days prior to admission she was experience chills and generalized weakness.  On evaluation in the ED she was found to have a positive UA.  Her urine culture grew an ESBL and blood cultures that were positive for E. coli patient will have a total of 14 days of ertapenem.  She was in stable condition and was discharged home with a midline IV.  She will have home health care and follow-up with PCP and Dr. Nieto infectious disease.    Pertinent Physical Exam At Time of Discharge  Physical Exam  Constitutional:       Appearance: She is obese.   Cardiovascular:      Rate and Rhythm: Normal rate and regular rhythm.      Pulses: Normal pulses.      Heart sounds: Normal heart sounds.   Pulmonary:       Effort: Pulmonary effort is normal.      Breath sounds: Normal breath sounds.   Abdominal:      General: Bowel sounds are normal.      Palpations: Abdomen is soft.   Musculoskeletal:         General: Normal range of motion.   Skin:     General: Skin is warm and dry.   Neurological:      Mental Status: She is alert and oriented to person, place, and time.         Outpatient Follow-Up  Future Appointments   Date Time Provider Department Center   6/10/2025 11:00 AM Jose Alejandro Hamilton MD [id number] [facility]   6/18/2025  3:45 PM Gabriela Zamora DPM [id number] [facility]   8/28/2025 11:00 AM Dallas Ruiz MD [id number] [facility]         KAYLENE Xiong-CNP

## 2025-06-08 NOTE — DOCUMENTATION CLARIFICATION NOTE
"    PATIENT:               FRANKIE WESTBROOK  ACCT #:                  [id number]  MRN:                       [id number]  :                       6/10/1928  ADMIT DATE:       2025 10:32 PM  DISCH DATE:        2025 3:33 PM  RESPONDING PROVIDER #:        [id number]          PROVIDER RESPONSE TEXT:    Pneumonia not treated for this admission    CDI QUERY TEXT:    Clarification        Instruction:    Based on your assessment of the patient and the clinical information, please provide the requested documentation by clicking on the appropriate radio button and enter any additional information if prompted.    Question: Please further clarify the diagnosis of Pneumonia as    When answering this query, please exercise your independent professional judgment. The fact that a question is being asked, does not imply that any particular answer is desired or expected.    The patient's clinical indicators include:  Clinical Information:  89 y.o. female presenting with chills not feeling well.    Clinical Indicators:    : HP: \"This patient was recently hospital treated for urinary tract infection as well as pneumonia.  She was discharged home on tapering steroids.\"  \"Emergency room impression the ER physician was feeling tract infection sepsis questionable pneumonia versus heart failure. She did receive fluids and antibiotics\"    : Cardiology \"Patient currently on antibiotics for possible resistant to urinary tract infection and pneumonia\"    : ID: \"CT angio chest shows no acute pulmonary embolism.  Small pleural effusion.  Ground glass opacities-pulmonary edema versus viral infection.\"    Treatment:  CT chest, CXR, ID consult, Levaquin 250 mg iv x 1, Merrem 1 gm iv q 12 hrs    Risk Factors: Recent Pna, UTI  Options provided:  -- Continued treatment this admit for pneumonia  -- Pneumonia not treated for this admission  -- Other - I will add my own diagnosis  -- Refer to Clinical Documentation Reviewer    Query created " by: Rudy Pruitt on 6/3/2025 12:52 PM      Electronically signed by:  JOSE EDUARDO HENRY 6/8/2025 4:18 PM

## 2025-06-09 ENCOUNTER — PHARMACY VISIT (OUTPATIENT)
Dept: PHARMACY | Facility: CLINIC | Age: OVER 89
End: 2025-06-09
Payer: MEDICARE

## 2025-06-09 PROCEDURE — RXMED WILLOW AMBULATORY MEDICATION CHARGE

## 2025-06-10 ENCOUNTER — OFFICE VISIT (OUTPATIENT)
Dept: PRIMARY CARE | Facility: CLINIC | Age: OVER 89
End: 2025-06-10
Payer: MEDICARE

## 2025-06-10 VITALS
BODY MASS INDEX: 31.48 KG/M2 | WEIGHT: 161.2 LBS | HEART RATE: 59 BPM | OXYGEN SATURATION: 99 % | DIASTOLIC BLOOD PRESSURE: 55 MMHG | SYSTOLIC BLOOD PRESSURE: 134 MMHG

## 2025-06-10 DIAGNOSIS — Z16.30 INFECTION OF DRUG-RESISTANT BACTERIA: ICD-10-CM

## 2025-06-10 DIAGNOSIS — E11.22 TYPE 2 DIABETES MELLITUS WITH STAGE 3B CHRONIC KIDNEY DISEASE, WITHOUT LONG-TERM CURRENT USE OF INSULIN (MULTI): ICD-10-CM

## 2025-06-10 DIAGNOSIS — E55.9 VITAMIN D DEFICIENCY: ICD-10-CM

## 2025-06-10 DIAGNOSIS — N18.32 TYPE 2 DIABETES MELLITUS WITH STAGE 3B CHRONIC KIDNEY DISEASE, WITHOUT LONG-TERM CURRENT USE OF INSULIN (MULTI): ICD-10-CM

## 2025-06-10 DIAGNOSIS — D64.9 ANEMIA, UNSPECIFIED TYPE: ICD-10-CM

## 2025-06-10 DIAGNOSIS — R11.0 NAUSEA: ICD-10-CM

## 2025-06-10 DIAGNOSIS — A49.9 INFECTION OF DRUG-RESISTANT BACTERIA: ICD-10-CM

## 2025-06-10 DIAGNOSIS — I12.9 HYPERTENSIVE CHRONIC KIDNEY DISEASE W STG 1-4/UNSP CHR KDNY: ICD-10-CM

## 2025-06-10 DIAGNOSIS — N30.00 ACUTE CYSTITIS WITHOUT HEMATURIA: Primary | ICD-10-CM

## 2025-06-10 DIAGNOSIS — E53.8 VITAMIN B12 DEFICIENCY: ICD-10-CM

## 2025-06-10 PROCEDURE — 1126F AMNT PAIN NOTED NONE PRSNT: CPT | Performed by: FAMILY MEDICINE

## 2025-06-10 PROCEDURE — 3078F DIAST BP <80 MM HG: CPT | Performed by: FAMILY MEDICINE

## 2025-06-10 PROCEDURE — 99495 TRANSJ CARE MGMT MOD F2F 14D: CPT | Performed by: FAMILY MEDICINE

## 2025-06-10 PROCEDURE — 1111F DSCHRG MED/CURRENT MED MERGE: CPT | Performed by: FAMILY MEDICINE

## 2025-06-10 PROCEDURE — 1036F TOBACCO NON-USER: CPT | Performed by: FAMILY MEDICINE

## 2025-06-10 PROCEDURE — 1159F MED LIST DOCD IN RCRD: CPT | Performed by: FAMILY MEDICINE

## 2025-06-10 PROCEDURE — 1160F RVW MEDS BY RX/DR IN RCRD: CPT | Performed by: FAMILY MEDICINE

## 2025-06-10 PROCEDURE — 3075F SYST BP GE 130 - 139MM HG: CPT | Performed by: FAMILY MEDICINE

## 2025-06-10 RX ORDER — ONDANSETRON 4 MG/1
4 TABLET, FILM COATED ORAL EVERY 8 HOURS PRN
Qty: 30 TABLET | Refills: 0 | Status: SHIPPED | OUTPATIENT
Start: 2025-06-10 | End: 2025-06-20

## 2025-06-10 ASSESSMENT — PATIENT HEALTH QUESTIONNAIRE - PHQ9
2. FEELING DOWN, DEPRESSED OR HOPELESS: NOT AT ALL
SUM OF ALL RESPONSES TO PHQ9 QUESTIONS 1 AND 2: 0
1. LITTLE INTEREST OR PLEASURE IN DOING THINGS: NOT AT ALL

## 2025-06-10 ASSESSMENT — ENCOUNTER SYMPTOMS
WHEEZING: 0
VOMITING: 0
FATIGUE: 1
COUGH: 0
CHILLS: 0
FREQUENCY: 0
HEMATURIA: 0
NERVOUS/ANXIOUS: 0
NAUSEA: 0
CONSTIPATION: 0
TREMORS: 0
MYALGIAS: 0
WEAKNESS: 0
SHORTNESS OF BREATH: 0
FEVER: 0
DIZZINESS: 0
ARTHRALGIAS: 1
PALPITATIONS: 0
CONFUSION: 0

## 2025-06-10 ASSESSMENT — LIFESTYLE VARIABLES
AUDIT-C TOTAL SCORE: 0
SKIP TO QUESTIONS 9-10: 1
HOW OFTEN DO YOU HAVE SIX OR MORE DRINKS ON ONE OCCASION: NEVER
HOW OFTEN DO YOU HAVE A DRINK CONTAINING ALCOHOL: NEVER
HOW MANY STANDARD DRINKS CONTAINING ALCOHOL DO YOU HAVE ON A TYPICAL DAY: PATIENT DOES NOT DRINK

## 2025-06-10 ASSESSMENT — PAIN SCALES - GENERAL: PAINLEVEL_OUTOF10: 0-NO PAIN

## 2025-06-10 NOTE — PROGRESS NOTES
"       Patient: Fartun Carey  : 6/10/1928  PCP: Jose Alejandro Hamilton MD  MRN: [id number]  Program: Transitional Care Management  Status: Enrolled  Effective Dates: 2025 - present  Responsible Staff: Ruthie German  Social Drivers to be Addressed: Physical Activity         Fartun Carey is a 89 y.o. female presenting today for follow-up after being discharged from the hospital 5 days ago. The main problem requiring admission was complicated, resistant UTI. The discharge summary and/or Transitional Care Management documentation was reviewed. Medication reconciliation was performed as indicated via the \"Asad as Reviewed\" timestamp.     Fartun Carey was contacted by Transitional Care Management services two days after her discharge. This encounter and supporting documentation was reviewed.  She had anemia - vitD and vit B12 def, also with CKD- will recheck labs and a urine with home health    Review of Systems   Constitutional:  Positive for fatigue. Negative for chills and fever.   HENT:  Negative for ear pain and postnasal drip.    Respiratory:  Negative for cough, shortness of breath and wheezing.    Cardiovascular:  Negative for chest pain, palpitations and leg swelling.   Gastrointestinal:  Negative for constipation, nausea and vomiting.   Genitourinary:  Negative for frequency and hematuria.   Musculoskeletal:  Positive for arthralgias. Negative for myalgias.   Skin:  Negative for rash.   Neurological:  Negative for dizziness, tremors and weakness.   Psychiatric/Behavioral:  Negative for confusion. The patient is not nervous/anxious.    Vit D def, vit B12 def, nocturia    /55   Pulse 59   Wt 73.1 kg (161 lb 3.2 oz)   SpO2 99%   BMI 31.48 kg/m²     Physical Exam  Vitals reviewed.   Constitutional:       General: She is not in acute distress.     Appearance: Normal appearance. She is not ill-appearing.   HENT:      Nose: No rhinorrhea.      Mouth/Throat:      Pharynx: No posterior oropharyngeal " erythema.   Cardiovascular:      Rate and Rhythm: Normal rate and regular rhythm.      Heart sounds: Normal heart sounds. No murmur heard.  Pulmonary:      Breath sounds: Normal breath sounds. No wheezing or rhonchi.   Abdominal:      Tenderness: There is no abdominal tenderness. There is no right CVA tenderness or left CVA tenderness.   Musculoskeletal:      Cervical back: Neck supple.      Right lower leg: No edema.      Left lower leg: No edema.   Skin:     Findings: No rash.   Neurological:      General: No focal deficit present.      Mental Status: She is alert and oriented to person, place, and time.   Psychiatric:         Mood and Affect: Mood normal.         Behavior: Behavior normal.         The complexity of medical decision making for this patient's transitional care is moderate.    Assessment/Plan   Problem List Items Addressed This Visit           ICD-10-CM    Type 2 diabetes mellitus, without long-term current use of insulin (Multi) E11.9    Relevant Orders    Comprehensive Metabolic Panel    Vitamin D deficiency E55.9    Relevant Orders    Vitamin D 25-Hydroxy,Total (for eval of Vitamin D levels)    Acute cystitis without hematuria - Primary N30.00    Relevant Orders    Urinalysis with Reflex Culture and Microscopic    Referral to Infectious Disease     Other Visit Diagnoses         Codes      Hypertensive chronic kidney disease w stg 1-4/unsp chr kdny     I12.9    continue meds, encourage fluids, avoid NSAIDS     Relevant Orders    Comprehensive Metabolic Panel      Anemia, unspecified type     D64.9    check labs     Relevant Orders    CBC and Auto Differential    Vitamin B12    Iron and TIBC      Vitamin B12 deficiency     E53.8    vit B12 supplement       Nausea     R11.0    script sent for zofran - encourage to increase intake of food and liquids     Relevant Medications    ondansetron (Zofran) 4 mg tablet      Infection of drug-resistant bacteria     A49.9, Z16.30    Relevant Orders    Referral  to Infectious Disease

## 2025-06-11 ENCOUNTER — HOME CARE VISIT (OUTPATIENT)
Dept: HOME HEALTH SERVICES | Facility: HOME HEALTH | Age: OVER 89
End: 2025-06-11
Payer: MEDICARE

## 2025-06-11 ENCOUNTER — TELEPHONE (OUTPATIENT)
Dept: PRIMARY CARE | Facility: CLINIC | Age: OVER 89
End: 2025-06-11
Payer: MEDICARE

## 2025-06-11 ENCOUNTER — PATIENT OUTREACH (OUTPATIENT)
Dept: PRIMARY CARE | Facility: CLINIC | Age: OVER 89
End: 2025-06-11
Payer: MEDICARE

## 2025-06-11 ENCOUNTER — HOME INFUSION (OUTPATIENT)
Dept: INFUSION THERAPY | Age: OVER 89
End: 2025-06-11
Payer: MEDICARE

## 2025-06-11 PROCEDURE — G0299 HHS/HOSPICE OF RN EA 15 MIN: HCPCS | Mod: HHH

## 2025-06-11 RX ADMIN — SODIUM CHLORIDE 10 ML: 9 INJECTION, SOLUTION INTRAMUSCULAR; INTRAVENOUS; SUBCUTANEOUS at 11:50

## 2025-06-11 RX ADMIN — Medication 5 ML: at 11:50

## 2025-06-11 ASSESSMENT — ENCOUNTER SYMPTOMS
PERSON REPORTING PAIN: PATIENT
NAUSEA: 1
DYSPNEA ACTIVITY LEVEL: AFTER AMBULATING 10 - 20 FT
SHORTNESS OF BREATH: 1
CHANGE IN APPETITE: INCREASED
APPETITE LEVEL: FAIR
MUSCLE WEAKNESS: 1
LOWER EXTREMITY EDEMA: 1
DENIES PAIN: 1
LAST BOWEL MOVEMENT: 67366

## 2025-06-11 ASSESSMENT — ACTIVITIES OF DAILY LIVING (ADL)
CURRENT_FUNCTION: STAND BY ASSIST
AMBULATION ASSISTANCE: STAND BY ASSIST

## 2025-06-11 NOTE — PROGRESS NOTES
Confirmation of at least 2 patient identifiers.    Completed telephonic follow-up with patient after recent visit with PCP   Spoke to patient during outreach call.    Patient reports feeling: No change from previous encounter     Patient has questions or concerns about medications: No     Have all prescribed medications been filled? Yes    Patient has necessary resources to manage their care? Yes    Patient has questions or concerns? No    Next care management follow-up approximately within one month.  Care  information provided to patient.    Patient provided with [facility]. Provider line for I.D provider per PCP visit . Patient francisco j [facility] nurse was in home today for labs along with urine.       Encouraged to call providers for any questions concerns or change in condition

## 2025-06-11 NOTE — PROGRESS NOTES
Chart Review: Patient ordered Ertapenem 500mgIV q24 for recurrent UTI    Labs and PICC line dressing changed to by homecare RN    Telephone call with patient's daughter, Olive Schaefer does all infusions - labs today included UA ordered by PCP, Dr Hamilton.  Referral sent to Dr ELIZABETH's office for follow up but call from office for appt   States her mom is feeling better, no complaints of UTI symptoms  Agreed to delivery on 6/12 of remaining doses - please have  call when on the way - [phone], delivery by 6p if possible    POC Dr ELIZABETH on 6/13 for 6/15

## 2025-06-12 ENCOUNTER — TELEPHONE (OUTPATIENT)
Dept: PRIMARY CARE | Facility: CLINIC | Age: OVER 89
End: 2025-06-12
Payer: MEDICARE

## 2025-06-12 ENCOUNTER — HOME INFUSION (OUTPATIENT)
Dept: INFUSION THERAPY | Age: OVER 89
End: 2025-06-12
Payer: MEDICARE

## 2025-06-12 NOTE — PROGRESS NOTES
Patient ordered Ertapenem through 6/15 for UTI  UA repeated on 6/11  Results positive for protein and WBC  Cultures pending     Dr Nieto aware of above  Followup on culture results on 6/16 in [facility]  and with Dr ELIZABETH for orders

## 2025-06-12 NOTE — TELEPHONE ENCOUNTER
Patient notified of recent lab results. She has an appointment with infectious disease next Wednesday. She has not noticed any blood in her stool or black stools.

## 2025-06-12 NOTE — TELEPHONE ENCOUNTER
----- Message from Jose Alejandro Hamilton sent at 6/12/2025  2:01 PM EDT -----    ----- Message -----  From: Laura Purplle Results In  Sent: 6/11/2025  11:10 PM EDT  To: Jose Alejandro Hamilton MD

## 2025-06-12 NOTE — TELEPHONE ENCOUNTER
Attempted to contact patient regarding recent lab results. No answer and unable to leave a message.

## 2025-06-13 LAB
25(OH)D3+25(OH)D2 SERPL-MCNC: 44 NG/ML (ref 30–100)
ALBUMIN SERPL-MCNC: 3.1 G/DL (ref 3.6–5.1)
ALP SERPL-CCNC: 116 U/L (ref 37–153)
ALT SERPL-CCNC: 12 U/L (ref 6–29)
ANION GAP SERPL CALCULATED.4IONS-SCNC: 11 MMOL/L (CALC) (ref 7–17)
APPEARANCE UR: CLEAR
AST SERPL-CCNC: 17 U/L (ref 10–35)
BACTERIA #/AREA URNS HPF: ABNORMAL /HPF
BACTERIA UR CULT: ABNORMAL
BACTERIA UR CULT: ABNORMAL
BASOPHILS # BLD AUTO: 53 CELLS/UL (ref 0–200)
BASOPHILS NFR BLD AUTO: 0.3 %
BILIRUB SERPL-MCNC: 0.5 MG/DL (ref 0.2–1.2)
BILIRUB UR QL STRIP: NEGATIVE
BUN SERPL-MCNC: 10 MG/DL (ref 7–25)
CALCIUM SERPL-MCNC: 8.2 MG/DL (ref 8.6–10.4)
CHLORIDE SERPL-SCNC: 102 MMOL/L (ref 98–110)
CO2 SERPL-SCNC: 27 MMOL/L (ref 20–32)
COLOR UR: YELLOW
CREAT SERPL-MCNC: 0.93 MG/DL (ref 0.6–0.95)
EGFRCR SERPLBLD CKD-EPI 2021: 56 ML/MIN/1.73M2
EOSINOPHIL # BLD AUTO: 106 CELLS/UL (ref 15–500)
EOSINOPHIL NFR BLD AUTO: 0.6 %
ERYTHROCYTE [DISTWIDTH] IN BLOOD BY AUTOMATED COUNT: 13.5 % (ref 11–15)
GLUCOSE SERPL-MCNC: 109 MG/DL (ref 65–99)
GLUCOSE UR QL STRIP: NEGATIVE
HCT VFR BLD AUTO: 31 % (ref 35–45)
HGB BLD-MCNC: 9.7 G/DL (ref 11.7–15.5)
HGB UR QL STRIP: NEGATIVE
HYALINE CASTS #/AREA URNS LPF: ABNORMAL /LPF
IRON SATN MFR SERPL: 13 % (CALC) (ref 16–45)
IRON SERPL-MCNC: 26 MCG/DL (ref 45–160)
KETONES UR QL STRIP: NEGATIVE
LEUKOCYTE ESTERASE UR QL STRIP: ABNORMAL
LYMPHOCYTES # BLD AUTO: 2460 CELLS/UL (ref 850–3900)
LYMPHOCYTES NFR BLD AUTO: 13.9 %
MCH RBC QN AUTO: 30.6 PG (ref 27–33)
MCHC RBC AUTO-ENTMCNC: 31.3 G/DL (ref 32–36)
MCV RBC AUTO: 97.8 FL (ref 80–100)
MONOCYTES # BLD AUTO: 1540 CELLS/UL (ref 200–950)
MONOCYTES NFR BLD AUTO: 8.7 %
NEUTROPHILS # BLD AUTO: ABNORMAL CELLS/UL (ref 1500–7800)
NEUTROPHILS NFR BLD AUTO: 76.5 %
NITRITE UR QL STRIP: NEGATIVE
PH UR STRIP: 6 [PH] (ref 5–8)
PLATELET # BLD AUTO: 471 THOUSAND/UL (ref 140–400)
PMV BLD REES-ECKER: 10.1 FL (ref 7.5–12.5)
POTASSIUM SERPL-SCNC: 4 MMOL/L (ref 3.5–5.3)
PROT SERPL-MCNC: 5.7 G/DL (ref 6.1–8.1)
PROT UR QL STRIP: ABNORMAL
RBC # BLD AUTO: 3.17 MILLION/UL (ref 3.8–5.1)
RBC #/AREA URNS HPF: ABNORMAL /HPF
SERVICE CMNT-IMP: ABNORMAL
SODIUM SERPL-SCNC: 140 MMOL/L (ref 135–146)
SP GR UR STRIP: 1.01 (ref 1–1.03)
SQUAMOUS #/AREA URNS HPF: ABNORMAL /HPF
TIBC SERPL-MCNC: 201 MCG/DL (CALC) (ref 250–450)
VIT B12 SERPL-MCNC: 1616 PG/ML (ref 200–1100)
WBC # BLD AUTO: 17.7 THOUSAND/UL (ref 3.8–10.8)
WBC #/AREA URNS HPF: ABNORMAL /HPF

## 2025-06-14 ENCOUNTER — APPOINTMENT (OUTPATIENT)
Dept: RADIOLOGY | Facility: HOSPITAL | Age: OVER 89
End: 2025-06-14
Payer: MEDICARE

## 2025-06-14 ENCOUNTER — APPOINTMENT (OUTPATIENT)
Dept: CARDIOLOGY | Facility: HOSPITAL | Age: OVER 89
End: 2025-06-14
Payer: MEDICARE

## 2025-06-14 ENCOUNTER — HOME CARE VISIT (OUTPATIENT)
Dept: HOME HEALTH SERVICES | Facility: HOME HEALTH | Age: OVER 89
End: 2025-06-14
Payer: MEDICARE

## 2025-06-14 ENCOUNTER — HOSPITAL ENCOUNTER (EMERGENCY)
Facility: HOSPITAL | Age: OVER 89
Discharge: HOME | End: 2025-06-14
Attending: EMERGENCY MEDICINE
Payer: MEDICARE

## 2025-06-14 VITALS
HEIGHT: 65 IN | BODY MASS INDEX: 26.85 KG/M2 | OXYGEN SATURATION: 99 % | SYSTOLIC BLOOD PRESSURE: 123 MMHG | TEMPERATURE: 98.4 F | WEIGHT: 161.16 LBS | DIASTOLIC BLOOD PRESSURE: 46 MMHG | RESPIRATION RATE: 14 BRPM | HEART RATE: 58 BPM

## 2025-06-14 VITALS
RESPIRATION RATE: 16 BRPM | HEART RATE: 70 BPM | DIASTOLIC BLOOD PRESSURE: 70 MMHG | TEMPERATURE: 97.3 F | OXYGEN SATURATION: 98 % | SYSTOLIC BLOOD PRESSURE: 140 MMHG

## 2025-06-14 DIAGNOSIS — M26.642 ARTHRITIS OF LEFT TEMPOROMANDIBULAR JOINT: ICD-10-CM

## 2025-06-14 DIAGNOSIS — M25.511 ACUTE PAIN OF RIGHT SHOULDER: ICD-10-CM

## 2025-06-14 DIAGNOSIS — D64.9 ANEMIA, UNSPECIFIED TYPE: ICD-10-CM

## 2025-06-14 DIAGNOSIS — R68.84 JAW PAIN: Primary | ICD-10-CM

## 2025-06-14 DIAGNOSIS — I10 HYPERTENSION, UNSPECIFIED TYPE: ICD-10-CM

## 2025-06-14 LAB
ALBUMIN SERPL BCP-MCNC: 2.9 G/DL (ref 3.4–5)
ALP SERPL-CCNC: 94 U/L (ref 33–136)
ALT SERPL W P-5'-P-CCNC: 10 U/L (ref 7–45)
ANION GAP SERPL CALCULATED.3IONS-SCNC: 13 MMOL/L (ref 10–20)
APPEARANCE UR: CLEAR
AST SERPL W P-5'-P-CCNC: 15 U/L (ref 9–39)
BASOPHILS # BLD AUTO: 0.04 X10*3/UL (ref 0–0.1)
BASOPHILS NFR BLD AUTO: 0.3 %
BILIRUB SERPL-MCNC: 0.5 MG/DL (ref 0–1.2)
BILIRUB UR STRIP.AUTO-MCNC: NEGATIVE MG/DL
BNP SERPL-MCNC: 357 PG/ML (ref 0–99)
BUN SERPL-MCNC: 15 MG/DL (ref 6–23)
CALCIUM SERPL-MCNC: 8.2 MG/DL (ref 8.6–10.3)
CARDIAC TROPONIN I PNL SERPL HS: 13 NG/L (ref 0–13)
CARDIAC TROPONIN I PNL SERPL HS: 13 NG/L (ref 0–13)
CHLORIDE SERPL-SCNC: 106 MMOL/L (ref 98–107)
CO2 SERPL-SCNC: 24 MMOL/L (ref 21–32)
COLOR UR: YELLOW
CREAT SERPL-MCNC: 0.96 MG/DL (ref 0.5–1.05)
EGFRCR SERPLBLD CKD-EPI 2021: 54 ML/MIN/1.73M*2
EOSINOPHIL # BLD AUTO: 0.03 X10*3/UL (ref 0–0.4)
EOSINOPHIL NFR BLD AUTO: 0.2 %
ERYTHROCYTE [DISTWIDTH] IN BLOOD BY AUTOMATED COUNT: 15.3 % (ref 11.5–14.5)
GLUCOSE SERPL-MCNC: 133 MG/DL (ref 74–99)
GLUCOSE UR STRIP.AUTO-MCNC: NORMAL MG/DL
HCT VFR BLD AUTO: 26.5 % (ref 36–46)
HGB BLD-MCNC: 8.2 G/DL (ref 12–16)
IMM GRANULOCYTES # BLD AUTO: 0.11 X10*3/UL (ref 0–0.5)
IMM GRANULOCYTES NFR BLD AUTO: 0.7 % (ref 0–0.9)
KETONES UR STRIP.AUTO-MCNC: NEGATIVE MG/DL
LEUKOCYTE ESTERASE UR QL STRIP.AUTO: ABNORMAL
LYMPHOCYTES # BLD AUTO: 1.39 X10*3/UL (ref 0.8–3)
LYMPHOCYTES NFR BLD AUTO: 9.3 %
MCH RBC QN AUTO: 30.5 PG (ref 26–34)
MCHC RBC AUTO-ENTMCNC: 30.9 G/DL (ref 32–36)
MCV RBC AUTO: 99 FL (ref 80–100)
MONOCYTES # BLD AUTO: 2.1 X10*3/UL (ref 0.05–0.8)
MONOCYTES NFR BLD AUTO: 14.1 %
MUCOUS THREADS #/AREA URNS AUTO: ABNORMAL /LPF
NEUTROPHILS # BLD AUTO: 11.22 X10*3/UL (ref 1.6–5.5)
NEUTROPHILS NFR BLD AUTO: 75.4 %
NITRITE UR QL STRIP.AUTO: NEGATIVE
NRBC BLD-RTO: 0 /100 WBCS (ref 0–0)
PH UR STRIP.AUTO: 5.5 [PH]
PLATELET # BLD AUTO: 384 X10*3/UL (ref 150–450)
POTASSIUM SERPL-SCNC: 4 MMOL/L (ref 3.5–5.3)
PROT SERPL-MCNC: 5.5 G/DL (ref 6.4–8.2)
PROT UR STRIP.AUTO-MCNC: ABNORMAL MG/DL
RBC # BLD AUTO: 2.69 X10*6/UL (ref 4–5.2)
RBC # UR STRIP.AUTO: NEGATIVE MG/DL
RBC #/AREA URNS AUTO: ABNORMAL /HPF
SODIUM SERPL-SCNC: 139 MMOL/L (ref 136–145)
SP GR UR STRIP.AUTO: 1.02
SQUAMOUS #/AREA URNS AUTO: ABNORMAL /HPF
UROBILINOGEN UR STRIP.AUTO-MCNC: NORMAL MG/DL
WBC # BLD AUTO: 14.9 X10*3/UL (ref 4.4–11.3)
WBC #/AREA URNS AUTO: ABNORMAL /HPF

## 2025-06-14 PROCEDURE — 70450 CT HEAD/BRAIN W/O DYE: CPT | Performed by: STUDENT IN AN ORGANIZED HEALTH CARE EDUCATION/TRAINING PROGRAM

## 2025-06-14 PROCEDURE — 70450 CT HEAD/BRAIN W/O DYE: CPT

## 2025-06-14 PROCEDURE — 99285 EMERGENCY DEPT VISIT HI MDM: CPT | Mod: 25 | Performed by: EMERGENCY MEDICINE

## 2025-06-14 PROCEDURE — 93005 ELECTROCARDIOGRAM TRACING: CPT

## 2025-06-14 PROCEDURE — 71275 CT ANGIOGRAPHY CHEST: CPT | Performed by: STUDENT IN AN ORGANIZED HEALTH CARE EDUCATION/TRAINING PROGRAM

## 2025-06-14 PROCEDURE — 87086 URINE CULTURE/COLONY COUNT: CPT | Mod: TRILAB | Performed by: EMERGENCY MEDICINE

## 2025-06-14 PROCEDURE — 81001 URINALYSIS AUTO W/SCOPE: CPT | Performed by: EMERGENCY MEDICINE

## 2025-06-14 PROCEDURE — 74174 CTA ABD&PLVS W/CONTRAST: CPT | Performed by: STUDENT IN AN ORGANIZED HEALTH CARE EDUCATION/TRAINING PROGRAM

## 2025-06-14 PROCEDURE — 73060 X-RAY EXAM OF HUMERUS: CPT | Mod: RIGHT SIDE | Performed by: STUDENT IN AN ORGANIZED HEALTH CARE EDUCATION/TRAINING PROGRAM

## 2025-06-14 PROCEDURE — 80053 COMPREHEN METABOLIC PANEL: CPT | Performed by: EMERGENCY MEDICINE

## 2025-06-14 PROCEDURE — 2550000001 HC RX 255 CONTRASTS: Performed by: EMERGENCY MEDICINE

## 2025-06-14 PROCEDURE — 70486 CT MAXILLOFACIAL W/O DYE: CPT

## 2025-06-14 PROCEDURE — 96374 THER/PROPH/DIAG INJ IV PUSH: CPT | Mod: 59

## 2025-06-14 PROCEDURE — 2500000004 HC RX 250 GENERAL PHARMACY W/ HCPCS (ALT 636 FOR OP/ED): Performed by: EMERGENCY MEDICINE

## 2025-06-14 PROCEDURE — 70486 CT MAXILLOFACIAL W/O DYE: CPT | Performed by: STUDENT IN AN ORGANIZED HEALTH CARE EDUCATION/TRAINING PROGRAM

## 2025-06-14 PROCEDURE — 83880 ASSAY OF NATRIURETIC PEPTIDE: CPT | Performed by: EMERGENCY MEDICINE

## 2025-06-14 PROCEDURE — 76377 3D RENDER W/INTRP POSTPROCES: CPT | Mod: CCI

## 2025-06-14 PROCEDURE — 73060 X-RAY EXAM OF HUMERUS: CPT | Mod: RT

## 2025-06-14 PROCEDURE — 84484 ASSAY OF TROPONIN QUANT: CPT | Performed by: EMERGENCY MEDICINE

## 2025-06-14 PROCEDURE — 74174 CTA ABD&PLVS W/CONTRAST: CPT

## 2025-06-14 PROCEDURE — 96361 HYDRATE IV INFUSION ADD-ON: CPT

## 2025-06-14 PROCEDURE — 96375 TX/PRO/DX INJ NEW DRUG ADDON: CPT | Mod: 59

## 2025-06-14 PROCEDURE — 2500000001 HC RX 250 WO HCPCS SELF ADMINISTERED DRUGS (ALT 637 FOR MEDICARE OP): Performed by: EMERGENCY MEDICINE

## 2025-06-14 PROCEDURE — 85025 COMPLETE CBC W/AUTO DIFF WBC: CPT | Performed by: EMERGENCY MEDICINE

## 2025-06-14 PROCEDURE — G0151 HHCP-SERV OF PT,EA 15 MIN: HCPCS | Mod: HHH

## 2025-06-14 RX ORDER — MORPHINE SULFATE 4 MG/ML
4 INJECTION, SOLUTION INTRAMUSCULAR; INTRAVENOUS ONCE
Status: COMPLETED | OUTPATIENT
Start: 2025-06-14 | End: 2025-06-14

## 2025-06-14 RX ORDER — TRAMADOL HYDROCHLORIDE 50 MG/1
50 TABLET, FILM COATED ORAL EVERY 6 HOURS PRN
Qty: 10 TABLET | Refills: 0 | Status: SHIPPED | OUTPATIENT
Start: 2025-06-14 | End: 2025-06-17

## 2025-06-14 RX ORDER — TRAMADOL HYDROCHLORIDE 50 MG/1
50 TABLET, FILM COATED ORAL EVERY 6 HOURS PRN
Qty: 10 TABLET | Refills: 0 | Status: SHIPPED | OUTPATIENT
Start: 2025-06-14 | End: 2025-06-14

## 2025-06-14 RX ORDER — ONDANSETRON HYDROCHLORIDE 2 MG/ML
4 INJECTION, SOLUTION INTRAVENOUS ONCE
Status: COMPLETED | OUTPATIENT
Start: 2025-06-14 | End: 2025-06-14

## 2025-06-14 RX ORDER — ACETAMINOPHEN 325 MG/1
975 TABLET ORAL ONCE
Status: COMPLETED | OUTPATIENT
Start: 2025-06-14 | End: 2025-06-14

## 2025-06-14 RX ADMIN — ONDANSETRON 4 MG: 2 INJECTION, SOLUTION INTRAMUSCULAR; INTRAVENOUS at 17:35

## 2025-06-14 RX ADMIN — SODIUM CHLORIDE 500 ML: 900 INJECTION, SOLUTION INTRAVENOUS at 17:35

## 2025-06-14 RX ADMIN — MORPHINE SULFATE 4 MG: 4 INJECTION, SOLUTION INTRAMUSCULAR; INTRAVENOUS at 17:35

## 2025-06-14 RX ADMIN — ACETAMINOPHEN 975 MG: 325 TABLET ORAL at 17:35

## 2025-06-14 RX ADMIN — IOHEXOL 75 ML: 350 INJECTION, SOLUTION INTRAVENOUS at 18:39

## 2025-06-14 SDOH — HEALTH STABILITY: PHYSICAL HEALTH

## 2025-06-14 SDOH — HEALTH STABILITY: PHYSICAL HEALTH: EXERCISE TYPE: B LE HEP ADDED SUPINE THERAEX AND HAS SITTING AND STANDING HEP ALREADY

## 2025-06-14 SDOH — HEALTH STABILITY: PHYSICAL HEALTH: EXERCISE COMMENTS: HIP EXTENSION  PARTIAL SQUATS

## 2025-06-14 ASSESSMENT — ENCOUNTER SYMPTOMS
PAIN: 1
PAIN SEVERITY GOAL: 1/10
LOWEST PAIN SEVERITY IN PAST 24 HOURS: 0/10
PAIN LOCATION - PAIN QUALITY: ACHY
MUSCLE WEAKNESS: 1
PAIN LOCATION - PAIN SEVERITY: 5/10
HIGHEST PAIN SEVERITY IN PAST 24 HOURS: 5/10
PAIN LOCATION - PAIN FREQUENCY: CONSTANT
PERSON REPORTING PAIN: PATIENT
SUBJECTIVE PAIN PROGRESSION: WAXING AND WANING
PAIN LOCATION: RIGHT SHOULDER

## 2025-06-14 ASSESSMENT — PAIN SCALES - GENERAL
PAINLEVEL_OUTOF10: 0 - NO PAIN

## 2025-06-14 ASSESSMENT — PAIN - FUNCTIONAL ASSESSMENT
PAIN_FUNCTIONAL_ASSESSMENT: 0-10
PAIN_FUNCTIONAL_ASSESSMENT: 0-10

## 2025-06-14 ASSESSMENT — ACTIVITIES OF DAILY LIVING (ADL)
DRESSING_UB_CURRENT_FUNCTION: SUPERVISION
PHYSICAL TRANSFERS ASSESSED: 1
CURRENT_FUNCTION: SUPERVISION
AMBULATION ASSISTANCE: ONE PERSON
TOILETING: SUPERVISION
AMBULATION ASSISTANCE: 1
DRESSING_LB_CURRENT_FUNCTION: SUPERVISION
AMBULATION ASSISTANCE ON FLAT SURFACES: 1
TOILETING: 1
AMBULATION ASSISTANCE: STAND BY ASSIST

## 2025-06-14 NOTE — PROGRESS NOTES
Attestation/Supervisory note for GRACE Dyer      The patient is a 89-year-old female presenting to the emergency department by EMS from home for evaluation of bilateral shoulder pain and left-sided jaw pain.  The patient states that she has had bilateral shoulder pain in the past but she states that the right shoulder pain/upper arm pain was worse this morning when she woke up sometime around 0730.  She states that she does not have any significant left-sided shoulder pain at this time.  She states that she also has left-sided jaw pain.  No better or worse.  No radiation.  It is a dull aching pain.  She denies any recent injury or trauma.  She denies any headache or visual changes.  No focal weakness or numbness.  No midline neck or back pain.  No palpitations.  No diaphoresis.  She denies any chest pain or shortness of breath.  No abdominal pain.  No nausea or vomiting.  No diarrhea or constipation.  No urinary complaints.  She states that she is being treated for urinary tract infection but only has 1 more dose of the medication to go.  All pertinent positives and negatives are recorded above.  All other systems reviewed and otherwise negative.  Vital signs with mild hypertension but otherwise within normal limits.  Physical exam with a well-nourished well-developed female in no acute distress.  HEENT exam with dry mucous membranes but otherwise unremarkable.  She has no evidence of airway compromise or respiratory distress.  Abdominal exam is benign.  She does not have any gross motor, neurologic or vascular deficits on exam.  Pulses are equal bilaterally.      EKG with sinus rhythm at 66 bpm, occasional PACs, normal axis, normal voltage, normal ST segment, normal T waves      IV fluids, oral acetaminophen, IV morphine and IV Zofran ordered      Diagnostic labs with evidence of a possible urinary tract infection, leukocytosis, mildly elevated BNP, mild electrolyte imbalance, anemia but otherwise  unremarkable.      Initial troponin 13.  Repeat troponin 13      CT head wo IV contrast   Final Result   Brain:   No acute intracranial abnormality.   Chronic microvascular ischemia and involutional changes.        Facial Bones:   No acute facial bone fracture.   Severe degenerative changes of the left temporomandibular joint.        Signed by: Anupam Barbosa 6/14/2025 7:49 PM   Dictation workstation:   [id number]      CT maxillofacial bones wo IV contrast   Final Result   Brain:   No acute intracranial abnormality.   Chronic microvascular ischemia and involutional changes.        Facial Bones:   No acute facial bone fracture.   Severe degenerative changes of the left temporomandibular joint.        Signed by: Anupam Barbosa 6/14/2025 7:49 PM   Dictation workstation:   [id number]      CT angio chest abdomen pelvis   Final Result   1. Diffuse atherosclerotic calcification, without thoracic or   abdominal aortic aneurysm or acute aortic pathology.   2. Stable diffuse mosaic attenuation and scattered centrilobular   nodules, throughout both tata thoraces, similar to the examination   from 05/31/2025. These likely represent sequela of an   infectious/inflammatory process.   3. Other findings as described above.                  Signed by: Anupam Barbosa 6/14/2025 8:07 PM   Dictation workstation:   [id number]      CT 3D reconstruction   Final Result   Brain:   No acute intracranial abnormality.   Chronic microvascular ischemia and involutional changes.        Facial Bones:   No acute facial bone fracture.   Severe degenerative changes of the left temporomandibular joint.        Signed by: Anupam Barbosa 6/14/2025 7:49 PM   Dictation workstation:   [id number]      XR humerus right   Final Result   No fracture or dislocation.             MACRO:   None        Signed by: Brant Medrano 6/14/2025 6:03 PM   Dictation workstation:   [id number]           The patient does not have any gross motor, neurologic or  vascular deficits on exam.  She is well-perfused on exam.  No evidence of sepsis.  No evidence of a STEMI on EKG or cardiac enzymes.  No events on telemetry.  CT head shows no evidence of intracranial hemorrhage, mass effect or CVA.  CT maxillofacial shows no evidence of acute injury or bony deformity.  She does have some severe degenerative changes of the left TMJ.  CT angio chest abdomen pelvis shows no evidence of PE or dissection.  No evidence of pneumonia or pneumothorax.  No evidence of CHF.  No widening of the mediastinum.  No acute process within the abdomen or pelvis.  X-ray of the right humerus shows no evidence of fracture or dislocation.  The diagnostic labs do show evidence of a possible urinary tract infection but the patient is being treated with an antibiotic currently for this and does not have any urinary symptoms at this time.        The patient was released in good condition.  She will follow-up with her primary care physician within 1 to 2 days for further management of her current symptoms and review of the urine culture results.  She will return to the emergency department sooner with worsening of symptoms or onset of new symptoms.  She was given a limited prescription for Ultram for pain control.      Impression/diagnosis:  Left-sided jaw pain, atraumatic  Right upper arm/shoulder pain, atraumatic, acute on chronic  Hypertension, unspecified  Degenerative disease of the TMJ, left-sided  Anemia, unspecified      I personally saw the patient and made/approve the management plan and take responsibility for the patient management.      I independently interpreted the following study (S) EKG and diagnostic labs      I personally discussed the patient's management with the patient      I reviewed the results of the diagnostic labs and diagnostic imaging.  Formal radiology read was completed by the radiologist.      Zoie Tracey MD

## 2025-06-15 ENCOUNTER — HOSPITAL ENCOUNTER (EMERGENCY)
Facility: HOSPITAL | Age: OVER 89
Discharge: HOME | End: 2025-06-15
Attending: STUDENT IN AN ORGANIZED HEALTH CARE EDUCATION/TRAINING PROGRAM
Payer: MEDICARE

## 2025-06-15 ENCOUNTER — HOME CARE VISIT (OUTPATIENT)
Dept: HOME HEALTH SERVICES | Facility: HOME HEALTH | Age: OVER 89
End: 2025-06-15
Payer: MEDICARE

## 2025-06-15 ENCOUNTER — APPOINTMENT (OUTPATIENT)
Dept: RADIOLOGY | Facility: HOSPITAL | Age: OVER 89
End: 2025-06-15
Payer: MEDICARE

## 2025-06-15 VITALS
SYSTOLIC BLOOD PRESSURE: 113 MMHG | RESPIRATION RATE: 18 BRPM | OXYGEN SATURATION: 98 % | HEART RATE: 73 BPM | WEIGHT: 155 LBS | DIASTOLIC BLOOD PRESSURE: 54 MMHG | TEMPERATURE: 97.8 F | HEIGHT: 61 IN | BODY MASS INDEX: 29.27 KG/M2
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
## 2025-06-15 DIAGNOSIS — S09.90XA CLOSED HEAD INJURY, INITIAL ENCOUNTER: ICD-10-CM

## 2025-06-15 LAB
ABO GROUP (TYPE) IN BLOOD: NORMAL
ALBUMIN SERPL BCP-MCNC: 2.7 G/DL (ref 3.4–5)
ALP SERPL-CCNC: 88 U/L (ref 33–136)
ALT SERPL W P-5'-P-CCNC: 10 U/L (ref 7–45)
ANION GAP SERPL CALCULATED.3IONS-SCNC: 11 MMOL/L (ref 10–20)
ANTIBODY SCREEN: NORMAL
AST SERPL W P-5'-P-CCNC: 14 U/L (ref 9–39)
BASOPHILS # BLD AUTO: 0.03 X10*3/UL (ref 0–0.1)
BASOPHILS NFR BLD AUTO: 0.3 %
BILIRUB SERPL-MCNC: 0.5 MG/DL (ref 0–1.2)
BUN SERPL-MCNC: 19 MG/DL (ref 6–23)
CALCIUM SERPL-MCNC: 8.1 MG/DL (ref 8.6–10.3)
CHLORIDE SERPL-SCNC: 105 MMOL/L (ref 98–107)
CO2 SERPL-SCNC: 26 MMOL/L (ref 21–32)
CREAT SERPL-MCNC: 1.29 MG/DL (ref 0.5–1.05)
EGFRCR SERPLBLD CKD-EPI 2021: 38 ML/MIN/1.73M*2
EOSINOPHIL # BLD AUTO: 0.03 X10*3/UL (ref 0–0.4)
EOSINOPHIL NFR BLD AUTO: 0.3 %
ERYTHROCYTE [DISTWIDTH] IN BLOOD BY AUTOMATED COUNT: 15.3 % (ref 11.5–14.5)
GLUCOSE SERPL-MCNC: 195 MG/DL (ref 74–99)
HCT VFR BLD AUTO: 26.2 % (ref 36–46)
HGB BLD-MCNC: 8 G/DL (ref 12–16)
IMM GRANULOCYTES # BLD AUTO: 0.08 X10*3/UL (ref 0–0.5)
IMM GRANULOCYTES NFR BLD AUTO: 0.7 % (ref 0–0.9)
INR PPP: 1.2 (ref 0.9–1.2)
LYMPHOCYTES # BLD AUTO: 0.91 X10*3/UL (ref 0.8–3)
LYMPHOCYTES NFR BLD AUTO: 7.7 %
MCH RBC QN AUTO: 30.2 PG (ref 26–34)
MCHC RBC AUTO-ENTMCNC: 30.5 G/DL (ref 32–36)
MCV RBC AUTO: 99 FL (ref 80–100)
MONOCYTES # BLD AUTO: 0.78 X10*3/UL (ref 0.05–0.8)
MONOCYTES NFR BLD AUTO: 6.6 %
NEUTROPHILS # BLD AUTO: 10.03 X10*3/UL (ref 1.6–5.5)
NEUTROPHILS NFR BLD AUTO: 84.4 %
NRBC BLD-RTO: 0 /100 WBCS (ref 0–0)
PLATELET # BLD AUTO: 351 X10*3/UL (ref 150–450)
POTASSIUM SERPL-SCNC: 3.9 MMOL/L (ref 3.5–5.3)
PROT SERPL-MCNC: 5.2 G/DL (ref 6.4–8.2)
PROTHROMBIN TIME: 12.4 SECONDS (ref 9.3–12.7)
RBC # BLD AUTO: 2.65 X10*6/UL (ref 4–5.2)
RH FACTOR (ANTIGEN D): NORMAL
SODIUM SERPL-SCNC: 138 MMOL/L (ref 136–145)
WBC # BLD AUTO: 11.9 X10*3/UL (ref 4.4–11.3)

## 2025-06-15 PROCEDURE — 80053 COMPREHEN METABOLIC PANEL: CPT | Performed by: PHYSICIAN ASSISTANT

## 2025-06-15 PROCEDURE — 72131 CT LUMBAR SPINE W/O DYE: CPT | Performed by: RADIOLOGY

## 2025-06-15 PROCEDURE — 72131 CT LUMBAR SPINE W/O DYE: CPT

## 2025-06-15 PROCEDURE — 86901 BLOOD TYPING SEROLOGIC RH(D): CPT | Performed by: PHYSICIAN ASSISTANT

## 2025-06-15 PROCEDURE — 85610 PROTHROMBIN TIME: CPT | Performed by: PHYSICIAN ASSISTANT

## 2025-06-15 PROCEDURE — 72125 CT NECK SPINE W/O DYE: CPT | Performed by: RADIOLOGY

## 2025-06-15 PROCEDURE — 99285 EMERGENCY DEPT VISIT HI MDM: CPT | Mod: 25 | Performed by: STUDENT IN AN ORGANIZED HEALTH CARE EDUCATION/TRAINING PROGRAM

## 2025-06-15 PROCEDURE — 71045 X-RAY EXAM CHEST 1 VIEW: CPT

## 2025-06-15 PROCEDURE — 85025 COMPLETE CBC W/AUTO DIFF WBC: CPT | Performed by: PHYSICIAN ASSISTANT

## 2025-06-15 PROCEDURE — 72128 CT CHEST SPINE W/O DYE: CPT

## 2025-06-15 PROCEDURE — 96365 THER/PROPH/DIAG IV INF INIT: CPT

## 2025-06-15 PROCEDURE — 72125 CT NECK SPINE W/O DYE: CPT

## 2025-06-15 PROCEDURE — 72128 CT CHEST SPINE W/O DYE: CPT | Performed by: RADIOLOGY

## 2025-06-15 PROCEDURE — 71045 X-RAY EXAM CHEST 1 VIEW: CPT | Performed by: RADIOLOGY

## 2025-06-15 PROCEDURE — 70450 CT HEAD/BRAIN W/O DYE: CPT

## 2025-06-15 PROCEDURE — 70450 CT HEAD/BRAIN W/O DYE: CPT | Performed by: RADIOLOGY

## 2025-06-15 PROCEDURE — 2500000004 HC RX 250 GENERAL PHARMACY W/ HCPCS (ALT 636 FOR OP/ED): Performed by: PHYSICIAN ASSISTANT

## 2025-06-15 RX ADMIN — ERTAPENEM SODIUM 500 MG: 1 INJECTION, POWDER, LYOPHILIZED, FOR SOLUTION INTRAMUSCULAR; INTRAVENOUS at 12:28

## 2025-06-15 ASSESSMENT — PAIN - FUNCTIONAL ASSESSMENT: PAIN_FUNCTIONAL_ASSESSMENT: 0-10

## 2025-06-15 ASSESSMENT — PAIN DESCRIPTION - PROGRESSION: CLINICAL_PROGRESSION: NOT CHANGED

## 2025-06-15 ASSESSMENT — PAIN SCALES - GENERAL: PAINLEVEL_OUTOF10: 0 - NO PAIN

## 2025-06-15 NOTE — DISCHARGE INSTRUCTIONS
Please follow-up with your primary care physician early this week.  If you have any more falls at home or have any fear of being at home, do not feel safe at home return to the emergency department.

## 2025-06-15 NOTE — ED PROVIDER NOTES
"The patient was seen in conjunction with the advanced practice provider, and I performed a substantive portion of the encounter. The following is my supervisory note.    History:  Patient presents to ED with family for concern of fall and head strike.  Patient states she was at the kitchen sink and took a step backwards and lost her footing and fell backwards hitting her head but did not experience any LOC, patient and family do not report any AC/AP medication use.  Present endorses bilateral posterior shoulder pain and left side jaw pain but denies any significant pain portion with movement.  Denies any neck/back pain.  Denies any prodromal symptoms of headache, vision changes, dysarthria/aphasia, cardiopulmonary symptoms, or any GI symptoms.  Feeling notes patient is completing course for UTI with last antibiotic dose today.  Patient denies any other significant systemic symptoms or complaints or other significant injuries or pain.  And is concerned with patient's multiple falls over undisclosed time and having discussion with appropriate future needs.    VS:  /54   Pulse 73   Temp 36.6 °C (97.8 °F) (Temporal)   Resp 18   Ht 1.549 m (5' 1\")   Wt 70.3 kg (155 lb)   SpO2 98%   BMI 29.29 kg/m²      Physical exam:  Airway: patent  Breathing: bilateral breath sounds  Circulation: bilateral radial/DP/PT, central carotid/femoral  GCS: 15    CONST: alert, normal appearance, no acute distress, does not appear ill/toxic  HEAD: normocephalic, atraumatic, midface stable  NECK: trachea midline, no midline C-spine tenderness or appreciable spinous process step-offs/deformities, C-spine clinically cleared at bedside  ENT: no septal hematoma or epistaxis, no perioral/intraoral injuries or dental malocclusion  EYES: Pupils 4-2 mm, no periorbital ecchymosis, periorbital rims intact, EOMI, no appreciable ocular entrapment  CV: RRR (sinus rhythm on telemetry), no murmurs, 1+ equal/symmetrical pulses x4, chest wall " non-tender, trace distal equal/medical BLE pitting edema  PULM: CTAB, no respiratory distress, not requiring supplemental O2, equal/symmetrical chest wall rise, no appreciable paradoxical movement  ABD: soft, moderately protuberant/non-tender/non-distended, no mass, no evidence of ecchymosis  :   MSK: pelvis stable to compression and without pain, no obvious gross extremity trauma/deformities and no appreciable pain with PROM x4 (prox/mid/distal joints and extremity), no appreciable midline T/L-spine tenderness and no spinous process step-offs/deformities, mild tenderness along posterior scapular regions bilaterally without any obvious posterior thoracic trauma  SKIN: no evidence of wounds/lacerations, no ecchymosis, warm/dry, no pallor  NEURO: A&Ox4, gross strength/motor/sensation intact x4  PSYCH: Appropriate affect      I personally reviewed and interpreted the following studies: EKG is N/A, labs are significant for improving leukocytosis with known UTI infection, baseline normocytic anemia, borderline VINCENT just above baseline renal function, images are notable for CTH no evidence of traumatic ICH or calvarium fracture.      MDM:  Patient presented to the ED for assessment for mechanical fall backwards with head strike and no reported LOC, no report of AC/AP medication use.  Concerning PMHx of DM2, CAD, HTN, HLD, hypothyroidism, BPPV, CKD 3, A-fib not anticoagulated.    Per Chart Review: ED encounter on 6/14/2025 for reported jaw pain, CT maxillofacial bones demonstrates no evidence of acute or chronic ostial findings of mandible.    Assessment/evaluation consistent with mechanical fall without any significant injury but concerning for developing imbalance and coordination issues with ADLs. No concerning history, clinical evidence/work-up, or exam findings for the concerning differentials of traumatic ICH, anemia, significant lecture light/metabolic derangement. These conditions have been thoroughly evaluated  and determined to be sufficiently unlikely to be the etiology of patient's presenting symptoms.       ED Course/Diagnosis:  Diagnoses as of 06/17/25 7495   Fall, initial encounter   Closed head injury, initial encounter       1. Fall, initial encounter        2. Closed head injury, initial encounter                 Guzman Lala MD  06/17/25 9072

## 2025-06-15 NOTE — DISCHARGE INSTRUCTIONS
Follow-up with your primary care physician within 1 to 2 days for further management of your current symptoms and review of the urine culture results.    Return to the emergency department sooner with worsening of symptoms or onset of new symptoms

## 2025-06-16 ENCOUNTER — HOME INFUSION (OUTPATIENT)
Dept: INFUSION THERAPY | Age: OVER 89
End: 2025-06-16
Payer: MEDICARE

## 2025-06-16 ENCOUNTER — HOME CARE VISIT (OUTPATIENT)
Dept: HOME HEALTH SERVICES | Facility: HOME HEALTH | Age: OVER 89
End: 2025-06-16
Payer: MEDICARE

## 2025-06-16 ENCOUNTER — TELEPHONE (OUTPATIENT)
Dept: INFUSION THERAPY | Age: OVER 89
End: 2025-06-16
Payer: MEDICARE

## 2025-06-16 VITALS
RESPIRATION RATE: 20 BRPM | OXYGEN SATURATION: 96 % | DIASTOLIC BLOOD PRESSURE: 56 MMHG | TEMPERATURE: 99.1 F | SYSTOLIC BLOOD PRESSURE: 116 MMHG | HEART RATE: 82 BPM

## 2025-06-16 DIAGNOSIS — N30.00 ACUTE CYSTITIS WITHOUT HEMATURIA: Primary | ICD-10-CM

## 2025-06-16 LAB — BACTERIA UR CULT: NO GROWTH

## 2025-06-16 PROCEDURE — G0299 HHS/HOSPICE OF RN EA 15 MIN: HCPCS | Mod: HHH

## 2025-06-16 RX ADMIN — SODIUM CHLORIDE 10 ML: 9 INJECTION, SOLUTION INTRAMUSCULAR; INTRAVENOUS; SUBCUTANEOUS at 15:45

## 2025-06-16 RX ADMIN — Medication 5 ML: at 15:45

## 2025-06-16 ASSESSMENT — ENCOUNTER SYMPTOMS
DENIES PAIN: 1
DYSPNEA ACTIVITY LEVEL: AFTER AMBULATING 10 - 20 FT
LAST BOWEL MOVEMENT: 67372
MUSCLE WEAKNESS: 1
BOWEL INCONTINENCE: 1
PERSON REPORTING PAIN: PATIENT
LOWER EXTREMITY EDEMA: 1
APPETITE LEVEL: FAIR
SHORTNESS OF BREATH: 1
DIARRHEA: 1
CHANGE IN APPETITE: INCREASED

## 2025-06-16 ASSESSMENT — ACTIVITIES OF DAILY LIVING (ADL)
AMBULATION ASSISTANCE: ONE PERSON
CURRENT_FUNCTION: SUPERVISION
AMBULATION ASSISTANCE: STAND BY ASSIST

## 2025-06-16 NOTE — TELEPHONE ENCOUNTER
Spoke with patient's caregiver. They only had 1 of each type of flush in home and did not know to keep flushing line. After instruction understand to flush once a day until MD appt on 6/18. Had already flushed once today. Sending addition 3x each NS and 10 unit/ml heparin flushes today by 8 pm to same address as last delivery so they have enough until this appointment.  to call first.

## 2025-06-16 NOTE — PROGRESS NOTES
Patient ordered Ertapenem through 6/15 for UTI  UA repeated on 6/11  Results positive for protein and WBC  Cultures normal  ED visit on 06/14 for jaw pain    Pharmacy checked with MD and will see patient on 06/18/25. Orders given to stop ertapenem, but maintain midline through 06/18/25.  Orders placed in EPIC and routed to team    Patient rep to make sure patient has flushes through 06/18/25     Follow up 6/18 with Dr ELIZABETH for orders

## 2025-06-17 ENCOUNTER — HOME CARE VISIT (OUTPATIENT)
Dept: HOME HEALTH SERVICES | Facility: HOME HEALTH | Age: OVER 89
End: 2025-06-17
Payer: MEDICARE

## 2025-06-17 VITALS
OXYGEN SATURATION: 93 % | TEMPERATURE: 99.1 F | HEART RATE: 70 BPM | RESPIRATION RATE: 18 BRPM | DIASTOLIC BLOOD PRESSURE: 62 MMHG | SYSTOLIC BLOOD PRESSURE: 128 MMHG

## 2025-06-17 PROCEDURE — G0299 HHS/HOSPICE OF RN EA 15 MIN: HCPCS | Mod: HHH

## 2025-06-17 ASSESSMENT — ENCOUNTER SYMPTOMS
LOWER EXTREMITY EDEMA: 1
DYSPNEA ON EXERTION: 1
FATIGUES EASILY: 1

## 2025-06-18 ENCOUNTER — TELEPHONE (OUTPATIENT)
Dept: INPATIENT UNIT | Facility: HOSPITAL | Age: OVER 89
End: 2025-06-18

## 2025-06-18 ENCOUNTER — APPOINTMENT (OUTPATIENT)
Dept: PODIATRY | Facility: CLINIC | Age: OVER 89
End: 2025-06-18
Payer: MEDICARE

## 2025-06-18 ENCOUNTER — HOME INFUSION (OUTPATIENT)
Dept: INFUSION THERAPY | Age: OVER 89
End: 2025-06-18

## 2025-06-18 DIAGNOSIS — E11.9 COMPREHENSIVE DIABETIC FOOT EXAMINATION, TYPE 2 DM, ENCOUNTER FOR (MULTI): ICD-10-CM

## 2025-06-18 DIAGNOSIS — N30.00 ACUTE CYSTITIS WITHOUT HEMATURIA: Primary | ICD-10-CM

## 2025-06-18 DIAGNOSIS — M79.671 PAIN IN BOTH FEET: ICD-10-CM

## 2025-06-18 DIAGNOSIS — E11.9 DIABETES MELLITUS WITHOUT COMPLICATION: Primary | ICD-10-CM

## 2025-06-18 DIAGNOSIS — B35.1 ONYCHOMYCOSIS: ICD-10-CM

## 2025-06-18 DIAGNOSIS — M79.672 PAIN IN BOTH FEET: ICD-10-CM

## 2025-06-18 LAB
ATRIAL RATE: 66 BPM
P AXIS: 64 DEGREES
P OFFSET: 177 MS
P ONSET: 123 MS
PR INTERVAL: 172 MS
Q ONSET: 209 MS
QRS COUNT: 11 BEATS
QRS DURATION: 80 MS
QT INTERVAL: 428 MS
QTC CALCULATION(BAZETT): 448 MS
QTC FREDERICIA: 442 MS
R AXIS: -12 DEGREES
T AXIS: 53 DEGREES
T OFFSET: 423 MS
VENTRICULAR RATE: 66 BPM

## 2025-06-18 PROCEDURE — 99213 OFFICE O/P EST LOW 20 MIN: CPT | Performed by: PODIATRIST

## 2025-06-18 NOTE — PROGRESS NOTES
[facility] rec'd message from Dr. Nieto - butch removed in appointment today, no further infusion needs    Chart forwarded to patient care rep to discharge from pharmacy service

## 2025-06-19 ENCOUNTER — HOME CARE VISIT (OUTPATIENT)
Dept: HOME HEALTH SERVICES | Facility: HOME HEALTH | Age: OVER 89
End: 2025-06-19
Payer: MEDICARE

## 2025-06-19 VITALS
RESPIRATION RATE: 18 BRPM | DIASTOLIC BLOOD PRESSURE: 62 MMHG | SYSTOLIC BLOOD PRESSURE: 118 MMHG | OXYGEN SATURATION: 94 % | HEART RATE: 94 BPM | TEMPERATURE: 97.2 F

## 2025-06-19 PROCEDURE — G0157 HHC PT ASSISTANT EA 15: HCPCS | Mod: CQ,HHH

## 2025-06-19 ASSESSMENT — ENCOUNTER SYMPTOMS
PERSON REPORTING PAIN: PATIENT
DENIES PAIN: 1

## 2025-06-20 ENCOUNTER — HOME CARE VISIT (OUTPATIENT)
Dept: HOME HEALTH SERVICES | Facility: HOME HEALTH | Age: OVER 89
End: 2025-06-20
Payer: MEDICARE

## 2025-06-20 NOTE — ED PROVIDER NOTES
HPI   Chief Complaint   Patient presents with    Fall     Last night I fell twice I have not been eating well in the last 24 hrs the first time I slid down and hit my back against the bathroom door and the second I fell backward and hit the back of my head no loc not blood thinners no pain        HPI  This is a 89-year-old female presenting to the emergency room for evaluation of mechanical fall x 2.  Patient was just seen in the emergency department yesterday.  Discharged home.  She does live at home alone.  Reportedly sustained a mechanical fall after discharge yesterday, she did hit the back of her head.  She denies any preceding shortness of breath, chest pain or dizziness.  Patient is alert and oriented.  She denies any complaints.  Her daughter wanted her to come in for evaluation secondary to the recent fall and home health aide wanted her to come in as well.  She is due for her last dose of IV ertapenem for urinary tract infection.  Patient has no complaints on arrival.  No headache, neck pain, chest pain, abdominal pain, back pain.  No pain in her upper or lower EXTR bilaterally.  She has been ambulating since the fall without difficulty.  No pain in her hips bilaterally.  No fevers or chills.  No coughing or congestion.      Please see HPI for pertinent positive and negative ROS.       Patient History   Medical History[1]  Surgical History[2]  Family History[3]  Social History[4]    Physical Exam   ED Triage Vitals [06/15/25 1019]   Temperature Heart Rate Respirations BP   36.6 °C (97.8 °F) 63 16 (!) 129/45      Pulse Ox Temp Source Heart Rate Source Patient Position   96 % Temporal Monitor Sitting      BP Location FiO2 (%)     Left arm --       Physical Exam  GENERAL APPEARANCE: Awake and alert. No acute respiratory distress.   VITAL SIGNS: As per the nurses' triage record.  HEENT: Normocephalic, atraumatic.   NECK: Soft, nontender and supple, no bony step-offs appreciated other cervical midline, no  tenderness to palpation of the cervical midline.    CHEST: Nontender to palpation. Clear to auscultation bilaterally. No rales, rhonchi, or wheezing. Symmetric rise and fall of chest wall.   HEART: Clear S1 and S2. Regular rate and rhythm.  Strong and equal pulses in the extremities.  ABDOMEN: Soft, nontender, nondistended  MUSCULOSKELETAL: Full gross active range of motion of the upper and lower extremities bilaterally.  No tenderness to palpation over the long bones of upper or lower extremities bilaterally.   +tenderness palpation over the thoracic or lumbar spinous processes, no bony step-offs appreciated.  No tenderness to palpation over the pelvic region bilaterally.. Ambulating on own with no acute difficulties  NEUROLOGICAL: Awake, alert and oriented x 3. Motor power intact in the upper and lower extremities. Sensation is intact to light touch in the upper and lower extremities. Patient answering questions appropriately.   IMMUNOLOGICAL: No lymphatic streaking noted  DERMATOLOGIC: Warm and dry without petechiae, rashes, or ecchymosis noted on visible skin.   PYSCH: Cooperative with appropriate mood and affect.    ED Course & MDM   Diagnoses as of 06/19/25 2105   Fall, initial encounter   Closed head injury, initial encounter                 No data recorded     Pinky Coma Scale Score: 15 (06/15/25 1052 : Mirna Sanz RN)                           Medical Decision Making  Parts of this chart have been completed using voice recognition software. Please excuse any errors of transcription.  My thought process and reason for plan has been formulated from the time that I saw the patient until the time of disposition and is not specific to one specific moment during their visit and furthermore my MDM encompasses this entire chart and not only this text box.      HPI: Detailed above.    Exam: A medically appropriate exam performed, outlined above, given the known history and presentation.    History obtained  from: Patient and patient's daughter and son-in-law who are present with her    Medications given during visit:  Medications   ertapenem (INVanz) 500 mg in sodium chloride 0.9% 50 mL IV (0 mg intravenous Stopped 6/15/25 1302)        Diagnostic/tests  Labs Reviewed   CBC WITH AUTO DIFFERENTIAL - Abnormal       Result Value    WBC 11.9 (*)     nRBC 0.0      RBC 2.65 (*)     Hemoglobin 8.0 (*)     Hematocrit 26.2 (*)     MCV 99      MCH 30.2      MCHC 30.5 (*)     RDW 15.3 (*)     Platelets 351      Neutrophils % 84.4      Immature Granulocytes %, Automated 0.7      Lymphocytes % 7.7      Monocytes % 6.6      Eosinophils % 0.3      Basophils % 0.3      Neutrophils Absolute 10.03 (*)     Immature Granulocytes Absolute, Automated 0.08      Lymphocytes Absolute 0.91      Monocytes Absolute 0.78      Eosinophils Absolute 0.03      Basophils Absolute 0.03     COMPREHENSIVE METABOLIC PANEL - Abnormal    Glucose 195 (*)     Sodium 138      Potassium 3.9      Chloride 105      Bicarbonate 26      Anion Gap 11      Urea Nitrogen 19      Creatinine 1.29 (*)     eGFR 38 (*)     Calcium 8.1 (*)     Albumin 2.7 (*)     Alkaline Phosphatase 88      Total Protein 5.2 (*)     AST 14      Bilirubin, Total 0.5      ALT 10     PROTIME-INR - Normal    Protime 12.4      INR 1.2      Narrative:     INR Therapeutic Range: 2.0-3.5   TYPE AND SCREEN    ABO TYPE A      Rh TYPE POS      ANTIBODY SCREEN NEG        XR chest 1 view   Final Result   Allowing for the aforementioned limitation, minimal left basilar   atelectatic changes without acute infiltrates.        Signed by: Huang Cruz 6/15/2025 12:18 PM   Dictation workstation:   [id number]      CT thoracic spine wo IV contrast   Final Result   Stable DJD in the thoracic spine as described.        Small stable bilateral pleural effusions.        Additional stable findings as above.        MACRO:   None        Signed by: Syd Bacon 6/15/2025 12:35 PM   Dictation workstation:    [id number]      CT lumbar spine wo IV contrast   Final Result   Stable DJD in the lumbosacral spine and distal thoracic spine as   described.        MACRO:   None        Signed by: Syd Bacon 6/15/2025 12:28 PM   Dictation workstation:   [id number]      CT head wo IV contrast   Final Result   No acute intracranial bleed or focal mass effect.        Mild-to-moderate volume loss.        Mild-to-moderate chronic white matter ischemic disease in the deep   periventricular regions.        MACRO:   None        Signed by: Syd Bacon 6/15/2025 12:18 PM   Dictation workstation:   [id number]      CT cervical spine wo IV contrast   Final Result   DJD in the cervical spine as described. No CT evidence of cervical   spine fracture in this exam.        Biapical lung nodules. Please refer to report for CT scan chest done   yesterday.        MACRO:   None        Signed by: Syd Bacon 6/15/2025 12:22 PM   Dictation workstation:   [id number]           Considerations/further MDM:  Patient was seen in conjucntion with my supervising physician,  Dr. Lala. Please refer to his note.      Presents for mechanical fall.  CT head and CT cervical spine without IV contrast shows no acute findings.  CT cervical spine IV contra shows biapical lung nodules that were also seen on CT chest yesterday, patient is aware of this.  CT thoracic spine and CT lumbar spine without IV contrast shows no acute findings.  Chest x-ray shows no evidence of acute cardiopulmonary process.    Patient was given IV dose of ertapenem secondary to known urinary tract infection, this is her last dose.  Laboratory evaluation is grossly unremarkable.  No evidence of significant electro disturbance or VINCENT.  Improved leukocytosis from yesterday.  Patient is completely alert and able to make her own medical decisions.  She does not want to be placed in a skilled nursing facility.  She does want to go home.  She ambulated in the emergency department out  difficulty.  Patient and her daughter feel comfortable with discharge plan home.  Return precautions were discussed.  She was discharged in stable condition.    Procedure  Procedures       [1]   Past Medical History:  Diagnosis Date    Diabetes (Multi)     Diabetes mellitus (Multi)     Disease of thyroid gland     Hypertension     Renal disease    [2]   Past Surgical History:  Procedure Laterality Date    APPENDECTOMY      BACK SURGERY      HYSTERECTOMY     [3]   Family History  Problem Relation Name Age of Onset    Heart disease Mother      Heart disease Father      Heart disease Brother      No Known Problems Son      Heart disease Maternal Grandmother      Heart disease Maternal Grandfather      Heart disease Paternal Grandmother      Heart disease Paternal Grandfather     [4]   Social History  Tobacco Use    Smoking status: Never    Smokeless tobacco: Never   Vaping Use    Vaping status: Never Used   Substance Use Topics    Alcohol use: Never    Drug use: Never        Gabrielle Ware PA-C  06/19/25 0668

## 2025-06-23 ENCOUNTER — HOME CARE VISIT (OUTPATIENT)
Dept: HOME HEALTH SERVICES | Facility: HOME HEALTH | Age: OVER 89
End: 2025-06-23
Payer: MEDICARE

## 2025-06-23 VITALS
TEMPERATURE: 97.6 F | RESPIRATION RATE: 16 BRPM | DIASTOLIC BLOOD PRESSURE: 60 MMHG | HEART RATE: 67 BPM | SYSTOLIC BLOOD PRESSURE: 148 MMHG

## 2025-06-23 PROCEDURE — G0299 HHS/HOSPICE OF RN EA 15 MIN: HCPCS | Mod: HHH

## 2025-06-23 ASSESSMENT — ENCOUNTER SYMPTOMS
LAST BOWEL MOVEMENT: 67379
APPETITE LEVEL: FAIR
DENIES PAIN: 1
FATIGUES EASILY: 1
MUSCLE WEAKNESS: 1
PERSON REPORTING PAIN: PATIENT
CHANGE IN APPETITE: INCREASED
LOWER EXTREMITY EDEMA: 1

## 2025-06-23 ASSESSMENT — ACTIVITIES OF DAILY LIVING (ADL)
AMBULATION ASSISTANCE: STAND BY ASSIST
AMBULATION ASSISTANCE: ONE PERSON
CURRENT_FUNCTION: SUPERVISION

## 2025-06-24 ENCOUNTER — HOME CARE VISIT (OUTPATIENT)
Dept: HOME HEALTH SERVICES | Facility: HOME HEALTH | Age: OVER 89
End: 2025-06-24
Payer: MEDICARE

## 2025-06-24 VITALS
RESPIRATION RATE: 18 BRPM | HEART RATE: 65 BPM | OXYGEN SATURATION: 96 % | DIASTOLIC BLOOD PRESSURE: 64 MMHG | SYSTOLIC BLOOD PRESSURE: 122 MMHG | TEMPERATURE: 97.2 F

## 2025-06-24 PROCEDURE — G0157 HHC PT ASSISTANT EA 15: HCPCS | Mod: CQ,HHH

## 2025-06-24 ASSESSMENT — ENCOUNTER SYMPTOMS
PERSON REPORTING PAIN: PATIENT
DENIES PAIN: 1

## 2025-06-26 ENCOUNTER — OFFICE VISIT (OUTPATIENT)
Dept: VASCULAR SURGERY | Facility: CLINIC | Age: OVER 89
End: 2025-06-26
Payer: MEDICARE

## 2025-06-26 VITALS — DIASTOLIC BLOOD PRESSURE: 68 MMHG | RESPIRATION RATE: 16 BRPM | HEART RATE: 67 BPM | SYSTOLIC BLOOD PRESSURE: 142 MMHG

## 2025-06-26 DIAGNOSIS — R60.9 EDEMA, UNSPECIFIED TYPE: Primary | ICD-10-CM

## 2025-06-26 PROCEDURE — 1111F DSCHRG MED/CURRENT MED MERGE: CPT | Performed by: NURSE PRACTITIONER

## 2025-06-26 PROCEDURE — 1036F TOBACCO NON-USER: CPT | Performed by: NURSE PRACTITIONER

## 2025-06-26 PROCEDURE — 99213 OFFICE O/P EST LOW 20 MIN: CPT | Performed by: NURSE PRACTITIONER

## 2025-06-26 PROCEDURE — 3077F SYST BP >= 140 MM HG: CPT | Performed by: NURSE PRACTITIONER

## 2025-06-26 PROCEDURE — 1159F MED LIST DOCD IN RCRD: CPT | Performed by: NURSE PRACTITIONER

## 2025-06-26 PROCEDURE — 99203 OFFICE O/P NEW LOW 30 MIN: CPT | Performed by: NURSE PRACTITIONER

## 2025-06-26 PROCEDURE — 3078F DIAST BP <80 MM HG: CPT | Performed by: NURSE PRACTITIONER

## 2025-06-26 PROCEDURE — 1126F AMNT PAIN NOTED NONE PRSNT: CPT | Performed by: NURSE PRACTITIONER

## 2025-06-26 PROCEDURE — 1160F RVW MEDS BY RX/DR IN RCRD: CPT | Performed by: NURSE PRACTITIONER

## 2025-06-26 RX ORDER — ESOMEPRAZOLE MAGNESIUM 40 MG/1
40 CAPSULE, DELAYED RELEASE ORAL
COMMUNITY

## 2025-06-26 RX ORDER — FUROSEMIDE 20 MG/1
20 TABLET ORAL DAILY
COMMUNITY

## 2025-06-26 ASSESSMENT — PAIN SCALES - GENERAL: PAINLEVEL_OUTOF10: 0-NO PAIN

## 2025-06-26 ASSESSMENT — ENCOUNTER SYMPTOMS
LOSS OF SENSATION IN FEET: 1
OCCASIONAL FEELINGS OF UNSTEADINESS: 1
DEPRESSION: 0

## 2025-06-27 ENCOUNTER — HOME CARE VISIT (OUTPATIENT)
Dept: HOME HEALTH SERVICES | Facility: HOME HEALTH | Age: OVER 89
End: 2025-06-27
Payer: MEDICARE

## 2025-06-27 ENCOUNTER — PATIENT OUTREACH (OUTPATIENT)
Dept: PRIMARY CARE | Facility: CLINIC | Age: OVER 89
End: 2025-06-27
Payer: MEDICARE

## 2025-06-27 NOTE — PROGRESS NOTES
Successful outreach to patient regarding hospitalization as patient continues TCM program.   At time of outreach call the patient feels as if their condition has improved since initial visit with PCP or specialist. Patient states had F/U with I.D recently     Questions or concerns addressed at this time with patient.   Provided contact information to patient if any further non-emergent needs arise.        Encouraged to call providers for any questions concerns or change in condition

## 2025-06-29 NOTE — PROGRESS NOTES
Chief Complaint  Leg Swelling    History Of Present Illness  Fartun Carey is a 89 y.o. female  who presents to the office today for evaluation of her bilateral lower extremities.  Several weeks ago, the patient developed moderate edema within her bilateral lower legs.  She denies swelling within her thighs.  The patient has been wearing 20-30 mmHg compression knee highs daily.      The patient was recently hospitalized and treated for pneumonia and UTI.  She was placed on antibiotics, prescribed by ID.  She states that she also had a great deal of IV fluids while hospitalized and was instructed to increase her oral fluids at home.  She has hypertension and several of her anti-hypertensive meds were changed during her recent hospitalization.  She is scheduled to be seen by her cardiologist next week.      The patient was referred to our office through her ID specialist to determine whether her swelling is partly vascular in nature and how best to manage it.         The patient remains active in her community and walks frequently.      Past Medical History  She has a past medical history of Diabetes (Multi), Diabetes mellitus (Multi), Disease of thyroid gland, Hypertension, and Renal disease.    Surgical History  She has a past surgical history that includes Hysterectomy; Back surgery; and Appendectomy.     Social History  She reports that she has never smoked. She has never used smokeless tobacco. She reports that she does not drink alcohol and does not use drugs.    Family History  Family History[1]     Allergies  Amlodipine, Amoxicillin, Amoxicillin-pot clavulanate, Glimepiride, Lisinopril, Metformin hcl, Tetracycline hcl, and Cephalexin    Medications  Current Medications[2]     Review of Systems   CONSTITUTIONAL: Denies weight loss, fever and chills.  HEENT: Denies changes in vision and hearing.  RESPIRATORY: Denies SOB and cough.  CV: Denies palpitations and CP.  GI: Denies abdominal pain, nausea, vomiting and  diarrhea.   : Denies dysuria and urinary frequency.   MSK: Denies myalgia and joint pain.  SKIN: Denies rash and pruritus.   VASC: Denies claudication and ischemic rest pain.  NEUROLOGICAL: Denies headache and syncope.  PSYCHIATRIC: Denies recent changes in mood. Denies anxiety and depression.    Physical exam  Constitutional:  Alert and oriented to person, place, and time.  In no acute distress.    HEENT:  Head is atraumatic, normocephalic.    Neck:  Soft and nontender.  No cervical bruits present.    Respiratory:  Lungs clear to auscultation.    Cardiac:  Regular rate and rhythm.  No murmurs present.      Abdominal:  Soft, nontender, nondistended, with bowel sounds present.   Extremities:  Lower extremities warm to touch and normal in color.  No varicose veins or venous stasis changes present on bilateral legs.  Mild edema present within bilateral lower legs and feet.  No open wounds present.      Pulse exam:  Radial pulses are palpable and equal.  Bilateral dorsalis pedis and posterior tibial pulses are palpable.    Musculoskeletal:  Moves extremities freely.  Dermatological: Skin color, texture, turgor normal.  No rashes or lesions present.    Neurological:  No focal deficits.    Psych:  Calm, cooperative.  Answers questions appropriately.      Last Recorded Vitals  /68 (BP Location: Left arm, Patient Position: Sitting, BP Cuff Size: Adult) Comment (BP Location): manual bp  Pulse 67   Resp 16     Relevant Results  No results found. However, due to the size of the patient record, not all encounters were searched. Please check Results Review for a complete set of results.    ECG 12 lead  Result Date: 6/18/2025  Sinus rhythm with Premature atrial complexes Otherwise normal ECG When compared with ECG of 14-JUN-2025 16:49, (unconfirmed) Premature atrial complexes are now Present Minimal criteria for Anterior infarct are no longer Present Nonspecific T wave abnormality now evident in Anterior leads Confirmed  by Brijesh Jerez ([id number]) on 6/18/2025 8:34:36 AM    CT thoracic spine wo IV contrast  Result Date: 6/15/2025  Interpreted By:  Syd Bacon, STUDY: CT THORACIC SPINE WO IV CONTRAST;  6/15/2025 11:51 am   INDICATION: Signs/Symptoms:fall.   COMPARISON: Comparison with CT scan of the chest, abdomen, and pelvis from 06/14/2025.   ACCESSION NUMBER(S): [id number]   ORDERING CLINICIAN: DONTE MORLEY   TECHNIQUE: Thin section axial images were obtained from the thoracic inlet down through the proximal lumbar spine.. Sagittal and coronal reconstruction images were generated. Soft tissue, lung, and bone windows were reviewed.   FINDINGS: VERTEBRAL BODIES AND POSTERIOR ELEMENTS: There is mild-to-moderate disc space narrowing and endplate osteophytosis throughout the thoracic spine, more pronounced distally.. No compression fracture or posterior element fracture. No listhesis. No destructive bone lesion.   SPINAL CANAL: No focal disc herniation in this unenhanced exam.   MEDIASTINUM AND LUNGS: Stable calcified lymph node in the inferior aspect of the left hilum. There are scant small stable noncalcified central mediastinal lymph nodes. There are stable patchy ground-glass perihilar infiltrative densities bilaterally. These are only partially imaged today. There are small stable bilateral pleural effusions.       Stable DJD in the thoracic spine as described.   Small stable bilateral pleural effusions.   Additional stable findings as above.   MACRO: None   Signed by: Syd Bacon 6/15/2025 12:35 PM Dictation workstation:   [id number]    CT lumbar spine wo IV contrast  Result Date: 6/15/2025  Interpreted By:  Syd Bacon, STUDY: CT LUMBAR SPINE WO IV CONTRAST;  6/15/2025 11:51 am   INDICATION: Signs/Symptoms:fall.   COMPARISON: CT scan of the chest, abdomen, and pelvis from 06/14/2025..   ACCESSION NUMBER(S): [id number]   ORDERING CLINICIAN: DONTE MORLEY   TECHNIQUE: Thin section axial images were obtained from T11 down  through the mid sacrum. Sagittal and coronal reconstruction images were generated. Soft tissue and bone windows were reviewed.   FINDINGS: VERTEBRAL BODIES AND POSTERIOR ELEMENTS: Multilevel vacuum disc phenomenon in the distal thoracic spine down through L5-S1 with sparing at L3-4. Moderate disc space narrowing with mild-to-moderate endplate osteophytosis from T11-12 down through L2-3 and also at L4-5 and L5-S1. Disc space height is preserved at L3-4. There is grade 1 retrolisthesis of T12 or L1 and of L1 over L2. Multilevel lumbar spine interfacet hypertrophy with spur formation. No compression fracture or posterior element fracture.    No destructive bone lesion. No gross exiting nerve root compression based on this exam.   SPINAL CANAL: No focal disc herniation in this unenhanced exam.   SOFT TISSUES: Within normal limits.   ABDOMEN/PELVIS: Please refer to report from CT scan of the chest, abdomen, and pelvis yesterday for information.       Stable DJD in the lumbosacral spine and distal thoracic spine as described.   MACRO: None   Signed by: Syd Bacon 6/15/2025 12:28 PM Dictation workstation:   [id number]    CT cervical spine wo IV contrast  Result Date: 6/15/2025  Interpreted By:  Syd Bacon, STUDY: CT CERVICAL SPINE WO IV CONTRAST;  6/15/2025 11:50 am   INDICATION: Signs/Symptoms:fall.     COMPARISON: Correlation with CT scan chest from 06/14/2025..   ACCESSION NUMBER(S): [id number]   ORDERING CLINICIAN: DONTE MORLEY   TECHNIQUE: Thin section axial images were obtained from the skull base down through the thoracic inlet. Sagittal and coronal reconstruction images were generated. Soft tissue, lung, and bone windows were reviewed.   FINDINGS: VERTEBRAL BODIES AND POSTERIOR ELEMENTS: Mild-to-moderate disc space narrowing with endplate osteophytosis from C3-4 through C6-7 with mild changes at C7-T1. Multilevel interfacet and uncovertebral hypertrophy with spur formation. No cervical spine compression  fracture. No posterior element fracture. No destructive bone lesion. No listhesis.   SPINAL CANAL: No gross disc herniation.   NECK SOFT TISSUES: Within normal limits.   LUNG APICES: There are solitary tiny bilateral apical lung nodules measuring 3 mm in diameter each. There are multiple additional lung nodules evident on yesterday's CT scan chest.   SKULL BASE: Within normal limits.       DJD in the cervical spine as described. No CT evidence of cervical spine fracture in this exam.   Biapical lung nodules. Please refer to report for CT scan chest done yesterday.   MACRO: None   Signed by: Sdy Bacon 6/15/2025 12:22 PM Dictation workstation:   [id number]    XR chest 1 view  Result Date: 6/15/2025  Interpreted By:  Huang Cruz, STUDY: XR CHEST 1 VIEW; 6/15/2025 11:54 am   INDICATION: Signs/Symptoms:weakness   COMPARISON: 05/12/2025 06/14/2025.   ACCESSION NUMBER(S): [id number]   ORDERING CLINICIAN: DONTE MORLEY   FINDINGS: The study is limited due to  poor inspiratory effort, with resultant crowding of the pulmonary vasculature. The cardiomediastinal silhouette is within normal limits for the technique. There is no pneumothorax or significant pleural effusion. Minimal left basilar atelectatic changes are noted. The osseous structures are unremarkable.       Allowing for the aforementioned limitation, minimal left basilar atelectatic changes without acute infiltrates.   Signed by: Huang Cruz 6/15/2025 12:18 PM Dictation workstation:   [id number]    CT head wo IV contrast  Result Date: 6/15/2025  Interpreted By:  Syd Bacon, STUDY: CT HEAD WO IV CONTRAST;  6/15/2025 11:50 am   INDICATION: Signs/Symptoms:fall.   COMPARISON: Prior from 6/14/2025.   ACCESSION NUMBER(S): [id number]   ORDERING CLINICIAN: DONTE MORLEY   TECHNIQUE: Routine axial images were obtained from the skull base through the vertex.  Sagittal and coronal reconstruction images were generated. Brain, subdural, and bone windows were  reviewed. N/A   N/A   FINDINGS: INTRACRANIAL: Mild-to-moderate prominence of ventricles and sulci. There is mild-to-moderate patchy hypodensity throughout the deep periventricular white matter. No acute intracranial bleed, midline shift, or focal mass effect. No destructive bone lesion. No depressed skull fracture. Skullbase arterial calcifications in the carotid siphons and vertebral arteries.   EXTRACRANIAL: Visualized paranasal sinuses were clear. Visualized mastoid air cells were clear.       No acute intracranial bleed or focal mass effect.   Mild-to-moderate volume loss.   Mild-to-moderate chronic white matter ischemic disease in the deep periventricular regions.   MACRO: None   Signed by: Syd Bacon 6/15/2025 12:18 PM Dictation workstation:   [id number]    CT angio chest abdomen pelvis  Result Date: 6/14/2025  Interpreted By:  Anupam Barbosa, STUDY: CT ANGIO CHEST ABDOMEN PELVIS;  6/14/2025 7:10 pm   INDICATION: Signs/Symptoms:Left-sided jaw pain, bilateral shoulder pain; rule out dissection.   COMPARISON: CT chest 05/31/2025 and 05/03/2025 CT abdomen 10/13/2023   ACCESSION NUMBER(S): [id number]   ORDERING CLINICIAN: GIANLUCA AVILA   TECHNIQUE: Axial non-contrast images of the chest, abdomen, and pelvis with coronal and sagittal reformatted images. Axial CT images of the chest, abdomen and pelvis after the intravenous administration of using angiographic technique with coronal and sagittal reformatted images. MIP images were provided and reviewed. 3D reconstructions were created on a separate independent workstation and reviewed.   FINDINGS: VASCULATURE:   PULMONARY ARTERIES:  No acute pulmonary embolism.   THORACIC AORTA: Non-contrast images show no evidence of acute intramural hematoma. No thoracic aortic aneurysm or dissection. Moderate thoracic aortic atherosclerosis.   ABDOMINAL AORTA: No abdominal aortic aneurysm or dissection. Severe abdominal aortic atherosclerosis.   ABDOMINAL AND PELVIC  ARTERIES:  No hemodynamically significant stenosis or occlusion.     CT CHEST:   MEDIASTINUM AND LYMPH NODES:  No enlarged intrathoracic or axillary lymph nodes. No pneumomediastinum.   HEART: Normal size.  Moderate coronary artery calcifications. No significant pericardial effusion.   LUNG, PLEURA, LARGE AIRWAYS:  Central airways are patent without endobronchial lesions. Trace left pleural effusion with atelectasis. Redemonstration diffuse mosaic attenuation and ground-glass opacities, throughout both tata thoraces similar to the examination from 05/31/2025. Additionally, there several unchanged centrilobular nodules which are stable in size since prior. For example a 9 mm right upper lobe nodule (series 5, image 67). No pneumothorax.   OSSEOUS STRUCTURES/CHEST WALL: Degenerative change of the thoracic spine. No acute osseous abnormality.     CT ABDOMEN/PELVIS:   ABDOMINAL WALL: No significant abnormality.   LIVER: No significant parenchymal abnormality.   BILE DUCTS: No significant intrahepatic or extrahepatic dilatation.   GALLBLADDER: Cholecystectomy.   PANCREAS: Several cystic lesions, scattered throughout the pancreatic parenchyma, measuring up to 16 mm in the pancreatic head.   SPLEEN: Calcified granulomas.   ADRENALS: No significant abnormality.   KIDNEYS, URETERS, BLADDER: Bilateral renal atrophy. Multiple right renal calculi, measuring up to 11 mm in the upper pole. No hydronephrosis.   REPRODUCTIVE ORGANS: No significant abnormality.   VESSELS: (See above). No additional significant abnormality.   RETROPERITONEUM/LYMPH NODES: No enlarged lymph nodes.   BOWEL/MESENTERY/PERITONEUM: Small hiatal hernia. The stomach is otherwise unremarkable. No bowel dilation or wall thickening. Colonic diverticulosis without diverticulitis. Moderate colonic stool. Appendix is not visualized.   No significant ascites, free air, or fluid collection.     OSSEOUS STRUCTURES: Degenerative changes of the lumbar spine. No acute  osseous abnormality.       1. Diffuse atherosclerotic calcification, without thoracic or abdominal aortic aneurysm or acute aortic pathology. 2. Stable diffuse mosaic attenuation and scattered centrilobular nodules, throughout both tata thoraces, similar to the examination from 05/31/2025. These likely represent sequela of an infectious/inflammatory process. 3. Other findings as described above.       Signed by: Anupam Barbosa 6/14/2025 8:07 PM Dictation workstation:   [id number]    CT head wo IV contrast  Result Date: 6/14/2025  Interpreted By:  Anupam Barbosa, STUDY: CT trauma special CT FACIAL BONES WO IV CONTRAST; CT 3D RECONSTRUCTION; CT HEAD WO IV CONTRAST  6/14/2025 7:10 pm   INDICATION: Signs/Symptoms:Left-sided pain; Signs/Symptoms:LT side jaw pain   COMPARISON: None   ACCESSION NUMBER(S): [id number]; [id number]; [id number]   ORDERING CLINICIAN: GIANLUCA AVILA   TECHNIQUE: Thin cut axial CT images through the head, facial bones were obtained and reconstructed in the coronal  and sagittal plane. 3D facial bones were also completed.   FINDINGS: HEAD:   Parenchyma: There is no intracranial hemorrhage. The grey-white differentiation is intact. There is no mass effect or midline shift. Patchy supratentorial hypodensity, nonspecific, but likely secondary to mild chronic microvascular ischemia.   CSF Spaces: Mild generalized volume loss, with concordant ventricular enlargement.   Extra-Axial Fluid: There is no extraaxial fluid collection.   Calvarium: The calvarium is unremarkable.     FACIAL BONES:   Facial Bones: There is no displaced facial bone fracture.   Orbits: The bony orbits are intact. Bilateral lens replacements.   Mandible/Temporomandibular Joints: Severe left and moderate right degenerative changes of the temporomandibular joints   Paranasal sinuses: Right maxillary sinus mucous retention cyst.   Mastoids: Mastoids are clear.   Soft tissues: Unremarkable.         Brain: No acute intracranial  abnormality. Chronic microvascular ischemia and involutional changes.   Facial Bones: No acute facial bone fracture. Severe degenerative changes of the left temporomandibular joint.   Signed by: Anupam Barbosa 6/14/2025 7:49 PM Dictation workstation:   [id number]    CT maxillofacial bones wo IV contrast  Result Date: 6/14/2025  Interpreted By:  Anupam Barbosa, STUDY: CT trauma special CT FACIAL BONES WO IV CONTRAST; CT 3D RECONSTRUCTION; CT HEAD WO IV CONTRAST  6/14/2025 7:10 pm   INDICATION: Signs/Symptoms:Left-sided pain; Signs/Symptoms:LT side jaw pain   COMPARISON: None   ACCESSION NUMBER(S): [id number]; [id number]; [id number]   ORDERING CLINICIAN: GIANLUCA AVILA   TECHNIQUE: Thin cut axial CT images through the head, facial bones were obtained and reconstructed in the coronal  and sagittal plane. 3D facial bones were also completed.   FINDINGS: HEAD:   Parenchyma: There is no intracranial hemorrhage. The grey-white differentiation is intact. There is no mass effect or midline shift. Patchy supratentorial hypodensity, nonspecific, but likely secondary to mild chronic microvascular ischemia.   CSF Spaces: Mild generalized volume loss, with concordant ventricular enlargement.   Extra-Axial Fluid: There is no extraaxial fluid collection.   Calvarium: The calvarium is unremarkable.     FACIAL BONES:   Facial Bones: There is no displaced facial bone fracture.   Orbits: The bony orbits are intact. Bilateral lens replacements.   Mandible/Temporomandibular Joints: Severe left and moderate right degenerative changes of the temporomandibular joints   Paranasal sinuses: Right maxillary sinus mucous retention cyst.   Mastoids: Mastoids are clear.   Soft tissues: Unremarkable.         Brain: No acute intracranial abnormality. Chronic microvascular ischemia and involutional changes.   Facial Bones: No acute facial bone fracture. Severe degenerative changes of the left temporomandibular joint.   Signed by: Anupam  Familia 6/14/2025 7:49 PM Dictation workstation:   [id number]    CT 3D reconstruction  Result Date: 6/14/2025  Interpreted By:  Anupam Barbosa, STUDY: CT trauma special CT FACIAL BONES WO IV CONTRAST; CT 3D RECONSTRUCTION; CT HEAD WO IV CONTRAST  6/14/2025 7:10 pm   INDICATION: Signs/Symptoms:Left-sided pain; Signs/Symptoms:LT side jaw pain   COMPARISON: None   ACCESSION NUMBER(S): [id number]; [id number]; [id number]   ORDERING CLINICIAN: GIANLUCA AVILA   TECHNIQUE: Thin cut axial CT images through the head, facial bones were obtained and reconstructed in the coronal  and sagittal plane. 3D facial bones were also completed.   FINDINGS: HEAD:   Parenchyma: There is no intracranial hemorrhage. The grey-white differentiation is intact. There is no mass effect or midline shift. Patchy supratentorial hypodensity, nonspecific, but likely secondary to mild chronic microvascular ischemia.   CSF Spaces: Mild generalized volume loss, with concordant ventricular enlargement.   Extra-Axial Fluid: There is no extraaxial fluid collection.   Calvarium: The calvarium is unremarkable.     FACIAL BONES:   Facial Bones: There is no displaced facial bone fracture.   Orbits: The bony orbits are intact. Bilateral lens replacements.   Mandible/Temporomandibular Joints: Severe left and moderate right degenerative changes of the temporomandibular joints   Paranasal sinuses: Right maxillary sinus mucous retention cyst.   Mastoids: Mastoids are clear.   Soft tissues: Unremarkable.         Brain: No acute intracranial abnormality. Chronic microvascular ischemia and involutional changes.   Facial Bones: No acute facial bone fracture. Severe degenerative changes of the left temporomandibular joint.   Signed by: Anupam Barbosa 6/14/2025 7:49 PM Dictation workstation:   [id number]    XR humerus right  Result Date: 6/14/2025  Interpreted By:  Brant Medrano, STUDY: XR HUMERUS RIGHT; ;  6/14/2025 5:20 pm   INDICATION:  Signs/Symptoms:Atraumatic pain.     COMPARISON: None.   ACCESSION NUMBER(S): [id number]   ORDERING CLINICIAN: GIANLUCA AVILA   FINDINGS: Right humerus appears intact and normal in alignment. No aggressive periosteal reaction. IV line in the right upper extremity.       No fracture or dislocation.     MACRO: None   Signed by: Brant Medrano 6/14/2025 6:03 PM Dictation workstation:   [id number]    Bedside Midline Imaging  Result Date: 6/4/2025  These images are not reportable by radiology and will not be interpreted by  Radiologists.    Electrocardiogram, 12-lead PRN ACS symptoms  Result Date: 6/3/2025  Sinus tachycardia with Blocked Premature atrial complexes with occasional Premature ventricular complexes Left axis deviation Abnormal ECG When compared with ECG of 02-JUN-2025 10:26, (unconfirmed) Premature atrial complexes are now Present Confirmed by David Eddy ([id number]) on 6/3/2025 8:23:05 AM    CT angio chest for pulmonary embolism  Result Date: 6/1/2025  Interpreted By:  Syd Barnard, STUDY: CT ANGIO CHEST FOR PULMONARY EMBOLISM;  5/31/2025 11:37 pm   INDICATION: Signs/Symptoms:sob, tachy.     COMPARISON: 05/03/2025   ACCESSION NUMBER(S): [id number]   ORDERING CLINICIAN: DEDRICK CAVAZOS   TECHNIQUE: Contiguous axial images of the chest were obtained after the intravenous administration of iodinated contrast using angiographic PE protocol. Coronal and sagittal reformatted images were reconstructed from the axial data. MIP images were created on an independent workstation and reviewed.   FINDINGS:   MEDIASTINUM AND LYMPH NODES:  The esophageal wall appears within normal limits.  No enlarged intrathoracic or axillary lymph nodes by imaging criteria. No pneumomediastinum.   VESSELS:  Normal caliber thoracic aorta without dissection. Mild aortic atherosclerosis.  No acute pulmonary embolism.   HEART: Normal size. Aortic valvular calcifications. Severe coronary artery calcifications. Small pericardial effusion.    LUNG, AIRWAYS, PLEURA: Trace pleural effusions. Ill-defined central predominant ground-glass opacities in all lobes of both lungs are noted and are increased slightly from prior study. There is mild septal thickening centrally. There are again numerous solid bilateral pulmonary nodules that are stable from 05/03/2025. To the extent visualized on prior CT abdomen/pelvis studies such as 10/09/2023 these were present and appear grossly unchanged and likely reflects sequela of remote granulomatous infection, given that some are partially calcified. No pneumothorax.   OSSEOUS STRUCTURES: No acute osseous abnormality. Severe degenerative disc height loss in the lower thoracic and upper lumbar spine. No high-grade canal stenosis.   CHEST WALL SOFT TISSUES: No discernible acute abnormality.   UPPER ABDOMEN/OTHER: Calcified granulomas in the spleen. There is diffuse pancreatic ductal dilatation and pancreatic atrophy. Appearance is chronic dating back to 08/09/2023.       No acute pulmonary embolism.   Increased prominence and extent of central predominant ground-glass opacities raising suspicion for pulmonary edema versus viral infection, the former favored the please correlate clinically.   Small pleural effusion.   Numerous bilateral solid pulmonary nodules, some with speckled calcifications and given stability dating back to 2023 and splenic calcified granulomas these are most favored to represent sequela of remote granulomatous infection.   MACRO: None.   Signed by: Syd Barnard 6/1/2025 12:57 AM Dictation workstation:   [id number]      Assessment/Plan  Leg Edema    This is a 89 year old female with bilateral lower leg edema.  I explained to the patient that she has no signs of venous insufficiency in her bilateral lower extremities and that her pedal pulses are palpable bilaterally.      The patient's leg swelling in not venous or arterial in nature.  It is possible that her leg edema is related to her recent IV  fluids, the changes to her anti-hypertensive meds, and to her recent increase in oral fluids.      I have encouraged the patient to continue to wear compression stockings daily.  She will elevate her legs when possible.  She will speak to her Cardiologist next week to see if her anti-hypertensive meds need to be adjusted.    The patient will return to our office on an as needed basis.      KAYLENE Vaughn-CNP       [1]   Family History  Problem Relation Name Age of Onset    Heart disease Mother      Heart disease Father      Heart disease Brother      No Known Problems Son      Heart disease Maternal Grandmother      Heart disease Maternal Grandfather      Heart disease Paternal Grandmother      Heart disease Paternal Grandfather     [2]   Current Outpatient Medications:     acetaminophen (Tylenol) 325 mg tablet, Take 2 tablets (650 mg) by mouth every 4 hours if needed for mild pain (1 - 3)., Disp: , Rfl:     aspirin 81 mg chewable tablet, Chew and swallow 1 tablet (81 mg) once daily., Disp: , Rfl:     atorvastatin (Lipitor) 80 mg tablet, Take 1 tablet (80 mg) by mouth once daily., Disp: , Rfl:     blood sugar diagnostic (Accu-Chek Guide test strips) strip, Inject 1 strip under the skin early in the morning.., Disp: 100 strip, Rfl: 3    cholecalciferol (Vitamin D-3) 25 MCG (1000 UT) capsule, Take 1 capsule (25 mcg) by mouth once daily., Disp: , Rfl:     cyanocobalamin (Vitamin B-12) 1,000 mcg tablet, Take 1 tablet (1,000 mcg) by mouth once daily., Disp: , Rfl:     docusate sodium (Colace) 100 mg capsule, Take 1 capsule (100 mg) by mouth 2 times a day as needed for constipation., Disp: 60 capsule, Rfl: 0    fosfomycin (Monurol) 3 gram packet, Take 3 g by mouth see administration instructions. 3gm PO once every 2 weeks for 3 months, Disp: , Rfl:     hydrALAZINE (Apresoline) 25 mg tablet, Take 1 tablet (25 mg) by mouth 3 times a day., Disp: 90 tablet, Rfl: 0    levothyroxine (Synthroid, Levoxyl) 88 mcg tablet,  Take 1 tablet (88 mcg) by mouth once daily., Disp: 90 tablet, Rfl: 2    metoprolol succinate XL (Toprol-XL) 25 mg 24 hr tablet, Take 1 tablet (25 mg) by mouth once daily. Do not crush or chew., Disp: 30 tablet, Rfl: 0    SITagliptin phosphate (Januvia) 50 mg tablet, Take 1 tablet (50 mg) by mouth once daily., Disp: 90 tablet, Rfl: 2    vit C/E/Zn/coppr/lutein/zeaxan (PRESERVISION AREDS-2 ORAL), Take 1 capsule by mouth every 12 hours., Disp: , Rfl:     0.9 % sodium chloride (sodium chloride, PF, 0.9%) injection, Infuse 10-20 mL into a venous catheter once daily. SASH 20 ml after labs (Patient not taking: Reported on 6/26/2025), Disp: , Rfl:     esomeprazole (NexIUM) 40 mg DR capsule, Take 1 capsule (40 mg) by mouth once daily in the morning. Take before meals. Do not open capsule., Disp: , Rfl:     furosemide (Lasix) 20 mg tablet, Take 1 tablet (20 mg) by mouth once daily., Disp: , Rfl:     heparin flush (heparin LockFlush,Porcine,,PF,) 10 unit/mL injection, Infuse 5 mL (50 Units) into a venous catheter once daily. (Patient not taking: Reported on 6/26/2025), Disp: , Rfl:

## 2025-06-30 ENCOUNTER — HOME CARE VISIT (OUTPATIENT)
Dept: HOME HEALTH SERVICES | Facility: HOME HEALTH | Age: OVER 89
End: 2025-06-30
Payer: MEDICARE

## 2025-06-30 VITALS
SYSTOLIC BLOOD PRESSURE: 146 MMHG | DIASTOLIC BLOOD PRESSURE: 68 MMHG | OXYGEN SATURATION: 94 % | RESPIRATION RATE: 18 BRPM | TEMPERATURE: 97.5 F | HEART RATE: 67 BPM

## 2025-06-30 PROCEDURE — G0299 HHS/HOSPICE OF RN EA 15 MIN: HCPCS | Mod: HHH

## 2025-06-30 ASSESSMENT — ENCOUNTER SYMPTOMS
PERSON REPORTING PAIN: PATIENT
MUSCLE WEAKNESS: 1
LOWER EXTREMITY EDEMA: 1
APPETITE LEVEL: GOOD
FATIGUES EASILY: 1
CHANGE IN APPETITE: INCREASED
LAST BOWEL MOVEMENT: 67386
DENIES PAIN: 1

## 2025-06-30 ASSESSMENT — ACTIVITIES OF DAILY LIVING (ADL)
AMBULATION ASSISTANCE: STAND BY ASSIST
CURRENT_FUNCTION: SUPERVISION
CURRENT_FUNCTION: STAND BY ASSIST

## 2025-07-01 ENCOUNTER — APPOINTMENT (OUTPATIENT)
Dept: CARDIOLOGY | Facility: CLINIC | Age: OVER 89
End: 2025-07-01
Payer: MEDICARE

## 2025-07-01 VITALS
HEART RATE: 57 BPM | HEIGHT: 61 IN | SYSTOLIC BLOOD PRESSURE: 177 MMHG | RESPIRATION RATE: 14 BRPM | WEIGHT: 162 LBS | DIASTOLIC BLOOD PRESSURE: 84 MMHG | BODY MASS INDEX: 30.58 KG/M2 | OXYGEN SATURATION: 94 %

## 2025-07-01 DIAGNOSIS — I48.11 LONGSTANDING PERSISTENT ATRIAL FIBRILLATION (MULTI): ICD-10-CM

## 2025-07-01 DIAGNOSIS — I10 PRIMARY HYPERTENSION: Primary | ICD-10-CM

## 2025-07-01 DIAGNOSIS — I73.9 PERIPHERAL VASCULAR DISEASE: ICD-10-CM

## 2025-07-01 DIAGNOSIS — I47.10 SUPRAVENTRICULAR TACHYCARDIA: ICD-10-CM

## 2025-07-01 DIAGNOSIS — I25.118 ATHEROSCLEROTIC HEART DISEASE OF NATIVE CORONARY ARTERY WITH OTHER FORMS OF ANGINA PECTORIS: ICD-10-CM

## 2025-07-01 PROCEDURE — 1111F DSCHRG MED/CURRENT MED MERGE: CPT | Performed by: INTERNAL MEDICINE

## 2025-07-01 PROCEDURE — 3079F DIAST BP 80-89 MM HG: CPT | Performed by: INTERNAL MEDICINE

## 2025-07-01 PROCEDURE — 1125F AMNT PAIN NOTED PAIN PRSNT: CPT | Performed by: INTERNAL MEDICINE

## 2025-07-01 PROCEDURE — 1159F MED LIST DOCD IN RCRD: CPT | Performed by: INTERNAL MEDICINE

## 2025-07-01 PROCEDURE — 1160F RVW MEDS BY RX/DR IN RCRD: CPT | Performed by: INTERNAL MEDICINE

## 2025-07-01 PROCEDURE — 99214 OFFICE O/P EST MOD 30 MIN: CPT | Performed by: INTERNAL MEDICINE

## 2025-07-01 PROCEDURE — 3077F SYST BP >= 140 MM HG: CPT | Performed by: INTERNAL MEDICINE

## 2025-07-01 RX ORDER — METOPROLOL SUCCINATE 25 MG/1
25 TABLET, EXTENDED RELEASE ORAL DAILY
Qty: 30 TABLET | Refills: 0 | Status: SHIPPED | OUTPATIENT
Start: 2025-07-01 | End: 2025-07-31

## 2025-07-01 RX ORDER — HYDROCHLOROTHIAZIDE 25 MG/1
25 TABLET ORAL DAILY
Qty: 90 TABLET | Refills: 3 | Status: SHIPPED | OUTPATIENT
Start: 2025-07-01 | End: 2025-07-01

## 2025-07-01 RX ORDER — METOPROLOL SUCCINATE 25 MG/1
25 TABLET, EXTENDED RELEASE ORAL DAILY
Qty: 90 TABLET | Refills: 3 | Status: SHIPPED | OUTPATIENT
Start: 2025-08-01 | End: 2026-07-27

## 2025-07-01 RX ORDER — METOPROLOL SUCCINATE 25 MG/1
25 TABLET, EXTENDED RELEASE ORAL DAILY
COMMUNITY
End: 2025-07-01 | Stop reason: SDUPTHER

## 2025-07-01 RX ORDER — SPIRONOLACTONE 25 MG/1
12.5 TABLET ORAL DAILY
Qty: 45 TABLET | Refills: 3 | Status: SHIPPED | OUTPATIENT
Start: 2025-07-01 | End: 2026-07-01

## 2025-07-01 RX ORDER — HYDROCHLOROTHIAZIDE 25 MG/1
25 TABLET ORAL DAILY
Qty: 90 TABLET | Refills: 3 | Status: SHIPPED | OUTPATIENT
Start: 2025-07-01 | End: 2026-07-01

## 2025-07-01 RX ORDER — SPIRONOLACTONE 25 MG/1
12.5 TABLET ORAL DAILY
Qty: 45 TABLET | Refills: 3 | Status: SHIPPED | OUTPATIENT
Start: 2025-07-01 | End: 2025-07-01

## 2025-07-01 ASSESSMENT — LIFESTYLE VARIABLES
AUDIT TOTAL SCORE: 0
HAVE YOU OR SOMEONE ELSE BEEN INJURED AS A RESULT OF YOUR DRINKING: NO
HAS A RELATIVE, FRIEND, DOCTOR, OR ANOTHER HEALTH PROFESSIONAL EXPRESSED CONCERN ABOUT YOUR DRINKING OR SUGGESTED YOU CUT DOWN: NO
HOW OFTEN DO YOU HAVE SIX OR MORE DRINKS ON ONE OCCASION: NEVER
HOW MANY STANDARD DRINKS CONTAINING ALCOHOL DO YOU HAVE ON A TYPICAL DAY: PATIENT DOES NOT DRINK
AUDIT-C TOTAL SCORE: 0
HOW OFTEN DO YOU HAVE A DRINK CONTAINING ALCOHOL: NEVER
SKIP TO QUESTIONS 9-10: 1

## 2025-07-01 ASSESSMENT — ENCOUNTER SYMPTOMS
OCCASIONAL FEELINGS OF UNSTEADINESS: 0
LOSS OF SENSATION IN FEET: 0
DEPRESSION: 0

## 2025-07-01 ASSESSMENT — PAIN SCALES - GENERAL: PAINLEVEL_OUTOF10: 5

## 2025-07-01 NOTE — PATIENT INSTRUCTIONS
Start taking hydrochlorothiazide 25 mg 1 tablet daily.    Start taking spironolactone 25 mg 1/2 tablet once daily.    You may still take sometimes furosemide 20 mg as needed if you are not improving with this medication and you have a lot of swelling.    I want you to have a blood work done in 2 weeks to make sure your kidneys and potassium is stable.    Continue all other medications that you have been on.    I will see you in 2 months

## 2025-07-01 NOTE — PROGRESS NOTES
[facility]    Interventional Cardiology    History of present illness:  This is a very pleasant 89-year-old female with history of hypertension.  Patient presents to my office for follow-up after recent hospitalization for shortness of breath COPD exacerbation as well as acute decompensated heart failure.  Patient in the hospital was noted to have irregular heartbeat which was consistent of sinus tachycardia with frequent PACs.  Patient was started on beta-blocker.  Presents to my office for follow-up.  Still complaining of shortness of breath with activity.  Still having moderate lower extremity edema.  Primary care physician told her not to take diuretics unless it is really needed.  Physical examination suggestive of volume overload.    Medical History[1]    Surgical History[2]    Allergies[3]     reports that she has never smoked. She has never used smokeless tobacco. She reports that she does not drink alcohol and does not use drugs.    Family History[4]    Patient's Medications   New Prescriptions    No medications on file   Previous Medications    ACETAMINOPHEN (TYLENOL) 325 MG TABLET    Take 2 tablets (650 mg) by mouth every 4 hours if needed for mild pain (1 - 3).    ASPIRIN 81 MG CHEWABLE TABLET    Chew and swallow 1 tablet (81 mg) once daily.    ATORVASTATIN (LIPITOR) 80 MG TABLET    Take 1 tablet (80 mg) by mouth once daily.    BLOOD SUGAR DIAGNOSTIC (ACCU-CHEK GUIDE TEST STRIPS) STRIP    Inject 1 strip under the skin early in the morning..    CHOLECALCIFEROL (VITAMIN D-3) 25 MCG (1000 UT) CAPSULE    Take 1 capsule (25 mcg) by mouth once daily.    CYANOCOBALAMIN (VITAMIN B-12) 1,000 MCG TABLET    Take 1 tablet (1,000 mcg) by mouth once daily.    DOCUSATE SODIUM (COLACE) 100 MG CAPSULE    Take 1 capsule (100 mg) by mouth 2 times a day as needed for constipation.    ESOMEPRAZOLE (NEXIUM) 40 MG DR CAPSULE    Take 1 capsule (40 mg) by mouth once daily in  the morning. Take before meals. Do not open capsule.    FOSFOMYCIN (MONUROL) 3 GRAM PACKET    Take 3 g by mouth see administration instructions. 3gm PO once every 2 weeks for 3 months    FUROSEMIDE (LASIX) 20 MG TABLET    Take 1 tablet (20 mg) by mouth once daily.    HYDRALAZINE (APRESOLINE) 25 MG TABLET    Take 1 tablet (25 mg) by mouth 3 times a day.    LEVOTHYROXINE (SYNTHROID, LEVOXYL) 88 MCG TABLET    Take 1 tablet (88 mcg) by mouth once daily.    METOPROLOL SUCCINATE XL (TOPROL-XL) 25 MG 24 HR TABLET    Take 1 tablet (25 mg) by mouth once daily. Do not crush or chew.    SITAGLIPTIN PHOSPHATE (JANUVIA) 50 MG TABLET    Take 1 tablet (50 mg) by mouth once daily.    VIT C/E/ZN/COPPR/LUTEIN/ZEAXAN (PRESERVISION AREDS-2 ORAL)    Take 1 capsule by mouth every 12 hours.   Modified Medications    No medications on file   Discontinued Medications    No medications on file       Objective   Physical Exam  General: Patient in no acute distress   HEENT: Atraumatic normocephalic.  Neck: Supple, jugular venous pressure within normal limit.  No bruits  Lungs: Clear to auscultation bilaterally  Cardiovascular: Regular rate and rhythm, normal heart sounds, no murmurs rubs or gallops  Abdomen: Soft nontender nondistended.  Normal bowel sounds.  Extremities: Warm to touch, no edema.    Lab Review   Admission on 06/15/2025, Discharged on 06/15/2025   Component Date Value    WBC 06/15/2025 11.9 (H)     nRBC 06/15/2025 0.0     RBC 06/15/2025 2.65 (L)     Hemoglobin 06/15/2025 8.0 (L)     Hematocrit 06/15/2025 26.2 (L)     MCV 06/15/2025 99     MCH 06/15/2025 30.2     MCHC 06/15/2025 30.5 (L)     RDW 06/15/2025 15.3 (H)     Platelets 06/15/2025 351     Neutrophils % 06/15/2025 84.4     Immature Granulocytes %,* 06/15/2025 0.7     Lymphocytes % 06/15/2025 7.7     Monocytes % 06/15/2025 6.6     Eosinophils % 06/15/2025 0.3     Basophils % 06/15/2025 0.3     Neutrophils Absolute 06/15/2025 10.03 (H)     Immature Granulocytes Ab*  06/15/2025 0.08     Lymphocytes Absolute 06/15/2025 0.91     Monocytes Absolute 06/15/2025 0.78     Eosinophils Absolute 06/15/2025 0.03     Basophils Absolute 06/15/2025 0.03     Glucose 06/15/2025 195 (H)     Sodium 06/15/2025 138     Potassium 06/15/2025 3.9     Chloride 06/15/2025 105     Bicarbonate 06/15/2025 26     Anion Gap 06/15/2025 11     Urea Nitrogen 06/15/2025 19     Creatinine 06/15/2025 1.29 (H)     eGFR 06/15/2025 38 (L)     Calcium 06/15/2025 8.1 (L)     Albumin 06/15/2025 2.7 (L)     Alkaline Phosphatase 06/15/2025 88     Total Protein 06/15/2025 5.2 (L)     AST 06/15/2025 14     Bilirubin, Total 06/15/2025 0.5     ALT 06/15/2025 10     Protime 06/15/2025 12.4     INR 06/15/2025 1.2     ABO TYPE 06/15/2025 A     Rh TYPE 06/15/2025 POS     ANTIBODY SCREEN 06/15/2025 NEG    Admission on 06/14/2025, Discharged on 06/14/2025   Component Date Value    WBC 06/14/2025 14.9 (H)     nRBC 06/14/2025 0.0     RBC 06/14/2025 2.69 (L)     Hemoglobin 06/14/2025 8.2 (L)     Hematocrit 06/14/2025 26.5 (L)     MCV 06/14/2025 99     MCH 06/14/2025 30.5     MCHC 06/14/2025 30.9 (L)     RDW 06/14/2025 15.3 (H)     Platelets 06/14/2025 384     Neutrophils % 06/14/2025 75.4     Immature Granulocytes %,* 06/14/2025 0.7     Lymphocytes % 06/14/2025 9.3     Monocytes % 06/14/2025 14.1     Eosinophils % 06/14/2025 0.2     Basophils % 06/14/2025 0.3     Neutrophils Absolute 06/14/2025 11.22 (H)     Immature Granulocytes Ab* 06/14/2025 0.11     Lymphocytes Absolute 06/14/2025 1.39     Monocytes Absolute 06/14/2025 2.10 (H)     Eosinophils Absolute 06/14/2025 0.03     Basophils Absolute 06/14/2025 0.04     Glucose 06/14/2025 133 (H)     Sodium 06/14/2025 139     Potassium 06/14/2025 4.0     Chloride 06/14/2025 106     Bicarbonate 06/14/2025 24     Anion Gap 06/14/2025 13     Urea Nitrogen 06/14/2025 15     Creatinine 06/14/2025 0.96     eGFR 06/14/2025 54 (L)     Calcium 06/14/2025 8.2 (L)     Albumin 06/14/2025 2.9 (L)      Alkaline Phosphatase 06/14/2025 94     Total Protein 06/14/2025 5.5 (L)     AST 06/14/2025 15     Bilirubin, Total 06/14/2025 0.5     ALT 06/14/2025 10     BNP 06/14/2025 357 (H)     Ventricular Rate 06/14/2025 66     Atrial Rate 06/14/2025 66     AL Interval 06/14/2025 172     QRS Duration 06/14/2025 80     QT Interval 06/14/2025 428     QTC Calculation(Bazett) 06/14/2025 448     P Axis 06/14/2025 64     R [place] 06/14/2025 -12     T Axis 06/14/2025 53     QRS Count 06/14/2025 11     Q Onset 06/14/2025 209     P Onset 06/14/2025 123     P Offset 06/14/2025 177     T Offset 06/14/2025 423     QTC Fredericia 06/14/2025 442     Troponin I, High Sensiti* 06/14/2025 13     Color, Urine 06/14/2025 Yellow     Appearance, Urine 06/14/2025 Clear     Specific Gravity, Urine 06/14/2025 1.017     pH, Urine 06/14/2025 5.5     Protein, Urine 06/14/2025 30 (1+) (A)     Glucose, Urine 06/14/2025 Normal     Blood, Urine 06/14/2025 NEGATIVE     Ketones, Urine 06/14/2025 NEGATIVE     Bilirubin, Urine 06/14/2025 NEGATIVE     Urobilinogen, Urine 06/14/2025 Normal     Nitrite, Urine 06/14/2025 NEGATIVE     Leukocyte Esterase, Urine 06/14/2025 75 Sarahi/uL (A)     Troponin I, High Sensiti* 06/14/2025 13     WBC, Urine 06/14/2025 21-50 (A)     RBC, Urine 06/14/2025 1-2     Squamous Epithelial Cell* 06/14/2025 1-9 (SPARSE)     Mucus, Urine 06/14/2025 FEW     Urine Culture 06/14/2025 No growth    Office Visit on 06/10/2025   Component Date Value    COLOR 06/11/2025 YELLOW     APPEARANCE 06/11/2025 CLEAR     SPECIFIC GRAVITY 06/11/2025 1.007     PH 06/11/2025 6.0     GLUCOSE 06/11/2025 NEGATIVE     BILIRUBIN 06/11/2025 NEGATIVE     KETONES 06/11/2025 NEGATIVE     OCCULT BLOOD 06/11/2025 NEGATIVE     PROTEIN 06/11/2025 TRACE (A)     NITRITE 06/11/2025 NEGATIVE     LEUKOCYTE ESTERASE 06/11/2025 1+ (A)     WBC 06/11/2025 10-20 (A)     RBC 06/11/2025 0-2     SQUAMOUS EPITHELIAL CELLS 06/11/2025 0-5     BACTERIA 06/11/2025 NONE SEEN     HYALINE CAST  06/11/2025 NONE SEEN     NOTE 06/11/2025      REFLEXIVE URINE CULTURE 06/11/2025      CULTURE, URINE, ROUTINE 06/11/2025 SEE NOTE     GLUCOSE 06/11/2025 109 (H)     UREA NITROGEN (BUN) 06/11/2025 10     CREATININE 06/11/2025 0.93     EGFR 06/11/2025 56 (L)     SODIUM 06/11/2025 140     POTASSIUM 06/11/2025 4.0     CHLORIDE 06/11/2025 102     CARBON DIOXIDE 06/11/2025 27     ELECTROLYTE BALANCE 06/11/2025 11     CALCIUM 06/11/2025 8.2 (L)     PROTEIN, TOTAL 06/11/2025 5.7 (L)     ALBUMIN 06/11/2025 3.1 (L)     BILIRUBIN, TOTAL 06/11/2025 0.5     ALKALINE PHOSPHATASE 06/11/2025 116     AST 06/11/2025 17     ALT 06/11/2025 12     WHITE BLOOD CELL COUNT 06/11/2025 17.7 (H)     RED BLOOD CELL COUNT 06/11/2025 3.17 (L)     HEMOGLOBIN 06/11/2025 9.7 (L)     HEMATOCRIT 06/11/2025 31.0 (L)     MCV 06/11/2025 97.8     MCH 06/11/2025 30.6     MCHC 06/11/2025 31.3 (L)     RDW 06/11/2025 13.5     PLATELET COUNT 06/11/2025 471 (H)     MPV 06/11/2025 10.1     ABSOLUTE NEUTROPHILS 06/11/2025 13,541 (H)     ABSOLUTE LYMPHOCYTES 06/11/2025 2,460     ABSOLUTE MONOCYTES 06/11/2025 1,540 (H)     ABSOLUTE EOSINOPHILS 06/11/2025 106     ABSOLUTE BASOPHILS 06/11/2025 53     NEUTROPHILS 06/11/2025 76.5     LYMPHOCYTES 06/11/2025 13.9     MONOCYTES 06/11/2025 8.7     EOSINOPHILS 06/11/2025 0.6     BASOPHILS 06/11/2025 0.3     VITAMIN B12 06/11/2025 1,616 (H)     VITAMIN D,25-OH,TOTAL,IA 06/11/2025 44     IRON, TOTAL 06/11/2025 26 (L)     IRON BINDING CAPACITY 06/11/2025 201 (L)     % SATURATION 06/11/2025 13 (L)    No results displayed because visit has over 200 results.      Orders Only on 05/14/2025   Component Date Value    GLUCOSE 05/14/2025 183 (H)     UREA NITROGEN (BUN) 05/14/2025 28 (H)     CREATININE 05/14/2025 1.16 (H)     EGFR 05/14/2025 43 (L)     BUN/CREATININE RATIO 05/14/2025 24 (H)     SODIUM 05/14/2025 138     POTASSIUM 05/14/2025 4.3     CHLORIDE 05/14/2025 103     CARBON DIOXIDE 05/14/2025 23     ELECTROLYTE BALANCE  05/14/2025 12     CALCIUM 05/14/2025 8.6    No results displayed because visit has over 200 results.           Assessment/Plan   Problem List[5]   This is a very pleasant 89-year-old female with history of hypertension.  Patient presents to my office for follow-up after recent hospitalization for shortness of breath COPD exacerbation as well as acute decompensated heart failure.  Patient in the hospital was noted to have irregular heartbeat which was consistent of sinus tachycardia with frequent PACs.  Patient was started on beta-blocker.  Presents to my office for follow-up.  Still complaining of shortness of breath with activity.  Still having moderate lower extremity edema.  Primary care physician told her not to take diuretics unless it is really needed.  Physical examination suggestive of volume overload.    At this point my recommendation is to start patient on hydrochlorothiazide for better blood pressure control and as a diuretic.  Will start also on spironolactone 12.5 mg oral daily.  May take furosemide as needed if edema persist or she continues to have severe shortness of breath.  We will check basic metabolic panel in 2 weeks.  I will follow-up in 2 months from now.  Discussed with her lifestyle modification.  Patient has no chest pain or shortness of breath and mild orthopnea.  Will have also should consider ischemic workup since patient is agreeable to have stress test if needed but her troponin were within normal limits and EKG did not suggest ischemia.    David Monroy MD              [1]   Past Medical History:  Diagnosis Date    Diabetes (Multi)     Diabetes mellitus (Multi)     Disease of thyroid gland     Hypertension     Renal disease    [2]   Past Surgical History:  Procedure Laterality Date    APPENDECTOMY      BACK SURGERY      HYSTERECTOMY     [3]   Allergies  Allergen Reactions    Amlodipine Swelling    Amoxicillin Unknown    Amoxicillin-Pot Clavulanate Diarrhea    Glimepiride Diarrhea     Lisinopril Cough    Metformin Hcl Diarrhea    Tetracycline Hcl Diarrhea    Cephalexin Rash   [4]   Family History  Problem Relation Name Age of Onset    Heart disease Mother      Heart disease Father      Heart disease Brother      No Known Problems Son      Heart disease Maternal Grandmother      Heart disease Maternal Grandfather      Heart disease Paternal Grandmother      Heart disease Paternal Grandfather     [5]   Patient Active Problem List  Diagnosis    Complication of diabetes mellitus (Multi)    Atherosclerotic heart disease of native coronary artery with other forms of angina pectoris    Degeneration of lumbar intervertebral disc    Type 2 diabetes mellitus, without long-term current use of insulin (Multi)    Elevated erythrocyte sedimentation rate    History of hysterectomy    Hyperlipidemia    Hypothyroid    Lichen sclerosus    Lumbar spondylosis    Osteoarthritis    Pulmonary nodule    Primary hypertension    Lumbosacral spondylosis without myelopathy    Vitamin D deficiency    Vulvodynia    Hyponatremia    Peripheral vascular disease    Supraventricular tachycardia    Stage 3b chronic kidney disease (Multi)    Dysequilibrium    Weakness    BPPV (benign paroxysmal positional vertigo), left    Chronic kidney disease, stage 3a (Multi)    Generalized weakness    Acute cystitis without hematuria    Sepsis due to Escherichia coli without acute organ dysfunction (Multi)    Acute on chronic renal failure    Hypomagnesemia    A-fib (Multi)

## 2025-07-02 ENCOUNTER — HOME CARE VISIT (OUTPATIENT)
Dept: HOME HEALTH SERVICES | Facility: HOME HEALTH | Age: OVER 89
End: 2025-07-02
Payer: MEDICARE

## 2025-07-05 ASSESSMENT — ENCOUNTER SYMPTOMS
PERSON REPORTING PAIN: PATIENT
DENIES PAIN: 1

## 2025-07-05 NOTE — HOME HEALTH
Called twice, left message once to offer a visit for today.  No answer.  Presume patient not available or possibly not interested in a visit.  PT DISCIPLINE DISCHARGE WITHOUT VISIT completed today.

## 2025-07-07 ENCOUNTER — APPOINTMENT (OUTPATIENT)
Dept: CARDIOLOGY | Facility: HOSPITAL | Age: OVER 89
DRG: 291 | End: 2025-07-07
Payer: MEDICARE

## 2025-07-07 ENCOUNTER — TELEPHONE (OUTPATIENT)
Dept: CARDIOLOGY | Facility: CLINIC | Age: OVER 89
End: 2025-07-07
Payer: MEDICARE

## 2025-07-07 ENCOUNTER — HOSPITAL ENCOUNTER (INPATIENT)
Facility: HOSPITAL | Age: OVER 89
LOS: 2 days | Discharge: HOME HEALTH | DRG: 291 | End: 2025-07-10
Attending: HOSPITALIST | Admitting: INTERNAL MEDICINE
Payer: MEDICARE

## 2025-07-07 ENCOUNTER — APPOINTMENT (OUTPATIENT)
Dept: RADIOLOGY | Facility: HOSPITAL | Age: OVER 89
DRG: 291 | End: 2025-07-07
Payer: MEDICARE

## 2025-07-07 DIAGNOSIS — J90 BILATERAL PLEURAL EFFUSION: ICD-10-CM

## 2025-07-07 DIAGNOSIS — I50.9 ACUTE ON CHRONIC CONGESTIVE HEART FAILURE, UNSPECIFIED HEART FAILURE TYPE: ICD-10-CM

## 2025-07-07 DIAGNOSIS — I10 PRIMARY HYPERTENSION: ICD-10-CM

## 2025-07-07 DIAGNOSIS — R06.02 SHORTNESS OF BREATH: Primary | ICD-10-CM

## 2025-07-07 PROBLEM — I50.33 ACUTE ON CHRONIC DIASTOLIC (CONGESTIVE) HEART FAILURE: Status: ACTIVE | Noted: 2025-07-07

## 2025-07-07 LAB
ALBUMIN SERPL BCP-MCNC: 3.6 G/DL (ref 3.4–5)
ALP SERPL-CCNC: 101 U/L (ref 33–136)
ALT SERPL W P-5'-P-CCNC: 18 U/L (ref 7–45)
ANION GAP SERPL CALCULATED.3IONS-SCNC: 13 MMOL/L (ref 10–20)
AST SERPL W P-5'-P-CCNC: 27 U/L (ref 9–39)
BASOPHILS # BLD AUTO: 0.04 X10*3/UL (ref 0–0.1)
BASOPHILS NFR BLD AUTO: 0.4 %
BILIRUB SERPL-MCNC: 0.6 MG/DL (ref 0–1.2)
BNP SERPL-MCNC: 276 PG/ML (ref 0–99)
BUN SERPL-MCNC: 13 MG/DL (ref 6–23)
CALCIUM SERPL-MCNC: 8.9 MG/DL (ref 8.6–10.3)
CARDIAC TROPONIN I PNL SERPL HS: 13 NG/L (ref 0–13)
CARDIAC TROPONIN I PNL SERPL HS: 14 NG/L (ref 0–13)
CHLORIDE SERPL-SCNC: 103 MMOL/L (ref 98–107)
CO2 SERPL-SCNC: 27 MMOL/L (ref 21–32)
CREAT SERPL-MCNC: 0.88 MG/DL (ref 0.5–1.05)
EGFRCR SERPLBLD CKD-EPI 2021: 60 ML/MIN/1.73M*2
EOSINOPHIL # BLD AUTO: 0.19 X10*3/UL (ref 0–0.4)
EOSINOPHIL NFR BLD AUTO: 2.1 %
ERYTHROCYTE [DISTWIDTH] IN BLOOD BY AUTOMATED COUNT: 17.4 % (ref 11.5–14.5)
FLUAV RNA RESP QL NAA+PROBE: NOT DETECTED
FLUBV RNA RESP QL NAA+PROBE: NOT DETECTED
GLUCOSE SERPL-MCNC: 113 MG/DL (ref 74–99)
HCT VFR BLD AUTO: 31.2 % (ref 36–46)
HGB BLD-MCNC: 9.7 G/DL (ref 12–16)
IMM GRANULOCYTES # BLD AUTO: 0.05 X10*3/UL (ref 0–0.5)
IMM GRANULOCYTES NFR BLD AUTO: 0.5 % (ref 0–0.9)
LYMPHOCYTES # BLD AUTO: 2.39 X10*3/UL (ref 0.8–3)
LYMPHOCYTES NFR BLD AUTO: 26.2 %
MAGNESIUM SERPL-MCNC: 1.39 MG/DL (ref 1.6–2.4)
MCH RBC QN AUTO: 31 PG (ref 26–34)
MCHC RBC AUTO-ENTMCNC: 31.1 G/DL (ref 32–36)
MCV RBC AUTO: 100 FL (ref 80–100)
MONOCYTES # BLD AUTO: 0.84 X10*3/UL (ref 0.05–0.8)
MONOCYTES NFR BLD AUTO: 9.2 %
NEUTROPHILS # BLD AUTO: 5.61 X10*3/UL (ref 1.6–5.5)
NEUTROPHILS NFR BLD AUTO: 61.6 %
NRBC BLD-RTO: 0 /100 WBCS (ref 0–0)
PLATELET # BLD AUTO: 227 X10*3/UL (ref 150–450)
POTASSIUM SERPL-SCNC: 4.1 MMOL/L (ref 3.5–5.3)
PROT SERPL-MCNC: 6.5 G/DL (ref 6.4–8.2)
RBC # BLD AUTO: 3.13 X10*6/UL (ref 4–5.2)
RSV RNA RESP QL NAA+PROBE: NOT DETECTED
SARS-COV-2 RNA RESP QL NAA+PROBE: NOT DETECTED
SODIUM SERPL-SCNC: 139 MMOL/L (ref 136–145)
WBC # BLD AUTO: 9.1 X10*3/UL (ref 4.4–11.3)

## 2025-07-07 PROCEDURE — G0378 HOSPITAL OBSERVATION PER HR: HCPCS

## 2025-07-07 PROCEDURE — 36415 COLL VENOUS BLD VENIPUNCTURE: CPT

## 2025-07-07 PROCEDURE — 2500000004 HC RX 250 GENERAL PHARMACY W/ HCPCS (ALT 636 FOR OP/ED): Performed by: NURSE PRACTITIONER

## 2025-07-07 PROCEDURE — 83880 ASSAY OF NATRIURETIC PEPTIDE: CPT

## 2025-07-07 PROCEDURE — 85025 COMPLETE CBC W/AUTO DIFF WBC: CPT

## 2025-07-07 PROCEDURE — 96366 THER/PROPH/DIAG IV INF ADDON: CPT

## 2025-07-07 PROCEDURE — 96365 THER/PROPH/DIAG IV INF INIT: CPT | Mod: 59

## 2025-07-07 PROCEDURE — 80053 COMPREHEN METABOLIC PANEL: CPT

## 2025-07-07 PROCEDURE — 93010 ELECTROCARDIOGRAM REPORT: CPT | Performed by: INTERNAL MEDICINE

## 2025-07-07 PROCEDURE — 71046 X-RAY EXAM CHEST 2 VIEWS: CPT

## 2025-07-07 PROCEDURE — 2500000001 HC RX 250 WO HCPCS SELF ADMINISTERED DRUGS (ALT 637 FOR MEDICARE OP)

## 2025-07-07 PROCEDURE — 87637 SARSCOV2&INF A&B&RSV AMP PRB: CPT

## 2025-07-07 PROCEDURE — 99285 EMERGENCY DEPT VISIT HI MDM: CPT | Mod: 25

## 2025-07-07 PROCEDURE — 99223 1ST HOSP IP/OBS HIGH 75: CPT | Performed by: NURSE PRACTITIONER

## 2025-07-07 PROCEDURE — 96375 TX/PRO/DX INJ NEW DRUG ADDON: CPT

## 2025-07-07 PROCEDURE — 2500000004 HC RX 250 GENERAL PHARMACY W/ HCPCS (ALT 636 FOR OP/ED)

## 2025-07-07 PROCEDURE — 93005 ELECTROCARDIOGRAM TRACING: CPT

## 2025-07-07 PROCEDURE — 71046 X-RAY EXAM CHEST 2 VIEWS: CPT | Performed by: RADIOLOGY

## 2025-07-07 PROCEDURE — 2500000002 HC RX 250 W HCPCS SELF ADMINISTERED DRUGS (ALT 637 FOR MEDICARE OP, ALT 636 FOR OP/ED)

## 2025-07-07 PROCEDURE — 83735 ASSAY OF MAGNESIUM: CPT

## 2025-07-07 PROCEDURE — 2500000001 HC RX 250 WO HCPCS SELF ADMINISTERED DRUGS (ALT 637 FOR MEDICARE OP): Performed by: NURSE PRACTITIONER

## 2025-07-07 PROCEDURE — 84484 ASSAY OF TROPONIN QUANT: CPT

## 2025-07-07 PROCEDURE — 96372 THER/PROPH/DIAG INJ SC/IM: CPT | Performed by: NURSE PRACTITIONER

## 2025-07-07 RX ORDER — METOPROLOL SUCCINATE 25 MG/1
25 TABLET, EXTENDED RELEASE ORAL ONCE
Status: COMPLETED | OUTPATIENT
Start: 2025-07-07 | End: 2025-07-07

## 2025-07-07 RX ORDER — CHOLECALCIFEROL (VITAMIN D3) 25 MCG
25 TABLET ORAL DAILY
Status: DISCONTINUED | OUTPATIENT
Start: 2025-07-07 | End: 2025-07-10 | Stop reason: HOSPADM

## 2025-07-07 RX ORDER — ENOXAPARIN SODIUM 100 MG/ML
40 INJECTION SUBCUTANEOUS EVERY 24 HOURS
Status: DISCONTINUED | OUTPATIENT
Start: 2025-07-07 | End: 2025-07-10 | Stop reason: HOSPADM

## 2025-07-07 RX ORDER — BISACODYL 5 MG
10 TABLET, DELAYED RELEASE (ENTERIC COATED) ORAL DAILY PRN
Status: DISCONTINUED | OUTPATIENT
Start: 2025-07-07 | End: 2025-07-10 | Stop reason: HOSPADM

## 2025-07-07 RX ORDER — ACETAMINOPHEN 650 MG/1
650 SUPPOSITORY RECTAL EVERY 4 HOURS PRN
Status: DISCONTINUED | OUTPATIENT
Start: 2025-07-07 | End: 2025-07-10 | Stop reason: HOSPADM

## 2025-07-07 RX ORDER — LEVOTHYROXINE SODIUM 88 UG/1
88 TABLET ORAL DAILY
Status: DISCONTINUED | OUTPATIENT
Start: 2025-07-07 | End: 2025-07-10 | Stop reason: HOSPADM

## 2025-07-07 RX ORDER — ATORVASTATIN CALCIUM 80 MG/1
80 TABLET, FILM COATED ORAL DAILY
Status: DISCONTINUED | OUTPATIENT
Start: 2025-07-07 | End: 2025-07-10 | Stop reason: HOSPADM

## 2025-07-07 RX ORDER — NAPROXEN SODIUM 220 MG/1
81 TABLET, FILM COATED ORAL DAILY
Status: DISCONTINUED | OUTPATIENT
Start: 2025-07-07 | End: 2025-07-10 | Stop reason: HOSPADM

## 2025-07-07 RX ORDER — HYDRALAZINE HYDROCHLORIDE 25 MG/1
25 TABLET, FILM COATED ORAL 3 TIMES DAILY
Status: DISCONTINUED | OUTPATIENT
Start: 2025-07-07 | End: 2025-07-08

## 2025-07-07 RX ORDER — FUROSEMIDE 10 MG/ML
20 INJECTION INTRAMUSCULAR; INTRAVENOUS 2 TIMES DAILY
Status: DISCONTINUED | OUTPATIENT
Start: 2025-07-07 | End: 2025-07-08

## 2025-07-07 RX ORDER — LANOLIN ALCOHOL/MO/W.PET/CERES
1000 CREAM (GRAM) TOPICAL DAILY
Status: DISCONTINUED | OUTPATIENT
Start: 2025-07-07 | End: 2025-07-10 | Stop reason: HOSPADM

## 2025-07-07 RX ORDER — DEXTROSE 50 % IN WATER (D50W) INTRAVENOUS SYRINGE
12.5
Status: DISCONTINUED | OUTPATIENT
Start: 2025-07-07 | End: 2025-07-10 | Stop reason: HOSPADM

## 2025-07-07 RX ORDER — SPIRONOLACTONE 25 MG/1
12.5 TABLET ORAL ONCE
Status: COMPLETED | OUTPATIENT
Start: 2025-07-07 | End: 2025-07-07

## 2025-07-07 RX ORDER — GUAIFENESIN/DEXTROMETHORPHAN 100-10MG/5
5 SYRUP ORAL EVERY 4 HOURS PRN
Status: DISCONTINUED | OUTPATIENT
Start: 2025-07-07 | End: 2025-07-10 | Stop reason: HOSPADM

## 2025-07-07 RX ORDER — HYDRALAZINE HYDROCHLORIDE 20 MG/ML
10 INJECTION INTRAMUSCULAR; INTRAVENOUS EVERY 8 HOURS PRN
Status: DISCONTINUED | OUTPATIENT
Start: 2025-07-07 | End: 2025-07-10 | Stop reason: HOSPADM

## 2025-07-07 RX ORDER — METOPROLOL TARTRATE 25 MG/1
25 TABLET, FILM COATED ORAL ONCE
Status: DISCONTINUED | OUTPATIENT
Start: 2025-07-07 | End: 2025-07-07 | Stop reason: ALTCHOICE

## 2025-07-07 RX ORDER — FUROSEMIDE 10 MG/ML
40 INJECTION INTRAMUSCULAR; INTRAVENOUS ONCE
Status: COMPLETED | OUTPATIENT
Start: 2025-07-07 | End: 2025-07-07

## 2025-07-07 RX ORDER — ACETAMINOPHEN 325 MG/1
650 TABLET ORAL EVERY 4 HOURS PRN
Status: DISCONTINUED | OUTPATIENT
Start: 2025-07-07 | End: 2025-07-10 | Stop reason: HOSPADM

## 2025-07-07 RX ORDER — METOPROLOL SUCCINATE 25 MG/1
25 TABLET, EXTENDED RELEASE ORAL DAILY
Status: DISCONTINUED | OUTPATIENT
Start: 2025-07-08 | End: 2025-07-10 | Stop reason: HOSPADM

## 2025-07-07 RX ORDER — MAGNESIUM SULFATE HEPTAHYDRATE 40 MG/ML
4 INJECTION, SOLUTION INTRAVENOUS ONCE
Status: COMPLETED | OUTPATIENT
Start: 2025-07-07 | End: 2025-07-07

## 2025-07-07 RX ORDER — HYDROCHLOROTHIAZIDE 25 MG/1
25 TABLET ORAL ONCE
Status: COMPLETED | OUTPATIENT
Start: 2025-07-07 | End: 2025-07-07

## 2025-07-07 RX ORDER — DEXTROSE 50 % IN WATER (D50W) INTRAVENOUS SYRINGE
25
Status: DISCONTINUED | OUTPATIENT
Start: 2025-07-07 | End: 2025-07-10 | Stop reason: HOSPADM

## 2025-07-07 RX ORDER — PANTOPRAZOLE SODIUM 40 MG/1
40 TABLET, DELAYED RELEASE ORAL
Status: DISCONTINUED | OUTPATIENT
Start: 2025-07-08 | End: 2025-07-10 | Stop reason: HOSPADM

## 2025-07-07 RX ORDER — HYDRALAZINE HYDROCHLORIDE 25 MG/1
25 TABLET, FILM COATED ORAL ONCE
Status: COMPLETED | OUTPATIENT
Start: 2025-07-07 | End: 2025-07-07

## 2025-07-07 RX ORDER — INSULIN LISPRO 100 [IU]/ML
0-10 INJECTION, SOLUTION INTRAVENOUS; SUBCUTANEOUS
Status: DISCONTINUED | OUTPATIENT
Start: 2025-07-08 | End: 2025-07-10 | Stop reason: HOSPADM

## 2025-07-07 RX ADMIN — ENOXAPARIN SODIUM 40 MG: 40 INJECTION SUBCUTANEOUS at 18:42

## 2025-07-07 RX ADMIN — FUROSEMIDE 40 MG: 10 INJECTION, SOLUTION INTRAMUSCULAR; INTRAVENOUS at 14:03

## 2025-07-07 RX ADMIN — METOPROLOL SUCCINATE 25 MG: 25 TABLET, EXTENDED RELEASE ORAL at 14:03

## 2025-07-07 RX ADMIN — MAGNESIUM SULFATE IN WATER FOR 4 G: 40 INJECTION INTRAVENOUS at 14:04

## 2025-07-07 RX ADMIN — HYDRALAZINE HYDROCHLORIDE 25 MG: 25 TABLET ORAL at 14:03

## 2025-07-07 RX ADMIN — FUROSEMIDE 20 MG: 10 INJECTION, SOLUTION INTRAMUSCULAR; INTRAVENOUS at 18:41

## 2025-07-07 RX ADMIN — SPIRONOLACTONE 12.5 MG: 25 TABLET ORAL at 14:11

## 2025-07-07 RX ADMIN — HYDRALAZINE HYDROCHLORIDE 25 MG: 25 TABLET ORAL at 18:41

## 2025-07-07 RX ADMIN — HYDROCHLOROTHIAZIDE 25 MG: 25 TABLET ORAL at 14:03

## 2025-07-07 SDOH — SOCIAL STABILITY: SOCIAL INSECURITY: DO YOU FEEL UNSAFE GOING BACK TO THE PLACE WHERE YOU ARE LIVING?: NO

## 2025-07-07 SDOH — SOCIAL STABILITY: SOCIAL INSECURITY: HAVE YOU HAD THOUGHTS OF HARMING ANYONE ELSE?: NO

## 2025-07-07 SDOH — SOCIAL STABILITY: SOCIAL INSECURITY: DO YOU FEEL ANYONE HAS EXPLOITED OR TAKEN ADVANTAGE OF YOU FINANCIALLY OR OF YOUR PERSONAL PROPERTY?: NO

## 2025-07-07 SDOH — SOCIAL STABILITY: SOCIAL INSECURITY: ARE YOU OR HAVE YOU BEEN THREATENED OR ABUSED PHYSICALLY, EMOTIONALLY, OR SEXUALLY BY ANYONE?: NO

## 2025-07-07 SDOH — SOCIAL STABILITY: SOCIAL INSECURITY: ARE THERE ANY APPARENT SIGNS OF INJURIES/BEHAVIORS THAT COULD BE RELATED TO ABUSE/NEGLECT?: NO

## 2025-07-07 SDOH — SOCIAL STABILITY: SOCIAL INSECURITY: ABUSE: ADULT

## 2025-07-07 SDOH — SOCIAL STABILITY: SOCIAL INSECURITY: DOES ANYONE TRY TO KEEP YOU FROM HAVING/CONTACTING OTHER FRIENDS OR DOING THINGS OUTSIDE YOUR HOME?: NO

## 2025-07-07 SDOH — SOCIAL STABILITY: SOCIAL INSECURITY: HAS ANYONE EVER THREATENED TO HURT YOUR FAMILY OR YOUR PETS?: NO

## 2025-07-07 SDOH — SOCIAL STABILITY: SOCIAL INSECURITY: WERE YOU ABLE TO COMPLETE ALL THE BEHAVIORAL HEALTH SCREENINGS?: YES

## 2025-07-07 ASSESSMENT — PAIN DESCRIPTION - PROGRESSION: CLINICAL_PROGRESSION: NOT CHANGED

## 2025-07-07 ASSESSMENT — ACTIVITIES OF DAILY LIVING (ADL)
HEARING - RIGHT EAR: HEARING AID
FEEDING YOURSELF: INDEPENDENT
GROOMING: INDEPENDENT
TOILETING: INDEPENDENT
WALKS IN HOME: INDEPENDENT
PATIENT'S MEMORY ADEQUATE TO SAFELY COMPLETE DAILY ACTIVITIES?: YES
ADEQUATE_TO_COMPLETE_ADL: YES
DRESSING YOURSELF: INDEPENDENT
JUDGMENT_ADEQUATE_SAFELY_COMPLETE_DAILY_ACTIVITIES: YES
HEARING - LEFT EAR: HEARING AID
BATHING: INDEPENDENT
LACK_OF_TRANSPORTATION: NO

## 2025-07-07 ASSESSMENT — COGNITIVE AND FUNCTIONAL STATUS - GENERAL
DAILY ACTIVITIY SCORE: 24
PATIENT BASELINE BEDBOUND: NO
WALKING IN HOSPITAL ROOM: A LITTLE
MOBILITY SCORE: 21
CLIMB 3 TO 5 STEPS WITH RAILING: A LITTLE
MOVING TO AND FROM BED TO CHAIR: A LITTLE

## 2025-07-07 ASSESSMENT — PATIENT HEALTH QUESTIONNAIRE - PHQ9
SUM OF ALL RESPONSES TO PHQ9 QUESTIONS 1 & 2: 0
2. FEELING DOWN, DEPRESSED OR HOPELESS: NOT AT ALL
1. LITTLE INTEREST OR PLEASURE IN DOING THINGS: NOT AT ALL

## 2025-07-07 ASSESSMENT — LIFESTYLE VARIABLES
HOW MANY STANDARD DRINKS CONTAINING ALCOHOL DO YOU HAVE ON A TYPICAL DAY: PATIENT DOES NOT DRINK
SKIP TO QUESTIONS 9-10: 1
HOW OFTEN DO YOU HAVE 6 OR MORE DRINKS ON ONE OCCASION: NEVER
AUDIT-C TOTAL SCORE: 0
HOW OFTEN DO YOU HAVE A DRINK CONTAINING ALCOHOL: NEVER
AUDIT-C TOTAL SCORE: 0

## 2025-07-07 ASSESSMENT — PAIN SCALES - GENERAL
PAINLEVEL_OUTOF10: 0 - NO PAIN

## 2025-07-07 ASSESSMENT — PAIN - FUNCTIONAL ASSESSMENT: PAIN_FUNCTIONAL_ASSESSMENT: 0-10

## 2025-07-07 NOTE — ED PROVIDER NOTES
HPI   Chief Complaint   Patient presents with    Shortness of Breath     For the past 3 days I have had a hard time breathing        Patient is a 89-year-old female with past medical history of leg swelling, hypertension presenting with concerns for shortness of breath.  Onset for the last 3 days.  Actually saw her cardiologist days prior and was started on spironolactone and hydrochlorothiazide for bilateral lower extremity swelling.  Relatively new development for the leg swelling.  States that she is been short of breath for the last several days on exertion.  States she does not believe she has a history of heart failure.  No recent travel.  No recent surgeries.  She is not on blood thinners.  Patient denies fevers, chills, cough, sore throat, runny nose, chest pain, abdominal pain, nausea, vomiting, diarrhea or urinary complaints.              Patient History   Medical History[1]  Surgical History[2]  Family History[3]  Social History[4]    Physical Exam   ED Triage Vitals [07/07/25 1122]   Temperature Heart Rate Respirations BP   36.4 °C (97.6 °F) 83 18 151/88      Pulse Ox Temp src Heart Rate Source Patient Position   (!) 92 % -- Monitor Sitting      BP Location FiO2 (%)     Left arm --       Physical Exam  Vitals and nursing note reviewed.   Constitutional:       Appearance: She is well-developed.      Comments: Awake, laying in examination bed   HENT:      Head: Normocephalic and atraumatic.      Nose: Nose normal.      Mouth/Throat:      Mouth: Mucous membranes are moist.      Pharynx: Oropharynx is clear.   Eyes:      Extraocular Movements: Extraocular movements intact.      Conjunctiva/sclera: Conjunctivae normal.      Pupils: Pupils are equal, round, and reactive to light.   Cardiovascular:      Rate and Rhythm: Normal rate and regular rhythm.      Pulses: Normal pulses.      Heart sounds: Normal heart sounds. No murmur heard.  Pulmonary:      Effort: Pulmonary effort is normal. No respiratory distress.  "     Breath sounds: Normal breath sounds.   Abdominal:      General: Abdomen is flat.      Palpations: Abdomen is soft.      Tenderness: There is no abdominal tenderness.   Musculoskeletal:         General: No swelling. Normal range of motion.      Cervical back: Normal range of motion and neck supple.      Right lower leg: Edema present.      Left lower leg: Edema present.      Comments: +1 pitting edema to the bilateral lower extremity   Skin:     General: Skin is warm and dry.      Capillary Refill: Capillary refill takes less than 2 seconds.   Neurological:      General: No focal deficit present.      Mental Status: She is alert and oriented to person, place, and time.   Psychiatric:         Mood and Affect: Mood normal.         Behavior: Behavior normal.           ED Course & MDM   ED Course as of 07/07/25 2021 Mon Jul 07, 2025   1140 EKG interpreted by myself independently, EKG shows sinus rhythm rate 87 bpm, QRS 68, , QTc 466, patient has no ST elevation or depression, negative for acute MI. [MARKOS]   1342 Patient's blood pressure is steadily increasing.  She states that she did not want to take anything prior to coming to the ED because she thought it would be \"confusing\" [AR]      ED Course User Index  [AR] Armando Pinto PA-C  [MARKOS] Delmar Machado, DO         Diagnoses as of 07/07/25 2021   Shortness of breath   Acute on chronic congestive heart failure, unspecified heart failure type   Bilateral pleural effusion                 No data recorded     Pinky Coma Scale Score: 15 (07/07/25 1120 : Dex Lira, EMT)                           Medical Decision Making  Patient is a 89-year-old female with past medical history of leg swelling, hypertension presenting with concerns for shortness of breath.  Lab work, viral swabs and imaging ordered.  Conditions considered include but are not limited: Viral illness, CHF, pneumonia, ACS.    Attending physician was available for consultation on this patient.  " CBC is without leukocytosis but does show signs of anemia with hemoglobin of 9.7.  CMP without significant electrolyte abnormality or renal impairment.  Magnesium is low.  Magnesium ordered.  Patient states she did not take her blood pressure medication so patient's home medication was given.  I also did give IV Lasix as I do believe she is fluid overloaded.  Chest x-ray does show bilateral pleural effusion.  BNP of 276.  Troponin of 14 and 13.    Patient did pass an ambulatory trial where she did not become hypoxic but did have some symptomatic shortness of breath.  I did discuss with admitting provider, Dr. Santos, about admission for diuretics.  She is agreeable to this plan.  Did discuss with provider that patient's blood pressure was elevated related to her not taking her home medication.  She was given home blood pressure medication and blood pressure has improved.  As I deemed necessary from the patient's history, physical, laboratory imaging findings as well as ED course, I considered the above is a diagnoses.    Portions of this note made with Dragon software, please be mindful of potential grammatical errors.      Medications   aspirin chewable tablet 81 mg (81 mg oral Not Given 7/7/25 1837)   atorvastatin (Lipitor) tablet 80 mg (80 mg oral Not Given 7/7/25 1840)   cholecalciferol (Vitamin D-3) tablet 25 mcg (25 mcg oral Not Given 7/7/25 1837)   cyanocobalamin (Vitamin B-12) tablet 1,000 mcg (1,000 mcg oral Not Given 7/7/25 1837)   pantoprazole (ProtoNix) EC tablet 40 mg (has no administration in time range)   hydrALAZINE (Apresoline) tablet 25 mg (25 mg oral Given 7/7/25 1841)   levothyroxine (Synthroid, Levoxyl) tablet 88 mcg (88 mcg oral Not Given 7/7/25 1839)   metoprolol succinate XL (Toprol-XL) 24 hr tablet 25 mg (has no administration in time range)   oxygen (O2) therapy (has no administration in time range)   enoxaparin (Lovenox) syringe 40 mg (40 mg subcutaneous Given 7/7/25 1842)    acetaminophen (Tylenol) tablet 650 mg (has no administration in time range)     Or   acetaminophen (Tylenol) suppository 650 mg (has no administration in time range)   bisacodyl (Dulcolax) EC tablet 10 mg (has no administration in time range)   benzocaine-menthol (Cepastat Sore Throat) lozenge 1 lozenge (has no administration in time range)   dextromethorphan-guaifenesin (Robitussin DM)  mg/5 mL oral liquid 5 mL (has no administration in time range)   furosemide (Lasix) injection 20 mg (20 mg intravenous Given 7/7/25 1841)   hydrALAZINE (Apresoline) injection 10 mg (has no administration in time range)   glucagon (Glucagen) injection 1 mg (has no administration in time range)   dextrose 50 % injection 25 g (has no administration in time range)   glucagon (Glucagen) injection 1 mg (has no administration in time range)   dextrose 50 % injection 12.5 g (has no administration in time range)   insulin lispro injection 0-10 Units (has no administration in time range)   magnesium sulfate 4 g in sterile water for injection 100 mL (0 g intravenous Stopped 7/7/25 1815)   hydrALAZINE (Apresoline) tablet 25 mg (25 mg oral Given 7/7/25 1403)   furosemide (Lasix) injection 40 mg (40 mg intravenous Given 7/7/25 1403)   hydroCHLOROthiazide (HYDRODiuril) tablet 25 mg (25 mg oral Given 7/7/25 1403)   spironolactone (Aldactone) tablet 12.5 mg (12.5 mg oral Given 7/7/25 1411)   metoprolol succinate XL (Toprol-XL) 24 hr tablet 25 mg (25 mg oral Given 7/7/25 1403)     Labs Reviewed   CBC WITH AUTO DIFFERENTIAL - Abnormal       Result Value    WBC 9.1      nRBC 0.0      RBC 3.13 (*)     Hemoglobin 9.7 (*)     Hematocrit 31.2 (*)           MCH 31.0      MCHC 31.1 (*)     RDW 17.4 (*)     Platelets 227      Neutrophils % 61.6      Immature Granulocytes %, Automated 0.5      Lymphocytes % 26.2      Monocytes % 9.2      Eosinophils % 2.1      Basophils % 0.4      Neutrophils Absolute 5.61 (*)     Immature Granulocytes Absolute,  Automated 0.05      Lymphocytes Absolute 2.39      Monocytes Absolute 0.84 (*)     Eosinophils Absolute 0.19      Basophils Absolute 0.04     COMPREHENSIVE METABOLIC PANEL - Abnormal    Glucose 113 (*)     Sodium 139      Potassium 4.1      Chloride 103      Bicarbonate 27      Anion Gap 13      Urea Nitrogen 13      Creatinine 0.88      eGFR 60 (*)     Calcium 8.9      Albumin 3.6      Alkaline Phosphatase 101      Total Protein 6.5      AST 27      Bilirubin, Total 0.6      ALT 18     B-TYPE NATRIURETIC PEPTIDE - Abnormal     (*)     Narrative:        <100 pg/mL - Heart failure unlikely  100-299 pg/mL - Intermediate probability of acute heart                  failure exacerbation. Correlate with clinical                  context and patient history.    >=300 pg/mL - Heart Failure likely. Correlate with clinical                  context and patient history.    BNP testing is performed using different testing methodology at [facility] than at other [facility] hospitals. Direct result comparisons should only be made within the same method.      SERIAL TROPONIN-INITIAL - Abnormal    Troponin I, High Sensitivity 14 (*)     Narrative:     Less than 99th percentile of normal range cutoff-  Female and children under 18 years old <14 ng/L; Male <21 ng/L: Negative  Repeat testing should be performed if clinically indicated.     Female and children under 18 years old 14-50 ng/L; Male 21-50 ng/L:  Consistent with possible cardiac damage and possible increased clinical   risk. Serial measurements may help to assess extent of myocardial damage.     >50 ng/L: Consistent with cardiac damage, increased clinical risk and  myocardial infarction. Serial measurements may help assess extent of   myocardial damage.      NOTE: Children less than 1 year old may have higher baseline troponin   levels and results should be interpreted in conjunction with the overall   clinical context.     NOTE: Troponin I testing is  performed using a different   testing methodology at [facility] than at [facility]. Direct result comparisons should only   be made within the same method.   MAGNESIUM - Abnormal    Magnesium 1.39 (*)    SARS-COV-2, INFLUENZA A/B AND RSV PCR - Normal    Coronavirus 2019, PCR Not Detected      Flu A Result Not Detected      Flu B Result Not Detected      RSV PCR Not Detected      Narrative:     This assay is an FDA-cleared, in vitro diagnostic nucleic acid amplification test for the qualitative detection and differentiation of SARS CoV-2/ Influenza A/B/ RSV from nasopharyngeal specimens collected from individuals with signs and symptoms of respiratory tract infections, and has been validated for use at [facility]. Negative results do not preclude COVID-19/ Influenza A/B/ RSV infections and should not be used as the sole basis for diagnosis, treatment, or other management decisions. Testing for SARS CoV-2 is recommended only for patients who meet current clinical and/or epidemiological criteria defined by federal, state, or local public health directives.   SERIAL TROPONIN, 1 HOUR - Normal    Troponin I, High Sensitivity 13      Narrative:     Less than 99th percentile of normal range cutoff-  Female and children under 18 years old <14 ng/L; Male <21 ng/L: Negative  Repeat testing should be performed if clinically indicated.     Female and children under 18 years old 14-50 ng/L; Male 21-50 ng/L:  Consistent with possible cardiac damage and possible increased clinical   risk. Serial measurements may help to assess extent of myocardial damage.     >50 ng/L: Consistent with cardiac damage, increased clinical risk and  myocardial infarction. Serial measurements may help assess extent of   myocardial damage.      NOTE: Children less than 1 year old may have higher baseline troponin   levels and results should be interpreted in conjunction with the overall   clinical  context.     NOTE: Troponin I testing is performed using a different   testing methodology at [facility] than at other   [facility] hospitals. Direct result comparisons should only   be made within the same method.   TROPONIN SERIES- (INITIAL, 1 HR)    Narrative:     The following orders were created for panel order Troponin I Series, High Sensitivity (0, 1 HR).  Procedure                               Abnormality         Status                     ---------                               -----------         ------                     Troponin I, High Sensiti...[[id number]]  Abnormal            Final result               Troponin, High Sensitivi...[[id number]]  Normal              Final result                 Please view results for these tests on the individual orders.   CBC   BASIC METABOLIC PANEL   MAGNESIUM   POCT GLUCOSE METER     XR chest 2 views   Final Result   Mild changes of congestive heart failure. Continued follow-up is   recommended to resolution.             MACRO:   None        Signed by: Carlos A Hardwick 7/7/2025 1:32 PM   Dictation workstation:   [id number]            Procedure  Procedures         [1]   Past Medical History:  Diagnosis Date    Diabetes (Multi)     Diabetes mellitus (Multi)     Disease of thyroid gland     Hypertension     Renal disease    [2]   Past Surgical History:  Procedure Laterality Date    APPENDECTOMY      BACK SURGERY      HYSTERECTOMY     [3]   Family History  Problem Relation Name Age of Onset    Heart disease Mother      Heart disease Father      Heart disease Brother      No Known Problems Son      Heart disease Maternal Grandmother      Heart disease Maternal Grandfather      Heart disease Paternal Grandmother      Heart disease Paternal Grandfather     [4]   Social History  Tobacco Use    Smoking status: Never     Passive exposure: Never    Smokeless tobacco: Never   Vaping Use    Vaping status: Never Used   Substance Use Topics    Alcohol use: Never     Drug use: Never        Armando Pinto PA-C  07/07/25 2021

## 2025-07-07 NOTE — TELEPHONE ENCOUNTER
Patient called the office stating she is having a very difficult time breathing and can barely get up out of her chair due to SOB. She states she has been taking her 2 new diuretics and is still struggling. I informed her that she is allowed to take her furosemide daily as needed if she still has SOB/Swelling. She was very sob on the phone and I advised her to go to the ER and call 911 if she could not go herself.

## 2025-07-07 NOTE — H&P
History Of Present Illness  Fartun Carey is a 89 y.o. female presenting with shortness of breath with exertion.  She has a history of hypothyroid, chronic kidney disease, hypertension, diastolic heart failure, and type 2 diabetes mellitus.  Patient states that about a week ago her legs were very swollen and she was getting progressively short of breath with exertion.  She states her shortness of breath kept worsening over the last 3 days and she decided to come to the emergency room today.  In the emergency room she was given 40 mg of Lasix and chest x-ray show mild CHF.  She recently saw Dr. Monroy for a regularly scheduled appointment and he did adjust some of her medications adding hydrochlorothiazide and a diuretic.  She had an echocardiogram in May that showed an ejection fraction of 60 to 65%.  Currently patient states she is breathing fine but whenever she tries to do something she gets very short of breath.  She denies any chest pain, shortness of breath, or abdominal pain.  She further denies any headache, dizziness, nausea/vomiting.     Past Medical History  Medical History[1]    Surgical History  Surgical History[2]     Social History  She reports that she has never smoked. She has never been exposed to tobacco smoke. She has never used smokeless tobacco. She reports that she does not drink alcohol and does not use drugs.    Family History  Family History[3]     Allergies  Amlodipine, Amoxicillin, Amoxicillin-pot clavulanate, Glimepiride, Lisinopril, Metformin hcl, Tetracycline hcl, and Cephalexin    Review of Systems  All systems reviewed and negative except as noted in HPI  Physical Exam  Constitutional: No acute distress, calm, cooperative  HEENT: PERRL, normocephalic, atraumatic, mucous membranes moist  Cardiovascular: Regular rhythm and rate, bilateral ankle and pedal edema  Respiratory: Expiratory wheezing bilaterally  Gastrointestinal: Bowel sounds positive x 4, soft, nontender  Neurologic: Alert  "and oriented x 3 equal strength bilaterally  Musculoskeletal: Able to move all extremities  Skin: Warm, dry and intact  Last Recorded Vitals  Blood pressure (!) 194/94, pulse 75, temperature 36.6 °C (97.9 °F), resp. rate 18, height (!) 1.549 m (5' 1\"), weight 73.5 kg (162 lb), SpO2 (!) 90%.   Assessment & Plan  Acute on chronic diastolic (congestive) heart failure  Not hypoxic saturating 93 to 94% on room air  Chest x-ray was suggestive of mild CHF  Echocardiogram done in May 2025 showed an ejection fraction of 60 to 65%  Lasix 20 mg twice daily  Follow electrolytes  CHF pathway  Consult cardiology  Hypomagnesia  Magnesium 1.39 in the emergency room  Replaced  Follow electrolytes  Hypertension  Continue home hydralazine and metoprolol  Stage III chronic kidney disease  Creatinine stable  Continue to monitor  Type 2 diabetes mellitus  Hold oral antidiabetics  Hypoglycemia protocol  Accu-Cheks AC and at bedtime  Insulin sliding scale  DVT prophylaxis  Lovenox  Discussed CODE STATUS with patient in detail and she would like to be full code        I spent 75 minutes in the professional and overall care of this patient.      Gage Amaya, APRN-CNP         [1]   Past Medical History:  Diagnosis Date    Diabetes (Multi)     Diabetes mellitus (Multi)     Disease of thyroid gland     Hypertension     Renal disease    [2]   Past Surgical History:  Procedure Laterality Date    APPENDECTOMY      BACK SURGERY      HYSTERECTOMY     [3]   Family History  Problem Relation Name Age of Onset    Heart disease Mother      Heart disease Father      Heart disease Brother      No Known Problems Son      Heart disease Maternal Grandmother      Heart disease Maternal Grandfather      Heart disease Paternal Grandmother      Heart disease Paternal Grandfather       "

## 2025-07-07 NOTE — PROGRESS NOTES
Pharmacy Medication History Review    Fartun Carey is a 89 y.o. female admitted for Acute on chronic diastolic (congestive) heart failure. Pharmacy reviewed the patient's [id number] medications and allergies for accuracy.    Medications ADDED:  N/A  Medications CHANGED:  Tylenol 325 mg changed to nightly  Medications REMOVED:   N/A    The list below reflects the updated PTA list.   Prior to Admission Medications   Prescriptions Last Dose Informant Patient Reported? Taking?   SITagliptin phosphate (Januvia) 50 mg tablet 7/6/2025 Morning  No Yes   Sig: Take 1 tablet (50 mg) by mouth once daily.   acetaminophen (Tylenol) 325 mg tablet 7/6/2025 Evening  No Yes   Sig: Take 2 tablets (650 mg) by mouth every 4 hours if needed for mild pain (1 - 3).   Patient taking differently: Take 2 tablets (650 mg) by mouth once daily at bedtime.   aspirin 81 mg chewable tablet 7/6/2025 Evening  Yes Yes   Sig: Chew and swallow 1 tablet (81 mg) once daily.   atorvastatin (Lipitor) 80 mg tablet 7/6/2025 Noon  Yes Yes   Sig: Take 1 tablet (80 mg) by mouth once daily.   blood sugar diagnostic (Accu-Chek Guide test strips) strip   No No   Sig: Inject 1 strip under the skin early in the morning..   cholecalciferol (Vitamin D-3) 25 MCG (1000 UT) capsule 7/6/2025 Morning  Yes Yes   Sig: Take 1 capsule (25 mcg) by mouth once daily.   cyanocobalamin (Vitamin B-12) 1,000 mcg tablet 7/6/2025 Morning  Yes Yes   Sig: Take 1 tablet (1,000 mcg) by mouth once daily.   docusate sodium (Colace) 100 mg capsule Past Week  No Yes   Sig: Take 1 capsule (100 mg) by mouth 2 times a day as needed for constipation.   esomeprazole (NexIUM) 40 mg DR capsule 7/6/2025 Morning  Yes Yes   Sig: Take 1 capsule (40 mg) by mouth once daily in the morning. Take before meals. Do not open capsule.   fosfomycin (Monurol) 3 gram packet 6/30/2025  Yes Yes   Sig: Take 3 g by mouth see administration instructions. 3gm PO once every 2 weeks for 3 months   furosemide  (Lasix) 20 mg tablet 7/6/2025 Morning  Yes Yes   Sig: Take 1 tablet (20 mg) by mouth once daily.   hydrALAZINE (Apresoline) 25 mg tablet 7/6/2025 Evening  No Yes   Sig: Take 1 tablet (25 mg) by mouth 3 times a day.   hydroCHLOROthiazide (HYDRODiuril) 25 mg tablet 7/6/2025 Morning  No Yes   Sig: Take 1 tablet (25 mg) by mouth once daily.   levothyroxine (Synthroid, Levoxyl) 88 mcg tablet 7/6/2025 Morning  No Yes   Sig: Take 1 tablet (88 mcg) by mouth once daily.   metoprolol succinate XL (Toprol-XL) 25 mg 24 hr tablet Unknown  No No   Sig: Take 1 tablet (25 mg) by mouth once daily. Do not crush or chew. Do not fill before August 1, 2025.   metoprolol succinate XL (Toprol-XL) 25 mg 24 hr tablet 7/6/2025 Morning  No Yes   Sig: Take 1 tablet (25 mg) by mouth once daily. Do not crush or chew.   spironolactone (Aldactone) 25 mg tablet 7/6/2025 Morning  No Yes   Sig: Take 0.5 tablets (12.5 mg) by mouth once daily.   vit C/E/Zn/coppr/lutein/zeaxan (PRESERVISION AREDS-2 ORAL) 7/6/2025 Evening  Yes Yes   Sig: Take 1 capsule by mouth every 12 hours.      Facility-Administered Medications: None        The list below reflects the updated allergy list. Please review each documented allergy for additional clarification and justification.  Allergies  Reviewed by Diane Castellanos on 7/7/2025        Severity Reactions Comments    Amlodipine Not Specified Swelling     Amoxicillin Not Specified Unknown     Amoxicillin-pot Clavulanate Not Specified Diarrhea     Glimepiride Not Specified Diarrhea     Lisinopril Not Specified Cough     Metformin Hcl Not Specified Diarrhea     Tetracycline Hcl Not Specified Diarrhea     Cephalexin Low Rash             Patient accepts M2B at discharge.     Sources:   Patient Interview Good historian  Chart Review     Additional Comments:  N/A      Diane Castellanos  Pharmacy Technician  07/07/25

## 2025-07-07 NOTE — ASSESSMENT & PLAN NOTE
Not hypoxic saturating 93 to 94% on room air  Chest x-ray was suggestive of mild CHF  Echocardiogram done in May 2025 showed an ejection fraction of 60 to 65%  Lasix 20 mg twice daily  Follow electrolytes  CHF pathway  Consult cardiology  Hypomagnesia  Magnesium 1.39 in the emergency room  Replaced  Follow electrolytes  Hypertension  Continue home hydralazine and metoprolol  Stage III chronic kidney disease  Creatinine stable  Continue to monitor  Type 2 diabetes mellitus  Hold oral antidiabetics  Hypoglycemia protocol  Accu-Cheks AC and at bedtime  Insulin sliding scale  DVT prophylaxis  Lovenox  Discussed CODE STATUS with patient in detail and she would like to be full code

## 2025-07-08 ENCOUNTER — APPOINTMENT (OUTPATIENT)
Dept: CARDIOLOGY | Facility: HOSPITAL | Age: OVER 89
DRG: 291 | End: 2025-07-08
Payer: MEDICARE

## 2025-07-08 PROBLEM — R06.02 SHORTNESS OF BREATH: Status: ACTIVE | Noted: 2025-07-08

## 2025-07-08 LAB
ANION GAP SERPL CALCULATED.3IONS-SCNC: 14 MMOL/L (ref 10–20)
BUN SERPL-MCNC: 15 MG/DL (ref 6–23)
CALCIUM SERPL-MCNC: 8.6 MG/DL (ref 8.6–10.3)
CHLORIDE SERPL-SCNC: 103 MMOL/L (ref 98–107)
CO2 SERPL-SCNC: 29 MMOL/L (ref 21–32)
CREAT SERPL-MCNC: 0.93 MG/DL (ref 0.5–1.05)
EGFRCR SERPLBLD CKD-EPI 2021: 56 ML/MIN/1.73M*2
ERYTHROCYTE [DISTWIDTH] IN BLOOD BY AUTOMATED COUNT: 17.6 % (ref 11.5–14.5)
GLUCOSE BLD MANUAL STRIP-MCNC: 100 MG/DL (ref 74–99)
GLUCOSE BLD MANUAL STRIP-MCNC: 165 MG/DL (ref 74–99)
GLUCOSE BLD MANUAL STRIP-MCNC: 182 MG/DL (ref 74–99)
GLUCOSE BLD MANUAL STRIP-MCNC: 194 MG/DL (ref 74–99)
GLUCOSE SERPL-MCNC: 89 MG/DL (ref 74–99)
HCT VFR BLD AUTO: 29.5 % (ref 36–46)
HGB BLD-MCNC: 9.3 G/DL (ref 12–16)
MAGNESIUM SERPL-MCNC: 2.01 MG/DL (ref 1.6–2.4)
MCH RBC QN AUTO: 30.9 PG (ref 26–34)
MCHC RBC AUTO-ENTMCNC: 31.5 G/DL (ref 32–36)
MCV RBC AUTO: 98 FL (ref 80–100)
NRBC BLD-RTO: 0 /100 WBCS (ref 0–0)
PLATELET # BLD AUTO: 191 X10*3/UL (ref 150–450)
POTASSIUM SERPL-SCNC: 3.5 MMOL/L (ref 3.5–5.3)
RBC # BLD AUTO: 3.01 X10*6/UL (ref 4–5.2)
SODIUM SERPL-SCNC: 142 MMOL/L (ref 136–145)
WBC # BLD AUTO: 10.2 X10*3/UL (ref 4.4–11.3)

## 2025-07-08 PROCEDURE — 97165 OT EVAL LOW COMPLEX 30 MIN: CPT | Mod: GO

## 2025-07-08 PROCEDURE — 36415 COLL VENOUS BLD VENIPUNCTURE: CPT | Performed by: NURSE PRACTITIONER

## 2025-07-08 PROCEDURE — 93005 ELECTROCARDIOGRAM TRACING: CPT

## 2025-07-08 PROCEDURE — 97162 PT EVAL MOD COMPLEX 30 MIN: CPT | Mod: GP

## 2025-07-08 PROCEDURE — 2500000001 HC RX 250 WO HCPCS SELF ADMINISTERED DRUGS (ALT 637 FOR MEDICARE OP): Performed by: INTERNAL MEDICINE

## 2025-07-08 PROCEDURE — 2500000004 HC RX 250 GENERAL PHARMACY W/ HCPCS (ALT 636 FOR OP/ED): Performed by: INTERNAL MEDICINE

## 2025-07-08 PROCEDURE — 99232 SBSQ HOSP IP/OBS MODERATE 35: CPT | Performed by: INTERNAL MEDICINE

## 2025-07-08 PROCEDURE — 2500000002 HC RX 250 W HCPCS SELF ADMINISTERED DRUGS (ALT 637 FOR MEDICARE OP, ALT 636 FOR OP/ED): Performed by: INTERNAL MEDICINE

## 2025-07-08 PROCEDURE — 2500000004 HC RX 250 GENERAL PHARMACY W/ HCPCS (ALT 636 FOR OP/ED): Performed by: NURSE PRACTITIONER

## 2025-07-08 PROCEDURE — 2500000001 HC RX 250 WO HCPCS SELF ADMINISTERED DRUGS (ALT 637 FOR MEDICARE OP): Performed by: NURSE PRACTITIONER

## 2025-07-08 PROCEDURE — 83735 ASSAY OF MAGNESIUM: CPT | Performed by: NURSE PRACTITIONER

## 2025-07-08 PROCEDURE — 99222 1ST HOSP IP/OBS MODERATE 55: CPT | Performed by: INTERNAL MEDICINE

## 2025-07-08 PROCEDURE — 80048 BASIC METABOLIC PNL TOTAL CA: CPT | Performed by: NURSE PRACTITIONER

## 2025-07-08 PROCEDURE — 82947 ASSAY GLUCOSE BLOOD QUANT: CPT

## 2025-07-08 PROCEDURE — 1200000002 HC GENERAL ROOM WITH TELEMETRY DAILY

## 2025-07-08 PROCEDURE — 85027 COMPLETE CBC AUTOMATED: CPT | Performed by: NURSE PRACTITIONER

## 2025-07-08 PROCEDURE — 2500000002 HC RX 250 W HCPCS SELF ADMINISTERED DRUGS (ALT 637 FOR MEDICARE OP, ALT 636 FOR OP/ED): Performed by: NURSE PRACTITIONER

## 2025-07-08 RX ORDER — DAPAGLIFLOZIN 10 MG/1
5 TABLET, FILM COATED ORAL EVERY 24 HOURS
Status: DISCONTINUED | OUTPATIENT
Start: 2025-07-08 | End: 2025-07-10 | Stop reason: HOSPADM

## 2025-07-08 RX ORDER — FUROSEMIDE 10 MG/ML
40 INJECTION INTRAMUSCULAR; INTRAVENOUS 2 TIMES DAILY
Status: DISCONTINUED | OUTPATIENT
Start: 2025-07-08 | End: 2025-07-09

## 2025-07-08 RX ORDER — SPIRONOLACTONE 25 MG/1
25 TABLET ORAL ONCE
Status: COMPLETED | OUTPATIENT
Start: 2025-07-08 | End: 2025-07-08

## 2025-07-08 RX ORDER — HYDRALAZINE HYDROCHLORIDE 50 MG/1
50 TABLET, FILM COATED ORAL 3 TIMES DAILY
Status: DISCONTINUED | OUTPATIENT
Start: 2025-07-08 | End: 2025-07-09

## 2025-07-08 RX ADMIN — LEVOTHYROXINE SODIUM 88 MCG: 0.09 TABLET ORAL at 06:42

## 2025-07-08 RX ADMIN — HYDRALAZINE HYDROCHLORIDE 50 MG: 50 TABLET ORAL at 21:59

## 2025-07-08 RX ADMIN — ATORVASTATIN CALCIUM 80 MG: 80 TABLET, FILM COATED ORAL at 10:00

## 2025-07-08 RX ADMIN — FUROSEMIDE 40 MG: 10 INJECTION, SOLUTION INTRAMUSCULAR; INTRAVENOUS at 13:59

## 2025-07-08 RX ADMIN — PANTOPRAZOLE SODIUM 40 MG: 40 TABLET, DELAYED RELEASE ORAL at 06:42

## 2025-07-08 RX ADMIN — ENOXAPARIN SODIUM 40 MG: 40 INJECTION SUBCUTANEOUS at 17:16

## 2025-07-08 RX ADMIN — ASPIRIN 81 MG: 81 TABLET, CHEWABLE ORAL at 09:59

## 2025-07-08 RX ADMIN — INSULIN LISPRO 2 UNITS: 100 INJECTION, SOLUTION INTRAVENOUS; SUBCUTANEOUS at 12:23

## 2025-07-08 RX ADMIN — Medication 1000 MCG: at 10:01

## 2025-07-08 RX ADMIN — HYDRALAZINE HYDROCHLORIDE 50 MG: 50 TABLET ORAL at 10:06

## 2025-07-08 RX ADMIN — METOPROLOL SUCCINATE 25 MG: 25 TABLET, EXTENDED RELEASE ORAL at 10:02

## 2025-07-08 RX ADMIN — DAPAGLIFLOZIN 5 MG: 10 TABLET, FILM COATED ORAL at 10:01

## 2025-07-08 RX ADMIN — INSULIN LISPRO 2 UNITS: 100 INJECTION, SOLUTION INTRAVENOUS; SUBCUTANEOUS at 17:16

## 2025-07-08 RX ADMIN — Medication 25 MCG: at 10:00

## 2025-07-08 RX ADMIN — SPIRONOLACTONE 25 MG: 25 TABLET ORAL at 10:02

## 2025-07-08 ASSESSMENT — PAIN SCALES - GENERAL
PAINLEVEL_OUTOF10: 0 - NO PAIN

## 2025-07-08 ASSESSMENT — ACTIVITIES OF DAILY LIVING (ADL)
BATHING_ASSISTANCE: MINIMAL
ADL_ASSISTANCE: INDEPENDENT
LACK_OF_TRANSPORTATION: NO
ADL_ASSISTANCE: INDEPENDENT

## 2025-07-08 ASSESSMENT — COGNITIVE AND FUNCTIONAL STATUS - GENERAL
TOILETING: A LITTLE
HELP NEEDED FOR BATHING: A LITTLE
CLIMB 3 TO 5 STEPS WITH RAILING: A LITTLE
MOBILITY SCORE: 21
MOBILITY SCORE: 20
WALKING IN HOSPITAL ROOM: A LITTLE
WALKING IN HOSPITAL ROOM: A LITTLE
STANDING UP FROM CHAIR USING ARMS: A LITTLE
PERSONAL GROOMING: A LITTLE
DAILY ACTIVITIY SCORE: 18
DRESSING REGULAR LOWER BODY CLOTHING: A LITTLE
CLIMB 3 TO 5 STEPS WITH RAILING: A LITTLE
EATING MEALS: A LITTLE
DAILY ACTIVITIY SCORE: 24
MOVING TO AND FROM BED TO CHAIR: A LITTLE
DRESSING REGULAR UPPER BODY CLOTHING: A LITTLE
MOVING TO AND FROM BED TO CHAIR: A LITTLE

## 2025-07-08 ASSESSMENT — PAIN - FUNCTIONAL ASSESSMENT
PAIN_FUNCTIONAL_ASSESSMENT: 0-10

## 2025-07-08 NOTE — PROGRESS NOTES
Physical Therapy Evaluation    Patient Name: Fartun Carey  MRN: [id number]  Department: [facility]  Room: [facility]315-A  Today's Date: 7/8/2025   Time Calculation  Start Time: 1345  Stop Time: 1353  Time Calculation (min): 8 min    Assessment/Plan   PT Assessment  PT Assessment Results: Decreased strength, Decreased endurance, Impaired balance, Decreased mobility, Decreased safety awareness, Obesity, Decreased skin integrity, Pain  Rehab Prognosis: Good  Barriers to Discharge Home: No anticipated barriers  Evaluation/Treatment Tolerance: Patient tolerated treatment well  Medical Staff Made Aware: Yes  Strengths: Premorbid level of function  Barriers to Participation: Comorbidities  End of Session Communication: Bedside nurse  Assessment Comment: 89 year old female admits with acut florentino chronic HF, low Mg, HTN, CKD3, and DB. On eval, she demonstrates weakness warranting skilled PT care. At DC, low intensity rehab is recommended.  End of Session Patient Position: Bed, 3 rail up, Alarm on  IP OR SWING BED PT PLAN  Inpatient or Swing Bed: Inpatient  PT Plan  Treatment/Interventions: Transfer training, Gait training, Balance training, Strengthening, Endurance training, Therapeutic exercise, Therapeutic activity, Home exercise program, Positioning, Postural re-education  PT Plan: Ongoing PT  PT Frequency: 3 times per week (during hospitalization)  PT Discharge Recommendations: Low intensity level of continued care  Equipment Recommended upon Discharge: Wheeled walker  PT Recommended Transfer Status: Contact guard, Assistive device  PT - OK to Discharge: Yes    Subjective     PT Visit Info:  PT Received On: 07/08/25  General Visit Information:  General  Reason for Referral: Impaired Mobility-CHF  Referred By: Dr. Edward  Past Medical History Relevant to Rehab: HLD, OA, pulmonary nodule, CKD stage 3, acute on chronic renal failure, a-fib, acute cystitis without hematuria  Family/Caregiver Present: No  Co-Treatment:  OT  Co-Treatment Reason: decreased activity tolerance this date  Prior to Session Communication: Bedside nurse  Patient Position Received:  (Sitting at EOB with OT)  Preferred Learning Style: verbal, visual  General Comment: Agreeable to PT eval  Home Living:  Home Living  Type of Home: [place]  Lives With: Alone  Home Adaptive Equipment: Cane, Other (Comment) (rollator)  Home Layout: One level  Home Access: Level entry  Bathroom Shower/Tub: Walk-in shower  Bathroom Toilet: Handicapped height  Bathroom Equipment: Grab bars in shower, Built-in shower seat  Home Living Comments: patient reports 2 falls in the last 2 weeks due to weakness in her B LE and her legs giving out  Prior Level of Function:  Prior Function Per Pt/Caregiver Report  Level of [place]: Independent with ADLs and functional transfers, Independent with homemaking with ambulation  Receives Help From: Family, Neighbor  ADL Assistance: Independent  Homemaking Assistance: Independent  Ambulatory Assistance: Independent (rollator)  Vocational: Retired  Leisure: enjoys baking cookies  Prior Function Comments: + drives. sleeps in an adjustable bed  Precautions:  Precautions  Hearing/Visual Limitations: +glasses, bilteral hearing aides  Medical Precautions: Fall precautions      Date/Time Vitals Session Patient Position Pulse Resp SpO2 BP MAP (mmHg)    07/08/25 1520 --  --  81  18  93 %  113/52  721                 Objective   Pain:  Pain Assessment  Pain Assessment: 0-10  0-10 (Numeric) Pain Score: 0 - No pain  Cognition:  Cognition  Overall Cognitive Status: Within Functional Limits  Orientation Level: Oriented X4    General Assessments:  Activity Tolerance  Endurance: Decreased tolerance for upright activites    Sensation  Sensation Comment: patient denies numbness/tingling    Strength  Strength Comments: 4/5 BLEs on MMT. Pt reports feeling weak  Functional Assessments:       Transfers  Transfer: Yes  Transfer 1  Transfer From 1: Bed to  Transfer to 1:  Stand  Technique 1: Sit to stand, Stand to sit  Transfer Device 1: Walker  Transfer Level of Assistance 1: Close supervision  Trials/Comments 1: Cues on hand placement, elevated bed height    Ambulation/Gait Training  Ambulation/Gait Training Performed: Yes  Ambulation/Gait Training 1  Surface 1: Level tile  Device 1: Rolling walker  Assistance 1: Close supervision  Comments/Distance (ft) 1: 60'- slow, shuffled, kyphotic pushing device too far out in front limiting ABDI. Cues to avoid shuffle and keep walker close at all times. Max fatigue today    Outcome Measures:  [facility] Basic Mobility  Turning from your back to your side while in a flat bed without using bedrails: None  Moving from lying on your back to sitting on the side of a flat bed without using bedrails: None  Moving to and from bed to chair (including a wheelchair): A little  Standing up from a chair using your arms (e.g. wheelchair or bedside chair): A little  To walk in hospital room: A little  Climbing 3-5 steps with railing: A little  Basic Mobility - Total Score: 20    Encounter Problems       Encounter Problems (Active)       Balance       Standing Balance (Progressing)       Start:  07/08/25    Expected End:  07/22/25       Pt will demonstrate good static standing balance to promote safe participation with out of bed activity, transfers, and mobility              Mobility       Ambulation (Progressing)       Start:  07/08/25    Expected End:  07/22/25       Pt will ambulate 300' modified independent assist with walker to promote safe home mobility              PT Transfers       Sit to stand (Progressing)       Start:  07/08/25    Expected End:  07/22/25       Pt will transfer sit to standing position with modified independent assist and walker to promote safe out of bed activity              Safety       Safe Mobility Techniques (Progressing)       Start:  07/08/25    Expected End:  07/22/25       Pt will correctly identify and demonstrate safe  mobility techniques to reduce their risks for falls during their acute care stay                   Education Documentation  Mobility Training, taught by Jorge Soriano, PT at 7/8/2025  3:36 PM.  Learner: Patient  Readiness: Acceptance  Method: Explanation  Response: Verbalizes Understanding  Comment: safe mobility techniques    Education Comments  No comments found.

## 2025-07-08 NOTE — CONSULTS
Inpatient consult to Cardiology  Consult performed by: Pancho Mondragon DO  Consult ordered by: KAYLENE Liriano-CNP        History Of Present Illness:    Fartun Carey is a 89 y.o. female presenting with congestive heart failure.  This patient has a history of atherosclerotic heart disease, heart failure with preserved ejection fraction, hypertension and diabetes who follows with Dr. Monroy and reports to the emergency department with shortness of breath symptoms.  Had some shortness of breath on and off for the last month or so.  She recently saw Dr. Monroy who added hydrochlorothiazide in attempt to improve blood pressure control.  But she states that over the last couple days her shortness of breath symptoms have just worsened significantly thus she reports to our emergency department and now is admitted for further treatment and investigation.  She did have an echocardiogram done in May 2025 revealing normal left ventricular systolic function with ejection fraction estimated at 60 to 65%.  In the emergency department laboratory studies revealed unremarkable troponins of 14 and 13 and chest x-ray with mild congestive heart failure changes.     Last Recorded Vitals:  Vitals:    07/07/25 1800 07/07/25 1941 07/07/25 2325 07/08/25 0742   BP: (!) 192/81 155/72 158/75 148/80   BP Location:  Left arm Left arm Left arm   Patient Position:  Lying Lying Lying   Pulse:  70 71 69   Resp:  19 18 20   Temp:  36.7 °C (98.1 °F) 36.7 °C (98.1 °F) 36.7 °C (98.1 °F)   TempSrc:  Temporal Temporal Temporal   SpO2: (!) 90% 91% 93% 91%   Weight:       Height:           Last Labs:  CBC - 7/8/2025:  5:35 AM  10.2 9.3 191    29.5      CMP - 7/8/2025:  5:34 AM  8.6 6.5 27 --- 0.6   2.0 3.6 18 101      PTT - No results in last year.  1.2   12.4 _     Troponin I, High Sensitivity   Date/Time Value Ref Range Status   07/07/2025 01:36 PM 13 0 - 13 ng/L Final   07/07/2025 12:24 PM 14 (H) 0 - 13 ng/L Final   06/14/2025 06:35 PM 13 0 - 13 ng/L  Final     BNP   Date/Time Value Ref Range Status   07/07/2025 12:24  (H) 0 - 99 pg/mL Final   06/14/2025 05:33  (H) 0 - 99 pg/mL Final     HEMOGLOBIN A1c   Date/Time Value Ref Range Status   03/04/2025 11:11 AM 7.4 (H) <5.7 % of total Hgb Final     Comment:     For someone without known diabetes, a hemoglobin A1c  value of 6.5% or greater indicates that they may have   diabetes and this should be confirmed with a follow-up   test.     For someone with known diabetes, a value <7% indicates   that their diabetes is well controlled and a value   greater than or equal to 7% indicates suboptimal   control. A1c targets should be individualized based on   duration of diabetes, age, comorbid conditions, and   other considerations.     Currently, no consensus exists regarding use of  hemoglobin A1c for diagnosis of diabetes for children.           Hemoglobin A1C   Date/Time Value Ref Range Status   12/02/2024 09:08 AM 6.8 (H) See comment % Final   07/23/2024 11:13 AM 7.0 (H) see below % Final     LDL-CHOLESTEROL   Date/Time Value Ref Range Status   03/04/2025 11:11 AM 47 mg/dL (calc) Final     Comment:     Reference range: <100     Desirable range <100 mg/dL for primary prevention;    <70 mg/dL for patients with CHD or diabetic patients   with > or = 2 CHD risk factors.     LDL-C is now calculated using the Sarika   calculation, which is a validated novel method providing   better accuracy than the Friedewald equation in the   estimation of LDL-C.   Sd PIERCE et al. JANETH. 2013;310(19): 0773-4873   (http://education.[employer].SocialSign.in/faq/CYR087)       LDL Calculated   Date/Time Value Ref Range Status   12/02/2024 09:08 AM 53 <=99 mg/dL Final     Comment:                                 Near   Borderline      AGE      Desirable  Optimal    High     High     Very High     0-19 Y     0 - 109     ---    110-129   >/= 130     ----    20-24 Y     0 - 119     ---    120-159   >/= 160     ----      >24 Y     0  -  99   100-129  130-159   160-189     >/=190     07/23/2024 11:13 AM 47 <=99 mg/dL Final     Comment:                                 Near   Borderline      AGE      Desirable  Optimal    High     High     Very High     0-19 Y     0 - 109     ---    110-129   >/= 130     ----    20-24 Y     0 - 119     ---    120-159   >/= 160     ----      >24 Y     0 -  99   100-129  130-159   160-189     >/=190     04/04/2024 11:02 AM 44 <=99 mg/dL Final     Comment:                                 Near   Borderline      AGE      Desirable  Optimal    High     High     Very High     0-19 Y     0 - 109     ---    110-129   >/= 130     ----    20-24 Y     0 - 119     ---    120-159   >/= 160     ----      >24 Y     0 -  99   100-129  130-159   160-189     >/=190       VLDL   Date/Time Value Ref Range Status   12/02/2024 09:08 AM 38 0 - 40 mg/dL Final   07/23/2024 11:13 AM 44 (H) 0 - 40 mg/dL Final   04/04/2024 11:02 AM 38 0 - 40 mg/dL Final      Last I/O:  I/O last 3 completed shifts:  In: 104.6 (1.4 mL/kg) [I.V.:104.6 (1.4 mL/kg)]  Out: - (0 mL/kg)   Weight: 73.5 kg     Past Cardiology Tests (Last 3 Years):  EKG:  ECG 12 lead 06/14/2025      Electrocardiogram, 12-lead PRN ACS symptoms 06/02/2025      Electrocardiogram, 12-lead PRN ACS symptoms 06/02/2025      ECG 12 lead 05/31/2025      Electrocardiogram, 12-lead PRN ACS symptoms 05/12/2025      ECG 12 lead 05/03/2025      ECG 12 Lead 02/21/2024    Echo:  Transthoracic Echo (TTE) Limited 05/05/2025      Transthoracic Echo (TTE) Complete 11/24/2023    Ejection Fractions:  EF   Date/Time Value Ref Range Status   05/05/2025 02:02 PM 63 %      Cath:  No results found for this or any previous visit from the past 1095 days.    Stress Test:  No results found for this or any previous visit from the past 1095 days.    Cardiac Imaging:  No results found for this or any previous visit from the past 1095 days.      Past Medical History:  She has a past medical history of Diabetes (Multi),  Diabetes mellitus (Multi), Disease of thyroid gland, Hypertension, and Renal disease.  Atherosclerotic heart disease status post remote PCI/stent  Primary hypertension  Hyperlipidemia    Past Surgical History:  She has a past surgical history that includes Hysterectomy; Back surgery; and Appendectomy.      Social History:  She reports that she has never smoked. She has never been exposed to tobacco smoke. She has never used smokeless tobacco. She reports that she does not drink alcohol and does not use drugs.      Family History:  Mother  at age 80 with no history of cardiovascular disease.  Father  at age 39 with congestive heart failure.     Allergies:  Amlodipine, Amoxicillin, Amoxicillin-pot clavulanate, Glimepiride, Lisinopril, Metformin hcl, Tetracycline hcl, and Cephalexin    Inpatient Medications:  Scheduled Medications[1]  PRN Medications[2]  Continuous Medications[3]  Outpatient Medications:  Current Outpatient Medications   Medication Instructions    acetaminophen (TYLENOL) 650 mg, oral, Every 4 hours PRN    aspirin 81 mg chewable tablet 1 tablet, Daily    atorvastatin (Lipitor) 80 mg tablet 1 tablet, Daily    blood sugar diagnostic (Accu-Chek Guide test strips) strip 1 strip, subcutaneous, Daily    cholecalciferol (Vitamin D-3) 25 MCG (1000 UT) capsule 1 capsule, Daily    cyanocobalamin (Vitamin B-12) 1,000 mcg tablet 1 tablet, Daily    docusate sodium (COLACE) 100 mg, oral, 2 times daily PRN    esomeprazole (NEXIUM) 40 mg, Daily before breakfast    fosfomycin (MONUROL) 3 g, See admin instructions    furosemide (LASIX) 20 mg, Daily    hydrALAZINE (APRESOLINE) 25 mg, oral, 3 times daily    hydroCHLOROthiazide (HYDRODIURIL) 25 mg, oral, Daily    levothyroxine (SYNTHROID, LEVOXYL) 88 mcg, oral, Daily    [START ON 2025] metoprolol succinate XL (TOPROL-XL) 25 mg, oral, Daily, Do not crush or chew.    metoprolol succinate XL (TOPROL-XL) 25 mg, oral, Daily, Do not crush or chew.     SITagliptin phosphate (JANUVIA) 50 mg, oral, Daily    spironolactone (ALDACTONE) 12.5 mg, oral, Daily    vit C/E/Zn/coppr/lutein/zeaxan (PRESERVISION AREDS-2 ORAL) 1 capsule, Every 12 hours       Physical Exam:  Constitutional:       Appearance: Not in distress.   Eyes:      Conjunctiva/sclera: Conjunctivae normal.   HENT:    Mouth/Throat:      Pharynx: Oropharynx is clear.   Neck:      Vascular: No carotid bruit. JVD normal.   Pulmonary:      Breath sounds: Normal breath sounds. No wheezing. No rales.   Cardiovascular:      Regular rhythm.      Murmurs: There is no murmur.      No gallop.  No click. No rub.   Abdominal:      Palpations: Abdomen is soft.      Tenderness: There is no abdominal tenderness.   Musculoskeletal:         General: No deformity. Neurological:      General: No focal deficit present.           Assessment/Plan     Acute on chronic heart failure with preserved ejection fraction  Atherosclerotic heart disease status post remote PCI/stent  Primary hypertension  Hyperlipidemia  Diabetes mellitus type 2  Hypothyroidism    7/8: Patient presents with acute shortness of breath symptoms primarily with exertion.  An anginal equivalent would certainly be possible but I see no ischemic changes on EKGs and troponins are unremarkable.  Heart failure with preserved ejection fraction certainly would be very possible given the changes seen on the chest x-ray.  Will give the patient IV diuretic therapy and attempt to reduce vascular congestion.  I think adding an SGLT2 inhibitor and substitution of her Januvia would be a reasonable recommendation to treat both her type 2 diabetes as well as her diastolic heart failure.  Blood pressure was significantly elevated on presentation and modestly elevated at this time.  I will titrate up the dose of the spironolactone and hydralazine and we will continue to follow blood pressures on a regular basis.    Peripheral IV 07/07/25 22 G 3 cm Left Antecubital (Active)   Site  Assessment Clean;Dry;Intact 07/07/25 2100   Dressing Status Clean;Dry;Occlusive 07/07/25 2100   Number of days: 1       Peripheral IV 07/07/25 20 G Anterior;Right Hand (Active)   Site Assessment Clean;Dry;Intact 07/07/25 2100   Dressing Status Clean;Dry;Occlusive 07/07/25 2100   Number of days: 1       Code Status:  Full Code    I spent 60 minutes in the professional and overall care of this patient.        Pancho Mondragon DO       [1]   Scheduled medications   Medication Dose Route Frequency    aspirin  81 mg oral Daily    atorvastatin  80 mg oral Daily    cholecalciferol  25 mcg oral Daily    cyanocobalamin  1,000 mcg oral Daily    dapagliflozin propanediol  5 mg oral q24h    enoxaparin  40 mg subcutaneous q24h    furosemide  40 mg intravenous BID    hydrALAZINE  50 mg oral TID    insulin lispro  0-10 Units subcutaneous TID AC    levothyroxine  88 mcg oral Daily    metoprolol succinate XL  25 mg oral Daily    pantoprazole  40 mg oral Daily before breakfast    spironolactone  25 mg oral Once   [2]   PRN medications   Medication    acetaminophen    Or    acetaminophen    benzocaine-menthol    bisacodyl    dextromethorphan-guaifenesin    dextrose    dextrose    glucagon    glucagon    hydrALAZINE    oxygen   [3]   Continuous Medications   Medication Dose Last Rate

## 2025-07-08 NOTE — CARE PLAN
The patient's goals for the shift include      The clinical goals for the shift include safety    Over the shift, the patient did not make progress toward the following goals. Barriers to progression include . Recommendations to address these barriers include   Problem: Safety - Adult  Goal: Free from fall injury  Outcome: Progressing     Problem: Chronic Conditions and Co-morbidities  Goal: Patient's chronic conditions and co-morbidity symptoms are monitored and maintained or improved  Outcome: Progressing     Problem: Heart Failure  Goal: Improved gas exchange this shift  Outcome: Progressing  Goal: Improved urinary output this shift  Outcome: Progressing  Goal: Reduction in peripheral edema within 24 hours  Outcome: Progressing  Goal: Report improvement of dyspnea/breathlessness this shift  Outcome: Progressing  Goal: Weight from fluid excess reduced over 2-3 days, then stabilize  Outcome: Progressing  Goal: Increase self care and/or family involvement in 24 hours  Outcome: Progressing   .

## 2025-07-08 NOTE — NURSING NOTE
"Pt c/o \" I feel funny \"no specific c/o EKG  done VSS pt assisted back to bed, once lying down stated she felt better  "

## 2025-07-08 NOTE — PROGRESS NOTES
Fartun Carey is a 89 y.o. female on day 0 of admission presenting with Acute on chronic diastolic (congestive) heart failure.      Subjective   Patient evaluated in room 315 together with nurse.  She is on room air.  She currently denies any shortness of breath but is concerned that she may be if she starts walking.  No chest pain  Cardiology notes from Dr. Mondragon reviewed       Objective     Last Recorded Vitals  /80 (BP Location: Left arm, Patient Position: Lying)   Pulse 69   Temp 36.7 °C (98.1 °F) (Temporal)   Resp 20   Wt 73.5 kg (162 lb)   SpO2 91%   Intake/Output last 3 Shifts:    Intake/Output Summary (Last 24 hours) at 7/8/2025 0901  Last data filed at 7/7/2025 2341  Gross per 24 hour   Intake 104.58 ml   Output --   Net 104.58 ml       Physical Exam  Alert oriented x 3 cooperative on room air no distress very pleasant  Normocephalic/atraumatic EOMI PERRLA  Neck supple  Chest fairly clear with very occasional bibasilar crackles no wheezes  Heart regular S1-S2 distant  Abdomen soft nontender  Extremities no particular pedal pulses  Neurologic examination gross muscles are nonfocal  Psych no psychosis  Skin no rash  Relevant Results  Reviewed BMP CBC x-ray result  Assessment/Plan     Assessment & Plan  Acute on chronic diastolic (congestive) heart failure  Not hypoxic saturating 93 to 94% on room air  Chest x-ray was suggestive of mild CHF  Echocardiogram done in May 2025 showed an ejection fraction of 60 to 65%  Lasix 20 mg twice daily  Follow electrolytes  CHF pathway  Consult cardiology  Hypomagnesia  Magnesium 1.39 in the emergency room  Replaced  Follow electrolytes  Hypertension  Continue home hydralazine and metoprolol  Stage III chronic kidney disease  Creatinine stable  Continue to monitor  Type 2 diabetes mellitus  Hold oral antidiabetics  Hypoglycemia protocol  Accu-Cheks AC and at bedtime  Insulin sliding scale  DVT prophylaxis  Lovenox  Discussed CODE STATUS with patient in detail and  she would like to be full code  Atherosclerotic heart disease of native coronary artery with other forms of angina pectoris    Type 2 diabetes mellitus, without long-term current use of insulin (Multi)    Hypothyroid    Primary hypertension    Stage 3b chronic kidney disease (Multi)    Hypomagnesemia      July 8, appreciate cardiology input and adjust medications, will order PT OT eval and functional assessment             Manisha Edward MD

## 2025-07-08 NOTE — CARE PLAN
The patient's goals for the shift include      The clinical goals for the shift include monitor BP

## 2025-07-08 NOTE — PROGRESS NOTES
Occupational Therapy    Evaluation    Patient Name: Fartun Carey  MRN: [id number]  Department: [facility]  Room: [facility]315-A  Today's Date: 7/8/2025  Time Calculation  Start Time: 1337  Stop Time: 1353  Time Calculation (min): 16 min    Assessment  IP OT Assessment  OT Assessment: Patient is a 89 year old female admitted with acute on chronic diastolic CHF. Patient is presenting with a slight decline in strength, balance, activity tolerance, and function, resulting in an increased need for assist with ADL/IADL tasks. Reocmmend skilled OT to address the above deficits and maximize patient's safety and independence with daily tasks.  Prognosis: Good  Barriers to Discharge Home: No anticipated barriers  Evaluation/Treatment Tolerance: Patient limited by fatigue  End of Session Communication: Bedside nurse  End of Session Patient Position: Bed, 3 rail up, Alarm on  Plan:  Treatment Interventions: ADL retraining, Functional transfer training, UE strengthening/ROM, Endurance training, Patient/family training, Compensatory technique education, Neuromuscular reeducation, Equipment evaluation/education  OT Frequency: 2 times per week  OT Discharge Recommendations: Low intensity level of continued care  Equipment Recommended upon Discharge: Wheeled walker  OT Recommended Transfer Status: Assistive equipment (Comment), Stand by assist  OT - OK to Discharge: Yes    Subjective   Current Problem:  1. Shortness of breath        2. Acute on chronic congestive heart failure, unspecified heart failure type        3. Bilateral pleural effusion          OT Visit Info:  OT Received On: 07/08/25  General Visit Info:  General  Reason for Referral: decline in ADLs, acute on chronic diastolic CHF  Referred By: Dr. Edward  Past Medical History Relevant to Rehab: HLD, OA, pulmonary nodule, CKD stage 3, acute on chronic renal failure, a-fib, acute cystitis without hematuria  Family/Caregiver Present: No  Co-Treatment: PT  Co-Treatment Reason:  decreased activity tolerance this date  Prior to Session Communication: Bedside nurse  Patient Position Received: Bed, 3 rail up, Alarm off, not on at start of session  Preferred Learning Style: verbal, visual  General Comment: Patient is a 89 year old female who presented to the ED with c/o SOB. Patient admitted with acute on chronic diastolic CHF, atherosclerotic heart disease of native coronary artery with other forms of angina pectoris, DM type 2, hypothyroid, primary HTN, CKD stage 3, hypoemagnesemia and SOB. Patient is in bed upon arrival and agreeable to participate.  Precautions:  Hearing/Visual Limitations: +glasses, bilteral hearing aides  Medical Precautions: Fall precautions    Vital Signs Comment: O2 97% on RA. HR up to 130 with functional mobility    Pain:  Pain Assessment  Pain Assessment: 0-10  0-10 (Numeric) Pain Score: 0 - No pain    Objective   Cognition:  Overall Cognitive Status: Within Functional Limits  Orientation Level: Oriented X4  Cognition Comments: patient is pleasant and cooperative. able to follow commands appropriately     Home Living:  Type of Home: Condo  Lives With: Alone  Home Adaptive Equipment: Cane, Other (Comment) (rollator)  Home Layout: One level  Home Access: Level entry  Bathroom Shower/Tub: Walk-in shower  Bathroom Toilet: Handicapped height  Bathroom Equipment: Grab bars in shower, Built-in shower seat  Home Living Comments: patient reports 2 falls in the last 2 weeks due to weakness in her B LE and her legs giving out   Prior Function:  Level of [place]: Independent with ADLs and functional transfers, Independent with homemaking with ambulation  Receives Help From: Family, Neighbor  ADL Assistance: Independent  Homemaking Assistance: Independent  Ambulatory Assistance: Independent (rollator)  Vocational: Retired  Prior Function Comments: + drives. sleeps in an adjustable bed  IADL History:  Homemaking Responsibilities: Yes  IADL Comments: patient is independent  with ADLs/IADLs at baseline  ADL:  Eating Assistance: Stand by  Eating Deficit: Setup (per clinical judgement)  Grooming Assistance: Stand by  Grooming Deficit: Steadying, Supervision/safety, Increased time to complete, Standing with assistive device (per clinical judgement)  Bathing Assistance: Minimal  Bathing Deficit: Setup, Steadying, Supervision/safety, Increased time to complete , Use of adaptive equipment (per clinical judgement)  UE Dressing Assistance: Stand by  UE Dressing Deficit: Setup (per clinical judgement)  LE Dressing Assistance: Minimal  LE Dressing Deficit: Setup, Steadying, Requires assistive device for steadying, Supervision/safety, Increased time to complete, Use of adaptive equipment (per clinical judgement)  Toileting Assistance with Device: Stand by  Toileting Deficit: Setup, Steadying, Supervison/safety (per clinical judgement)  Activity Tolerance:  Endurance: Decreased tolerance for upright activites  Activity Tolerance Comments: limited by fatigue  Bed Mobility/Transfers: Bed Mobility  Bed Mobility: Yes  Bed Mobility 1  Bed Mobility 1: Supine to sitting  Level of Assistance 1: Modified independent  Bed Mobility Comments 1: head of bed slightly elevated, use of bed rail  Bed Mobility 2  Bed Mobility  2: Sitting to supine  Level of Assistance 2: Independent  Bed Mobility Comments 2: bed flat, increased time and effort    Transfers  Transfer: Yes  Transfer 1  Transfer From 1: Bed to  Transfer to 1: Stand  Technique 1: Sit to stand  Transfer Device 1: Walker  Transfer Level of Assistance 1: Close supervision  Trials/Comments 1: increased time. from elevated height of bed    Functional Mobility:  Functional Mobility  Functional Mobility Performed: Yes  Functional Mobility 1  Surface 1: Level tile  Device 1: Rolling walker  Assistance 1: Close supervision  Comments 1: within room. no LOB. < household distances  Sitting Balance:  Static Sitting Balance  Static Sitting-Balance Support: Feet  supported, No upper extremity supported  Static Sitting-Level of Assistance: Distant supervision  Standing Balance:  Static Standing Balance  Static Standing-Balance Support: Bilateral upper extremity supported  Static Standing-Level of Assistance: Close supervision    IADL's:   Homemaking Responsibilities: Yes  IADL Comments: patient is independent with ADLs/IADLs at baseline  Vision: Vision - Basic Assessment  Current Vision: Wears glasses all the time  Sensation:  Sensation Comment: patient denies numbness/tingling  Strength:  Strength Comments: B UE 4/5 grossly  Coordination:  Movements are Fluid and Coordinated: Yes   Hand Function:  Hand Function  Gross Grasp: Functional  Coordination: Functional  Extremities: RUE   RUE : Within Functional Limits and LUE   LUE: Within Functional Limits    Outcome Measures: [facility] Daily Activity  Putting on and taking off regular lower body clothing: A little  Bathing (including washing, rinsing, drying): A little  Putting on and taking off regular upper body clothing: A little  Toileting, which includes using toilet, bedpan or urinal: A little  Taking care of personal grooming such as brushing teeth: A little  Eating Meals: A little  Daily Activity - Total Score: 18    Education Documentation  Body Mechanics, taught by Lennie Jackson OT at 7/8/2025  2:08 PM.  Learner: Patient  Readiness: Acceptance  Method: Explanation, Demonstration  Response: Verbalizes Understanding, Needs Reinforcement  Comment: Reviewed OT POC. Education on safe functional transfers, mobility, ADLs, fall risks, and importance of OOB activity participation    Precautions, taught by Lennie Jackson OT at 7/8/2025  2:08 PM.  Learner: Patient  Readiness: Acceptance  Method: Explanation, Demonstration  Response: Verbalizes Understanding, Needs Reinforcement  Comment: Reviewed OT POC. Education on safe functional transfers, mobility, ADLs, fall risks, and importance of OOB activity participation    Education  Comments  No comments found.      Goals:   Encounter Problems       Encounter Problems (Active)       OT Goals       ADLs (Progressing)       Start:  07/08/25    Expected End:  07/29/25       Patient will complete ADL tasks, with modified independence, using AE need in order to increase patient's safety and independence with self-care tasks.          Functional Transfers (Progressing)       Start:  07/08/25    Expected End:  07/29/25       Patient will complete functional transfers with modified independence, using least restrictive device, in order to increase patient's safety and independence with daily tasks.          Functional Mobility  (Progressing)       Start:  07/08/25    Expected End:  07/29/25       Patient will demonstrate the ability to complete item retrieval and functional mobility with modified independence, in order to increase safety and independence with daily tasks.          Activity Tolerance  (Progressing)       Start:  07/08/25    Expected End:  07/29/25       Patient will demonstrate the ability to participate in functional activity at least >/= 25 minutes in order to increase patient's safety and independence with daily tasks.

## 2025-07-08 NOTE — PROGRESS NOTES
"             Therapy Communication Note    Patient Name: Fartun Carey  MRN: [id number]  Today's Date: 7/8/2025    Discipline: Physical Therapy    Missed Visit Reason:      Missed Time: Cancel    Comment: Attempted to see patient several times this AM. Pt has been with staff members frequently.  At 1031- PT entered room but staff had just assisted her back to bed after \"feeling funny\" while up in the chair. Pending medical workup, will cancel eval for the AM.  "

## 2025-07-08 NOTE — PROGRESS NOTES
07/08/25 1106   Discharge Planning   Living Arrangements Alone   Support Systems Children;Family members   Assistance Needed Patient reports she is independnent of ADLs and IADLs. She reports she hires a girl who comes and helps with cooking occassionally and cleaning.   Type of Residence Private residence   Number of Stairs to Enter Residence 1   Number of Stairs Within Residence 0   Who is requesting discharge planning? Provider   Home or Post Acute Services None   Expected Discharge Disposition [place]  (TBD pending therapy)   Does the patient need discharge transport arranged? No   Financial Resource Strain   How hard is it for you to pay for the very basics like food, housing, medical care, and heating? Not hard   Housing Stability   In the last 12 months, was there a time when you were not able to pay the mortgage or rent on time? N   In the past 12 months, how many times have you moved where you were living? 0   At any time in the past 12 months, were you homeless or living in a shelter (including now)? N   Transportation Needs   In the past 12 months, has lack of transportation kept you from medical appointments or from getting medications? no   In the past 12 months, has lack of transportation kept you from meetings, work, or from getting things needed for daily living? No   Stroke Family Assessment   Stroke Family Assessment Needed No   Intensity of Service   Intensity of Service 0-30 min     MSW met with patient at bedside. She reports she lives at home, in a condo, alone. She states she is independent of all ADLs and IADLs. However, does have a lady that comes in to assist, occasionally, with cooking and has someone who does her cleaning for her.     Patient is uncertain of discharge needs at this time and would like to wait for PT/OT recommendations. Care Transitions will continue to follow.

## 2025-07-09 ENCOUNTER — APPOINTMENT (OUTPATIENT)
Dept: RADIOLOGY | Facility: HOSPITAL | Age: OVER 89
DRG: 291 | End: 2025-07-09
Payer: MEDICARE

## 2025-07-09 LAB
ALBUMIN SERPL BCP-MCNC: 3 G/DL (ref 3.4–5)
ALP SERPL-CCNC: 85 U/L (ref 33–136)
ALT SERPL W P-5'-P-CCNC: 11 U/L (ref 7–45)
ANION GAP SERPL CALCULATED.3IONS-SCNC: 9 MMOL/L (ref 10–20)
AST SERPL W P-5'-P-CCNC: 13 U/L (ref 9–39)
BASOPHILS # BLD AUTO: 0.03 X10*3/UL (ref 0–0.1)
BASOPHILS NFR BLD AUTO: 0.2 %
BILIRUB SERPL-MCNC: 0.5 MG/DL (ref 0–1.2)
BUN SERPL-MCNC: 18 MG/DL (ref 6–23)
CALCIUM SERPL-MCNC: 8.3 MG/DL (ref 8.6–10.3)
CHLORIDE SERPL-SCNC: 101 MMOL/L (ref 98–107)
CO2 SERPL-SCNC: 32 MMOL/L (ref 21–32)
CREAT SERPL-MCNC: 1.2 MG/DL (ref 0.5–1.05)
EGFRCR SERPLBLD CKD-EPI 2021: 41 ML/MIN/1.73M*2
EOSINOPHIL # BLD AUTO: 0.07 X10*3/UL (ref 0–0.4)
EOSINOPHIL NFR BLD AUTO: 0.6 %
ERYTHROCYTE [DISTWIDTH] IN BLOOD BY AUTOMATED COUNT: 17.7 % (ref 11.5–14.5)
GLUCOSE BLD MANUAL STRIP-MCNC: 104 MG/DL (ref 74–99)
GLUCOSE BLD MANUAL STRIP-MCNC: 171 MG/DL (ref 74–99)
GLUCOSE BLD MANUAL STRIP-MCNC: 237 MG/DL (ref 74–99)
GLUCOSE BLD MANUAL STRIP-MCNC: 92 MG/DL (ref 74–99)
GLUCOSE SERPL-MCNC: 92 MG/DL (ref 74–99)
HCT VFR BLD AUTO: 27.6 % (ref 36–46)
HGB BLD-MCNC: 8.7 G/DL (ref 12–16)
IMM GRANULOCYTES # BLD AUTO: 0.04 X10*3/UL (ref 0–0.5)
IMM GRANULOCYTES NFR BLD AUTO: 0.3 % (ref 0–0.9)
LYMPHOCYTES # BLD AUTO: 3.01 X10*3/UL (ref 0.8–3)
LYMPHOCYTES NFR BLD AUTO: 24.1 %
MCH RBC QN AUTO: 30.9 PG (ref 26–34)
MCHC RBC AUTO-ENTMCNC: 31.5 G/DL (ref 32–36)
MCV RBC AUTO: 98 FL (ref 80–100)
MONOCYTES # BLD AUTO: 1.87 X10*3/UL (ref 0.05–0.8)
MONOCYTES NFR BLD AUTO: 15 %
NEUTROPHILS # BLD AUTO: 7.46 X10*3/UL (ref 1.6–5.5)
NEUTROPHILS NFR BLD AUTO: 59.8 %
NRBC BLD-RTO: 0 /100 WBCS (ref 0–0)
PLATELET # BLD AUTO: 186 X10*3/UL (ref 150–450)
POTASSIUM SERPL-SCNC: 3.4 MMOL/L (ref 3.5–5.3)
PROT SERPL-MCNC: 5.3 G/DL (ref 6.4–8.2)
RBC # BLD AUTO: 2.82 X10*6/UL (ref 4–5.2)
SODIUM SERPL-SCNC: 139 MMOL/L (ref 136–145)
WBC # BLD AUTO: 12.5 X10*3/UL (ref 4.4–11.3)

## 2025-07-09 PROCEDURE — 71045 X-RAY EXAM CHEST 1 VIEW: CPT

## 2025-07-09 PROCEDURE — 99232 SBSQ HOSP IP/OBS MODERATE 35: CPT | Performed by: INTERNAL MEDICINE

## 2025-07-09 PROCEDURE — 2500000001 HC RX 250 WO HCPCS SELF ADMINISTERED DRUGS (ALT 637 FOR MEDICARE OP): Performed by: NURSE PRACTITIONER

## 2025-07-09 PROCEDURE — 97116 GAIT TRAINING THERAPY: CPT | Mod: GP

## 2025-07-09 PROCEDURE — 97110 THERAPEUTIC EXERCISES: CPT | Mod: GP

## 2025-07-09 PROCEDURE — 2500000001 HC RX 250 WO HCPCS SELF ADMINISTERED DRUGS (ALT 637 FOR MEDICARE OP): Performed by: INTERNAL MEDICINE

## 2025-07-09 PROCEDURE — 2500000002 HC RX 250 W HCPCS SELF ADMINISTERED DRUGS (ALT 637 FOR MEDICARE OP, ALT 636 FOR OP/ED): Performed by: NURSE PRACTITIONER

## 2025-07-09 PROCEDURE — 99233 SBSQ HOSP IP/OBS HIGH 50: CPT | Performed by: INTERNAL MEDICINE

## 2025-07-09 PROCEDURE — 2500000004 HC RX 250 GENERAL PHARMACY W/ HCPCS (ALT 636 FOR OP/ED): Performed by: NURSE PRACTITIONER

## 2025-07-09 PROCEDURE — 71045 X-RAY EXAM CHEST 1 VIEW: CPT | Performed by: INTERNAL MEDICINE

## 2025-07-09 PROCEDURE — 85025 COMPLETE CBC W/AUTO DIFF WBC: CPT | Performed by: INTERNAL MEDICINE

## 2025-07-09 PROCEDURE — 36415 COLL VENOUS BLD VENIPUNCTURE: CPT | Performed by: INTERNAL MEDICINE

## 2025-07-09 PROCEDURE — 2500000002 HC RX 250 W HCPCS SELF ADMINISTERED DRUGS (ALT 637 FOR MEDICARE OP, ALT 636 FOR OP/ED): Performed by: INTERNAL MEDICINE

## 2025-07-09 PROCEDURE — 80053 COMPREHEN METABOLIC PANEL: CPT | Performed by: INTERNAL MEDICINE

## 2025-07-09 PROCEDURE — 1200000002 HC GENERAL ROOM WITH TELEMETRY DAILY

## 2025-07-09 PROCEDURE — 82947 ASSAY GLUCOSE BLOOD QUANT: CPT

## 2025-07-09 RX ORDER — SPIRONOLACTONE 25 MG/1
25 TABLET ORAL
Status: DISCONTINUED | OUTPATIENT
Start: 2025-07-09 | End: 2025-07-10 | Stop reason: HOSPADM

## 2025-07-09 RX ORDER — POTASSIUM CHLORIDE 20 MEQ/1
20 TABLET, EXTENDED RELEASE ORAL ONCE
Status: COMPLETED | OUTPATIENT
Start: 2025-07-09 | End: 2025-07-09

## 2025-07-09 RX ORDER — HYDRALAZINE HYDROCHLORIDE 25 MG/1
25 TABLET, FILM COATED ORAL 3 TIMES DAILY
Status: DISCONTINUED | OUTPATIENT
Start: 2025-07-09 | End: 2025-07-10 | Stop reason: HOSPADM

## 2025-07-09 RX ADMIN — ASPIRIN 81 MG: 81 TABLET, CHEWABLE ORAL at 09:39

## 2025-07-09 RX ADMIN — INSULIN LISPRO 2 UNITS: 100 INJECTION, SOLUTION INTRAVENOUS; SUBCUTANEOUS at 12:10

## 2025-07-09 RX ADMIN — Medication 25 MCG: at 09:40

## 2025-07-09 RX ADMIN — PANTOPRAZOLE SODIUM 40 MG: 40 TABLET, DELAYED RELEASE ORAL at 06:11

## 2025-07-09 RX ADMIN — DAPAGLIFLOZIN 5 MG: 10 TABLET, FILM COATED ORAL at 09:40

## 2025-07-09 RX ADMIN — ACETAMINOPHEN 650 MG: 325 TABLET ORAL at 09:38

## 2025-07-09 RX ADMIN — POTASSIUM CHLORIDE 20 MEQ: 1500 TABLET, EXTENDED RELEASE ORAL at 09:40

## 2025-07-09 RX ADMIN — ENOXAPARIN SODIUM 40 MG: 40 INJECTION SUBCUTANEOUS at 16:50

## 2025-07-09 RX ADMIN — LEVOTHYROXINE SODIUM 88 MCG: 0.09 TABLET ORAL at 06:11

## 2025-07-09 RX ADMIN — Medication 1000 MCG: at 09:40

## 2025-07-09 RX ADMIN — INSULIN LISPRO 4 UNITS: 100 INJECTION, SOLUTION INTRAVENOUS; SUBCUTANEOUS at 16:50

## 2025-07-09 RX ADMIN — HYDRALAZINE HYDROCHLORIDE 25 MG: 25 TABLET ORAL at 20:19

## 2025-07-09 RX ADMIN — HYDRALAZINE HYDROCHLORIDE 25 MG: 25 TABLET ORAL at 16:08

## 2025-07-09 RX ADMIN — SPIRONOLACTONE 25 MG: 25 TABLET ORAL at 09:44

## 2025-07-09 RX ADMIN — ATORVASTATIN CALCIUM 80 MG: 80 TABLET, FILM COATED ORAL at 09:40

## 2025-07-09 RX ADMIN — METOPROLOL SUCCINATE 25 MG: 25 TABLET, EXTENDED RELEASE ORAL at 09:40

## 2025-07-09 ASSESSMENT — PAIN - FUNCTIONAL ASSESSMENT
PAIN_FUNCTIONAL_ASSESSMENT: 0-10
PAIN_FUNCTIONAL_ASSESSMENT: WONG-BAKER FACES
PAIN_FUNCTIONAL_ASSESSMENT: 0-10
PAIN_FUNCTIONAL_ASSESSMENT: 0-10

## 2025-07-09 ASSESSMENT — PAIN SCALES - GENERAL
PAINLEVEL_OUTOF10: 0 - NO PAIN
PAINLEVEL_OUTOF10: 0 - NO PAIN
PAINLEVEL_OUTOF10: 8
PAINLEVEL_OUTOF10: 0 - NO PAIN

## 2025-07-09 ASSESSMENT — PAIN DESCRIPTION - ORIENTATION: ORIENTATION: RIGHT;LEFT

## 2025-07-09 ASSESSMENT — COGNITIVE AND FUNCTIONAL STATUS - GENERAL
DAILY ACTIVITIY SCORE: 24
CLIMB 3 TO 5 STEPS WITH RAILING: A LITTLE
WALKING IN HOSPITAL ROOM: A LITTLE
MOVING TO AND FROM BED TO CHAIR: A LITTLE
CLIMB 3 TO 5 STEPS WITH RAILING: A LITTLE
MOBILITY SCORE: 21
MOBILITY SCORE: 21
STANDING UP FROM CHAIR USING ARMS: A LITTLE
MOVING TO AND FROM BED TO CHAIR: A LITTLE
DAILY ACTIVITIY SCORE: 24
WALKING IN HOSPITAL ROOM: A LITTLE
MOBILITY SCORE: 20
WALKING IN HOSPITAL ROOM: A LITTLE
MOVING TO AND FROM BED TO CHAIR: A LITTLE
CLIMB 3 TO 5 STEPS WITH RAILING: A LITTLE

## 2025-07-09 ASSESSMENT — PAIN DESCRIPTION - DESCRIPTORS: DESCRIPTORS: ACHING

## 2025-07-09 ASSESSMENT — PAIN SCALES - WONG BAKER: WONGBAKER_NUMERICALRESPONSE: NO HURT

## 2025-07-09 ASSESSMENT — PAIN DESCRIPTION - LOCATION: LOCATION: KNEE

## 2025-07-09 NOTE — PROGRESS NOTES
Fartun Carey is a 89 y.o. female on day 1 of admission presenting with Acute on chronic diastolic (congestive) heart failure.      Subjective   Patient was seen earlier this morning she is in no acute distress on room air.  Cardiology notes from Dr. Mondragon from this morning reviewed restarting hydralazine at a lower dose and transitioning diuretics to Aldactone       Objective     Last Recorded Vitals  BP (!) 147/95 (BP Location: Left arm, Patient Position: Lying)   Pulse 78   Temp 36.7 °C (98.1 °F) (Temporal)   Resp 17   Wt 73.5 kg (162 lb)   SpO2 93%   Intake/Output last 3 Shifts:  No intake or output data in the 24 hours ending 07/09/25 0910    Physical Exam  Alert oriented x 3 cooperative on room air no distress  Chest fairly clear occasional crackles mostly left base  Heart regular  Abdomen soft nontender extremities no edema  Neurologic exam nonfocal  Skin no rash    Relevant Results  Reviewed a.m. labs including BMP and CBC, mild worsening creatinine noted potassium borderline low at 3.4 hemoglobin 9.7  Assessment/Plan     Assessment & Plan  Acute on chronic diastolic (congestive) heart failure  Not hypoxic saturating 93 to 94% on room air  Chest x-ray was suggestive of mild CHF  Echocardiogram done in May 2025 showed an ejection fraction of 60 to 65%  Lasix 20 mg twice daily  Follow electrolytes  CHF pathway  Consult cardiology  Hypomagnesia  Magnesium 1.39 in the emergency room  Replaced  Follow electrolytes  Hypertension  Continue home hydralazine and metoprolol  Stage III chronic kidney disease  Creatinine stable  Continue to monitor  Type 2 diabetes mellitus  Hold oral antidiabetics  Hypoglycemia protocol  Accu-Cheks AC and at bedtime  Insulin sliding scale  DVT prophylaxis  Lovenox  Discussed CODE STATUS with patient in detail and she would like to be full code  Atherosclerotic heart disease of native coronary artery with other forms of angina pectoris    Type 2 diabetes mellitus, without long-term  current use of insulin (Multi)    Hypothyroid    Primary hypertension    Stage 3b chronic kidney disease (Multi)    Hypomagnesemia    Shortness of breath      July 9, continue to monitor as recommended by cardiology.  Continue medication regimen per cardiology.  Would like to repeat chest x-ray as well.             Manisha Edward MD

## 2025-07-09 NOTE — CARE PLAN
The patient's goals for the shift include      The clinical goals for the shift include safety monitor VS

## 2025-07-09 NOTE — CARE PLAN
The patient's goals for the shift include      The clinical goals for the shift include monitor bp    Over the shift, the patient did not make progress toward the following goals. Barriers to progression include . Recommendations to address these barriers include   Problem: Heart Failure  Goal: Improved gas exchange this shift  Outcome: Progressing  Goal: Improved urinary output this shift  Outcome: Progressing  Goal: Reduction in peripheral edema within 24 hours  Outcome: Progressing  Goal: Report improvement of dyspnea/breathlessness this shift  Outcome: Progressing  Goal: Weight from fluid excess reduced over 2-3 days, then stabilize  Outcome: Progressing  Goal: Increase self care and/or family involvement in 24 hours  Outcome: Progressing     Problem: Pain - Adult  Goal: Verbalizes/displays adequate comfort level or baseline comfort level  Outcome: Progressing     Problem: Safety - Adult  Goal: Free from fall injury  Outcome: Progressing     Problem: Chronic Conditions and Co-morbidities  Goal: Patient's chronic conditions and co-morbidity symptoms are monitored and maintained or improved  Outcome: Progressing   .

## 2025-07-09 NOTE — PROGRESS NOTES
"Fartun Carey is a 89 y.o. female on day 1 of admission presenting with Acute on chronic diastolic (congestive) heart failure.    Subjective   Patient still has some shortness of breath symptoms when she exerts herself and walks.  Also knees more painful this morning.       Objective     Physical Exam  Eyes:      Conjunctiva/sclera: Conjunctivae normal.   Cardiovascular:      Rate and Rhythm: Normal rate and regular rhythm.      Heart sounds:      No gallop.   Pulmonary:      Breath sounds: Normal breath sounds. No wheezing, rhonchi or rales.   Abdominal:      Palpations: Abdomen is soft.   Neurological:      General: No focal deficit present.      Mental Status: She is alert.       Constitutional:       Appearance: Not in distress.   Eyes:      Conjunctiva/sclera: Conjunctivae normal.   Neck:      Vascular: JVD normal.   Pulmonary:      Breath sounds: Normal breath sounds. No wheezing. No rhonchi. No rales.   Cardiovascular:      Normal rate. Regular rhythm.      Murmurs: There is no murmur.      No gallop.  No click. No rub.   Abdominal:      Palpations: Abdomen is soft.   Neurological:      General: No focal deficit present.      Mental Status: Alert.        Last Recorded Vitals  Blood pressure (!) 147/95, pulse 78, temperature 36.7 °C (98.1 °F), temperature source Temporal, resp. rate 17, height (!) 1.549 m (5' 1\"), weight 73.5 kg (162 lb), SpO2 93%.  Intake/Output last 3 Shifts:  I/O last 3 completed shifts:  In: 104.6 (1.4 mL/kg) [I.V.:104.6 (1.4 mL/kg)]  Out: - (0 mL/kg)   Weight: 73.5 kg     Relevant Results                   Results for orders placed or performed during the hospital encounter of 07/07/25 (from the past 24 hours)   POCT GLUCOSE   Result Value Ref Range    POCT Glucose 194 (H) 74 - 99 mg/dL   POCT GLUCOSE   Result Value Ref Range    POCT Glucose 182 (H) 74 - 99 mg/dL   POCT GLUCOSE   Result Value Ref Range    POCT Glucose 165 (H) 74 - 99 mg/dL   CBC and Auto Differential   Result Value Ref " Range    WBC 12.5 (H) 4.4 - 11.3 x10*3/uL    nRBC 0.0 0.0 - 0.0 /100 WBCs    RBC 2.82 (L) 4.00 - 5.20 x10*6/uL    Hemoglobin 8.7 (L) 12.0 - 16.0 g/dL    Hematocrit 27.6 (L) 36.0 - 46.0 %    MCV 98 80 - 100 fL    MCH 30.9 26.0 - 34.0 pg    MCHC 31.5 (L) 32.0 - 36.0 g/dL    RDW 17.7 (H) 11.5 - 14.5 %    Platelets 186 150 - 450 x10*3/uL    Neutrophils % 59.8 40.0 - 80.0 %    Immature Granulocytes %, Automated 0.3 0.0 - 0.9 %    Lymphocytes % 24.1 13.0 - 44.0 %    Monocytes % 15.0 2.0 - 10.0 %    Eosinophils % 0.6 0.0 - 6.0 %    Basophils % 0.2 0.0 - 2.0 %    Neutrophils Absolute 7.46 (H) 1.60 - 5.50 x10*3/uL    Immature Granulocytes Absolute, Automated 0.04 0.00 - 0.50 x10*3/uL    Lymphocytes Absolute 3.01 (H) 0.80 - 3.00 x10*3/uL    Monocytes Absolute 1.87 (H) 0.05 - 0.80 x10*3/uL    Eosinophils Absolute 0.07 0.00 - 0.40 x10*3/uL    Basophils Absolute 0.03 0.00 - 0.10 x10*3/uL   Comprehensive Metabolic Panel   Result Value Ref Range    Glucose 92 74 - 99 mg/dL    Sodium 139 136 - 145 mmol/L    Potassium 3.4 (L) 3.5 - 5.3 mmol/L    Chloride 101 98 - 107 mmol/L    Bicarbonate 32 21 - 32 mmol/L    Anion Gap 9 (L) 10 - 20 mmol/L    Urea Nitrogen 18 6 - 23 mg/dL    Creatinine 1.20 (H) 0.50 - 1.05 mg/dL    eGFR 41 (L) >60 mL/min/1.73m*2    Calcium 8.3 (L) 8.6 - 10.3 mg/dL    Albumin 3.0 (L) 3.4 - 5.0 g/dL    Alkaline Phosphatase 85 33 - 136 U/L    Total Protein 5.3 (L) 6.4 - 8.2 g/dL    AST 13 9 - 39 U/L    Bilirubin, Total 0.5 0.0 - 1.2 mg/dL    ALT 11 7 - 45 U/L   POCT GLUCOSE   Result Value Ref Range    POCT Glucose 92 74 - 99 mg/dL     *Note: Due to a large number of results and/or encounters for the requested time period, some results have not been displayed. A complete set of results can be found in Results Review.               Assessment & Plan  Acute on chronic diastolic (congestive) heart failure    Atherosclerotic heart disease of native coronary artery with other forms of angina pectoris    Type 2 diabetes  mellitus, without long-term current use of insulin (Multi)    Hypothyroid    Primary hypertension    Stage 3b chronic kidney disease (Multi)    Hypomagnesemia    Shortness of breath    Acute on chronic heart failure with preserved ejection fraction  Atherosclerotic heart disease status post remote PCI/stent  Primary hypertension  Hyperlipidemia  Diabetes mellitus type 2  Hypothyroidism     7/8: Patient presents with acute shortness of breath symptoms primarily with exertion.  An anginal equivalent would certainly be possible but I see no ischemic changes on EKGs and troponins are unremarkable.  Heart failure with preserved ejection fraction certainly would be very possible given the changes seen on the chest x-ray.  Will give the patient IV diuretic therapy and attempt to reduce vascular congestion.  I think adding an SGLT2 inhibitor and substitution of her Januvia would be a reasonable recommendation to treat both her type 2 diabetes as well as her diastolic heart failure.  Blood pressure was significantly elevated on presentation and modestly elevated at this time.  I will titrate up the dose of the spironolactone and hydralazine and we will continue to follow blood pressures on a regular basis.    7/9: Clinically not much change over the last 24 hours.  We initiated therapy with Farxiga in place of her Januvia yesterday.  Also give the patient IV furosemide therapy.  Will transition from the IV furosemide to oral spironolactone.  Patient was given 50 mg of hydralazine yesterday and had drop in blood pressure and medication placed on hold.  Blood pressure mildly elevated this morning we will restart hydralazine but at a lower dose of 25 mg 3 times daily.  Will increase ambulation and monitor symptoms.      I spent 20 minutes in the professional and overall care of this patient.      Pancho Mondragon,

## 2025-07-09 NOTE — PROGRESS NOTES
Physical Therapy Treatment    Patient Name: Fartun Carey  MRN: [id number]  Department: [address]  Room: [address] [place], [place] [address]  Today's Date: 7/9/2025  Time Calculation  Start Time: 1449  Stop Time: 1513  Time Calculation (min): 24 min         Assessment/Plan   PT Assessment  Barriers to Discharge Home: No anticipated barriers  Evaluation/Treatment Tolerance: Patient tolerated treatment well  Medical Staff Made Aware: Yes  End of Session Communication: Bedside nurse  Assessment Comment: New onset of B knee soreness today likely stiffness during hospitalization. This responds well to BLE exercise and gait as Pt reports a reduction in Sx at end of session. Low intensity rehab is still appropriate at [place]. CC aware  End of Session Patient Position: Bed, 3 rail up, Alarm on     PT Plan  Treatment/Interventions: Transfer training, Gait training, Balance training, Strengthening, Endurance training, Therapeutic exercise, Therapeutic activity, Home exercise program, Positioning, Postural re-education  PT Plan: Ongoing PT  PT Frequency: 3 times per week (during hospitalization)  PT Discharge Recommendations: Low intensity level of continued care  Equipment Recommended upon Discharge: Wheeled walker  PT Recommended Transfer Status: Contact guard, Assistive device  PT - OK to Discharge: Yes    PT Visit Info:  PT Received On: 07/09/25  Response to Previous Treatment: Other (Comment) (See general comment)     General Visit Information:   General  Family/Caregiver Present: No  Prior to Session Communication: Bedside nurse, Care Coordinator (Care coordination reports team+Pt concern for mobility given new B knee pain)  Patient Position Received: Bed, 3 rail up, Alarm on  Preferred Learning Style: verbal, visual  General Comment: PT notified of medical team+Pt concern for new B knee pain which may be limiting her mobility more than previous session. Pt agreeable to PT follow up and is excited to work with me    Subjective  "  Precautions:  Precautions  Hearing/Visual Limitations: +glasses, bilteral hearing aides  Medical Precautions: Fall precautions     Date/Time Vitals Session Patient Position Pulse Resp SpO2 BP MAP (mmHg)    07/09/25 1559 --  --  96  16  93 %  118/55  76                 Objective   Pain:  Pain Assessment  Pain Assessment: 0-10  0-10 (Numeric) Pain Score: 0 - No pain (at rest but reports B knee \"soreness\" if up and moving. She is worried this will impact her mobility. Denies any acute injury or process that would have caused this soreness)  Cognition:  Cognition  Overall Cognitive Status: Within Functional Limits  Orientation Level: Oriented X4    Activity Tolerance:  Activity Tolerance  Endurance: Decreased tolerance for upright activites  Activity Tolerance Comments: Heavily fatigued post gait  Treatments:  Therapeutic Exercise  Therapeutic Exercise Performed: Yes  Therapeutic Exercise Activity 1: Pt educated on and completes B ankle pumps x20, knee kicks x10, resisted knee flexion x10, resisted knee ext x10, resisted hip abd x10, and resisted hip add x10. Pt shown how to self complete. Discussed seated marches as well for HEP purpose but did not formally complete at this time    Bed Mobility  Bed Mobility: Yes  Bed Mobility 1  Bed Mobility 1: Supine to sitting, Sitting to supine  Level of Assistance 1: Modified independent    Ambulation/Gait Training  Ambulation/Gait Training Performed: Yes  Ambulation/Gait Training 1  Surface 1: Level tile  Device 1: Rolling walker  Assistance 1: Contact guard  Comments/Distance (ft) 1: 80'x2: First trial, Pt starts walking very slow, very shuffled, guarded, and kyphotic but as she continues her pace improves, shuffle is mostly corrected, and Pt seems to be more comfortable getting around. Cues for deviation correction. Second trial completed following seated rest break with continued improvement in deviations (CS by end of second trial for safety)  Transfers  Transfer: " Yes  Transfer 1  Transfer From 1: Bed to  Transfer to 1: Stand  Technique 1: Sit to stand, Stand to sit  Transfer Device 1: Walker  Transfer Level of Assistance 1: Close supervision  Trials/Comments 1: Cues on technique  Transfers 2  Transfer From 2: Chair with arms to  Transfer to 2: Stand  Technique 2: Sit to stand, Stand to sit  Transfer Device 2: Walker  Transfer Level of Assistance 2: Close supervision  Trials/Comments 2: cues on technique    Outcome Measures:  [facility] Basic Mobility  Turning from your back to your side while in a flat bed without using bedrails: None  Moving from lying on your back to sitting on the side of a flat bed without using bedrails: None  Moving to and from bed to chair (including a wheelchair): A little  Standing up from a chair using your arms (e.g. wheelchair or bedside chair): A little  To walk in hospital room: A little  Climbing 3-5 steps with railing: A little  Basic Mobility - Total Score: 20    Education Documentation  Home Exercise Program, taught by Jorge Soriano PT at 7/9/2025  4:07 PM.  Learner: Patient  Readiness: Acceptance  Method: Explanation, Demonstration  Response: Verbalizes Understanding  Comment: safe mobility techniques, HEP    Mobility Training, taught by Jorge Soriano PT at 7/9/2025  4:07 PM.  Learner: Patient  Readiness: Acceptance  Method: Explanation, Demonstration  Response: Verbalizes Understanding  Comment: safe mobility techniques, HEP    Education Comments  No comments found.        OP EDUCATION:       Encounter Problems       Encounter Problems (Active)       Balance       Standing Balance (Progressing)       Start:  07/08/25    Expected End:  07/22/25       Pt will demonstrate good static standing balance to promote safe participation with out of bed activity, transfers, and mobility              Mobility       Ambulation (Progressing)       Start:  07/08/25    Expected End:  07/22/25       Pt will ambulate 300' modified independent assist  with walker to promote safe home mobility              PT Transfers       Sit to stand (Progressing)       Start:  07/08/25    Expected End:  07/22/25       Pt will transfer sit to standing position with modified independent assist and walker to promote safe out of bed activity              Safety       Safe Mobility Techniques (Progressing)       Start:  07/08/25    Expected End:  07/22/25       Pt will correctly identify and demonstrate safe mobility techniques to reduce their risks for falls during their acute care stay

## 2025-07-09 NOTE — DOCUMENTATION CLARIFICATION NOTE
PATIENT:               FRANKIE WESTBROOK  [id number]  MRN:                       [id number]  :                       6/10/1928  ADMIT DATE:       2025 11:20 AM  DISCH DATE:  RESPONDING PROVIDER #:        [id number]          PROVIDER RESPONSE TEXT:    Hypertensive Crisis    CDI QUERY TEXT:    Clarification    Instruction:    Based on your assessment of the patient and the clinical information, please provide the requested documentation by clicking on the appropriate radio button and enter any additional information if prompted.    Question: Based on the clinical criteria, can the patient diagnosis of HTN be further specified as    When answering this query, please exercise your independent professional judgment. The fact that a question is being asked, does not imply that any particular answer is desired or expected.    The patient's clinical indicators include:  Clinical Information: 89y.o. F admitted with Acute on chronic diastolic CHF, Hypomagnesemia, HTN, CKD & type 2 DM     ED Provider: 89-year-old female with past medical history of leg swelling, hypertension presenting with concerns for shortness of breath. Patient states she did not take her BP medication, patient's home medication was given, also IV Lasix was given, as I believe she is fluid overloaded.     H&P: Acute on chronic diastolic heart failure, not hypoxic, Hypomagnesemia, HTN, CKD stage 3 & type 2 DM     Cards: Acute on chronic heart failure with preserved ejection fraction, Atherosclerotic heart disease status post remote PCI/stent. Patient presents with acute shortness of breath symptoms primarily with exertion.  An anginal equivalent would be possible but I see no ischemic changes on EKGs and troponins are unremarkable.     Cards: Blood pressure was significantly elevated on presentation and modestly elevated at this time. I will titrate up the dose of the spironolactone and hydralazine and we will continue to  follow blood pressures    Clinical Indicators:  7/7   7/7 BP: 204/86, 208/76, 210/108, 229/109, 155/117, 202/69, 194/94    Treatment:  7/7 Hydrochlorothiazide 25mg po x1 dose  7/7-7/8 Lasix 20-40mg IV x3 doses  7/7-7/8 Hydralazine 25-50mg po x4 doses  7/7-7/9 Metoprolol 25mg po x3 doses  7/7-7/9 Spironolactone 12.5-25mg po x3 doses    Risk Factors: HTN, CHF, CAD with angina, CKD stage 3, BMI: 30.6  Options provided:  -- Hypertensive Crisis  -- Hypertensive Urgency  -- Hypertensive Emergency  -- Other - I will add my own diagnosis  -- Refer to Clinical Documentation Reviewer    Query created by: Lidya Hoyos on 7/9/2025 3:25 PM      Electronically signed by:  NEGRA PEDRO MD 7/9/2025 5:11 PM

## 2025-07-09 NOTE — PROGRESS NOTES
07/09/25 1019   Discharge Planning   Expected Discharge Disposition Othe  (Patient requesting to be re-evaluated by therapy this date due to bilateral knee pain and difficulty getting from chair to the bed. Wants to wait on disposition until the same as she may require SNF.)   Does the patient need discharge transport arranged? No

## 2025-07-10 ENCOUNTER — DOCUMENTATION (OUTPATIENT)
Dept: HOME HEALTH SERVICES | Facility: HOME HEALTH | Age: OVER 89
End: 2025-07-10
Payer: MEDICARE

## 2025-07-10 ENCOUNTER — PHARMACY VISIT (OUTPATIENT)
Dept: PHARMACY | Facility: CLINIC | Age: OVER 89
End: 2025-07-10
Payer: MEDICARE

## 2025-07-10 VITALS
TEMPERATURE: 98.6 F | RESPIRATION RATE: 18 BRPM | WEIGHT: 162 LBS | HEIGHT: 61 IN | BODY MASS INDEX: 30.58 KG/M2 | SYSTOLIC BLOOD PRESSURE: 145 MMHG | OXYGEN SATURATION: 95 % | DIASTOLIC BLOOD PRESSURE: 64 MMHG | HEART RATE: 91 BPM

## 2025-07-10 LAB
ALBUMIN SERPL BCP-MCNC: 2.9 G/DL (ref 3.4–5)
ALP SERPL-CCNC: 82 U/L (ref 33–136)
ALT SERPL W P-5'-P-CCNC: 10 U/L (ref 7–45)
ANION GAP SERPL CALCULATED.3IONS-SCNC: 11 MMOL/L (ref 10–20)
AST SERPL W P-5'-P-CCNC: 13 U/L (ref 9–39)
BASOPHILS # BLD AUTO: 0.03 X10*3/UL (ref 0–0.1)
BASOPHILS NFR BLD AUTO: 0.3 %
BILIRUB SERPL-MCNC: 0.5 MG/DL (ref 0–1.2)
BUN SERPL-MCNC: 23 MG/DL (ref 6–23)
CALCIUM SERPL-MCNC: 8.2 MG/DL (ref 8.6–10.3)
CHLORIDE SERPL-SCNC: 100 MMOL/L (ref 98–107)
CO2 SERPL-SCNC: 31 MMOL/L (ref 21–32)
CREAT SERPL-MCNC: 1.29 MG/DL (ref 0.5–1.05)
EGFRCR SERPLBLD CKD-EPI 2021: 38 ML/MIN/1.73M*2
EOSINOPHIL # BLD AUTO: 0.13 X10*3/UL (ref 0–0.4)
EOSINOPHIL NFR BLD AUTO: 1.1 %
ERYTHROCYTE [DISTWIDTH] IN BLOOD BY AUTOMATED COUNT: 17.2 % (ref 11.5–14.5)
GLUCOSE BLD MANUAL STRIP-MCNC: 107 MG/DL (ref 74–99)
GLUCOSE BLD MANUAL STRIP-MCNC: 235 MG/DL (ref 74–99)
GLUCOSE SERPL-MCNC: 95 MG/DL (ref 74–99)
HCT VFR BLD AUTO: 27.8 % (ref 36–46)
HGB BLD-MCNC: 8.6 G/DL (ref 12–16)
IMM GRANULOCYTES # BLD AUTO: 0.04 X10*3/UL (ref 0–0.5)
IMM GRANULOCYTES NFR BLD AUTO: 0.3 % (ref 0–0.9)
LYMPHOCYTES # BLD AUTO: 2.55 X10*3/UL (ref 0.8–3)
LYMPHOCYTES NFR BLD AUTO: 22.3 %
MCH RBC QN AUTO: 30.4 PG (ref 26–34)
MCHC RBC AUTO-ENTMCNC: 30.9 G/DL (ref 32–36)
MCV RBC AUTO: 98 FL (ref 80–100)
MONOCYTES # BLD AUTO: 1.72 X10*3/UL (ref 0.05–0.8)
MONOCYTES NFR BLD AUTO: 15 %
NEUTROPHILS # BLD AUTO: 6.98 X10*3/UL (ref 1.6–5.5)
NEUTROPHILS NFR BLD AUTO: 61 %
NRBC BLD-RTO: 0 /100 WBCS (ref 0–0)
PLATELET # BLD AUTO: 181 X10*3/UL (ref 150–450)
POTASSIUM SERPL-SCNC: 3.7 MMOL/L (ref 3.5–5.3)
PROT SERPL-MCNC: 5.3 G/DL (ref 6.4–8.2)
Q ONSET: 228 MS
QRS COUNT: 15 BEATS
QRS DURATION: 68 MS
QT INTERVAL: 388 MS
QTC CALCULATION(BAZETT): 466 MS
QTC FREDERICIA: 439 MS
R AXIS: -28 DEGREES
RBC # BLD AUTO: 2.83 X10*6/UL (ref 4–5.2)
SODIUM SERPL-SCNC: 138 MMOL/L (ref 136–145)
T AXIS: 27 DEGREES
T OFFSET: 422 MS
VENTRICULAR RATE: 87 BPM
WBC # BLD AUTO: 11.5 X10*3/UL (ref 4.4–11.3)

## 2025-07-10 PROCEDURE — 85025 COMPLETE CBC W/AUTO DIFF WBC: CPT | Performed by: INTERNAL MEDICINE

## 2025-07-10 PROCEDURE — RXMED WILLOW AMBULATORY MEDICATION CHARGE

## 2025-07-10 PROCEDURE — 80053 COMPREHEN METABOLIC PANEL: CPT | Performed by: INTERNAL MEDICINE

## 2025-07-10 PROCEDURE — 99232 SBSQ HOSP IP/OBS MODERATE 35: CPT | Performed by: INTERNAL MEDICINE

## 2025-07-10 PROCEDURE — 99239 HOSP IP/OBS DSCHRG MGMT >30: CPT | Performed by: INTERNAL MEDICINE

## 2025-07-10 PROCEDURE — 2500000001 HC RX 250 WO HCPCS SELF ADMINISTERED DRUGS (ALT 637 FOR MEDICARE OP): Performed by: INTERNAL MEDICINE

## 2025-07-10 PROCEDURE — 36415 COLL VENOUS BLD VENIPUNCTURE: CPT | Performed by: INTERNAL MEDICINE

## 2025-07-10 PROCEDURE — 2500000001 HC RX 250 WO HCPCS SELF ADMINISTERED DRUGS (ALT 637 FOR MEDICARE OP): Performed by: NURSE PRACTITIONER

## 2025-07-10 PROCEDURE — 2500000002 HC RX 250 W HCPCS SELF ADMINISTERED DRUGS (ALT 637 FOR MEDICARE OP, ALT 636 FOR OP/ED): Performed by: INTERNAL MEDICINE

## 2025-07-10 PROCEDURE — 82947 ASSAY GLUCOSE BLOOD QUANT: CPT

## 2025-07-10 PROCEDURE — 2500000002 HC RX 250 W HCPCS SELF ADMINISTERED DRUGS (ALT 637 FOR MEDICARE OP, ALT 636 FOR OP/ED): Performed by: NURSE PRACTITIONER

## 2025-07-10 RX ORDER — ACETAMINOPHEN 325 MG/1
650 TABLET ORAL EVERY 4 HOURS PRN
Start: 2025-07-10

## 2025-07-10 RX ORDER — HYDRALAZINE HYDROCHLORIDE 25 MG/1
25 TABLET, FILM COATED ORAL 3 TIMES DAILY
Qty: 90 TABLET | Refills: 0 | Status: SHIPPED | OUTPATIENT
Start: 2025-07-10

## 2025-07-10 RX ORDER — SPIRONOLACTONE 25 MG/1
25 TABLET ORAL
Qty: 30 TABLET | Refills: 0 | Status: SHIPPED | OUTPATIENT
Start: 2025-07-11 | End: 2025-08-10

## 2025-07-10 RX ORDER — DAPAGLIFLOZIN 5 MG/1
5 TABLET, FILM COATED ORAL EVERY 24 HOURS
Qty: 30 TABLET | Refills: 0 | Status: SHIPPED | OUTPATIENT
Start: 2025-07-11 | End: 2025-08-10

## 2025-07-10 RX ORDER — METOPROLOL SUCCINATE 25 MG/1
25 TABLET, EXTENDED RELEASE ORAL DAILY
Qty: 30 TABLET | Refills: 0 | Status: SHIPPED | OUTPATIENT
Start: 2025-07-11 | End: 2025-08-10

## 2025-07-10 RX ADMIN — SPIRONOLACTONE 25 MG: 25 TABLET ORAL at 08:32

## 2025-07-10 RX ADMIN — LEVOTHYROXINE SODIUM 88 MCG: 0.09 TABLET ORAL at 05:21

## 2025-07-10 RX ADMIN — Medication 25 MCG: at 08:32

## 2025-07-10 RX ADMIN — HYDRALAZINE HYDROCHLORIDE 25 MG: 25 TABLET ORAL at 08:32

## 2025-07-10 RX ADMIN — Medication 1000 MCG: at 08:32

## 2025-07-10 RX ADMIN — ASPIRIN 81 MG: 81 TABLET, CHEWABLE ORAL at 08:32

## 2025-07-10 RX ADMIN — ACETAMINOPHEN 650 MG: 325 TABLET ORAL at 10:12

## 2025-07-10 RX ADMIN — PANTOPRAZOLE SODIUM 40 MG: 40 TABLET, DELAYED RELEASE ORAL at 05:21

## 2025-07-10 RX ADMIN — INSULIN LISPRO 4 UNITS: 100 INJECTION, SOLUTION INTRAVENOUS; SUBCUTANEOUS at 13:04

## 2025-07-10 RX ADMIN — DAPAGLIFLOZIN 5 MG: 10 TABLET, FILM COATED ORAL at 08:32

## 2025-07-10 RX ADMIN — ATORVASTATIN CALCIUM 80 MG: 80 TABLET, FILM COATED ORAL at 08:32

## 2025-07-10 RX ADMIN — METOPROLOL SUCCINATE 25 MG: 25 TABLET, EXTENDED RELEASE ORAL at 08:32

## 2025-07-10 ASSESSMENT — COGNITIVE AND FUNCTIONAL STATUS - GENERAL
CLIMB 3 TO 5 STEPS WITH RAILING: A LITTLE
DAILY ACTIVITIY SCORE: 23
TOILETING: A LITTLE
WALKING IN HOSPITAL ROOM: A LITTLE
MOBILITY SCORE: 22

## 2025-07-10 ASSESSMENT — PAIN SCALES - GENERAL
PAINLEVEL_OUTOF10: 0 - NO PAIN
PAINLEVEL_OUTOF10: 7
PAINLEVEL_OUTOF10: 0 - NO PAIN

## 2025-07-10 ASSESSMENT — PAIN - FUNCTIONAL ASSESSMENT
PAIN_FUNCTIONAL_ASSESSMENT: 0-10
PAIN_FUNCTIONAL_ASSESSMENT: 0-10
PAIN_FUNCTIONAL_ASSESSMENT: FLACC (FACE, LEGS, ACTIVITY, CRY, CONSOLABILITY)

## 2025-07-10 NOTE — DISCHARGE INSTR - AVS FIRST PAGE
Heart Failure Discharge Instructions    First 10 minutes of your day:  1. Weigh yourself daily and record on your weight calendars.  2. If you gain more than 2 or 3 pounds overnight or 5 pounds in 5 days, call your cardiologist.  3. Check yourself for any subtle change of symptoms (slight increase in swelling, slight shortness of breath, a new intolerance to lying flat, a new cough). If present, be sure to call your cardiologist right away - do not wait.  4. Next, check your blood pressure and heart rate and record (after sitting a full 5 minutes). Then take your morning meds.    Rest of the Day:  5. Check blood pressure and heart rate a few hours later and record on your calendars.  6. Follow a low sodium diet. No more than 700 mg per meal (or 2000 mg per day).  7. Limit total fluids to no more than 8 cups (or 2 liters) per day - this includes all fluids (water, coffee, juice, milk, tea, etc.), and no less than 6 cups per day.  8. Stay as active as you can tolerate.     Important Follow-Up:  9. Be sure to see your cardiologist in one week after discharge. Call to schedule your follow-up appointments when you get home if they were not already scheduled for you.  10. Keep your follow-up appointments! Bring your weight calendars with you so the doctors can see your weight trend and blood pressure readings.  11. Be sure to  any new prescriptions and take them as directed. If unsure of the medications, be sure to call your cardiologist. Be sure to obtain refills for all new cardiac medicines at your follow-up appointment.  12. If you have any questions or concerns or you have not heard back from the cardiologist, feel free to call Katie D'Amico, Heart Failure Navigator at [phone]

## 2025-07-10 NOTE — PROGRESS NOTES
"   07/10/25 1124   Discharge Planning   Home or Post Acute Services In home services   Type of Home Care Services Home OT;Home PT;Home nursing visits     Spoke with pt, role of tCC explained. Pt would like to home home with [facility] on discharge. Choice lsit provided. Per freedom of choice would like to use  as she states, \"I used them before.\" Will request provider to place orders, await acceptance and SOC. CT will follow.     1320- [facility] SOC 7/12.   "

## 2025-07-10 NOTE — DISCHARGE SUMMARY
Discharge Diagnosis  Problem List[1]  Congestive heart failure  Decreased ambulatory status  Coronary artery disease  Hypertension  Type 2 diabetes  Hypothyroidism    Issues Requiring Follow-Up  New medications follow-up with physicians    Discharge Meds     Your medication list        START taking these medications        Instructions Last Dose Given Next Dose Due   dapagliflozin propanediol 5 mg tablet  Commonly known as: Farxiga  Start taking on: July 11, 2025      Take 1 tablet (5 mg) by mouth once every 24 hours.              CHANGE how you take these medications        Instructions Last Dose Given Next Dose Due   acetaminophen 325 mg tablet  Commonly known as: Tylenol  What changed: reasons to take this      Take 2 tablets (650 mg) by mouth every 4 hours if needed for mild pain (1 - 3), moderate pain (4 - 6), headaches or fever (temp greater than 38.0 C).       metoprolol succinate XL 25 mg 24 hr tablet  Commonly known as: Toprol-XL  Start taking on: July 11, 2025  What changed: Another medication with the same name was removed. Continue taking this medication, and follow the directions you see here.      Take 1 tablet (25 mg) by mouth once daily. Do not crush or chew.       spironolactone 25 mg tablet  Commonly known as: Aldactone  Start taking on: July 11, 2025  What changed:   how much to take  when to take this      Take 1 tablet (25 mg) by mouth once every 24 hours.              CONTINUE taking these medications        Instructions Last Dose Given Next Dose Due   Accu-Chek Guide test strips  Generic drug: blood sugar diagnostic      Inject 1 strip under the skin early in the morning..       aspirin 81 mg chewable tablet           atorvastatin 80 mg tablet  Commonly known as: Lipitor           cholecalciferol 25 mcg (1,000 units) capsule  Commonly known as: Vitamin D-3           docusate sodium 100 mg capsule  Commonly known as: Colace      Take 1 capsule (100 mg) by mouth 2 times a day as needed for  constipation.       esomeprazole 40 mg DR capsule  Commonly known as: NexIUM           fosfomycin 3 gram packet  Commonly known as: Monurol           hydrALAZINE 25 mg tablet  Commonly known as: Apresoline      Take 1 tablet (25 mg) by mouth 3 times a day.       levothyroxine 88 mcg tablet  Commonly known as: Synthroid, Levoxyl      Take 1 tablet (88 mcg) by mouth once daily.       PRESERVISION AREDS-2 ORAL           Vitamin B-12 1,000 mcg tablet  Generic drug: cyanocobalamin                  STOP taking these medications      furosemide 20 mg tablet  Commonly known as: Lasix        hydroCHLOROthiazide 25 mg tablet  Commonly known as: HYDRODiuril        SITagliptin phosphate 50 mg tablet  Commonly known as: Januvia                  Where to Get Your Medications        These medications were sent to [facility]  [address], [facility] [address]      Hours: 9 AM to 6 PM Mon-Fri, 9 AM to 1 PM Sat Phone: [phone]   dapagliflozin propanediol 5 mg tablet  hydrALAZINE 25 mg tablet  metoprolol succinate XL 25 mg 24 hr tablet  spironolactone 25 mg tablet       Information about where to get these medications is not yet available    Ask your nurse or doctor about these medications  acetaminophen 325 mg tablet         Test Results Pending At Discharge  Pending Labs       No current pending labs.            Hospital Course  This patient was admitted with exertional dyspnea and suggestion of diastolic acute heart failure.  She was diuresed by cardiology medications were adjusted notably for starting metoprolol Aldactone and hydralazine combination.  Cardiology as of today recommends to hold off on Lasix.  They also change Jardiance to Farxiga low-dose.  They recommend follow-up with her own cardiologist Dr. Monroy.  The patient will physical therapy who eventually recommend going home with home care she is on room air.  I did update her daughter Humaira over the phone and she had no further  questions    Pertinent Physical Exam At Time of Discharge  The patient was seen in room 315 in the presence of heart failure nurse  Normocephalic/atraumatic EOMI PERRLA  Neck supple  Chest clear heart regular  Abdomen soft nontender  Extremities no edema  Neurologic exam grossly nonfocal    Lab results from today reviewed including CMP and CBC    Outpatient Follow-Up  Per chart    Time spent on discharge arrangement:  45 minutes    Manisha Edward MD         [1]   Patient Active Problem List  Diagnosis    Complication of diabetes mellitus (Multi)    Atherosclerotic heart disease of native coronary artery with other forms of angina pectoris    Degeneration of lumbar intervertebral disc    Type 2 diabetes mellitus, without long-term current use of insulin (Multi)    Elevated erythrocyte sedimentation rate    History of hysterectomy    Hyperlipidemia    Hypothyroid    Lichen sclerosus    Lumbar spondylosis    Osteoarthritis    Pulmonary nodule    Primary hypertension    Lumbosacral spondylosis without myelopathy    Vitamin D deficiency    Vulvodynia    Hyponatremia    Peripheral vascular disease    Supraventricular tachycardia    Stage 3b chronic kidney disease (Multi)    Dysequilibrium    Weakness    BPPV (benign paroxysmal positional vertigo), left    Chronic kidney disease, stage 3a (Multi)    Generalized weakness    Acute cystitis without hematuria    Sepsis due to Escherichia coli without acute organ dysfunction (Multi)    Acute on chronic renal failure    Hypomagnesemia    A-fib (Multi)    Acute on chronic diastolic (congestive) heart failure    Shortness of breath

## 2025-07-10 NOTE — HH CARE COORDINATION
Home Care received a Referral to Resume Care for Nursing, Physical Therapy, and Occupational Therapy. We have processed the referral for a Resumption of Care on 7/12.     If you have any questions or concerns, please feel free to contact us at [phone]. Follow the prompts, enter your five digit zip code, and you will be directed to your care team on E[facility] 1.

## 2025-07-10 NOTE — CARE PLAN
The patient's goals for the shift include      The clinical goals for the shift include monitor labs/VS

## 2025-07-10 NOTE — CONSULTS
Heart Failure Nurse Navigator    The role of the HF nurse navigator is to (1) characterize risk profiles of patients with heart failure transitioning from [id number] after hospitalization, (2) recommend interventions to improve care and reduce risks of worse post-hospitalization outcomes. Potential recommendations may touch base on patient/family education, additional consults that may bring value, and appointment scheduling.    History    I met with Fartun Carey at the bedside, and my impressions include: 89 y.o. female presenting with shortness of breath with exertion.  She has a history of hypothyroid, chronic kidney disease, hypertension, diastolic heart failure, and type 2 diabetes mellitus.  Patient states that about a week ago her legs were very swollen and she was getting progressively short of breath with exertion.  She states her shortness of breath kept worsening over the last 3 days. BNP elevated at 276, Echo from 5/31/25 shows EF 63%, CXR shows normal cardiomediastinal silhouette.     Patients Cardiologist(s): Dr. Monroy    Patients Primary Care Provider: Dr. Hamilton    1. Medical Domain  What is the patient's most recent LVEF? 63%  HFrEF (LVEF <= 40%) Quadruple therapy recommended  HFmrEF (LVEF 41-49%) Quadruple therapy recommended  HFpEF (LVEF >= 50%) Minimum recommendations: SGLT2i and MRA  Is the patient on OP GDMT for their condition?   ARNI/ACEI/ARB: No None  BB: Yes Metoprolol succinate 25 mg daily  MRA: Yes spironolactone 25 mg daily  SGLT2i: Yes dapagliflozin 5mg daily  Is the patient prescribed a diuretic? No  None  Does this patient have an implanted device (ie cardioMEMS, ICD, CRT-D)?  Device type: None  Could this patient have advanced heart failure (Stage D heart failure)?: No     2. Mind and Emotion  Does this patient have possible cognitive impairment?: No   Does this patient have major depression?: No     3. Physical Function  Could this patient be frail?: No   Defined by  "presence of all of these: slowness, weakness, shrinking, inactivity, exhaustion  Is this patient at risk for falling?: Yes   Defined by having experienced a fall in the last 12 months.    4. Social Determinants of Health  Transportation deficits?: No   Lack of insurance?: No   Poor financial resources?: No   Living conditions (homelessness, unstable home)?: No   Poor family/social support?: No   Poor personal care?: No   Food insecurity?: No   Lack of HCPOA?: No    I provided the following heart failure education:  - Living With Heart Failure book provided.  - HF signs and symptoms, heart failure zones and when to call cardiologist.   - Controlling Heart Failure at Home: medication adherence, following up with cardiologist at least once yearly, staying healthy and active, limiting sodium and fluid intake as directed by cardiologist.  - Daily Weight Education    Assessment  Met with patient at the bedside. Very pleasant alert patient. We discussed her home life and how she manages her HF. Patient states \"I don't eat lots of salty things, but I also don't look at labels like I should\". We went over the low sodium and nutrition label handout so Fartun knows what to look for when looking for food sodium content. Fartun stated that she drinks around 50-56 ounces of liquid a day. She explained that she tries to stay hydrated without over hydrating. Patient has been weighing herself at home daily. I did explain the significance of why we recommend daily weights. Fartun is currently still driving and gets herself to and from her doctors appointments. She manages her prescriptions through a home delivery company and has no issues receiving her medications. Fartun' depends on daughter Olive for some things but is mainly independent and lives alone. She will be discharging home with home health. Fartun is concerned about her new medication Farxiga and it's cost. She said she will look into that before her follow up appointment with Dr." Porfirio. Patient has opted into M2Bs but has opted out of HHVC at this time.     CHF disease education was discussed in great detail with a patient.       CHF signs and symptoms discussed and when to call cardiologist?  Yes    Living With Heart Failure Education booklet?  Yes    Controlling Heart Failure at Home Education? Yes    Home medication usage?  Yes    Nutrition Education? Yes    Fluid Restriction Education? Yes    Daily Weight Education? Yes    Follow-up with Cardiologist after discharge education? Yes       IP Medications:  ARNi/ACEi/ARB: None  BB: Metoprolol succinate 25 mg daily  MRA: spironolactone 25 mg daily  SGLT2i: dapagliflozin 5mg daily  Diuretic: None    Recommendations/ Plan  Hospital follow up with cardiologist as recommended, take all medications, keep salt and salty foods to a minimum, monitor fluid intake, practice daily weights and monitor for HF symptoms and report them promptly to your cardiologist.        *Cardiology Discharge Appointment Follow-up Plan: Dr. Monroy 7/23/2025 at 1415 at [facility]

## 2025-07-10 NOTE — PROGRESS NOTES
"Fartun Carey is a 89 y.o. female on day 2 of admission presenting with Acute on chronic diastolic (congestive) heart failure.    Subjective   The patient states that she is feeling better.  Less shortness of breath and overall improved.  Think she is ready for discharge home.       Objective     Physical Exam  Eyes:      Conjunctiva/sclera: Conjunctivae normal.   Cardiovascular:      Rate and Rhythm: Normal rate and regular rhythm.      Heart sounds:      No gallop.   Pulmonary:      Breath sounds: Normal breath sounds. No wheezing, rhonchi or rales.   Abdominal:      Palpations: Abdomen is soft.   Neurological:      General: No focal deficit present.      Mental Status: She is alert.       Constitutional:       Appearance: Not in distress.   Eyes:      Conjunctiva/sclera: Conjunctivae normal.   Neck:      Vascular: JVD normal.   Pulmonary:      Breath sounds: Normal breath sounds. No wheezing. No rhonchi. No rales.   Cardiovascular:      Normal rate. Regular rhythm.      Murmurs: There is no murmur.      No gallop.  No click. No rub.   Abdominal:      Palpations: Abdomen is soft.   Neurological:      General: No focal deficit present.      Mental Status: Alert.        Last Recorded Vitals  Blood pressure 145/64, pulse 91, temperature 37 °C (98.6 °F), temperature source Temporal, resp. rate 18, height (!) 1.549 m (5' 1\"), weight 73.5 kg (162 lb), SpO2 95%.  Intake/Output last 3 Shifts:  I/O last 3 completed shifts:  In: 590 (8 mL/kg) [P.O.:590]  Out: - (0 mL/kg)   Weight: 73.5 kg     Relevant Results                   Results for orders placed or performed during the hospital encounter of 07/07/25 (from the past 24 hours)   POCT GLUCOSE   Result Value Ref Range    POCT Glucose 171 (H) 74 - 99 mg/dL   POCT GLUCOSE   Result Value Ref Range    POCT Glucose 237 (H) 74 - 99 mg/dL   POCT GLUCOSE   Result Value Ref Range    POCT Glucose 104 (H) 74 - 99 mg/dL   CBC and Auto Differential   Result Value Ref Range    WBC 11.5 " (H) 4.4 - 11.3 x10*3/uL    nRBC 0.0 0.0 - 0.0 /100 WBCs    RBC 2.83 (L) 4.00 - 5.20 x10*6/uL    Hemoglobin 8.6 (L) 12.0 - 16.0 g/dL    Hematocrit 27.8 (L) 36.0 - 46.0 %    MCV 98 80 - 100 fL    MCH 30.4 26.0 - 34.0 pg    MCHC 30.9 (L) 32.0 - 36.0 g/dL    RDW 17.2 (H) 11.5 - 14.5 %    Platelets 181 150 - 450 x10*3/uL    Neutrophils % 61.0 40.0 - 80.0 %    Immature Granulocytes %, Automated 0.3 0.0 - 0.9 %    Lymphocytes % 22.3 13.0 - 44.0 %    Monocytes % 15.0 2.0 - 10.0 %    Eosinophils % 1.1 0.0 - 6.0 %    Basophils % 0.3 0.0 - 2.0 %    Neutrophils Absolute 6.98 (H) 1.60 - 5.50 x10*3/uL    Immature Granulocytes Absolute, Automated 0.04 0.00 - 0.50 x10*3/uL    Lymphocytes Absolute 2.55 0.80 - 3.00 x10*3/uL    Monocytes Absolute 1.72 (H) 0.05 - 0.80 x10*3/uL    Eosinophils Absolute 0.13 0.00 - 0.40 x10*3/uL    Basophils Absolute 0.03 0.00 - 0.10 x10*3/uL   Comprehensive Metabolic Panel   Result Value Ref Range    Glucose 95 74 - 99 mg/dL    Sodium 138 136 - 145 mmol/L    Potassium 3.7 3.5 - 5.3 mmol/L    Chloride 100 98 - 107 mmol/L    Bicarbonate 31 21 - 32 mmol/L    Anion Gap 11 10 - 20 mmol/L    Urea Nitrogen 23 6 - 23 mg/dL    Creatinine 1.29 (H) 0.50 - 1.05 mg/dL    eGFR 38 (L) >60 mL/min/1.73m*2    Calcium 8.2 (L) 8.6 - 10.3 mg/dL    Albumin 2.9 (L) 3.4 - 5.0 g/dL    Alkaline Phosphatase 82 33 - 136 U/L    Total Protein 5.3 (L) 6.4 - 8.2 g/dL    AST 13 9 - 39 U/L    Bilirubin, Total 0.5 0.0 - 1.2 mg/dL    ALT 10 7 - 45 U/L   POCT GLUCOSE   Result Value Ref Range    POCT Glucose 107 (H) 74 - 99 mg/dL     *Note: Due to a large number of results and/or encounters for the requested time period, some results have not been displayed. A complete set of results can be found in Results Review.               Assessment & Plan  Acute on chronic diastolic (congestive) heart failure    Atherosclerotic heart disease of native coronary artery with other forms of angina pectoris    Type 2 diabetes mellitus, without long-term  current use of insulin (Multi)    Hypothyroid    Primary hypertension    Stage 3b chronic kidney disease (Multi)    Hypomagnesemia    Shortness of breath    Acute on chronic heart failure with preserved ejection fraction  Atherosclerotic heart disease status post remote PCI/stent  Primary hypertension  Hyperlipidemia  Diabetes mellitus type 2  Hypothyroidism     7/8: Patient presents with acute shortness of breath symptoms primarily with exertion.  An anginal equivalent would certainly be possible but I see no ischemic changes on EKGs and troponins are unremarkable.  Heart failure with preserved ejection fraction certainly would be very possible given the changes seen on the chest x-ray.  Will give the patient IV diuretic therapy and attempt to reduce vascular congestion.  I think adding an SGLT2 inhibitor and substitution of her Januvia would be a reasonable recommendation to treat both her type 2 diabetes as well as her diastolic heart failure.  Blood pressure was significantly elevated on presentation and modestly elevated at this time.  I will titrate up the dose of the spironolactone and hydralazine and we will continue to follow blood pressures on a regular basis.     7/9: Clinically not much change over the last 24 hours.  We initiated therapy with Farxiga in place of her Januvia yesterday.  Also give the patient IV furosemide therapy.  Will transition from the IV furosemide to oral spironolactone.  Patient was given 50 mg of hydralazine yesterday and had drop in blood pressure and medication placed on hold.  Blood pressure mildly elevated this morning we will restart hydralazine but at a lower dose of 25 mg 3 times daily.  Will increase ambulation and monitor symptoms.     7/10: Clinically improved over last 24 hours.  Less shortness of breath.  Chest x-ray appears to be improved as well.  Blood pressure in a reasonable spot at this time.  Do not want to be overly aggressive in lowering blood pressure.  We  have substituted Farxiga for Januvia in attempt to improve treatment of her heart failure with preserved ejection fraction.  Just a slight increase in creatinine with the IV diuretics given yesterday.  Would not continue the furosemide therapy.  At this point continue the combination of spironolactone 25 mg daily, metoprolol succinate 25 mg daily, hydralazine 25 mg 3 times a day for treatment of the blood pressure.  Overall the patient appears to be stable and improved and would okay her discharge home today and then follow-up with Dr. Monroy in the next 2 to 3 weeks.      I spent 15 minutes in the professional and overall care of this patient.      Pancho Mondragon, DO

## 2025-07-11 ENCOUNTER — PATIENT OUTREACH (OUTPATIENT)
Facility: CLINIC | Age: OVER 89
End: 2025-07-11
Payer: MEDICARE

## 2025-07-11 NOTE — PROGRESS NOTES
Discharge Facility: [facility]     Discharge Diagnosis:    Congestive heart failure  Decreased ambulatory status  Coronary artery disease  Hypertension  Type 2 diabetes  Hypothyroidism     Issues Requiring Follow-Up  New medications follow-up with physicians      Admission Date: 7/7/2025   Discharge Date:  7/10/2025     PCP Appointment Date: 7/17/2025 Follow up with Jose Alejandro Hamilton MD (Family Medicine) in 1 week     Specialist Appointment Date:    [facility] / In process     7/17/2025 BMP CBC      Follow up with David Monroy MD (Cardiology); Patient to call and schedule hospital followup appointment with Dr. Monroy in next 2-3 weeks has Appt. 7/23/2025     Hospital Encounter and Summary Linked: Yes      ED to Hosp-Admission (Discharged) with Manisha Edward MD (07/07/2025)       See discharge assessment below for further details     Wrap Up  Wrap Up Additional Comments: I spoke to patient states she is feeling better has DTR in community for support aware of medication changes reviewed , states DTR assists with medicaitons. Patient aware to monitor weight daily after B.R use before eating or drinking if 2+lb in a day or 5+lbs in a week call provider Dr. Monroy right away . [facility] in process. PCP F/U 7/17, patient aware of labs 7/17, aware [facility] in process, Cards F/U 7/23. (7/11/2025 11:02 AM)    Engagement  Call Start Time: 1102 (7/11/2025 11:02 AM)    Medications  Medications reviewed with patient/caregiver?: Yes (7/11/2025 11:02 AM)  Is the patient having any side effects they believe may be caused by any medication additions or changes?: No (7/11/2025 11:02 AM)  Does the patient have all medications ordered at discharge?: Yes (7/11/2025 11:02 AM)  Care Management Interventions: No intervention needed (7/11/2025 11:02 AM)  Prescription Comments: START taking these medications         Instructions  Last Dose Given  Next Dose Due  dapagliflozin propanediol 5 mg tablet  Commonly known as:  Farxiga  Start taking on: July 11, 2025        Take 1 tablet (5 mg) by mouth once every 24 hours.                    CHANGE how you take these medications         Instructions  Last Dose Given  Next Dose Due  acetaminophen 325 mg tablet  Commonly known as: Tylenol  What changed: reasons to take this        Take 2 tablets (650 mg) by mouth every 4 hours if needed for mild pain (1 - 3), moderate pain (4 - 6), headaches or fever (temp greater than 38.0 C).           metoprolol succinate XL 25 mg 24 hr tablet  Commonly known as: Toprol-XL  Start taking on: July 11, 2025  What changed: Another medication with the same name was removed. Continue taking this medication, and follow the directions you see here.        Take 1 tablet (25 mg) by mouth once daily. Do not crush or chew.           spironolactone 25 mg tablet  Commonly known as: Aldactone  Start taking on: July 11, 2025  What changed:   how much to take  when to take this        Take 1 tablet (25 mg) by mouth once every 24 hours. (7/11/2025 11:02 AM)  Is the patient taking all medications as directed (includes completed medication regime)?: -- (Pt states has all meds) (7/11/2025 11:02 AM)  Care Management Interventions: Provided patient education (7/11/2025 11:02 AM)  Medication Comments: STOP taking these medications      furosemide 20 mg tablet  Commonly known as: Lasix        hydroCHLOROthiazide 25 mg tablet  Commonly known as: HYDRODiuril        SITagliptin phosphate 50 mg tablet  Commonly known as: Januvia (7/11/2025 11:02 AM)    Appointments  Does the patient have a primary care provider?: Yes (7/11/2025 11:02 AM)  Care Management Interventions: Verified appointment date/time/provider (7/11/2025 11:02 AM)  Care Management Interventions: Advised patient to keep appointment (7/11/2025 11:02 AM)    Self Management  What is the home health agency?: [facility] , I called they will call back to update on referral , returned call states referral in process (7/11/2025  11:02 AM)  Home Health Interventions: Called home health agency (7/11/2025 11:02 AM)  What Durable Medical Equipment (DME) was ordered?: NA (7/11/2025 11:02 AM)    Patient Teaching  Does the patient have access to their discharge instructions?: Yes (7/11/2025 11:02 AM)  Care Management Interventions: Reviewed instructions with patient (7/11/2025 11:02 AM)  What is the patient's perception of their health status since discharge?: Improving (7/11/2025 11:02 AM)  Is the patient/caregiver able to teach back the hierarchy of who to call/visit for symptoms/problems? PCP, Specialist, Home Health nurse, Urgent Care, ED, 911: Yes (7/11/2025 11:02 AM)

## 2025-07-13 ENCOUNTER — HOME HEALTH ADMISSION (OUTPATIENT)
Dept: HOME HEALTH SERVICES | Facility: HOME HEALTH | Age: OVER 89
End: 2025-07-13
Payer: MEDICARE

## 2025-07-13 ENCOUNTER — HOME CARE VISIT (OUTPATIENT)
Dept: HOME HEALTH SERVICES | Facility: HOME HEALTH | Age: OVER 89
End: 2025-07-13
Payer: MEDICARE

## 2025-07-13 VITALS
HEART RATE: 59 BPM | OXYGEN SATURATION: 96 % | SYSTOLIC BLOOD PRESSURE: 130 MMHG | DIASTOLIC BLOOD PRESSURE: 58 MMHG | TEMPERATURE: 97.2 F | RESPIRATION RATE: 18 BRPM

## 2025-07-13 PROCEDURE — G0299 HHS/HOSPICE OF RN EA 15 MIN: HCPCS | Mod: HHH

## 2025-07-13 PROCEDURE — 169592 NO-PAY CLAIM PROCEDURE

## 2025-07-13 ASSESSMENT — ACTIVITIES OF DAILY LIVING (ADL)
AMBULATION ASSISTANCE: STAND BY ASSIST
CURRENT_FUNCTION: STAND BY ASSIST
ENTERING_EXITING_HOME: STAND BY ASSIST
OASIS_M1830: 03

## 2025-07-13 ASSESSMENT — ENCOUNTER SYMPTOMS
PAIN: 1
HIGHEST PAIN SEVERITY IN PAST 24 HOURS: 2/10
APPETITE LEVEL: GOOD
PAIN LOCATION: GENERALIZED
FATIGUES EASILY: 1
LOWER EXTREMITY EDEMA: 1

## 2025-07-14 ENCOUNTER — PATIENT OUTREACH (OUTPATIENT)
Dept: CASE MANAGEMENT | Facility: HOSPITAL | Age: OVER 89
End: 2025-07-14
Payer: MEDICARE

## 2025-07-14 ENCOUNTER — HOME CARE VISIT (OUTPATIENT)
Dept: HOME HEALTH SERVICES | Facility: HOME HEALTH | Age: OVER 89
End: 2025-07-14
Payer: MEDICARE

## 2025-07-14 NOTE — PROGRESS NOTES
Heart Failure Nurse Navigator Transition of Care Phone Call    The role of the HF nurse navigator is to (1) characterize risk profiles of patients with heart failure transitioning from [id number] after hospitalization, (2) recommend interventions to improve care and reduce risks of worse post-hospitalization outcomes.     HF Nurse Navigator outreach to patient via phone call to review HF education and check on progress. No answer, unable to leave message due to no voicemail.    Will try and call out to the patient again 7/21.    Katie D'Amico BSN, RN   Clinical Nurse Navigator, [facility]     Office: [phone]  [facility]

## 2025-07-16 ENCOUNTER — HOME CARE VISIT (OUTPATIENT)
Dept: HOME HEALTH SERVICES | Facility: HOME HEALTH | Age: OVER 89
End: 2025-07-16
Payer: MEDICARE

## 2025-07-16 ENCOUNTER — APPOINTMENT (OUTPATIENT)
Dept: PAIN MEDICINE | Facility: CLINIC | Age: OVER 89
End: 2025-07-16
Payer: MEDICARE

## 2025-07-16 PROCEDURE — G0151 HHCP-SERV OF PT,EA 15 MIN: HCPCS | Mod: HHH

## 2025-07-16 ASSESSMENT — ENCOUNTER SYMPTOMS
PERSON REPORTING PAIN: PATIENT
DENIES PAIN: 1

## 2025-07-16 ASSESSMENT — ACTIVITIES OF DAILY LIVING (ADL)
AMBULATION ASSISTANCE: MODERATE ASSIST
AMBULATION ASSISTANCE: 1

## 2025-07-16 NOTE — HOME HEALTH
7 7 25 Monday, admitted for Shortness of Breath.  Things are better now but I am weakened from the experience.  Self functional rating of about 75%.  Strength, balance and endurance.  Every now and then I have a dizzy feeling in my head.  I don't know if it is medicine or what.    26 seconds TUG    Walking for 1 minute, 21 seconds wheeled walker, indoors on level carpet and tile surfaces.    One step to enter and exit her home.  Dog Hortencia at her feet.    Standing, Sitting and supine exercises.   Patient retains a copy of those exercises from recent home care visits.

## 2025-07-17 ENCOUNTER — OFFICE VISIT (OUTPATIENT)
Dept: PRIMARY CARE | Facility: CLINIC | Age: OVER 89
End: 2025-07-17
Payer: MEDICARE

## 2025-07-17 VITALS
DIASTOLIC BLOOD PRESSURE: 70 MMHG | BODY MASS INDEX: 28.89 KG/M2 | HEIGHT: 61 IN | WEIGHT: 153 LBS | SYSTOLIC BLOOD PRESSURE: 148 MMHG | HEART RATE: 67 BPM | OXYGEN SATURATION: 99 %

## 2025-07-17 DIAGNOSIS — R91.8 PULMONARY NODULES: ICD-10-CM

## 2025-07-17 DIAGNOSIS — I50.9 ACUTE ON CHRONIC CONGESTIVE HEART FAILURE, UNSPECIFIED HEART FAILURE TYPE: ICD-10-CM

## 2025-07-17 DIAGNOSIS — I12.9 HYPERTENSIVE CHRONIC KIDNEY DISEASE W STG 1-4/UNSP CHR KDNY: ICD-10-CM

## 2025-07-17 DIAGNOSIS — D64.9 ANEMIA, UNSPECIFIED TYPE: ICD-10-CM

## 2025-07-17 DIAGNOSIS — N18.32 STAGE 3B CHRONIC KIDNEY DISEASE (MULTI): ICD-10-CM

## 2025-07-17 DIAGNOSIS — I50.33 ACUTE ON CHRONIC DIASTOLIC (CONGESTIVE) HEART FAILURE: Primary | ICD-10-CM

## 2025-07-17 DIAGNOSIS — E11.9 TYPE 2 DIABETES MELLITUS WITHOUT COMPLICATION, WITHOUT LONG-TERM CURRENT USE OF INSULIN: ICD-10-CM

## 2025-07-17 DIAGNOSIS — I10 PRIMARY HYPERTENSION: ICD-10-CM

## 2025-07-17 DIAGNOSIS — R06.02 SHORTNESS OF BREATH: ICD-10-CM

## 2025-07-17 PROCEDURE — 3078F DIAST BP <80 MM HG: CPT | Performed by: FAMILY MEDICINE

## 2025-07-17 PROCEDURE — 1160F RVW MEDS BY RX/DR IN RCRD: CPT | Performed by: FAMILY MEDICINE

## 2025-07-17 PROCEDURE — 99495 TRANSJ CARE MGMT MOD F2F 14D: CPT | Performed by: FAMILY MEDICINE

## 2025-07-17 PROCEDURE — 1159F MED LIST DOCD IN RCRD: CPT | Performed by: FAMILY MEDICINE

## 2025-07-17 PROCEDURE — 1126F AMNT PAIN NOTED NONE PRSNT: CPT | Performed by: FAMILY MEDICINE

## 2025-07-17 PROCEDURE — 1111F DSCHRG MED/CURRENT MED MERGE: CPT | Performed by: FAMILY MEDICINE

## 2025-07-17 PROCEDURE — 3077F SYST BP >= 140 MM HG: CPT | Performed by: FAMILY MEDICINE

## 2025-07-17 ASSESSMENT — PAIN SCALES - GENERAL: PAINLEVEL_OUTOF10: 0-NO PAIN

## 2025-07-17 NOTE — PROGRESS NOTES
"Patient: Fartun Carey  : 6/10/1928  PCP: Jose Alejandro Hamilton MD  MRN: [id number]  Program: Disease Specific Discharge Navigator  Status: Enrolled  Effective Dates: 7/10/2025 - present  Responsible Staff: Katherine D'Amico, RN  Social Drivers to be Addressed: Physical Activity    Transitional Care Management  Status: Enrolled  Effective Dates: 2025 - present  Responsible Staff: Ruthie German  Social Drivers to be Addressed: Physical Activity         Fartun Carey is a 89 y.o. female presenting today for follow-up after being discharged from the hospital 7 days ago. The main problem requiring admission was congestive heart failure. The discharge summary and/or Transitional Care Management documentation was reviewed. Medication reconciliation was performed as indicated via the \"Asad as Reviewed\" timestamp.     Fartun Carey was contacted by Transitional Care Management services two days after her discharge. This encounter and supporting documentation was reviewed.    Review of Systems    /70   Pulse 67   Ht (!) 1.549 m (5' 1\")   Wt 69.4 kg (153 lb)   SpO2 99%   BMI 28.91 kg/m²     Physical Exam    The complexity of medical decision making for this patient's transitional care is moderate.    Assessment/Plan   Problem List Items Addressed This Visit           ICD-10-CM    Type 2 diabetes mellitus, without long-term current use of insulin (Multi) E11.9    Primary hypertension I10    Stage 3b chronic kidney disease (Multi) N18.32    Relevant Orders    Basic Metabolic Panel    Acute on chronic diastolic (congestive) heart failure - Primary I50.33     Other Visit Diagnoses         Codes      Anemia, unspecified type     D64.9    recheck labs, increased intake of iron rich foods     Relevant Orders    CBC and Auto Differential    Iron and TIBC      Hypertensive chronic kidney disease w stg 1-4/unsp chr kdny     I12.9    continue meds, avoid NSAIDS       Pulmonary nodules     R91.8    recheck CT     " Relevant Orders    CT chest wo IV contrast

## 2025-07-18 ENCOUNTER — HOME CARE VISIT (OUTPATIENT)
Dept: HOME HEALTH SERVICES | Facility: HOME HEALTH | Age: OVER 89
End: 2025-07-18
Payer: MEDICARE

## 2025-07-18 ENCOUNTER — PATIENT OUTREACH (OUTPATIENT)
Dept: PRIMARY CARE | Facility: CLINIC | Age: OVER 89
End: 2025-07-18
Payer: MEDICARE

## 2025-07-18 VITALS
RESPIRATION RATE: 16 BRPM | WEIGHT: 155 LBS | TEMPERATURE: 96.9 F | DIASTOLIC BLOOD PRESSURE: 42 MMHG | BODY MASS INDEX: 29.29 KG/M2 | HEART RATE: 68 BPM | SYSTOLIC BLOOD PRESSURE: 146 MMHG | OXYGEN SATURATION: 99 %

## 2025-07-18 PROCEDURE — G0300 HHS/HOSPICE OF LPN EA 15 MIN: HCPCS | Mod: HHH

## 2025-07-18 ASSESSMENT — ENCOUNTER SYMPTOMS
CHANGE IN APPETITE: UNCHANGED
FATIGUES EASILY: 1
LOWER EXTREMITY EDEMA: 1
APPETITE LEVEL: GOOD
DENIES PAIN: 1

## 2025-07-18 ASSESSMENT — ACTIVITIES OF DAILY LIVING (ADL)
CURRENT_FUNCTION: STAND BY ASSIST
AMBULATION ASSISTANCE: STAND BY ASSIST

## 2025-07-18 NOTE — PROGRESS NOTES
Confirmation of at least 2 patient identifiers.    Completed telephonic follow-up with patient after recent visit with PCP   Spoke to patient during outreach call.    Patient reports feeling: Improved    Patient has questions or concerns about medications: No    Have all prescribed medications been filled? Yes    Patient has necessary resources to manage their care? Yes    Patient has questions or concerns? No    Next care management follow-up approximately within one month.  Care  information provided to patient.        Encouraged to call providers for any questions concerns or change in condition

## 2025-07-19 DIAGNOSIS — E03.9 HYPOTHYROIDISM, UNSPECIFIED TYPE: ICD-10-CM

## 2025-07-21 RX ORDER — LEVOTHYROXINE SODIUM 88 UG/1
88 TABLET ORAL DAILY
Qty: 90 TABLET | Refills: 2 | Status: SHIPPED | OUTPATIENT
Start: 2025-07-21

## 2025-07-22 ENCOUNTER — HOME CARE VISIT (OUTPATIENT)
Dept: HOME HEALTH SERVICES | Facility: HOME HEALTH | Age: OVER 89
End: 2025-07-22
Payer: MEDICARE

## 2025-07-22 ENCOUNTER — PATIENT OUTREACH (OUTPATIENT)
Dept: CASE MANAGEMENT | Facility: HOSPITAL | Age: OVER 89
End: 2025-07-22
Payer: MEDICARE

## 2025-07-22 VITALS
DIASTOLIC BLOOD PRESSURE: 58 MMHG | HEART RATE: 60 BPM | OXYGEN SATURATION: 97 % | SYSTOLIC BLOOD PRESSURE: 150 MMHG | TEMPERATURE: 97.4 F

## 2025-07-22 PROCEDURE — G0157 HHC PT ASSISTANT EA 15: HCPCS | Mod: CQ,HHH

## 2025-07-22 ASSESSMENT — ENCOUNTER SYMPTOMS
DENIES PAIN: 1
PERSON REPORTING PAIN: PATIENT

## 2025-07-22 NOTE — PROGRESS NOTES
Heart Failure Nurse Navigator Transition of Care Phone Call    The role of the HF nurse navigator is to (1) characterize risk profiles of patients with heart failure transitioning from [id number] after hospitalization, (2) recommend interventions to improve care and reduce risks of worse post-hospitalization outcomes.     HF Nurse Navigator outreach to patient via phone call to review HF education and check on progress. No answer, unable to leave message due to busy signal.     Will try to reach patient 7/29.    Katie D'Amico BSN, RN   Clinical Nurse Navigator, [facility]     Office: [phone]  [facility] [place]

## 2025-07-23 ENCOUNTER — APPOINTMENT (OUTPATIENT)
Dept: CARDIOLOGY | Facility: CLINIC | Age: OVER 89
End: 2025-07-23
Payer: MEDICARE

## 2025-07-23 VITALS
DIASTOLIC BLOOD PRESSURE: 69 MMHG | WEIGHT: 152 LBS | RESPIRATION RATE: 14 BRPM | OXYGEN SATURATION: 97 % | SYSTOLIC BLOOD PRESSURE: 163 MMHG | HEART RATE: 71 BPM | HEIGHT: 61 IN | BODY MASS INDEX: 28.7 KG/M2

## 2025-07-23 DIAGNOSIS — I50.9 ACUTE ON CHRONIC CONGESTIVE HEART FAILURE, UNSPECIFIED HEART FAILURE TYPE: ICD-10-CM

## 2025-07-23 DIAGNOSIS — I10 PRIMARY HYPERTENSION: ICD-10-CM

## 2025-07-23 DIAGNOSIS — R06.02 SHORTNESS OF BREATH: ICD-10-CM

## 2025-07-23 LAB
ANION GAP SERPL CALCULATED.4IONS-SCNC: 12 MMOL/L (CALC) (ref 7–17)
BASOPHILS # BLD AUTO: 19 CELLS/UL (ref 0–200)
BASOPHILS NFR BLD AUTO: 0.2 %
BUN SERPL-MCNC: 16 MG/DL (ref 7–25)
BUN/CREAT SERPL: 15 (CALC) (ref 6–22)
CALCIUM SERPL-MCNC: 9.1 MG/DL (ref 8.6–10.4)
CHLORIDE SERPL-SCNC: 105 MMOL/L (ref 98–110)
CO2 SERPL-SCNC: 23 MMOL/L (ref 20–32)
CREAT SERPL-MCNC: 1.08 MG/DL (ref 0.6–0.95)
EGFRCR SERPLBLD CKD-EPI 2021: 47 ML/MIN/1.73M2
EOSINOPHIL # BLD AUTO: 228 CELLS/UL (ref 15–500)
EOSINOPHIL NFR BLD AUTO: 2.4 %
ERYTHROCYTE [DISTWIDTH] IN BLOOD BY AUTOMATED COUNT: 14.4 % (ref 11–15)
GLUCOSE SERPL-MCNC: 113 MG/DL (ref 65–139)
HCT VFR BLD AUTO: 33 % (ref 35–45)
HGB BLD-MCNC: 10.1 G/DL (ref 11.7–15.5)
IRON SATN MFR SERPL: 26 % (CALC) (ref 16–45)
IRON SERPL-MCNC: 69 MCG/DL (ref 45–160)
LYMPHOCYTES # BLD AUTO: 3145 CELLS/UL (ref 850–3900)
LYMPHOCYTES NFR BLD AUTO: 33.1 %
MCH RBC QN AUTO: 31.4 PG (ref 27–33)
MCHC RBC AUTO-ENTMCNC: 30.6 G/DL (ref 32–36)
MCV RBC AUTO: 102.5 FL (ref 80–100)
MONOCYTES # BLD AUTO: 1007 CELLS/UL (ref 200–950)
MONOCYTES NFR BLD AUTO: 10.6 %
NEUTROPHILS # BLD AUTO: 5102 CELLS/UL (ref 1500–7800)
NEUTROPHILS NFR BLD AUTO: 53.7 %
PLATELET # BLD AUTO: 244 THOUSAND/UL (ref 140–400)
PMV BLD REES-ECKER: 11 FL (ref 7.5–12.5)
POTASSIUM SERPL-SCNC: 4.5 MMOL/L (ref 3.5–5.3)
RBC # BLD AUTO: 3.22 MILLION/UL (ref 3.8–5.1)
SODIUM SERPL-SCNC: 140 MMOL/L (ref 135–146)
TIBC SERPL-MCNC: 262 MCG/DL (CALC) (ref 250–450)
WBC # BLD AUTO: 9.5 THOUSAND/UL (ref 3.8–10.8)

## 2025-07-23 PROCEDURE — 3077F SYST BP >= 140 MM HG: CPT | Performed by: INTERNAL MEDICINE

## 2025-07-23 PROCEDURE — 1160F RVW MEDS BY RX/DR IN RCRD: CPT | Performed by: INTERNAL MEDICINE

## 2025-07-23 PROCEDURE — 99214 OFFICE O/P EST MOD 30 MIN: CPT | Performed by: INTERNAL MEDICINE

## 2025-07-23 PROCEDURE — 3078F DIAST BP <80 MM HG: CPT | Performed by: INTERNAL MEDICINE

## 2025-07-23 PROCEDURE — 1159F MED LIST DOCD IN RCRD: CPT | Performed by: INTERNAL MEDICINE

## 2025-07-23 PROCEDURE — 1126F AMNT PAIN NOTED NONE PRSNT: CPT | Performed by: INTERNAL MEDICINE

## 2025-07-23 PROCEDURE — 1111F DSCHRG MED/CURRENT MED MERGE: CPT | Performed by: INTERNAL MEDICINE

## 2025-07-23 RX ORDER — SPIRONOLACTONE 25 MG/1
25 TABLET ORAL
Qty: 90 TABLET | Refills: 3 | Status: SHIPPED | OUTPATIENT
Start: 2025-07-23 | End: 2025-07-23 | Stop reason: SDUPTHER

## 2025-07-23 RX ORDER — METOPROLOL SUCCINATE 25 MG/1
25 TABLET, EXTENDED RELEASE ORAL DAILY
Qty: 90 TABLET | Refills: 3 | Status: SHIPPED | OUTPATIENT
Start: 2025-07-23 | End: 2026-07-18

## 2025-07-23 RX ORDER — DAPAGLIFLOZIN 5 MG/1
5 TABLET, FILM COATED ORAL EVERY 24 HOURS
Qty: 90 TABLET | Refills: 3 | Status: SHIPPED | OUTPATIENT
Start: 2025-07-23 | End: 2025-08-01 | Stop reason: SDUPTHER

## 2025-07-23 RX ORDER — HYDRALAZINE HYDROCHLORIDE 100 MG/1
100 TABLET, FILM COATED ORAL 2 TIMES DAILY
Qty: 180 TABLET | Refills: 3 | Status: SHIPPED | OUTPATIENT
Start: 2025-07-23 | End: 2026-07-23

## 2025-07-23 RX ORDER — SPIRONOLACTONE 25 MG/1
25 TABLET ORAL
Qty: 90 TABLET | Refills: 3 | Status: SHIPPED | OUTPATIENT
Start: 2025-07-23 | End: 2026-07-23

## 2025-07-23 ASSESSMENT — LIFESTYLE VARIABLES
HOW OFTEN DO YOU HAVE SIX OR MORE DRINKS ON ONE OCCASION: NEVER
HOW MANY STANDARD DRINKS CONTAINING ALCOHOL DO YOU HAVE ON A TYPICAL DAY: PATIENT DOES NOT DRINK
HOW OFTEN DO YOU HAVE A DRINK CONTAINING ALCOHOL: NEVER
HAVE YOU OR SOMEONE ELSE BEEN INJURED AS A RESULT OF YOUR DRINKING: NO
AUDIT-C TOTAL SCORE: 0
AUDIT TOTAL SCORE: 0
HAS A RELATIVE, FRIEND, DOCTOR, OR ANOTHER HEALTH PROFESSIONAL EXPRESSED CONCERN ABOUT YOUR DRINKING OR SUGGESTED YOU CUT DOWN: NO
SKIP TO QUESTIONS 9-10: 1

## 2025-07-23 ASSESSMENT — ENCOUNTER SYMPTOMS
LOSS OF SENSATION IN FEET: 0
DEPRESSION: 0
OCCASIONAL FEELINGS OF UNSTEADINESS: 0

## 2025-07-23 ASSESSMENT — PAIN SCALES - GENERAL: PAINLEVEL_OUTOF10: 0-NO PAIN

## 2025-07-23 NOTE — PATIENT INSTRUCTIONS
Increase hydralazine to 100 mg twice daily.    Continue taking all other medications the same way, they have been working very good.    If you develop more swelling in the legs or retaining fluid you may take the furosemide as needed.    I will see you in 4 months

## 2025-07-23 NOTE — PROGRESS NOTES
[facility]    Interventional Cardiology    History of present illness:    Medical History[1]    Surgical History[2]    Allergies[3]     reports that she has never smoked. She has never been exposed to tobacco smoke. She has never used smokeless tobacco. She reports that she does not drink alcohol and does not use drugs.    Family History[4]    Patient's Medications   New Prescriptions    No medications on file   Previous Medications    ACETAMINOPHEN (TYLENOL) 325 MG TABLET    Take 2 tablets (650 mg) by mouth every 4 hours if needed for mild pain (1 - 3), moderate pain (4 - 6), headaches or fever (temp greater than 38.0 C).    ASPIRIN 81 MG CHEWABLE TABLET    Chew and swallow 1 tablet (81 mg) once daily.    ATORVASTATIN (LIPITOR) 80 MG TABLET    Take 1 tablet (80 mg) by mouth once daily.    BLOOD SUGAR DIAGNOSTIC (ACCU-CHEK GUIDE TEST STRIPS) STRIP    Inject 1 strip under the skin early in the morning..    CHOLECALCIFEROL (VITAMIN D-3) 25 MCG (1000 UT) CAPSULE    Take 1 capsule (25 mcg) by mouth once daily.    CYANOCOBALAMIN (VITAMIN B-12) 1,000 MCG TABLET    Take 1 tablet (1,000 mcg) by mouth once daily.    DAPAGLIFLOZIN PROPANEDIOL (FARXIGA) 5 MG TABLET    Take 1 tablet (5 mg) by mouth once every 24 hours.    DOCUSATE SODIUM (COLACE) 100 MG CAPSULE    Take 1 capsule (100 mg) by mouth 2 times a day as needed for constipation.    ESOMEPRAZOLE (NEXIUM) 40 MG DR CAPSULE    Take 1 capsule (40 mg) by mouth once daily in the morning. Take before meals. Do not open capsule.    FOSFOMYCIN (MONUROL) 3 GRAM PACKET    Take 3 g by mouth see administration instructions. 3gm PO once every 2 weeks for 3 months    HYDRALAZINE (APRESOLINE) 25 MG TABLET    Take 1 tablet (25 mg) by mouth 3 times a day.    METOPROLOL SUCCINATE XL (TOPROL-XL) 25 MG 24 HR TABLET    Take 1 tablet (25 mg) by mouth once daily. Do not crush or chew.    SPIRONOLACTONE (ALDACTONE) 25 MG TABLET    Take 1  "tablet (25 mg) by mouth once every 24 hours.    SYNTHROID 88 MCG TABLET    TAKE 1 TABLET ONCE DAILY    VIT C/E/ZN/COPPR/LUTEIN/ZEAXAN (PRESERVISION AREDS-2 ORAL)    Take 1 capsule by mouth every 12 hours.   Modified Medications    No medications on file   Discontinued Medications    No medications on file       Objective   Physical Exam  General: Patient in no acute distress   HEENT: Atraumatic normocephalic.  Neck: Supple, jugular venous pressure within normal limit.  No bruits  Lungs: Clear to auscultation bilaterally  Cardiovascular: Regular rate and rhythm, normal heart sounds, no murmurs rubs or gallops  Abdomen: Soft nontender nondistended.  Normal bowel sounds.  Extremities: Warm to touch, no edema.      Cardiographics  ECG: {findings; ec} .  Echocardiogram: {findings; echo:25818}      Lab Review   {recent labs:::\"not applicable\"}     Assessment/Plan   Problem List[5]   This is a very pleasant 89-year-old female with history of hypertension.  Patient presents to my office for follow-up after recent hospitalization for shortness of breath COPD exacerbation as well as acute decompensated heart failure.  Patient in the hospital was noted to have irregular heartbeat which was consistent of sinus tachycardia with frequent PACs.  Patient was started on beta-blocker.  Presents to my office for follow-up.  Still complaining of shortness of breath with activity.  Still having moderate lower extremity edema.  Primary care physician told her not to take diuretics unless it is really needed.  Physical examination suggestive of volume overload.     At this point my recommendation is to start patient on hydrochlorothiazide for better blood pressure control and as a diuretic.  Will start also on spironolactone 12.5 mg oral daily.  May take furosemide as needed if edema persist or she continues to have severe shortness of breath.  We will check basic metabolic panel in 2 weeks.  I will follow-up in 2 months from " now.  Discussed with her lifestyle modification.  Patient has no chest pain or shortness of breath and mild orthopnea.  Will have also should consider ischemic workup since patient is agreeable to have stress test if needed but her troponin were within normal limits and EKG did not suggest ischemia.    Recent ADHF, labs yesterday good. Increase hydralazine        David Monroy MD              [1]   Past Medical History:  Diagnosis Date    Diabetes (Multi)     Diabetes mellitus (Multi)     Disease of thyroid gland     Hypertension     Renal disease    [2]   Past Surgical History:  Procedure Laterality Date    APPENDECTOMY      BACK SURGERY      HYSTERECTOMY     [3]   Allergies  Allergen Reactions    Amlodipine Swelling    Amoxicillin Unknown    Amoxicillin-Pot Clavulanate Diarrhea    Glimepiride Diarrhea    Lisinopril Cough    Metformin Hcl Diarrhea    Tetracycline Hcl Diarrhea    Cephalexin Rash   [4]   Family History  Problem Relation Name Age of Onset    Heart disease Mother      Heart disease Father      Heart disease Brother      No Known Problems Son      Heart disease Maternal Grandmother      Heart disease Maternal Grandfather      Heart disease Paternal Grandmother      Heart disease Paternal Grandfather     [5]   Patient Active Problem List  Diagnosis    Complication of diabetes mellitus (Multi)    Atherosclerotic heart disease of native coronary artery with other forms of angina pectoris    Degeneration of lumbar intervertebral disc    Type 2 diabetes mellitus, without long-term current use of insulin (Multi)    Elevated erythrocyte sedimentation rate    History of hysterectomy    Hyperlipidemia    Hypothyroid    Lichen sclerosus    Lumbar spondylosis    Osteoarthritis    Pulmonary nodule    Primary hypertension    Lumbosacral spondylosis without myelopathy    Vitamin D deficiency    Vulvodynia    Hyponatremia    Peripheral vascular disease    Supraventricular tachycardia    Stage 3b chronic kidney disease  (Multi)    Dysequilibrium    Weakness    BPPV (benign paroxysmal positional vertigo), left    Chronic kidney disease, stage 3a (Multi)    Generalized weakness    Acute cystitis without hematuria    Sepsis due to Escherichia coli without acute organ dysfunction (Multi)    Acute on chronic renal failure    Hypomagnesemia    A-fib (Multi)    Acute on chronic diastolic (congestive) heart failure    Shortness of breath      07/09/2025 101     Bicarbonate 07/09/2025 32     Anion Gap 07/09/2025 9 (L)     Urea Nitrogen 07/09/2025 18     Creatinine 07/09/2025 1.20 (H)     eGFR 07/09/2025 41 (L)     Calcium 07/09/2025 8.3 (L)     Albumin 07/09/2025 3.0 (L)     Alkaline Phosphatase 07/09/2025 85     Total Protein 07/09/2025 5.3 (L)     AST 07/09/2025 13     Bilirubin, Total 07/09/2025 0.5     ALT 07/09/2025 11     POCT Glucose 07/08/2025 165 (H)     POCT Glucose 07/09/2025 92     POCT Glucose 07/09/2025 171 (H)     POCT Glucose 07/09/2025 237 (H)     WBC 07/10/2025 11.5 (H)     nRBC 07/10/2025 0.0     RBC 07/10/2025 2.83 (L)     Hemoglobin 07/10/2025 8.6 (L)     Hematocrit 07/10/2025 27.8 (L)     MCV 07/10/2025 98     MCH 07/10/2025 30.4     MCHC 07/10/2025 30.9 (L)     RDW 07/10/2025 17.2 (H)     Platelets 07/10/2025 181     Neutrophils % 07/10/2025 61.0     Immature Granulocytes %,* 07/10/2025 0.3     Lymphocytes % 07/10/2025 22.3     Monocytes % 07/10/2025 15.0     Eosinophils % 07/10/2025 1.1     Basophils % 07/10/2025 0.3     Neutrophils Absolute 07/10/2025 6.98 (H)     Immature Granulocytes Ab* 07/10/2025 0.04     Lymphocytes Absolute 07/10/2025 2.55     Monocytes Absolute 07/10/2025 1.72 (H)     Eosinophils Absolute 07/10/2025 0.13     Basophils Absolute 07/10/2025 0.03     Glucose 07/10/2025 95     Sodium 07/10/2025 138     Potassium 07/10/2025 3.7     Chloride 07/10/2025 100     Bicarbonate 07/10/2025 31     Anion Gap 07/10/2025 11     Urea Nitrogen 07/10/2025 23     Creatinine 07/10/2025 1.29 (H)     eGFR 07/10/2025 38 (L)     Calcium 07/10/2025 8.2 (L)     Albumin 07/10/2025 2.9 (L)     Alkaline Phosphatase 07/10/2025 82     Total Protein 07/10/2025 5.3 (L)     AST 07/10/2025 13     Bilirubin, Total 07/10/2025 0.5     ALT 07/10/2025 10     POCT Glucose 07/09/2025 104 (H)     POCT Glucose 07/10/2025 107 (H)     POCT Glucose 07/10/2025 235 (H)    Admission on 06/15/2025, Discharged on 06/15/2025   Component Date Value    WBC  06/15/2025 11.9 (H)     nRBC 06/15/2025 0.0     RBC 06/15/2025 2.65 (L)     Hemoglobin 06/15/2025 8.0 (L)     Hematocrit 06/15/2025 26.2 (L)     MCV 06/15/2025 99     MCH 06/15/2025 30.2     MCHC 06/15/2025 30.5 (L)     RDW 06/15/2025 15.3 (H)     Platelets 06/15/2025 351     Neutrophils % 06/15/2025 84.4     Immature Granulocytes %,* 06/15/2025 0.7     Lymphocytes % 06/15/2025 7.7     Monocytes % 06/15/2025 6.6     Eosinophils % 06/15/2025 0.3     Basophils % 06/15/2025 0.3     Neutrophils Absolute 06/15/2025 10.03 (H)     Immature Granulocytes Ab* 06/15/2025 0.08     Lymphocytes Absolute 06/15/2025 0.91     Monocytes Absolute 06/15/2025 0.78     Eosinophils Absolute 06/15/2025 0.03     Basophils Absolute 06/15/2025 0.03     Glucose 06/15/2025 195 (H)     Sodium 06/15/2025 138     Potassium 06/15/2025 3.9     Chloride 06/15/2025 105     Bicarbonate 06/15/2025 26     Anion Gap 06/15/2025 11     Urea Nitrogen 06/15/2025 19     Creatinine 06/15/2025 1.29 (H)     eGFR 06/15/2025 38 (L)     Calcium 06/15/2025 8.1 (L)     Albumin 06/15/2025 2.7 (L)     Alkaline Phosphatase 06/15/2025 88     Total Protein 06/15/2025 5.2 (L)     AST 06/15/2025 14     Bilirubin, Total 06/15/2025 0.5     ALT 06/15/2025 10     Protime 06/15/2025 12.4     INR 06/15/2025 1.2     ABO TYPE 06/15/2025 A     Rh TYPE 06/15/2025 POS     ANTIBODY SCREEN 06/15/2025 NEG    Admission on 06/14/2025, Discharged on 06/14/2025   Component Date Value    WBC 06/14/2025 14.9 (H)     nRBC 06/14/2025 0.0     RBC 06/14/2025 2.69 (L)     Hemoglobin 06/14/2025 8.2 (L)     Hematocrit 06/14/2025 26.5 (L)     MCV 06/14/2025 99     MCH 06/14/2025 30.5     MCHC 06/14/2025 30.9 (L)     RDW 06/14/2025 15.3 (H)     Platelets 06/14/2025 384     Neutrophils % 06/14/2025 75.4     Immature Granulocytes %,* 06/14/2025 0.7     Lymphocytes % 06/14/2025 9.3     Monocytes % 06/14/2025 14.1     Eosinophils % 06/14/2025 0.2     Basophils % 06/14/2025 0.3     Neutrophils Absolute  06/14/2025 11.22 (H)     Immature Granulocytes Ab* 06/14/2025 0.11     Lymphocytes Absolute 06/14/2025 1.39     Monocytes Absolute 06/14/2025 2.10 (H)     Eosinophils Absolute 06/14/2025 0.03     Basophils Absolute 06/14/2025 0.04     Glucose 06/14/2025 133 (H)     Sodium 06/14/2025 139     Potassium 06/14/2025 4.0     Chloride 06/14/2025 106     Bicarbonate 06/14/2025 24     Anion Gap 06/14/2025 13     Urea Nitrogen 06/14/2025 15     Creatinine 06/14/2025 0.96     eGFR 06/14/2025 54 (L)     Calcium 06/14/2025 8.2 (L)     Albumin 06/14/2025 2.9 (L)     Alkaline Phosphatase 06/14/2025 94     Total Protein 06/14/2025 5.5 (L)     AST 06/14/2025 15     Bilirubin, Total 06/14/2025 0.5     ALT 06/14/2025 10     BNP 06/14/2025 357 (H)     Ventricular Rate 06/14/2025 66     Atrial Rate 06/14/2025 66     MN Interval 06/14/2025 172     QRS Duration 06/14/2025 80     QT Interval 06/14/2025 428     QTC Calculation(Bazett) 06/14/2025 448     P Axis 06/14/2025 64     R [place] 06/14/2025 -12     T Axis 06/14/2025 53     QRS Count 06/14/2025 11     Q Onset 06/14/2025 209     P Onset 06/14/2025 123     P Offset 06/14/2025 177     T Offset 06/14/2025 423     QTC Fredericia 06/14/2025 442     Troponin I, High Sensiti* 06/14/2025 13     Color, Urine 06/14/2025 Yellow     Appearance, Urine 06/14/2025 Clear     Specific Gravity, Urine 06/14/2025 1.017     pH, Urine 06/14/2025 5.5     Protein, Urine 06/14/2025 30 (1+) (A)     Glucose, Urine 06/14/2025 Normal     Blood, Urine 06/14/2025 NEGATIVE     Ketones, Urine 06/14/2025 NEGATIVE     Bilirubin, Urine 06/14/2025 NEGATIVE     Urobilinogen, Urine 06/14/2025 Normal     Nitrite, Urine 06/14/2025 NEGATIVE     Leukocyte Esterase, Urine 06/14/2025 75 Sarahi/uL (A)     Troponin I, High Sensiti* 06/14/2025 13     WBC, Urine 06/14/2025 21-50 (A)     RBC, Urine 06/14/2025 1-2     Squamous Epithelial Cell* 06/14/2025 1-9 (SPARSE)     Mucus, Urine 06/14/2025 FEW     Urine Culture 06/14/2025 No growth     Office Visit on 06/10/2025   Component Date Value    COLOR 06/11/2025 YELLOW     APPEARANCE 06/11/2025 CLEAR     SPECIFIC GRAVITY 06/11/2025 1.007     PH 06/11/2025 6.0     GLUCOSE 06/11/2025 NEGATIVE     BILIRUBIN 06/11/2025 NEGATIVE     KETONES 06/11/2025 NEGATIVE     OCCULT BLOOD 06/11/2025 NEGATIVE     PROTEIN 06/11/2025 TRACE (A)     NITRITE 06/11/2025 NEGATIVE     LEUKOCYTE ESTERASE 06/11/2025 1+ (A)     WBC 06/11/2025 10-20 (A)     RBC 06/11/2025 0-2     SQUAMOUS EPITHELIAL CELLS 06/11/2025 0-5     BACTERIA 06/11/2025 NONE SEEN     HYALINE CAST 06/11/2025 NONE SEEN     NOTE 06/11/2025      REFLEXIVE URINE CULTURE 06/11/2025      CULTURE, URINE, ROUTINE 06/11/2025 SEE NOTE     GLUCOSE 06/11/2025 109 (H)     UREA NITROGEN (BUN) 06/11/2025 10     CREATININE 06/11/2025 0.93     EGFR 06/11/2025 56 (L)     SODIUM 06/11/2025 140     POTASSIUM 06/11/2025 4.0     CHLORIDE 06/11/2025 102     CARBON DIOXIDE 06/11/2025 27     ELECTROLYTE BALANCE 06/11/2025 11     CALCIUM 06/11/2025 8.2 (L)     PROTEIN, TOTAL 06/11/2025 5.7 (L)     ALBUMIN 06/11/2025 3.1 (L)     BILIRUBIN, TOTAL 06/11/2025 0.5     ALKALINE PHOSPHATASE 06/11/2025 116     AST 06/11/2025 17     ALT 06/11/2025 12     WHITE BLOOD CELL COUNT 06/11/2025 17.7 (H)     RED BLOOD CELL COUNT 06/11/2025 3.17 (L)     HEMOGLOBIN 06/11/2025 9.7 (L)     HEMATOCRIT 06/11/2025 31.0 (L)     MCV 06/11/2025 97.8     MCH 06/11/2025 30.6     MCHC 06/11/2025 31.3 (L)     RDW 06/11/2025 13.5     PLATELET COUNT 06/11/2025 471 (H)     MPV 06/11/2025 10.1     ABSOLUTE NEUTROPHILS 06/11/2025 13,541 (H)     ABSOLUTE LYMPHOCYTES 06/11/2025 2,460     ABSOLUTE MONOCYTES 06/11/2025 1,540 (H)     ABSOLUTE EOSINOPHILS 06/11/2025 106     ABSOLUTE BASOPHILS 06/11/2025 53     NEUTROPHILS 06/11/2025 76.5     LYMPHOCYTES 06/11/2025 13.9     MONOCYTES 06/11/2025 8.7     EOSINOPHILS 06/11/2025 0.6     BASOPHILS 06/11/2025 0.3     VITAMIN B12 06/11/2025 1,616 (H)     VITAMIN D,25-OH,TOTAL,IA  06/11/2025 44     IRON, TOTAL 06/11/2025 26 (L)     IRON BINDING CAPACITY 06/11/2025 201 (L)     % SATURATION 06/11/2025 13 (L)    No results displayed because visit has over 200 results.           Assessment/Plan   Problem List[5]   This is a very pleasant 89-year-old female with history of hypertension.  Patient presents to my office for follow-up after recent hospitalization for shortness of breath COPD exacerbation as well as acute decompensated heart failure.  Patient feeling better after he was discharged from the hospital.  His labs from yesterday showing stable renal function.  Blood pressure remains elevated today.  Denies having any  major lower extremity edema orthopnea or PND.      Still having very minimal lower extremity edema.  JVP appears okay.  I will continue current medications.  Reviewed his labs from yesterday which showed stable renal function.  I will increase hydralazine to 100 mg twice daily to control better his blood pressure.  Otherwise continue other medications.  Will follow-up in 3 months.  Discussed with patient lifestyle modification salt restrictions.          David Monroy MD              [1]   Past Medical History:  Diagnosis Date    Diabetes (Multi)     Diabetes mellitus (Multi)     Disease of thyroid gland     Hypertension     Renal disease    [2]   Past Surgical History:  Procedure Laterality Date    APPENDECTOMY      BACK SURGERY      HYSTERECTOMY     [3]   Allergies  Allergen Reactions    Amlodipine Swelling    Amoxicillin Unknown    Amoxicillin-Pot Clavulanate Diarrhea    Glimepiride Diarrhea    Lisinopril Cough    Metformin Hcl Diarrhea    Tetracycline Hcl Diarrhea    Cephalexin Rash   [4]   Family History  Problem Relation Name Age of Onset    Heart disease Mother      Heart disease Father      Heart disease Brother      No Known Problems Son      Heart disease Maternal Grandmother      Heart disease Maternal Grandfather      Heart disease Paternal Grandmother      Heart  disease Paternal Grandfather     [5]   Patient Active Problem List  Diagnosis    Complication of diabetes mellitus (Multi)    Atherosclerotic heart disease of native coronary artery with other forms of angina pectoris    Degeneration of lumbar intervertebral disc    Type 2 diabetes mellitus, without long-term current use of insulin (Multi)    Elevated erythrocyte sedimentation rate    History of hysterectomy    Hyperlipidemia    Hypothyroid    Lichen sclerosus    Lumbar spondylosis    Osteoarthritis    Pulmonary nodule    Primary hypertension    Lumbosacral spondylosis without myelopathy    Vitamin D deficiency    Vulvodynia    Hyponatremia    Peripheral vascular disease    Supraventricular tachycardia    Stage 3b chronic kidney disease (Multi)    Dysequilibrium    Weakness    BPPV (benign paroxysmal positional vertigo), left    Chronic kidney disease, stage 3a (Multi)    Generalized weakness    Acute cystitis without hematuria    Sepsis due to Escherichia coli without acute organ dysfunction (Multi)    Acute on chronic renal failure    Hypomagnesemia    A-fib (Multi)    Acute on chronic diastolic (congestive) heart failure    Shortness of breath

## 2025-07-25 ENCOUNTER — HOME CARE VISIT (OUTPATIENT)
Dept: HOME HEALTH SERVICES | Facility: HOME HEALTH | Age: OVER 89
End: 2025-07-25
Payer: MEDICARE

## 2025-07-25 ENCOUNTER — TELEPHONE (OUTPATIENT)
Dept: CARDIOLOGY | Facility: CLINIC | Age: OVER 89
End: 2025-07-25
Payer: MEDICARE

## 2025-07-25 VITALS
DIASTOLIC BLOOD PRESSURE: 50 MMHG | TEMPERATURE: 96.7 F | HEART RATE: 76 BPM | SYSTOLIC BLOOD PRESSURE: 140 MMHG | BODY MASS INDEX: 29.29 KG/M2 | RESPIRATION RATE: 20 BRPM | OXYGEN SATURATION: 96 % | WEIGHT: 155 LBS

## 2025-07-25 PROCEDURE — G0300 HHS/HOSPICE OF LPN EA 15 MIN: HCPCS | Mod: HHH

## 2025-07-25 ASSESSMENT — ACTIVITIES OF DAILY LIVING (ADL)
CURRENT_FUNCTION: STAND BY ASSIST
AMBULATION ASSISTANCE: STAND BY ASSIST

## 2025-07-25 ASSESSMENT — ENCOUNTER SYMPTOMS
DENIES PAIN: 1
FATIGUES EASILY: 1
CHANGE IN APPETITE: UNCHANGED
APPETITE LEVEL: FAIR
LOWER EXTREMITY EDEMA: 1

## 2025-07-25 NOTE — TELEPHONE ENCOUNTER
Can you clarify if you wanted the hydralazine increased 100 mg po twice daily the patient thought you said just once daily?

## 2025-07-28 ENCOUNTER — RESULTS FOLLOW-UP (OUTPATIENT)
Dept: PRIMARY CARE | Facility: CLINIC | Age: OVER 89
End: 2025-07-28
Payer: MEDICARE

## 2025-07-28 NOTE — TELEPHONE ENCOUNTER
----- Message from Jose Alejandro Hamilton sent at 7/23/2025  9:35 AM EDT -----    ----- Message -----  From: [facility] In  Sent: 7/23/2025   9:06 AM EDT  To: Jose Alejandro Hamilton MD

## 2025-07-28 NOTE — TELEPHONE ENCOUNTER
Patient notified of recent lab results.    She went to see Dr. Monroy on Wednesday and her BP was elevated. She was told to increase her Hydralazine from 75 mg to 100 mg. She read her visit summary when she got home and it stated that she needs to take Hydralazine 100 mg twice a day. That is a big jump from 75 mg per day to 200 mg per day and she is concerned. She said she has tried to contact their office and has not received a response. She said her BP at home has been normal but was elevated at her appointment with him.

## 2025-07-29 ENCOUNTER — HOME CARE VISIT (OUTPATIENT)
Dept: HOME HEALTH SERVICES | Facility: HOME HEALTH | Age: OVER 89
End: 2025-07-29
Payer: MEDICARE

## 2025-07-29 VITALS
OXYGEN SATURATION: 99 % | RESPIRATION RATE: 16 BRPM | DIASTOLIC BLOOD PRESSURE: 70 MMHG | SYSTOLIC BLOOD PRESSURE: 162 MMHG | HEART RATE: 64 BPM | TEMPERATURE: 96.8 F

## 2025-07-29 PROCEDURE — G0300 HHS/HOSPICE OF LPN EA 15 MIN: HCPCS | Mod: HHH

## 2025-07-29 ASSESSMENT — ENCOUNTER SYMPTOMS
DENIES PAIN: 1
APPETITE LEVEL: FAIR
FATIGUES EASILY: 1
CHANGE IN APPETITE: UNCHANGED
LOWER EXTREMITY EDEMA: 1

## 2025-07-29 ASSESSMENT — ACTIVITIES OF DAILY LIVING (ADL)
AMBULATION ASSISTANCE: STAND BY ASSIST
CURRENT_FUNCTION: STAND BY ASSIST

## 2025-07-29 NOTE — TELEPHONE ENCOUNTER
Hydralazine taking 25 mg four time a day /70 with just once dose of the 25 mg   She wants to just do the 100 mg daily anyway,

## 2025-07-30 ENCOUNTER — PATIENT OUTREACH (OUTPATIENT)
Dept: CASE MANAGEMENT | Facility: HOSPITAL | Age: OVER 89
End: 2025-07-30
Payer: MEDICARE

## 2025-07-30 ENCOUNTER — HOME CARE VISIT (OUTPATIENT)
Dept: HOME HEALTH SERVICES | Facility: HOME HEALTH | Age: OVER 89
End: 2025-07-30
Payer: MEDICARE

## 2025-07-30 VITALS
SYSTOLIC BLOOD PRESSURE: 128 MMHG | TEMPERATURE: 97.5 F | OXYGEN SATURATION: 98 % | DIASTOLIC BLOOD PRESSURE: 52 MMHG | HEART RATE: 69 BPM

## 2025-07-30 PROCEDURE — G0157 HHC PT ASSISTANT EA 15: HCPCS | Mod: CQ,HHH

## 2025-07-30 ASSESSMENT — ENCOUNTER SYMPTOMS
PERSON REPORTING PAIN: PATIENT
DENIES PAIN: 1

## 2025-07-30 NOTE — TELEPHONE ENCOUNTER
I mention to her in my office that I want her to take the hydralazine 100 mg twice daily we may increase it even to 3 times daily if her blood pressure is not controlled.

## 2025-07-30 NOTE — PROGRESS NOTES
Heart Failure Nurse Navigator Transition of Care Phone Call    The role of the HF nurse navigator is to (1) characterize risk profiles of patients with heart failure transitioning from [id number] after hospitalization, (2) recommend interventions to improve care and reduce risks of worse post-hospitalization outcomes.     Assessment  Call attempted to patient . Spoke with patient and obtained the following information.    HF Symptoms  Chest pain? No  Shortness of breath? none  Orthopnea? No  Paroxysmal nocturnal dyspnea? No  Edema? No  Weight gain >2lbs in 3 days or 5lbs in 1 week? No    Medications  Is the patient prescribed the following medications?  ARNi/ACEi/ARB: None  BB: Metoprolol succinate 25 mg daily  MRA: spironolactone 25 mg daily  SGLT2i: Farxiga 5mg daily  Diuretic: Lasix PRN  Is the patient adherent to prescribed medications? YES    Management    Is the patient obtaining daily weights? YES  If yes, current weight? 150 lbs  Is the patient following diet limitations (2-3g Na, fluid restriction)? YES  Does the patient have a  cardiology follow-up scheduled? YES  If yes, appointment details? Saw Dr. Monroy on 7/23/25  If no, what is the reason?  Does the patient require HF education reinforcement? No  If yes, what was discussed?     Recommendations/Comments:  Spoke with Fartun briefly. She stated she is feeling well, taking her meds, weighing herself daily, eating a low salt diet and staying fairly active.     Next call out will be 8/6/25.    Katie D'Amico BSN, RN   Clinical Nurse Navigator, [facility]     Office: [phone]  [facility]

## 2025-07-31 ENCOUNTER — HOME CARE VISIT (OUTPATIENT)
Dept: HOME HEALTH SERVICES | Facility: HOME HEALTH | Age: OVER 89
End: 2025-07-31
Payer: MEDICARE

## 2025-07-31 VITALS
WEIGHT: 155 LBS | OXYGEN SATURATION: 97 % | TEMPERATURE: 96 F | BODY MASS INDEX: 29.29 KG/M2 | RESPIRATION RATE: 20 BRPM | SYSTOLIC BLOOD PRESSURE: 150 MMHG | DIASTOLIC BLOOD PRESSURE: 54 MMHG | HEART RATE: 64 BPM

## 2025-07-31 PROCEDURE — G0300 HHS/HOSPICE OF LPN EA 15 MIN: HCPCS | Mod: HHH

## 2025-07-31 ASSESSMENT — ENCOUNTER SYMPTOMS
LAST BOWEL MOVEMENT: 67417
STOOL FREQUENCY: DAILY
LOWER EXTREMITY EDEMA: 1
APPETITE LEVEL: FAIR
DENIES PAIN: 1
PERSON REPORTING PAIN: PATIENT
FATIGUES EASILY: 1
CHANGE IN APPETITE: UNCHANGED

## 2025-07-31 ASSESSMENT — ACTIVITIES OF DAILY LIVING (ADL)
AMBULATION ASSISTANCE: STAND BY ASSIST
CURRENT_FUNCTION: STAND BY ASSIST

## 2025-07-31 NOTE — TELEPHONE ENCOUNTER
Spoke with Fartun regarding Hydralazine. Informed of Dr. Nix advice. States she is taking the 100 mg twice a day as ordered.

## 2025-08-01 ENCOUNTER — PATIENT OUTREACH (OUTPATIENT)
Dept: PRIMARY CARE | Facility: CLINIC | Age: OVER 89
End: 2025-08-01
Payer: MEDICARE

## 2025-08-01 DIAGNOSIS — E11.65 TYPE 2 DIABETES MELLITUS WITH HYPERGLYCEMIA, WITHOUT LONG-TERM CURRENT USE OF INSULIN: Primary | ICD-10-CM

## 2025-08-01 RX ORDER — DAPAGLIFLOZIN 5 MG/1
5 TABLET, FILM COATED ORAL EVERY OTHER DAY
Qty: 90 TABLET | Refills: 3 | Status: SHIPPED | OUTPATIENT
Start: 2025-08-01 | End: 2026-07-27

## 2025-08-01 NOTE — PROGRESS NOTES
Successful outreach to patient regarding hospitalization as patient continues TCM program.   At time of outreach call the patient feels as if their condition has improved since initial visit with PCP or specialist.    Questions or concerns addressed at this time with patient.   Provided contact information to patient if any further non-emergent needs arise.      Patient continues to monitor eight daily and chart     Encouraged to call providers for any questions concerns or change in condition

## 2025-08-04 ENCOUNTER — HOME CARE VISIT (OUTPATIENT)
Dept: HOME HEALTH SERVICES | Facility: HOME HEALTH | Age: OVER 89
End: 2025-08-04
Payer: MEDICARE

## 2025-08-04 VITALS
HEART RATE: 68 BPM | TEMPERATURE: 97.3 F | SYSTOLIC BLOOD PRESSURE: 134 MMHG | OXYGEN SATURATION: 95 % | RESPIRATION RATE: 18 BRPM | DIASTOLIC BLOOD PRESSURE: 62 MMHG

## 2025-08-04 PROCEDURE — G0299 HHS/HOSPICE OF RN EA 15 MIN: HCPCS | Mod: HHH

## 2025-08-04 ASSESSMENT — ACTIVITIES OF DAILY LIVING (ADL)
CURRENT_FUNCTION: STAND BY ASSIST
AMBULATION ASSISTANCE: STAND BY ASSIST

## 2025-08-04 ASSESSMENT — ENCOUNTER SYMPTOMS
LAST BOWEL MOVEMENT: 67420
CHANGE IN APPETITE: INCREASED
SHORTNESS OF BREATH: 1
COUGH CHARACTERISTICS: DRY
PERSON REPORTING PAIN: PATIENT
DYSPNEA ACTIVITY LEVEL: AFTER AMBULATING MORE THAN 20 FT
APPETITE LEVEL: GOOD
COUGH: 1
DENIES PAIN: 1

## 2025-08-06 ENCOUNTER — HOME CARE VISIT (OUTPATIENT)
Dept: HOME HEALTH SERVICES | Facility: HOME HEALTH | Age: OVER 89
End: 2025-08-06
Payer: MEDICARE

## 2025-08-06 PROCEDURE — G0151 HHCP-SERV OF PT,EA 15 MIN: HCPCS | Mod: HHH

## 2025-08-06 ASSESSMENT — ENCOUNTER SYMPTOMS
DENIES PAIN: 1
PERSON REPORTING PAIN: PATIENT

## 2025-08-06 NOTE — HOME HEALTH
"Things are \"on the up.\"  I am feeling good.  Carmencita took good care of me.  Sunday I drove to [place], Tomorrow I will drive to lunch, Saturday I will drive to [employer] to get some donuts.   It has taken myself a while to get my confidence back but I am there.   90% self functional rating.    17 seconds TUG   Walking for 4 minute, 26 seconds wheeled walker, indoors on level carpet and tile surfaces. One step to enter and exit her home. Dog Hortencia at her feet. Standing, Sitting and supine exercises. Patient retains a copy of those exercises from recent home care visits."

## 2025-08-06 NOTE — Clinical Note
"Physical Therapy Discharge visit completed today.    Things are \"on the up.\"  I am feeling good.  Carmencita took good care of me.  Sunday I drove to [employer], Tomorrow I will drive to lunch, Saturday I will drive to [employer] to get some donuts.   It has taken myself a while to get my confidence back but I am there.   90% self functional rating.    17 seconds TUG   Walking for 4 minute, 26 seconds wheeled walker, indoors on level carpet and tile surfaces. One step to enter and exit her home. Dog Hortencia at her feet. Standing, Sitting and supine exercises. Patient retains a copy of those exercises from recent home care visits."

## 2025-08-11 ENCOUNTER — PATIENT OUTREACH (OUTPATIENT)
Dept: CASE MANAGEMENT | Facility: HOSPITAL | Age: OVER 89
End: 2025-08-11
Payer: MEDICARE

## 2025-08-11 ENCOUNTER — HOME CARE VISIT (OUTPATIENT)
Dept: HOME HEALTH SERVICES | Facility: HOME HEALTH | Age: OVER 89
End: 2025-08-11
Payer: MEDICARE

## 2025-08-11 VITALS
TEMPERATURE: 97.5 F | RESPIRATION RATE: 18 BRPM | SYSTOLIC BLOOD PRESSURE: 148 MMHG | OXYGEN SATURATION: 96 % | HEART RATE: 60 BPM | DIASTOLIC BLOOD PRESSURE: 62 MMHG

## 2025-08-11 PROCEDURE — G0299 HHS/HOSPICE OF RN EA 15 MIN: HCPCS | Mod: HHH

## 2025-08-11 ASSESSMENT — ACTIVITIES OF DAILY LIVING (ADL)
HOME_HEALTH_OASIS: 00
OASIS_M1830: 00

## 2025-08-11 ASSESSMENT — ENCOUNTER SYMPTOMS
PERSON REPORTING PAIN: PATIENT
DENIES PAIN: 1

## 2025-08-14 ENCOUNTER — TELEPHONE (OUTPATIENT)
Dept: PRIMARY CARE | Facility: CLINIC | Age: OVER 89
End: 2025-08-14
Payer: MEDICARE

## 2025-08-14 ENCOUNTER — TELEPHONE (OUTPATIENT)
Dept: CARDIOLOGY | Facility: CLINIC | Age: OVER 89
End: 2025-08-14
Payer: MEDICARE

## 2025-08-19 ENCOUNTER — HOSPITAL ENCOUNTER (OUTPATIENT)
Dept: RADIOLOGY | Facility: HOSPITAL | Age: OVER 89
Discharge: HOME | End: 2025-08-19
Payer: MEDICARE

## 2025-08-19 DIAGNOSIS — R91.8 PULMONARY NODULES: ICD-10-CM

## 2025-08-19 PROCEDURE — 71250 CT THORAX DX C-: CPT

## 2025-08-19 PROCEDURE — 71250 CT THORAX DX C-: CPT | Performed by: RADIOLOGY

## 2025-08-20 DIAGNOSIS — R91.1 PULMONARY NODULE: Primary | ICD-10-CM

## 2025-08-28 ENCOUNTER — APPOINTMENT (OUTPATIENT)
Dept: NEUROLOGY | Facility: CLINIC | Age: OVER 89
End: 2025-08-28
Payer: MEDICARE

## 2025-08-28 VITALS
SYSTOLIC BLOOD PRESSURE: 131 MMHG | HEART RATE: 66 BPM | BODY MASS INDEX: 29.07 KG/M2 | HEIGHT: 61 IN | WEIGHT: 154 LBS | TEMPERATURE: 96 F | DIASTOLIC BLOOD PRESSURE: 54 MMHG

## 2025-08-28 DIAGNOSIS — H81.12 BENIGN PAROXYSMAL POSITIONAL VERTIGO OF LEFT EAR: ICD-10-CM

## 2025-08-28 PROCEDURE — 3075F SYST BP GE 130 - 139MM HG: CPT | Performed by: STUDENT IN AN ORGANIZED HEALTH CARE EDUCATION/TRAINING PROGRAM

## 2025-08-28 PROCEDURE — 1160F RVW MEDS BY RX/DR IN RCRD: CPT | Performed by: STUDENT IN AN ORGANIZED HEALTH CARE EDUCATION/TRAINING PROGRAM

## 2025-08-28 PROCEDURE — 3078F DIAST BP <80 MM HG: CPT | Performed by: STUDENT IN AN ORGANIZED HEALTH CARE EDUCATION/TRAINING PROGRAM

## 2025-08-28 PROCEDURE — 1126F AMNT PAIN NOTED NONE PRSNT: CPT | Performed by: STUDENT IN AN ORGANIZED HEALTH CARE EDUCATION/TRAINING PROGRAM

## 2025-08-28 PROCEDURE — 1159F MED LIST DOCD IN RCRD: CPT | Performed by: STUDENT IN AN ORGANIZED HEALTH CARE EDUCATION/TRAINING PROGRAM

## 2025-08-28 PROCEDURE — 99214 OFFICE O/P EST MOD 30 MIN: CPT | Performed by: STUDENT IN AN ORGANIZED HEALTH CARE EDUCATION/TRAINING PROGRAM

## 2025-08-28 PROCEDURE — 1036F TOBACCO NON-USER: CPT | Performed by: STUDENT IN AN ORGANIZED HEALTH CARE EDUCATION/TRAINING PROGRAM

## 2025-08-28 ASSESSMENT — PAIN SCALES - GENERAL: PAINLEVEL_OUTOF10: 0-NO PAIN

## 2025-09-15 ENCOUNTER — APPOINTMENT (OUTPATIENT)
Dept: CARDIOLOGY | Facility: CLINIC | Age: OVER 89
End: 2025-09-15
Payer: MEDICARE

## 2025-10-15 ENCOUNTER — APPOINTMENT (OUTPATIENT)
Dept: PODIATRY | Facility: CLINIC | Age: OVER 89
End: 2025-10-15
Payer: MEDICARE

## 2025-11-04 ENCOUNTER — APPOINTMENT (OUTPATIENT)
Dept: CARDIOLOGY | Facility: CLINIC | Age: OVER 89
End: 2025-11-04
Payer: MEDICARE